# Patient Record
Sex: MALE | Race: WHITE | NOT HISPANIC OR LATINO | Employment: OTHER | URBAN - METROPOLITAN AREA
[De-identification: names, ages, dates, MRNs, and addresses within clinical notes are randomized per-mention and may not be internally consistent; named-entity substitution may affect disease eponyms.]

---

## 2019-11-15 ENCOUNTER — HOSPITAL ENCOUNTER (EMERGENCY)
Facility: HOSPITAL | Age: 59
Discharge: HOME | End: 2019-11-16
Attending: EMERGENCY MEDICINE
Payer: COMMERCIAL

## 2019-11-15 DIAGNOSIS — R06.02 SOB (SHORTNESS OF BREATH): ICD-10-CM

## 2019-11-15 DIAGNOSIS — R10.9 FLANK PAIN: Primary | ICD-10-CM

## 2019-11-15 DIAGNOSIS — N20.0 KIDNEY STONE: ICD-10-CM

## 2019-11-15 PROCEDURE — 93005 ELECTROCARDIOGRAM TRACING: CPT

## 2019-11-15 PROCEDURE — 99285 EMERGENCY DEPT VISIT HI MDM: CPT | Mod: 25

## 2019-11-16 VITALS
TEMPERATURE: 97 F | HEART RATE: 88 BPM | WEIGHT: 225 LBS | RESPIRATION RATE: 18 BRPM | BODY MASS INDEX: 33.33 KG/M2 | DIASTOLIC BLOOD PRESSURE: 90 MMHG | OXYGEN SATURATION: 99 % | SYSTOLIC BLOOD PRESSURE: 166 MMHG | HEIGHT: 69 IN

## 2019-11-16 LAB
ALBUMIN SERPL BCP-MCNC: 4.2 G/DL (ref 3.5–5.2)
ALP SERPL-CCNC: 58 U/L (ref 55–135)
ALT SERPL W/O P-5'-P-CCNC: 28 U/L (ref 10–44)
ANION GAP SERPL CALC-SCNC: 11 MMOL/L (ref 8–16)
AST SERPL-CCNC: 22 U/L (ref 10–40)
BACTERIA #/AREA URNS HPF: NEGATIVE /HPF
BASOPHILS # BLD AUTO: 0.08 K/UL (ref 0–0.2)
BASOPHILS NFR BLD: 0.5 % (ref 0–1.9)
BILIRUB SERPL-MCNC: 1 MG/DL (ref 0.1–1)
BILIRUB UR QL STRIP: NEGATIVE
BUN SERPL-MCNC: 16 MG/DL (ref 6–20)
CALCIUM SERPL-MCNC: 8.8 MG/DL (ref 8.7–10.5)
CHLORIDE SERPL-SCNC: 103 MMOL/L (ref 95–110)
CLARITY UR: CLEAR
CO2 SERPL-SCNC: 23 MMOL/L (ref 23–29)
COLOR UR: YELLOW
CREAT SERPL-MCNC: 1.3 MG/DL (ref 0.5–1.4)
DIFFERENTIAL METHOD: ABNORMAL
EOSINOPHIL # BLD AUTO: 0.4 K/UL (ref 0–0.5)
EOSINOPHIL NFR BLD: 2.2 % (ref 0–8)
ERYTHROCYTE [DISTWIDTH] IN BLOOD BY AUTOMATED COUNT: 13.2 % (ref 11.5–14.5)
EST. GFR  (AFRICAN AMERICAN): >60 ML/MIN/1.73 M^2
EST. GFR  (NON AFRICAN AMERICAN): 59.7 ML/MIN/1.73 M^2
GLUCOSE SERPL-MCNC: 254 MG/DL (ref 70–110)
GLUCOSE UR QL STRIP: ABNORMAL
HCT VFR BLD AUTO: 46.6 % (ref 40–54)
HGB BLD-MCNC: 15.2 G/DL (ref 14–18)
HGB UR QL STRIP: ABNORMAL
HYALINE CASTS #/AREA URNS LPF: 1 /LPF
IMM GRANULOCYTES # BLD AUTO: 0.06 K/UL (ref 0–0.04)
IMM GRANULOCYTES NFR BLD AUTO: 0.4 % (ref 0–0.5)
KETONES UR QL STRIP: ABNORMAL
LEUKOCYTE ESTERASE UR QL STRIP: NEGATIVE
LYMPHOCYTES # BLD AUTO: 0.9 K/UL (ref 1–4.8)
LYMPHOCYTES NFR BLD: 5.8 % (ref 18–48)
MCH RBC QN AUTO: 29.1 PG (ref 27–31)
MCHC RBC AUTO-ENTMCNC: 32.6 G/DL (ref 32–36)
MCV RBC AUTO: 89 FL (ref 82–98)
MICROSCOPIC COMMENT: ABNORMAL
MONOCYTES # BLD AUTO: 0.9 K/UL (ref 0.3–1)
MONOCYTES NFR BLD: 5.3 % (ref 4–15)
NEUTROPHILS # BLD AUTO: 13.9 K/UL (ref 1.8–7.7)
NEUTROPHILS NFR BLD: 85.8 % (ref 38–73)
NITRITE UR QL STRIP: NEGATIVE
NRBC BLD-RTO: 0 /100 WBC
PH UR STRIP: 7 [PH] (ref 5–8)
PLATELET # BLD AUTO: 231 K/UL (ref 150–350)
PMV BLD AUTO: 11 FL (ref 9.2–12.9)
POTASSIUM SERPL-SCNC: 3.9 MMOL/L (ref 3.5–5.1)
PROT SERPL-MCNC: 6.5 G/DL (ref 6–8.4)
PROT UR QL STRIP: NEGATIVE
RBC # BLD AUTO: 5.22 M/UL (ref 4.6–6.2)
RBC #/AREA URNS HPF: 6 /HPF (ref 0–4)
SODIUM SERPL-SCNC: 137 MMOL/L (ref 136–145)
SP GR UR STRIP: 1 (ref 1–1.03)
SQUAMOUS #/AREA URNS HPF: 1 /HPF
URN SPEC COLLECT METH UR: ABNORMAL
UROBILINOGEN UR STRIP-ACNC: NEGATIVE EU/DL
WBC # BLD AUTO: 16.2 K/UL (ref 3.9–12.7)
WBC #/AREA URNS HPF: 1 /HPF (ref 0–5)
YEAST URNS QL MICRO: ABNORMAL

## 2019-11-16 PROCEDURE — 80053 COMPREHEN METABOLIC PANEL: CPT

## 2019-11-16 PROCEDURE — 81001 URINALYSIS AUTO W/SCOPE: CPT

## 2019-11-16 PROCEDURE — 85025 COMPLETE CBC W/AUTO DIFF WBC: CPT

## 2019-11-16 RX ORDER — TAMSULOSIN HYDROCHLORIDE 0.4 MG/1
0.4 CAPSULE ORAL DAILY
Qty: 5 CAPSULE | Refills: 0 | Status: SHIPPED | OUTPATIENT
Start: 2019-11-16 | End: 2022-07-19

## 2019-11-16 RX ORDER — IBUPROFEN 600 MG/1
600 TABLET ORAL EVERY 6 HOURS PRN
Qty: 20 TABLET | Refills: 0 | Status: ON HOLD | OUTPATIENT
Start: 2019-11-16 | End: 2022-07-19 | Stop reason: HOSPADM

## 2019-11-16 RX ORDER — ONDANSETRON 4 MG/1
4 TABLET, FILM COATED ORAL EVERY 6 HOURS
Qty: 12 TABLET | Refills: 0 | Status: ON HOLD | OUTPATIENT
Start: 2019-11-16 | End: 2022-07-19 | Stop reason: HOSPADM

## 2019-11-16 NOTE — ED NOTES
Patient identifiers for Matteo Fraire checked and correct.  LOC:  Matteo Fraire is awake, alert, and aware of environment with an appropriate affect. He is oriented x 3 and speaking appropriately.  APPEARANCE:  He is restless and in no acute distress. He is clean and well groomed, patient's clothing is properly fastened.  SKIN:  The skin is warm and dry. He has normal skin turgor and moist mucus membranes. Skin is intact; no bruising.   MUSCULOSKELETAL:  He is moving all extremities well, no obvious deformities noted. Pulses intact.   RESPIRATORY:  Airway is open and patent. Respirations are spontaneous and non-labored with normal effort and rate.  CARDIAC:  He has a normal rate and rhythm. No peripheral edema noted. Capillary refill < 3 seconds.  ABDOMEN:  No distention noted.  Soft and non-tender upon palpation. Pt reports abdominal pain to the lower left quadrant.   NEUROLOGICAL:  PERRL. Facial expression is symmetrical. Hand grasps are equal bilaterally. Normal sensation in all extremities when touched with finger.  Allergies reported:  Review of patient's allergies indicates:  No Known Allergies  OTHER NOTES:  Pt stated pain is a 7/10

## 2019-11-19 ENCOUNTER — HOSPITAL ENCOUNTER (OUTPATIENT)
Dept: RADIOLOGY | Facility: HOSPITAL | Age: 59
Discharge: HOME | End: 2019-11-19
Attending: SPECIALIST
Payer: COMMERCIAL

## 2019-11-19 DIAGNOSIS — N20.1 CALCULUS OF URETER: ICD-10-CM

## 2019-11-19 DIAGNOSIS — N20.1 CALCULUS OF URETER: Primary | ICD-10-CM

## 2019-11-19 PROCEDURE — 74018 RADEX ABDOMEN 1 VIEW: CPT | Mod: TC,PO

## 2019-11-20 ENCOUNTER — LAB VISIT (OUTPATIENT)
Dept: LAB | Facility: HOSPITAL | Age: 59
End: 2019-11-20
Attending: SPECIALIST
Payer: COMMERCIAL

## 2019-11-20 ENCOUNTER — HOSPITAL ENCOUNTER (OUTPATIENT)
Dept: RADIOLOGY | Facility: HOSPITAL | Age: 59
Discharge: HOME | End: 2019-11-20
Attending: SPECIALIST
Payer: COMMERCIAL

## 2019-11-20 ENCOUNTER — HOSPITAL ENCOUNTER (OUTPATIENT)
Dept: PREADMISSION TESTING | Facility: HOSPITAL | Age: 59
Discharge: HOME | End: 2019-11-20
Attending: SPECIALIST
Payer: COMMERCIAL

## 2019-11-20 VITALS
SYSTOLIC BLOOD PRESSURE: 159 MMHG | RESPIRATION RATE: 20 BRPM | DIASTOLIC BLOOD PRESSURE: 92 MMHG | WEIGHT: 231.5 LBS | BODY MASS INDEX: 34.29 KG/M2 | TEMPERATURE: 97 F | OXYGEN SATURATION: 97 % | HEART RATE: 94 BPM | HEIGHT: 69 IN

## 2019-11-20 DIAGNOSIS — Z41.9 SURGERY, ELECTIVE: Primary | ICD-10-CM

## 2019-11-20 DIAGNOSIS — N20.1 LEFT URETERAL STONE: ICD-10-CM

## 2019-11-20 DIAGNOSIS — N20.1 LEFT URETERAL STONE: Primary | ICD-10-CM

## 2019-11-20 LAB
ALBUMIN SERPL BCP-MCNC: 4.3 G/DL (ref 3.5–5.2)
ALP SERPL-CCNC: 57 U/L (ref 55–135)
ALT SERPL W/O P-5'-P-CCNC: 22 U/L (ref 10–44)
ANION GAP SERPL CALC-SCNC: 10 MMOL/L (ref 8–16)
APTT PPP: 29.2 SEC (ref 23.6–33.3)
AST SERPL-CCNC: 18 U/L (ref 10–40)
BACTERIA #/AREA URNS HPF: NEGATIVE /HPF
BASOPHILS # BLD AUTO: 0.07 K/UL (ref 0–0.2)
BASOPHILS NFR BLD: 0.8 % (ref 0–1.9)
BILIRUB SERPL-MCNC: 1 MG/DL (ref 0.1–1)
BILIRUB UR QL STRIP: NEGATIVE
BUN SERPL-MCNC: 16 MG/DL (ref 6–20)
CALCIUM SERPL-MCNC: 9.4 MG/DL (ref 8.7–10.5)
CHLORIDE SERPL-SCNC: 101 MMOL/L (ref 95–110)
CLARITY UR: CLEAR
CO2 SERPL-SCNC: 30 MMOL/L (ref 23–29)
COLOR UR: YELLOW
COMPLEXED PSA SERPL-MCNC: 0.44 NG/ML (ref 0–4)
CREAT SERPL-MCNC: 1.2 MG/DL (ref 0.5–1.4)
DIFFERENTIAL METHOD: ABNORMAL
EOSINOPHIL # BLD AUTO: 0.9 K/UL (ref 0–0.5)
EOSINOPHIL NFR BLD: 11.3 % (ref 0–8)
ERYTHROCYTE [DISTWIDTH] IN BLOOD BY AUTOMATED COUNT: 13 % (ref 11.5–14.5)
EST. GFR  (AFRICAN AMERICAN): >60 ML/MIN/1.73 M^2
EST. GFR  (NON AFRICAN AMERICAN): >60 ML/MIN/1.73 M^2
GLUCOSE SERPL-MCNC: 133 MG/DL (ref 70–110)
GLUCOSE UR QL STRIP: NEGATIVE
HCT VFR BLD AUTO: 45.9 % (ref 40–54)
HGB BLD-MCNC: 15 G/DL (ref 14–18)
HGB UR QL STRIP: ABNORMAL
HYALINE CASTS #/AREA URNS LPF: 2 /LPF
IMM GRANULOCYTES # BLD AUTO: 0.03 K/UL (ref 0–0.04)
IMM GRANULOCYTES NFR BLD AUTO: 0.4 % (ref 0–0.5)
INR PPP: 1.1
KETONES UR QL STRIP: NEGATIVE
LEUKOCYTE ESTERASE UR QL STRIP: NEGATIVE
LYMPHOCYTES # BLD AUTO: 1.6 K/UL (ref 1–4.8)
LYMPHOCYTES NFR BLD: 19.1 % (ref 18–48)
MCH RBC QN AUTO: 29.4 PG (ref 27–31)
MCHC RBC AUTO-ENTMCNC: 32.7 G/DL (ref 32–36)
MCV RBC AUTO: 90 FL (ref 82–98)
MICROSCOPIC COMMENT: ABNORMAL
MONOCYTES # BLD AUTO: 0.5 K/UL (ref 0.3–1)
MONOCYTES NFR BLD: 6.2 % (ref 4–15)
NEUTROPHILS # BLD AUTO: 5.2 K/UL (ref 1.8–7.7)
NEUTROPHILS NFR BLD: 62.2 % (ref 38–73)
NITRITE UR QL STRIP: NEGATIVE
NRBC BLD-RTO: 0 /100 WBC
PH UR STRIP: 6 [PH] (ref 5–8)
PLATELET # BLD AUTO: 258 K/UL (ref 150–350)
PMV BLD AUTO: 11.3 FL (ref 9.2–12.9)
POTASSIUM SERPL-SCNC: 4 MMOL/L (ref 3.5–5.1)
PROT SERPL-MCNC: 6.7 G/DL (ref 6–8.4)
PROT UR QL STRIP: ABNORMAL
PROTHROMBIN TIME: 13.4 SEC (ref 10.6–14.8)
RBC # BLD AUTO: 5.1 M/UL (ref 4.6–6.2)
RBC #/AREA URNS HPF: 6 /HPF (ref 0–4)
SODIUM SERPL-SCNC: 141 MMOL/L (ref 136–145)
SP GR UR STRIP: 1.01 (ref 1–1.03)
SQUAMOUS #/AREA URNS HPF: 0 /HPF
URN SPEC COLLECT METH UR: ABNORMAL
UROBILINOGEN UR STRIP-ACNC: ABNORMAL EU/DL
WBC # BLD AUTO: 8.33 K/UL (ref 3.9–12.7)
WBC #/AREA URNS HPF: 3 /HPF (ref 0–5)

## 2019-11-20 PROCEDURE — 81001 URINALYSIS AUTO W/SCOPE: CPT

## 2019-11-20 PROCEDURE — 36415 COLL VENOUS BLD VENIPUNCTURE: CPT

## 2019-11-20 PROCEDURE — 85025 COMPLETE CBC W/AUTO DIFF WBC: CPT

## 2019-11-20 PROCEDURE — 84153 ASSAY OF PSA TOTAL: CPT

## 2019-11-20 PROCEDURE — 80053 COMPREHEN METABOLIC PANEL: CPT

## 2019-11-20 PROCEDURE — 85610 PROTHROMBIN TIME: CPT

## 2019-11-20 PROCEDURE — 85730 THROMBOPLASTIN TIME PARTIAL: CPT

## 2019-11-20 PROCEDURE — 71046 X-RAY EXAM CHEST 2 VIEWS: CPT | Mod: TC

## 2019-11-20 RX ORDER — CEFAZOLIN SODIUM 1 G/3ML
2 INJECTION, POWDER, FOR SOLUTION INTRAMUSCULAR; INTRAVENOUS ONCE
Status: CANCELLED | OUTPATIENT
Start: 2019-11-21

## 2019-11-20 NOTE — DISCHARGE INSTRUCTIONS
To confirm, Your doctor has instructed you that surgery is scheduled for: November 21, 2019    Please report to [facility] on [address] the morning of surgery.     Pre-Op will call the afternoon prior to surgery between 4:00 and 6:00 PM with the final arrival time.      PLEASE NOTE:  The surgery schedule has many variables which may affect the time of your surgery case.  Family members should be available if your surgery time changes.  Plan to be here the day of your procedure between 4-6 hours.    MEDICATIONS:  TAKE ONLY THESE MEDICATIONS WITH A SMALL SIP OF WATER THE MORNING OF YOUR PROCEDURE: None      DO NOT TAKE THESE MEDICATIONS 5-7 DAYS PRIOR to your procedure or per your surgeon's request: ASPIRIN, ALEVE, ADVIL, IBUPROFEN, FISH OIL VITAMIN E, HERBALS  (May take Tylenol)    ONLY if you are prescribed any types of blood thinners such as:  Aspirin, Coumadin, Plavix, Pradaxa, Xarelto, Aggrenox, Effient, Eliquis, Savasya, Brilinta, or any other, ask your surgeon whether you should stop taking them and how long before surgery you should stop.  You may also need to verify with the prescribing physician if it is ok to stop your medication.      INSTRUCTIONS IMPORTANT!!  · Do not eat or drink anything between midnight and the time of your procedure- this includes gum, mints, and candy.  · Shower the night before AND the morning of your procedure with a Chlorhexidine wash such as Hibiclens or Dial antibacterial soap from the neck down.  Do not get it on your face or in your eyes.  You may use your own shampoo and face wash. This helps your skin to be as bacteria free as possible.    · If you wear contact lenses, dentures, hearing aids or glasses, bring a container to put them in during surgery and give to a family member for safe keeping.  Please leave all jewelry, piercing's and valuables at home.   · DO NOT remove hair from the surgery site.  Do not shave the incision site unless you are given specific  instructions to do so.    · Make arrangements in advance for transportation home by a responsible adult.  · You must make arrangements for transportation, TAXI'S, UBER'S OR LYFTS ARE NOT ALLOWED.      If you have any questions about these instructions, call Pre-Op Admit  Nursing at [phone] or the Pre-Op Day Surgery Unit at [phone].

## 2019-11-21 ENCOUNTER — ANESTHESIA EVENT (OUTPATIENT)
Dept: SURGERY | Facility: HOSPITAL | Age: 59
End: 2019-11-21
Payer: COMMERCIAL

## 2019-11-21 ENCOUNTER — HOSPITAL ENCOUNTER (OUTPATIENT)
Facility: HOSPITAL | Age: 59
Discharge: HOME | End: 2019-11-21
Attending: SPECIALIST | Admitting: SPECIALIST
Payer: COMMERCIAL

## 2019-11-21 ENCOUNTER — ANESTHESIA (OUTPATIENT)
Dept: SURGERY | Facility: HOSPITAL | Age: 59
End: 2019-11-21
Payer: COMMERCIAL

## 2019-11-21 VITALS
SYSTOLIC BLOOD PRESSURE: 172 MMHG | WEIGHT: 231.5 LBS | HEIGHT: 69 IN | HEART RATE: 76 BPM | DIASTOLIC BLOOD PRESSURE: 98 MMHG | RESPIRATION RATE: 16 BRPM | OXYGEN SATURATION: 98 % | BODY MASS INDEX: 34.29 KG/M2 | TEMPERATURE: 98 F

## 2019-11-21 DIAGNOSIS — N20.0 RENAL STONE: Primary | ICD-10-CM

## 2019-11-21 DIAGNOSIS — Z41.9 SURGERY, ELECTIVE: ICD-10-CM

## 2019-11-21 PROCEDURE — 71000039 HC RECOVERY, EACH ADD'L HOUR: Performed by: SPECIALIST

## 2019-11-21 PROCEDURE — 27000671 HC TUBING MICROBORE EXT: Performed by: STUDENT IN AN ORGANIZED HEALTH CARE EDUCATION/TRAINING PROGRAM

## 2019-11-21 PROCEDURE — 71000015 HC POSTOP RECOV 1ST HR: Performed by: SPECIALIST

## 2019-11-21 PROCEDURE — 71000016 HC POSTOP RECOV ADDL HR: Performed by: SPECIALIST

## 2019-11-21 PROCEDURE — 27000653 HC CATH, IV CATHLN: Performed by: STUDENT IN AN ORGANIZED HEALTH CARE EDUCATION/TRAINING PROGRAM

## 2019-11-21 PROCEDURE — 36000706: Performed by: SPECIALIST

## 2019-11-21 PROCEDURE — 63600175 PHARM REV CODE 636 W HCPCS: Performed by: NURSE ANESTHETIST, CERTIFIED REGISTERED

## 2019-11-21 PROCEDURE — 27202105 HC BIS BILATERAL SENSOR: Performed by: STUDENT IN AN ORGANIZED HEALTH CARE EDUCATION/TRAINING PROGRAM

## 2019-11-21 PROCEDURE — 63600175 PHARM REV CODE 636 W HCPCS: Performed by: ANESTHESIOLOGY

## 2019-11-21 PROCEDURE — 36000707: Performed by: SPECIALIST

## 2019-11-21 PROCEDURE — 27202103: Performed by: STUDENT IN AN ORGANIZED HEALTH CARE EDUCATION/TRAINING PROGRAM

## 2019-11-21 PROCEDURE — 37000009 HC ANESTHESIA EA ADD 15 MINS: Performed by: SPECIALIST

## 2019-11-21 PROCEDURE — 25000003 PHARM REV CODE 250: Performed by: ANESTHESIOLOGY

## 2019-11-21 PROCEDURE — 71000033 HC RECOVERY, INTIAL HOUR: Performed by: SPECIALIST

## 2019-11-21 PROCEDURE — 37000008 HC ANESTHESIA 1ST 15 MINUTES: Performed by: SPECIALIST

## 2019-11-21 PROCEDURE — 27000651 HC AIRWAY WILLIAMS: Performed by: STUDENT IN AN ORGANIZED HEALTH CARE EDUCATION/TRAINING PROGRAM

## 2019-11-21 PROCEDURE — 27200651 HC AIRWAY, LMA: Performed by: STUDENT IN AN ORGANIZED HEALTH CARE EDUCATION/TRAINING PROGRAM

## 2019-11-21 PROCEDURE — 27000673 HC TUBING BLOOD Y: Performed by: STUDENT IN AN ORGANIZED HEALTH CARE EDUCATION/TRAINING PROGRAM

## 2019-11-21 RX ORDER — SODIUM CHLORIDE 0.9 % (FLUSH) 0.9 %
10 SYRINGE (ML) INJECTION
Status: DISCONTINUED | OUTPATIENT
Start: 2019-11-21 | End: 2019-11-21 | Stop reason: HOSPADM

## 2019-11-21 RX ORDER — ACETAMINOPHEN 500 MG
1000 TABLET ORAL ONCE
Status: COMPLETED | OUTPATIENT
Start: 2019-11-21 | End: 2019-11-21

## 2019-11-21 RX ORDER — LIDOCAINE HCL/PF 100 MG/5ML
SYRINGE (ML) INTRAVENOUS
Status: DISCONTINUED | OUTPATIENT
Start: 2019-11-21 | End: 2019-11-21

## 2019-11-21 RX ORDER — DIPHENHYDRAMINE HYDROCHLORIDE 50 MG/ML
12.5 INJECTION INTRAMUSCULAR; INTRAVENOUS
Status: DISCONTINUED | OUTPATIENT
Start: 2019-11-21 | End: 2019-11-21 | Stop reason: HOSPADM

## 2019-11-21 RX ORDER — ONDANSETRON 2 MG/ML
4 INJECTION INTRAMUSCULAR; INTRAVENOUS DAILY PRN
Status: DISCONTINUED | OUTPATIENT
Start: 2019-11-21 | End: 2019-11-21 | Stop reason: HOSPADM

## 2019-11-21 RX ORDER — PROPOFOL 10 MG/ML
INJECTION, EMULSION INTRAVENOUS
Status: DISCONTINUED | OUTPATIENT
Start: 2019-11-21 | End: 2019-11-21

## 2019-11-21 RX ORDER — OXYCODONE HYDROCHLORIDE 5 MG/1
5 TABLET ORAL
Status: DISCONTINUED | OUTPATIENT
Start: 2019-11-21 | End: 2019-11-21 | Stop reason: HOSPADM

## 2019-11-21 RX ORDER — CEFAZOLIN SODIUM 1 G/3ML
2 INJECTION, POWDER, FOR SOLUTION INTRAMUSCULAR; INTRAVENOUS ONCE
Status: DISCONTINUED | OUTPATIENT
Start: 2019-11-21 | End: 2019-11-21 | Stop reason: HOSPADM

## 2019-11-21 RX ORDER — HYDROCODONE BITARTRATE AND ACETAMINOPHEN 5; 325 MG/1; MG/1
1 TABLET ORAL EVERY 4 HOURS PRN
Status: DISCONTINUED | OUTPATIENT
Start: 2019-11-21 | End: 2019-11-21 | Stop reason: HOSPADM

## 2019-11-21 RX ORDER — NAPROXEN SODIUM 220 MG/1
81 TABLET, FILM COATED ORAL DAILY
COMMUNITY

## 2019-11-21 RX ORDER — HYDROCODONE BITARTRATE AND ACETAMINOPHEN 5; 325 MG/1; MG/1
1 TABLET ORAL EVERY 4 HOURS PRN
Qty: 12 TABLET | Refills: 0 | Status: ON HOLD
Start: 2019-11-21 | End: 2022-07-19 | Stop reason: HOSPADM

## 2019-11-21 RX ORDER — MEPERIDINE HYDROCHLORIDE 50 MG/ML
12.5 INJECTION INTRAMUSCULAR; INTRAVENOUS; SUBCUTANEOUS EVERY 10 MIN PRN
Status: DISCONTINUED | OUTPATIENT
Start: 2019-11-21 | End: 2019-11-21 | Stop reason: HOSPADM

## 2019-11-21 RX ORDER — FENTANYL CITRATE 50 UG/ML
INJECTION, SOLUTION INTRAMUSCULAR; INTRAVENOUS
Status: DISCONTINUED | OUTPATIENT
Start: 2019-11-21 | End: 2019-11-21

## 2019-11-21 RX ORDER — HYDRALAZINE HYDROCHLORIDE 20 MG/ML
10 INJECTION INTRAMUSCULAR; INTRAVENOUS ONCE
Status: COMPLETED | OUTPATIENT
Start: 2019-11-21 | End: 2019-11-21

## 2019-11-21 RX ORDER — FENTANYL CITRATE 50 UG/ML
25 INJECTION, SOLUTION INTRAMUSCULAR; INTRAVENOUS
Status: DISCONTINUED | OUTPATIENT
Start: 2019-11-21 | End: 2019-11-21 | Stop reason: HOSPADM

## 2019-11-21 RX ORDER — SODIUM CHLORIDE, SODIUM LACTATE, POTASSIUM CHLORIDE, CALCIUM CHLORIDE 600; 310; 30; 20 MG/100ML; MG/100ML; MG/100ML; MG/100ML
INJECTION, SOLUTION INTRAVENOUS CONTINUOUS PRN
Status: DISCONTINUED | OUTPATIENT
Start: 2019-11-21 | End: 2019-11-21

## 2019-11-21 RX ORDER — MIDAZOLAM HYDROCHLORIDE 1 MG/ML
INJECTION, SOLUTION INTRAMUSCULAR; INTRAVENOUS
Status: DISCONTINUED | OUTPATIENT
Start: 2019-11-21 | End: 2019-11-21

## 2019-11-21 RX ORDER — ONDANSETRON 2 MG/ML
INJECTION INTRAMUSCULAR; INTRAVENOUS
Status: DISCONTINUED | OUTPATIENT
Start: 2019-11-21 | End: 2019-11-21

## 2019-11-21 RX ORDER — GABAPENTIN 100 MG/1
100 CAPSULE ORAL ONCE
Status: COMPLETED | OUTPATIENT
Start: 2019-11-21 | End: 2019-11-21

## 2019-11-21 RX ADMIN — PROPOFOL 160 MG: 10 INJECTION, EMULSION INTRAVENOUS at 11:11

## 2019-11-21 RX ADMIN — HYDRALAZINE HYDROCHLORIDE 10 MG: 20 INJECTION INTRAMUSCULAR; INTRAVENOUS at 12:11

## 2019-11-21 RX ADMIN — FENTANYL CITRATE 100 MCG: 50 INJECTION INTRAMUSCULAR; INTRAVENOUS at 11:11

## 2019-11-21 RX ADMIN — GABAPENTIN 100 MG: 100 CAPSULE ORAL at 11:11

## 2019-11-21 RX ADMIN — SODIUM CHLORIDE, SODIUM LACTATE, POTASSIUM CHLORIDE, AND CALCIUM CHLORIDE: .6; .31; .03; .02 INJECTION, SOLUTION INTRAVENOUS at 11:11

## 2019-11-21 RX ADMIN — MIDAZOLAM 1 MG: 1 INJECTION INTRAMUSCULAR; INTRAVENOUS at 11:11

## 2019-11-21 RX ADMIN — ACETAMINOPHEN 1000 MG: 500 TABLET, FILM COATED ORAL at 11:11

## 2019-11-21 RX ADMIN — ONDANSETRON 4 MG: 2 INJECTION INTRAMUSCULAR; INTRAVENOUS at 11:11

## 2019-11-21 RX ADMIN — LIDOCAINE HYDROCHLORIDE 100 MG: 20 INJECTION, SOLUTION INTRAVENOUS at 11:11

## 2019-11-21 NOTE — TRANSFER OF CARE
"Anesthesia Transfer of Care Note    Patient: Matteo Fraire    Procedure(s) Performed: Procedure(s) (LRB):  LITHOTRIPSY, ESWL ( (Left)  CYSTOSCOPY, WITH URETERAL STENT INSERTION (Left)    Patient location: OPS    Anesthesia Type: general    Transport from OR: Transported from OR on room air with adequate spontaneous ventilation    Post pain: adequate analgesia    Post assessment: no apparent anesthetic complications    Post vital signs: stable    Level of consciousness: awake and alert    Nausea/Vomiting: no nausea/vomiting    Complications: none    Transfer of care protocol was followed      Last vitals:   Visit Vitals  BP (!) 183/108   Pulse 77   Temp 36.6 °C (97.8 °F) (Oral)   Resp 17   Ht 5' 9" (1.753 m)   Wt 105 kg (231 lb 7.7 oz)   SpO2 96%   BMI 34.18 kg/m²     "

## 2019-11-21 NOTE — ANESTHESIA PREPROCEDURE EVALUATION
11/21/2019  Matteo Fraire is a 59 y.o., male.    Anesthesia Evaluation    I have reviewed the Patient Summary Reports.    I have reviewed the Nursing Notes.   I have reviewed the Medications.     Review of Systems  Anesthesia Hx:  Denies Family Hx of Anesthesia complications.   Denies Personal Hx of Anesthesia complications.   Social:  Non-Smoker    Cardiovascular:   Exercise tolerance: good Hypertension Past MI (2005 and 2011) CAD asymptomatic CABG/stent     Pulmonary:   Three Sleep studies inconclusive   Renal/:   renal calculi    Hepatic/GI:   GERD (occ)        Physical Exam  General:  Well nourished, Obesity    Airway/Jaw/Neck:  Airway Findings: Mouth Opening: Normal Tongue: Normal  Mallampati: II  Improves to I with phonation.  TM Distance: Normal, at least 6 cm  Jaw/Neck Findings:  Neck ROM: Normal ROM      Dental:  Dental Findings: In tact   Chest/Lungs:  Chest/Lungs Findings: Clear to auscultation, Normal Respiratory Rate     Heart/Vascular:  Heart Findings: Rate: Normal  Rhythm: Regular Rhythm  Sounds: Normal  Heart murmur: negative       Mental Status:  Mental Status Findings:  Cooperative, Alert and Oriented         Anesthesia Plan  Type of Anesthesia, risks & benefits discussed:  Anesthesia Type:  general  Patient's Preference:   Intra-op Monitoring Plan: standard ASA monitors  Intra-op Monitoring Plan Comments:   Post Op Pain Control Plan: multimodal analgesia  Post Op Pain Control Plan Comments:   Induction:   IV  Beta Blocker:         Informed Consent: Patient understands risks and agrees with Anesthesia plan.  Questions answered. Anesthesia consent signed with patient.  ASA Score: 3     Day of Surgery Review of History & Physical:        Anesthesia Plan Notes: LMA  High risk for sleep apnea... No Versed, minimize fentanyl  Tylenol and gabapentin given in holding  Consider toradol in  PACU        Ready For Surgery From Anesthesia Perspective.

## 2019-11-21 NOTE — PLAN OF CARE
Spoke to Dr. Mederos regarding patients blood pressure, first reading 203/118, waited 20 minutes second reading 183/108.  Surgery called for patient, Dr. Mederos gave no new orders and said elevated blood pressure would be addressing in surgery holding.

## 2019-11-21 NOTE — ANESTHESIA POSTPROCEDURE EVALUATION
Anesthesia Post Evaluation    Patient: Matteo Fraire    Procedure(s) Performed: Procedure(s) (LRB):  LITHOTRIPSY, ESWL (Left)  CYSTOSCOPY, WITH URETERAL STENT INSERTION (Left)    Final Anesthesia Type: general    Patient location during evaluation: PACU  Patient participation: Yes- Able to Participate  Level of consciousness: awake and alert and oriented  Post-procedure vital signs: reviewed and stable  Pain management: adequate  Airway patency: patent    PONV status at discharge: No PONV  Anesthetic complications: no      Cardiovascular status: blood pressure returned to baseline and hemodynamically stable  Respiratory status: unassisted, spontaneous ventilation and room air  Hydration status: euvolemic  Follow-up not needed.          Vitals Value Taken Time   /83 11/21/2019  1:15 PM   Temp 36.6 °C (97.8 °F) 11/21/2019  9:51 AM   Pulse 70 11/21/2019  1:23 PM   Resp 12 11/21/2019  1:23 PM   SpO2 100 % 11/21/2019  1:23 PM   Vitals shown include unvalidated device data.      No case tracking events are documented in the log.      Pain/Lissy Score: Pain Rating Prior to Med Admin: 0 (11/21/2019 11:28 AM)

## 2019-11-21 NOTE — DISCHARGE INSTRUCTIONS
NO DRIVING, DRINKING ALCOHOL AND NO SIGNING LEGAL DOCUMENTS FOR 24 HOURS OR WHILE TAKING PAIN MEDICATIONS.  DO NOT SHOWER FOR 24 HOURS.  NOTIFY YOUR DOCTOR FOR UNCONTROLLED PAIN, TEMPERATURE GREATER THAN 100.5 AND/OR UNCONTROLLED NAUSEA/VOMITING

## 2019-11-21 NOTE — DISCHARGE SUMMARY
Patient comes in for outpatient lithotripsy    Procedure is done w no complication    After a brief stay in recovery, patient is discharged in good condition    Meds given:  Lortab    F/u office:  2 weeks with shaun MCCLAIN

## 2019-11-21 NOTE — OP NOTE
Operative Note         SUMMARY     Surgery Date:  11/21/2019     Assistant:     Indications:  This is a 59-year-old male with a left ureteral stone  Here for outpatient lithotripsy      Pre-op Diagnosis:   Left ureteral stone    Post-op Diagnosis:  Same    Procedure:  Left ESWL  Anesthesia: General    Description of Procedure:   After consents were obtained the patient was taken to the operating room  Placed in a supine position  Anesthesia is given  Dornier lithotripter is brought into the room  Patient is properly positioned on the lithotripsy probe  The stone is 4     mm  We began at a KV of 16 and gradually increased to 25      3000     Shocks were delivered      The procedure was done without any complication  After the procedure the patient is brought to the recovery room in satisfactory condition      Findings/Key Components:          Estimated Blood Loss:

## 2019-11-28 NOTE — ED PROVIDER NOTES
"Encounter Date: 11/15/2019     Chart completed on date signed following the encounter from chart review.       History     Chief Complaint   Patient presents with    Flank Pain     left flank pain started after eating dinner tonight, then became nauseated and sob, vomit x 4,      HPI     Presented with flank pain. Started tonight.  Pain moderate to severe.  Pain is left-sided.  No alleviating or exacerbating factors.  Began spontaneously.    Review of patient's allergies indicates:  No Known Allergies  Past Medical History:   Diagnosis Date    Heart attack 2005;2011    Hypertension     Psoriasis      Past Surgical History:   Procedure Laterality Date    CORONARY ARTERY BYPASS GRAFT      CYSTOSCOPY W/ URETERAL STENT PLACEMENT Left 11/21/2019    Procedure: CYSTOSCOPY, WITH URETERAL STENT INSERTION;  Surgeon: Mickey Escudero MD;  Location: [facility] OR;  Service: Urology;  Laterality: Left;    EXTRACORPOREAL SHOCK WAVE LITHOTRIPSY Left 11/21/2019    Procedure: LITHOTRIPSY, ESWL;  Surgeon: Mickey Escudero MD;  [facility];  Service: Urology;  Laterality: Left;    HERNIA REPAIR      Inguinal just after birth     No family history on file.  Social History     Tobacco Use    Smoking status: Never Smoker    Smokeless tobacco: Never Used   Substance Use Topics    Alcohol use: Never     Frequency: Never     Comment: "Quit in 87"    Drug use: Never     Review of Systems   Constitutional: Negative for fever.   HENT: Negative for sore throat.    Respiratory: Negative for shortness of breath.    Cardiovascular: Negative for chest pain.   Gastrointestinal: Positive for vomiting. Negative for nausea.   Genitourinary: Positive for flank pain. Negative for dysuria.   Musculoskeletal: Negative for back pain.   Skin: Negative for rash.   Neurological: Negative for weakness.   Hematological: Does not bruise/bleed easily.       Physical Exam     Initial Vitals [11/15/19 2234]   BP Pulse Resp Temp SpO2   (!) 191/115 " 95 (!) 22 97.6 °F (36.4 °C) 98 %      MAP       --         Physical Exam    Constitutional: Vital signs are normal. He appears well-developed and well-nourished.   HENT:   Head: Normocephalic and atraumatic.   Eyes: Conjunctivae are normal.   Neck: Neck supple.   Cardiovascular: Normal rate and regular rhythm.   Pulmonary/Chest:   Effort normal   Abdominal: Soft. Normal appearance. There is tenderness.   Musculoskeletal: Normal range of motion.   Neurological: He is alert and oriented to person, place, and time.   Skin: Skin is warm and dry.   Psychiatric: He has a normal mood and affect.         ED Course   Procedures  Labs Reviewed   URINALYSIS, REFLEX TO URINE CULTURE - Abnormal; Notable for the following components:       Result Value    Glucose, UA 3+ (*)     Ketones, UA 1+ (*)     Occult Blood UA 2+ (*)     All other components within normal limits    Narrative:     Preferred Collection Type->Urine, Clean Catch  Specimen Source->Urine   CBC W/ AUTO DIFFERENTIAL - Abnormal; Notable for the following components:    WBC 16.20 (*)     Gran # (ANC) 13.9 (*)     Immature Grans (Abs) 0.06 (*)     Lymph # 0.9 (*)     Gran% 85.8 (*)     Lymph% 5.8 (*)     All other components within normal limits   COMPREHENSIVE METABOLIC PANEL - Abnormal; Notable for the following components:    Glucose 254 (*)     eGFR if non  59.7 (*)     All other components within normal limits   URINALYSIS MICROSCOPIC - Abnormal; Notable for the following components:    RBC, UA 6 (*)     All other components within normal limits    Narrative:     Preferred Collection Type->Urine, Clean Catch  Specimen Source->Urine        ECG Results          EKG 12-lead (Final result)  Result time 11/18/19 13:43:12    Final result by Interface, Lab In [facility] (11/18/19 13:43:12)                 Narrative:    Test Reason : R06.02,    Vent. Rate : 091 BPM     Atrial Rate : 091 BPM     P-R Int : 204 ms          QRS Dur : 102 ms      QT Int : 366 ms        P-R-T Axes : 045 070 075 degrees     QTc Int : 450 ms    Normal sinus rhythm  Normal ECG  No previous ECGs available  Confirmed by Carlos Souza MD ([id number]) on 11/18/2019 1:43:04 PM    Referred By: ADEBAYO   SELF           Confirmed By:Carlos Souza MD                            Imaging Results          CT Renal Stone Study ABD Pelvis WO (Final result)  Result time 11/16/19 00:47:49    Final result by Jeff Galindo MD (11/16/19 00:47:49)                 Narrative:        Exam: CT OF THE ABDOMEN/PELVIS WITHOUT CONTRAST    Clinical data: Left lower quadrant pain with nausea/vomiting and shortness of  breath, onset tonight.    Technique: Direct contiguous axial CT images were acquired through the abdomen  and pelvis without contrast using soft tissue and bone algorithms.  Reformatted/MPR images were performed. Radiation dose: CTDIvol = 19.50 mGy, DLP  = 1015.50 mGy x cm.    Limitations: Lack of intravenous contrast limits evaluation of solid viscera.  Lack of oral contrast limits evaluation of the bowel loops.    Prior Studies: No prior studies submitted.    Findings: Lung bases:  Grossly clear.    Liver:   Unremarkable size, contour, and density.  Focal fatty infiltration of  right lobe of liver.  No evidence of mass. No evidence of dilated ducts.    Gallbladder:  Unremarkable    Spleen:  Grossly unremarkable.    Pancreas/adrenal glands:   Grossly unremarkable size, contour and density.    Kidneys:   In anatomic position. Grossly unremarkable renal size, contour and  density.    A 3.5 mm calculus in the left mid ureter at the level of the inferior endplate  of the L4 vertebra with mild left-sided hydronephrosis and hydroureter and  subtle perinephric fat stranding.    A 6 mm nonobstructive calculus in the right kidney interpolar region.      Retroperitoneum: No retroperitoneal lymphadenopathy. Unremarkable abdominal  aorta.  The IVC is grossly unremarkable.    Peritoneal cavity:  No evidence of  free air or ascites.    Gastrointestinal tract: Nondistended small bowel and colon.  No evidence of  obstruction.    Appendix:  Unremarkable.    Pelvis:  Solid and hollow viscera grossly unremarkable.  Bladder is moderately  distended.    Osseous structures: Bilateral pars defect at L5-S1 level.  Mild bilateral facet  arthropathy at L4-L5 and L5-S1 level.    No acute or destructive bony process identified.    IMPRESSION:    1.  A 3.5 mm calculus in the left mid ureter at the level of the inferior  endplate of the L4 vertebra with mild left-sided hydronephrosis and hydroureter  and subtle perinephric fat stranding.  A 6 mm nonobstructive calculus is also  visualized in the right kidney interpolar region.    2.  Mild, diffuse fatty infiltration of the liver.  No suspicious focal lesion.    Recommendation:    Follow up as clinically indicated.    All CT scans at this facility utilize dose modulation, iterative reconstruction,  and/or weight based dosing when appropriate to reduce radiation dose to as low  as reasonably achievable.          Electronically Signed by JEFF MICHAEL MD at 11/16/2019 2:07:25 AM                               Medical Decision Making:   Initial Assessment:   Flank pain.  Likely renal stone in origin.  See results of CT imaging below.  Offered pain control with narcotic based pain medication, but patient elected on Motrin.  Will discharge with Flomax, Motrin, and follow-up.    Imaging Results          CT Renal Stone Study ABD Pelvis WO (Final result)  Result time 11/16/19   00:47:49    Final result by Jeff Michael MD (11/16/19 00:47:49)                 Narrative:        Exam: CT OF THE ABDOMEN/PELVIS WITHOUT CONTRAST    Clinical data: Left lower quadrant pain with nausea/vomiting and shortness   of  breath, onset tonight.    Technique: Direct contiguous axial CT images were acquired through the   abdomen  and pelvis without contrast using soft tissue and bone  algorithms.  Reformatted/MPR images were performed. Radiation dose: CTDIvol = 19.50   mGy, DLP  = 1015.50 mGy x cm.    Limitations: Lack of intravenous contrast limits evaluation of solid   viscera.  Lack of oral contrast limits evaluation of the bowel loops.    Prior Studies: No prior studies submitted.    Findings: Lung bases:  Grossly clear.    Liver:   Unremarkable size, contour, and density.  Focal fatty   infiltration of  right lobe of liver.  No evidence of mass. No evidence of dilated ducts.    Gallbladder:  Unremarkable    Spleen:  Grossly unremarkable.    Pancreas/adrenal glands:   Grossly unremarkable size, contour and density.    Kidneys:   In anatomic position. Grossly unremarkable renal size, contour   and  density.    A 3.5 mm calculus in the left mid ureter at the level of the inferior   endplate  of the L4 vertebra with mild left-sided hydronephrosis and hydroureter and  subtle perinephric fat stranding.    A 6 mm nonobstructive calculus in the right kidney interpolar region.      Retroperitoneum: No retroperitoneal lymphadenopathy. Unremarkable   abdominal  aorta.  The IVC is grossly unremarkable.    Peritoneal cavity:  No evidence of free air or ascites.    Gastrointestinal tract: Nondistended small bowel and colon.  No evidence   of  obstruction.    Appendix:  Unremarkable.    Pelvis:  Solid and hollow viscera grossly unremarkable.  Bladder is   moderately  distended.    Osseous structures: Bilateral pars defect at L5-S1 level.  Mild bilateral   facet  arthropathy at L4-L5 and L5-S1 level.    No acute or destructive bony process identified.    IMPRESSION:    1.  A 3.5 mm calculus in the left mid ureter at the level of the inferior  endplate of the L4 vertebra with mild left-sided hydronephrosis and   hydroureter  and subtle perinephric fat stranding.  A 6 mm nonobstructive calculus is   also  visualized in the right kidney interpolar region.    2.  Mild, diffuse fatty infiltration of the liver.   No suspicious focal   lesion.    Recommendation:    Follow up as clinically indicated.    All CT scans at this facility utilize dose modulation, iterative   reconstruction,  and/or weight based dosing when appropriate to reduce radiation dose to as   low  as reasonably achievable.          Electronically Signed by CARLI MICHAEL MD at 11/16/2019 2:07:25 AM                                                         Clinical Impression:       ICD-10-CM ICD-9-CM   1. Flank pain R10.9 789.09   2. SOB (shortness of breath) R06.02 786.05   3. Kidney stone N20.0 592.0                             Jerad Pugh Jr., MD  11/27/19 1068

## 2022-06-27 ENCOUNTER — HOSPITAL ENCOUNTER (INPATIENT)
Facility: HOSPITAL | Age: 62
LOS: 22 days | Discharge: NURSING, REHAB OR LONG-TERM CARE | DRG: 231 | End: 2022-07-19
Attending: EMERGENCY MEDICINE | Admitting: GENERAL PRACTICE
Payer: COMMERCIAL

## 2022-06-27 DIAGNOSIS — R00.9 ABNORMAL HEART RATE: ICD-10-CM

## 2022-06-27 DIAGNOSIS — R07.9 CHEST PAIN: ICD-10-CM

## 2022-06-27 DIAGNOSIS — I21.3 ST ELEVATION MYOCARDIAL INFARCTION (STEMI), UNSPECIFIED ARTERY: ICD-10-CM

## 2022-06-27 DIAGNOSIS — I63.9 CVA (CEREBRAL VASCULAR ACCIDENT): ICD-10-CM

## 2022-06-27 DIAGNOSIS — I21.3 STEMI (ST ELEVATION MYOCARDIAL INFARCTION): Primary | ICD-10-CM

## 2022-06-27 DIAGNOSIS — Z95.1 S/P CABG (CORONARY ARTERY BYPASS GRAFT): ICD-10-CM

## 2022-06-27 DIAGNOSIS — I47.29 NSVT (NONSUSTAINED VENTRICULAR TACHYCARDIA): ICD-10-CM

## 2022-06-27 DIAGNOSIS — R63.39 FEEDING PROBLEM: ICD-10-CM

## 2022-06-27 DIAGNOSIS — I21.09 ST ELEVATION MYOCARDIAL INFARCTION (STEMI) INVOLVING OTHER CORONARY ARTERY OF ANTERIOR WALL: ICD-10-CM

## 2022-06-27 DIAGNOSIS — R79.89 ELEVATED TROPONIN: ICD-10-CM

## 2022-06-27 DIAGNOSIS — I63.9 EMBOLIC STROKE: ICD-10-CM

## 2022-06-27 DIAGNOSIS — I25.709 CORONARY ARTERY DISEASE INVOLVING CORONARY BYPASS GRAFT OF NATIVE HEART WITH ANGINA PECTORIS: ICD-10-CM

## 2022-06-27 LAB
ALBUMIN SERPL BCP-MCNC: 4.6 G/DL (ref 3.5–5.2)
ALP SERPL-CCNC: 65 U/L (ref 55–135)
ALT SERPL W/O P-5'-P-CCNC: 43 U/L (ref 10–44)
ANION GAP SERPL CALC-SCNC: 11 MMOL/L (ref 8–16)
APTT PPP: 28.1 SEC (ref 23.3–35.1)
AST SERPL-CCNC: 33 U/L (ref 10–40)
BASOPHILS # BLD AUTO: 0.06 K/UL (ref 0–0.2)
BASOPHILS # BLD AUTO: 0.08 K/UL (ref 0–0.2)
BASOPHILS NFR BLD: 0.7 % (ref 0–1.9)
BASOPHILS NFR BLD: 0.9 % (ref 0–1.9)
BILIRUB SERPL-MCNC: 0.8 MG/DL (ref 0.1–1)
BNP SERPL-MCNC: 202 PG/ML (ref 0–99)
BUN SERPL-MCNC: 18 MG/DL (ref 8–23)
CALCIUM SERPL-MCNC: 9 MG/DL (ref 8.7–10.5)
CHLORIDE SERPL-SCNC: 100 MMOL/L (ref 95–110)
CK MB SERPL-MCNC: 2 NG/ML (ref 0.1–6.5)
CK SERPL-CCNC: 95 U/L (ref 20–200)
CO2 SERPL-SCNC: 26 MMOL/L (ref 23–29)
CREAT SERPL-MCNC: 1.4 MG/DL (ref 0.5–1.4)
DIFFERENTIAL METHOD: NORMAL
DIFFERENTIAL METHOD: NORMAL
EOSINOPHIL # BLD AUTO: 0.3 K/UL (ref 0–0.5)
EOSINOPHIL # BLD AUTO: 0.4 K/UL (ref 0–0.5)
EOSINOPHIL NFR BLD: 4.1 % (ref 0–8)
EOSINOPHIL NFR BLD: 5.2 % (ref 0–8)
ERYTHROCYTE [DISTWIDTH] IN BLOOD BY AUTOMATED COUNT: 13 % (ref 11.5–14.5)
ERYTHROCYTE [DISTWIDTH] IN BLOOD BY AUTOMATED COUNT: 13.1 % (ref 11.5–14.5)
EST. GFR  (AFRICAN AMERICAN): >60 ML/MIN/1.73 M^2
EST. GFR  (NON AFRICAN AMERICAN): 53.8 ML/MIN/1.73 M^2
GLUCOSE SERPL-MCNC: 169 MG/DL (ref 70–110)
HCT VFR BLD AUTO: 44.6 % (ref 40–54)
HCT VFR BLD AUTO: 49.2 % (ref 40–54)
HGB BLD-MCNC: 14.6 G/DL (ref 14–18)
HGB BLD-MCNC: 16 G/DL (ref 14–18)
IMM GRANULOCYTES # BLD AUTO: 0.02 K/UL (ref 0–0.04)
IMM GRANULOCYTES # BLD AUTO: 0.03 K/UL (ref 0–0.04)
IMM GRANULOCYTES NFR BLD AUTO: 0.2 % (ref 0–0.5)
IMM GRANULOCYTES NFR BLD AUTO: 0.4 % (ref 0–0.5)
INR PPP: 1.1
LYMPHOCYTES # BLD AUTO: 2.1 K/UL (ref 1–4.8)
LYMPHOCYTES # BLD AUTO: 2.7 K/UL (ref 1–4.8)
LYMPHOCYTES NFR BLD: 25.1 % (ref 18–48)
LYMPHOCYTES NFR BLD: 31.7 % (ref 18–48)
MAGNESIUM SERPL-MCNC: 2.2 MG/DL (ref 1.6–2.6)
MCH RBC QN AUTO: 28.4 PG (ref 27–31)
MCH RBC QN AUTO: 28.7 PG (ref 27–31)
MCHC RBC AUTO-ENTMCNC: 32.5 G/DL (ref 32–36)
MCHC RBC AUTO-ENTMCNC: 32.7 G/DL (ref 32–36)
MCV RBC AUTO: 87 FL (ref 82–98)
MCV RBC AUTO: 88 FL (ref 82–98)
MONOCYTES # BLD AUTO: 0.5 K/UL (ref 0.3–1)
MONOCYTES # BLD AUTO: 0.6 K/UL (ref 0.3–1)
MONOCYTES NFR BLD: 5.7 % (ref 4–15)
MONOCYTES NFR BLD: 7.2 % (ref 4–15)
NEUTROPHILS # BLD AUTO: 4.6 K/UL (ref 1.8–7.7)
NEUTROPHILS # BLD AUTO: 5.3 K/UL (ref 1.8–7.7)
NEUTROPHILS NFR BLD: 54.6 % (ref 38–73)
NEUTROPHILS NFR BLD: 64.2 % (ref 38–73)
NRBC BLD-RTO: 0 /100 WBC
NRBC BLD-RTO: 0 /100 WBC
PLATELET # BLD AUTO: 232 K/UL (ref 150–450)
PLATELET # BLD AUTO: 289 K/UL (ref 150–450)
PMV BLD AUTO: 11 FL (ref 9.2–12.9)
PMV BLD AUTO: 11 FL (ref 9.2–12.9)
POC ACTIVATED CLOTTING TIME K: 237 SEC (ref 74–137)
POTASSIUM SERPL-SCNC: 4 MMOL/L (ref 3.5–5.1)
PROT SERPL-MCNC: 7.1 G/DL (ref 6–8.4)
PROTHROMBIN TIME: 13.1 SEC (ref 11.4–13.7)
RBC # BLD AUTO: 5.09 M/UL (ref 4.6–6.2)
RBC # BLD AUTO: 5.63 M/UL (ref 4.6–6.2)
SAMPLE: ABNORMAL
SARS-COV-2 RDRP RESP QL NAA+PROBE: NEGATIVE
SODIUM SERPL-SCNC: 137 MMOL/L (ref 136–145)
TROPONIN I SERPL DL<=0.01 NG/ML-MCNC: 0.06 NG/ML
WBC # BLD AUTO: 8.21 K/UL (ref 3.9–12.7)
WBC # BLD AUTO: 8.46 K/UL (ref 3.9–12.7)

## 2022-06-27 PROCEDURE — 25000003 PHARM REV CODE 250: Performed by: EMERGENCY MEDICINE

## 2022-06-27 PROCEDURE — 99291 CRITICAL CARE FIRST HOUR: CPT

## 2022-06-27 PROCEDURE — 82553 CREATINE MB FRACTION: CPT | Performed by: EMERGENCY MEDICINE

## 2022-06-27 PROCEDURE — C1769 GUIDE WIRE: HCPCS | Performed by: GENERAL PRACTICE

## 2022-06-27 PROCEDURE — 96365 THER/PROPH/DIAG IV INF INIT: CPT

## 2022-06-27 PROCEDURE — 36415 COLL VENOUS BLD VENIPUNCTURE: CPT | Performed by: GENERAL PRACTICE

## 2022-06-27 PROCEDURE — 25000003 PHARM REV CODE 250: Performed by: GENERAL PRACTICE

## 2022-06-27 PROCEDURE — U0002 COVID-19 LAB TEST NON-CDC: HCPCS | Performed by: EMERGENCY MEDICINE

## 2022-06-27 PROCEDURE — 84484 ASSAY OF TROPONIN QUANT: CPT | Mod: 91 | Performed by: GENERAL PRACTICE

## 2022-06-27 PROCEDURE — 85610 PROTHROMBIN TIME: CPT | Performed by: STUDENT IN AN ORGANIZED HEALTH CARE EDUCATION/TRAINING PROGRAM

## 2022-06-27 PROCEDURE — 83880 ASSAY OF NATRIURETIC PEPTIDE: CPT | Performed by: STUDENT IN AN ORGANIZED HEALTH CARE EDUCATION/TRAINING PROGRAM

## 2022-06-27 PROCEDURE — 96366 THER/PROPH/DIAG IV INF ADDON: CPT

## 2022-06-27 PROCEDURE — 85025 COMPLETE CBC W/AUTO DIFF WBC: CPT | Performed by: STUDENT IN AN ORGANIZED HEALTH CARE EDUCATION/TRAINING PROGRAM

## 2022-06-27 PROCEDURE — 93455 CORONARY ART/GRFT ANGIO S&I: CPT | Mod: 26,59,51, | Performed by: GENERAL PRACTICE

## 2022-06-27 PROCEDURE — 99152 PR MOD CONSCIOUS SEDATION, SAME PHYS, 5+ YRS, FIRST 15 MIN: ICD-10-PCS | Mod: ,,, | Performed by: GENERAL PRACTICE

## 2022-06-27 PROCEDURE — 85025 COMPLETE CBC W/AUTO DIFF WBC: CPT | Mod: 91 | Performed by: GENERAL PRACTICE

## 2022-06-27 PROCEDURE — 96375 TX/PRO/DX INJ NEW DRUG ADDON: CPT

## 2022-06-27 PROCEDURE — 83880 ASSAY OF NATRIURETIC PEPTIDE: CPT | Mod: 91 | Performed by: GENERAL PRACTICE

## 2022-06-27 PROCEDURE — 93459 L HRT ART/GRFT ANGIO: CPT | Performed by: GENERAL PRACTICE

## 2022-06-27 PROCEDURE — 85730 THROMBOPLASTIN TIME PARTIAL: CPT | Performed by: EMERGENCY MEDICINE

## 2022-06-27 PROCEDURE — 63600175 PHARM REV CODE 636 W HCPCS: Performed by: GENERAL PRACTICE

## 2022-06-27 PROCEDURE — C1887 CATHETER, GUIDING: HCPCS | Performed by: GENERAL PRACTICE

## 2022-06-27 PROCEDURE — 84484 ASSAY OF TROPONIN QUANT: CPT | Performed by: STUDENT IN AN ORGANIZED HEALTH CARE EDUCATION/TRAINING PROGRAM

## 2022-06-27 PROCEDURE — 20000000 HC ICU ROOM

## 2022-06-27 PROCEDURE — 92941 PRQ TRLML REVSC TOT OCCL AMI: CPT | Mod: LD | Performed by: GENERAL PRACTICE

## 2022-06-27 PROCEDURE — 99223 PR INITIAL HOSPITAL CARE,LEVL III: ICD-10-PCS | Mod: 25,,, | Performed by: GENERAL PRACTICE

## 2022-06-27 PROCEDURE — 99223 1ST HOSP IP/OBS HIGH 75: CPT | Mod: 25,,, | Performed by: GENERAL PRACTICE

## 2022-06-27 PROCEDURE — 99153 MOD SED SAME PHYS/QHP EA: CPT | Performed by: GENERAL PRACTICE

## 2022-06-27 PROCEDURE — C1760 CLOSURE DEV, VASC: HCPCS | Performed by: GENERAL PRACTICE

## 2022-06-27 PROCEDURE — 93455 PR CATH PLACE/CORONARY ANGIO, IMG SUPER/INTERP, BYPASS ANGIO: ICD-10-PCS | Mod: 26,59,51, | Performed by: GENERAL PRACTICE

## 2022-06-27 PROCEDURE — 25500020 PHARM REV CODE 255: Performed by: GENERAL PRACTICE

## 2022-06-27 PROCEDURE — 93005 ELECTROCARDIOGRAM TRACING: CPT | Performed by: SPECIALIST

## 2022-06-27 PROCEDURE — 93455 CORONARY ART/GRFT ANGIO S&I: CPT | Mod: XU | Performed by: GENERAL PRACTICE

## 2022-06-27 PROCEDURE — 82550 ASSAY OF CK (CPK): CPT | Performed by: EMERGENCY MEDICINE

## 2022-06-27 PROCEDURE — 93010 ELECTROCARDIOGRAM REPORT: CPT | Mod: ,,, | Performed by: SPECIALIST

## 2022-06-27 PROCEDURE — 99152 MOD SED SAME PHYS/QHP 5/>YRS: CPT | Performed by: GENERAL PRACTICE

## 2022-06-27 PROCEDURE — 92920 PRQ TRLUML C ANGIOP 1ART&/BR: CPT | Mod: LD,,, | Performed by: GENERAL PRACTICE

## 2022-06-27 PROCEDURE — 93010 EKG 12-LEAD: ICD-10-PCS | Mod: ,,, | Performed by: SPECIALIST

## 2022-06-27 PROCEDURE — 63600175 PHARM REV CODE 636 W HCPCS: Performed by: EMERGENCY MEDICINE

## 2022-06-27 PROCEDURE — 99152 MOD SED SAME PHYS/QHP 5/>YRS: CPT | Mod: ,,, | Performed by: GENERAL PRACTICE

## 2022-06-27 PROCEDURE — 83735 ASSAY OF MAGNESIUM: CPT | Performed by: EMERGENCY MEDICINE

## 2022-06-27 PROCEDURE — 86901 BLOOD TYPING SEROLOGIC RH(D): CPT | Performed by: GENERAL PRACTICE

## 2022-06-27 PROCEDURE — C1725 CATH, TRANSLUMIN NON-LASER: HCPCS | Performed by: GENERAL PRACTICE

## 2022-06-27 PROCEDURE — C1894 INTRO/SHEATH, NON-LASER: HCPCS | Performed by: GENERAL PRACTICE

## 2022-06-27 PROCEDURE — 80061 LIPID PANEL: CPT | Performed by: GENERAL PRACTICE

## 2022-06-27 PROCEDURE — 80053 COMPREHEN METABOLIC PANEL: CPT | Performed by: STUDENT IN AN ORGANIZED HEALTH CARE EDUCATION/TRAINING PROGRAM

## 2022-06-27 PROCEDURE — 92920 PR PTCA: ICD-10-PCS | Mod: LD,,, | Performed by: GENERAL PRACTICE

## 2022-06-27 PROCEDURE — 80048 BASIC METABOLIC PNL TOTAL CA: CPT | Performed by: GENERAL PRACTICE

## 2022-06-27 DEVICE — IMPLANTABLE DEVICE: Type: IMPLANTABLE DEVICE | Site: GROIN | Status: FUNCTIONAL

## 2022-06-27 RX ORDER — FENTANYL CITRATE 50 UG/ML
INJECTION, SOLUTION INTRAMUSCULAR; INTRAVENOUS
Status: DISCONTINUED | OUTPATIENT
Start: 2022-06-27 | End: 2022-06-27 | Stop reason: HOSPADM

## 2022-06-27 RX ORDER — LIDOCAINE HYDROCHLORIDE 20 MG/ML
INJECTION, SOLUTION EPIDURAL; INFILTRATION; INTRACAUDAL; PERINEURAL
Status: DISCONTINUED | OUTPATIENT
Start: 2022-06-27 | End: 2022-06-27 | Stop reason: HOSPADM

## 2022-06-27 RX ORDER — NITROGLYCERIN 20 MG/100ML
0-400 INJECTION INTRAVENOUS CONTINUOUS
Status: DISCONTINUED | OUTPATIENT
Start: 2022-06-27 | End: 2022-06-29

## 2022-06-27 RX ORDER — LABETALOL HYDROCHLORIDE 5 MG/ML
10 INJECTION, SOLUTION INTRAVENOUS
Status: COMPLETED | OUTPATIENT
Start: 2022-06-27 | End: 2022-06-27

## 2022-06-27 RX ORDER — MORPHINE SULFATE 2 MG/ML
2 INJECTION, SOLUTION INTRAMUSCULAR; INTRAVENOUS
Status: DISCONTINUED | OUTPATIENT
Start: 2022-06-27 | End: 2022-06-30

## 2022-06-27 RX ORDER — NITROGLYCERIN 5 MG/ML
INJECTION, SOLUTION INTRAVENOUS
Status: DISCONTINUED | OUTPATIENT
Start: 2022-06-27 | End: 2022-06-27 | Stop reason: HOSPADM

## 2022-06-27 RX ORDER — ASPIRIN 81 MG/1
81 TABLET ORAL DAILY
Status: DISCONTINUED | OUTPATIENT
Start: 2022-06-28 | End: 2022-07-09

## 2022-06-27 RX ORDER — MIDAZOLAM HYDROCHLORIDE 1 MG/ML
INJECTION INTRAMUSCULAR; INTRAVENOUS
Status: DISCONTINUED | OUTPATIENT
Start: 2022-06-27 | End: 2022-06-27 | Stop reason: HOSPADM

## 2022-06-27 RX ORDER — ONDANSETRON 2 MG/ML
4 INJECTION INTRAMUSCULAR; INTRAVENOUS
Status: COMPLETED | OUTPATIENT
Start: 2022-06-27 | End: 2022-06-27

## 2022-06-27 RX ORDER — NAPROXEN SODIUM 220 MG/1
324 TABLET, FILM COATED ORAL
Status: ACTIVE | OUTPATIENT
Start: 2022-06-27 | End: 2022-06-28

## 2022-06-27 RX ORDER — ASPIRIN 325 MG
650 TABLET ORAL ONCE
Status: ON HOLD | COMMUNITY
End: 2022-07-19 | Stop reason: HOSPADM

## 2022-06-27 RX ORDER — LIDOCAINE HYDROCHLORIDE 10 MG/ML
INJECTION, SOLUTION EPIDURAL; INFILTRATION; INTRACAUDAL; PERINEURAL
Status: DISCONTINUED | OUTPATIENT
Start: 2022-06-27 | End: 2022-06-27 | Stop reason: HOSPADM

## 2022-06-27 RX ORDER — ATORVASTATIN CALCIUM 40 MG/1
80 TABLET, FILM COATED ORAL DAILY
Status: DISCONTINUED | OUTPATIENT
Start: 2022-06-28 | End: 2022-07-18

## 2022-06-27 RX ORDER — SODIUM CHLORIDE 0.9 % (FLUSH) 0.9 %
10 SYRINGE (ML) INJECTION
Status: DISCONTINUED | OUTPATIENT
Start: 2022-06-27 | End: 2022-07-08

## 2022-06-27 RX ORDER — HEPARIN SODIUM 10000 [USP'U]/ML
INJECTION, SOLUTION INTRAVENOUS; SUBCUTANEOUS
Status: DISCONTINUED | OUTPATIENT
Start: 2022-06-27 | End: 2022-06-27 | Stop reason: HOSPADM

## 2022-06-27 RX ORDER — TALC
6 POWDER (GRAM) TOPICAL NIGHTLY PRN
Status: DISCONTINUED | OUTPATIENT
Start: 2022-06-27 | End: 2022-07-19 | Stop reason: HOSPADM

## 2022-06-27 RX ORDER — METOPROLOL TARTRATE 25 MG/1
25 TABLET, FILM COATED ORAL 2 TIMES DAILY
Status: DISCONTINUED | OUTPATIENT
Start: 2022-06-27 | End: 2022-07-15

## 2022-06-27 RX ORDER — LOSARTAN POTASSIUM 50 MG/1
50 TABLET ORAL DAILY
Status: DISCONTINUED | OUTPATIENT
Start: 2022-06-28 | End: 2022-07-15

## 2022-06-27 RX ORDER — NITROGLYCERIN 20 MG/100ML
INJECTION INTRAVENOUS
Status: DISCONTINUED | OUTPATIENT
Start: 2022-06-27 | End: 2022-07-08

## 2022-06-27 RX ORDER — MORPHINE SULFATE 4 MG/ML
4 INJECTION, SOLUTION INTRAMUSCULAR; INTRAVENOUS
Status: COMPLETED | OUTPATIENT
Start: 2022-06-27 | End: 2022-06-27

## 2022-06-27 RX ADMIN — ONDANSETRON 4 MG: 2 INJECTION INTRAMUSCULAR; INTRAVENOUS at 08:06

## 2022-06-27 RX ADMIN — MORPHINE SULFATE 4 MG: 4 INJECTION, SOLUTION INTRAMUSCULAR; INTRAVENOUS at 08:06

## 2022-06-27 RX ADMIN — LABETALOL HYDROCHLORIDE 10 MG: 5 INJECTION, SOLUTION INTRAVENOUS at 08:06

## 2022-06-27 RX ADMIN — NITROGLYCERIN 10 MCG/MIN: 20 INJECTION INTRAVENOUS at 08:06

## 2022-06-27 RX ADMIN — METOPROLOL TARTRATE 25 MG: 25 TABLET, FILM COATED ORAL at 11:06

## 2022-06-27 NOTE — Clinical Note
The catheter was repositioned into the ostial SVG graft. An angiography was performed of the svg to om. The angiography was performed via power injection.

## 2022-06-27 NOTE — Clinical Note
The catheter was repositioned into the ostial SVG graft. An angiography was performed of the graft. Multiple views were taken. The angiography was performed via power injection. The injected amount was 6 mL contrast at 3 mL/s. The PSI from the power injection was 300. SVG Graft to OM

## 2022-06-27 NOTE — Clinical Note
The catheter was inserted into the ostium   left main. An angiography was performed of the left coronary arteries. Multiple views were taken. The angiography was performed via power injection. The injected amount was 8 mL contrast at 4 mL/s.

## 2022-06-27 NOTE — Clinical Note
The catheter was repositioned into the ostial LIMA graft. An angiography was performed of the lima to lad. The angiography was performed via power injection.

## 2022-06-28 ENCOUNTER — CLINICAL SUPPORT (OUTPATIENT)
Dept: CARDIOLOGY | Facility: HOSPITAL | Age: 62
DRG: 231 | End: 2022-06-28
Attending: GENERAL PRACTICE
Payer: COMMERCIAL

## 2022-06-28 VITALS — HEIGHT: 69 IN | BODY MASS INDEX: 32.88 KG/M2 | WEIGHT: 222 LBS

## 2022-06-28 PROBLEM — I21.3 STEMI (ST ELEVATION MYOCARDIAL INFARCTION): Status: ACTIVE | Noted: 2022-06-28

## 2022-06-28 LAB
ABO + RH BLD: NORMAL
ANION GAP SERPL CALC-SCNC: 6 MMOL/L (ref 8–16)
ANION GAP SERPL CALC-SCNC: 8 MMOL/L (ref 8–16)
AV INDEX (PROSTH): 0.93
AV MEAN GRADIENT: 3 MMHG
AV VALVE AREA: 3.43 CM2
BASOPHILS # BLD AUTO: 0.06 K/UL (ref 0–0.2)
BASOPHILS NFR BLD: 0.7 % (ref 0–1.9)
BLD GP AB SCN CELLS X3 SERPL QL: NORMAL
BNP SERPL-MCNC: 183 PG/ML (ref 0–99)
BSA FOR ECHO PROCEDURE: 2.21 M2
BUN SERPL-MCNC: 15 MG/DL (ref 8–23)
BUN SERPL-MCNC: 16 MG/DL (ref 8–23)
CALCIUM SERPL-MCNC: 8.4 MG/DL (ref 8.7–10.5)
CALCIUM SERPL-MCNC: 8.6 MG/DL (ref 8.7–10.5)
CHLORIDE SERPL-SCNC: 104 MMOL/L (ref 95–110)
CHLORIDE SERPL-SCNC: 105 MMOL/L (ref 95–110)
CHOLEST SERPL-MCNC: 186 MG/DL (ref 120–199)
CHOLEST/HDLC SERPL: 6.2 {RATIO} (ref 2–5)
CO2 SERPL-SCNC: 26 MMOL/L (ref 23–29)
CO2 SERPL-SCNC: 26 MMOL/L (ref 23–29)
CREAT SERPL-MCNC: 1.1 MG/DL (ref 0.5–1.4)
CREAT SERPL-MCNC: 1.2 MG/DL (ref 0.5–1.4)
DIFFERENTIAL METHOD: NORMAL
DOP CALC AO VTI: 18.61 CM
DOP CALC LVOT AREA: 3.7 CM2
DOP CALC LVOT DIAMETER: 2.17 CM
DOP CALC LVOT PEAK VEL: 85.07 M/S
DOP CALC LVOT STROKE VOLUME: 63.88 CM3
DOP CALCLVOT PEAK VEL VTI: 17.28 CM
E WAVE DECELERATION TIME: 186.47 MSEC
E/A RATIO: 1.23
E/E' RATIO: 14.73 M/S
EJECTION FRACTION: 60 %
EOSINOPHIL # BLD AUTO: 0.4 K/UL (ref 0–0.5)
EOSINOPHIL NFR BLD: 4.7 % (ref 0–8)
ERYTHROCYTE [DISTWIDTH] IN BLOOD BY AUTOMATED COUNT: 13.2 % (ref 11.5–14.5)
EST. GFR  (AFRICAN AMERICAN): >60 ML/MIN/1.73 M^2
EST. GFR  (AFRICAN AMERICAN): >60 ML/MIN/1.73 M^2
EST. GFR  (NON AFRICAN AMERICAN): >60 ML/MIN/1.73 M^2
EST. GFR  (NON AFRICAN AMERICAN): >60 ML/MIN/1.73 M^2
ESTIMATED AVG GLUCOSE: 128 MG/DL (ref 68–131)
FRACTIONAL SHORTENING: 26 % (ref 28–44)
GLUCOSE SERPL-MCNC: 145 MG/DL (ref 70–110)
GLUCOSE SERPL-MCNC: 153 MG/DL (ref 70–110)
HBA1C MFR BLD: 6.1 % (ref 4.5–6.2)
HCT VFR BLD AUTO: 43.9 % (ref 40–54)
HDLC SERPL-MCNC: 30 MG/DL (ref 40–75)
HDLC SERPL: 16.1 % (ref 20–50)
HGB BLD-MCNC: 14.2 G/DL (ref 14–18)
IMM GRANULOCYTES # BLD AUTO: 0.03 K/UL (ref 0–0.04)
IMM GRANULOCYTES NFR BLD AUTO: 0.3 % (ref 0–0.5)
LDLC SERPL CALC-MCNC: 134.6 MG/DL (ref 63–159)
LEFT ATRIUM SIZE: 4.72 CM
LEFT INTERNAL DIMENSION IN SYSTOLE: 3.78 CM (ref 2.1–4)
LEFT VENTRICLE DIASTOLIC VOLUME INDEX: 61.86 ML/M2
LEFT VENTRICLE DIASTOLIC VOLUME: 133.61 ML
LEFT VENTRICLE SYSTOLIC VOLUME INDEX: 25.1 ML/M2
LEFT VENTRICLE SYSTOLIC VOLUME: 54.13 ML
LEFT VENTRICULAR INTERNAL DIMENSION IN DIASTOLE: 5.11 CM (ref 3.5–6)
LV LATERAL E/E' RATIO: 16.2 M/S
LV SEPTAL E/E' RATIO: 13.5 M/S
LYMPHOCYTES # BLD AUTO: 1.7 K/UL (ref 1–4.8)
LYMPHOCYTES NFR BLD: 19.4 % (ref 18–48)
MCH RBC QN AUTO: 28.9 PG (ref 27–31)
MCHC RBC AUTO-ENTMCNC: 32.3 G/DL (ref 32–36)
MCV RBC AUTO: 89 FL (ref 82–98)
MONOCYTES # BLD AUTO: 0.7 K/UL (ref 0.3–1)
MONOCYTES NFR BLD: 7.7 % (ref 4–15)
MV PEAK A VEL: 0.66 M/S
MV PEAK E VEL: 0.81 M/S
NEUTROPHILS # BLD AUTO: 6 K/UL (ref 1.8–7.7)
NEUTROPHILS NFR BLD: 67.2 % (ref 38–73)
NONHDLC SERPL-MCNC: 156 MG/DL
NRBC BLD-RTO: 0 /100 WBC
PISA TR MAX VEL: 1.97 M/S
PLATELET # BLD AUTO: 222 K/UL (ref 150–450)
PMV BLD AUTO: 11.3 FL (ref 9.2–12.9)
POTASSIUM SERPL-SCNC: 3.7 MMOL/L (ref 3.5–5.1)
POTASSIUM SERPL-SCNC: 3.7 MMOL/L (ref 3.5–5.1)
RA PRESSURE: 3 MMHG
RBC # BLD AUTO: 4.92 M/UL (ref 4.6–6.2)
RIGHT VENTRICULAR END-DIASTOLIC DIMENSION: 265 CM
SODIUM SERPL-SCNC: 136 MMOL/L (ref 136–145)
SODIUM SERPL-SCNC: 139 MMOL/L (ref 136–145)
TDI LATERAL: 0.05 M/S
TDI SEPTAL: 0.06 M/S
TDI: 0.06 M/S
TR MAX PG: 16 MMHG
TRICUSPID ANNULAR PLANE SYSTOLIC EXCURSION: 213 CM
TRIGL SERPL-MCNC: 107 MG/DL (ref 30–150)
TROPONIN I SERPL DL<=0.01 NG/ML-MCNC: 14.81 NG/ML
TROPONIN I SERPL DL<=0.01 NG/ML-MCNC: 14.81 NG/ML
TV REST PULMONARY ARTERY PRESSURE: 19 MMHG
WBC # BLD AUTO: 8.86 K/UL (ref 3.9–12.7)

## 2022-06-28 PROCEDURE — 93306 TTE W/DOPPLER COMPLETE: CPT | Mod: 26,,, | Performed by: INTERNAL MEDICINE

## 2022-06-28 PROCEDURE — 25000003 PHARM REV CODE 250

## 2022-06-28 PROCEDURE — 25000003 PHARM REV CODE 250: Performed by: GENERAL PRACTICE

## 2022-06-28 PROCEDURE — 83036 HEMOGLOBIN GLYCOSYLATED A1C: CPT | Performed by: GENERAL PRACTICE

## 2022-06-28 PROCEDURE — 63600175 PHARM REV CODE 636 W HCPCS: Performed by: THORACIC SURGERY (CARDIOTHORACIC VASCULAR SURGERY)

## 2022-06-28 PROCEDURE — 20000000 HC ICU ROOM

## 2022-06-28 PROCEDURE — 36415 COLL VENOUS BLD VENIPUNCTURE: CPT | Performed by: GENERAL PRACTICE

## 2022-06-28 PROCEDURE — 25000003 PHARM REV CODE 250: Performed by: INTERNAL MEDICINE

## 2022-06-28 PROCEDURE — 93306 ECHO (CUPID ONLY): ICD-10-PCS | Mod: 26,,, | Performed by: INTERNAL MEDICINE

## 2022-06-28 PROCEDURE — 93306 TTE W/DOPPLER COMPLETE: CPT

## 2022-06-28 PROCEDURE — 36415 COLL VENOUS BLD VENIPUNCTURE: CPT | Performed by: INTERNAL MEDICINE

## 2022-06-28 PROCEDURE — 80048 BASIC METABOLIC PNL TOTAL CA: CPT | Performed by: INTERNAL MEDICINE

## 2022-06-28 PROCEDURE — 94761 N-INVAS EAR/PLS OXIMETRY MLT: CPT

## 2022-06-28 PROCEDURE — 85025 COMPLETE CBC W/AUTO DIFF WBC: CPT | Performed by: INTERNAL MEDICINE

## 2022-06-28 RX ORDER — ENOXAPARIN SODIUM 100 MG/ML
100 INJECTION SUBCUTANEOUS
Status: DISCONTINUED | OUTPATIENT
Start: 2022-06-28 | End: 2022-06-28

## 2022-06-28 RX ORDER — ISOSORBIDE MONONITRATE 30 MG/1
30 TABLET, EXTENDED RELEASE ORAL DAILY
Status: DISCONTINUED | OUTPATIENT
Start: 2022-06-28 | End: 2022-06-29

## 2022-06-28 RX ORDER — CHLORHEXIDINE GLUCONATE ORAL RINSE 1.2 MG/ML
15 SOLUTION DENTAL 2 TIMES DAILY
Status: DISPENSED | OUTPATIENT
Start: 2022-06-28 | End: 2022-07-03

## 2022-06-28 RX ORDER — ENOXAPARIN SODIUM 100 MG/ML
100 INJECTION SUBCUTANEOUS
Status: DISCONTINUED | OUTPATIENT
Start: 2022-06-28 | End: 2022-07-08

## 2022-06-28 RX ORDER — MUPIROCIN 20 MG/G
OINTMENT TOPICAL 2 TIMES DAILY
Status: DISPENSED | OUTPATIENT
Start: 2022-06-28 | End: 2022-07-03

## 2022-06-28 RX ADMIN — ISOSORBIDE MONONITRATE 30 MG: 30 TABLET, EXTENDED RELEASE ORAL at 12:06

## 2022-06-28 RX ADMIN — ATORVASTATIN CALCIUM 80 MG: 40 TABLET, FILM COATED ORAL at 10:06

## 2022-06-28 RX ADMIN — METOPROLOL TARTRATE 25 MG: 25 TABLET, FILM COATED ORAL at 10:06

## 2022-06-28 RX ADMIN — ENOXAPARIN SODIUM 100 MG: 100 INJECTION SUBCUTANEOUS at 10:06

## 2022-06-28 RX ADMIN — LOSARTAN POTASSIUM 50 MG: 50 TABLET, FILM COATED ORAL at 10:06

## 2022-06-28 RX ADMIN — ENOXAPARIN SODIUM 100 MG: 100 INJECTION SUBCUTANEOUS at 09:06

## 2022-06-28 RX ADMIN — ASPIRIN 81 MG: 81 TABLET, COATED ORAL at 10:06

## 2022-06-28 RX ADMIN — MUPIROCIN: 20 OINTMENT TOPICAL at 10:06

## 2022-06-28 RX ADMIN — METOPROLOL TARTRATE 25 MG: 25 TABLET, FILM COATED ORAL at 09:06

## 2022-06-28 RX ADMIN — CHLORHEXIDINE GLUCONATE 15 ML: 1.2 RINSE ORAL at 10:06

## 2022-06-28 NOTE — HPI
61-year-old male past medical history of coronary artery disease status post CABG 2011 and stent 2005 both occasions they were related with MIs course in the ER today because he started having chest pain about 1 hour prior to arrival it was a pressure-like pain associated with sweating and dry heaving is with radiation to both arms sensation was similar to his MI 2005 he got 2 aspirins and appears to present 1 hour later when he got to the EKG showed STEMI so he was taken to cath lab where he had angioplasty of LIMA to LAD by Dr. Powell he is currently on nitroglycerin drip due to high blood pressure he also received morphine IV 4 mg Zofran IV 4 and labetalol 10 mg IV admitted to ICU.

## 2022-06-28 NOTE — H&P
[facility]  History & Physical    Patient Name: Matteo Fraire  MRN: [id number]  Patient Class: IP- Inpatient  Admission Date: 6/27/2022  Attending Physician: Stef Moreno MD   Primary Care Provider: Primary Doctor No         Patient information was obtained from patient, past medical records and ER records.     Subjective:     Principal Problem:STEMI (ST elevation myocardial infarction)     Chief Complaint:   Chief Complaint   Patient presents with    Chest Pain     Started about 7 PM.  Pt has cardiac procedures in 2005 and 2011.  Pt took two 325 Aspirin at home prior to arrival to ED        HPI:   61-year-old male past medical history of coronary artery disease status post CABG 2011 and stent 2005 both occasions they were related with MIs course in the ER today because he started having chest pain about 1 hour prior to arrival it was a pressure-like pain associated with sweating and dry heaving is with radiation to both arms sensation was similar to his MI 2005 he got 2 aspirins and appears to present 1 hour later when he got to the EKG showed STEMI so he was taken to cath lab where he had angioplasty of LIMA to LAD by Dr. Powell he is currently on nitroglycerin drip due to high blood pressure he also received morphine IV 4 mg Zofran IV 4 and labetalol 10 mg IV admitted to ICU.      Past Medical History:   Diagnosis Date    Heart attack 2005;2011    Hypertension     Psoriasis        Past Surgical History:   Procedure Laterality Date    CORONARY ARTERY BYPASS GRAFT      CYSTOSCOPY W/ URETERAL STENT PLACEMENT Left 11/21/2019    Procedure: CYSTOSCOPY, WITH URETERAL STENT INSERTION;  Surgeon: Mickey Escudero MD;  Location: [facility] OR;  Service: Urology;  Laterality: Left;    EXTRACORPOREAL SHOCK WAVE LITHOTRIPSY Left 11/21/2019    Procedure: LITHOTRIPSY, ESWL;  Surgeon: Mickey Escudero MD;  Location: [facility] OR;  Service: Urology;  Laterality: Left;    HERNIA REPAIR       "Inguinal just after birth       Review of patient's allergies indicates:  No Known Allergies    No current facility-administered medications on file prior to encounter.     Current Outpatient Medications on File Prior to Encounter   Medication Sig    aspirin 325 MG tablet Take 650 mg by mouth once.    aspirin 81 MG Chew Take 81 mg by mouth once daily.    HYDROcodone-acetaminophen (NORCO) 5-325 mg per tablet Take 1 tablet by mouth every 4 (four) hours as needed for Pain.    ibuprofen (ADVIL,MOTRIN) 600 MG tablet Take 1 tablet (600 mg total) by mouth every 6 (six) hours as needed for Pain.    ondansetron (ZOFRAN) 4 MG tablet Take 1 tablet (4 mg total) by mouth every 6 (six) hours.    tamsulosin (FLOMAX) 0.4 mg Cap Take 1 capsule (0.4 mg total) by mouth once daily. for 5 doses     Family History    None       Tobacco Use    Smoking status: Never Smoker    Smokeless tobacco: Never Used   Substance and Sexual Activity    Alcohol use: Never     Comment: "Quit in 87"    Drug use: Never    Sexual activity: Not on file     Review of Systems   Reason unable to perform ROS: Ten point system review pertinent positives and negatives are present on HPI.   Objective:     Vital Signs (Most Recent):  Temp: 98.3 °F (36.8 °C) (06/27/22 2255)  Pulse: 72 (06/28/22 0015)  Resp: 20 (06/28/22 0015)  BP: (!) 148/80 (06/28/22 0015)  SpO2: 98 % (06/28/22 0015)   Vital Signs (24h Range):  Temp:  [98.3 °F (36.8 °C)-98.4 °F (36.9 °C)] 98.3 °F (36.8 °C)  Pulse:  [72-85] 72  Resp:  [10-26] 20  SpO2:  [96 %-100 %] 98 %  BP: (132-229)/() 148/80     Weight: 101.1 kg (222 lb 14.2 oz)  Body mass index is 32.91 kg/m².    Physical Exam  Constitutional:       Appearance: He is obese.   HENT:      Head: Normocephalic and atraumatic.      Nose: Nose normal.   Cardiovascular:      Rate and Rhythm: Normal rate.      Heart sounds: Normal heart sounds. No murmur heard.    No friction rub. No gallop.   Pulmonary:      Effort: Pulmonary effort is " normal. No respiratory distress.      Breath sounds: Normal breath sounds. No stridor. No wheezing or rhonchi.   Abdominal:      General: Abdomen is flat. Bowel sounds are normal. There is no distension.      Palpations: Abdomen is soft.      Tenderness: There is no abdominal tenderness. There is no guarding or rebound.   Neurological:      General: No focal deficit present.      Mental Status: He is alert.           Significant Labs: All pertinent labs within the past 24 hours have been reviewed.  A1C: No results for input(s): HGBA1C in the last 4320 hours.  CBC:   Recent Labs   Lab 06/27/22 1951 06/27/22 2305   WBC 8.46 8.21   HGB 16.0 14.6   HCT 49.2 44.6    232     CMP:   Recent Labs   Lab 06/27/22 1951 06/27/22 2305    136   K 4.0 3.7    104   CO2 26 26   * 153*   BUN 18 16   CREATININE 1.4 1.2   CALCIUM 9.0 8.4*   PROT 7.1  --    ALBUMIN 4.6  --    BILITOT 0.8  --    ALKPHOS 65  --    AST 33  --    ALT 43  --    ANIONGAP 11 6*   EGFRNONAA 53.8* >60.0     Cardiac Markers:   Recent Labs   Lab 06/27/22 2305   *     Lipid Panel:   Recent Labs   Lab 06/27/22 2305   CHOL 186   HDL 30*   LDLCALC 134.6   TRIG 107   CHOLHDL 16.1*     Magnesium:   Recent Labs   Lab 06/27/22 1951   MG 2.2     Troponin:   Recent Labs   Lab 06/27/22 1951 06/27/22 2305   TROPONINI 0.056* 14.806*  14.806*     TSH: No results for input(s): TSH in the last 4320 hours.  Urine Culture: No results for input(s): LABURIN in the last 48 hours.  Urine Studies: No results for input(s): COLORU, APPEARANCEUA, PHUR, SPECGRAV, PROTEINUA, GLUCUA, KETONESU, BILIRUBINUA, OCCULTUA, NITRITE, UROBILINOGEN, LEUKOCYTESUR, RBCUA, WBCUA, BACTERIA, SQUAMEPITHEL, HYALINECASTS in the last 48 hours.    Invalid input(s): WRIGHTSUR    Significant Imaging: I have reviewed all pertinent imaging results/findings within the past 24 hours.    Assessment/Plan:     * STEMI (ST elevation myocardial infarction)    Status post angioplasty  of GODOY to LAD by Dr. Powell  Continue with nitroglycerin drip metoprolol p.o. 25 mg b.i.d. aspirin 81 once a day Brilinta atorvastatin  A1c  Cardiology recommends cardiothoracic surgery evaluation input appreciated  Follow-up echo        VTE Risk Mitigation (From admission, onward)    None             Stef Moreno MD  Department of Hospital Medicine   [facility]

## 2022-06-28 NOTE — ASSESSMENT & PLAN NOTE
Status post angioplasty of GODOY to LAD by Dr. Powell  Continue with nitroglycerin drip metoprolol p.o. 25 mg b.i.d. aspirin 81 once a day Brilinta atorvastatin  A1c  Cardiology recommends cardiothoracic surgery evaluation input appreciated  Follow-up echo

## 2022-06-28 NOTE — CONSULTS
"CVT SURGERY CONSULT NOTE    Patient Info:   Matteo Fraire                                                        61 y.o.                          1960    Date of Consult: 6/27/2022    Reason for Consult: Multivessel CAD    Referring Physician: Dr. Stevan Powell MD    Subjective:      HPI:  61-year-old white male with extensive past cardiac history including hypertension and multiple myocardial infarctions status post PCI previous CABG in 2011. He presented to [facility] yesterday evening after doing some yard work resulting in chest pain associated with nausea and vomiting.  Repeat EKG showed ST-elevation myocardial infarction anterior leads .  He was brought for emergent left heart catheterization worse LAD was ballooned. Cardiothoracic surgery was consulted for evaluation of redo CABG versus staged PCI.    Past Medical History:   Diagnosis Date    Heart attack 2005;2011    Hypertension     Psoriasis        Past Surgical History:   Procedure Laterality Date    CORONARY ARTERY BYPASS GRAFT      CYSTOSCOPY W/ URETERAL STENT PLACEMENT Left 11/21/2019    Procedure: CYSTOSCOPY, WITH URETERAL STENT INSERTION;  Surgeon: Mickey Escudero MD;  Location: [facility] OR;  Service: Urology;  Laterality: Left;    EXTRACORPOREAL SHOCK WAVE LITHOTRIPSY Left 11/21/2019    Procedure: LITHOTRIPSY, ESWL;  Surgeon: Mickey Escudero MD;  Location: [facility];  Service: Urology;  Laterality: Left;    HERNIA REPAIR      Inguinal just after birth       Social History     Tobacco Use    Smoking status: Never Smoker    Smokeless tobacco: Never Used   Substance Use Topics    Alcohol use: Never     Comment: "Quit in 87"       Social History     Substance and Sexual Activity   Drug Use Never       No family history on file.    Review of patient's allergies indicates:  No Known Allergies    Prior to Admission medications    Medication Sig Start Date End Date Taking? Authorizing Provider   aspirin 325 MG tablet Take 650 " "mg by mouth once.   Yes Historical Provider   aspirin 81 MG Chew Take 81 mg by mouth once daily.    Historical Provider   HYDROcodone-acetaminophen (NORCO) 5-325 mg per tablet Take 1 tablet by mouth every 4 (four) hours as needed for Pain. 11/21/19   Mickey Escudero MD   ibuprofen (ADVIL,MOTRIN) 600 MG tablet Take 1 tablet (600 mg total) by mouth every 6 (six) hours as needed for Pain. 11/16/19   Jerad Pugh Jr., MD   ondansetron (ZOFRAN) 4 MG tablet Take 1 tablet (4 mg total) by mouth every 6 (six) hours. 11/16/19   Jerad Pugh Jr., MD   tamsulosin (FLOMAX) 0.4 mg Cap Take 1 capsule (0.4 mg total) by mouth once daily. for 5 doses 11/16/19 11/21/19  Jerad Pugh Jr., MD       Review of Systems   Constitutional: Positive for malaise/fatigue. Negative for chills and fever.   HENT: Negative for congestion, sinus pain and sore throat.    Respiratory: Positive for cough and shortness of breath. Negative for hemoptysis.    Cardiovascular: Positive for chest pain, palpitations and orthopnea. Negative for claudication.   Gastrointestinal: Positive for nausea and vomiting. Negative for abdominal pain, constipation and diarrhea.   Genitourinary: Negative for dysuria and flank pain.   Musculoskeletal: Negative for falls and myalgias.   Neurological: Negative for dizziness, tingling, tremors and headaches. Seizures:  nasal cannula.   Psychiatric/Behavioral: Negative for depression.       Objective:    Physical Exam  /82   Pulse 68   Temp 98.2 °F (36.8 °C) (Oral)   Resp 15   Ht 5' 9" (1.753 m)   Wt 101.1 kg (222 lb 14.2 oz)   SpO2 96%   BMI 32.91 kg/m²   INPATIENT MEDS:    nitroGLYCERIN 10 mcg/min (06/28/22 0200)    nitroGLYCERIN 30 mcg/min (06/27/22 2116)      aspirin  81 mg Oral Daily    atorvastatin  80 mg Oral Daily    enoxparin  100 mg Subcutaneous Q24H    losartan  50 mg Oral Daily    metoprolol tartrate  25 mg Oral BID    ticagrelor  90 mg Oral BID     melatonin, morphine, nitroGLYCERIN, " "sodium chloride 0.9%     RECENT O2 THERAPY  Nasal cannula  I/O last 24 hours  Intake/Output - Last 3 Shifts       06/26 0700 06/27 0659 06/27 0700 06/28 0659 06/28 0700 06/29 0659    I.V. (mL/kg)  69.6 (0.7)     Total Intake(mL/kg)  69.6 (0.7)     Urine (mL/kg/hr)  900     Total Output  900     Net  -830.4                Recent cardiac rhythm:   Recent Pain Assessment:  Normal sinus rhythm denies any ACS symptoms  CBC:  Recent Labs   Lab 06/27/22 1951 06/27/22 2305 06/28/22  0426   WBC 8.46 8.21 8.86   RBC 5.63 5.09 4.92   HGB 16.0 14.6 14.2    232 222   MCV 87 88 89   MCH 28.4 28.7 28.9   MCHC 32.5 32.7 32.3     BMP:  Recent Labs   Lab 06/27/22 1951 06/27/22 2305 06/28/22  0426   CO2 26 26 26   BUN 18 16 15   CREATININE 1.4 1.2 1.1   CALCIUM 9.0 8.4* 8.6*     CARDIAC ENZYMES:  Recent Labs   Lab 06/27/22 1951 06/27/22  2305   TROPONINI 0.056* 14.806*  14.806*   CPK 95  --      BNP:  Recent Labs   Lab 06/27/22 1951 06/27/22  2305   * 183*     PT/INR:  INR   Date Value Ref Range Status   06/27/2022 1.1  Final     Comment:     Coumadin Therapy:  INR: 2.0-3.0 conventional anticoagulation    INR: 2.5-3.5 intensive anticoagulation     11/20/2019 1.1  Final     Comment:     Coumadin Therapy:  INR: 2.0-3.0 conventional anticoagulation  INR: 2.5-3.5 intensive anticoagulation     01/28/2011 1.0 0.8 - 1.2 Final     Comment:     ACCP Guideline for Coumadin usage recommends a "target range" of  2.0 - 3.0 for INR for all indicators except mechanical heart valves  and antiphospholipid syndromes which should use 2.5 - 3.5.  .   01/27/2011 1.0 0.8 - 1.2 Final     Comment:     ACCP Guideline for Coumadin usage recommends a "target range" of  2.0 - 3.0 for INR for all indicators except mechanical heart valves  and antiphospholipid syndromes which should use 2.5 - 3.5.  .   01/26/2011 1.0 0.8 - 1.2 Final     Comment:     ACCP Guideline for Coumadin usage recommends a "target range" of  2.0 - 3.0 for INR for all " indicators except mechanical heart valves  and antiphospholipid syndromes which should use 2.5 - 3.5.  .       DIAGNOSTIC RESULTS  Left heart catheterization 06/27/2022:       The Insertion lesion before Mid LAD was 100% stenosed with 30% stenosis post-intervention.  The RPDA lesion was 90% stenosed.  The RPAV lesion was 50% stenosed.  The Mid RCA-2 lesion was 90% stenosed.  The Mid RCA-1 lesion was 60% stenosed.  The 1st Mrg-2 lesion was 75% stenosed.  The 1st Mrg-3 lesion was 60% stenosed.  The 1st Mrg-4 lesion was 70% stenosed.  The 1st Mrg-1 lesion was 95% stenosed.  The Prox LAD lesion was 60% stenosed.  The Ost LAD to Prox LAD lesion was 90% stenosed.  The Mid LM to Ost LAD lesion was 80% stenosed.  The Ost Cx to Prox Cx lesion was 99% stenosed.  The Mid LAD-1 lesion was 99% stenosed.  The Mid LAD-2 lesion was 100% stenosed with 20% stenosis post-intervention.  The Mid LAD-3 lesion was 80% stenosed with 30% stenosis post-intervention.  The estimated blood loss was none.  This was a successful PCI for acute myocardial infarction.        ECG Results          EKG 12-lead (In process)  Result time 06/28/22 05:06:32    In process by Interface, Lab In [facility] (06/28/22 05:06:32)                 Narrative:    Test Reason : R07.9,    Vent. Rate : 076 BPM     Atrial Rate : 076 BPM     P-R Int : 216 ms          QRS Dur : 104 ms      QT Int : 388 ms       P-R-T Axes : 040 087 039 degrees     QTc Int : 436 ms    Sinus rhythm with 1st degree A-V block  Incomplete right bundle branch block  Anteroseptal infarct (cited on or before 27-JUN-2022)  Lateral injury pattern    ACUTE MI / STEMI    Abnormal ECG  When compared with ECG of 27-JUN-2022 20:35,  Premature ventricular complexes are no longer Present  Serial changes of Anteroseptal infarct Present    Referred By: [email or web address]   SELF           Confirmed By:                               CURRENT CONSULTS  Consults (From admission, onward)        Status Ordering Provider      Inpatient consult to Cardiology  Once        Provider:  Stevan Powell MD    Acknowledged MADDIE CARLOS     Inpatient consult to Cardiothoracic Surgery  Once        Provider:  Eyal Stone MD    Acknowledged STEVAN POWELL            Assessment/Plan:    [unfilled]  History PCI 2005  History CABG x2 2011   Chest pain  STEMI  Multivessel CAD  -hemodynamically stable  -s/p balloon angioplasty to LAD  -telemetry reviewed:  Normal sinus rhythm with mild ST elevation  -DC oral anti-platelet agents and placed on Lovenox  -I have discussed potential redo CABG versus staged PCI with patient and he would prefer to go the surgical route.  I will discuss with Dr. Stone his surgical candidacy along with potential time.  Regardless patient would need to be off of oral anti-platelet agents to decrease the chances of b, current chart reviewed.  -obtain carotid ultrasound in venous leg mapping in the meantime    Have personally all pertinent laboratory data, diagnostic studies, past medical history                Case and plan of care discussed with MD.  GI Prophylaxis:  PPI:proton pump inhibitor per orders  DVT Prophylaxis:  Anticoagulants   Medication Route Frequency    enoxaparin injection 100 mg Subcutaneous Q24H       Signed:  NICK CravenCNP-BC  Cardiovascular/Thoracic Surgery  6/28/2022  8:46 AM

## 2022-06-28 NOTE — SUBJECTIVE & OBJECTIVE
"Past Medical History:   Diagnosis Date    Heart attack 2005;2011    Hypertension     Psoriasis        Past Surgical History:   Procedure Laterality Date    CORONARY ARTERY BYPASS GRAFT      CYSTOSCOPY W/ URETERAL STENT PLACEMENT Left 11/21/2019    Procedure: CYSTOSCOPY, WITH URETERAL STENT INSERTION;  Surgeon: Mickey Escudero MD;  Location: [facility];  Service: Urology;  Laterality: Left;    EXTRACORPOREAL SHOCK WAVE LITHOTRIPSY Left 11/21/2019    Procedure: LITHOTRIPSY, ESWL;  Surgeon: Mickey Escudero MD;  Location: [facility];  Service: Urology;  Laterality: Left;    HERNIA REPAIR      Inguinal just after birth       Review of patient's allergies indicates:  No Known Allergies    No current facility-administered medications on file prior to encounter.     Current Outpatient Medications on File Prior to Encounter   Medication Sig    aspirin 325 MG tablet Take 650 mg by mouth once.    aspirin 81 MG Chew Take 81 mg by mouth once daily.    HYDROcodone-acetaminophen (NORCO) 5-325 mg per tablet Take 1 tablet by mouth every 4 (four) hours as needed for Pain.    ibuprofen (ADVIL,MOTRIN) 600 MG tablet Take 1 tablet (600 mg total) by mouth every 6 (six) hours as needed for Pain.    ondansetron (ZOFRAN) 4 MG tablet Take 1 tablet (4 mg total) by mouth every 6 (six) hours.    tamsulosin (FLOMAX) 0.4 mg Cap Take 1 capsule (0.4 mg total) by mouth once daily. for 5 doses     Family History    None       Tobacco Use    Smoking status: Never Smoker    Smokeless tobacco: Never Used   Substance and Sexual Activity    Alcohol use: Never     Comment: "Quit in 87"    Drug use: Never    Sexual activity: Not on file     Review of Systems   Reason unable to perform ROS: Ten point system review pertinent positives and negatives are present on HPI.   Objective:     Vital Signs (Most Recent):  Temp: 98.3 °F (36.8 °C) (06/27/22 2255)  Pulse: 72 (06/28/22 0015)  Resp: 20 (06/28/22 0015)  BP: (!) 148/80 (06/28/22 0015)  SpO2: 98 % (06/28/22 " 0015)   Vital Signs (24h Range):  Temp:  [98.3 °F (36.8 °C)-98.4 °F (36.9 °C)] 98.3 °F (36.8 °C)  Pulse:  [72-85] 72  Resp:  [10-26] 20  SpO2:  [96 %-100 %] 98 %  BP: (132-229)/() 148/80     Weight: 101.1 kg (222 lb 14.2 oz)  Body mass index is 32.91 kg/m².    Physical Exam  Constitutional:       Appearance: He is obese.   HENT:      Head: Normocephalic and atraumatic.      Nose: Nose normal.   Cardiovascular:      Rate and Rhythm: Normal rate.      Heart sounds: Normal heart sounds. No murmur heard.    No friction rub. No gallop.   Pulmonary:      Effort: Pulmonary effort is normal. No respiratory distress.      Breath sounds: Normal breath sounds. No stridor. No wheezing or rhonchi.   Abdominal:      General: Abdomen is flat. Bowel sounds are normal. There is no distension.      Palpations: Abdomen is soft.      Tenderness: There is no abdominal tenderness. There is no guarding or rebound.   Neurological:      General: No focal deficit present.      Mental Status: He is alert.           Significant Labs: All pertinent labs within the past 24 hours have been reviewed.  A1C: No results for input(s): HGBA1C in the last 4320 hours.  CBC:   Recent Labs   Lab 06/27/22 1951 06/27/22 2305   WBC 8.46 8.21   HGB 16.0 14.6   HCT 49.2 44.6    232     CMP:   Recent Labs   Lab 06/27/22 1951 06/27/22 2305    136   K 4.0 3.7    104   CO2 26 26   * 153*   BUN 18 16   CREATININE 1.4 1.2   CALCIUM 9.0 8.4*   PROT 7.1  --    ALBUMIN 4.6  --    BILITOT 0.8  --    ALKPHOS 65  --    AST 33  --    ALT 43  --    ANIONGAP 11 6*   EGFRNONAA 53.8* >60.0     Cardiac Markers:   Recent Labs   Lab 06/27/22  2305   *     Lipid Panel:   Recent Labs   Lab 06/27/22 2305   CHOL 186   HDL 30*   LDLCALC 134.6   TRIG 107   CHOLHDL 16.1*     Magnesium:   Recent Labs   Lab 06/27/22 1951   MG 2.2     Troponin:   Recent Labs   Lab 06/27/22 1951 06/27/22  2305   TROPONINI 0.056* 14.806*  14.806*     TSH: No  results for input(s): TSH in the last 4320 hours.  Urine Culture: No results for input(s): LABURIN in the last 48 hours.  Urine Studies: No results for input(s): COLORU, APPEARANCEUA, PHUR, SPECGRAV, PROTEINUA, GLUCUA, KETONESU, BILIRUBINUA, OCCULTUA, NITRITE, UROBILINOGEN, LEUKOCYTESUR, RBCUA, WBCUA, BACTERIA, SQUAMEPITHEL, HYALINECASTS in the last 48 hours.    Invalid input(s): MICHAEL    Significant Imaging: I have reviewed all pertinent imaging results/findings within the past 24 hours.

## 2022-06-28 NOTE — CONSULTS
[facility]  Department of Cardiology  Consult Note      PATIENT NAME: Matteo Fraire    MRN: [id number]  TODAY'S DATE: 06/27/2022  ADMIT DATE: 6/27/2022                          CONSULT REQUESTED BY: Jaylon Garcia MD    SUBJECTIVE     PRINCIPAL PROBLEM: <principal problem not specified>      REASON FOR CONSULT:  ST-elevation MI      HPI:  Patient is a 61-year-old male has a history of myocardial infarction the past with stents placed in 2005. Two thousand eleven he had coronary bypass x2.  Bypass was done at the [facility] .  He has recently moved to [place].  He was outside doing [employer] work today without any significant problem.  He came in to sit down to rest had onset of chest pain about 1 hour before admission to the hospital.  Initial EKG was not diagnostic but repeat EKG showed ST-elevation MI in the anterior lateral leads.  He was brought to the cardiac catheterization laboratory where he was found to have occluded LAD at the insertion of the LIMA graft to the LAD with no collaterals.  Simple angioplasty was performed which revealed a small vessel which was 2 is small to insert a stent.  He is currently pain-free and stable on nitro drip and beta-blocker.  The groin was closed with Vascade and he is being transferred to the ICU.    Patient also has unprotected right coronary artery with a subtotal lesion in the distal and the ostial PDA branch.  The insertion of the circumflex graft to the obtuse marginal has significant disease.  There is multiple lesions in proximal LAD.  The LAD after the surgeon the LIMA has mild diffuse disease.  Cardiovascular surgery will be consulted for consideration repeat bypass versus multivessel angioplasty at a later date.        Review of patient's allergies indicates:  No Known Allergies    Past Medical History:   Diagnosis Date    Heart attack 2005;2011    Hypertension     Psoriasis      Past Surgical History:   Procedure Laterality Date    CORONARY ARTERY  "BYPASS GRAFT      CYSTOSCOPY W/ URETERAL STENT PLACEMENT Left 11/21/2019    Procedure: CYSTOSCOPY, WITH URETERAL STENT INSERTION;  Surgeon: Mickey Escudero MD;  Location: [facility] OR;  Service: Urology;  Laterality: Left;    EXTRACORPOREAL SHOCK WAVE LITHOTRIPSY Left 11/21/2019    Procedure: LITHOTRIPSY, ESWL;  Surgeon: Mickey Escudero MD;  Location: [facility] OR;  Service: Urology;  Laterality: Left;    HERNIA REPAIR      Inguinal just after birth     Social History     Tobacco Use    Smoking status: Never Smoker    Smokeless tobacco: Never Used   Substance Use Topics    Alcohol use: Never     Comment: "Quit in 87"    Drug use: Never        REVIEW OF SYSTEMS  CONSTITUTIONAL: Negative for chills, fatigue and fever.   EYES: No double vision, No blurred vision  NEURO: No headaches, No dizziness  RESPIRATORY: Negative for cough, shortness of breath and wheezing.    CARDIOVASCULAR: POS for chest pain.  FOR 1 HOUR Negative for palpitations and leg swelling.   GI: Negative for abdominal pain, No melena, diarrhea, nausea and vomiting.   : Negative for dysuria and frequency, Negative for hematuria  SKIN: Negative for bruising, Negative for edema or discoloration noted.   ENDOCRINE: Negative for polyphagia, Negative for heat intolerance, Negative for cold intolerance  PSYCHIATRIC: Negative for depression, Negative for anxiety, Negative for memory loss  MUSCULOSKELETAL: Negative for neck pain, Negative for muscle weakness, Negative for back pain     OBJECTIVE     VITAL SIGNS (Most Recent)  Temp: 98.4 °F (36.9 °C) (06/27/22 1954)  Pulse: 74 (06/27/22 2054)  Resp: 19 (06/27/22 2054)  BP: (!) 169/96 (06/27/22 2054)  SpO2: 100 % (06/27/22 2054)    VENTILATION STATUS  Resp: 19 (06/27/22 2054)  SpO2: 100 % (06/27/22 2054)       I & O (Last 24H):No intake or output data in the 24 hours ending 06/27/22 2232    WEIGHTS  Wt Readings from Last 1 Encounters:   06/27/22 1954 99.8 kg (220 lb)       PHYSICAL EXAM  GENERAL: well " built, well nourished, well-developed in no apparent distress alert and oriented.   HEENT: Normocephalic. Pupils normal and conjunctivae normal.  Mucous membranes normal, no cyanosis or icterus, trachea central,no pallor or icterus is noted..   NECK: No JVD. No bruit..   THYROID: Thyroid not enlarged. No nodules present..   CARDIAC: Regular rate and rhythm. S1 is normal.S2 is normal.No gallops, clicks or murmurs noted at this time.  CHEST ANATOMY: normal.   LUNGS: Clear to auscultation. No wheezing or rhonchi..   ABDOMEN: Soft no masses or organomegaly.  No abdomen pulsations or bruits.  Normal bowel sounds. No pulsations and no masses felt, No guarding or rebound.   URINARY: No lee catheter   EXTREMITIES: No cyanosis, clubbing or edema noted at this time., no calf tenderness bilaterally.   PERIPHERAL VASCULAR SYSTEM: Good palpable distal pulses.   CENTRAL NERVOUS SYSTEM: No focal motor or sensory deficits noted.   SKIN: Skin without lesions, moist, well perfused.   MUSCLE STRENGTH & TONE: No noteable weakness, atrophy or abnormal movement.     HOME MEDICATIONS:  No current facility-administered medications on file prior to encounter.     Current Outpatient Medications on File Prior to Encounter   Medication Sig Dispense Refill    aspirin 325 MG tablet Take 650 mg by mouth once.      aspirin 81 MG Chew Take 81 mg by mouth once daily.      HYDROcodone-acetaminophen (NORCO) 5-325 mg per tablet Take 1 tablet by mouth every 4 (four) hours as needed for Pain. 12 tablet 0    ibuprofen (ADVIL,MOTRIN) 600 MG tablet Take 1 tablet (600 mg total) by mouth every 6 (six) hours as needed for Pain. 20 tablet 0    ondansetron (ZOFRAN) 4 MG tablet Take 1 tablet (4 mg total) by mouth every 6 (six) hours. 12 tablet 0    tamsulosin (FLOMAX) 0.4 mg Cap Take 1 capsule (0.4 mg total) by mouth once daily. for 5 doses 5 capsule 0       SCHEDULED MEDS:   aspirin  324 mg Oral ED 1 Time    [START ON 6/28/2022] aspirin  81 mg Oral  Daily    [START ON 6/28/2022] atorvastatin  80 mg Oral Daily    [START ON 6/28/2022] losartan  50 mg Oral Daily    metoprolol tartrate  25 mg Oral BID    [START ON 6/28/2022] ticagrelor  90 mg Oral BID       CONTINUOUS INFUSIONS:   nitroGLYCERIN 30 mcg/min (06/27/22 2030)    nitroGLYCERIN 30 mcg/min (06/27/22 2116)       PRN MEDS:fentaNYL, heparin (porcine), iohexoL, LIDOcaine (PF) 10 mg/ml (1%), LIDOcaine (PF) 20 mg/mL (2%), melatonin, midazolam, morphine, nitroGLYCERIN, nitroGLYCERIN, sodium chloride 0.9%, ticagrelor    LABS AND DIAGNOSTICS     CBC LAST 3 DAYS  Recent Labs   Lab 06/27/22 1951   WBC 8.46   RBC 5.63   HGB 16.0   HCT 49.2   MCV 87   MCH 28.4   MCHC 32.5   RDW 13.1      MPV 11.0   GRAN 54.6  4.6   LYMPH 31.7  2.7   MONO 7.2  0.6   BASO 0.08   NRBC 0       COAGULATION LAST 3 DAYS  Recent Labs   Lab 06/27/22 1951   LABPT 13.1   INR 1.1   APTT 28.1       CHEMISTRY LAST 3 DAYS  Recent Labs   Lab 06/27/22 1951      K 4.0      CO2 26   ANIONGAP 11   BUN 18   CREATININE 1.4   *   CALCIUM 9.0   MG 2.2   ALBUMIN 4.6   PROT 7.1   ALKPHOS 65   ALT 43   AST 33   BILITOT 0.8       CARDIAC PROFILE LAST 3 DAYS  Recent Labs   Lab 06/27/22 1951   *   CPK 95   CPKMB 2.0   TROPONINI 0.056*       ENDOCRINE LAST 3 DAYS  No results for input(s): TSH, PROCAL in the last 168 hours.    LAST ARTERIAL BLOOD GAS  ABG  No results for input(s): PH, PO2, PCO2, HCO3, BE in the last 168 hours.    LAST 7 DAYS MICROBIOLOGY   Microbiology Results (last 7 days)     ** No results found for the last 168 hours. **          MOST RECENT IMAGING  X-Ray KUB  Narrative: CLINICAL HISTORY:  ([id number] y/o  (1960) M    Pre op; Encounter for procedure for purposes other than remedying health state, unspecified    TECHNIQUE:  (A#[id number], exam time 11/21/2019 9:27)    XR KUB [id number]    COMPARISON:  Most recent radiograph from 11/19/2019    FINDINGS:  2-3 mm calcification projecting to the soft  tissues between the 3rd and 4th transverse process most consistent with the patient's known left-sided ureter stone.    There are no abnormally dilated gas filled loops of large or small bowel to suggest bowel obstruction. There is no pneumatosis or gross pneumoperitoneum. Osseous structures show no acute abnormality.  Impression: 1.  Unchanged calcification projecting along the course of the left ureter.    2.  Nonobstructive bowel gas pattern.    Electronically signed by: Alton Mosquera MD  Date:    11/21/2019  Time:    09:30      ECHOCARDIOGRAM RESULTS (last 5)  No results found for this or any previous visit.      CURRENT/PREVIOUS VISIT EKG  Results for orders placed or performed during the hospital encounter of 11/15/19   EKG 12-lead    Collection Time: 11/15/19 10:35 PM    Narrative    Test Reason : R06.02,    Vent. Rate : 091 BPM     Atrial Rate : 091 BPM     P-R Int : 204 ms          QRS Dur : 102 ms      QT Int : 366 ms       P-R-T Axes : 045 070 075 degrees     QTc Int : 450 ms    Normal sinus rhythm  Normal ECG  No previous ECGs available  Confirmed by Carlos Souza MD ([id number]) on 11/18/2019 1:43:04 PM    Referred By: [email or web address]   SELF           Confirmed By:Carlos Souza MD           ASSESSMENT/PLAN:     There are no active hospital problems to display for this patient.      ASSESSMENT & PLAN:   ST-ELEVATION MI  HYPERTENSIVE URGENCY  STATUS POST CORONARY BYPASS GRAFT OCCLUDED LAD AT THE INSERTION OF THE LIMA  BOLTON MULTIVESSEL CORONARY DISEASE  OBESITY  ELEVATED GLUCOSE RULE OUT DIABETES    RECOMMENDATIONS:  THE GODOY TO LAD INSERT IN THE MIDPORTION THE LAD WHICH WAS TOTALLY OCCLUDED WITH IT WAS OPENED WITH SIMPLE ANGIOPLASTY AT 2 0 BALLOON WAS USED THERE WERE 2 LESIONS 100% AND 90% IN THE MID PORTION WHICH WERE OPENED UP.  PATIENT IS TRANSFERRED ICU CONSULT CARDIOVASCULAR SURGERY FOR CONSIDERATION REPEAT 3 VESSEL BYPASS VERSUS MULTIVESSEL STENTING    ECHO FOR LV FUNCTION  Stevan Powell,  MD  [facility]  Department of Cardiology  Date of Service: 06/27/2022  10:32 PM

## 2022-06-28 NOTE — ED PROVIDER NOTES
"Encounter Date: 6/27/2022       History     Chief Complaint   Patient presents with    Chest Pain     Started about 7 PM.  Pt has cardiac procedures in 2005 and 2011.  Pt took two 325 Aspirin at home prior to arrival to ED     Patient presents with less than 1 hour of sternal chest pain associated shortness of breath.  Patient unsure if this is like his previous myocardial infarction.  Patient with coronary stent in 2005 and coronary bypass grafting in 2011 both associated myocardial infarction.  Pain is located to the mid upper sternal area with no radiation.  Positive shortness of breath and diaphoresis.  Positive nausea with no vomiting.        Review of patient's allergies indicates:  No Known Allergies  Past Medical History:   Diagnosis Date    Heart attack 2005;2011    Hypertension     Psoriasis      Past Surgical History:   Procedure Laterality Date    CORONARY ARTERY BYPASS GRAFT      CYSTOSCOPY W/ URETERAL STENT PLACEMENT Left 11/21/2019    Procedure: CYSTOSCOPY, WITH URETERAL STENT INSERTION;  Surgeon: Mickey Escudero MD;  Location: [facility];  Service: Urology;  Laterality: Left;    EXTRACORPOREAL SHOCK WAVE LITHOTRIPSY Left 11/21/2019    Procedure: LITHOTRIPSY, ESWL;  Surgeon: Mickey Escudero MD;  Location: [facility];  Service: Urology;  Laterality: Left;    HERNIA REPAIR      Inguinal just after birth     No family history on file.  Social History     Tobacco Use    Smoking status: Never Smoker    Smokeless tobacco: Never Used   Substance Use Topics    Alcohol use: Never     Comment: "Quit in 87"    Drug use: Never     Review of Systems   Constitutional: Negative for chills and fever.   HENT: Negative for sore throat.    Eyes: Negative for photophobia and visual disturbance.   Respiratory: Positive for shortness of breath.    Cardiovascular: Positive for chest pain.   Gastrointestinal: Positive for nausea. Negative for abdominal pain and vomiting.   Genitourinary: Negative for dysuria. "   Musculoskeletal: Negative for joint swelling.   Skin: Negative for rash.   Neurological: Negative for weakness and headaches.   Psychiatric/Behavioral: Negative for confusion.       Physical Exam     Initial Vitals [06/27/22 1954]   BP Pulse Resp Temp SpO2   (!) 229/125 85 20 98.4 °F (36.9 °C) 98 %      MAP       --         Physical Exam    Nursing note and vitals reviewed.  Constitutional:   Mildly diaphoretic   HENT:   Head: Normocephalic and atraumatic.   Eyes: Conjunctivae and EOM are normal.   Neck:   Normal range of motion.  Cardiovascular: Regular rhythm.   Pulmonary/Chest: Breath sounds normal.   No significant reproducible chest pain   Abdominal: Abdomen is soft. There is no abdominal tenderness.   Musculoskeletal:         General: Normal range of motion.      Cervical back: Normal range of motion.     Skin: No rash noted.   Psychiatric:   Alert, anxious         ED Course   Critical Care    Date/Time: 6/27/2022 9:00 PM  Performed by: Jaylon Garcia MD  Authorized by: Jaylon Garcia MD   Direct patient critical care time: 15 minutes  Additional history critical care time: 5 minutes  Ordering / reviewing critical care time: 5 minutes  Documentation critical care time: 5 minutes  Consulting other physicians critical care time: 5 minutes  Total critical care time (exclusive of procedural time) : 35 minutes        Labs Reviewed   COMPREHENSIVE METABOLIC PANEL - Abnormal; Notable for the following components:       Result Value    Glucose 169 (*)     eGFR if non  53.8 (*)     All other components within normal limits   B-TYPE NATRIURETIC PEPTIDE - Abnormal; Notable for the following components:     (*)     All other components within normal limits   TROPONIN I - Abnormal; Notable for the following components:    Troponin I 0.056 (*)     All other components within normal limits   CBC W/ AUTO DIFFERENTIAL   PROTIME-INR   APTT   CK-MB   CK   MAGNESIUM   SARS-COV-2 RNA AMPLIFICATION,  QUAL   URINALYSIS, REFLEX TO URINE CULTURE          Imaging Results          X-Ray Chest AP Portable (In process)                  Medications   aspirin chewable tablet 324 mg ( Oral Canceled Entry 6/27/22 2000)   nitroGLYCERIN 50 mg in dextrose 5 % 250 mL infusion (TITRATING) (30 mcg/min Intravenous Rate/Dose Change 6/27/22 2030)   morphine injection 4 mg (4 mg Intravenous Given 6/27/22 2005)   ondansetron injection 4 mg (4 mg Intravenous Given 6/27/22 2005)   labetaloL injection 10 mg (10 mg Intravenous Given 6/27/22 2005)     Medical Decision Making:   History:   Old Medical Records: I decided to obtain old medical records.  Clinical Tests:   Lab Tests: Reviewed  Radiological Study: Reviewed  Medical Tests: Reviewed  ED Management:  Patient presents with history and physical exam consistent with acute coronary syndrome.  Original EKG with some peaked T-waves in precordial lead and less than 1 mm ST-elevation.  This is somewhat changed from previous EKG from 2019.  Given extremely elevated blood pressure low and EKG, patient treated with morphine, Zofran, labetalol and nitroglycerin infusion.  Blood pressure improving.  Given high index of suspicion of evolving cardiac event, repeat EKG obtained that now shows evidence of acute ST elevation myocardial infarction.  Dr. Powell with Interventional Cardiology immediately consulted and will take to cardiac catheterization lab.  Patient took aspirin 650 mg prior to arrival.  Blood pressure is improving.                      Clinical Impression:   Final diagnoses:  [R07.9] Chest pain  [I21.3] ST elevation myocardial infarction (STEMI), unspecified artery          ED Disposition Condition    Admit               Jaylon Garcia MD  06/27/22 7631

## 2022-06-28 NOTE — PLAN OF CARE
[facility]  Initial Discharge Assessment       Primary Care Provider: Primary Doctor No    Admission Diagnosis: Chest pain [R07.9]    Admission Date: 2022  Expected Discharge Date:     Discharge Barriers Identified: None    Payor: BLUE CROSS BLUE SHIELD / Plan: [facility] FEDERAL BASIC / Product Type: PPO /     Extended Emergency Contact Information  Primary Emergency Contact: janessa gruber  Address: [address]  Home Phone: [phone]  Mobile Phone: [phone]  Relation: Significant other  Preferred language: English   needed? No    Discharge Plan A: Home  Discharge Plan B: Home      [id number] [address]  [address] [facility]  [address] [facility] [address]  Phone: [phone] Fax: [phone]    : Met with patient at bedside. Verified name and . Introduced self and role of CM in care of patient.   Lives with: s/o Janessa [phone]  Decision maker: self  MPOA: no  DME: denies  Provider services: denies  PCP: does not have one. Uses Urgent care  Transportation home Janessa       Initial Assessment (most recent)     Adult Discharge Assessment - 22 1504        Discharge Assessment    Assessment Type Discharge Planning Assessment     Confirmed/corrected address, phone number and insurance Yes     Confirmed Demographics Correct on Facesheet     Source of Information patient;health record     Communicated MICHAEL with patient/caregiver Date not available/Unable to determine     Reason For Admission chest pain STEMI     Lives With significant other     Do you expect to return to your current living situation? Yes     Do you have help at home or someone to help you manage your care at home? Yes     Who are your caregiver(s) and their phone number(s)? Janessa [phone]     Prior to hospitilization cognitive status: Alert/Oriented     Current cognitive status:  Alert/Oriented     Walking or Climbing Stairs Difficulty none     Dressing/Bathing Difficulty none     Do you have any problems with: --   denies    Equipment Currently Used at Home none     Readmission within 30 days? No     Do you currently have service(s) that help you manage your care at home? No     Do you take prescription medications? Yes     Do you have any problems affording any of your prescribed medications? --   BCBS    Is the patient taking medications as prescribed? yes     Who is going to help you get home at discharge? s/o Janessa     How do you get to doctors appointments? car, drives self;family or friend will provide     Are you on dialysis? No     Do you take coumadin? No     Discharge Plan A Home     Discharge Plan B Home     DME Needed Upon Discharge  none     Discharge Plan discussed with: Patient     Discharge Barriers Identified None        Relationship/Environment    Name(s) of Who Lives With Patient Janessa

## 2022-06-28 NOTE — PROGRESS NOTES
[facility]  Department of Cardiology  Consult Note      PATIENT NAME: Matteo Fraire    MRN: [id number]  TODAY'S DATE: 06/28/2022  ADMIT DATE: 6/27/2022                          CONSULT REQUESTED BY: Stef Moreno MD    SUBJECTIVE     PRINCIPAL PROBLEM: STEMI (ST elevation myocardial infarction)      REASON FOR CONSULT:  ST-elevation MI          6/28/2022  Interval History  Appears comfortable, no chest pain on IV tridil. And no dyspnea.  Patient in ICU. Denies CP. Right groin soft. Vital signs stable.    HPI:  Patient is a 61-year-old male has a history of myocardial infarction the past with stents placed in 2005. Two thousand eleven he had coronary bypass x2.  Bypass was done at the [facility] .  He has recently moved to [place].  He was outside doing "[employer]d work today without any significant problem.  He came in to sit down to rest had onset of chest pain about 1 hour before admission to the hospital.  Initial EKG was not diagnostic but repeat EKG showed ST-elevation MI in the anterior lateral leads.  He was brought to the cardiac catheterization laboratory where he was found to have occluded LAD at the insertion of the LIMA graft to the LAD with no collaterals.  Simple angioplasty was performed which revealed a small vessel which was 2 is small to insert a stent.  He is currently pain-free and stable on nitro drip and beta-blocker.  The groin was closed with V[place] and he is being transferred to the ICU.    Patient also has unprotected right coronary artery with a subtotal lesion in the distal and the ostial PDA branch.  The insertion of the circumflex graft to the obtuse marginal has significant disease.  There is multiple lesions in proximal LAD.  The LAD after the surgeon the LIMA has mild diffuse disease.  Cardiovascular surgery will be consulted for consideration repeat bypass versus multivessel angioplasty at a later date.        Review of patient's allergies indicates:  No Known  "Allergies    Past Medical History:   Diagnosis Date    Heart attack 2005;2011    Hypertension     Psoriasis      Past Surgical History:   Procedure Laterality Date    CORONARY ARTERY BYPASS GRAFT      CYSTOSCOPY W/ URETERAL STENT PLACEMENT Left 11/21/2019    Procedure: CYSTOSCOPY, WITH URETERAL STENT INSERTION;  Surgeon: Mickey Escudero MD;  Location: [facility];  Service: Urology;  Laterality: Left;    EXTRACORPOREAL SHOCK WAVE LITHOTRIPSY Left 11/21/2019    Procedure: LITHOTRIPSY, ESWL;  Surgeon: Mickey Escudero MD;  Location: [facility];  Service: Urology;  Laterality: Left;    HERNIA REPAIR      Inguinal just after birth     Social History     Tobacco Use    Smoking status: Never Smoker    Smokeless tobacco: Never Used   Substance Use Topics    Alcohol use: Never     Comment: "Quit in 87"    Drug use: Never        REVIEW OF SYSTEMS  CONSTITUTIONAL: Negative for chills, fatigue and fever.   EYES: No double vision, No blurred vision  NEURO: No headaches, No dizziness  RESPIRATORY: Negative for cough, shortness of breath and wheezing.    CARDIOVASCULAR: no cp  GI: Negative for abdominal pain, No melena, diarrhea, nausea and vomiting.   : Negative for dysuria and frequency, Negative for hematuria  SKIN: Negative for bruising, Negative for edema or discoloration noted.   ENDOCRINE: Negative for polyphagia, Negative for heat intolerance, Negative for cold intolerance  PSYCHIATRIC: Negative for depression, Negative for anxiety, Negative for memory loss  MUSCULOSKELETAL: Negative for neck pain, Negative for muscle weakness, Negative for back pain     OBJECTIVE     VITAL SIGNS (Most Recent)  Temp: 98 °F (36.7 °C) (06/28/22 1145)  Pulse: 74 (06/28/22 1230)  Resp: (!) 23 (06/28/22 1230)  BP: (!) 149/88 (06/28/22 1230)  SpO2: 96 % (06/28/22 1230)    VENTILATION STATUS  Resp: (!) 23 (06/28/22 1230)  SpO2: 96 % (06/28/22 1230)       I & O (Last 24H):    Intake/Output Summary (Last 24 hours) at 6/28/2022 7606  Last data " filed at 6/28/2022 0501  Gross per 24 hour   Intake 69.57 ml   Output 900 ml   Net -830.43 ml       WEIGHTS  Wt Readings from Last 1 Encounters:   06/27/22 2254 101.1 kg (222 lb 14.2 oz)   06/27/22 1954 99.8 kg (220 lb)     Physical examination:      Constitutional:  Well-built well-nourished in no apparent distress, alert and oriented    Neck: no carotid bruit, no JVD, no masses    Lungs: diminished breath sounds bibasilar, rhonchi bilaterally  Chest Wall: no tenderness  CARDIAC:  regular rate and rhythm, S1, S2 normal, no murmur, click, rub or gallop  Abdomen: soft, non-tender; bowel sounds normal; no masses,  no organomegaly, no guarding or rebound noted  Extremities: Extremities normal, atraumatic, no cyanosis, clubbing, or edema  Skin: Skin color, texture, turgor normal. No rashes or lesions  Neuro: Alert and responsive.  Moving all extremities      HOME MEDICATIONS:  No current facility-administered medications on file prior to encounter.     Current Outpatient Medications on File Prior to Encounter   Medication Sig Dispense Refill    aspirin 325 MG tablet Take 650 mg by mouth once.      aspirin 81 MG Chew Take 81 mg by mouth once daily.      HYDROcodone-acetaminophen (NORCO) 5-325 mg per tablet Take 1 tablet by mouth every 4 (four) hours as needed for Pain. 12 tablet 0    ibuprofen (ADVIL,MOTRIN) 600 MG tablet Take 1 tablet (600 mg total) by mouth every 6 (six) hours as needed for Pain. 20 tablet 0    ondansetron (ZOFRAN) 4 MG tablet Take 1 tablet (4 mg total) by mouth every 6 (six) hours. 12 tablet 0    tamsulosin (FLOMAX) 0.4 mg Cap Take 1 capsule (0.4 mg total) by mouth once daily. for 5 doses 5 capsule 0       SCHEDULED MEDS:   aspirin  81 mg Oral Daily    atorvastatin  80 mg Oral Daily    chlorhexidine  15 mL Mouth/Throat BID    enoxparin  100 mg Subcutaneous Q12H    isosorbide mononitrate  30 mg Oral Daily    losartan  50 mg Oral Daily    metoprolol tartrate  25 mg Oral BID    mupirocin   Nasal BID        CONTINUOUS INFUSIONS:   nitroGLYCERIN 10 mcg/min (06/28/22 0200)    nitroGLYCERIN 30 mcg/min (06/27/22 2116)       PRN MEDS:melatonin, morphine, nitroGLYCERIN, sodium chloride 0.9%    LABS AND DIAGNOSTICS     CBC LAST 3 DAYS  Recent Labs   Lab 06/27/22 1951 06/27/22 2305 06/28/22  0426   WBC 8.46 8.21 8.86   RBC 5.63 5.09 4.92   HGB 16.0 14.6 14.2   HCT 49.2 44.6 43.9   MCV 87 88 89   MCH 28.4 28.7 28.9   MCHC 32.5 32.7 32.3   RDW 13.1 13.0 13.2    232 222   MPV 11.0 11.0 11.3   GRAN 54.6  4.6 64.2  5.3 67.2  6.0   LYMPH 31.7  2.7 25.1  2.1 19.4  1.7   MONO 7.2  0.6 5.7  0.5 7.7  0.7   BASO 0.08 0.06 0.06   NRBC 0 0 0       COAGULATION LAST 3 DAYS  Recent Labs   Lab 06/27/22 1951   LABPT 13.1   INR 1.1   APTT 28.1       CHEMISTRY LAST 3 DAYS  Recent Labs   Lab 06/27/22 1951 06/27/22 2305 06/28/22 0426    136 139   K 4.0 3.7 3.7    104 105   CO2 26 26 26   ANIONGAP 11 6* 8   BUN 18 16 15   CREATININE 1.4 1.2 1.1   * 153* 145*   CALCIUM 9.0 8.4* 8.6*   MG 2.2  --   --    ALBUMIN 4.6  --   --    PROT 7.1  --   --    ALKPHOS 65  --   --    ALT 43  --   --    AST 33  --   --    BILITOT 0.8  --   --        CARDIAC PROFILE LAST 3 DAYS  Recent Labs   Lab 06/27/22 1951 06/27/22 2305   * 183*   CPK 95  --    CPKMB 2.0  --    TROPONINI 0.056* 14.806*  14.806*       ENDOCRINE LAST 3 DAYS  No results for input(s): TSH, PROCAL in the last 168 hours.    LAST ARTERIAL BLOOD GAS  ABG  No results for input(s): PH, PO2, PCO2, HCO3, BE in the last 168 hours.    LAST 7 DAYS MICROBIOLOGY   Microbiology Results (last 7 days)       ** No results found for the last 168 hours. **            MOST RECENT IMAGING  US Vein mapping, Other, Bilateral  HISTORY: vein mapping for pre-op CABG    FINDINGS: The right greater saphenous vein in the upper thigh measures 3.2 mm in diameter, in the right mid thigh 2.8 mm in diameter, in the distal right thigh 2.7 mm in diameter, at the level of the  knee 2.5 mm in diameter, in the upper calf 2.2 mm in diameter, in the mid calf 0.5 mm in diameter, and in the distal calf 2.9 mm in diameter.    The left greater saphenous vein in the upper thigh measures 3.2 mm in diameter, in the mid thigh 3.7 mm in diameter, in the distal thigh 3.2 mm in diameter, at the level of the knee 2.9 mm in diameter, in the left upper calf 2.8 mm in diameter, in the left mid calf 3 mm in diameter, and in the distal calf 3.5 mm in diameter.    IMPRESSION: Bilateral greater saphenous vein mapping as described.    Electronically signed by:  Perico Mullen MD  6/28/2022 2:11 PM CDT Workstation: [id number]  US Carotid Bilateral  CMS MANDATED QUALITY DATA - CAROTID - 195    All measurements and percent stenosis described below were determined using NASCET criteria or criteria similar to NASCET, as defined by the Society of Radiologists in Ultrasound Consensus Conference, Radiology, 2003    Reason: Preop exam.    COMPARISON: None    FINDINGS:  Color Doppler, spectral Doppler, and grayscale analysis was performed.    Grayscale images show mild bilateral intimal thickening of the CCA.    Estimated peak systolic velocity in right internal carotid artery is 52.3 cm/s proximally, 41.3 cm/s at the mid artery, and 43.5 cm/s distally. Antegrade flow is present in the right vertebral artery.    Estimated peak systolic velocity in left internal carotid artery is 64.2 cm/s proximally, 56.4 cm/s at the mid artery, and 57.7 cm/s distally. Antegrade flow is present in the left vertebral artery.    IMPRESSION:    Mild intimal thickening of the bilateral CCA. No hemodynamic significant stenosis.    Electronically signed by:  Waldemar De La Cruz DO  6/28/2022 12:38 PM CDT Workstation: [id number]  Echo Saline Bubble? No  · The left ventricle is normal in size with mild predominantly mid septal   mild asymmetric hypertrophy eccentric hypertrophy and normal systolic   function.  · The estimated ejection fraction is  60%.  · Indeterminate left ventricular diastolic function.  · Normal right ventricular size with normal right ventricular systolic   function.  · Mild left atrial enlargement.  · Normal central venous pressure (3 mmHg).  · The estimated PA systolic pressure is 19 mmHg.  · Mild mitral regurgitation.     X-Ray Chest AP Portable  Reason: chest pain    FINDINGS:    PA and lateral chest with comparison chest x-ray November 20, 2019 show normal cardiomediastinal silhouette.  Mild bilateral perihilar hazy ill-defined opacities are noted. Pulmonary vasculature is normal. No acute osseous abnormality.    IMPRESSION:    Mild bilateral perihilar hazy ill-defined opacities could reflect mild pulmonary edema or infection in the proper clinical settings.    Electronically signed by:  Waldemar De La Cruz DO  6/28/2022 6:13 AM CDT Workstation: [id number]      ECHOCARDIOGRAM RESULTS (last 5)  No results found for this or any previous visit.      CURRENT/PREVIOUS VISIT EKG  Results for orders placed or performed during the hospital encounter of 06/27/22   EKG 12-lead    Collection Time: 06/27/22  8:36 PM    Narrative    Test Reason : R07.9,    Vent. Rate : 076 BPM     Atrial Rate : 076 BPM     P-R Int : 216 ms          QRS Dur : 104 ms      QT Int : 388 ms       P-R-T Axes : 040 087 039 degrees     QTc Int : 436 ms    Sinus rhythm with 1st degree A-V block  Incomplete right bundle branch block  Anteroseptal infarct (cited on or before 27-JUN-2022)  Lateral injury pattern    ACUTE MI / STEMI    Abnormal ECG  When compared with ECG of 27-JUN-2022 20:35,  Premature ventricular complexes are no longer Present  Serial changes of Anteroseptal infarct Present    Referred By: [email or web address]   SELF           Confirmed By:            ASSESSMENT/PLAN:     Active Hospital Problems    Diagnosis    *STEMI (ST elevation myocardial infarction)       ASSESSMENT & PLAN:   ST-ELEVATION MI s/p balloon angioplasty to LAD  HYPERTENSIVE URGENCY  STATUS POST  CORONARY BYPASS GRAFT OCCLUDED LAD AT THE INSERTION OF THE LIMA  BOLTON MULTIVESSEL CORONARY DISEASE  OBESITY  ELEVATED GLUCOSE RULE OUT DIABETES    RECOMMENDATIONS:    Change Nitro gtt to Imdur 30 mg daily  Continue ASA 81 mg daily and Lovenox  Continue Metoprolol 25 mg PO BID  Continue Losartan 50 mg daily  Continue Lipitor 80 mg daily  Plan for surgery with Dr. Jones  Obtain Carotid US          Siobhan Tran NP  [facility]  Department of Cardiology  Date of Service: 06/28/2022  10:32 PM    I have personally interviewed and examined the patient, I have reviewed the Nurse Practitioner's history and physical, assessment, and plan. I have personally evaluated the patient at bedside and agree with the findings and made appropriate changes as necessary in recommendations.    Jackson Mann MD.    Department of Cardiology  [facility]  Dfate of service : 06/28/22

## 2022-06-28 NOTE — PROGRESS NOTES
"Pharmacist Renal Dose Adjustment Note    Matteo Fraire is a 61 y.o. male being treated with the medication Enoxaparin    Patient Data:    Vital Signs (Most Recent):  Temp: 98.2 °F (36.8 °C) (06/28/22 0716)  Pulse: 68 (06/28/22 0730)  Resp: 15 (06/28/22 0730)  BP: 136/82 (06/28/22 0730)  SpO2: 96 % (06/28/22 0730) Vital Signs (72h Range):  Temp:  [98.2 °F (36.8 °C)-98.4 °F (36.9 °C)]   Pulse:  [65-85]   Resp:  [10-26]   BP: (109-229)/()   SpO2:  [95 %-100 %]      Recent Labs   Lab 06/27/22 1951 06/27/22  2305 06/28/22  0426   CREATININE 1.4 1.2 1.1     Ht: 5' 9" (1.753 m)  Wt: 101.1 kg (222 lb 14.2 oz)  Estimated Creatinine Clearance: 82.7 mL/min (based on SCr of 1.1 mg/dL).    Enoxaparin 1 mg/kg SQ q 24 hrs will be changed to Enoxaparin 1 mg/kg SQ q 12 hrs due to CrCl > 30 ml/min    Pharmacist's Name: Ramón Hernandez  Pharmacist's Extension: [phone]    "

## 2022-06-28 NOTE — NURSING
"          Dx: STEMI (ST elevation myocardial infarction)    Shift Events: No acute events     Goals of Care: No chest Pain     Neuro: AAO x4, Follows Commands and Moves All Extremities    Vital Signs: /74   Pulse 69   Temp 98.1 °F (36.7 °C) (Oral)   Resp 15   Ht 5' 9" (1.753 m)   Wt 101.1 kg (222 lb 14.2 oz)   SpO2 97%   BMI 32.91 kg/m²     Respiratory: Room Air    Diet: Cardiac Diet    Gtts: MIVF, Nitro      Urine Output: Voids Spontaneously 500 cc/shift             Labs/Accuchecks: Troponin 14.08.    Skin: Right groin Sheath wnl       "

## 2022-06-29 PROBLEM — I25.709 CORONARY ARTERY DISEASE INVOLVING CORONARY BYPASS GRAFT OF NATIVE HEART WITH ANGINA PECTORIS: Status: ACTIVE | Noted: 2022-06-29

## 2022-06-29 LAB
ALBUMIN SERPL BCP-MCNC: 3.9 G/DL (ref 3.5–5.2)
ALP SERPL-CCNC: 56 U/L (ref 55–135)
ALT SERPL W/O P-5'-P-CCNC: 35 U/L (ref 10–44)
ANION GAP SERPL CALC-SCNC: 9 MMOL/L (ref 8–16)
AST SERPL-CCNC: 41 U/L (ref 10–40)
BILIRUB SERPL-MCNC: 1.7 MG/DL (ref 0.1–1)
BUN SERPL-MCNC: 20 MG/DL (ref 8–23)
CALCIUM SERPL-MCNC: 8.7 MG/DL (ref 8.7–10.5)
CHLORIDE SERPL-SCNC: 102 MMOL/L (ref 95–110)
CO2 SERPL-SCNC: 24 MMOL/L (ref 23–29)
CREAT SERPL-MCNC: 1.2 MG/DL (ref 0.5–1.4)
EST. GFR  (AFRICAN AMERICAN): >60 ML/MIN/1.73 M^2
EST. GFR  (NON AFRICAN AMERICAN): >60 ML/MIN/1.73 M^2
GLUCOSE SERPL-MCNC: 98 MG/DL (ref 70–110)
MAGNESIUM SERPL-MCNC: 2.2 MG/DL (ref 1.6–2.6)
POTASSIUM SERPL-SCNC: 4.2 MMOL/L (ref 3.5–5.1)
PROT SERPL-MCNC: 6.3 G/DL (ref 6–8.4)
SODIUM SERPL-SCNC: 135 MMOL/L (ref 136–145)

## 2022-06-29 PROCEDURE — 25000003 PHARM REV CODE 250

## 2022-06-29 PROCEDURE — 99233 SBSQ HOSP IP/OBS HIGH 50: CPT | Mod: ,,, | Performed by: INTERNAL MEDICINE

## 2022-06-29 PROCEDURE — 99900035 HC TECH TIME PER 15 MIN (STAT)

## 2022-06-29 PROCEDURE — 25000003 PHARM REV CODE 250: Performed by: GENERAL PRACTICE

## 2022-06-29 PROCEDURE — 63600175 PHARM REV CODE 636 W HCPCS: Performed by: THORACIC SURGERY (CARDIOTHORACIC VASCULAR SURGERY)

## 2022-06-29 PROCEDURE — 21000000 HC CCU ICU ROOM CHARGE

## 2022-06-29 PROCEDURE — 25000003 PHARM REV CODE 250: Performed by: HOSPITALIST

## 2022-06-29 PROCEDURE — 94761 N-INVAS EAR/PLS OXIMETRY MLT: CPT

## 2022-06-29 PROCEDURE — 99900031 HC PATIENT EDUCATION (STAT)

## 2022-06-29 PROCEDURE — 80053 COMPREHEN METABOLIC PANEL: CPT | Performed by: HOSPITALIST

## 2022-06-29 PROCEDURE — 99233 PR SUBSEQUENT HOSPITAL CARE,LEVL III: ICD-10-PCS | Mod: ,,, | Performed by: INTERNAL MEDICINE

## 2022-06-29 PROCEDURE — 83735 ASSAY OF MAGNESIUM: CPT | Performed by: HOSPITALIST

## 2022-06-29 PROCEDURE — 94799 UNLISTED PULMONARY SVC/PX: CPT

## 2022-06-29 PROCEDURE — 36415 COLL VENOUS BLD VENIPUNCTURE: CPT | Performed by: HOSPITALIST

## 2022-06-29 PROCEDURE — 25000003 PHARM REV CODE 250: Performed by: INTERNAL MEDICINE

## 2022-06-29 RX ORDER — ISOSORBIDE MONONITRATE 60 MG/1
60 TABLET, EXTENDED RELEASE ORAL DAILY
Status: DISCONTINUED | OUTPATIENT
Start: 2022-06-30 | End: 2022-07-01

## 2022-06-29 RX ORDER — POTASSIUM CHLORIDE 20 MEQ/1
20 TABLET, EXTENDED RELEASE ORAL 2 TIMES DAILY
Status: COMPLETED | OUTPATIENT
Start: 2022-06-29 | End: 2022-06-30

## 2022-06-29 RX ORDER — LANOLIN ALCOHOL/MO/W.PET/CERES
800 CREAM (GRAM) TOPICAL 2 TIMES DAILY
Status: DISCONTINUED | OUTPATIENT
Start: 2022-06-29 | End: 2022-07-08

## 2022-06-29 RX ADMIN — ASPIRIN 81 MG: 81 TABLET, COATED ORAL at 08:06

## 2022-06-29 RX ADMIN — MUPIROCIN: 20 OINTMENT TOPICAL at 08:06

## 2022-06-29 RX ADMIN — CHLORHEXIDINE GLUCONATE 15 ML: 1.2 RINSE ORAL at 08:06

## 2022-06-29 RX ADMIN — ENOXAPARIN SODIUM 100 MG: 100 INJECTION SUBCUTANEOUS at 11:06

## 2022-06-29 RX ADMIN — METOPROLOL TARTRATE 25 MG: 25 TABLET, FILM COATED ORAL at 08:06

## 2022-06-29 RX ADMIN — LOSARTAN POTASSIUM 50 MG: 50 TABLET, FILM COATED ORAL at 08:06

## 2022-06-29 RX ADMIN — POTASSIUM CHLORIDE 20 MEQ: 1500 TABLET, EXTENDED RELEASE ORAL at 08:06

## 2022-06-29 RX ADMIN — ENOXAPARIN SODIUM 100 MG: 100 INJECTION SUBCUTANEOUS at 10:06

## 2022-06-29 RX ADMIN — Medication 800 MG: at 08:06

## 2022-06-29 RX ADMIN — ISOSORBIDE MONONITRATE 30 MG: 30 TABLET, EXTENDED RELEASE ORAL at 08:06

## 2022-06-29 RX ADMIN — ATORVASTATIN CALCIUM 80 MG: 40 TABLET, FILM COATED ORAL at 08:06

## 2022-06-29 NOTE — PROGRESS NOTES
Critical access hospital Medicine  Progress Note    Patient name: Matteo Fraire  MRN: [id number]  Admit Date: 6/27/2022   LOS: 2 days     SUBJECTIVE:     Principal problem: STEMI (ST elevation myocardial infarction)    Interval History:  She was seen and examined bedside, chest pain-free, off Tridil drip since morning, right groin has no hematoma or erythema.  Hemodynamically stable.  No labs today.  Waiting for CABG    Scheduled Meds:   aspirin  81 mg Oral Daily    atorvastatin  80 mg Oral Daily    chlorhexidine  15 mL Mouth/Throat BID    enoxparin  100 mg Subcutaneous Q12H    isosorbide mononitrate  30 mg Oral Daily    losartan  50 mg Oral Daily    metoprolol tartrate  25 mg Oral BID    mupirocin   Nasal BID     Continuous Infusions:   nitroGLYCERIN 30 mcg/min (06/27/22 2116)     PRN Meds:melatonin, morphine, nitroGLYCERIN, sodium chloride 0.9%    Review of patient's allergies indicates:  No Known Allergies    Review of Systems: As per interval history    OBJECTIVE:     Vital Signs (Most Recent)  Temp: 98.9 °F (37.2 °C) (06/29/22 1115)  Pulse: 72 (06/29/22 1530)  Resp: 12 (06/29/22 1115)  BP: 138/76 (06/29/22 1530)  SpO2: 95 % (06/29/22 1530)    Vital Signs Range (Last 24H):  Temp:  [98.2 °F (36.8 °C)-98.9 °F (37.2 °C)]   Pulse:  [67-78]   Resp:  [5-26]   BP: ()/(64-82)   SpO2:  [92 %-98 %]     I & O (Last 24H):    Intake/Output Summary (Last 24 hours) at 6/29/2022 1547  Last data filed at 6/28/2022 1844  Gross per 24 hour   Intake 135.6 ml   Output --   Net 135.6 ml       Physical Exam:  General: Patient resting comfortably in no acute distress. Appears as stated age. Calm  Eyes: No conjunctival injection. No scleral icterus.  ENT: Hearing grossly intact. No discharge from ears. No nasal discharge.   Neck: Supple, trachea midline. No JVD  CVS: RRR. No LE edema BL  Lungs:  No tachypnea or accessory muscle use.  Clear to auscultation bilaterally  Abdomen:  Soft, nontender and nondistended.   No organomegaly  Neuro: AOx3. Moves all extremities. Follows commands. Responds appropriately   Skin:  No rash or erythema noted.  Right groin has no erythema or hematoma    Laboratory:  I have reviewed all pertinent lab results within the past 24 hours.    Diagnostic Results:  Reviewed all imaging    ASSESSMENT/PLAN:     61-year-old with prior history of CAD status post PCI and CABG came with chest pain found to have STEMI status post balloon angioplasty to LAD now waiting for CABG    Active Hospital Problems    Diagnosis  POA    *STEMI (ST elevation myocardial infarction) [I21.3]  Yes    Coronary artery disease involving coronary bypass graft of native heart with angina pectoris [I25.709]  Yes      Resolved Hospital Problems   No resolved problems to display.         Plan:   STEMI status post balloon angioplasty to LAD now waiting for CABG.   Unfortunately patient got Brilinta 180 mg on 06/27  Currently on weight based Lovenox  Patient was offered staged PCI and CABG redo and he opted for CABG redo  Continue Imdur, beta-blocker, losartan  Increase activity  Incentive spirometer  Transfer to cardiology floor  P.r.n. labs      VTE Risk Mitigation (From admission, onward)         Ordered     enoxaparin injection 100 mg  Every 12 hours (non-standard times)         06/28/22 0914                    Department Hospital Medicine  [facility]  Estela Madrid MD  Date of service: 06/29/2022

## 2022-06-29 NOTE — CONSULTS
"[facility]  Adult Nutrition   Consult Note (Nutrition Education)     SUMMARY     Recommendations  1) Educated patient on HTN, CHF and HH and gave written information along with my contact information if needed.   2) Continue current cardiac diet as tolerated and encourage intake.    Goals:   1) Patient to meet 75 to 100% of his estimated energy and protein needs.  2) Patient to understand the relationship between diet and HTN, CHF and a healthy heart and be able to lower his HTN, BNP and his weight by 10%.    Nutrition Goal Status: goal not met yet    Communication of RD Recs: other (comment) (patient)    Dietitian Rounds Brief  Consult per Diet Education:  Educated patient on HTN, CHF and a healthy heart and gave written information along with my contact information if needed. He verbalized understanding and intent to comply. Will follow-up after his CABG.      Diet order:   Current Diet Order: Cardiac      Evaluation of Received Nutrient/Fluid Intake  Energy Calories Required: meeting needs  Protein Required: meeting needs  Fluid Required: meeting needs  Tolerance: tolerating     % Intake of Estimated Energy Needs: 75 - 100 %  % Meal Intake: 75 - 100 %      Intake/Output Summary (Last 24 hours) at 6/29/2022 1629  Last data filed at 6/28/2022 1844  Gross per 24 hour   Intake 135.6 ml   Output --   Net 135.6 ml        Anthropometrics  Temp: 98.9 °F (37.2 °C)  Height Method: Stated  Height: 5' 9" (175.3 cm)  Height (inches): 69 in  Weight Method: Bed Scale  Weight: 101.1 kg (222 lb 14.2 oz)  Weight (lb): 222.89 lb  Ideal Body Weight (IBW), Male: 160 lb  % Ideal Body Weight, Male (lb): 139.31 %  BMI (Calculated): 32.9  BMI Grade: 30 - 34.9- obesity - grade I       Estimated/Assessed Needs  Weight Used For Calorie Calculations: 100.7 kg (222 lb 0.1 oz)  Energy Calorie Requirements (kcal): 1760-7828 kcals/day (20-25 kcals/kg)  Energy Need Method: Kcal/kg  Protein Requirements: 110-146 g/day (1.5-2 g/kg " IBW)  Weight Used For Protein Calculations: 73 kg (160 lb 15 oz)     Estimated Fluid Requirement Method: RDA Method  RDA Method (mL): 2014       Reason for Assessment  Reason For Assessment: consult  Diagnosis: other (see comments) (STEMI (ST elevation myocardial infarction))  Relevant Medical History: Hypertension, Heart attack, Psoriasis  Interdisciplinary Rounds: attended    Nutrition/Diet History  Spiritual, Cultural Beliefs, Taoism Practices, Values that Affect Care: no  Food Allergies: NKFA  Factors Affecting Nutritional Intake: None identified at this time    Nutrition Risk Screen  Nutrition Risk Screen: no indicators present             Weight History:  Wt Readings from Last 5 Encounters:   06/27/22 101.1 kg (222 lb 14.2 oz)   06/28/22 100.7 kg (222 lb)   11/21/19 105 kg (231 lb 7.7 oz)   11/20/19 105 kg (231 lb 7.7 oz)   11/15/19 102.1 kg (225 lb)        Lab/Procedures/Meds: Pertinent Labs/Meds Reviewed    Medications:Pertinent Medications Reviewed  Scheduled Meds:   aspirin  81 mg Oral Daily    atorvastatin  80 mg Oral Daily    chlorhexidine  15 mL Mouth/Throat BID    enoxparin  100 mg Subcutaneous Q12H    isosorbide mononitrate  30 mg Oral Daily    losartan  50 mg Oral Daily    metoprolol tartrate  25 mg Oral BID    mupirocin   Nasal BID     Continuous Infusions:   nitroGLYCERIN 30 mcg/min (06/27/22 2116)     PRN Meds:.melatonin, morphine, nitroGLYCERIN, sodium chloride 0.9%    Labs: Pertinent Labs Reviewed  Clinical Chemistry:  Recent Labs   Lab 06/27/22  1951 06/27/22  2305 06/28/22  0426      < > 139   K 4.0   < > 3.7      < > 105   CO2 26   < > 26   *   < > 145*   BUN 18   < > 15   CREATININE 1.4   < > 1.1   CALCIUM 9.0   < > 8.6*   PROT 7.1  --   --    ALBUMIN 4.6  --   --    BILITOT 0.8  --   --    ALKPHOS 65  --   --    AST 33  --   --    ALT 43  --   --    ANIONGAP 11   < > 8   ESTGFRAFRICA >60.0   < > >60.0   EGFRNONAA 53.8*   < > >60.0   MG 2.2  --   --     < > =  values in this interval not displayed.     CBC:   Recent Labs   Lab 06/28/22  0426   WBC 8.86   RBC 4.92   HGB 14.2   HCT 43.9      MCV 89   MCH 28.9   MCHC 32.3     Lipid Panel:  Recent Labs   Lab 06/27/22  2305   CHOL 186   HDL 30*   LDLCALC 134.6   TRIG 107   CHOLHDL 16.1*     Cardiac Profile:  Recent Labs   Lab 06/27/22 1951 06/27/22  2305   * 183*   CPK 95  --    CPKMB 2.0  --    TROPONINI 0.056* 14.806*  14.806*     Diabetes:  Recent Labs   Lab 06/28/22  0426   HGBA1C 6.1     Monitor and Evaluation  Food and Nutrient Intake: energy intake, food and beverage intake  Food and Nutrient Adminstration: diet order  Knowledge/Beliefs/Attitudes: food and nutrition knowledge/skill, beliefs and attitudes  Physical Activity and Function: nutrition-related ADLs and IADLs, factors affecting access to physical activity  Anthropometric Measurements: weight, weight change, body mass index  Biochemical Data, Medical Tests and Procedures: lipid profile, inflammatory profile, glucose/endocrine profile, gastrointestinal profile, electrolyte and renal panel  Nutrition-Focused Physical Findings: overall appearance     Nutrition Risk  Level of Risk/Frequency of Follow-up: low     Nutrition Follow-Up  RD Follow-up?: Yes      Eloisa Burnham RD 06/29/2022 4:29 PM

## 2022-06-29 NOTE — NURSING
1700- PT transported to 2535 from icu at this time. No acute distress noted. Bed alarm in use. Pt verb. Understanding l

## 2022-06-29 NOTE — CARE UPDATE
06/29/22 0727   Patient Assessment/Suction   Level of Consciousness (AVPU) alert   Respiratory Effort Normal;Unlabored   PRE-TX-O2   SpO2 96 %   Pulse Oximetry Type Continuous   $ Pulse Oximetry - Multiple Charge Pulse Oximetry - Multiple   Pulse 74   Resp 17   Education   $ Education 15 min  (SATS)   Respiratory Evaluation   $ Care Plan Tech Time 15 min

## 2022-06-29 NOTE — NURSING TRANSFER
Nursing Transfer Note      6/29/2022     Report called to Daniela     Reason patient is being transferred: Step-Down    Transfer To: 2532 Cardio A    Transfer via wheelchair    Transfer with cardiac monitoring    Transported by RADHA Motley RN    Medicines sent: Yes    Any special needs or follow-up needed: N/A    Chart send with patient: Yes    Notified: spouse    Patient reassessed at: 1720 06/29/2022      Upon arrival to floor: cardiac monitor applied, patient oriented to room, call bell in reach and bed in lowest position

## 2022-06-29 NOTE — PLAN OF CARE
"          Dx: STEMI (ST elevation myocardial infarction)    Shift Events: No acute events    Goals of Care: No chest pain     Neuro: AAO x4    Vital Signs: BP (!) 145/86   Pulse 76   Temp 99 °F (37.2 °C) (Oral)   Resp (!) 29   Ht 5' 9" (1.753 m)   Wt 101.1 kg (222 lb 14.2 oz)   SpO2 95%   BMI 32.91 kg/m²     Respiratory: Room Air    Diet: Cardiac Diet    Gtts: Nitroglycerin D/C    Urine Output: Voids Spontaneously x2 shift         Labs/Accuchecks: Mg and CMP pending .    Skin: Psoriasis        "

## 2022-06-29 NOTE — PROGRESS NOTES
[facility]  Department of Cardiology  Consult Note      PATIENT NAME: Matteo Fraire    MRN: [id number]  TODAY'S DATE: 06/29/2022  ADMIT DATE: 6/27/2022                          CONSULT REQUESTED BY: Stef Moreno MD    SUBJECTIVE     PRINCIPAL PROBLEM: STEMI (ST elevation myocardial infarction)      REASON FOR CONSULT:  ST-elevation MI    6/29/2022    Patient denies CP. Did well overnight. Right groin soft. VSS.     6/28/2022  Interval History  Appears comfortable, no chest pain on IV tridil. And no dyspnea.  Patient in ICU. Denies CP. Right groin soft. Vital signs stable.    HPI:  Patient is a 61-year-old male has a history of myocardial infarction the past with stents placed in 2005. Two thousand eleven he had coronary bypass x2.  Bypass was done at the [facility] .  He has recently moved to [place].  He was outside doing [employer] work today without any significant problem.  He came in to sit down to rest had onset of chest pain about 1 hour before admission to the hospital.  Initial EKG was not diagnostic but repeat EKG showed ST-elevation MI in the anterior lateral leads.  He was brought to the cardiac catheterization laboratory where he was found to have occluded LAD at the insertion of the LIMA graft to the LAD with no collaterals.  Simple angioplasty was performed which revealed a small vessel which was 2 is small to insert a stent.  He is currently pain-free and stable on nitro drip and beta-blocker.  The groin was closed with Vascade and he is being transferred to the ICU.    Patient also has unprotected right coronary artery with a subtotal lesion in the distal and the ostial PDA branch.  The insertion of the circumflex graft to the obtuse marginal has significant disease.  There is multiple lesions in proximal LAD.  The LAD after the surgeon the LIMA has mild diffuse disease.  Cardiovascular surgery will be consulted for consideration repeat bypass versus multivessel angioplasty at a later  "date.        Review of patient's allergies indicates:  No Known Allergies    Past Medical History:   Diagnosis Date    Heart attack 2005;2011    Hypertension     Psoriasis      Past Surgical History:   Procedure Laterality Date    CORONARY ANGIOGRAPHY INCLUDING BYPASS GRAFTS WITH CATHETERIZATION OF LEFT HEART Left 6/27/2022    Procedure: Left heart cath;  Surgeon: Stevan Powell MD;  Location: [facility] CATH/EP LAB;  Service: Cardiology;  Laterality: Left;    CORONARY ARTERY BYPASS GRAFT      CYSTOSCOPY W/ URETERAL STENT PLACEMENT Left 11/21/2019    Procedure: CYSTOSCOPY, WITH URETERAL STENT INSERTION;  Surgeon: Mickey Escudero MD;  Location: [facility] OR;  Service: Urology;  Laterality: Left;    EXTRACORPOREAL SHOCK WAVE LITHOTRIPSY Left 11/21/2019    Procedure: LITHOTRIPSY, ESWL;  Surgeon: Mickey Escudero MD;  Location: [facility] OR;  Service: Urology;  Laterality: Left;    HERNIA REPAIR      Inguinal just after birth     Social History     Tobacco Use    Smoking status: Never Smoker    Smokeless tobacco: Never Used   Substance Use Topics    Alcohol use: Never     Comment: "Quit in 87"    Drug use: Never        REVIEW OF SYSTEMS  CONSTITUTIONAL: Negative for chills, fatigue and fever.   EYES: No double vision, No blurred vision  NEURO: No headaches, No dizziness  RESPIRATORY: Negative for cough, shortness of breath and wheezing.    CARDIOVASCULAR: no cp  GI: Negative for abdominal pain, No melena, diarrhea, nausea and vomiting.   : Negative for dysuria and frequency, Negative for hematuria  SKIN: Negative for bruising, Negative for edema or discoloration noted.   ENDOCRINE: Negative for polyphagia, Negative for heat intolerance, Negative for cold intolerance  PSYCHIATRIC: Negative for depression, Negative for anxiety, Negative for memory loss  MUSCULOSKELETAL: Negative for neck pain, Negative for muscle weakness, Negative for back pain     OBJECTIVE     VITAL SIGNS (Most Recent)  Temp: 98.9 °F (37.2 °C) " (06/29/22 1115)  Pulse: 71 (06/29/22 1230)  Resp: 12 (06/29/22 1115)  BP: 125/82 (06/29/22 1230)  SpO2: 95 % (06/29/22 1230)    VENTILATION STATUS  Resp: 12 (06/29/22 1115)  SpO2: 95 % (06/29/22 1230)       I & O (Last 24H):    Intake/Output Summary (Last 24 hours) at 6/29/2022 1337  Last data filed at 6/28/2022 1844  Gross per 24 hour   Intake 135.6 ml   Output --   Net 135.6 ml       WEIGHTS  Wt Readings from Last 1 Encounters:   06/27/22 2254 101.1 kg (222 lb 14.2 oz)   06/27/22 1954 99.8 kg (220 lb)     Physical examination:      Constitutional:  Well-built well-nourished in no apparent distress, alert and oriented    Neck: no carotid bruit, no JVD, no masses    Lungs: diminished breath sounds bibasilar, rhonchi bilaterally  Chest Wall: no tenderness  CARDIAC:  regular rate and rhythm, S1, S2 normal, no murmur, click, rub or gallop  Abdomen: soft, non-tender; bowel sounds normal; no masses,  no organomegaly, no guarding or rebound noted  Extremities: Extremities normal, atraumatic, no cyanosis, clubbing, or edema  Skin: Skin color, texture, turgor normal. No rashes or lesions  Neuro: Alert and responsive.  Moving all extremities      HOME MEDICATIONS:  No current facility-administered medications on file prior to encounter.     Current Outpatient Medications on File Prior to Encounter   Medication Sig Dispense Refill    aspirin 325 MG tablet Take 650 mg by mouth once.      aspirin 81 MG Chew Take 81 mg by mouth once daily.      HYDROcodone-acetaminophen (NORCO) 5-325 mg per tablet Take 1 tablet by mouth every 4 (four) hours as needed for Pain. 12 tablet 0    ibuprofen (ADVIL,MOTRIN) 600 MG tablet Take 1 tablet (600 mg total) by mouth every 6 (six) hours as needed for Pain. 20 tablet 0    ondansetron (ZOFRAN) 4 MG tablet Take 1 tablet (4 mg total) by mouth every 6 (six) hours. 12 tablet 0    tamsulosin (FLOMAX) 0.4 mg Cap Take 1 capsule (0.4 mg total) by mouth once daily. for 5 doses 5 capsule 0        SCHEDULED MEDS:   aspirin  81 mg Oral Daily    atorvastatin  80 mg Oral Daily    chlorhexidine  15 mL Mouth/Throat BID    enoxparin  100 mg Subcutaneous Q12H    isosorbide mononitrate  30 mg Oral Daily    losartan  50 mg Oral Daily    metoprolol tartrate  25 mg Oral BID    mupirocin   Nasal BID       CONTINUOUS INFUSIONS:   nitroGLYCERIN Stopped (06/29/22 1115)    nitroGLYCERIN 30 mcg/min (06/27/22 2116)       PRN MEDS:melatonin, morphine, nitroGLYCERIN, sodium chloride 0.9%    LABS AND DIAGNOSTICS     CBC LAST 3 DAYS  Recent Labs   Lab 06/27/22 1951 06/27/22 2305 06/28/22 0426   WBC 8.46 8.21 8.86   RBC 5.63 5.09 4.92   HGB 16.0 14.6 14.2   HCT 49.2 44.6 43.9   MCV 87 88 89   MCH 28.4 28.7 28.9   MCHC 32.5 32.7 32.3   RDW 13.1 13.0 13.2    232 222   MPV 11.0 11.0 11.3   GRAN 54.6  4.6 64.2  5.3 67.2  6.0   LYMPH 31.7  2.7 25.1  2.1 19.4  1.7   MONO 7.2  0.6 5.7  0.5 7.7  0.7   BASO 0.08 0.06 0.06   NRBC 0 0 0       COAGULATION LAST 3 DAYS  Recent Labs   Lab 06/27/22 1951   LABPT 13.1   INR 1.1   APTT 28.1       CHEMISTRY LAST 3 DAYS  Recent Labs   Lab 06/27/22 1951 06/27/22 2305 06/28/22 0426    136 139   K 4.0 3.7 3.7    104 105   CO2 26 26 26   ANIONGAP 11 6* 8   BUN 18 16 15   CREATININE 1.4 1.2 1.1   * 153* 145*   CALCIUM 9.0 8.4* 8.6*   MG 2.2  --   --    ALBUMIN 4.6  --   --    PROT 7.1  --   --    ALKPHOS 65  --   --    ALT 43  --   --    AST 33  --   --    BILITOT 0.8  --   --        CARDIAC PROFILE LAST 3 DAYS  Recent Labs   Lab 06/27/22  1951 06/27/22  2305   * 183*   CPK 95  --    CPKMB 2.0  --    TROPONINI 0.056* 14.806*  14.806*       ENDOCRINE LAST 3 DAYS  No results for input(s): TSH, PROCAL in the last 168 hours.    LAST ARTERIAL BLOOD GAS  ABG  No results for input(s): PH, PO2, PCO2, HCO3, BE in the last 168 hours.    LAST 7 DAYS MICROBIOLOGY   Microbiology Results (last 7 days)     ** No results found for the last 168 hours. **           MOST RECENT IMAGING  Cardiac catheterization  · The Insertion lesion before Mid LAD was 100% stenosed with 30% stenosis   post-intervention.  · The RPDA lesion was 90% stenosed.  · The RPAV lesion was 50% stenosed.  · The Mid RCA-2 lesion was 90% stenosed.  · The Mid RCA-1 lesion was 60% stenosed.  · The 1st Mrg-2 lesion was 75% stenosed.  · The 1st Mrg-3 lesion was 60% stenosed.  · The 1st Mrg-4 lesion was 70% stenosed.  · The 1st Mrg-1 lesion was 95% stenosed.  · The Prox LAD lesion was 60% stenosed.  · The Ost LAD to Prox LAD lesion was 90% stenosed.  · The Mid LM to Ost LAD lesion was 80% stenosed.  · The Ost Cx to Prox Cx lesion was 99% stenosed.  · The Mid LAD-1 lesion was 99% stenosed.  · The Mid LAD-2 lesion was 100% stenosed with 20% stenosis   post-intervention.  · The Mid LAD-3 lesion was 80% stenosed with 30% stenosis   post-intervention.  · The estimated blood loss was none.  · This was a successful PCI for acute myocardial infarction.     The procedure log was documented by No documenter listed and verified by   Stevan Powell MD.    Date: 6/27/2022  Time: 11:06 PM      ECHOCARDIOGRAM RESULTS (last 5)  No results found for this or any previous visit.      CURRENT/PREVIOUS VISIT EKG  Results for orders placed or performed during the hospital encounter of 06/27/22   EKG 12-lead    Collection Time: 06/27/22  8:36 PM    Narrative    Test Reason : R07.9,    Vent. Rate : 076 BPM     Atrial Rate : 076 BPM     P-R Int : 216 ms          QRS Dur : 104 ms      QT Int : 388 ms       P-R-T Axes : 040 087 039 degrees     QTc Int : 436 ms    Sinus rhythm with 1st degree A-V block  Incomplete right bundle branch block  Anteroseptal infarct (cited on or before 27-JUN-2022)  Lateral injury pattern    ACUTE MI / STEMI    Abnormal ECG  When compared with ECG of 27-JUN-2022 20:35,  Premature ventricular complexes are no longer Present  Serial changes of Anteroseptal infarct Present    Referred By: [email or web address]   SELF            Confirmed By:            ASSESSMENT/PLAN:     Active Hospital Problems    Diagnosis    *STEMI (ST elevation myocardial infarction)       ASSESSMENT & PLAN:   ST-ELEVATION MI s/p balloon angioplasty to LAD  HYPERTENSIVE URGENCY  STATUS POST CORONARY BYPASS GRAFT OCCLUDED LAD AT THE INSERTION OF THE LIMA  BOLTON MULTIVESSEL CORONARY DISEASE  OBESITY  ELEVATED GLUCOSE RULE OUT DIABETES    RECOMMENDATIONS:    CABG is planned early next week.   Patient got Brilinta on admit.  He is now on lovenox  He is chest pain free  Patient will can be moved to Cardiology  Continue Imdur, increase dose to 60 mg daily  Continue Metoprolol 25 mg PO BID  Will follow you  Increase Activity  Continue magnesium , may reduce the dose to 400 mg bid x 3 days then q day  IS          Siobhan Tran NP  [facility]  Department of Cardiology  Date of Service: 06/29/2022  10:32 PM    I have personally interviewed and examined the patient, I have reviewed the Nurse Practitioner's history and physical, assessment, and plan. I have personally evaluated the patient at bedside and agree with the findings and made appropriate changes as necessary in recommendations.    Jackson Mann MD.  Department of Cardiology  [facility]  Dfate of service : 06/29/22

## 2022-06-30 PROBLEM — I47.29 NSVT (NONSUSTAINED VENTRICULAR TACHYCARDIA): Status: ACTIVE | Noted: 2022-06-30

## 2022-06-30 LAB
ALBUMIN SERPL BCP-MCNC: 3.9 G/DL (ref 3.5–5.2)
ANION GAP SERPL CALC-SCNC: 6 MMOL/L (ref 8–16)
ANION GAP SERPL CALC-SCNC: 6 MMOL/L (ref 8–16)
BASOPHILS # BLD AUTO: 0.06 K/UL (ref 0–0.2)
BASOPHILS NFR BLD: 0.7 % (ref 0–1.9)
BNP SERPL-MCNC: 165 PG/ML (ref 0–99)
BUN SERPL-MCNC: 19 MG/DL (ref 8–23)
BUN SERPL-MCNC: 19 MG/DL (ref 8–23)
CALCIUM SERPL-MCNC: 8.5 MG/DL (ref 8.7–10.5)
CALCIUM SERPL-MCNC: 8.5 MG/DL (ref 8.7–10.5)
CHLORIDE SERPL-SCNC: 102 MMOL/L (ref 95–110)
CHLORIDE SERPL-SCNC: 102 MMOL/L (ref 95–110)
CO2 SERPL-SCNC: 26 MMOL/L (ref 23–29)
CO2 SERPL-SCNC: 26 MMOL/L (ref 23–29)
CREAT SERPL-MCNC: 1.2 MG/DL (ref 0.5–1.4)
CREAT SERPL-MCNC: 1.2 MG/DL (ref 0.5–1.4)
DIFFERENTIAL METHOD: ABNORMAL
EOSINOPHIL # BLD AUTO: 0.3 K/UL (ref 0–0.5)
EOSINOPHIL NFR BLD: 3.8 % (ref 0–8)
ERYTHROCYTE [DISTWIDTH] IN BLOOD BY AUTOMATED COUNT: 13.2 % (ref 11.5–14.5)
EST. GFR  (AFRICAN AMERICAN): >60 ML/MIN/1.73 M^2
EST. GFR  (AFRICAN AMERICAN): >60 ML/MIN/1.73 M^2
EST. GFR  (NON AFRICAN AMERICAN): >60 ML/MIN/1.73 M^2
EST. GFR  (NON AFRICAN AMERICAN): >60 ML/MIN/1.73 M^2
GLUCOSE SERPL-MCNC: 115 MG/DL (ref 70–110)
GLUCOSE SERPL-MCNC: 115 MG/DL (ref 70–110)
GLUCOSE SERPL-MCNC: 194 MG/DL (ref 70–110)
HCT VFR BLD AUTO: 47.2 % (ref 40–54)
HGB BLD-MCNC: 15 G/DL (ref 14–18)
IMM GRANULOCYTES # BLD AUTO: 0.06 K/UL (ref 0–0.04)
IMM GRANULOCYTES NFR BLD AUTO: 0.7 % (ref 0–0.5)
LYMPHOCYTES # BLD AUTO: 1.9 K/UL (ref 1–4.8)
LYMPHOCYTES NFR BLD: 21.3 % (ref 18–48)
MCH RBC QN AUTO: 28.6 PG (ref 27–31)
MCHC RBC AUTO-ENTMCNC: 31.8 G/DL (ref 32–36)
MCV RBC AUTO: 90 FL (ref 82–98)
MONOCYTES # BLD AUTO: 0.8 K/UL (ref 0.3–1)
MONOCYTES NFR BLD: 9.1 % (ref 4–15)
NEUTROPHILS # BLD AUTO: 5.6 K/UL (ref 1.8–7.7)
NEUTROPHILS NFR BLD: 64.4 % (ref 38–73)
NRBC BLD-RTO: 0 /100 WBC
PHOSPHATE SERPL-MCNC: 2.2 MG/DL (ref 2.7–4.5)
PLATELET # BLD AUTO: 215 K/UL (ref 150–450)
PMV BLD AUTO: 11.3 FL (ref 9.2–12.9)
POTASSIUM SERPL-SCNC: 4.3 MMOL/L (ref 3.5–5.1)
POTASSIUM SERPL-SCNC: 4.3 MMOL/L (ref 3.5–5.1)
RBC # BLD AUTO: 5.24 M/UL (ref 4.6–6.2)
SODIUM SERPL-SCNC: 134 MMOL/L (ref 136–145)
SODIUM SERPL-SCNC: 134 MMOL/L (ref 136–145)
TROPONIN I SERPL DL<=0.01 NG/ML-MCNC: 9.88 NG/ML
WBC # BLD AUTO: 8.75 K/UL (ref 3.9–12.7)

## 2022-06-30 PROCEDURE — 25000003 PHARM REV CODE 250: Performed by: INTERNAL MEDICINE

## 2022-06-30 PROCEDURE — 93010 EKG 12-LEAD: ICD-10-PCS | Mod: ,,, | Performed by: GENERAL PRACTICE

## 2022-06-30 PROCEDURE — 93005 ELECTROCARDIOGRAM TRACING: CPT | Performed by: INTERNAL MEDICINE

## 2022-06-30 PROCEDURE — 63600175 PHARM REV CODE 636 W HCPCS: Performed by: THORACIC SURGERY (CARDIOTHORACIC VASCULAR SURGERY)

## 2022-06-30 PROCEDURE — 25000003 PHARM REV CODE 250: Performed by: GENERAL PRACTICE

## 2022-06-30 PROCEDURE — 25000003 PHARM REV CODE 250

## 2022-06-30 PROCEDURE — 27000221 HC OXYGEN, UP TO 24 HOURS

## 2022-06-30 PROCEDURE — 21000000 HC CCU ICU ROOM CHARGE

## 2022-06-30 PROCEDURE — 80069 RENAL FUNCTION PANEL: CPT | Performed by: HOSPITALIST

## 2022-06-30 PROCEDURE — 36415 COLL VENOUS BLD VENIPUNCTURE: CPT | Performed by: HOSPITALIST

## 2022-06-30 PROCEDURE — 93010 ELECTROCARDIOGRAM REPORT: CPT | Mod: ,,, | Performed by: INTERNAL MEDICINE

## 2022-06-30 PROCEDURE — 93010 EKG 12-LEAD: ICD-10-PCS | Mod: ,,, | Performed by: INTERNAL MEDICINE

## 2022-06-30 PROCEDURE — 99900035 HC TECH TIME PER 15 MIN (STAT)

## 2022-06-30 PROCEDURE — 93005 ELECTROCARDIOGRAM TRACING: CPT | Performed by: GENERAL PRACTICE

## 2022-06-30 PROCEDURE — 99233 PR SUBSEQUENT HOSPITAL CARE,LEVL III: ICD-10-PCS | Mod: ,,, | Performed by: INTERNAL MEDICINE

## 2022-06-30 PROCEDURE — 83880 ASSAY OF NATRIURETIC PEPTIDE: CPT | Performed by: HOSPITALIST

## 2022-06-30 PROCEDURE — 84484 ASSAY OF TROPONIN QUANT: CPT | Performed by: HOSPITALIST

## 2022-06-30 PROCEDURE — 25000003 PHARM REV CODE 250: Performed by: HOSPITALIST

## 2022-06-30 PROCEDURE — 93010 ELECTROCARDIOGRAM REPORT: CPT | Mod: ,,, | Performed by: GENERAL PRACTICE

## 2022-06-30 PROCEDURE — 25000242 PHARM REV CODE 250 ALT 637 W/ HCPCS: Performed by: INTERNAL MEDICINE

## 2022-06-30 PROCEDURE — 94761 N-INVAS EAR/PLS OXIMETRY MLT: CPT

## 2022-06-30 PROCEDURE — 99233 SBSQ HOSP IP/OBS HIGH 50: CPT | Mod: ,,, | Performed by: INTERNAL MEDICINE

## 2022-06-30 PROCEDURE — 99900031 HC PATIENT EDUCATION (STAT)

## 2022-06-30 PROCEDURE — 85025 COMPLETE CBC W/AUTO DIFF WBC: CPT | Performed by: HOSPITALIST

## 2022-06-30 RX ORDER — POLYETHYLENE GLYCOL 3350 17 G/17G
17 POWDER, FOR SOLUTION ORAL 2 TIMES DAILY
Status: DISCONTINUED | OUTPATIENT
Start: 2022-06-30 | End: 2022-07-08

## 2022-06-30 RX ORDER — ALPRAZOLAM 0.25 MG/1
0.25 TABLET ORAL 3 TIMES DAILY PRN
Status: DISCONTINUED | OUTPATIENT
Start: 2022-06-30 | End: 2022-07-19 | Stop reason: HOSPADM

## 2022-06-30 RX ORDER — ALPRAZOLAM 0.25 MG/1
0.25 TABLET ORAL ONCE AS NEEDED
Status: COMPLETED | OUTPATIENT
Start: 2022-06-30 | End: 2022-06-30

## 2022-06-30 RX ORDER — NITROGLYCERIN 0.4 MG/1
0.4 TABLET SUBLINGUAL EVERY 5 MIN PRN
Status: DISCONTINUED | OUTPATIENT
Start: 2022-06-30 | End: 2022-07-08

## 2022-06-30 RX ORDER — NITROGLYCERIN 0.4 MG/1
TABLET SUBLINGUAL
Status: DISPENSED
Start: 2022-06-30 | End: 2022-06-30

## 2022-06-30 RX ORDER — HYDROCODONE BITARTRATE AND ACETAMINOPHEN 5; 325 MG/1; MG/1
1 TABLET ORAL EVERY 6 HOURS PRN
Status: DISCONTINUED | OUTPATIENT
Start: 2022-06-30 | End: 2022-07-08

## 2022-06-30 RX ORDER — MORPHINE SULFATE 2 MG/ML
2 INJECTION, SOLUTION INTRAMUSCULAR; INTRAVENOUS
Status: DISCONTINUED | OUTPATIENT
Start: 2022-06-30 | End: 2022-07-08

## 2022-06-30 RX ORDER — ACETAMINOPHEN 325 MG/1
650 TABLET ORAL EVERY 6 HOURS PRN
Status: DISCONTINUED | OUTPATIENT
Start: 2022-06-30 | End: 2022-06-30

## 2022-06-30 RX ORDER — NITROGLYCERIN 20 MG/100ML
20 INJECTION INTRAVENOUS CONTINUOUS
Status: DISCONTINUED | OUTPATIENT
Start: 2022-06-30 | End: 2022-07-01

## 2022-06-30 RX ORDER — ACETAMINOPHEN 325 MG/1
650 TABLET ORAL EVERY 6 HOURS PRN
Status: DISCONTINUED | OUTPATIENT
Start: 2022-06-30 | End: 2022-07-18

## 2022-06-30 RX ADMIN — MUPIROCIN: 20 OINTMENT TOPICAL at 09:06

## 2022-06-30 RX ADMIN — METOPROLOL TARTRATE 25 MG: 25 TABLET, FILM COATED ORAL at 09:06

## 2022-06-30 RX ADMIN — NITROGLYCERIN 0.4 MG: 0.4 TABLET SUBLINGUAL at 09:06

## 2022-06-30 RX ADMIN — ASPIRIN 81 MG: 81 TABLET, COATED ORAL at 09:06

## 2022-06-30 RX ADMIN — POTASSIUM CHLORIDE 20 MEQ: 1500 TABLET, EXTENDED RELEASE ORAL at 09:06

## 2022-06-30 RX ADMIN — Medication 800 MG: at 09:06

## 2022-06-30 RX ADMIN — POLYETHYLENE GLYCOL 3350 17 G: 17 POWDER, FOR SOLUTION ORAL at 10:06

## 2022-06-30 RX ADMIN — CHLORHEXIDINE GLUCONATE 15 ML: 1.2 RINSE ORAL at 09:06

## 2022-06-30 RX ADMIN — ENOXAPARIN SODIUM 100 MG: 100 INJECTION SUBCUTANEOUS at 10:06

## 2022-06-30 RX ADMIN — ISOSORBIDE MONONITRATE 60 MG: 60 TABLET, EXTENDED RELEASE ORAL at 09:06

## 2022-06-30 RX ADMIN — ATORVASTATIN CALCIUM 80 MG: 40 TABLET, FILM COATED ORAL at 09:06

## 2022-06-30 RX ADMIN — NITROGLYCERIN 10 MCG/MIN: 20 INJECTION INTRAVENOUS at 09:06

## 2022-06-30 RX ADMIN — POLYETHYLENE GLYCOL 3350 17 G: 17 POWDER, FOR SOLUTION ORAL at 09:06

## 2022-06-30 RX ADMIN — ALPRAZOLAM 0.25 MG: 0.25 TABLET ORAL at 09:06

## 2022-06-30 RX ADMIN — LOSARTAN POTASSIUM 50 MG: 50 TABLET, FILM COATED ORAL at 09:06

## 2022-06-30 NOTE — CARE UPDATE
06/29/22 2126   Patient Assessment/Suction   Respiratory Effort Unlabored   Rhythm/Pattern, Respiratory pattern regular   PRE-TX-O2   O2 Device (Oxygen Therapy) room air   SpO2 95 %   Pulse Oximetry Type Continuous   Pulse 75   Resp 20   Respiratory Evaluation   $ Care Plan Tech Time 15 min   $ Eval/Re-eval Charges Re-evaluation   Evaluation For   (CARE PLAN)

## 2022-06-30 NOTE — PROGRESS NOTES
[facility] Medicine  Progress Note    Patient name: Matteo Fraire  MRN: [id number]  Admit Date: 6/27/2022   LOS: 3 days     SUBJECTIVE:     Principal problem: STEMI (ST elevation myocardial infarction)    Interval History:  Pt was seen and examined bedside, today morning he had an episode of diaphoresis.  As per nursing staff patient appears very anxious and stressed out at times.  He was given nitroglycerin and Tridil drip was started.  No new changes in EKG, troponin is already downtrending.  Yesterday had short run of V-tach.  Waiting for CABG    Scheduled Meds:   aspirin  81 mg Oral Daily    atorvastatin  80 mg Oral Daily    chlorhexidine  15 mL Mouth/Throat BID    enoxparin  100 mg Subcutaneous Q12H    isosorbide mononitrate  60 mg Oral Daily    losartan  50 mg Oral Daily    magnesium oxide  800 mg Oral BID    metoprolol tartrate  25 mg Oral BID    mupirocin   Nasal BID    nitroGLYCERIN        polyethylene glycol  17 g Oral BID     Continuous Infusions:   nitroGLYCERIN 20 mcg/min (06/30/22 1028)    nitroGLYCERIN 30 mcg/min (06/27/22 2116)     PRN Meds:acetaminophen, ALPRAZolam, HYDROcodone-acetaminophen, melatonin, morphine, nitroGLYCERIN, nitroglycerin, sodium chloride 0.9%    Review of patient's allergies indicates:  No Known Allergies    Review of Systems: As per interval history    OBJECTIVE:     Vital Signs (Most Recent)  Temp: 98.1 °F (36.7 °C) (06/30/22 0848)  Pulse: 71 (06/30/22 1400)  Resp: 18 (06/30/22 0935)  BP: (!) 128/59 (06/30/22 1400)  SpO2: (!) 94 % (06/30/22 1400)    Vital Signs Range (Last 24H):  Temp:  [98.1 °F (36.7 °C)-99 °F (37.2 °C)]   Pulse:  [69-81]   Resp:  [18-30]   BP: (101-155)/(59-91)   SpO2:  [93 %-98 %]     I & O (Last 24H):    Intake/Output Summary (Last 24 hours) at 6/30/2022 1443  Last data filed at 6/30/2022 0432  Gross per 24 hour   Intake 360 ml   Output --   Net 360 ml       Physical Exam:  General: Patient resting comfortably in no  acute distress. Appears as stated age. Calm  Eyes: No conjunctival injection. No scleral icterus.  ENT: Hearing grossly intact. No discharge from ears. No nasal discharge.   Neck: Supple, trachea midline. No JVD  CVS: RRR. No LE edema BL  Lungs:  No tachypnea or accessory muscle use.  Clear to auscultation bilaterally  Abdomen:  Soft, nontender and nondistended.  No organomegaly  Neuro: AOx3. Moves all extremities. Follows commands. Responds appropriately   Skin:  No rash or erythema noted.  Right groin has no erythema or hematoma    Laboratory:  I have reviewed all pertinent lab results within the past 24 hours.    Diagnostic Results:  Reviewed all imaging    ASSESSMENT/PLAN:     61-year-old with prior history of CAD status post PCI and CABG came with chest pain found to have STEMI status post balloon angioplasty to LAD now waiting for CABG    Active Hospital Problems    Diagnosis  POA    *STEMI (ST elevation myocardial infarction) [I21.3]  Yes    NSVT (nonsustained ventricular tachycardia) [I47.2]  No    Coronary artery disease involving coronary bypass graft of native heart with angina pectoris [I25.709]  Yes      Resolved Hospital Problems   No resolved problems to display.         Plan:   STEMI status post balloon angioplasty to LAD now waiting for CABG.   Unfortunately patient got Brilinta 180 mg on 06/27  Currently on weight based Lovenox  Patient was offered staged PCI and CABG redo and he opted for CABG redo  Continue Imdur, beta-blocker, losartan  Noted cardiology started Tridil drip again due to an episode of diaphoresis today morning  Increase activity  Incentive spirometer  P.r.n. labs  Aggressive electrolyte replacement considering short run of V-tach yesterday      VTE Risk Mitigation (From admission, onward)         Ordered     enoxaparin injection 100 mg  Every 12 hours (non-standard times)         06/28/22 0942                    Department Hospital Medicine  [facility]  [facility]  Estela Madrid MD  Date of service: 06/30/2022

## 2022-06-30 NOTE — PROGRESS NOTES
[facility]  Department of Cardiology  Consult Note      PATIENT NAME: Matteo Fraire    MRN: [id number]  TODAY'S DATE: 06/30/2022  ADMIT DATE: 6/27/2022                          CONSULT REQUESTED BY: Estela Madrid MD    SUBJECTIVE     PRINCIPAL PROBLEM: STEMI (ST elevation myocardial infarction)      REASON FOR CONSULT:  ST-elevation MI      630/2022    Patient had problems with SOB, sweating and chest pressure this am. Tridil started and is doing better with resolution of his symptoms.. Troponin 9, down from 14.8    6/29/2022    Patient denies CP. Did well overnight. Right groin soft. VSS.     6/28/2022  Interval History  Appears comfortable, no chest pain on IV tridil. And no dyspnea.  Patient in ICU. Denies CP. Right groin soft. Vital signs stable.    HPI:  Patient is a 61-year-old male has a history of myocardial infarction the past with stents placed in 2005. Two thousand eleven he had coronary bypass x2.  Bypass was done at the [facility] .  He has recently moved to [place].  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  He came in to sit down to rest had onset of chest pain about 1 hour before admission to the hospital.  Initial EKG was not diagnostic but repeat EKG showed ST-elevation MI in the anterior lateral leads.  He was brought to the cardiac catheterization laboratory where he was found to have occluded LAD at the insertion of the LIMA graft to the LAD with no collaterals.  Simple angioplasty was performed which revealed a small vessel which was 2 is small to insert a stent.  He is currently pain-free and stable on nitro drip and beta-blocker.  The groin was closed with Vascade and he is being transferred to the ICU.    Patient also has unprotected right coronary artery with a subtotal lesion in the distal and the ostial PDA branch.  The insertion of the circumflex graft to the obtuse marginal has significant disease.  There is multiple lesions in proximal LAD.   "The LAD after the surgeon the LIMA has mild diffuse disease.  Cardiovascular surgery will be consulted for consideration repeat bypass versus multivessel angioplasty at a later date.        Review of patient's allergies indicates:  No Known Allergies    Past Medical History:   Diagnosis Date    Heart attack 2005;2011    Hypertension     Psoriasis      Past Surgical History:   Procedure Laterality Date    CORONARY ANGIOGRAPHY INCLUDING BYPASS GRAFTS WITH CATHETERIZATION OF LEFT HEART Left 6/27/2022    Procedure: Left heart cath;  Surgeon: Stevan Powell MD;  Location: [facility] CATH/EP LAB;  Service: Cardiology;  Laterality: Left;    CORONARY ARTERY BYPASS GRAFT      CYSTOSCOPY W/ URETERAL STENT PLACEMENT Left 11/21/2019    Procedure: CYSTOSCOPY, WITH URETERAL STENT INSERTION;  Surgeon: Mickey Escudero MD;  Location: [facility] OR;  Service: Urology;  Laterality: Left;    EXTRACORPOREAL SHOCK WAVE LITHOTRIPSY Left 11/21/2019    Procedure: LITHOTRIPSY, ESWL;  Surgeon: Mickey Escudero MD;  Location: [facility] OR;  Service: Urology;  Laterality: Left;    HERNIA REPAIR      Inguinal just after birth     Social History     Tobacco Use    Smoking status: Never Smoker    Smokeless tobacco: Never Used   Substance Use Topics    Alcohol use: Never     Comment: "Quit in 87"    Drug use: Never        REVIEW OF SYSTEMS  CONSTITUTIONAL: Negative for chills, fatigue and fever.   EYES: No double vision, No blurred vision  NEURO: No headaches, No dizziness  RESPIRATORY: +sob  CARDIOVASCULAR: + cp  GI: Negative for abdominal pain, No melena, diarrhea, nausea and vomiting.   : Negative for dysuria and frequency, Negative for hematuria  SKIN: Negative for bruising, Negative for edema or discoloration noted.   ENDOCRINE: Negative for polyphagia, Negative for heat intolerance, Negative for cold intolerance  PSYCHIATRIC: Negative for depression, Negative for anxiety, Negative for memory loss  MUSCULOSKELETAL: Negative for neck " pain, Negative for muscle weakness, Negative for back pain     OBJECTIVE     VITAL SIGNS (Most Recent)  Temp: 98.1 °F (36.7 °C) (06/30/22 0848)  Pulse: 71 (06/30/22 1035)  Resp: 18 (06/30/22 0713)  BP: 128/74 (increase to 20mcg/min) (06/30/22 1035)  SpO2: 97 % (06/30/22 1035)    VENTILATION STATUS  Resp: 18 (06/30/22 0713)  SpO2: 97 % (06/30/22 1035)       I & O (Last 24H):    Intake/Output Summary (Last 24 hours) at 6/30/2022 1052  Last data filed at 6/30/2022 0432  Gross per 24 hour   Intake 360 ml   Output --   Net 360 ml       WEIGHTS  Wt Readings from Last 1 Encounters:   06/30/22 0300 100.5 kg (221 lb 9.6 oz)   06/27/22 2254 101.1 kg (222 lb 14.2 oz)   06/27/22 1954 99.8 kg (220 lb)     Physical examination:      Constitutional:  Well-built well-nourished in no apparent distress, alert and oriented    Neck: no carotid bruit, no JVD, no masses    Lungs: diminished breath sounds   Chest Wall: no tenderness  CARDIAC:  regular rate and rhythm, S1, S2 normal, no murmur, click, rub or gallop  Abdomen: soft, non-tender; bowel sounds normal; no masses,  no organomegaly, no guarding or rebound noted  Extremities: Extremities normal, atraumatic, no cyanosis, clubbing, or edema  Skin: Skin color, texture, turgor normal. No rashes or lesions  Neuro: Alert and responsive.  Moving all extremities      HOME MEDICATIONS:  No current facility-administered medications on file prior to encounter.     Current Outpatient Medications on File Prior to Encounter   Medication Sig Dispense Refill    aspirin 325 MG tablet Take 650 mg by mouth once.      aspirin 81 MG Chew Take 81 mg by mouth once daily.      HYDROcodone-acetaminophen (NORCO) 5-325 mg per tablet Take 1 tablet by mouth every 4 (four) hours as needed for Pain. 12 tablet 0    ibuprofen (ADVIL,MOTRIN) 600 MG tablet Take 1 tablet (600 mg total) by mouth every 6 (six) hours as needed for Pain. 20 tablet 0    ondansetron (ZOFRAN) 4 MG tablet Take 1 tablet (4 mg total) by  mouth every 6 (six) hours. 12 tablet 0    tamsulosin (FLOMAX) 0.4 mg Cap Take 1 capsule (0.4 mg total) by mouth once daily. for 5 doses 5 capsule 0       SCHEDULED MEDS:   aspirin  81 mg Oral Daily    atorvastatin  80 mg Oral Daily    chlorhexidine  15 mL Mouth/Throat BID    enoxparin  100 mg Subcutaneous Q12H    isosorbide mononitrate  60 mg Oral Daily    losartan  50 mg Oral Daily    magnesium oxide  800 mg Oral BID    metoprolol tartrate  25 mg Oral BID    mupirocin   Nasal BID    nitroGLYCERIN        polyethylene glycol  17 g Oral BID       CONTINUOUS INFUSIONS:   nitroGLYCERIN 20 mcg/min (06/30/22 1028)    nitroGLYCERIN 30 mcg/min (06/27/22 2116)       PRN MEDS:acetaminophen, ALPRAZolam, HYDROcodone-acetaminophen, melatonin, morphine, nitroGLYCERIN, nitroglycerin, sodium chloride 0.9%    LABS AND DIAGNOSTICS     CBC LAST 3 DAYS  Recent Labs   Lab 06/27/22 2305 06/28/22  0426 06/30/22  0629   WBC 8.21 8.86 8.75   RBC 5.09 4.92 5.24   HGB 14.6 14.2 15.0   HCT 44.6 43.9 47.2   MCV 88 89 90   MCH 28.7 28.9 28.6   MCHC 32.7 32.3 31.8*   RDW 13.0 13.2 13.2    222 215   MPV 11.0 11.3 11.3   GRAN 64.2  5.3 67.2  6.0 64.4  5.6   LYMPH 25.1  2.1 19.4  1.7 21.3  1.9   MONO 5.7  0.5 7.7  0.7 9.1  0.8   BASO 0.06 0.06 0.06   NRBC 0 0 0       COAGULATION LAST 3 DAYS  Recent Labs   Lab 06/27/22 1951   LABPT 13.1   INR 1.1   APTT 28.1       CHEMISTRY LAST 3 DAYS  Recent Labs   Lab 06/27/22 1951 06/27/22 2305 06/28/22  0426 06/29/22  1657 06/30/22  0630      < > 139 135* 134*  134*   K 4.0   < > 3.7 4.2 4.3  4.3      < > 105 102 102  102   CO2 26   < > 26 24 26  26   ANIONGAP 11   < > 8 9 6*  6*   BUN 18   < > 15 20 19  19   CREATININE 1.4   < > 1.1 1.2 1.2  1.2   *   < > 145* 98 115*  115*   CALCIUM 9.0   < > 8.6* 8.7 8.5*  8.5*   MG 2.2  --   --  2.2  --    ALBUMIN 4.6  --   --  3.9 3.9   PROT 7.1  --   --  6.3  --    ALKPHOS 65  --   --  56  --    ALT 43  --   --   35  --    AST 33  --   --  41*  --    BILITOT 0.8  --   --  1.7*  --     < > = values in this interval not displayed.       CARDIAC PROFILE LAST 3 DAYS  Recent Labs   Lab 06/27/22 1951 06/27/22 2305 06/30/22  0630   * 183* 165*   CPK 95  --   --    CPKMB 2.0  --   --    TROPONINI 0.056* 14.806*  14.806*  --        ENDOCRINE LAST 3 DAYS  No results for input(s): TSH, PROCAL in the last 168 hours.    LAST ARTERIAL BLOOD GAS  ABG  No results for input(s): PH, PO2, PCO2, HCO3, BE in the last 168 hours.    LAST 7 DAYS MICROBIOLOGY   Microbiology Results (last 7 days)     ** No results found for the last 168 hours. **          MOST RECENT IMAGING  Cardiac catheterization  · The Insertion lesion before Mid LAD was 100% stenosed with 30% stenosis   post-intervention.  · The RPDA lesion was 90% stenosed.  · The RPAV lesion was 50% stenosed.  · The Mid RCA-2 lesion was 90% stenosed.  · The Mid RCA-1 lesion was 60% stenosed.  · The 1st Mrg-2 lesion was 75% stenosed.  · The 1st Mrg-3 lesion was 60% stenosed.  · The 1st Mrg-4 lesion was 70% stenosed.  · The 1st Mrg-1 lesion was 95% stenosed.  · The Prox LAD lesion was 60% stenosed.  · The Ost LAD to Prox LAD lesion was 90% stenosed.  · The Mid LM to Ost LAD lesion was 80% stenosed.  · The Ost Cx to Prox Cx lesion was 99% stenosed.  · The Mid LAD-1 lesion was 99% stenosed.  · The Mid LAD-2 lesion was 100% stenosed with 20% stenosis   post-intervention.  · The Mid LAD-3 lesion was 80% stenosed with 30% stenosis   post-intervention.  · The estimated blood loss was none.  · This was a successful PCI for acute myocardial infarction.     The procedure log was documented by No documenter listed and verified by   Stevan Powell MD.    Date: 6/27/2022  Time: 11:06 PM      ECHOCARDIOGRAM RESULTS (last 5)  No results found for this or any previous visit.      CURRENT/PREVIOUS VISIT EKG  Results for orders placed or performed during the hospital encounter of 06/27/22   EKG  12-LEAD    Collection Time: 06/30/22  9:15 AM    Narrative    Test Reason : I21.3,    Vent. Rate : 074 BPM     Atrial Rate : 074 BPM     P-R Int : 218 ms          QRS Dur : 104 ms      QT Int : 388 ms       P-R-T Axes : 039 089 160 degrees     QTc Int : 430 ms    Sinus rhythm with 1st degree A-V block  Incomplete right bundle branch block  Possible Inferior infarct ,age undetermined  Anteroseptal infarct (cited on or before 27-JUN-2022)  T wave abnormality, consider lateral ischemia  Abnormal ECG  When compared with ECG of 27-JUN-2022 20:36,  Borderline criteria for Inferior infarct are now Present  Serial changes of evolving Anteroseptal infarct Present    Referred By: [email or web address]   SELF           Confirmed By:            ASSESSMENT/PLAN:     Active Hospital Problems    Diagnosis    *STEMI (ST elevation myocardial infarction)    Coronary artery disease involving coronary bypass graft of native heart with angina pectoris       ASSESSMENT & PLAN:   ST-ELEVATION MI s/p balloon angioplasty to LAD  HYPERTENSIVE URGENCY  STATUS POST CORONARY BYPASS GRAFT OCCLUDED LAD AT THE INSERTION OF THE LIMA  BOLTON MULTIVESSEL CORONARY DISEASE  OBESITY  ELEVATED GLUCOSE RULE OUT DIABETES    RECOMMENDATIONS:    CABG is planned early next week.   Patient got Brilinta on admit.  He is now on lovenox  Chest Pain free on Tridil continue the same.   Clinically more are stable at this time.  Discussed with the nursing staff regarding further plans and to caution as should his symptoms change       Siobhan Tran NP  [facility]  Department of Cardiology  Date of Service: 06/30/2022  10:32 PM    I have personally interviewed and examined the patient, I have reviewed the Nurse Practitioner's history and physical, assessment, and plan. I have personally evaluated the patient at bedside and agree with the findings and made appropriate changes as necessary in recommendations.    Jackson Mann MD.  Department of  Cardiology  [facility]  Dfate of service : 06/30/22

## 2022-06-30 NOTE — PLAN OF CARE
As per ERINN valiente documentation,  CABG is planned early next week.       06/30/22 1513   Post-Acute Status   Discharge Delays None known at this time   Discharge Plan   Discharge Plan A Home with family   Discharge Plan B Home with family

## 2022-06-30 NOTE — TREATMENT PLAN
Patient seen resting in bad with mild shortness of breath. He remains on therapeutic dose Lovenox and Nitroglycerine gtt. Will Discuss surgical timing with Dr. Stone and plan accordingly.     [facility]  CVTS

## 2022-06-30 NOTE — NURSING
"0910;Diaphoretic, apprehensive and feeling "fuzzy" Cbg 194, afebrile, bp 130's systolic hr 72. Ekg obtained,  aware, troponin, repeat ekg and xanax ordered.   0945- Dr.V Mann at bedside, nitro SL given per order and Tridil gtt initiated at 10mcg/min.      1002- pt endorses improvement of symptoms.   1028- Tridil at 20mcg/min  2:29 PM- Symptoms resolved. Bp's holding well    -  "

## 2022-07-01 LAB
ALBUMIN SERPL BCP-MCNC: 3.9 G/DL (ref 3.5–5.2)
ANION GAP SERPL CALC-SCNC: 6 MMOL/L (ref 8–16)
BASOPHILS # BLD AUTO: 0.05 K/UL (ref 0–0.2)
BASOPHILS NFR BLD: 0.6 % (ref 0–1.9)
BUN SERPL-MCNC: 22 MG/DL (ref 8–23)
CALCIUM SERPL-MCNC: 8.6 MG/DL (ref 8.7–10.5)
CHLORIDE SERPL-SCNC: 103 MMOL/L (ref 95–110)
CO2 SERPL-SCNC: 26 MMOL/L (ref 23–29)
CREAT SERPL-MCNC: 1.2 MG/DL (ref 0.5–1.4)
DIFFERENTIAL METHOD: NORMAL
EOSINOPHIL # BLD AUTO: 0.4 K/UL (ref 0–0.5)
EOSINOPHIL NFR BLD: 5 % (ref 0–8)
ERYTHROCYTE [DISTWIDTH] IN BLOOD BY AUTOMATED COUNT: 13.2 % (ref 11.5–14.5)
EST. GFR  (AFRICAN AMERICAN): >60 ML/MIN/1.73 M^2
EST. GFR  (NON AFRICAN AMERICAN): >60 ML/MIN/1.73 M^2
GLUCOSE SERPL-MCNC: 131 MG/DL (ref 70–110)
HCT VFR BLD AUTO: 46.7 % (ref 40–54)
HGB BLD-MCNC: 15.1 G/DL (ref 14–18)
IMM GRANULOCYTES # BLD AUTO: 0.04 K/UL (ref 0–0.04)
IMM GRANULOCYTES NFR BLD AUTO: 0.5 % (ref 0–0.5)
LYMPHOCYTES # BLD AUTO: 2 K/UL (ref 1–4.8)
LYMPHOCYTES NFR BLD: 23.2 % (ref 18–48)
MCH RBC QN AUTO: 28.8 PG (ref 27–31)
MCHC RBC AUTO-ENTMCNC: 32.3 G/DL (ref 32–36)
MCV RBC AUTO: 89 FL (ref 82–98)
MONOCYTES # BLD AUTO: 0.8 K/UL (ref 0.3–1)
MONOCYTES NFR BLD: 9 % (ref 4–15)
NEUTROPHILS # BLD AUTO: 5.3 K/UL (ref 1.8–7.7)
NEUTROPHILS NFR BLD: 61.7 % (ref 38–73)
NRBC BLD-RTO: 0 /100 WBC
PHOSPHATE SERPL-MCNC: 2.8 MG/DL (ref 2.7–4.5)
PLATELET # BLD AUTO: 219 K/UL (ref 150–450)
PMV BLD AUTO: 11.5 FL (ref 9.2–12.9)
POTASSIUM SERPL-SCNC: 4.3 MMOL/L (ref 3.5–5.1)
RBC # BLD AUTO: 5.24 M/UL (ref 4.6–6.2)
SODIUM SERPL-SCNC: 135 MMOL/L (ref 136–145)
WBC # BLD AUTO: 8.59 K/UL (ref 3.9–12.7)

## 2022-07-01 PROCEDURE — 99232 PR SUBSEQUENT HOSPITAL CARE,LEVL II: ICD-10-PCS | Mod: ,,, | Performed by: INTERNAL MEDICINE

## 2022-07-01 PROCEDURE — 85025 COMPLETE CBC W/AUTO DIFF WBC: CPT | Performed by: HOSPITALIST

## 2022-07-01 PROCEDURE — 25000003 PHARM REV CODE 250: Performed by: GENERAL PRACTICE

## 2022-07-01 PROCEDURE — 21000000 HC CCU ICU ROOM CHARGE

## 2022-07-01 PROCEDURE — 25000003 PHARM REV CODE 250: Performed by: INTERNAL MEDICINE

## 2022-07-01 PROCEDURE — 25000003 PHARM REV CODE 250

## 2022-07-01 PROCEDURE — 99232 SBSQ HOSP IP/OBS MODERATE 35: CPT | Mod: ,,, | Performed by: INTERNAL MEDICINE

## 2022-07-01 PROCEDURE — 25000003 PHARM REV CODE 250: Performed by: HOSPITALIST

## 2022-07-01 PROCEDURE — 80069 RENAL FUNCTION PANEL: CPT | Performed by: HOSPITALIST

## 2022-07-01 PROCEDURE — 36415 COLL VENOUS BLD VENIPUNCTURE: CPT | Performed by: HOSPITALIST

## 2022-07-01 PROCEDURE — 63600175 PHARM REV CODE 636 W HCPCS: Performed by: THORACIC SURGERY (CARDIOTHORACIC VASCULAR SURGERY)

## 2022-07-01 RX ORDER — ISOSORBIDE MONONITRATE 60 MG/1
120 TABLET, EXTENDED RELEASE ORAL DAILY
Status: DISCONTINUED | OUTPATIENT
Start: 2022-07-02 | End: 2022-07-08

## 2022-07-01 RX ADMIN — ENOXAPARIN SODIUM 100 MG: 100 INJECTION SUBCUTANEOUS at 09:07

## 2022-07-01 RX ADMIN — LOSARTAN POTASSIUM 50 MG: 50 TABLET, FILM COATED ORAL at 09:07

## 2022-07-01 RX ADMIN — Medication 800 MG: at 09:07

## 2022-07-01 RX ADMIN — POLYETHYLENE GLYCOL 3350 17 G: 17 POWDER, FOR SOLUTION ORAL at 09:07

## 2022-07-01 RX ADMIN — ENOXAPARIN SODIUM 100 MG: 100 INJECTION SUBCUTANEOUS at 10:07

## 2022-07-01 RX ADMIN — ISOSORBIDE MONONITRATE 60 MG: 60 TABLET, EXTENDED RELEASE ORAL at 09:07

## 2022-07-01 RX ADMIN — ASPIRIN 81 MG: 81 TABLET, COATED ORAL at 09:07

## 2022-07-01 RX ADMIN — MUPIROCIN: 20 OINTMENT TOPICAL at 09:07

## 2022-07-01 RX ADMIN — METOPROLOL TARTRATE 25 MG: 25 TABLET, FILM COATED ORAL at 09:07

## 2022-07-01 RX ADMIN — CHLORHEXIDINE GLUCONATE 15 ML: 1.2 RINSE ORAL at 09:07

## 2022-07-01 RX ADMIN — ATORVASTATIN CALCIUM 80 MG: 40 TABLET, FILM COATED ORAL at 09:07

## 2022-07-01 NOTE — PROGRESS NOTES
[facility] Medicine  Progress Note    Patient name: Matteo Fraire  MRN: [id number]  Admit Date: 6/27/2022   LOS: 4 days     SUBJECTIVE:     Principal problem: STEMI (ST elevation myocardial infarction)    Interval History:  Pt was seen and examined bedside, remains on nitroglycerin drip, denies any active chest pain, hemodynamically stable, labs are stable.  Waiting for CABG    Scheduled Meds:   aspirin  81 mg Oral Daily    atorvastatin  80 mg Oral Daily    chlorhexidine  15 mL Mouth/Throat BID    enoxparin  100 mg Subcutaneous Q12H    isosorbide mononitrate  60 mg Oral Daily    losartan  50 mg Oral Daily    magnesium oxide  800 mg Oral BID    metoprolol tartrate  25 mg Oral BID    mupirocin   Nasal BID    polyethylene glycol  17 g Oral BID     Continuous Infusions:   nitroGLYCERIN 20 mcg/min (06/30/22 1028)    nitroGLYCERIN 30 mcg/min (06/27/22 2116)     PRN Meds:acetaminophen, ALPRAZolam, HYDROcodone-acetaminophen, melatonin, morphine, nitroGLYCERIN, nitroglycerin, sodium chloride 0.9%    Review of patient's allergies indicates:  No Known Allergies    Review of Systems: As per interval history    OBJECTIVE:     Vital Signs (Most Recent)  Temp: 98.3 °F (36.8 °C) (07/01/22 0731)  Pulse: 68 (07/01/22 0731)  Resp: 18 (07/01/22 0731)  BP: 134/75 (07/01/22 0731)  SpO2: 96 % (07/01/22 0731)    Vital Signs Range (Last 24H):  Temp:  [97.9 °F (36.6 °C)-99.4 °F (37.4 °C)]   Pulse:  [68-80]   Resp:  [18-20]   BP: ()/(58-80)   SpO2:  [93 %-98 %]     I & O (Last 24H):    Intake/Output Summary (Last 24 hours) at 7/1/2022 0910  Last data filed at 7/1/2022 0700  Gross per 24 hour   Intake 1095.85 ml   Output 650 ml   Net 445.85 ml       Physical Exam:  General: Patient resting comfortably in no acute distress. Appears as stated age. Calm  Eyes: No conjunctival injection. No scleral icterus.  ENT: Hearing grossly intact. No discharge from ears. No nasal discharge.   Neck: Supple, trachea  midline. No JVD  CVS: RRR. No LE edema BL  Lungs:  No tachypnea or accessory muscle use.  Clear to auscultation bilaterally  Abdomen:  Soft, nontender and nondistended.  No organomegaly  Neuro: AOx3. Moves all extremities. Follows commands. Responds appropriately   Skin:  No rash or erythema noted.  Right groin has no erythema or hematoma    Laboratory:  I have reviewed all pertinent lab results within the past 24 hours.    Diagnostic Results:  Reviewed all imaging    ASSESSMENT/PLAN:     61-year-old with prior history of CAD status post PCI and CABG came with chest pain found to have STEMI status post balloon angioplasty to LAD now waiting for CABG    Active Hospital Problems    Diagnosis  POA    *STEMI (ST elevation myocardial infarction) [I21.3]  Yes    NSVT (nonsustained ventricular tachycardia) [I47.2]  No    Coronary artery disease involving coronary bypass graft of native heart with angina pectoris [I25.709]  Yes      Resolved Hospital Problems   No resolved problems to display.         Plan:   STEMI status post balloon angioplasty to LAD now waiting for CABG.   Unfortunately patient got Brilinta 180 mg on 06/27  Currently on weight based Lovenox, ASA  Patient was offered staged PCI and CABG redo and he opted for CABG redo  Continue Imdur, beta-blocker, losartan  Noted cardiology started Tridil drip again on 06/30 due to an episode of diaphoresis today morning  Increase activity  Incentive spirometer  P.r.n. labs  Aggressive electrolyte replacement considering short run of V-tach on 06/29      VTE Risk Mitigation (From admission, onward)         Ordered     enoxaparin injection 100 mg  Every 12 hours (non-standard times)         06/28/22 0914                    Department Hospital Medicine  [facility]  Estela Madrid MD  Date of service: 07/01/2022

## 2022-07-01 NOTE — PLAN OF CARE
Problem: Adult Inpatient Plan of Care  Goal: Absence of Hospital-Acquired Illness or Injury  Outcome: Ongoing, Progressing  Goal: Optimal Comfort and Wellbeing  Outcome: Ongoing, Progressing  Goal: Readiness for Transition of Care  Outcome: Ongoing, Progressing     Problem: Arrhythmia/Dysrhythmia (Cardiac Catheterization)  Goal: Stable Heart Rate and Rhythm  Outcome: Ongoing, Progressing     Problem: Bleeding (Cardiac Catheterization)  Goal: Absence of Bleeding  Outcome: Ongoing, Progressing     Problem: Pain (Cardiac Catheterization)  Goal: Acceptable Pain Control  Outcome: Ongoing, Progressing     Problem: Fall Injury Risk  Goal: Absence of Fall and Fall-Related Injury  Outcome: Ongoing, Progressing

## 2022-07-01 NOTE — PLAN OF CARE
06/30/22 2130   Patient Assessment/Suction   Level of Consciousness (AVPU) alert   Respiratory Effort Unlabored   Expansion/Accessory Muscles/Retractions expansion symmetric   All Lung Fields Breath Sounds coarse   Rhythm/Pattern, Respiratory depth regular;pattern regular   Cough Frequency infrequent   Cough Type good;nonproductive   PRE-TX-O2   O2 Device (Oxygen Therapy) room air   $ Is the patient on Low Flow Oxygen? Yes   SpO2 95 %   Pulse Oximetry Type Continuous   $ Pulse Oximetry - Multiple Charge Pulse Oximetry - Multiple   Pulse 80

## 2022-07-01 NOTE — PROGRESS NOTES
[facility]  Department of Cardiology  Consult Note      PATIENT NAME: Matteo Fraire    MRN: [id number]  TODAY'S DATE: 07/01/2022  ADMIT DATE: 6/27/2022                          CONSULT REQUESTED BY: Estela Madrid MD    SUBJECTIVE     PRINCIPAL PROBLEM: STEMI (ST elevation myocardial infarction)      REASON FOR CONSULT:  ST-elevation MI    7/1/2022    Patient denies symptoms currently lying in bed.  Appears comfortable no sweating or angina symptoms noted today    630/2022    Patient had problems with SOB, sweating and chest pressure this am. Tridil started and is doing better with resolution of his symptoms.. Troponin 9, down from 14.8    6/29/2022    Patient denies CP. Did well overnight. Right groin soft. VSS.     6/28/2022  Interval History  Appears comfortable, no chest pain on IV tridil. And no dyspnea.  Patient in ICU. Denies CP. Right groin soft. Vital signs stable.    HPI:  Patient is a 61-year-old male has a history of myocardial infarction the past with stents placed in 2005. Two thousand eleven he had coronary bypass x2.  Bypass was done at the [facility] .  He has recently moved to [place].  He was outside doing [email or web address] work today without any significant problem.  He came in to sit down to rest had onset of chest pain about 1 hour before admission to the hospital.  Initial EKG was not diagnostic but repeat EKG showed ST-elevation MI in the anterior lateral leads.  He was brought to the cardiac catheterization laboratory where he was found to have occluded LAD at the insertion of the LIMA graft to the LAD with no collaterals.  Simple angioplasty was performed which revealed a small vessel which was 2 is small to insert a stent.  He is currently pain-free and stable on nitro drip and beta-blocker.  The groin was closed with [place] and he is being transferred to the ICU.    Patient also has unprotected right coronary artery with a subtotal lesion in the distal and the ostial PDA branch.  The  "insertion of the circumflex graft to the obtuse marginal has significant disease.  There is multiple lesions in proximal LAD.  The LAD after the surgeon the LIMA has mild diffuse disease.  Cardiovascular surgery will be consulted for consideration repeat bypass versus multivessel angioplasty at a later date.        Review of patient's allergies indicates:  No Known Allergies    Past Medical History:   Diagnosis Date    Heart attack 2005;2011    Hypertension     Psoriasis      Past Surgical History:   Procedure Laterality Date    CORONARY ANGIOGRAPHY INCLUDING BYPASS GRAFTS WITH CATHETERIZATION OF LEFT HEART Left 6/27/2022    Procedure: Left heart cath;  Surgeon: Stevan Powell MD;  Location: [facility] CATH/EP LAB;  Service: Cardiology;  Laterality: Left;    CORONARY ARTERY BYPASS GRAFT      CYSTOSCOPY W/ URETERAL STENT PLACEMENT Left 11/21/2019    Procedure: CYSTOSCOPY, WITH URETERAL STENT INSERTION;  Surgeon: Mickey Escudero MD;  Location: [facility] OR;  Service: Urology;  Laterality: Left;    EXTRACORPOREAL SHOCK WAVE LITHOTRIPSY Left 11/21/2019    Procedure: LITHOTRIPSY, ESWL;  Surgeon: Mickey Escudero MD;  Location: [facility] OR;  Service: Urology;  Laterality: Left;    HERNIA REPAIR      Inguinal just after birth     Social History     Tobacco Use    Smoking status: Never Smoker    Smokeless tobacco: Never Used   Substance Use Topics    Alcohol use: Never     Comment: "Quit in 87"    Drug use: Never        REVIEW OF SYSTEMS  CONSTITUTIONAL: Negative for chills, fatigue and fever.   EYES: No double vision, No blurred vision  NEURO: No headaches, No dizziness  RESPIRATORY: +sob  CARDIOVASCULAR: + cp  GI: Negative for abdominal pain, No melena, diarrhea, nausea and vomiting.   : Negative for dysuria and frequency, Negative for hematuria  SKIN: Negative for bruising, Negative for edema or discoloration noted.   ENDOCRINE: Negative for polyphagia, Negative for heat intolerance, Negative for cold " intolerance  PSYCHIATRIC: Negative for depression, Negative for anxiety, Negative for memory loss  MUSCULOSKELETAL: Negative for neck pain, Negative for muscle weakness, Negative for back pain     OBJECTIVE     VITAL SIGNS (Most Recent)  Temp: 98 °F (36.7 °C) (07/01/22 1210)  Pulse: 67 (07/01/22 1210)  Resp: 18 (07/01/22 1210)  BP: 127/65 (07/01/22 1210)  SpO2: (!) 93 % (07/01/22 1210)    VENTILATION STATUS  Resp: 18 (07/01/22 1210)  SpO2: (!) 93 % (07/01/22 1210)       I & O (Last 24H):    Intake/Output Summary (Last 24 hours) at 7/1/2022 1602  Last data filed at 7/1/2022 1300  Gross per 24 hour   Intake 1343.2 ml   Output 1350 ml   Net -6.8 ml       WEIGHTS  Wt Readings from Last 1 Encounters:   07/01/22 0300 97.3 kg (214 lb 8.1 oz)   06/30/22 0300 100.5 kg (221 lb 9.6 oz)   06/27/22 2254 101.1 kg (222 lb 14.2 oz)   06/27/22 1954 99.8 kg (220 lb)     Physical examination:      Constitutional:  Well-built well-nourished in no apparent distress, alert and oriented    Neck: no carotid bruit, no JVD, no masses    Lungs: diminished breath sounds   Chest Wall: no tenderness  CARDIAC:  regular rate and rhythm, S1, S2 normal, no murmur, click, rub or gallop  Abdomen: soft, non-tender; bowel sounds normal; no masses,  no organomegaly, no guarding or rebound noted  Extremities: Extremities normal, atraumatic, no cyanosis, clubbing, or edema  Skin: Skin color, texture, turgor normal. No rashes or lesions  Neuro: Alert and responsive.  Moving all extremities      HOME MEDICATIONS:  No current facility-administered medications on file prior to encounter.     Current Outpatient Medications on File Prior to Encounter   Medication Sig Dispense Refill    aspirin 325 MG tablet Take 650 mg by mouth once.      aspirin 81 MG Chew Take 81 mg by mouth once daily.      HYDROcodone-acetaminophen (NORCO) 5-325 mg per tablet Take 1 tablet by mouth every 4 (four) hours as needed for Pain. 12 tablet 0    ibuprofen (ADVIL,MOTRIN) 600 MG  tablet Take 1 tablet (600 mg total) by mouth every 6 (six) hours as needed for Pain. 20 tablet 0    ondansetron (ZOFRAN) 4 MG tablet Take 1 tablet (4 mg total) by mouth every 6 (six) hours. 12 tablet 0    tamsulosin (FLOMAX) 0.4 mg Cap Take 1 capsule (0.4 mg total) by mouth once daily. for 5 doses 5 capsule 0       SCHEDULED MEDS:   aspirin  81 mg Oral Daily    atorvastatin  80 mg Oral Daily    chlorhexidine  15 mL Mouth/Throat BID    enoxparin  100 mg Subcutaneous Q12H    isosorbide mononitrate  60 mg Oral Daily    losartan  50 mg Oral Daily    magnesium oxide  800 mg Oral BID    metoprolol tartrate  25 mg Oral BID    mupirocin   Nasal BID    polyethylene glycol  17 g Oral BID       CONTINUOUS INFUSIONS:   nitroGLYCERIN 30 mcg/min (06/27/22 2116)       PRN MEDS:acetaminophen, ALPRAZolam, HYDROcodone-acetaminophen, melatonin, morphine, nitroGLYCERIN, nitroglycerin, sodium chloride 0.9%    LABS AND DIAGNOSTICS     CBC LAST 3 DAYS  Recent Labs   Lab 06/28/22  0426 06/30/22  0629 07/01/22  0406   WBC 8.86 8.75 8.59   RBC 4.92 5.24 5.24   HGB 14.2 15.0 15.1   HCT 43.9 47.2 46.7   MCV 89 90 89   MCH 28.9 28.6 28.8   MCHC 32.3 31.8* 32.3   RDW 13.2 13.2 13.2    215 219   MPV 11.3 11.3 11.5   GRAN 67.2  6.0 64.4  5.6 61.7  5.3   LYMPH 19.4  1.7 21.3  1.9 23.2  2.0   MONO 7.7  0.7 9.1  0.8 9.0  0.8   BASO 0.06 0.06 0.05   NRBC 0 0 0       COAGULATION LAST 3 DAYS  Recent Labs   Lab 06/27/22 1951   LABPT 13.1   INR 1.1   APTT 28.1       CHEMISTRY LAST 3 DAYS  Recent Labs   Lab 06/27/22 1951 06/27/22  2305 06/29/22  1657 06/30/22  0630 07/01/22  0406      < > 135* 134*  134* 135*   K 4.0   < > 4.2 4.3  4.3 4.3      < > 102 102  102 103   CO2 26   < > 24 26  26 26   ANIONGAP 11   < > 9 6*  6* 6*   BUN 18   < > 20 19  19 22   CREATININE 1.4   < > 1.2 1.2  1.2 1.2   *   < > 98 115*  115* 131*   CALCIUM 9.0   < > 8.7 8.5*  8.5* 8.6*   MG 2.2  --  2.2  --   --    ALBUMIN  4.6  --  3.9 3.9 3.9   PROT 7.1  --  6.3  --   --    ALKPHOS 65  --  56  --   --    ALT 43  --  35  --   --    AST 33  --  41*  --   --    BILITOT 0.8  --  1.7*  --   --     < > = values in this interval not displayed.       CARDIAC PROFILE LAST 3 DAYS  Recent Labs   Lab 06/27/22  1951 06/27/22  2305 06/30/22  0630 06/30/22  0952   * 183* 165*  --    CPK 95  --   --   --    CPKMB 2.0  --   --   --    TROPONINI 0.056* 14.806*  14.806*  --  9.876*       ENDOCRINE LAST 3 DAYS  No results for input(s): TSH, PROCAL in the last 168 hours.    LAST ARTERIAL BLOOD GAS  ABG  No results for input(s): PH, PO2, PCO2, HCO3, BE in the last 168 hours.    LAST 7 DAYS MICROBIOLOGY   Microbiology Results (last 7 days)     ** No results found for the last 168 hours. **          MOST RECENT IMAGING  Cardiac catheterization  · The Insertion lesion before Mid LAD was 100% stenosed with 30% stenosis   post-intervention.  · The RPDA lesion was 90% stenosed.  · The RPAV lesion was 50% stenosed.  · The Mid RCA-2 lesion was 90% stenosed.  · The Mid RCA-1 lesion was 60% stenosed.  · The 1st Mrg-2 lesion was 75% stenosed.  · The 1st Mrg-3 lesion was 60% stenosed.  · The 1st Mrg-4 lesion was 70% stenosed.  · The 1st Mrg-1 lesion was 95% stenosed.  · The Prox LAD lesion was 60% stenosed.  · The Ost LAD to Prox LAD lesion was 90% stenosed.  · The Mid LM to Ost LAD lesion was 80% stenosed.  · The Ost Cx to Prox Cx lesion was 99% stenosed.  · The Mid LAD-1 lesion was 99% stenosed.  · The Mid LAD-2 lesion was 100% stenosed with 20% stenosis   post-intervention.  · The Mid LAD-3 lesion was 80% stenosed with 30% stenosis   post-intervention.  · The estimated blood loss was none.  · This was a successful PCI for acute myocardial infarction.     The procedure log was documented by No documenter listed and verified by   Stevan Powell MD.    Date: 6/27/2022  Time: 11:06 PM      ECHOCARDIOGRAM RESULTS (last 5)  No results found for this or any  previous visit.      CURRENT/PREVIOUS VISIT EKG  Results for orders placed or performed during the hospital encounter of 06/27/22   EKG 12-lead    Collection Time: 06/30/22 11:36 AM    Narrative    Test Reason : R07.9,    Vent. Rate : 073 BPM     Atrial Rate : 073 BPM     P-R Int : 212 ms          QRS Dur : 102 ms      QT Int : 404 ms       P-R-T Axes : 029 088 148 degrees     QTc Int : 445 ms    Sinus rhythm with 1st degree A-V block  Incomplete right bundle branch block  Possible Inferior infarct (cited on or before 30-JUN-2022)  Anteroseptal infarct (cited on or before 27-JUN-2022)  T wave abnormality, consider lateral ischemia  Abnormal ECG  When compared with ECG of 30-JUN-2022 09:15,  No significant change was found    Referred By: [email or web address]   SELF           Confirmed By:            ASSESSMENT/PLAN:     Active Hospital Problems    Diagnosis    *STEMI (ST elevation myocardial infarction)    NSVT (nonsustained ventricular tachycardia)    Coronary artery disease involving coronary bypass graft of native heart with angina pectoris       ASSESSMENT & PLAN:   ST-ELEVATION MI s/p balloon angioplasty to LAD  HYPERTENSIVE URGENCY  STATUS POST CORONARY BYPASS GRAFT OCCLUDED LAD AT THE INSERTION OF THE LIMA  BOLTON MULTIVESSEL CORONARY DISEASE  OBESITY  ELEVATED GLUCOSE RULE OUT DIABETES    RECOMMENDATIONS:    DC Tridil   Increase Imdur to 120 mg daily  Continue Lovenox 100 q 12  Continue Metoprolol and losartan  Increase Activity  IS  At bedside  CABG early next week        Siobhan Tran NP  Counts include 234 beds at the [facility]  Department of Cardiology  Date of Service: 07/01/2022  10:32 PM    I have personally interviewed and examined the patient, I have reviewed the Nurse Practitioner's history and physical, assessment, and plan. I have personally evaluated the patient at bedside and agree with the findings and made appropriate changes as necessary in recommendations.    Jackson Mann MD.  Department of  Cardiology  [facility]  Dfate of service :  07/01/2022

## 2022-07-01 NOTE — PLAN OF CARE
Pt remains inpt and remain on Nitro gtt and is scheduled for CABG.       07/01/22 1531   Discharge Reassessment   Assessment Type Discharge Planning Reassessment   Did the patient's condition or plan change since previous assessment? No   Discharge Plan discussed with: Patient   Discharge Plan A Home   Discharge Plan B Home   DME Needed Upon Discharge  none   Discharge Barriers Identified None   Why the patient remains in the hospital Requires continued medical care   Post-Acute Status   Discharge Delays None known at this time

## 2022-07-02 PROCEDURE — 25000003 PHARM REV CODE 250: Performed by: HOSPITALIST

## 2022-07-02 PROCEDURE — 25000003 PHARM REV CODE 250: Performed by: GENERAL PRACTICE

## 2022-07-02 PROCEDURE — 63600175 PHARM REV CODE 636 W HCPCS: Performed by: THORACIC SURGERY (CARDIOTHORACIC VASCULAR SURGERY)

## 2022-07-02 PROCEDURE — 99233 PR SUBSEQUENT HOSPITAL CARE,LEVL III: ICD-10-PCS | Mod: ,,, | Performed by: INTERNAL MEDICINE

## 2022-07-02 PROCEDURE — 21000000 HC CCU ICU ROOM CHARGE

## 2022-07-02 PROCEDURE — 99900035 HC TECH TIME PER 15 MIN (STAT)

## 2022-07-02 PROCEDURE — 94761 N-INVAS EAR/PLS OXIMETRY MLT: CPT

## 2022-07-02 PROCEDURE — 99233 SBSQ HOSP IP/OBS HIGH 50: CPT | Mod: ,,, | Performed by: INTERNAL MEDICINE

## 2022-07-02 PROCEDURE — 25000003 PHARM REV CODE 250

## 2022-07-02 PROCEDURE — 25000003 PHARM REV CODE 250: Performed by: NURSE PRACTITIONER

## 2022-07-02 RX ADMIN — ENOXAPARIN SODIUM 100 MG: 100 INJECTION SUBCUTANEOUS at 10:07

## 2022-07-02 RX ADMIN — CHLORHEXIDINE GLUCONATE 15 ML: 1.2 RINSE ORAL at 08:07

## 2022-07-02 RX ADMIN — ATORVASTATIN CALCIUM 80 MG: 40 TABLET, FILM COATED ORAL at 08:07

## 2022-07-02 RX ADMIN — Medication 800 MG: at 09:07

## 2022-07-02 RX ADMIN — METOPROLOL TARTRATE 25 MG: 25 TABLET, FILM COATED ORAL at 08:07

## 2022-07-02 RX ADMIN — POLYETHYLENE GLYCOL 3350 17 G: 17 POWDER, FOR SOLUTION ORAL at 08:07

## 2022-07-02 RX ADMIN — MUPIROCIN: 20 OINTMENT TOPICAL at 08:07

## 2022-07-02 RX ADMIN — LOSARTAN POTASSIUM 50 MG: 50 TABLET, FILM COATED ORAL at 08:07

## 2022-07-02 RX ADMIN — ASPIRIN 81 MG: 81 TABLET, COATED ORAL at 08:07

## 2022-07-02 RX ADMIN — ISOSORBIDE MONONITRATE 120 MG: 60 TABLET, EXTENDED RELEASE ORAL at 08:07

## 2022-07-02 NOTE — PROGRESS NOTES
Critical access hospital Medicine  Progress Note    Patient name: Matteo Fraire  MRN: [id number]  Admit Date: 6/27/2022   LOS: 5 days     SUBJECTIVE:     Principal problem: STEMI (ST elevation myocardial infarction)    Interval History:  Pt was seen and examined bedside, remains off nitroglycerin drip, denies any active chest pain, hemodynamically stable, labs are stable.  Waiting for CABG    Scheduled Meds:   aspirin  81 mg Oral Daily    atorvastatin  80 mg Oral Daily    chlorhexidine  15 mL Mouth/Throat BID    enoxparin  100 mg Subcutaneous Q12H    isosorbide mononitrate  120 mg Oral Daily    losartan  50 mg Oral Daily    magnesium oxide  800 mg Oral BID    metoprolol tartrate  25 mg Oral BID    mupirocin   Nasal BID    polyethylene glycol  17 g Oral BID     Continuous Infusions:   nitroGLYCERIN 30 mcg/min (06/27/22 2116)     PRN Meds:acetaminophen, ALPRAZolam, HYDROcodone-acetaminophen, melatonin, morphine, nitroGLYCERIN, nitroglycerin, sodium chloride 0.9%    Review of patient's allergies indicates:  No Known Allergies    Review of Systems: As per interval history    OBJECTIVE:     Vital Signs (Most Recent)  Temp: 97.7 °F (36.5 °C) (07/02/22 1216)  Pulse: 65 (07/02/22 1216)  Resp: 19 (07/02/22 1216)  BP: 113/66 (07/02/22 1216)  SpO2: 95 % (07/02/22 1216)    Vital Signs Range (Last 24H):  Temp:  [97.5 °F (36.4 °C)-98.5 °F (36.9 °C)]   Pulse:  [64-72]   Resp:  [16-19]   BP: (113-143)/(64-86)   SpO2:  [95 %-98 %]     I & O (Last 24H):    Intake/Output Summary (Last 24 hours) at 7/2/2022 1315  Last data filed at 7/2/2022 0914  Gross per 24 hour   Intake 240 ml   Output --   Net 240 ml       Physical Exam:  General: Patient resting comfortably in no acute distress. Appears as stated age. Calm  Eyes: No conjunctival injection. No scleral icterus.  ENT: Hearing grossly intact. No discharge from ears. No nasal discharge.   Neck: Supple, trachea midline. No JVD  CVS: RRR. No LE edema BL  Lungs:  No  tachypnea or accessory muscle use.  Clear to auscultation bilaterally  Abdomen:  Soft, nontender and nondistended.  No organomegaly  Neuro: AOx3. Moves all extremities. Follows commands. Responds appropriately   Skin:  No rash or erythema noted.  Right groin has no erythema or hematoma    Laboratory:  I have reviewed all pertinent lab results within the past 24 hours.    Diagnostic Results:  Reviewed all imaging    ASSESSMENT/PLAN:     61-year-old with prior history of CAD status post PCI and CABG came with chest pain found to have STEMI status post balloon angioplasty to LAD now waiting for CABG    Active Hospital Problems    Diagnosis  POA    *STEMI (ST elevation myocardial infarction) [I21.3]  Yes    NSVT (nonsustained ventricular tachycardia) [I47.2]  No    Coronary artery disease involving coronary bypass graft of native heart with angina pectoris [I25.709]  Yes      Resolved Hospital Problems   No resolved problems to display.         Plan:   STEMI status post balloon angioplasty to LAD now waiting for CABG.   Unfortunately patient got Brilinta 180 mg on 06/27  Currently on weight based Lovenox, ASA  Patient was offered staged PCI and CABG redo and he opted for CABG redo  Continue Imdur-uptitrated, beta-blocker, losartan  Increase activity  Incentive spirometer  P.r.n. labs  Aggressive electrolyte replacement considering short run of V-tach on 06/29      VTE Risk Mitigation (From admission, onward)         Ordered     enoxaparin injection 100 mg  Every 12 hours (non-standard times)         06/28/22 0914                    Department Hospital Medicine  [facility]  Estela Madrid MD  Date of service: 07/02/2022

## 2022-07-02 NOTE — PLAN OF CARE
07/02/22 0801   Patient Assessment/Suction   Level of Consciousness (AVPU) alert   Respiratory Effort Unlabored   PRE-TX-O2   O2 Device (Oxygen Therapy) room air   SpO2 97 %   Pulse Oximetry Type Continuous   $ Pulse Oximetry - Multiple Charge Pulse Oximetry - Multiple   Pulse 69   Resp 18   Respiratory Evaluation   $ Care Plan Tech Time 15 min

## 2022-07-02 NOTE — PLAN OF CARE
Problem: Adult Inpatient Plan of Care  Goal: Plan of Care Review  Outcome: Ongoing, Progressing  Goal: Patient-Specific Goal (Individualized)  Outcome: Ongoing, Progressing  Goal: Absence of Hospital-Acquired Illness or Injury  Outcome: Ongoing, Progressing  Goal: Optimal Comfort and Wellbeing  Outcome: Ongoing, Progressing  Goal: Readiness for Transition of Care  Outcome: Ongoing, Progressing     Problem: Arrhythmia/Dysrhythmia (Cardiac Catheterization)  Goal: Stable Heart Rate and Rhythm  Outcome: Ongoing, Progressing     Problem: Bleeding (Cardiac Catheterization)  Goal: Absence of Bleeding  Outcome: Ongoing, Progressing     Problem: Contrast-Induced Injury Risk (Cardiac Catheterization)  Goal: Absence of Contrast-Induced Injury  Outcome: Ongoing, Progressing     Problem: Embolism (Cardiac Catheterization)  Goal: Absence of Embolism Signs and Symptoms  Outcome: Ongoing, Progressing     Problem: Ongoing Anesthesia/Sedation Effects (Cardiac Catheterization)  Goal: Anesthesia/Sedation Recovery  Outcome: Ongoing, Progressing     Problem: Pain (Cardiac Catheterization)  Goal: Acceptable Pain Control  Outcome: Ongoing, Progressing     Problem: Vascular Access Protection (Cardiac Catheterization)  Goal: Absence of Vascular Access Complication  Outcome: Ongoing, Progressing     Problem: Fall Injury Risk  Goal: Absence of Fall and Fall-Related Injury  Outcome: Ongoing, Progressing

## 2022-07-02 NOTE — PROGRESS NOTES
[facility]  Department of Cardiology  Progress Note      PATIENT NAME: Matteo Fraire    MRN: [id number]  TODAY'S DATE: 07/02/2022  ADMIT DATE: 6/27/2022                          CONSULT REQUESTED BY: Estela Madrid MD    SUBJECTIVE     PRINCIPAL PROBLEM: STEMI (ST elevation myocardial infarction)     7/2/22:  Patient seen resting in bed. States he has been walking around the unit. No distress noted.       REASON FOR CONSULT:  ST-elevation MI    7/1/2022    Patient denies symptoms currently lying in bed.  Appears comfortable no sweating or angina symptoms noted today    630/2022    Patient had problems with SOB, sweating and chest pressure this am. Tridil started and is doing better with resolution of his symptoms.. Troponin 9, down from 14.8    6/29/2022    Patient denies CP. Did well overnight. Right groin soft. VSS.     6/28/2022  Interval History  Appears comfortable, no chest pain on IV tridil. And no dyspnea.  Patient in ICU. Denies CP. Right groin soft. Vital signs stable.    HPI:  Patient is a 61-year-old male has a history of myocardial infarction the past with stents placed in 2005. Two thousand eleven he had coronary bypass x2.  Bypass was done at the [facility] .  He has recently moved to [place].  He was outside doing [email or web address] work today without any significant problem.  He came in to sit down to rest had onset of chest pain about 1 hour before admission to the hospital.  Initial EKG was not diagnostic but repeat EKG showed ST-elevation MI in the anterior lateral leads.  He was brought to the cardiac catheterization laboratory where he was found to have occluded LAD at the insertion of the LIMA graft to the LAD with no collaterals.  Simple angioplasty was performed which revealed a small vessel which was 2 is small to insert a stent.  He is currently pain-free and stable on nitro drip and beta-blocker.  The groin was closed with Vascade and he is being transferred to the ICU.    Patient also  "has unprotected right coronary artery with a subtotal lesion in the distal and the ostial PDA branch.  The insertion of the circumflex graft to the obtuse marginal has significant disease.  There is multiple lesions in proximal LAD.  The LAD after the surgeon the LIMA has mild diffuse disease.  Cardiovascular surgery will be consulted for consideration repeat bypass versus multivessel angioplasty at a later date.        Review of patient's allergies indicates:  No Known Allergies    Past Medical History:   Diagnosis Date    Heart attack 2005;2011    Hypertension     Psoriasis      Past Surgical History:   Procedure Laterality Date    CORONARY ANGIOGRAPHY INCLUDING BYPASS GRAFTS WITH CATHETERIZATION OF LEFT HEART Left 6/27/2022    Procedure: Left heart cath;  Surgeon: Stevan Powell MD;  Location: [facility] CATH/EP LAB;  Service: Cardiology;  Laterality: Left;    CORONARY ARTERY BYPASS GRAFT      CYSTOSCOPY W/ URETERAL STENT PLACEMENT Left 11/21/2019    Procedure: CYSTOSCOPY, WITH URETERAL STENT INSERTION;  Surgeon: Mickey Escudero MD;  Location: [facility] OR;  Service: Urology;  Laterality: Left;    EXTRACORPOREAL SHOCK WAVE LITHOTRIPSY Left 11/21/2019    Procedure: LITHOTRIPSY, ESWL;  Surgeon: Mickey Escudero MD;  Location: [facility] OR;  Service: Urology;  Laterality: Left;    HERNIA REPAIR      Inguinal just after birth     Social History     Tobacco Use    Smoking status: Never Smoker    Smokeless tobacco: Never Used   Substance Use Topics    Alcohol use: Never     Comment: "Quit in 87"    Drug use: Never        REVIEW OF SYSTEMS  CONSTITUTIONAL: Negative for chills, fatigue and fever.   EYES: No double vision, No blurred vision  NEURO: No headaches, No dizziness  RESPIRATORY: +sob  CARDIOVASCULAR: + cp  GI: Negative for abdominal pain, No melena, diarrhea, nausea and vomiting.   : Negative for dysuria and frequency, Negative for hematuria  SKIN: Negative for bruising, Negative for edema or " discoloration noted.   ENDOCRINE: Negative for polyphagia, Negative for heat intolerance, Negative for cold intolerance  PSYCHIATRIC: Negative for depression, Negative for anxiety, Negative for memory loss  MUSCULOSKELETAL: Negative for neck pain, Negative for muscle weakness, Negative for back pain     OBJECTIVE     VITAL SIGNS (Most Recent)  Temp: 97.7 °F (36.5 °C) (07/02/22 1216)  Pulse: 65 (07/02/22 1216)  Resp: 19 (07/02/22 1216)  BP: 113/66 (07/02/22 1216)  SpO2: 95 % (07/02/22 1216)    VENTILATION STATUS  Resp: 19 (07/02/22 1216)  SpO2: 95 % (07/02/22 1216)       I & O (Last 24H):    Intake/Output Summary (Last 24 hours) at 7/2/2022 1429  Last data filed at 7/2/2022 0914  Gross per 24 hour   Intake 240 ml   Output --   Net 240 ml       WEIGHTS  Wt Readings from Last 1 Encounters:   07/01/22 0300 97.3 kg (214 lb 8.1 oz)   06/30/22 0300 100.5 kg (221 lb 9.6 oz)   06/27/22 2254 101.1 kg (222 lb 14.2 oz)   06/27/22 1954 99.8 kg (220 lb)     Physical examination:      Constitutional:  Well-built well-nourished in no apparent distress, alert and oriented    Neck: no carotid bruit, no JVD, no masses    Lungs: diminished breath sounds   Chest Wall: no tenderness  CARDIAC:  regular rate and rhythm, S1, S2 normal, no murmur, click, rub or gallop  Abdomen: soft, non-tender; bowel sounds normal; no masses,  no organomegaly, no guarding or rebound noted  Extremities: Extremities normal, atraumatic, no cyanosis, clubbing, or edema  Skin: Skin color, texture, turgor normal. No rashes or lesions  Neuro: Alert and responsive.  Moving all extremities      HOME MEDICATIONS:  No current facility-administered medications on file prior to encounter.     Current Outpatient Medications on File Prior to Encounter   Medication Sig Dispense Refill    aspirin 325 MG tablet Take 650 mg by mouth once.      aspirin 81 MG Chew Take 81 mg by mouth once daily.      HYDROcodone-acetaminophen (NORCO) 5-325 mg per tablet Take 1 tablet by  mouth every 4 (four) hours as needed for Pain. 12 tablet 0    ibuprofen (ADVIL,MOTRIN) 600 MG tablet Take 1 tablet (600 mg total) by mouth every 6 (six) hours as needed for Pain. 20 tablet 0    ondansetron (ZOFRAN) 4 MG tablet Take 1 tablet (4 mg total) by mouth every 6 (six) hours. 12 tablet 0    tamsulosin (FLOMAX) 0.4 mg Cap Take 1 capsule (0.4 mg total) by mouth once daily. for 5 doses 5 capsule 0       SCHEDULED MEDS:   aspirin  81 mg Oral Daily    atorvastatin  80 mg Oral Daily    chlorhexidine  15 mL Mouth/Throat BID    enoxparin  100 mg Subcutaneous Q12H    isosorbide mononitrate  120 mg Oral Daily    losartan  50 mg Oral Daily    magnesium oxide  800 mg Oral BID    metoprolol tartrate  25 mg Oral BID    mupirocin   Nasal BID    polyethylene glycol  17 g Oral BID       CONTINUOUS INFUSIONS:   nitroGLYCERIN 30 mcg/min (06/27/22 2116)       PRN MEDS:acetaminophen, ALPRAZolam, HYDROcodone-acetaminophen, melatonin, morphine, nitroGLYCERIN, nitroglycerin, sodium chloride 0.9%    LABS AND DIAGNOSTICS     CBC LAST 3 DAYS  Recent Labs   Lab 06/28/22  0426 06/30/22  0629 07/01/22  0406   WBC 8.86 8.75 8.59   RBC 4.92 5.24 5.24   HGB 14.2 15.0 15.1   HCT 43.9 47.2 46.7   MCV 89 90 89   MCH 28.9 28.6 28.8   MCHC 32.3 31.8* 32.3   RDW 13.2 13.2 13.2    215 219   MPV 11.3 11.3 11.5   GRAN 67.2  6.0 64.4  5.6 61.7  5.3   LYMPH 19.4  1.7 21.3  1.9 23.2  2.0   MONO 7.7  0.7 9.1  0.8 9.0  0.8   BASO 0.06 0.06 0.05   NRBC 0 0 0       COAGULATION LAST 3 DAYS  Recent Labs   Lab 06/27/22 1951   LABPT 13.1   INR 1.1   APTT 28.1       CHEMISTRY LAST 3 DAYS  Recent Labs   Lab 06/27/22 1951 06/27/22  2305 06/29/22  1657 06/30/22  0630 07/01/22  0406      < > 135* 134*  134* 135*   K 4.0   < > 4.2 4.3  4.3 4.3      < > 102 102  102 103   CO2 26   < > 24 26  26 26   ANIONGAP 11   < > 9 6*  6* 6*   BUN 18   < > 20 19  19 22   CREATININE 1.4   < > 1.2 1.2  1.2 1.2   *   < > 98  115*  115* 131*   CALCIUM 9.0   < > 8.7 8.5*  8.5* 8.6*   MG 2.2  --  2.2  --   --    ALBUMIN 4.6  --  3.9 3.9 3.9   PROT 7.1  --  6.3  --   --    ALKPHOS 65  --  56  --   --    ALT 43  --  35  --   --    AST 33  --  41*  --   --    BILITOT 0.8  --  1.7*  --   --     < > = values in this interval not displayed.       CARDIAC PROFILE LAST 3 DAYS  Recent Labs   Lab 06/27/22  1951 06/27/22  2305 06/30/22  0630 06/30/22  0952   * 183* 165*  --    CPK 95  --   --   --    CPKMB 2.0  --   --   --    TROPONINI 0.056* 14.806*  14.806*  --  9.876*       ENDOCRINE LAST 3 DAYS  No results for input(s): TSH, PROCAL in the last 168 hours.    LAST ARTERIAL BLOOD GAS  ABG  No results for input(s): PH, PO2, PCO2, HCO3, BE in the last 168 hours.    LAST 7 DAYS MICROBIOLOGY   Microbiology Results (last 7 days)     ** No results found for the last 168 hours. **          MOST RECENT IMAGING  Cardiac catheterization  · The Insertion lesion before Mid LAD was 100% stenosed with 30% stenosis   post-intervention.  · The RPDA lesion was 90% stenosed.  · The RPAV lesion was 50% stenosed.  · The Mid RCA-2 lesion was 90% stenosed.  · The Mid RCA-1 lesion was 60% stenosed.  · The 1st Mrg-2 lesion was 75% stenosed.  · The 1st Mrg-3 lesion was 60% stenosed.  · The 1st Mrg-4 lesion was 70% stenosed.  · The 1st Mrg-1 lesion was 95% stenosed.  · The Prox LAD lesion was 60% stenosed.  · The Ost LAD to Prox LAD lesion was 90% stenosed.  · The Mid LM to Ost LAD lesion was 80% stenosed.  · The Ost Cx to Prox Cx lesion was 99% stenosed.  · The Mid LAD-1 lesion was 99% stenosed.  · The Mid LAD-2 lesion was 100% stenosed with 20% stenosis   post-intervention.  · The Mid LAD-3 lesion was 80% stenosed with 30% stenosis   post-intervention.  · The estimated blood loss was none.  · This was a successful PCI for acute myocardial infarction.     The procedure log was documented by No documenter listed and verified by   Stevan Powell MD.    Date:  6/27/2022  Time: 11:06 PM      ECHOCARDIOGRAM RESULTS (last 5)  No results found for this or any previous visit.      CURRENT/PREVIOUS VISIT EKG  Results for orders placed or performed during the hospital encounter of 06/27/22   EKG 12-lead    Collection Time: 06/30/22 11:36 AM    Narrative    Test Reason : R07.9,    Vent. Rate : 073 BPM     Atrial Rate : 073 BPM     P-R Int : 212 ms          QRS Dur : 102 ms      QT Int : 404 ms       P-R-T Axes : 029 088 148 degrees     QTc Int : 445 ms    Sinus rhythm with 1st degree A-V block  Incomplete right bundle branch block  Possible Inferior infarct (cited on or before 30-JUN-2022)  Anteroseptal infarct (cited on or before 27-JUN-2022)  T wave abnormality, consider lateral ischemia  Abnormal ECG  When compared with ECG of 30-JUN-2022 09:15,  No significant change was found    Referred By: [email or web address]   SELF           Confirmed By:            ASSESSMENT/PLAN:     Active Hospital Problems    Diagnosis    *STEMI (ST elevation myocardial infarction)    NSVT (nonsustained ventricular tachycardia)    Coronary artery disease involving coronary bypass graft of native heart with angina pectoris       ASSESSMENT & PLAN:   ST-ELEVATION MI s/p balloon angioplasty to LAD  HYPERTENSIVE URGENCY  STATUS POST CORONARY BYPASS GRAFT OCCLUDED LAD AT THE INSERTION OF THE LIMA  BOLTON MULTIVESSEL CORONARY DISEASE  OBESITY  ELEVATED GLUCOSE RULE OUT DIABETES    RECOMMENDATIONS:    1. Continue current medical management. Awaiting CABG early next week.   2. Continue lovenox for now.   Hold dose before surgery.   3. Will follow.       Shawanda Mcneal NP  [facility]  Department of Cardiology  Date of Service: 07/02/2022  10:32 PM    I have personally interviewed and examined the patient, I have reviewed the Nurse Practitioner's history and physical, assessment, and plan. I have personally evaluated the patient at bedside and agree with the findings and made appropriate changes as  necessary in recommendations.    Vincent Mann MD.  Department of Cardiology  [facility]  Dfate of service :  07/01/2022

## 2022-07-03 PROCEDURE — 25000003 PHARM REV CODE 250: Performed by: HOSPITALIST

## 2022-07-03 PROCEDURE — 21000000 HC CCU ICU ROOM CHARGE

## 2022-07-03 PROCEDURE — 63600175 PHARM REV CODE 636 W HCPCS: Performed by: THORACIC SURGERY (CARDIOTHORACIC VASCULAR SURGERY)

## 2022-07-03 PROCEDURE — 99232 PR SUBSEQUENT HOSPITAL CARE,LEVL II: ICD-10-PCS | Mod: ,,, | Performed by: INTERNAL MEDICINE

## 2022-07-03 PROCEDURE — 99900035 HC TECH TIME PER 15 MIN (STAT)

## 2022-07-03 PROCEDURE — 94761 N-INVAS EAR/PLS OXIMETRY MLT: CPT

## 2022-07-03 PROCEDURE — 25000003 PHARM REV CODE 250: Performed by: GENERAL PRACTICE

## 2022-07-03 PROCEDURE — 99232 SBSQ HOSP IP/OBS MODERATE 35: CPT | Mod: ,,, | Performed by: INTERNAL MEDICINE

## 2022-07-03 PROCEDURE — 25000003 PHARM REV CODE 250: Performed by: NURSE PRACTITIONER

## 2022-07-03 RX ADMIN — ATORVASTATIN CALCIUM 80 MG: 40 TABLET, FILM COATED ORAL at 08:07

## 2022-07-03 RX ADMIN — LOSARTAN POTASSIUM 50 MG: 50 TABLET, FILM COATED ORAL at 08:07

## 2022-07-03 RX ADMIN — Medication 800 MG: at 08:07

## 2022-07-03 RX ADMIN — ISOSORBIDE MONONITRATE 120 MG: 60 TABLET, EXTENDED RELEASE ORAL at 08:07

## 2022-07-03 RX ADMIN — ASPIRIN 81 MG: 81 TABLET, COATED ORAL at 08:07

## 2022-07-03 RX ADMIN — METOPROLOL TARTRATE 25 MG: 25 TABLET, FILM COATED ORAL at 08:07

## 2022-07-03 RX ADMIN — ENOXAPARIN SODIUM 100 MG: 100 INJECTION SUBCUTANEOUS at 08:07

## 2022-07-03 RX ADMIN — Medication 800 MG: at 09:07

## 2022-07-03 RX ADMIN — ENOXAPARIN SODIUM 100 MG: 100 INJECTION SUBCUTANEOUS at 10:07

## 2022-07-03 NOTE — PROGRESS NOTES
[facility]  Department of Cardiology  Progress Note      PATIENT NAME: Matteo Fraire    MRN: [id number]  TODAY'S DATE: 07/03/2022  ADMIT DATE: 6/27/2022                          CONSULT REQUESTED BY: Felton Valentin MD    SUBJECTIVE     PRINCIPAL PROBLEM: STEMI (ST elevation myocardial infarction)     7/3/22:  Patient seen resting in bed. He has been up walking around without any issues.     7/2/22:  Patient seen resting in bed. States he has been walking around the unit. No distress noted.       REASON FOR CONSULT:  ST-elevation MI    7/1/2022    Patient denies symptoms currently lying in bed.  Appears comfortable no sweating or angina symptoms noted today    630/2022    Patient had problems with SOB, sweating and chest pressure this am. Tridil started and is doing better with resolution of his symptoms.. Troponin 9, down from 14.8    6/29/2022    Patient denies CP. Did well overnight. Right groin soft. VSS.     6/28/2022  Interval History  Appears comfortable, no chest pain on IV tridil. And no dyspnea.  Patient in ICU. Denies CP. Right groin soft. Vital signs stable.    HPI:  Patient is a 61-year-old male has a history of myocardial infarction the past with stents placed in 2005. Two thousand eleven he had coronary bypass x2.  Bypass was done at the [facility] .  He has recently moved to [place].  He was outside doing [employer] work today without any significant problem.  He came in to sit down to rest had onset of chest pain about 1 hour before admission to the hospital.  Initial EKG was not diagnostic but repeat EKG showed ST-elevation MI in the anterior lateral leads.  He was brought to the cardiac catheterization laboratory where he was found to have occluded LAD at the insertion of the LIMA graft to the LAD with no collaterals.  Simple angioplasty was performed which revealed a small vessel which was 2 is small to insert a stent.  He is currently pain-free and stable on nitro drip and beta-blocker.   "The groin was closed with Vascade and he is being transferred to the ICU.    Patient also has unprotected right coronary artery with a subtotal lesion in the distal and the ostial PDA branch.  The insertion of the circumflex graft to the obtuse marginal has significant disease.  There is multiple lesions in proximal LAD.  The LAD after the surgeon the LIMA has mild diffuse disease.  Cardiovascular surgery will be consulted for consideration repeat bypass versus multivessel angioplasty at a later date.        Review of patient's allergies indicates:  No Known Allergies    Past Medical History:   Diagnosis Date    Heart attack 2005;2011    Hypertension     Psoriasis      Past Surgical History:   Procedure Laterality Date    CORONARY ANGIOGRAPHY INCLUDING BYPASS GRAFTS WITH CATHETERIZATION OF LEFT HEART Left 6/27/2022    Procedure: Left heart cath;  Surgeon: Stevan Powell MD;  Location: [facility] CATH/EP LAB;  Service: Cardiology;  Laterality: Left;    CORONARY ARTERY BYPASS GRAFT      CYSTOSCOPY W/ URETERAL STENT PLACEMENT Left 11/21/2019    Procedure: CYSTOSCOPY, WITH URETERAL STENT INSERTION;  Surgeon: Mickey Escudero MD;  Location: [facility] OR;  Service: Urology;  Laterality: Left;    EXTRACORPOREAL SHOCK WAVE LITHOTRIPSY Left 11/21/2019    Procedure: LITHOTRIPSY, ESWL;  Surgeon: Mickey Escudero MD;  Location: [facility] OR;  Service: Urology;  Laterality: Left;    HERNIA REPAIR      Inguinal just after birth     Social History     Tobacco Use    Smoking status: Never Smoker    Smokeless tobacco: Never Used   Substance Use Topics    Alcohol use: Never     Comment: "Quit in 87"    Drug use: Never        REVIEW OF SYSTEMS  CONSTITUTIONAL: Negative for chills, fatigue and fever.   EYES: No double vision, No blurred vision  NEURO: No headaches, No dizziness  RESPIRATORY: +sob: resolved  CARDIOVASCULAR: + cp: resolved   GI: Negative for abdominal pain, No melena, diarrhea, nausea and vomiting.   : Negative " for dysuria and frequency, Negative for hematuria  SKIN: Negative for bruising, Negative for edema or discoloration noted.   ENDOCRINE: Negative for polyphagia, Negative for heat intolerance, Negative for cold intolerance  PSYCHIATRIC: Negative for depression, Negative for anxiety, Negative for memory loss  MUSCULOSKELETAL: Negative for neck pain, Negative for muscle weakness, Negative for back pain     OBJECTIVE     VITAL SIGNS (Most Recent)  Temp: 97.8 °F (36.6 °C) (07/03/22 1130)  Pulse: 78 (07/03/22 1130)  Resp: 20 (07/03/22 1130)  BP: 116/82 (07/03/22 1130)  SpO2: 96 % (07/03/22 1130)    VENTILATION STATUS  Resp: 20 (07/03/22 1130)  SpO2: 96 % (07/03/22 1130)       I & O (Last 24H):    Intake/Output Summary (Last 24 hours) at 7/3/2022 1323  Last data filed at 7/3/2022 0900  Gross per 24 hour   Intake 720 ml   Output 600 ml   Net 120 ml       WEIGHTS  Wt Readings from Last 1 Encounters:   07/01/22 0300 97.3 kg (214 lb 8.1 oz)   06/30/22 0300 100.5 kg (221 lb 9.6 oz)   06/27/22 2254 101.1 kg (222 lb 14.2 oz)   06/27/22 1954 99.8 kg (220 lb)     Physical examination:      Constitutional:  Well-built well-nourished in no apparent distress, alert and oriented    Neck: no carotid bruit, no JVD, no masses    Lungs: diminished breath sounds   Chest Wall: no tenderness  CARDIAC:  regular rate and rhythm, S1, S2 normal, no murmur, click, rub or gallop  Abdomen: soft, non-tender; bowel sounds normal; no masses,  no organomegaly, no guarding or rebound noted  Extremities: Extremities normal, atraumatic, no cyanosis, clubbing, or edema  Skin: Skin color, texture, turgor normal. No rashes or lesions  Neuro: Alert and responsive.  Moving all extremities      HOME MEDICATIONS:  No current facility-administered medications on file prior to encounter.     Current Outpatient Medications on File Prior to Encounter   Medication Sig Dispense Refill    aspirin 325 MG tablet Take 650 mg by mouth once.      aspirin 81 MG Chew Take 81  mg by mouth once daily.      HYDROcodone-acetaminophen (NORCO) 5-325 mg per tablet Take 1 tablet by mouth every 4 (four) hours as needed for Pain. 12 tablet 0    ibuprofen (ADVIL,MOTRIN) 600 MG tablet Take 1 tablet (600 mg total) by mouth every 6 (six) hours as needed for Pain. 20 tablet 0    ondansetron (ZOFRAN) 4 MG tablet Take 1 tablet (4 mg total) by mouth every 6 (six) hours. 12 tablet 0    tamsulosin (FLOMAX) 0.4 mg Cap Take 1 capsule (0.4 mg total) by mouth once daily. for 5 doses 5 capsule 0       SCHEDULED MEDS:   aspirin  81 mg Oral Daily    atorvastatin  80 mg Oral Daily    enoxparin  100 mg Subcutaneous Q12H    isosorbide mononitrate  120 mg Oral Daily    losartan  50 mg Oral Daily    magnesium oxide  800 mg Oral BID    metoprolol tartrate  25 mg Oral BID    polyethylene glycol  17 g Oral BID       CONTINUOUS INFUSIONS:   nitroGLYCERIN 30 mcg/min (06/27/22 2116)       PRN MEDS:acetaminophen, ALPRAZolam, HYDROcodone-acetaminophen, melatonin, morphine, nitroGLYCERIN, nitroglycerin, sodium chloride 0.9%    LABS AND DIAGNOSTICS     CBC LAST 3 DAYS  Recent Labs   Lab 06/28/22  0426 06/30/22  0629 07/01/22  0406   WBC 8.86 8.75 8.59   RBC 4.92 5.24 5.24   HGB 14.2 15.0 15.1   HCT 43.9 47.2 46.7   MCV 89 90 89   MCH 28.9 28.6 28.8   MCHC 32.3 31.8* 32.3   RDW 13.2 13.2 13.2    215 219   MPV 11.3 11.3 11.5   GRAN 67.2  6.0 64.4  5.6 61.7  5.3   LYMPH 19.4  1.7 21.3  1.9 23.2  2.0   MONO 7.7  0.7 9.1  0.8 9.0  0.8   BASO 0.06 0.06 0.05   NRBC 0 0 0       COAGULATION LAST 3 DAYS  Recent Labs   Lab 06/27/22 1951   LABPT 13.1   INR 1.1   APTT 28.1       CHEMISTRY LAST 3 DAYS  Recent Labs   Lab 06/27/22 1951 06/27/22  2305 06/29/22  1657 06/30/22  0630 07/01/22  0406      < > 135* 134*  134* 135*   K 4.0   < > 4.2 4.3  4.3 4.3      < > 102 102  102 103   CO2 26   < > 24 26  26 26   ANIONGAP 11   < > 9 6*  6* 6*   BUN 18   < > 20 19  19 22   CREATININE 1.4   < > 1.2 1.2   1.2 1.2   *   < > 98 115*  115* 131*   CALCIUM 9.0   < > 8.7 8.5*  8.5* 8.6*   MG 2.2  --  2.2  --   --    ALBUMIN 4.6  --  3.9 3.9 3.9   PROT 7.1  --  6.3  --   --    ALKPHOS 65  --  56  --   --    ALT 43  --  35  --   --    AST 33  --  41*  --   --    BILITOT 0.8  --  1.7*  --   --     < > = values in this interval not displayed.       CARDIAC PROFILE LAST 3 DAYS  Recent Labs   Lab 06/27/22  1951 06/27/22  2305 06/30/22  0630 06/30/22  0952   * 183* 165*  --    CPK 95  --   --   --    CPKMB 2.0  --   --   --    TROPONINI 0.056* 14.806*  14.806*  --  9.876*       ENDOCRINE LAST 3 DAYS  No results for input(s): TSH, PROCAL in the last 168 hours.    LAST ARTERIAL BLOOD GAS  ABG  No results for input(s): PH, PO2, PCO2, HCO3, BE in the last 168 hours.    LAST 7 DAYS MICROBIOLOGY   Microbiology Results (last 7 days)     ** No results found for the last 168 hours. **          MOST RECENT IMAGING  Cardiac catheterization  · The Insertion lesion before Mid LAD was 100% stenosed with 30% stenosis   post-intervention.  · The RPDA lesion was 90% stenosed.  · The RPAV lesion was 50% stenosed.  · The Mid RCA-2 lesion was 90% stenosed.  · The Mid RCA-1 lesion was 60% stenosed.  · The 1st Mrg-2 lesion was 75% stenosed.  · The 1st Mrg-3 lesion was 60% stenosed.  · The 1st Mrg-4 lesion was 70% stenosed.  · The 1st Mrg-1 lesion was 95% stenosed.  · The Prox LAD lesion was 60% stenosed.  · The Ost LAD to Prox LAD lesion was 90% stenosed.  · The Mid LM to Ost LAD lesion was 80% stenosed.  · The Ost Cx to Prox Cx lesion was 99% stenosed.  · The Mid LAD-1 lesion was 99% stenosed.  · The Mid LAD-2 lesion was 100% stenosed with 20% stenosis   post-intervention.  · The Mid LAD-3 lesion was 80% stenosed with 30% stenosis   post-intervention.  · The estimated blood loss was none.  · This was a successful PCI for acute myocardial infarction.     The procedure log was documented by No documenter listed and verified by    Stevan Powell MD.    Date: 6/27/2022  Time: 11:06 PM      ECHOCARDIOGRAM RESULTS (last 5)  No results found for this or any previous visit.      CURRENT/PREVIOUS VISIT EKG  Results for orders placed or performed during the hospital encounter of 06/27/22   EKG 12-lead    Collection Time: 06/30/22 11:36 AM    Narrative    Test Reason : R07.9,    Vent. Rate : 073 BPM     Atrial Rate : 073 BPM     P-R Int : 212 ms          QRS Dur : 102 ms      QT Int : 404 ms       P-R-T Axes : 029 088 148 degrees     QTc Int : 445 ms    Sinus rhythm with 1st degree A-V block  Incomplete right bundle branch block  Possible Inferior infarct (cited on or before 30-JUN-2022)  Anteroseptal infarct (cited on or before 27-JUN-2022)  T wave abnormality, consider lateral ischemia  Abnormal ECG  When compared with ECG of 30-JUN-2022 09:15,  No significant change was found    Referred By: [email or web address]   SELF           Confirmed By:            ASSESSMENT/PLAN:     Active Hospital Problems    Diagnosis    *STEMI (ST elevation myocardial infarction)    NSVT (nonsustained ventricular tachycardia)    Coronary artery disease involving coronary bypass graft of native heart with angina pectoris       ASSESSMENT & PLAN:   ST-ELEVATION MI s/p balloon angioplasty to LAD  HYPERTENSIVE URGENCY  STATUS POST CORONARY BYPASS GRAFT OCCLUDED LAD AT THE INSERTION OF THE LIMA  BOLTON MULTIVESSEL CORONARY DISEASE  OBESITY  ELEVATED GLUCOSE RULE OUT DIABETES    RECOMMENDATIONS:    1. Hopeful for CABG on Tuesday or Wednesday of this week.   2. Will see PRN for now and follow post op.       Shawanda Mcneal NP  [facility]  Department of Cardiology  Date of Service: 07/03/2022  10:32 PM    I have personally interviewed and examined the patient, I have reviewed the Nurse Practitioner's history and physical, assessment, and plan. I have personally evaluated the patient at bedside and agree with the findings and made appropriate changes as necessary in  recommendations.    Vincent Mann MD.  Department of Cardiology  [facility]  Dfate of service :  07/01/2022

## 2022-07-03 NOTE — PROGRESS NOTES
[facility] Medicine  Progress Note    Patient name: Matteo Fraire  MRN: [id number]  Admit Date: 6/27/2022   LOS: 6 days     SUBJECTIVE:     Principal problem: STEMI (ST elevation myocardial infarction)    Interval History:  No acute overnight events reported.  Patient denies chest pain, shortness of breath or lower extremity edema.    Scheduled Meds:   aspirin  81 mg Oral Daily    atorvastatin  80 mg Oral Daily    enoxparin  100 mg Subcutaneous Q12H    isosorbide mononitrate  120 mg Oral Daily    losartan  50 mg Oral Daily    magnesium oxide  800 mg Oral BID    metoprolol tartrate  25 mg Oral BID    polyethylene glycol  17 g Oral BID     Continuous Infusions:   nitroGLYCERIN 30 mcg/min (06/27/22 2116)     PRN Meds:acetaminophen, ALPRAZolam, HYDROcodone-acetaminophen, melatonin, morphine, nitroGLYCERIN, nitroglycerin, sodium chloride 0.9%    Review of patient's allergies indicates:  No Known Allergies    Review of Systems: As per interval history    OBJECTIVE:     Vital Signs (Most Recent)  Temp: 97.8 °F (36.6 °C) (07/03/22 1500)  Pulse: 69 (07/03/22 1500)  Resp: 20 (07/03/22 1500)  BP: 108/60 (07/03/22 1500)  SpO2: (!) 94 % (07/03/22 1500)    Vital Signs Range (Last 24H):  Temp:  [97.7 °F (36.5 °C)-98.2 °F (36.8 °C)]   Pulse:  [61-78]   Resp:  [16-20]   BP: (108-158)/(60-89)   SpO2:  [94 %-98 %]     I & O (Last 24H):    Intake/Output Summary (Last 24 hours) at 7/3/2022 1727  Last data filed at 7/3/2022 0900  Gross per 24 hour   Intake 480 ml   Output 600 ml   Net -120 ml       Physical Exam:  General: Patient resting comfortably in no acute distress. Appears as stated age. Calm  Eyes: No conjunctival injection. No scleral icterus.  ENT: Hearing grossly intact. No discharge from ears. No nasal discharge.   CVS: RRR. No LE edema BL  Lungs:  No tachypnea or accessory muscle use.  Clear to auscultation   Abdomen:  Soft, nontender and nondistended.  No organomegaly  Neuro: AOx3. Moves all  extremities. Follows commands. Responds appropriately   Skin:  No rash or erythema noted.  Right groin has no erythema or hematoma    ASSESSMENT/PLAN:     61-year-old with prior history of CAD status post PCI and CABG came with chest pain found to have STEMI status post balloon angioplasty to LAD now waiting for CABG    Active Hospital Problems    Diagnosis  POA    *STEMI (ST elevation myocardial infarction) [I21.3]  Yes    NSVT (nonsustained ventricular tachycardia) [I47.2]  No    Coronary artery disease involving coronary bypass graft of native heart with angina pectoris [I25.709]  Yes      Resolved Hospital Problems   No resolved problems to display.         Plan:   STEMI status post balloon angioplasty to LAD now waiting for CABG.   Currently on weight based enoxaparin and ASA  Patient was offered staged PCI and CABG redo and he opted for CABG redo  Continue Imdur-uptitrated, beta-blocker, losartan  Increase activity  Incentive spirometer  P.r.n. labs  Electrolyte replacement protocol    VTE Risk Mitigation (From admission, onward)         Ordered     enoxaparin injection 100 mg  Every 12 hours (non-standard times)         06/28/22 0914                    Department Hospital Medicine  [facility]  Felton Valentin MD  Date of service: 07/03/2022

## 2022-07-03 NOTE — CARE UPDATE
07/02/22 8222   Patient Assessment/Suction   Level of Consciousness (AVPU) alert   Respiratory Effort Unlabored   Expansion/Accessory Muscles/Retractions no use of accessory muscles   All Lung Fields Breath Sounds diminished;clear   Rhythm/Pattern, Respiratory no shortness of breath reported   Cough Frequency no cough   PRE-TX-O2   O2 Device (Oxygen Therapy) room air   SpO2 (!) 94 %   Pulse Oximetry Type Continuous   $ Pulse Oximetry - Multiple Charge Pulse Oximetry - Multiple   Pulse 65   Positioning   Head of Bed (HOB) Positioning HOB elevated;HOB at 30 degrees   Respiratory Evaluation   $ Care Plan Tech Time 15 min

## 2022-07-03 NOTE — PLAN OF CARE
07/03/22 0800   Patient Assessment/Suction   Level of Consciousness (AVPU) alert   Respiratory Effort Unlabored   PRE-TX-O2   O2 Device (Oxygen Therapy) room air   SpO2 96 %   Pulse Oximetry Type Continuous   $ Pulse Oximetry - Multiple Charge Pulse Oximetry - Multiple   Pulse 71   Resp 18   Respiratory Evaluation   $ Care Plan Tech Time 15 min

## 2022-07-04 PROCEDURE — 63600175 PHARM REV CODE 636 W HCPCS: Performed by: THORACIC SURGERY (CARDIOTHORACIC VASCULAR SURGERY)

## 2022-07-04 PROCEDURE — 99900031 HC PATIENT EDUCATION (STAT)

## 2022-07-04 PROCEDURE — 25000003 PHARM REV CODE 250: Performed by: GENERAL PRACTICE

## 2022-07-04 PROCEDURE — 94761 N-INVAS EAR/PLS OXIMETRY MLT: CPT

## 2022-07-04 PROCEDURE — 25000003 PHARM REV CODE 250: Performed by: HOSPITALIST

## 2022-07-04 PROCEDURE — 99232 PR SUBSEQUENT HOSPITAL CARE,LEVL II: ICD-10-PCS | Mod: ,,, | Performed by: INTERNAL MEDICINE

## 2022-07-04 PROCEDURE — 21000000 HC CCU ICU ROOM CHARGE

## 2022-07-04 PROCEDURE — 25000003 PHARM REV CODE 250: Performed by: NURSE PRACTITIONER

## 2022-07-04 PROCEDURE — 99900035 HC TECH TIME PER 15 MIN (STAT)

## 2022-07-04 PROCEDURE — 99232 SBSQ HOSP IP/OBS MODERATE 35: CPT | Mod: ,,, | Performed by: INTERNAL MEDICINE

## 2022-07-04 RX ADMIN — ENOXAPARIN SODIUM 100 MG: 100 INJECTION SUBCUTANEOUS at 08:07

## 2022-07-04 RX ADMIN — METOPROLOL TARTRATE 25 MG: 25 TABLET, FILM COATED ORAL at 08:07

## 2022-07-04 RX ADMIN — ASPIRIN 81 MG: 81 TABLET, COATED ORAL at 08:07

## 2022-07-04 RX ADMIN — Medication 800 MG: at 08:07

## 2022-07-04 RX ADMIN — ATORVASTATIN CALCIUM 80 MG: 40 TABLET, FILM COATED ORAL at 08:07

## 2022-07-04 RX ADMIN — ENOXAPARIN SODIUM 100 MG: 100 INJECTION SUBCUTANEOUS at 10:07

## 2022-07-04 RX ADMIN — ISOSORBIDE MONONITRATE 120 MG: 60 TABLET, EXTENDED RELEASE ORAL at 08:07

## 2022-07-04 RX ADMIN — LOSARTAN POTASSIUM 50 MG: 50 TABLET, FILM COATED ORAL at 08:07

## 2022-07-04 NOTE — PLAN OF CARE
07/04/22 0911   Patient Assessment/Suction   Level of Consciousness (AVPU) alert   Respiratory Effort Unlabored   Rhythm/Pattern, Respiratory no shortness of breath reported   PRE-TX-O2   O2 Device (Oxygen Therapy) room air   SpO2 97 %   Pulse Oximetry Type Continuous   $ Pulse Oximetry - Multiple Charge Pulse Oximetry - Multiple   Pulse 68   Resp 18   Education   $ Education 15 min   Respiratory Evaluation   $ Care Plan Tech Time 15 min

## 2022-07-04 NOTE — CARE UPDATE
07/03/22 5810   Patient Assessment/Suction   Level of Consciousness (AVPU) alert   Respiratory Effort Unlabored   Expansion/Accessory Muscles/Retractions no use of accessory muscles   All Lung Fields Breath Sounds clear;diminished   Rhythm/Pattern, Respiratory no shortness of breath reported   Cough Frequency no cough   PRE-TX-O2   O2 Device (Oxygen Therapy) room air   SpO2 (!) 89 %   Pulse Oximetry Type Intermittent   $ Pulse Oximetry - Multiple Charge Pulse Oximetry - Multiple   Pulse 70   Positioning   Head of Bed (HOB) Positioning HOB elevated;HOB at 20-30 degrees   Respiratory Evaluation   $ Care Plan Tech Time 15 min

## 2022-07-04 NOTE — PROGRESS NOTES
[facility] Medicine  Progress Note    Patient name: Matteo Fraire  MRN: [id number]  Admit Date: 6/27/2022   LOS: 7 days     SUBJECTIVE:     Principal problem: STEMI (ST elevation myocardial infarction)    Interval History:  No acute overnight events reported.  Patient denies chest pain, shortness of breath or lower extremity edema.    Scheduled Meds:   aspirin  81 mg Oral Daily    atorvastatin  80 mg Oral Daily    enoxparin  100 mg Subcutaneous Q12H    isosorbide mononitrate  120 mg Oral Daily    losartan  50 mg Oral Daily    magnesium oxide  800 mg Oral BID    metoprolol tartrate  25 mg Oral BID    polyethylene glycol  17 g Oral BID     Continuous Infusions:   nitroGLYCERIN 30 mcg/min (06/27/22 2116)     PRN Meds:acetaminophen, ALPRAZolam, HYDROcodone-acetaminophen, melatonin, morphine, nitroGLYCERIN, nitroglycerin, sodium chloride 0.9%    Review of patient's allergies indicates:  No Known Allergies    Review of Systems: As per interval history    OBJECTIVE:     Vital Signs (Most Recent)  Temp: 97.7 °F (36.5 °C) (07/04/22 1158)  Pulse: 68 (07/04/22 0911)  Resp: 20 (07/04/22 1158)  BP: 106/62 (07/04/22 1158)  SpO2: 95 % (07/04/22 1158)    Vital Signs Range (Last 24H):  Temp:  [97.4 °F (36.3 °C)-97.8 °F (36.6 °C)]   Pulse:  [64-72]   Resp:  [16-20]   BP: (106-159)/(59-89)   SpO2:  [89 %-98 %]     I & O (Last 24H):    Intake/Output Summary (Last 24 hours) at 7/4/2022 1358  Last data filed at 7/4/2022 0911  Gross per 24 hour   Intake 600 ml   Output --   Net 600 ml       Physical Exam:  General: Patient resting comfortably in no acute distress. Appears as stated age. Calm  Eyes: No conjunctival injection. No scleral icterus.  ENT: Hearing grossly intact. No discharge from ears. No nasal discharge.   CVS: RRR. No LE edema BL  Lungs:  No tachypnea or accessory muscle use  Neuro: AOx3. Moves all extremities. Follows commands. Responds appropriately   Skin:  No rash or erythema  noted    ASSESSMENT/PLAN:     61-year-old with prior history of CAD status post PCI and CABG came with chest pain found to have STEMI status post balloon angioplasty to LAD now waiting for CABG    Active Hospital Problems    Diagnosis  POA    *STEMI (ST elevation myocardial infarction) [I21.3]  Yes    NSVT (nonsustained ventricular tachycardia) [I47.2]  No    Coronary artery disease involving coronary bypass graft of native heart with angina pectoris [I25.709]  Yes      Resolved Hospital Problems   No resolved problems to display.         Plan:   STEMI status post balloon angioplasty to LAD now waiting for CABG.   Currently on weight based enoxaparin and ASA  Patient was offered staged PCI and CABG redo and he opted for CABG redo  Continue Imdur-uptitrated, beta-blocker, losartan  Incentive spirometry  P.r.n. labs  Electrolyte replacement protocol    VTE Risk Mitigation (From admission, onward)         Ordered     enoxaparin injection 100 mg  Every 12 hours (non-standard times)         06/28/22 0914                    Department Hospital Medicine  [facility]  Felton Valentin MD  Date of service: 07/04/2022

## 2022-07-04 NOTE — PROGRESS NOTES
[facility]  Department of Cardiology  Progress Note    PATIENT NAME: Matteo Fraire  MRN: [id number]  TODAY'S DATE: 07/04/2022  ADMIT DATE: 6/27/2022    SUBJECTIVE     PRINCIPLE PROBLEM: STEMI (ST elevation myocardial infarction)    INTERVAL HISTORY:    7/4/2022  Patient is awake alert lying comfortably in bed not in any acute distress.  Patient had walked in the hallway 4 times already.  Denies any chest pain or tightness or heaviness his breathing is good denies any shortness of breath.  Awaiting for CABG.    Review of patient's allergies indicates:  No Known Allergies    REVIEW OF SYSTEMS  CARDIOVASCULAR: No recent chest pain, palpitations, arm, neck, or jaw pain  RESPIRATORY: No recent fever, cough chills, SOB or congestion  : No blood in the urine  GI: No Nausea, vomiting, constipation, diarrhea, blood, or reflux.  MUSCULOSKELETAL: No myalgias  NEURO: No lightheadedness or dizziness  EYES: No Double vision, blurry, vision or headache     OBJECTIVE     VITAL SIGNS (Most Recent)  Temp: 97.4 °F (36.3 °C) (07/04/22 0732)  Pulse: 70 (07/04/22 0827)  Resp: 20 (07/04/22 0732)  BP: (!) 159/89 (07/04/22 0827)  SpO2: 98 % (07/04/22 0522)    VENTILATION STATUS  Resp: 20 (07/04/22 0732)  SpO2: 98 % (07/04/22 0522)       I & O (Last 24H):    Intake/Output Summary (Last 24 hours) at 7/4/2022 0953  Last data filed at 7/4/2022 0911  Gross per 24 hour   Intake 600 ml   Output --   Net 600 ml       WEIGHTS  Wt Readings from Last 1 Encounters:   07/01/22 0300 97.3 kg (214 lb 8.1 oz)   06/30/22 0300 100.5 kg (221 lb 9.6 oz)   06/27/22 2254 101.1 kg (222 lb 14.2 oz)   06/27/22 1954 99.8 kg (220 lb)       PHYSICAL EXAM  CONSTITUTIONAL: Well built, well nourished in no apparent distress  NECK: no carotid bruit, no JVD  LUNGS: CTA  CHEST WALL: no tenderness  HEART: regular rate and rhythm, S1, S2 normal, no murmur, click, rub or gallop   ABDOMEN: soft, non-tender; bowel sounds normal; no masses,  no  organomegaly  EXTREMITIES: Extremities normal, no edema  NEURO: AAO X 3    SCHEDULED MEDS:   aspirin  81 mg Oral Daily    atorvastatin  80 mg Oral Daily    enoxparin  100 mg Subcutaneous Q12H    isosorbide mononitrate  120 mg Oral Daily    losartan  50 mg Oral Daily    magnesium oxide  800 mg Oral BID    metoprolol tartrate  25 mg Oral BID    polyethylene glycol  17 g Oral BID       CONTINUOUS INFUSIONS:   nitroGLYCERIN 30 mcg/min (06/27/22 2116)       PRN MEDS:acetaminophen, ALPRAZolam, HYDROcodone-acetaminophen, melatonin, morphine, nitroGLYCERIN, nitroglycerin, sodium chloride 0.9%    LABS AND DIAGNOSTICS     CBC LAST 3 DAYS  Recent Labs   Lab 06/28/22  0426 06/30/22  0629 07/01/22  0406   WBC 8.86 8.75 8.59   RBC 4.92 5.24 5.24   HGB 14.2 15.0 15.1   HCT 43.9 47.2 46.7   MCV 89 90 89   MCH 28.9 28.6 28.8   MCHC 32.3 31.8* 32.3   RDW 13.2 13.2 13.2    215 219   MPV 11.3 11.3 11.5   GRAN 67.2  6.0 64.4  5.6 61.7  5.3   LYMPH 19.4  1.7 21.3  1.9 23.2  2.0   MONO 7.7  0.7 9.1  0.8 9.0  0.8   BASO 0.06 0.06 0.05   NRBC 0 0 0       COAGULATION LAST 3 DAYS  Recent Labs   Lab 06/27/22 1951   LABPT 13.1   INR 1.1   APTT 28.1       CHEMISTRY LAST 3 DAYS  Recent Labs   Lab 06/27/22 1951 06/27/22  2305 06/29/22  1657 06/30/22  0630 07/01/22  0406      < > 135* 134*  134* 135*   K 4.0   < > 4.2 4.3  4.3 4.3      < > 102 102  102 103   CO2 26   < > 24 26  26 26   ANIONGAP 11   < > 9 6*  6* 6*   BUN 18   < > 20 19  19 22   CREATININE 1.4   < > 1.2 1.2  1.2 1.2   *   < > 98 115*  115* 131*   CALCIUM 9.0   < > 8.7 8.5*  8.5* 8.6*   MG 2.2  --  2.2  --   --    ALBUMIN 4.6  --  3.9 3.9 3.9   PROT 7.1  --  6.3  --   --    ALKPHOS 65  --  56  --   --    ALT 43  --  35  --   --    AST 33  --  41*  --   --    BILITOT 0.8  --  1.7*  --   --     < > = values in this interval not displayed.       CARDIAC PROFILE LAST 3 DAYS  Recent Labs   Lab 06/27/22 1951 06/27/22  4331  06/30/22  0630 06/30/22  0952   * 183* 165*  --    CPK 95  --   --   --    CPKMB 2.0  --   --   --    TROPONINI 0.056* 14.806*  14.806*  --  9.876*       ENDOCRINE LAST 3 DAYS  No results for input(s): TSH, PROCAL in the last 168 hours.    LAST ARTERIAL BLOOD GAS  ABG  No results for input(s): PH, PO2, PCO2, HCO3, BE in the last 168 hours.    LAST 7 DAYS MICROBIOLOGY   Microbiology Results (last 7 days)     ** No results found for the last 168 hours. **          MOST RECENT IMAGING  Cardiac catheterization  · The Insertion lesion before Mid LAD was 100% stenosed with 30% stenosis   post-intervention.  · The RPDA lesion was 90% stenosed.  · The RPAV lesion was 50% stenosed.  · The Mid RCA-2 lesion was 90% stenosed.  · The Mid RCA-1 lesion was 60% stenosed.  · The 1st Mrg-2 lesion was 75% stenosed.  · The 1st Mrg-3 lesion was 60% stenosed.  · The 1st Mrg-4 lesion was 70% stenosed.  · The 1st Mrg-1 lesion was 95% stenosed.  · The Prox LAD lesion was 60% stenosed.  · The Ost LAD to Prox LAD lesion was 90% stenosed.  · The Mid LM to Ost LAD lesion was 80% stenosed.  · The Ost Cx to Prox Cx lesion was 99% stenosed.  · The Mid LAD-1 lesion was 99% stenosed.  · The Mid LAD-2 lesion was 100% stenosed with 20% stenosis   post-intervention.  · The Mid LAD-3 lesion was 80% stenosed with 30% stenosis   post-intervention.  · The estimated blood loss was none.  · This was a successful PCI for acute myocardial infarction.     The procedure log was documented by No documenter listed and verified by   Stevan Powell MD.    Date: 6/27/2022  Time: 11:06 PM      LASTECHO  Results for orders placed during the hospital encounter of 06/27/22    Echo Saline Bubble? No    Interpretation Summary  · The left ventricle is normal in size with mild predominantly mid septal mild asymmetric hypertrophy eccentric hypertrophy and normal systolic function.  · The estimated ejection fraction is 60%.  · Indeterminate left ventricular diastolic  function.  · Normal right ventricular size with normal right ventricular systolic function.  · Mild left atrial enlargement.  · Normal central venous pressure (3 mmHg).  · The estimated PA systolic pressure is 19 mmHg.  · Mild mitral regurgitation.      CURRENT/PREVIOUS VISIT EKG  Results for orders placed or performed during the hospital encounter of 06/27/22   EKG 12-lead    Collection Time: 06/30/22 11:36 AM    Narrative    Test Reason : R07.9,    Vent. Rate : 073 BPM     Atrial Rate : 073 BPM     P-R Int : 212 ms          QRS Dur : 102 ms      QT Int : 404 ms       P-R-T Axes : 029 088 148 degrees     QTc Int : 445 ms    Sinus rhythm with 1st degree A-V block  Incomplete right bundle branch block  Possible Inferior infarct (cited on or before 30-JUN-2022)  Anteroseptal infarct (cited on or before 27-JUN-2022)  T wave abnormality, consider lateral ischemia  Abnormal ECG  When compared with ECG of 30-JUN-2022 09:15,  No significant change was found    Referred By: [email or web address]   SELF           Confirmed By:        ASSESSMENT/PLAN:     Active Hospital Problems    Diagnosis    *STEMI (ST elevation myocardial infarction)    NSVT (nonsustained ventricular tachycardia)    Coronary artery disease involving coronary bypass graft of native heart with angina pectoris       ASSESSMENT & PLAN:   1. Multivessel coronary artery disease  2. ST-elevation myocardial infarction status post PTCA  3. Nonsustained ventricular tachycardia  4. Mixed hyperlipidemia      RECOMMENDATIONS:  1. Patient is doing well post myocardial infarction status post PTCA.  Patient is on aspirin and Lovenox 100 mg subQ b.i.d. continue the same.  2. His blood pressure is stable he is currently on losartan 50 mg p.o. q.day and metoprolol tartrate 25 mg p.o. b.i.d. continue the same.  He is also on isosorbide mononitrate 120 mg p.o. q.day.  3. Patient is anxious about his coronary bypass grafting.  Patiently awaiting for the same.          Vincent BATISTA  MD Johnathan  [facility]  Department of Cardiology  Date of Service: 07/04/2022  9:53 AM

## 2022-07-05 DIAGNOSIS — I25.709 CORONARY ARTERY DISEASE INVOLVING CORONARY BYPASS GRAFT OF NATIVE HEART WITH ANGINA PECTORIS: Primary | ICD-10-CM

## 2022-07-05 LAB
ANION GAP SERPL CALC-SCNC: 4 MMOL/L (ref 8–16)
BUN SERPL-MCNC: 23 MG/DL (ref 8–23)
CALCIUM SERPL-MCNC: 8.4 MG/DL (ref 8.7–10.5)
CHLORIDE SERPL-SCNC: 105 MMOL/L (ref 95–110)
CO2 SERPL-SCNC: 25 MMOL/L (ref 23–29)
CREAT SERPL-MCNC: 1.1 MG/DL (ref 0.5–1.4)
ERYTHROCYTE [DISTWIDTH] IN BLOOD BY AUTOMATED COUNT: 13 % (ref 11.5–14.5)
EST. GFR  (AFRICAN AMERICAN): >60 ML/MIN/1.73 M^2
EST. GFR  (NON AFRICAN AMERICAN): >60 ML/MIN/1.73 M^2
GLUCOSE SERPL-MCNC: 105 MG/DL (ref 70–110)
HCT VFR BLD AUTO: 45 % (ref 40–54)
HGB BLD-MCNC: 14.8 G/DL (ref 14–18)
MAGNESIUM SERPL-MCNC: 2.3 MG/DL (ref 1.6–2.6)
MCH RBC QN AUTO: 28.6 PG (ref 27–31)
MCHC RBC AUTO-ENTMCNC: 32.9 G/DL (ref 32–36)
MCV RBC AUTO: 87 FL (ref 82–98)
PLATELET # BLD AUTO: 221 K/UL (ref 150–450)
PMV BLD AUTO: 11.1 FL (ref 9.2–12.9)
POTASSIUM SERPL-SCNC: 4.1 MMOL/L (ref 3.5–5.1)
RBC # BLD AUTO: 5.17 M/UL (ref 4.6–6.2)
SODIUM SERPL-SCNC: 134 MMOL/L (ref 136–145)
WBC # BLD AUTO: 6 K/UL (ref 3.9–12.7)

## 2022-07-05 PROCEDURE — 94761 N-INVAS EAR/PLS OXIMETRY MLT: CPT

## 2022-07-05 PROCEDURE — 25000003 PHARM REV CODE 250: Performed by: NURSE PRACTITIONER

## 2022-07-05 PROCEDURE — 83735 ASSAY OF MAGNESIUM: CPT | Performed by: INTERNAL MEDICINE

## 2022-07-05 PROCEDURE — 99900035 HC TECH TIME PER 15 MIN (STAT)

## 2022-07-05 PROCEDURE — 36415 COLL VENOUS BLD VENIPUNCTURE: CPT | Performed by: INTERNAL MEDICINE

## 2022-07-05 PROCEDURE — 85027 COMPLETE CBC AUTOMATED: CPT | Performed by: INTERNAL MEDICINE

## 2022-07-05 PROCEDURE — 25000003 PHARM REV CODE 250: Performed by: GENERAL PRACTICE

## 2022-07-05 PROCEDURE — 99900031 HC PATIENT EDUCATION (STAT)

## 2022-07-05 PROCEDURE — 25000003 PHARM REV CODE 250: Performed by: HOSPITALIST

## 2022-07-05 PROCEDURE — 80048 BASIC METABOLIC PNL TOTAL CA: CPT | Performed by: INTERNAL MEDICINE

## 2022-07-05 PROCEDURE — 99233 SBSQ HOSP IP/OBS HIGH 50: CPT | Mod: ,,, | Performed by: GENERAL PRACTICE

## 2022-07-05 PROCEDURE — 99233 PR SUBSEQUENT HOSPITAL CARE,LEVL III: ICD-10-PCS | Mod: ,,, | Performed by: GENERAL PRACTICE

## 2022-07-05 PROCEDURE — 63600175 PHARM REV CODE 636 W HCPCS: Performed by: THORACIC SURGERY (CARDIOTHORACIC VASCULAR SURGERY)

## 2022-07-05 PROCEDURE — 21000000 HC CCU ICU ROOM CHARGE

## 2022-07-05 RX ADMIN — METOPROLOL TARTRATE 25 MG: 25 TABLET, FILM COATED ORAL at 08:07

## 2022-07-05 RX ADMIN — METOPROLOL TARTRATE 25 MG: 25 TABLET, FILM COATED ORAL at 09:07

## 2022-07-05 RX ADMIN — ENOXAPARIN SODIUM 100 MG: 100 INJECTION SUBCUTANEOUS at 09:07

## 2022-07-05 RX ADMIN — ASPIRIN 81 MG: 81 TABLET, COATED ORAL at 08:07

## 2022-07-05 RX ADMIN — ATORVASTATIN CALCIUM 80 MG: 40 TABLET, FILM COATED ORAL at 08:07

## 2022-07-05 RX ADMIN — Medication 800 MG: at 09:07

## 2022-07-05 RX ADMIN — Medication 800 MG: at 08:07

## 2022-07-05 RX ADMIN — ISOSORBIDE MONONITRATE 120 MG: 60 TABLET, EXTENDED RELEASE ORAL at 08:07

## 2022-07-05 RX ADMIN — LOSARTAN POTASSIUM 50 MG: 50 TABLET, FILM COATED ORAL at 08:07

## 2022-07-05 RX ADMIN — ENOXAPARIN SODIUM 100 MG: 100 INJECTION SUBCUTANEOUS at 10:07

## 2022-07-05 NOTE — PROGRESS NOTES
[facility]  Department of Cardiology  Progress Note    PATIENT NAME: Matteo Fraire  MRN: [id number]  TODAY'S DATE: 07/05/2022  ADMIT DATE: 6/27/2022    SUBJECTIVE     PRINCIPLE PROBLEM: STEMI (ST elevation myocardial infarction)    INTERVAL HISTORY:    7/5/2022   Patient still waiting for CABG, ate breakfast this AM. No chest pain events. VSS.     7/4/2022  Patient is awake alert lying comfortably in bed not in any acute distress.  Patient had walked in the hallway 4 times already.  Denies any chest pain or tightness or heaviness his breathing is good denies any shortness of breath.  Awaiting for CABG.    Review of patient's allergies indicates:  No Known Allergies    REVIEW OF SYSTEMS  CARDIOVASCULAR: No recent chest pain, palpitations, arm, neck, or jaw pain  RESPIRATORY: No recent fever, cough chills, SOB or congestion  : No blood in the urine  GI: No Nausea, vomiting, constipation, diarrhea, blood, or reflux.  MUSCULOSKELETAL: No myalgias  NEURO: No lightheadedness or dizziness  EYES: No Double vision, blurry, vision or headache     OBJECTIVE     VITAL SIGNS (Most Recent)  Temp: 97.8 °F (36.6 °C) (07/05/22 0728)  Pulse: 69 (07/05/22 0845)  Resp: 20 (07/05/22 0728)  BP: (!) 151/83 (07/05/22 0845)  SpO2: 99 % (07/05/22 0728)    VENTILATION STATUS  Resp: 20 (07/05/22 0728)  SpO2: 99 % (07/05/22 0728)       I & O (Last 24H):    Intake/Output Summary (Last 24 hours) at 7/5/2022 1106  Last data filed at 7/5/2022 0831  Gross per 24 hour   Intake 480 ml   Output 1750 ml   Net -1270 ml       WEIGHTS  Wt Readings from Last 1 Encounters:   07/05/22 0301 96.9 kg (213 lb 10 oz)   07/01/22 0300 97.3 kg (214 lb 8.1 oz)   06/30/22 0300 100.5 kg (221 lb 9.6 oz)   06/27/22 2254 101.1 kg (222 lb 14.2 oz)   06/27/22 1954 99.8 kg (220 lb)       PHYSICAL EXAM  CONSTITUTIONAL: Well built, well nourished in no apparent distress  NECK: no carotid bruit, no JVD  LUNGS: CTA  CHEST WALL: no tenderness  HEART: regular rate  and rhythm, S1, S2 normal, no murmur, click, rub or gallop   ABDOMEN: soft, non-tender; bowel sounds normal; no masses,  no organomegaly  EXTREMITIES: Extremities normal, no edema  NEURO: AAO X 3    SCHEDULED MEDS:   aspirin  81 mg Oral Daily    atorvastatin  80 mg Oral Daily    enoxparin  100 mg Subcutaneous Q12H    isosorbide mononitrate  120 mg Oral Daily    losartan  50 mg Oral Daily    magnesium oxide  800 mg Oral BID    metoprolol tartrate  25 mg Oral BID    polyethylene glycol  17 g Oral BID       CONTINUOUS INFUSIONS:   nitroGLYCERIN 30 mcg/min (06/27/22 2116)       PRN MEDS:acetaminophen, ALPRAZolam, HYDROcodone-acetaminophen, melatonin, morphine, nitroGLYCERIN, nitroglycerin, sodium chloride 0.9%    LABS AND DIAGNOSTICS     CBC LAST 3 DAYS  Recent Labs   Lab 06/30/22  0629 07/01/22  0406 07/05/22  0447   WBC 8.75 8.59 6.00   RBC 5.24 5.24 5.17   HGB 15.0 15.1 14.8   HCT 47.2 46.7 45.0   MCV 90 89 87   MCH 28.6 28.8 28.6   MCHC 31.8* 32.3 32.9   RDW 13.2 13.2 13.0    219 221   MPV 11.3 11.5 11.1   GRAN 64.4  5.6 61.7  5.3  --    LYMPH 21.3  1.9 23.2  2.0  --    MONO 9.1  0.8 9.0  0.8  --    BASO 0.06 0.05  --    NRBC 0 0  --        COAGULATION LAST 3 DAYS  No results for input(s): LABPT, INR, APTT in the last 168 hours.    CHEMISTRY LAST 3 DAYS  Recent Labs   Lab 06/29/22  1657 06/30/22  0630 07/01/22  0406 07/05/22  0446   * 134*  134* 135* 134*   K 4.2 4.3  4.3 4.3 4.1    102  102 103 105   CO2 24 26  26 26 25   ANIONGAP 9 6*  6* 6* 4*   BUN 20 19  19 22 23   CREATININE 1.2 1.2  1.2 1.2 1.1   GLU 98 115*  115* 131* 105   CALCIUM 8.7 8.5*  8.5* 8.6* 8.4*   MG 2.2  --   --  2.3   ALBUMIN 3.9 3.9 3.9  --    PROT 6.3  --   --   --    ALKPHOS 56  --   --   --    ALT 35  --   --   --    AST 41*  --   --   --    BILITOT 1.7*  --   --   --        CARDIAC PROFILE LAST 3 DAYS  Recent Labs   Lab 06/30/22  0630 06/30/22  0952   *  --    TROPONINI  --  9.876*       ENDOCRINE  LAST 3 DAYS  No results for input(s): TSH, PROCAL in the last 168 hours.    LAST ARTERIAL BLOOD GAS  ABG  No results for input(s): PH, PO2, PCO2, HCO3, BE in the last 168 hours.    LAST 7 DAYS MICROBIOLOGY   Microbiology Results (last 7 days)       ** No results found for the last 168 hours. **            MOST RECENT IMAGING  Cardiac catheterization  · The Insertion lesion before Mid LAD was 100% stenosed with 30% stenosis   post-intervention.  · The RPDA lesion was 90% stenosed.  · The RPAV lesion was 50% stenosed.  · The Mid RCA-2 lesion was 90% stenosed.  · The Mid RCA-1 lesion was 60% stenosed.  · The 1st Mrg-2 lesion was 75% stenosed.  · The 1st Mrg-3 lesion was 60% stenosed.  · The 1st Mrg-4 lesion was 70% stenosed.  · The 1st Mrg-1 lesion was 95% stenosed.  · The Prox LAD lesion was 60% stenosed.  · The Ost LAD to Prox LAD lesion was 90% stenosed.  · The Mid LM to Ost LAD lesion was 80% stenosed.  · The Ost Cx to Prox Cx lesion was 99% stenosed.  · The Mid LAD-1 lesion was 99% stenosed.  · The Mid LAD-2 lesion was 100% stenosed with 20% stenosis   post-intervention.  · The Mid LAD-3 lesion was 80% stenosed with 30% stenosis   post-intervention.  · The estimated blood loss was none.  · This was a successful PCI for acute myocardial infarction.     The procedure log was documented by No documenter listed and verified by   Stevan Powell MD.    Date: 6/27/2022  Time: 11:06 PM      [facility]  Results for orders placed during the hospital encounter of 06/27/22    Echo Saline Bubble? No    Interpretation Summary  · The left ventricle is normal in size with mild predominantly mid septal mild asymmetric hypertrophy eccentric hypertrophy and normal systolic function.  · The estimated ejection fraction is 60%.  · Indeterminate left ventricular diastolic function.  · Normal right ventricular size with normal right ventricular systolic function.  · Mild left atrial enlargement.  · Normal central venous pressure (3  mmHg).  · The estimated PA systolic pressure is 19 mmHg.  · Mild mitral regurgitation.      CURRENT/PREVIOUS VISIT EKG  Results for orders placed or performed during the hospital encounter of 06/27/22   EKG 12-lead    Collection Time: 06/30/22 11:36 AM    Narrative    Test Reason : R07.9,    Vent. Rate : 073 BPM     Atrial Rate : 073 BPM     P-R Int : 212 ms          QRS Dur : 102 ms      QT Int : 404 ms       P-R-T Axes : 029 088 148 degrees     QTc Int : 445 ms    Sinus rhythm with 1st degree A-V block  Incomplete right bundle branch block  Possible Inferior infarct (cited on or before 30-JUN-2022)  Anteroseptal infarct (cited on or before 27-JUN-2022)  T wave abnormality, consider lateral ischemia  Abnormal ECG  When compared with ECG of 30-JUN-2022 09:15,  No significant change was found    Referred By: [email or web address]   SELF           Confirmed By:        ASSESSMENT/PLAN:     Active Hospital Problems    Diagnosis    *STEMI (ST elevation myocardial infarction)    NSVT (nonsustained ventricular tachycardia)    Coronary artery disease involving coronary bypass graft of native heart with angina pectoris       ASSESSMENT & PLAN:   1. Multivessel coronary artery disease  2. ST-elevation myocardial infarction status post PTCA  3. Nonsustained ventricular tachycardia  4. Mixed hyperlipidemia      RECOMMENDATIONS:  Hopefully he will go for bypass tomorrow, we will get in touch with CTS today to confirm   Continue current medications as is   Rest per primary   Will follow him through surgery         Fanny Amaya PA-C  [facility]  Department of Cardiology  Date of Service: 07/05/2022  9:53 AM      Patient has no more chest pain cardiac problems since admission with STEMI.  Echo shows preserved ejection fraction.  Await repeat coronary bypass

## 2022-07-05 NOTE — PLAN OF CARE
Met with pt to review discharge plan. Pt stated no discharge needs at this time. Pt waiting for CABG and no scheduled time as yet. CM will continue to monitor for discharge needs

## 2022-07-05 NOTE — PLAN OF CARE
07/05/22 0818   Patient Assessment/Suction   Level of Consciousness (AVPU) alert   Respiratory Effort Normal;Unlabored   Rhythm/Pattern, Respiratory no shortness of breath reported   PRE-TX-O2   O2 Device (Oxygen Therapy) room air   SpO2 97 %   Pulse Oximetry Type Continuous   $ Pulse Oximetry - Multiple Charge Pulse Oximetry - Multiple   Pulse 63   Resp 18   Education   $ Education 15 min   Respiratory Evaluation   $ Care Plan Tech Time 15 min

## 2022-07-05 NOTE — PROGRESS NOTES
[facility] Medicine  Progress Note    Patient name: Matteo Fraire  MRN: [id number]  Admit Date: 6/27/2022   LOS: 8 days     SUBJECTIVE:     Principal problem: STEMI (ST elevation myocardial infarction)    Interval History:  No acute overnight events reported.  Patient denies chest pain, shortness of breath or lower extremity edema. Walking in the hallways without issues    Scheduled Meds:   aspirin  81 mg Oral Daily    atorvastatin  80 mg Oral Daily    enoxparin  100 mg Subcutaneous Q12H    isosorbide mononitrate  120 mg Oral Daily    losartan  50 mg Oral Daily    magnesium oxide  800 mg Oral BID    metoprolol tartrate  25 mg Oral BID    polyethylene glycol  17 g Oral BID     Continuous Infusions:   nitroGLYCERIN 30 mcg/min (06/27/22 2116)     PRN Meds:acetaminophen, ALPRAZolam, HYDROcodone-acetaminophen, melatonin, morphine, nitroGLYCERIN, nitroglycerin, sodium chloride 0.9%    Review of patient's allergies indicates:  No Known Allergies    Review of Systems: As per interval history    OBJECTIVE:     Vital Signs (Most Recent)  Temp: 98 °F (36.7 °C) (07/05/22 1100)  Pulse: 67 (07/05/22 1100)  Resp: 18 (07/05/22 1100)  BP: (!) 116/59 (07/05/22 1100)  SpO2: (!) 65 % (07/05/22 1100)    Vital Signs Range (Last 24H):  Temp:  [97.4 °F (36.3 °C)-98 °F (36.7 °C)]   Pulse:  [63-75]   Resp:  [18-21]   BP: (107-151)/(59-85)   SpO2:  [65 %-99 %]     I & O (Last 24H):    Intake/Output Summary (Last 24 hours) at 7/5/2022 1447  Last data filed at 7/5/2022 0831  Gross per 24 hour   Intake 480 ml   Output 1750 ml   Net -1270 ml       Physical Exam:  General: Patient resting comfortably in no acute distress. Appears as stated age. Calm  Eyes: No conjunctival injection. No scleral icterus.  ENT: Hearing grossly intact. No discharge from ears. No nasal discharge.   CVS: RRR. No LE edema BL  Lungs:  No tachypnea or accessory muscle use  Neuro: AOx3. Moves all extremities. Follows commands. Responds  appropriately   Skin:  No rash or erythema noted    ASSESSMENT/PLAN:     61-year-old with prior history of CAD status post PCI and CABG came with chest pain found to have STEMI status post balloon angioplasty to LAD now waiting for CABG    Active Hospital Problems    Diagnosis  POA    *STEMI (ST elevation myocardial infarction) [I21.3]  Yes    NSVT (nonsustained ventricular tachycardia) [I47.2]  No    Coronary artery disease involving coronary bypass graft of native heart with angina pectoris [I25.709]  Yes      Resolved Hospital Problems   No resolved problems to display.         Plan:   STEMI status post balloon angioplasty to LAD now waiting for CABG.   Currently on weight based enoxaparin and ASA  Patient was offered staged PCI and CABG redo and he opted for CABG redo  Continue Imdur-uptitrated, beta-blocker, losartan  Incentive spirometry  P.r.n. labs  Electrolyte replacement protocol  Discussed with cardiology and CT surgery     VTE Risk Mitigation (From admission, onward)         Ordered     enoxaparin injection 100 mg  Every 12 hours (non-standard times)         06/28/22 0914                    Department Hospital Medicine  [facility]  Felton Valentin MD  Date of service: 07/05/2022

## 2022-07-05 NOTE — PLAN OF CARE
Met with pt to review discharge plan. Pt stated no discharge needs at this time. Pt waiting for CABG. CM will  continue to monitor for discharge needs.        07/05/22 1527   Discharge Reassessment   Assessment Type Discharge Planning Reassessment   Did the patient's condition or plan change since previous assessment? No   Discharge Plan discussed with: Patient   Discharge Plan A Home   Discharge Plan B Home   DME Needed Upon Discharge  none   Discharge Barriers Identified None   Why the patient remains in the hospital Requires continued medical care   Post-Acute Status   Discharge Delays None known at this time

## 2022-07-06 LAB
ANION GAP SERPL CALC-SCNC: 6 MMOL/L (ref 8–16)
BUN SERPL-MCNC: 24 MG/DL (ref 8–23)
CALCIUM SERPL-MCNC: 8.9 MG/DL (ref 8.7–10.5)
CHLORIDE SERPL-SCNC: 102 MMOL/L (ref 95–110)
CO2 SERPL-SCNC: 28 MMOL/L (ref 23–29)
CREAT SERPL-MCNC: 1.3 MG/DL (ref 0.5–1.4)
ERYTHROCYTE [DISTWIDTH] IN BLOOD BY AUTOMATED COUNT: 13 % (ref 11.5–14.5)
EST. GFR  (AFRICAN AMERICAN): >60 ML/MIN/1.73 M^2
EST. GFR  (NON AFRICAN AMERICAN): 58.9 ML/MIN/1.73 M^2
GLUCOSE SERPL-MCNC: 107 MG/DL (ref 70–110)
HCT VFR BLD AUTO: 45.3 % (ref 40–54)
HGB BLD-MCNC: 14.8 G/DL (ref 14–18)
MAGNESIUM SERPL-MCNC: 2.3 MG/DL (ref 1.6–2.6)
MCH RBC QN AUTO: 29 PG (ref 27–31)
MCHC RBC AUTO-ENTMCNC: 32.7 G/DL (ref 32–36)
MCV RBC AUTO: 89 FL (ref 82–98)
PLATELET # BLD AUTO: 225 K/UL (ref 150–450)
PMV BLD AUTO: 11.1 FL (ref 9.2–12.9)
POTASSIUM SERPL-SCNC: 4.5 MMOL/L (ref 3.5–5.1)
RBC # BLD AUTO: 5.1 M/UL (ref 4.6–6.2)
SODIUM SERPL-SCNC: 136 MMOL/L (ref 136–145)
WBC # BLD AUTO: 6.08 K/UL (ref 3.9–12.7)

## 2022-07-06 PROCEDURE — 25000003 PHARM REV CODE 250: Performed by: GENERAL PRACTICE

## 2022-07-06 PROCEDURE — 36415 COLL VENOUS BLD VENIPUNCTURE: CPT | Performed by: INTERNAL MEDICINE

## 2022-07-06 PROCEDURE — 83735 ASSAY OF MAGNESIUM: CPT | Performed by: INTERNAL MEDICINE

## 2022-07-06 PROCEDURE — 25000003 PHARM REV CODE 250: Performed by: HOSPITALIST

## 2022-07-06 PROCEDURE — 63600175 PHARM REV CODE 636 W HCPCS: Performed by: THORACIC SURGERY (CARDIOTHORACIC VASCULAR SURGERY)

## 2022-07-06 PROCEDURE — 99900035 HC TECH TIME PER 15 MIN (STAT)

## 2022-07-06 PROCEDURE — 99233 PR SUBSEQUENT HOSPITAL CARE,LEVL III: ICD-10-PCS | Mod: ,,, | Performed by: GENERAL PRACTICE

## 2022-07-06 PROCEDURE — 21000000 HC CCU ICU ROOM CHARGE

## 2022-07-06 PROCEDURE — 99233 SBSQ HOSP IP/OBS HIGH 50: CPT | Mod: ,,, | Performed by: GENERAL PRACTICE

## 2022-07-06 PROCEDURE — 94761 N-INVAS EAR/PLS OXIMETRY MLT: CPT

## 2022-07-06 PROCEDURE — 25000003 PHARM REV CODE 250: Performed by: NURSE PRACTITIONER

## 2022-07-06 PROCEDURE — 80048 BASIC METABOLIC PNL TOTAL CA: CPT | Performed by: INTERNAL MEDICINE

## 2022-07-06 PROCEDURE — 85027 COMPLETE CBC AUTOMATED: CPT | Performed by: INTERNAL MEDICINE

## 2022-07-06 RX ADMIN — Medication 800 MG: at 10:07

## 2022-07-06 RX ADMIN — ASPIRIN 81 MG: 81 TABLET, COATED ORAL at 09:07

## 2022-07-06 RX ADMIN — METOPROLOL TARTRATE 25 MG: 25 TABLET, FILM COATED ORAL at 09:07

## 2022-07-06 RX ADMIN — ENOXAPARIN SODIUM 100 MG: 100 INJECTION SUBCUTANEOUS at 09:07

## 2022-07-06 RX ADMIN — Medication 800 MG: at 09:07

## 2022-07-06 RX ADMIN — ENOXAPARIN SODIUM 100 MG: 100 INJECTION SUBCUTANEOUS at 10:07

## 2022-07-06 RX ADMIN — ATORVASTATIN CALCIUM 80 MG: 40 TABLET, FILM COATED ORAL at 09:07

## 2022-07-06 RX ADMIN — LOSARTAN POTASSIUM 50 MG: 50 TABLET, FILM COATED ORAL at 09:07

## 2022-07-06 RX ADMIN — METOPROLOL TARTRATE 25 MG: 25 TABLET, FILM COATED ORAL at 10:07

## 2022-07-06 RX ADMIN — ISOSORBIDE MONONITRATE 120 MG: 60 TABLET, EXTENDED RELEASE ORAL at 09:07

## 2022-07-06 NOTE — CARE UPDATE
07/06/22 0934   PRE-TX-O2   O2 Device (Oxygen Therapy) room air   Pulse Oximetry Type Continuous   $ Pulse Oximetry - Multiple Charge Pulse Oximetry - Multiple   Resp 15   Respiratory Evaluation   $ Care Plan Tech Time 15 min

## 2022-07-06 NOTE — CARE UPDATE
07/05/22 2342   Patient Assessment/Suction   Level of Consciousness (AVPU) alert   PRE-TX-O2   O2 Device (Oxygen Therapy) room air   SpO2 95 %   Pulse Oximetry Type Continuous   $ Pulse Oximetry - Multiple Charge Pulse Oximetry - Multiple   Pulse 65   Education   $ Education 15 min   Respiratory Evaluation   $ Care Plan Tech Time 15 min

## 2022-07-06 NOTE — PLAN OF CARE
Pt remains inpt pending CABG. As per MD, possible CABG on Friday 7/8. CM will continue to monitor  for CM needs.        07/06/22 8045   Post-Acute Status   Discharge Delays None known at this time   Discharge Plan   Discharge Plan A Home   Discharge Plan B Home

## 2022-07-06 NOTE — PROGRESS NOTES
[facility]  Department of Cardiology  Progress Note    PATIENT NAME: Matteo Fraire  MRN: [id number]  TODAY'S DATE: 07/06/2022  ADMIT DATE: 6/27/2022    SUBJECTIVE     PRINCIPLE PROBLEM: STEMI (ST elevation myocardial infarction)    INTERVAL HISTORY:    7/6/2022   PATIENT FOR CABG THIS Friday. Had some mild chest pains after walking for 40 minutes this morning. Has not required nitro yet. VSS.     7/5/2022  Patient still waiting for CABG, ate breakfast this AM. No chest pain events. VSS.     7/4/2022  Patient is awake alert lying comfortably in bed not in any acute distress.  Patient had walked in the hallway 4 times already.  Denies any chest pain or tightness or heaviness his breathing is good denies any shortness of breath.  Awaiting for CABG.    Review of patient's allergies indicates:  No Known Allergies    REVIEW OF SYSTEMS  CARDIOVASCULAR: No recent chest pain, palpitations, arm, neck, or jaw pain  RESPIRATORY: No recent fever, cough chills, SOB or congestion  : No blood in the urine  GI: No Nausea, vomiting, constipation, diarrhea, blood, or reflux.  MUSCULOSKELETAL: No myalgias  NEURO: No lightheadedness or dizziness  EYES: No Double vision, blurry, vision or headache     OBJECTIVE     VITAL SIGNS (Most Recent)  Temp: 98.1 °F (36.7 °C) (07/06/22 0729)  Pulse: (!) 59 (07/06/22 0729)  Resp: 20 (07/06/22 0729)  BP: (!) 146/84 (07/06/22 0729)  SpO2: 97 % (07/06/22 0500)    VENTILATION STATUS  Resp: 20 (07/06/22 0729)  SpO2: 97 % (07/06/22 0500)       I & O (Last 24H):    Intake/Output Summary (Last 24 hours) at 7/6/2022 0931  Last data filed at 7/6/2022 0600  Gross per 24 hour   Intake 490 ml   Output 1825 ml   Net -1335 ml       WEIGHTS  Wt Readings from Last 1 Encounters:   07/05/22 0301 96.9 kg (213 lb 10 oz)   07/01/22 0300 97.3 kg (214 lb 8.1 oz)   06/30/22 0300 100.5 kg (221 lb 9.6 oz)   06/27/22 2254 101.1 kg (222 lb 14.2 oz)   06/27/22 1954 99.8 kg (220 lb)       PHYSICAL  EXAM  CONSTITUTIONAL: Well built, well nourished in no apparent distress  NECK: no carotid bruit, no JVD  LUNGS: CTA  CHEST WALL: no tenderness  HEART: regular rate and rhythm, S1, S2 normal, no murmur, click, rub or gallop   ABDOMEN: soft, non-tender; bowel sounds normal; no masses,  no organomegaly  EXTREMITIES: Extremities normal, no edema  NEURO: AAO X 3    SCHEDULED MEDS:   aspirin  81 mg Oral Daily    atorvastatin  80 mg Oral Daily    enoxparin  100 mg Subcutaneous Q12H    isosorbide mononitrate  120 mg Oral Daily    losartan  50 mg Oral Daily    magnesium oxide  800 mg Oral BID    metoprolol tartrate  25 mg Oral BID    polyethylene glycol  17 g Oral BID       CONTINUOUS INFUSIONS:   nitroGLYCERIN 30 mcg/min (06/27/22 2116)       PRN MEDS:acetaminophen, ALPRAZolam, HYDROcodone-acetaminophen, melatonin, morphine, nitroGLYCERIN, nitroglycerin, sodium chloride 0.9%    LABS AND DIAGNOSTICS     CBC LAST 3 DAYS  Recent Labs   Lab 06/30/22  0629 07/01/22  0406 07/05/22 0447 07/06/22  0355   WBC 8.75 8.59 6.00 6.08   RBC 5.24 5.24 5.17 5.10   HGB 15.0 15.1 14.8 14.8   HCT 47.2 46.7 45.0 45.3   MCV 90 89 87 89   MCH 28.6 28.8 28.6 29.0   MCHC 31.8* 32.3 32.9 32.7   RDW 13.2 13.2 13.0 13.0    219 221 225   MPV 11.3 11.5 11.1 11.1   GRAN 64.4  5.6 61.7  5.3  --   --    LYMPH 21.3  1.9 23.2  2.0  --   --    MONO 9.1  0.8 9.0  0.8  --   --    BASO 0.06 0.05  --   --    NRBC 0 0  --   --        COAGULATION LAST 3 DAYS  No results for input(s): LABPT, INR, APTT in the last 168 hours.    CHEMISTRY LAST 3 DAYS  Recent Labs   Lab 06/29/22  1657 06/30/22  0630 07/01/22  0406 07/05/22  0446 07/06/22  0355   * 134*  134* 135* 134* 136   K 4.2 4.3  4.3 4.3 4.1 4.5    102  102 103 105 102   CO2 24 26  26 26 25 28   ANIONGAP 9 6*  6* 6* 4* 6*   BUN 20 19  19 22 23 24*   CREATININE 1.2 1.2  1.2 1.2 1.1 1.3   GLU 98 115*  115* 131* 105 107   CALCIUM 8.7 8.5*  8.5* 8.6* 8.4* 8.9   MG 2.2  --    --  2.3 2.3   ALBUMIN 3.9 3.9 3.9  --   --    PROT 6.3  --   --   --   --    ALKPHOS 56  --   --   --   --    ALT 35  --   --   --   --    AST 41*  --   --   --   --    BILITOT 1.7*  --   --   --   --        CARDIAC PROFILE LAST 3 DAYS  Recent Labs   Lab 06/30/22  0630 06/30/22  0952   *  --    TROPONINI  --  9.876*       ENDOCRINE LAST 3 DAYS  No results for input(s): TSH, PROCAL in the last 168 hours.    LAST ARTERIAL BLOOD GAS  ABG  No results for input(s): PH, PO2, PCO2, HCO3, BE in the last 168 hours.    LAST 7 DAYS MICROBIOLOGY   Microbiology Results (last 7 days)     ** No results found for the last 168 hours. **          MOST RECENT IMAGING  Cardiac catheterization  · The Insertion lesion before Mid LAD was 100% stenosed with 30% stenosis   post-intervention.  · The RPDA lesion was 90% stenosed.  · The RPAV lesion was 50% stenosed.  · The Mid RCA-2 lesion was 90% stenosed.  · The Mid RCA-1 lesion was 60% stenosed.  · The 1st Mrg-2 lesion was 75% stenosed.  · The 1st Mrg-3 lesion was 60% stenosed.  · The 1st Mrg-4 lesion was 70% stenosed.  · The 1st Mrg-1 lesion was 95% stenosed.  · The Prox LAD lesion was 60% stenosed.  · The Ost LAD to Prox LAD lesion was 90% stenosed.  · The Mid LM to Ost LAD lesion was 80% stenosed.  · The Ost Cx to Prox Cx lesion was 99% stenosed.  · The Mid LAD-1 lesion was 99% stenosed.  · The Mid LAD-2 lesion was 100% stenosed with 20% stenosis   post-intervention.  · The Mid LAD-3 lesion was 80% stenosed with 30% stenosis   post-intervention.  · The estimated blood loss was none.  · This was a successful PCI for acute myocardial infarction.     The procedure log was documented by No documenter listed and verified by   Stevan Powell MD.    Date: 6/27/2022  Time: 11:06 PM      [facility]  Results for orders placed during the hospital encounter of 06/27/22    Echo Saline Bubble? No    Interpretation Summary  · The left ventricle is normal in size with mild predominantly mid  septal mild asymmetric hypertrophy eccentric hypertrophy and normal systolic function.  · The estimated ejection fraction is 60%.  · Indeterminate left ventricular diastolic function.  · Normal right ventricular size with normal right ventricular systolic function.  · Mild left atrial enlargement.  · Normal central venous pressure (3 mmHg).  · The estimated PA systolic pressure is 19 mmHg.  · Mild mitral regurgitation.      CURRENT/PREVIOUS VISIT EKG  Results for orders placed or performed during the hospital encounter of 06/27/22   EKG 12-lead    Collection Time: 06/30/22 11:36 AM    Narrative    Test Reason : R07.9,    Vent. Rate : 073 BPM     Atrial Rate : 073 BPM     P-R Int : 212 ms          QRS Dur : 102 ms      QT Int : 404 ms       P-R-T Axes : 029 088 148 degrees     QTc Int : 445 ms    Sinus rhythm with 1st degree A-V block  Incomplete right bundle branch block  Possible Inferior infarct (cited on or before 30-JUN-2022)  Anteroseptal infarct (cited on or before 27-JUN-2022)  T wave abnormality, consider lateral ischemia  Abnormal ECG  When compared with ECG of 30-JUN-2022 09:15,  No significant change was found    Referred By: [email or web address]   SELF           Confirmed By:        ASSESSMENT/PLAN:     Active Hospital Problems    Diagnosis    *STEMI (ST elevation myocardial infarction)    NSVT (nonsustained ventricular tachycardia)    Coronary artery disease involving coronary bypass graft of native heart with angina pectoris       ASSESSMENT & PLAN:   1. Multivessel coronary artery disease  2. ST-elevation myocardial infarction status post PTCA  3. Nonsustained ventricular tachycardia  4. Mixed hyperlipidemia      RECOMMENDATIONS:  He will go for repeat bypass on Friday with Dr. Stone   Continue current medications as is, PRN nitro for any further chest pains   Rest per primary   Will follow him through surgery         Fanny Amaya PA-C  [facility]  Department of Cardiology  Date of  Service: 07/06/2022  9:53 AM      Patient has no more chest pain cardiac problems since admission with STEMI.  Echo shows preserved ejection fraction.  Await repeat coronary bypass

## 2022-07-07 ENCOUNTER — ANESTHESIA EVENT (OUTPATIENT)
Dept: SURGERY | Facility: HOSPITAL | Age: 62
DRG: 231 | End: 2022-07-07
Payer: COMMERCIAL

## 2022-07-07 LAB
ANION GAP SERPL CALC-SCNC: 6 MMOL/L (ref 8–16)
BUN SERPL-MCNC: 21 MG/DL (ref 8–23)
CALCIUM SERPL-MCNC: 9.1 MG/DL (ref 8.7–10.5)
CHLORIDE SERPL-SCNC: 104 MMOL/L (ref 95–110)
CO2 SERPL-SCNC: 28 MMOL/L (ref 23–29)
CREAT SERPL-MCNC: 1.2 MG/DL (ref 0.5–1.4)
EST. GFR  (AFRICAN AMERICAN): >60 ML/MIN/1.73 M^2
EST. GFR  (NON AFRICAN AMERICAN): >60 ML/MIN/1.73 M^2
GLUCOSE SERPL-MCNC: 108 MG/DL (ref 70–110)
MAGNESIUM SERPL-MCNC: 2.3 MG/DL (ref 1.6–2.6)
POTASSIUM SERPL-SCNC: 4.3 MMOL/L (ref 3.5–5.1)
SODIUM SERPL-SCNC: 138 MMOL/L (ref 136–145)

## 2022-07-07 PROCEDURE — 80048 BASIC METABOLIC PNL TOTAL CA: CPT | Performed by: INTERNAL MEDICINE

## 2022-07-07 PROCEDURE — 25000003 PHARM REV CODE 250: Performed by: HOSPITALIST

## 2022-07-07 PROCEDURE — 83735 ASSAY OF MAGNESIUM: CPT | Performed by: INTERNAL MEDICINE

## 2022-07-07 PROCEDURE — 99232 PR SUBSEQUENT HOSPITAL CARE,LEVL II: ICD-10-PCS | Mod: 57,,, | Performed by: PHYSICIAN ASSISTANT

## 2022-07-07 PROCEDURE — 36415 COLL VENOUS BLD VENIPUNCTURE: CPT | Performed by: INTERNAL MEDICINE

## 2022-07-07 PROCEDURE — 25000003 PHARM REV CODE 250: Performed by: NURSE PRACTITIONER

## 2022-07-07 PROCEDURE — 99233 PR SUBSEQUENT HOSPITAL CARE,LEVL III: ICD-10-PCS | Mod: ,,, | Performed by: GENERAL PRACTICE

## 2022-07-07 PROCEDURE — 94761 N-INVAS EAR/PLS OXIMETRY MLT: CPT

## 2022-07-07 PROCEDURE — 21000000 HC CCU ICU ROOM CHARGE

## 2022-07-07 PROCEDURE — 99900035 HC TECH TIME PER 15 MIN (STAT)

## 2022-07-07 PROCEDURE — 25000003 PHARM REV CODE 250: Performed by: GENERAL PRACTICE

## 2022-07-07 PROCEDURE — 63600175 PHARM REV CODE 636 W HCPCS: Performed by: THORACIC SURGERY (CARDIOTHORACIC VASCULAR SURGERY)

## 2022-07-07 PROCEDURE — 99232 SBSQ HOSP IP/OBS MODERATE 35: CPT | Mod: 57,,, | Performed by: PHYSICIAN ASSISTANT

## 2022-07-07 PROCEDURE — 99233 SBSQ HOSP IP/OBS HIGH 50: CPT | Mod: ,,, | Performed by: GENERAL PRACTICE

## 2022-07-07 RX ORDER — HYDROCODONE BITARTRATE AND ACETAMINOPHEN 500; 5 MG/1; MG/1
TABLET ORAL
Status: DISCONTINUED | OUTPATIENT
Start: 2022-07-07 | End: 2022-07-08

## 2022-07-07 RX ORDER — ONDANSETRON 2 MG/ML
4 INJECTION INTRAMUSCULAR; INTRAVENOUS EVERY 6 HOURS PRN
Status: DISCONTINUED | OUTPATIENT
Start: 2022-07-07 | End: 2022-07-08

## 2022-07-07 RX ORDER — LIDOCAINE HYDROCHLORIDE 20 MG/ML
100 INJECTION INTRAVENOUS ONCE
Status: DISCONTINUED | OUTPATIENT
Start: 2022-07-07 | End: 2022-07-08

## 2022-07-07 RX ORDER — LIDOCAINE HYDROCHLORIDE ANHYDROUS AND DEXTROSE MONOHYDRATE .4; 5 G/100ML; G/100ML
1 INJECTION, SOLUTION INTRAVENOUS CONTINUOUS
Status: DISPENSED | OUTPATIENT
Start: 2022-07-07 | End: 2022-07-08

## 2022-07-07 RX ADMIN — ASPIRIN 81 MG: 81 TABLET, COATED ORAL at 09:07

## 2022-07-07 RX ADMIN — METOPROLOL TARTRATE 25 MG: 25 TABLET, FILM COATED ORAL at 08:07

## 2022-07-07 RX ADMIN — ATORVASTATIN CALCIUM 80 MG: 40 TABLET, FILM COATED ORAL at 09:07

## 2022-07-07 RX ADMIN — ENOXAPARIN SODIUM 100 MG: 100 INJECTION SUBCUTANEOUS at 09:07

## 2022-07-07 RX ADMIN — METOPROLOL TARTRATE 25 MG: 25 TABLET, FILM COATED ORAL at 09:07

## 2022-07-07 RX ADMIN — Medication 800 MG: at 09:07

## 2022-07-07 RX ADMIN — ISOSORBIDE MONONITRATE 120 MG: 60 TABLET, EXTENDED RELEASE ORAL at 09:07

## 2022-07-07 RX ADMIN — ALPRAZOLAM 0.25 MG: 0.25 TABLET ORAL at 08:07

## 2022-07-07 RX ADMIN — LOSARTAN POTASSIUM 50 MG: 50 TABLET, FILM COATED ORAL at 09:07

## 2022-07-07 RX ADMIN — Medication 800 MG: at 08:07

## 2022-07-07 NOTE — PLAN OF CARE
As per dr Valentin during rounds, pt scheduled for CAB on 7/8. CM will continue to monitor for discharge needs.

## 2022-07-07 NOTE — PLAN OF CARE
Problem: Adult Inpatient Plan of Care  Goal: Absence of Hospital-Acquired Illness or Injury  Outcome: Ongoing, Progressing  Goal: Optimal Comfort and Wellbeing  Outcome: Ongoing, Progressing  Goal: Readiness for Transition of Care  Outcome: Ongoing, Progressing     Problem: Bleeding (Cardiac Catheterization)  Goal: Absence of Bleeding  Outcome: Ongoing, Progressing     Problem: Pain (Cardiac Catheterization)  Goal: Acceptable Pain Control  Outcome: Ongoing, Progressing     Problem: Fall Injury Risk  Goal: Absence of Fall and Fall-Related Injury  Outcome: Ongoing, Progressing     Problem: Ongoing Anesthesia/Sedation Effects (Cardiac Catheterization)  Goal: Anesthesia/Sedation Recovery  Outcome: Met

## 2022-07-07 NOTE — PLAN OF CARE
Problem: Adult Inpatient Plan of Care  Goal: Plan of Care Review  Outcome: Ongoing, Progressing  Goal: Patient-Specific Goal (Individualized)  Outcome: Ongoing, Progressing  Goal: Absence of Hospital-Acquired Illness or Injury  Outcome: Ongoing, Progressing  Goal: Optimal Comfort and Wellbeing  Outcome: Ongoing, Progressing  Goal: Readiness for Transition of Care  Outcome: Ongoing, Progressing     Problem: Arrhythmia/Dysrhythmia (Cardiac Catheterization)  Goal: Stable Heart Rate and Rhythm  Outcome: Ongoing, Progressing     Problem: Bleeding (Cardiac Catheterization)  Goal: Absence of Bleeding  Outcome: Ongoing, Progressing     Problem: Contrast-Induced Injury Risk (Cardiac Catheterization)  Goal: Absence of Contrast-Induced Injury  Outcome: Ongoing, Progressing     Problem: Embolism (Cardiac Catheterization)  Goal: Absence of Embolism Signs and Symptoms  Outcome: Ongoing, Progressing     Problem: Pain (Cardiac Catheterization)  Goal: Acceptable Pain Control  Outcome: Ongoing, Progressing     Problem: Vascular Access Protection (Cardiac Catheterization)  Goal: Absence of Vascular Access Complication  Outcome: Ongoing, Progressing     Problem: Fall Injury Risk  Goal: Absence of Fall and Fall-Related Injury  Outcome: Ongoing, Progressing

## 2022-07-07 NOTE — CARE UPDATE
07/07/22 0122   PRE-TX-O2   O2 Device (Oxygen Therapy) room air   SpO2 95 %   Pulse Oximetry Type Continuous   $ Pulse Oximetry - Multiple Charge Pulse Oximetry - Multiple   Pulse 64   Respiratory Evaluation   $ Care Plan Tech Time 15 min

## 2022-07-07 NOTE — PROGRESS NOTES
"CVT SURGERY PROGRESS NOTE  Matteo Fraire  61 y.o.  1960    Patient's Chief Complaint:  STEMI (ST elevation myocardial infarction)    HPI:  61 year old male with hx of HTN, previous CABG 2011 who presented to ED with complaints of chest pain after performing yard work and found to have STEMI. Emergent LHC performed with findings of recurrent multivessel CAD, PTCA of LAD was performed. CVTS consulted for CABG eval.    Last 24 hour interval:  -Pt seen this am and reports no chest pain. He did have asymptomatic arrhythmia last night.     Subjective:  Symptoms:  Stable.  No chest pain or weakness.    Diet:  No nausea or vomiting.    Activity level: Normal.    Pain:  He reports no pain.                                                                   Objective:  General Appearance:  In no acute distress and comfortable.    Vital signs: (most recent): Blood pressure (!) 144/76, pulse 63, temperature 97.4 °F (36.3 °C), temperature source Oral, resp. rate 15, height 5' 9" (1.753 m), weight 96.4 kg (212 lb 8.4 oz), SpO2 99 %.    Output: Producing urine.    Lungs:  Normal effort and normal respiratory rate.  Breath sounds clear to auscultation.  He is not in respiratory distress.    Heart: Normal rate.  Regular rhythm.  No murmur.   Chest: Symmetric chest wall expansion.   Abdomen: Abdomen is soft and non-distended.  Bowel sounds are normal.   There is no abdominal tenderness.     Neurological: Patient is alert and oriented to person, place and time.    Skin:  Warm and dry.      Recent Vitals:  Vitals:    07/07/22 0305 07/07/22 0520 07/07/22 0703 07/07/22 0841   BP: 112/64  (!) 144/76    BP Location: Right arm  Right arm    Patient Position: Lying  Lying    Pulse: 62  63    Resp:    15   Temp: 97.9 °F (36.6 °C)  97.4 °F (36.3 °C)    TempSrc: Oral  Oral    SpO2: (!) 94%  99%    Weight:  96.4 kg (212 lb 8.4 oz)     Height:           INPATIENT MEDS   nitroGLYCERIN 30 mcg/min (06/27/22 2116)      aspirin  81 mg Oral " "Daily    atorvastatin  80 mg Oral Daily    enoxparin  100 mg Subcutaneous Q12H    isosorbide mononitrate  120 mg Oral Daily    losartan  50 mg Oral Daily    magnesium oxide  800 mg Oral BID    metoprolol tartrate  25 mg Oral BID    polyethylene glycol  17 g Oral BID     acetaminophen, ALPRAZolam, HYDROcodone-acetaminophen, melatonin, morphine, nitroGLYCERIN, nitroglycerin, sodium chloride 0.9%  HEMODYNAMICS      Recent O2 Therapy/Vent Settings: RA    I/O last 24 hrs:  Intake/Output - Last 3 Shifts       07/05 0700 07/06 0659 07/06 0700 07/07 0659 07/07 0700 07/08 0659    P.O. 720 1200 480    I.V. (mL/kg) 10 (0.1) 10 (0.1)     Total Intake(mL/kg) 730 (7.5) 1210 (12.6) 480 (5)    Urine (mL/kg/hr) 2825 (1.2) 750 (0.3)     Stool 0      Total Output 2825 750     Net -2095 +460 +480           Urine Occurrence 0 x 2 x     Stool Occurrence 1 x 1 x         Recent Cardiac Rhythm: SR    Recent Pain Assessment: 0    CBC  Recent Labs   Lab 07/01/22  0406 07/05/22  0447 07/06/22  0355   WBC 8.59 6.00 6.08   RBC 5.24 5.17 5.10   HGB 15.1 14.8 14.8    221 225   MCV 89 87 89   MCH 28.8 28.6 29.0   MCHC 32.3 32.9 32.7     BMP  Recent Labs   Lab 07/05/22  0446 07/06/22  0355 07/07/22  0322   CO2 25 28 28   BUN 23 24* 21   CREATININE 1.1 1.3 1.2   CALCIUM 8.4* 8.9 9.1     CARDIAC ENZYMES  Recent Labs   Lab 06/30/22  0952   TROPONINI 9.876*     PT/INR  INR   Date Value Ref Range Status   06/27/2022 1.1  Final     Comment:     Coumadin Therapy:  INR: 2.0-3.0 conventional anticoagulation    INR: 2.5-3.5 intensive anticoagulation     11/20/2019 1.1  Final     Comment:     Coumadin Therapy:  INR: 2.0-3.0 conventional anticoagulation  INR: 2.5-3.5 intensive anticoagulation     01/28/2011 1.0 0.8 - 1.2 Final     Comment:     ACCP Guideline for Coumadin usage recommends a "target range" of  2.0 - 3.0 for INR for all indicators except mechanical heart valves  and antiphospholipid syndromes which should use 2.5 - 3.5.  . " "  01/27/2011 1.0 0.8 - 1.2 Final     Comment:     ACCP Guideline for Coumadin usage recommends a "target range" of  2.0 - 3.0 for INR for all indicators except mechanical heart valves  and antiphospholipid syndromes which should use 2.5 - 3.5.  .   01/26/2011 1.0 0.8 - 1.2 Final     Comment:     ACCP Guideline for Coumadin usage recommends a "target range" of  2.0 - 3.0 for INR for all indicators except mechanical heart valves  and antiphospholipid syndromes which should use 2.5 - 3.5.  .     DIAGNOSTIC RESULTS:    CURRENT CONSULTS:  IP CONSULT TO CARDIOTHORACIC SURGERY  IP CONSULT TO CARDIOLOGY  IP CONSULT TO REGISTERED DIETITIAN/NUTRITIONIST    ASSESSMENT/PLAN:  STEMI  Multivessel CAD  Previous CABG 2011  S/P PTCA of LAD  -Hemodynamically stable without symptoms  -Plan for redo CABG in am  -Pre op orders placed    Case and plan of care discussed with MD Roxann Dotson PA-C  Cardiovascular Thoracic Surgery  7/7/2022  9:25 AM        "

## 2022-07-07 NOTE — PROGRESS NOTES
[facility]  Adult Nutrition   Progress Note (Follow-Up)    SUMMARY      Recommendations:   1. Continue Cardiac diet as tolerated.  2. Offer wound healing supplementation post-CABG.     Goals:   Goals: 1) Patient to meet 75 to 100% of his estimated energy and protein needs. 2) Patient to understand the relationship between diet and HTN, CHF and a healthy heart and be able to lower his HTN, BNP and his weight by 10%.    Dietitian Rounds Brief  Plan is for redo CABG in am per sx. Follow for supplementation and nutrition support as needed.    Diet order: Cardiac    % Intake of Estimated Energy Needs: 75 - 100 %  % Meal Intake: 75 - 100 %    Estimated/Assessed Needs  Weight Used For Calorie Calculations: 100.7 kg (222 lb 0.1 oz)  Energy Calorie Requirements (kcal): 5545-7043 kcals/day (20-25 kcals/kg)  Energy Need Method: Kcal/kg  Protein Requirements: 110-146 g/day (1.5-2 g/kg IBW)  Weight Used For Protein Calculations: 73 kg (160 lb 15 oz)     Estimated Fluid Requirement Method: RDA Method  RDA Method (mL): 2014       Weight History:  Wt Readings from Last 5 Encounters:   07/07/22 96.4 kg (212 lb 8.4 oz)   06/28/22 100.7 kg (222 lb)   11/21/19 105 kg (231 lb 7.7 oz)   11/20/19 105 kg (231 lb 7.7 oz)   11/15/19 102.1 kg (225 lb)        Reason for Assessment  Reason For Assessment: consult  Diagnosis: other (see comments) (STEMI (ST elevation myocardial infarction))  Relevant Medical History: Hypertension, Heart attack, Psoriasis  Interdisciplinary Rounds: attended    Medications:Pertinent Medications Reviewed  Scheduled Meds:   aspirin  81 mg Oral Daily    atorvastatin  80 mg Oral Daily    enoxparin  100 mg Subcutaneous Q12H    isosorbide mononitrate  120 mg Oral Daily    LIDOcaine (cardiac)  100 mg Intravenous Once    losartan  50 mg Oral Daily    magnesium oxide  800 mg Oral BID    metoprolol tartrate  25 mg Oral BID    polyethylene glycol  17 g Oral BID     Continuous Infusions:   LIDOcaine       nitroGLYCERIN 30 mcg/min (06/27/22 2116)     PRN Meds:.sodium chloride, acetaminophen, ALPRAZolam, HYDROcodone-acetaminophen, melatonin, morphine, nitroGLYCERIN, nitroglycerin, sodium chloride 0.9%    Labs: Pertinent Labs Reviewed  Clinical Chemistry:  Recent Labs   Lab 07/01/22  0406 07/05/22  0446 07/07/22  0322   *   < > 138   K 4.3   < > 4.3      < > 104   CO2 26   < > 28   *   < > 108   BUN 22   < > 21   CREATININE 1.2   < > 1.2   CALCIUM 8.6*   < > 9.1   ALBUMIN 3.9  --   --    ANIONGAP 6*   < > 6*   ESTGFRAFRICA >60.0   < > >60.0   EGFRNONAA >60.0   < > >60.0   MG  --    < > 2.3   PHOS 2.8  --   --     < > = values in this interval not displayed.     CBC:   Recent Labs   Lab 07/06/22  0355   WBC 6.08   RBC 5.10   HGB 14.8   HCT 45.3      MCV 89   MCH 29.0   MCHC 32.7       Monitor and Evaluation  Food and Nutrient Intake: energy intake, food and beverage intake  Food and Nutrient Adminstration: diet order  Knowledge/Beliefs/Attitudes: food and nutrition knowledge/skill, beliefs and attitudes  Physical Activity and Function: nutrition-related ADLs and IADLs, factors affecting access to physical activity  Anthropometric Measurements: weight, weight change, body mass index  Biochemical Data, Medical Tests and Procedures: lipid profile, inflammatory profile, glucose/endocrine profile, gastrointestinal profile, electrolyte and renal panel  Nutrition-Focused Physical Findings: overall appearance     Nutrition Risk  Level of Risk/Frequency of Follow-up: low     Nutrition Follow-Up  RD Follow-up?: Yes    Roxann Nj, PAULINO 07/07/2022 3:41 PM

## 2022-07-07 NOTE — PROGRESS NOTES
[facility]  Department of Cardiology  Progress Note    PATIENT NAME: Matteo Fraire  MRN: [id number]  TODAY'S DATE: 07/07/2022  ADMIT DATE: 6/27/2022    SUBJECTIVE     PRINCIPLE PROBLEM: STEMI (ST elevation myocardial infarction)    INTERVAL HISTORY:    7/7/2022   Patient had 42 beat run of Vtach this morning, asymp[tomatic. No further chest pain. VSS.     7/6/2022  PATIENT FOR CABG THIS Friday. Had some mild chest pains after walking for 40 minutes this morning. Has not required nitro yet. VSS.     7/5/2022  Patient still waiting for CABG, ate breakfast this AM. No chest pain events. VSS.     7/4/2022  Patient is awake alert lying comfortably in bed not in any acute distress.  Patient had walked in the hallway 4 times already.  Denies any chest pain or tightness or heaviness his breathing is good denies any shortness of breath.  Awaiting for CABG.    Review of patient's allergies indicates:  No Known Allergies    REVIEW OF SYSTEMS  CARDIOVASCULAR: No recent chest pain, palpitations, arm, neck, or jaw pain  RESPIRATORY: No recent fever, cough chills, SOB or congestion  : No blood in the urine  GI: No Nausea, vomiting, constipation, diarrhea, blood, or reflux.  MUSCULOSKELETAL: No myalgias  NEURO: No lightheadedness or dizziness  EYES: No Double vision, blurry, vision or headache     OBJECTIVE     VITAL SIGNS (Most Recent)  Temp: 98.4 °F (36.9 °C) (07/07/22 1115)  Pulse: 74 (07/07/22 1115)  Resp: 15 (07/07/22 0841)  BP: 126/75 (07/07/22 1115)  SpO2: 97 % (07/07/22 1115)    VENTILATION STATUS  Resp: 15 (07/07/22 0841)  SpO2: 97 % (07/07/22 1115)       I & O (Last 24H):    Intake/Output Summary (Last 24 hours) at 7/7/2022 1429  Last data filed at 7/7/2022 0830  Gross per 24 hour   Intake 1090 ml   Output 750 ml   Net 340 ml       WEIGHTS  Wt Readings from Last 1 Encounters:   07/07/22 0520 96.4 kg (212 lb 8.4 oz)   07/05/22 0301 96.9 kg (213 lb 10 oz)   07/01/22 0300 97.3 kg (214 lb 8.1 oz)   06/30/22  0300 100.5 kg (221 lb 9.6 oz)   06/27/22 2254 101.1 kg (222 lb 14.2 oz)   06/27/22 1954 99.8 kg (220 lb)       PHYSICAL EXAM  CONSTITUTIONAL: Well built, well nourished in no apparent distress  NECK: no carotid bruit, no JVD  LUNGS: CTA  CHEST WALL: no tenderness  HEART: regular rate and rhythm, S1, S2 normal, no murmur, click, rub or gallop   ABDOMEN: soft, non-tender; bowel sounds normal; no masses,  no organomegaly  EXTREMITIES: Extremities normal, no edema  NEURO: AAO X 3    SCHEDULED MEDS:   aspirin  81 mg Oral Daily    atorvastatin  80 mg Oral Daily    enoxparin  100 mg Subcutaneous Q12H    isosorbide mononitrate  120 mg Oral Daily    LIDOcaine (cardiac)  100 mg Intravenous Once    losartan  50 mg Oral Daily    magnesium oxide  800 mg Oral BID    metoprolol tartrate  25 mg Oral BID    polyethylene glycol  17 g Oral BID       CONTINUOUS INFUSIONS:   LIDOcaine      nitroGLYCERIN 30 mcg/min (06/27/22 2116)       PRN MEDS:sodium chloride, acetaminophen, ALPRAZolam, HYDROcodone-acetaminophen, melatonin, morphine, nitroGLYCERIN, nitroglycerin, sodium chloride 0.9%    LABS AND DIAGNOSTICS     CBC LAST 3 DAYS  Recent Labs   Lab 07/01/22  0406 07/05/22 0447 07/06/22  0355   WBC 8.59 6.00 6.08   RBC 5.24 5.17 5.10   HGB 15.1 14.8 14.8   HCT 46.7 45.0 45.3   MCV 89 87 89   MCH 28.8 28.6 29.0   MCHC 32.3 32.9 32.7   RDW 13.2 13.0 13.0    221 225   MPV 11.5 11.1 11.1   GRAN 61.7  5.3  --   --    LYMPH 23.2  2.0  --   --    MONO 9.0  0.8  --   --    BASO 0.05  --   --    NRBC 0  --   --        COAGULATION LAST 3 DAYS  No results for input(s): LABPT, INR, APTT in the last 168 hours.    CHEMISTRY LAST 3 DAYS  Recent Labs   Lab 07/01/22  0406 07/05/22  0446 07/06/22  0355 07/07/22  0322   * 134* 136 138   K 4.3 4.1 4.5 4.3    105 102 104   CO2 26 25 28 28   ANIONGAP 6* 4* 6* 6*   BUN 22 23 24* 21   CREATININE 1.2 1.1 1.3 1.2   * 105 107 108   CALCIUM 8.6* 8.4* 8.9 9.1   MG  --  2.3 2.3 2.3    ALBUMIN 3.9  --   --   --        CARDIAC PROFILE LAST 3 DAYS  No results for input(s): BNP, CPK, CPKMB, LDH, TROPONINI in the last 168 hours.    ENDOCRINE LAST 3 DAYS  No results for input(s): TSH, PROCAL in the last 168 hours.    LAST ARTERIAL BLOOD GAS  ABG  No results for input(s): PH, PO2, PCO2, HCO3, BE in the last 168 hours.    LAST 7 DAYS MICROBIOLOGY   Microbiology Results (last 7 days)       ** No results found for the last 168 hours. **            MOST RECENT IMAGING  Cardiac catheterization  · The Insertion lesion before Mid LAD was 100% stenosed with 30% stenosis   post-intervention.  · The RPDA lesion was 90% stenosed.  · The RPAV lesion was 50% stenosed.  · The Mid RCA-2 lesion was 90% stenosed.  · The Mid RCA-1 lesion was 60% stenosed.  · The 1st Mrg-2 lesion was 75% stenosed.  · The 1st Mrg-3 lesion was 60% stenosed.  · The 1st Mrg-4 lesion was 70% stenosed.  · The 1st Mrg-1 lesion was 95% stenosed.  · The Prox LAD lesion was 60% stenosed.  · The Ost LAD to Prox LAD lesion was 90% stenosed.  · The Mid LM to Ost LAD lesion was 80% stenosed.  · The Ost Cx to Prox Cx lesion was 99% stenosed.  · The Mid LAD-1 lesion was 99% stenosed.  · The Mid LAD-2 lesion was 100% stenosed with 20% stenosis   post-intervention.  · The Mid LAD-3 lesion was 80% stenosed with 30% stenosis   post-intervention.  · The estimated blood loss was none.  · This was a successful PCI for acute myocardial infarction.     The procedure log was documented by No documenter listed and verified by   Stevan Powell MD.    Date: 6/27/2022  Time: 11:06 PM      LASTECHO  Results for orders placed during the hospital encounter of 06/27/22    Echo Saline Bubble? No    Interpretation Summary  · The left ventricle is normal in size with mild predominantly mid septal mild asymmetric hypertrophy eccentric hypertrophy and normal systolic function.  · The estimated ejection fraction is 60%.  · Indeterminate left ventricular diastolic  function.  · Normal right ventricular size with normal right ventricular systolic function.  · Mild left atrial enlargement.  · Normal central venous pressure (3 mmHg).  · The estimated PA systolic pressure is 19 mmHg.  · Mild mitral regurgitation.      CURRENT/PREVIOUS VISIT EKG  Results for orders placed or performed during the hospital encounter of 06/27/22   EKG 12-lead    Collection Time: 06/30/22 11:36 AM    Narrative    Test Reason : R07.9,    Vent. Rate : 073 BPM     Atrial Rate : 073 BPM     P-R Int : 212 ms          QRS Dur : 102 ms      QT Int : 404 ms       P-R-T Axes : 029 088 148 degrees     QTc Int : 445 ms    Sinus rhythm with 1st degree A-V block  Incomplete right bundle branch block  Possible Inferior infarct (cited on or before 30-JUN-2022)  Anteroseptal infarct (cited on or before 27-JUN-2022)  T wave abnormality, consider lateral ischemia  Abnormal ECG  When compared with ECG of 30-JUN-2022 09:15,  No significant change was found    Referred By: [email or web address]   SELF           Confirmed By:        ASSESSMENT/PLAN:     Active Hospital Problems    Diagnosis    *STEMI (ST elevation myocardial infarction)    NSVT (nonsustained ventricular tachycardia)    Coronary artery disease involving coronary bypass graft of native heart with angina pectoris       ASSESSMENT & PLAN:   1. Multivessel coronary artery disease  2. ST-elevation myocardial infarction status post PTCA  3. Nonsustained ventricular tachycardia  4. Mixed hyperlipidemia      RECOMMENDATIONS:  He will go for repeat bypass on Friday with Dr. Stone   Continue current medications as is, PRN nitro for any further chest pains   Give 100 mg bolus of lidocaine followed by 1 mg/min infusion given long run of NSVT early this morning         Fanny Amaya PA-C  [facility]  Department of Cardiology  Date of Service: 07/07/2022  9:53 AM      Patient has no more chest pain cardiac problems since admission with STEMI.  Echo shows preserved  ejection fraction.  Await repeat coronary bypass  Patient had a long run of nonsustained ventricular tachycardia last night.  Would initiate a lidocaine overnight until surgery is performed

## 2022-07-07 NOTE — CARE UPDATE
07/07/22 0841   PRE-TX-O2   O2 Device (Oxygen Therapy) room air   Pulse Oximetry Type Continuous   $ Pulse Oximetry - Multiple Charge Pulse Oximetry - Multiple   Resp 15   Respiratory Evaluation   $ Care Plan Tech Time 15 min

## 2022-07-07 NOTE — PROGRESS NOTES
[facility] Medicine  Progress Note    Patient name: Matteo Fraire  MRN: [id number]  Admit Date: 6/27/2022   LOS: 9 days     SUBJECTIVE:     Principal problem: STEMI (ST elevation myocardial infarction)    Interval History:  No acute overnight events reported.  Patient denies chest pain, shortness of breath or lower extremity edema. Walking in the hallways without issues    Scheduled Meds:   aspirin  81 mg Oral Daily    atorvastatin  80 mg Oral Daily    enoxparin  100 mg Subcutaneous Q12H    isosorbide mononitrate  120 mg Oral Daily    losartan  50 mg Oral Daily    magnesium oxide  800 mg Oral BID    metoprolol tartrate  25 mg Oral BID    polyethylene glycol  17 g Oral BID     Continuous Infusions:   nitroGLYCERIN 30 mcg/min (06/27/22 2116)     PRN Meds:acetaminophen, ALPRAZolam, HYDROcodone-acetaminophen, melatonin, morphine, nitroGLYCERIN, nitroglycerin, sodium chloride 0.9%    Review of patient's allergies indicates:  No Known Allergies    Review of Systems: As per interval history    OBJECTIVE:     Vital Signs (Most Recent)  Temp: 97.9 °F (36.6 °C) (07/06/22 1954)  Pulse: 89 (07/06/22 1954)  Resp: 18 (07/06/22 1954)  BP: 127/78 (07/06/22 1954)  SpO2: 97 % (07/06/22 1954)    Vital Signs Range (Last 24H):  Temp:  [97.9 °F (36.6 °C)-98.7 °F (37.1 °C)]   Pulse:  [59-89]   Resp:  [14-20]   BP: (112-146)/(65-84)   SpO2:  [94 %-98 %]     I & O (Last 24H):    Intake/Output Summary (Last 24 hours) at 7/6/2022 2148  Last data filed at 7/6/2022 1300  Gross per 24 hour   Intake 840 ml   Output 625 ml   Net 215 ml       Physical Exam:  General: Patient resting comfortably in no acute distress. Appears as stated age. Calm  Eyes: No conjunctival injection. No scleral icterus.  ENT: Hearing grossly intact. No discharge from ears. No nasal discharge.   CVS: RRR. No LE edema BL  Lungs:  No tachypnea or accessory muscle use  Neuro: AOx3. Moves all extremities. Follows commands. Responds  appropriately   Skin:  No rash or erythema noted    ASSESSMENT/PLAN:     61-year-old with prior history of CAD status post PCI and CABG came with chest pain found to have STEMI status post balloon angioplasty to LAD now waiting for CABG    Active Hospital Problems    Diagnosis  POA    *STEMI (ST elevation myocardial infarction) [I21.3]  Yes    NSVT (nonsustained ventricular tachycardia) [I47.2]  No    Coronary artery disease involving coronary bypass graft of native heart with angina pectoris [I25.709]  Yes      Resolved Hospital Problems   No resolved problems to display.         Plan:   STEMI status post balloon angioplasty to LAD now waiting for CABG (tentative date - 07/08)  Currently on weight based enoxaparin and ASA  Patient was offered staged PCI and CABG redo and he opted for CABG redo  Continue Imdur-uptitrated, beta-blocker, losartan  Incentive spirometry  P.r.n. labs  Electrolyte replacement protocol    VTE Risk Mitigation (From admission, onward)         Ordered     enoxaparin injection 100 mg  Every 12 hours (non-standard times)         06/28/22 0914                    Department Hospital Medicine  [facility]  Felton Valentin MD  Date of service: 07/06/2022

## 2022-07-08 ENCOUNTER — ANESTHESIA (OUTPATIENT)
Dept: SURGERY | Facility: HOSPITAL | Age: 62
DRG: 231 | End: 2022-07-08
Payer: COMMERCIAL

## 2022-07-08 LAB
ABO + RH BLD: NORMAL
ALLENS TEST: ABNORMAL
ANION GAP SERPL CALC-SCNC: 8 MMOL/L (ref 8–16)
ANION GAP SERPL CALC-SCNC: 8 MMOL/L (ref 8–16)
ANION GAP SERPL CALC-SCNC: 9 MMOL/L (ref 8–16)
APTT PPP: 40.8 SEC (ref 23.3–35.1)
BASOPHILS # BLD AUTO: 0.01 K/UL (ref 0–0.2)
BASOPHILS # BLD AUTO: 0.01 K/UL (ref 0–0.2)
BASOPHILS # BLD AUTO: 0.09 K/UL (ref 0–0.2)
BASOPHILS NFR BLD: 0.1 % (ref 0–1.9)
BASOPHILS NFR BLD: 0.1 % (ref 0–1.9)
BASOPHILS NFR BLD: 0.4 % (ref 0–1.9)
BLD GP AB SCN CELLS X3 SERPL QL: NORMAL
BUN SERPL-MCNC: 17 MG/DL (ref 8–23)
BUN SERPL-MCNC: 17 MG/DL (ref 8–23)
BUN SERPL-MCNC: 22 MG/DL (ref 8–23)
CALCIUM SERPL-MCNC: 7 MG/DL (ref 8.7–10.5)
CALCIUM SERPL-MCNC: 7 MG/DL (ref 8.7–10.5)
CALCIUM SERPL-MCNC: 8.9 MG/DL (ref 8.7–10.5)
CHLORIDE SERPL-SCNC: 104 MMOL/L (ref 95–110)
CHLORIDE SERPL-SCNC: 111 MMOL/L (ref 95–110)
CHLORIDE SERPL-SCNC: 114 MMOL/L (ref 95–110)
CO2 SERPL-SCNC: 18 MMOL/L (ref 23–29)
CO2 SERPL-SCNC: 22 MMOL/L (ref 23–29)
CO2 SERPL-SCNC: 24 MMOL/L (ref 23–29)
CREAT SERPL-MCNC: 1.1 MG/DL (ref 0.5–1.4)
DELSYS: ABNORMAL
DIFFERENTIAL METHOD: ABNORMAL
EOSINOPHIL # BLD AUTO: 0 K/UL (ref 0–0.5)
EOSINOPHIL # BLD AUTO: 0 K/UL (ref 0–0.5)
EOSINOPHIL # BLD AUTO: 0.4 K/UL (ref 0–0.5)
EOSINOPHIL NFR BLD: 0 % (ref 0–8)
EOSINOPHIL NFR BLD: 0 % (ref 0–8)
EOSINOPHIL NFR BLD: 1.9 % (ref 0–8)
ERYTHROCYTE [DISTWIDTH] IN BLOOD BY AUTOMATED COUNT: 13.2 % (ref 11.5–14.5)
ERYTHROCYTE [DISTWIDTH] IN BLOOD BY AUTOMATED COUNT: 13.2 % (ref 11.5–14.5)
ERYTHROCYTE [DISTWIDTH] IN BLOOD BY AUTOMATED COUNT: 13.4 % (ref 11.5–14.5)
ERYTHROCYTE [DISTWIDTH] IN BLOOD BY AUTOMATED COUNT: 13.5 % (ref 11.5–14.5)
ERYTHROCYTE [SEDIMENTATION RATE] IN BLOOD BY WESTERGREN METHOD: 14 MM/H
ERYTHROCYTE [SEDIMENTATION RATE] IN BLOOD BY WESTERGREN METHOD: 14 MM/H
ERYTHROCYTE [SEDIMENTATION RATE] IN BLOOD BY WESTERGREN METHOD: 20 MM/H
ERYTHROCYTE [SEDIMENTATION RATE] IN BLOOD BY WESTERGREN METHOD: 20 MM/H
EST. GFR  (AFRICAN AMERICAN): >60 ML/MIN/1.73 M^2
EST. GFR  (NON AFRICAN AMERICAN): >60 ML/MIN/1.73 M^2
FIO2: 100
FIO2: 50
FIO2: 50
FIO2: 55
FIO2: 55
FLOW: 50
GLUCOSE SERPL-MCNC: 101 MG/DL (ref 70–110)
GLUCOSE SERPL-MCNC: 102 MG/DL (ref 70–110)
GLUCOSE SERPL-MCNC: 112 MG/DL (ref 70–110)
GLUCOSE SERPL-MCNC: 115 MG/DL (ref 70–110)
GLUCOSE SERPL-MCNC: 115 MG/DL (ref 70–110)
GLUCOSE SERPL-MCNC: 136 MG/DL (ref 70–110)
GLUCOSE SERPL-MCNC: 141 MG/DL (ref 70–110)
GLUCOSE SERPL-MCNC: 144 MG/DL (ref 70–110)
GLUCOSE SERPL-MCNC: 147 MG/DL (ref 70–110)
GLUCOSE SERPL-MCNC: 149 MG/DL (ref 70–110)
GLUCOSE SERPL-MCNC: 149 MG/DL (ref 70–110)
GLUCOSE SERPL-MCNC: 150 MG/DL (ref 70–110)
GLUCOSE SERPL-MCNC: 150 MG/DL (ref 70–110)
GLUCOSE SERPL-MCNC: 151 MG/DL (ref 70–110)
GLUCOSE SERPL-MCNC: 153 MG/DL (ref 70–110)
GLUCOSE SERPL-MCNC: 165 MG/DL (ref 70–110)
GLUCOSE SERPL-MCNC: 185 MG/DL (ref 70–110)
GLUCOSE SERPL-MCNC: 192 MG/DL (ref 70–110)
GLUCOSE SERPL-MCNC: 90 MG/DL (ref 70–110)
HCO3 UR-SCNC: 17.3 MMOL/L (ref 24–28)
HCO3 UR-SCNC: 19.6 MMOL/L (ref 24–28)
HCO3 UR-SCNC: 20.4 MMOL/L (ref 24–28)
HCO3 UR-SCNC: 20.6 MMOL/L (ref 24–28)
HCO3 UR-SCNC: 21.4 MMOL/L (ref 24–28)
HCO3 UR-SCNC: 21.8 MMOL/L (ref 24–28)
HCO3 UR-SCNC: 21.9 MMOL/L (ref 24–28)
HCO3 UR-SCNC: 23.4 MMOL/L (ref 24–28)
HCO3 UR-SCNC: 24 MMOL/L (ref 24–28)
HCO3 UR-SCNC: 24.8 MMOL/L (ref 24–28)
HCO3 UR-SCNC: 26 MMOL/L (ref 24–28)
HCT VFR BLD AUTO: 32.9 % (ref 40–54)
HCT VFR BLD AUTO: 36.4 % (ref 40–54)
HCT VFR BLD AUTO: 37.3 % (ref 40–54)
HCT VFR BLD AUTO: 44.5 % (ref 40–54)
HCT VFR BLD CALC: 25 %PCV (ref 36–54)
HCT VFR BLD CALC: 26 %PCV (ref 36–54)
HCT VFR BLD CALC: 26 %PCV (ref 36–54)
HCT VFR BLD CALC: 27 %PCV (ref 36–54)
HCT VFR BLD CALC: 30 %PCV (ref 36–54)
HCT VFR BLD CALC: 32 %PCV (ref 36–54)
HCT VFR BLD CALC: 35 %PCV (ref 36–54)
HCT VFR BLD CALC: 36 %PCV (ref 36–54)
HCT VFR BLD CALC: 36 %PCV (ref 36–54)
HCT VFR BLD CALC: 40 %PCV (ref 36–54)
HCT VFR BLD CALC: 44 %PCV (ref 36–54)
HGB BLD-MCNC: 10.7 G/DL (ref 14–18)
HGB BLD-MCNC: 12.2 G/DL (ref 14–18)
HGB BLD-MCNC: 12.3 G/DL (ref 14–18)
HGB BLD-MCNC: 14.5 G/DL (ref 14–18)
IMM GRANULOCYTES # BLD AUTO: 0.05 K/UL (ref 0–0.04)
IMM GRANULOCYTES # BLD AUTO: 0.06 K/UL (ref 0–0.04)
IMM GRANULOCYTES # BLD AUTO: 0.17 K/UL (ref 0–0.04)
IMM GRANULOCYTES NFR BLD AUTO: 0.4 % (ref 0–0.5)
IMM GRANULOCYTES NFR BLD AUTO: 0.5 % (ref 0–0.5)
IMM GRANULOCYTES NFR BLD AUTO: 0.8 % (ref 0–0.5)
LYMPHOCYTES # BLD AUTO: 0.3 K/UL (ref 1–4.8)
LYMPHOCYTES # BLD AUTO: 0.5 K/UL (ref 1–4.8)
LYMPHOCYTES # BLD AUTO: 3 K/UL (ref 1–4.8)
LYMPHOCYTES NFR BLD: 14 % (ref 18–48)
LYMPHOCYTES NFR BLD: 3 % (ref 18–48)
LYMPHOCYTES NFR BLD: 4 % (ref 18–48)
MAGNESIUM SERPL-MCNC: 2 MG/DL (ref 1.6–2.6)
MAGNESIUM SERPL-MCNC: 2.2 MG/DL (ref 1.6–2.6)
MAGNESIUM SERPL-MCNC: 2.4 MG/DL (ref 1.6–2.6)
MCH RBC QN AUTO: 28.7 PG (ref 27–31)
MCH RBC QN AUTO: 28.7 PG (ref 27–31)
MCH RBC QN AUTO: 29.1 PG (ref 27–31)
MCH RBC QN AUTO: 29.2 PG (ref 27–31)
MCHC RBC AUTO-ENTMCNC: 32.5 G/DL (ref 32–36)
MCHC RBC AUTO-ENTMCNC: 32.6 G/DL (ref 32–36)
MCHC RBC AUTO-ENTMCNC: 33 G/DL (ref 32–36)
MCHC RBC AUTO-ENTMCNC: 33.5 G/DL (ref 32–36)
MCV RBC AUTO: 87 FL (ref 82–98)
MCV RBC AUTO: 87 FL (ref 82–98)
MCV RBC AUTO: 88 FL (ref 82–98)
MCV RBC AUTO: 90 FL (ref 82–98)
MODE: ABNORMAL
MONOCYTES # BLD AUTO: 0.3 K/UL (ref 0.3–1)
MONOCYTES # BLD AUTO: 0.5 K/UL (ref 0.3–1)
MONOCYTES # BLD AUTO: 1.1 K/UL (ref 0.3–1)
MONOCYTES NFR BLD: 2.3 % (ref 4–15)
MONOCYTES NFR BLD: 4.1 % (ref 4–15)
MONOCYTES NFR BLD: 5.1 % (ref 4–15)
NEUTROPHILS # BLD AUTO: 10.5 K/UL (ref 1.8–7.7)
NEUTROPHILS # BLD AUTO: 11.8 K/UL (ref 1.8–7.7)
NEUTROPHILS # BLD AUTO: 16.6 K/UL (ref 1.8–7.7)
NEUTROPHILS NFR BLD: 77.8 % (ref 38–73)
NEUTROPHILS NFR BLD: 91.3 % (ref 38–73)
NEUTROPHILS NFR BLD: 94.2 % (ref 38–73)
NRBC BLD-RTO: 0 /100 WBC
PCO2 BLDA: 29.8 MMHG (ref 35–45)
PCO2 BLDA: 32.5 MMHG (ref 35–45)
PCO2 BLDA: 34.2 MMHG (ref 35–45)
PCO2 BLDA: 34.4 MMHG (ref 35–45)
PCO2 BLDA: 35.8 MMHG (ref 35–45)
PCO2 BLDA: 38.9 MMHG (ref 35–45)
PCO2 BLDA: 40.4 MMHG (ref 35–45)
PCO2 BLDA: 43.7 MMHG (ref 35–45)
PCO2 BLDA: 47 MMHG (ref 35–45)
PCO2 BLDA: 48.8 MMHG (ref 35–45)
PCO2 BLDA: 49.1 MMHG (ref 35–45)
PEEP: 5
PH SMN: 7.17 [PH] (ref 7.35–7.45)
PH SMN: 7.21 [PH] (ref 7.35–7.45)
PH SMN: 7.23 [PH] (ref 7.35–7.45)
PH SMN: 7.31 [PH] (ref 7.35–7.45)
PH SMN: 7.4 [PH] (ref 7.35–7.45)
PH SMN: 7.41 [PH] (ref 7.35–7.45)
PH SMN: 7.42 [PH] (ref 7.35–7.45)
PH SMN: 7.42 [PH] (ref 7.35–7.45)
PH SMN: 7.43 [PH] (ref 7.35–7.45)
PH SMN: 7.45 [PH] (ref 7.35–7.45)
PH SMN: 7.46 [PH] (ref 7.35–7.45)
PHOSPHATE SERPL-MCNC: 1.7 MG/DL (ref 2.7–4.5)
PIP: 20
PIP: 20
PIP: 24
PLATELET # BLD AUTO: 158 K/UL (ref 150–450)
PLATELET # BLD AUTO: 171 K/UL (ref 150–450)
PLATELET # BLD AUTO: 235 K/UL (ref 150–450)
PLATELET # BLD AUTO: 243 K/UL (ref 150–450)
PMV BLD AUTO: 11.2 FL (ref 9.2–12.9)
PMV BLD AUTO: 11.2 FL (ref 9.2–12.9)
PMV BLD AUTO: 11.3 FL (ref 9.2–12.9)
PMV BLD AUTO: 11.6 FL (ref 9.2–12.9)
PO2 BLDA: 134 MMHG (ref 80–100)
PO2 BLDA: 226 MMHG (ref 80–100)
PO2 BLDA: 384 MMHG (ref 80–100)
PO2 BLDA: 415 MMHG (ref 80–100)
PO2 BLDA: 453 MMHG (ref 80–100)
PO2 BLDA: 526 MMHG (ref 80–100)
PO2 BLDA: 550 MMHG (ref 80–100)
PO2 BLDA: 67 MMHG (ref 80–100)
PO2 BLDA: 71 MMHG (ref 80–100)
PO2 BLDA: 80 MMHG (ref 80–100)
PO2 BLDA: 87 MMHG (ref 80–100)
POC ACTIVATED CLOTTING TIME K: 121 SEC (ref 74–137)
POC ACTIVATED CLOTTING TIME K: 138 SEC (ref 74–137)
POC ACTIVATED CLOTTING TIME K: 491 SEC (ref 74–137)
POC ACTIVATED CLOTTING TIME K: 491 SEC (ref 74–137)
POC ACTIVATED CLOTTING TIME K: 595 SEC (ref 74–137)
POC ACTIVATED CLOTTING TIME K: 654 SEC (ref 74–137)
POC BE: -1 MMOL/L
POC BE: -1 MMOL/L
POC BE: -11 MMOL/L
POC BE: -3 MMOL/L
POC BE: -4 MMOL/L
POC BE: -7 MMOL/L
POC BE: -8 MMOL/L
POC BE: 1 MMOL/L
POC BE: 1 MMOL/L
POC IONIZED CALCIUM: 0.92 MMOL/L (ref 1.06–1.42)
POC IONIZED CALCIUM: 0.97 MMOL/L (ref 1.06–1.42)
POC IONIZED CALCIUM: 0.98 MMOL/L (ref 1.06–1.42)
POC IONIZED CALCIUM: 1 MMOL/L (ref 1.06–1.42)
POC IONIZED CALCIUM: 1.02 MMOL/L (ref 1.06–1.42)
POC IONIZED CALCIUM: 1.06 MMOL/L (ref 1.06–1.42)
POC IONIZED CALCIUM: 1.07 MMOL/L (ref 1.06–1.42)
POC IONIZED CALCIUM: 1.11 MMOL/L (ref 1.06–1.42)
POC IONIZED CALCIUM: 1.12 MMOL/L (ref 1.06–1.42)
POC IONIZED CALCIUM: 1.15 MMOL/L (ref 1.06–1.42)
POC IONIZED CALCIUM: 1.15 MMOL/L (ref 1.06–1.42)
POC PCO2 TEMP: 33.6 MMHG
POC PCO2 TEMP: 34.8 MMHG
POC PCO2 TEMP: 44.9 MMHG
POC PCO2 TEMP: 46.4 MMHG
POC PCO2 TEMP: 47.3 MMHG
POC PH TEMP: 7.19
POC PH TEMP: 7.23
POC PH TEMP: 7.24
POC PH TEMP: 7.41
POC PH TEMP: 7.42
POC PO2 TEMP: 128 MMHG
POC PO2 TEMP: 64 MMHG
POC PO2 TEMP: 73 MMHG
POC PO2 TEMP: 80 MMHG
POC PO2 TEMP: 84 MMHG
POC SATURATED O2: 100 % (ref 95–100)
POC SATURATED O2: 89 % (ref 95–100)
POC SATURATED O2: 94 % (ref 95–100)
POC SATURATED O2: 94 % (ref 95–100)
POC SATURATED O2: 96 % (ref 95–100)
POC SATURATED O2: 98 % (ref 95–100)
POC TCO2: 19 MMOL/L (ref 23–27)
POC TCO2: 21 MMOL/L (ref 23–27)
POC TCO2: 21 MMOL/L (ref 23–27)
POC TCO2: 22 MMOL/L (ref 23–27)
POC TCO2: 22 MMOL/L (ref 23–27)
POC TCO2: 23 MMOL/L (ref 23–27)
POC TCO2: 23 MMOL/L (ref 23–27)
POC TCO2: 24 MMOL/L (ref 23–27)
POC TCO2: 25 MMOL/L (ref 23–27)
POC TCO2: 26 MMOL/L (ref 23–27)
POC TCO2: 27 MMOL/L (ref 23–27)
POC TEMPERATURE: ABNORMAL
POTASSIUM BLD-SCNC: 3.3 MMOL/L (ref 3.5–5.1)
POTASSIUM BLD-SCNC: 3.6 MMOL/L (ref 3.5–5.1)
POTASSIUM BLD-SCNC: 3.6 MMOL/L (ref 3.5–5.1)
POTASSIUM BLD-SCNC: 3.7 MMOL/L (ref 3.5–5.1)
POTASSIUM BLD-SCNC: 3.9 MMOL/L (ref 3.5–5.1)
POTASSIUM BLD-SCNC: 4 MMOL/L (ref 3.5–5.1)
POTASSIUM BLD-SCNC: 4.1 MMOL/L (ref 3.5–5.1)
POTASSIUM BLD-SCNC: 4.1 MMOL/L (ref 3.5–5.1)
POTASSIUM BLD-SCNC: 4.5 MMOL/L (ref 3.5–5.1)
POTASSIUM BLD-SCNC: 4.8 MMOL/L (ref 3.5–5.1)
POTASSIUM BLD-SCNC: 5.6 MMOL/L (ref 3.5–5.1)
POTASSIUM SERPL-SCNC: 3.3 MMOL/L (ref 3.5–5.1)
POTASSIUM SERPL-SCNC: 3.3 MMOL/L (ref 3.5–5.1)
POTASSIUM SERPL-SCNC: 4 MMOL/L (ref 3.5–5.1)
POTASSIUM SERPL-SCNC: 4.1 MMOL/L (ref 3.5–5.1)
PS: 10
RBC # BLD AUTO: 3.66 M/UL (ref 4.6–6.2)
RBC # BLD AUTO: 4.19 M/UL (ref 4.6–6.2)
RBC # BLD AUTO: 4.29 M/UL (ref 4.6–6.2)
RBC # BLD AUTO: 5.06 M/UL (ref 4.6–6.2)
SAMPLE: ABNORMAL
SAMPLE: NORMAL
SITE: ABNORMAL
SODIUM BLD-SCNC: 140 MMOL/L (ref 136–145)
SODIUM BLD-SCNC: 142 MMOL/L (ref 136–145)
SODIUM BLD-SCNC: 144 MMOL/L (ref 136–145)
SODIUM BLD-SCNC: 145 MMOL/L (ref 136–145)
SODIUM BLD-SCNC: 146 MMOL/L (ref 136–145)
SODIUM BLD-SCNC: 147 MMOL/L (ref 136–145)
SODIUM SERPL-SCNC: 137 MMOL/L (ref 136–145)
SODIUM SERPL-SCNC: 140 MMOL/L (ref 136–145)
SODIUM SERPL-SCNC: 141 MMOL/L (ref 136–145)
SP02: 100
SP02: 96
SP02: 99
TROPONIN I SERPL DL<=0.01 NG/ML-MCNC: 0.85 NG/ML
TROPONIN I SERPL DL<=0.01 NG/ML-MCNC: 2.53 NG/ML
VT: 500
VT: 600
VT: 600
WBC # BLD AUTO: 11.15 K/UL (ref 3.9–12.7)
WBC # BLD AUTO: 12.94 K/UL (ref 3.9–12.7)
WBC # BLD AUTO: 21.37 K/UL (ref 3.9–12.7)
WBC # BLD AUTO: 7.82 K/UL (ref 3.9–12.7)

## 2022-07-08 PROCEDURE — 27000656 HC EYE GOGGLES: Performed by: ANESTHESIOLOGY

## 2022-07-08 PROCEDURE — 33518 CABG ARTERY-VEIN TWO: CPT | Mod: ,,, | Performed by: THORACIC SURGERY (CARDIOTHORACIC VASCULAR SURGERY)

## 2022-07-08 PROCEDURE — 80048 BASIC METABOLIC PNL TOTAL CA: CPT | Mod: 91 | Performed by: THORACIC SURGERY (CARDIOTHORACIC VASCULAR SURGERY)

## 2022-07-08 PROCEDURE — 33533 PR CABG, ARTERIAL, SINGLE: ICD-10-PCS | Mod: 51,,, | Performed by: THORACIC SURGERY (CARDIOTHORACIC VASCULAR SURGERY)

## 2022-07-08 PROCEDURE — 36000713 HC OR TIME LEV V EA ADD 15 MIN: Performed by: THORACIC SURGERY (CARDIOTHORACIC VASCULAR SURGERY)

## 2022-07-08 PROCEDURE — 37799 UNLISTED PX VASCULAR SURGERY: CPT

## 2022-07-08 PROCEDURE — 63600175 PHARM REV CODE 636 W HCPCS: Performed by: NURSE ANESTHETIST, CERTIFIED REGISTERED

## 2022-07-08 PROCEDURE — 27202276 HC INTRODUCER, PERC 8 FR: Performed by: ANESTHESIOLOGY

## 2022-07-08 PROCEDURE — 94002 VENT MGMT INPAT INIT DAY: CPT

## 2022-07-08 PROCEDURE — 27000676 HC TUBING PRIMARY PLUMSET: Performed by: ANESTHESIOLOGY

## 2022-07-08 PROCEDURE — S0017 INJECTION, AMINOCAPROIC ACID: HCPCS | Performed by: NURSE ANESTHETIST, CERTIFIED REGISTERED

## 2022-07-08 PROCEDURE — 35600 PR HARVEST, UPPER EXTREMITY ARTERY, 1 SEGM FOR CABG, OPEN: ICD-10-PCS | Mod: ,,, | Performed by: THORACIC SURGERY (CARDIOTHORACIC VASCULAR SURGERY)

## 2022-07-08 PROCEDURE — 27000284 HC CANNULA NASAL: Performed by: ANESTHESIOLOGY

## 2022-07-08 PROCEDURE — 99900026 HC AIRWAY MAINTENANCE (STAT)

## 2022-07-08 PROCEDURE — 27201423 OPTIME MED/SURG SUP & DEVICES STERILE SUPPLY: Performed by: THORACIC SURGERY (CARDIOTHORACIC VASCULAR SURGERY)

## 2022-07-08 PROCEDURE — 85025 COMPLETE CBC W/AUTO DIFF WBC: CPT | Mod: 91 | Performed by: THORACIC SURGERY (CARDIOTHORACIC VASCULAR SURGERY)

## 2022-07-08 PROCEDURE — P9047 ALBUMIN (HUMAN), 25%, 50ML: HCPCS | Mod: JG

## 2022-07-08 PROCEDURE — 25000003 PHARM REV CODE 250: Performed by: NURSE ANESTHETIST, CERTIFIED REGISTERED

## 2022-07-08 PROCEDURE — 37000008 HC ANESTHESIA 1ST 15 MINUTES: Performed by: THORACIC SURGERY (CARDIOTHORACIC VASCULAR SURGERY)

## 2022-07-08 PROCEDURE — 94761 N-INVAS EAR/PLS OXIMETRY MLT: CPT

## 2022-07-08 PROCEDURE — 86920 COMPATIBILITY TEST SPIN: CPT | Performed by: THORACIC SURGERY (CARDIOTHORACIC VASCULAR SURGERY)

## 2022-07-08 PROCEDURE — 84100 ASSAY OF PHOSPHORUS: CPT | Mod: 91 | Performed by: THORACIC SURGERY (CARDIOTHORACIC VASCULAR SURGERY)

## 2022-07-08 PROCEDURE — 25000003 PHARM REV CODE 250: Performed by: THORACIC SURGERY (CARDIOTHORACIC VASCULAR SURGERY)

## 2022-07-08 PROCEDURE — 93010 EKG 12-LEAD: ICD-10-PCS | Mod: 76,,, | Performed by: GENERAL PRACTICE

## 2022-07-08 PROCEDURE — 27201107 HC STYLET, STANDARD: Performed by: ANESTHESIOLOGY

## 2022-07-08 PROCEDURE — 85730 THROMBOPLASTIN TIME PARTIAL: CPT | Performed by: PHYSICIAN ASSISTANT

## 2022-07-08 PROCEDURE — 63600175 PHARM REV CODE 636 W HCPCS

## 2022-07-08 PROCEDURE — 33518 PR CABG, ARTERY-VEIN, TWO: ICD-10-PCS | Mod: ,,, | Performed by: THORACIC SURGERY (CARDIOTHORACIC VASCULAR SURGERY)

## 2022-07-08 PROCEDURE — 84484 ASSAY OF TROPONIN QUANT: CPT | Performed by: INTERNAL MEDICINE

## 2022-07-08 PROCEDURE — 33031 PR REMOV HEART SAC W CP BYPASS: ICD-10-PCS | Mod: ,,, | Performed by: THORACIC SURGERY (CARDIOTHORACIC VASCULAR SURGERY)

## 2022-07-08 PROCEDURE — 25000003 PHARM REV CODE 250: Performed by: INTERNAL MEDICINE

## 2022-07-08 PROCEDURE — 33530 PR CABG, REOPERATE >1 MON ORIG: ICD-10-PCS | Mod: ,,, | Performed by: THORACIC SURGERY (CARDIOTHORACIC VASCULAR SURGERY)

## 2022-07-08 PROCEDURE — 83735 ASSAY OF MAGNESIUM: CPT | Performed by: INTERNAL MEDICINE

## 2022-07-08 PROCEDURE — 82330 ASSAY OF CALCIUM: CPT

## 2022-07-08 PROCEDURE — 27000658 HC ARTERIAL LINE ALL: Performed by: ANESTHESIOLOGY

## 2022-07-08 PROCEDURE — 27000671 HC TUBING MICROBORE EXT: Performed by: ANESTHESIOLOGY

## 2022-07-08 PROCEDURE — 33031 PARTIAL REMOVAL OF HEART SAC: CPT | Mod: ,,, | Performed by: THORACIC SURGERY (CARDIOTHORACIC VASCULAR SURGERY)

## 2022-07-08 PROCEDURE — 27000675 HC TUBING MICRODRIP: Performed by: ANESTHESIOLOGY

## 2022-07-08 PROCEDURE — 27000666 HC INFUSOR PRESSURE BAG: Performed by: ANESTHESIOLOGY

## 2022-07-08 PROCEDURE — 80048 BASIC METABOLIC PNL TOTAL CA: CPT | Performed by: INTERNAL MEDICINE

## 2022-07-08 PROCEDURE — C1729 CATH, DRAINAGE: HCPCS | Performed by: THORACIC SURGERY (CARDIOTHORACIC VASCULAR SURGERY)

## 2022-07-08 PROCEDURE — 27000221 HC OXYGEN, UP TO 24 HOURS

## 2022-07-08 PROCEDURE — 93005 ELECTROCARDIOGRAM TRACING: CPT | Performed by: GENERAL PRACTICE

## 2022-07-08 PROCEDURE — 27202163 HC CATH MULTI LUMEN: Performed by: ANESTHESIOLOGY

## 2022-07-08 PROCEDURE — 33530 CORONARY ARTERY BYPASS/REOP: CPT | Mod: ,,, | Performed by: THORACIC SURGERY (CARDIOTHORACIC VASCULAR SURGERY)

## 2022-07-08 PROCEDURE — 93010 ELECTROCARDIOGRAM REPORT: CPT | Mod: ,,, | Performed by: GENERAL PRACTICE

## 2022-07-08 PROCEDURE — 86901 BLOOD TYPING SEROLOGIC RH(D): CPT | Performed by: PHYSICIAN ASSISTANT

## 2022-07-08 PROCEDURE — 27202107 HC XP QUATRO SENSOR: Performed by: ANESTHESIOLOGY

## 2022-07-08 PROCEDURE — S0017 INJECTION, AMINOCAPROIC ACID: HCPCS

## 2022-07-08 PROCEDURE — 84132 ASSAY OF SERUM POTASSIUM: CPT

## 2022-07-08 PROCEDURE — 37000009 HC ANESTHESIA EA ADD 15 MINS: Performed by: THORACIC SURGERY (CARDIOTHORACIC VASCULAR SURGERY)

## 2022-07-08 PROCEDURE — 27200678 HC TRANSDUCER MONITOR KIT TRIPLE: Performed by: ANESTHESIOLOGY

## 2022-07-08 PROCEDURE — 63600175 PHARM REV CODE 636 W HCPCS: Performed by: PHYSICIAN ASSISTANT

## 2022-07-08 PROCEDURE — 63600175 PHARM REV CODE 636 W HCPCS: Performed by: THORACIC SURGERY (CARDIOTHORACIC VASCULAR SURGERY)

## 2022-07-08 PROCEDURE — 35600 OPEN HRV UXTR ART 1 SGM CAB: CPT | Mod: ,,, | Performed by: THORACIC SURGERY (CARDIOTHORACIC VASCULAR SURGERY)

## 2022-07-08 PROCEDURE — 63600175 PHARM REV CODE 636 W HCPCS: Performed by: INTERNAL MEDICINE

## 2022-07-08 PROCEDURE — 85014 HEMATOCRIT: CPT

## 2022-07-08 PROCEDURE — P9045 ALBUMIN (HUMAN), 5%, 250 ML: HCPCS | Mod: JG | Performed by: NURSE ANESTHETIST, CERTIFIED REGISTERED

## 2022-07-08 PROCEDURE — 27000673 HC TUBING BLOOD Y: Performed by: ANESTHESIOLOGY

## 2022-07-08 PROCEDURE — 25000003 PHARM REV CODE 250

## 2022-07-08 PROCEDURE — 27100025 HC TUBING, SET FLUID WARMER: Performed by: ANESTHESIOLOGY

## 2022-07-08 PROCEDURE — 33508 ENDOSCOPIC VEIN HARVEST: CPT | Mod: 59,,, | Performed by: THORACIC SURGERY (CARDIOTHORACIC VASCULAR SURGERY)

## 2022-07-08 PROCEDURE — 27200066: Performed by: ANESTHESIOLOGY

## 2022-07-08 PROCEDURE — 63600175 PHARM REV CODE 636 W HCPCS: Mod: JG | Performed by: THORACIC SURGERY (CARDIOTHORACIC VASCULAR SURGERY)

## 2022-07-08 PROCEDURE — 84484 ASSAY OF TROPONIN QUANT: CPT | Mod: 91 | Performed by: THORACIC SURGERY (CARDIOTHORACIC VASCULAR SURGERY)

## 2022-07-08 PROCEDURE — 20000000 HC ICU ROOM

## 2022-07-08 PROCEDURE — 36415 COLL VENOUS BLD VENIPUNCTURE: CPT | Performed by: PHYSICIAN ASSISTANT

## 2022-07-08 PROCEDURE — 83735 ASSAY OF MAGNESIUM: CPT | Mod: 91 | Performed by: THORACIC SURGERY (CARDIOTHORACIC VASCULAR SURGERY)

## 2022-07-08 PROCEDURE — 33533 CABG ARTERIAL SINGLE: CPT | Mod: 51,,, | Performed by: THORACIC SURGERY (CARDIOTHORACIC VASCULAR SURGERY)

## 2022-07-08 PROCEDURE — C9248 INJ, CLEVIDIPINE BUTYRATE: HCPCS | Mod: JG | Performed by: THORACIC SURGERY (CARDIOTHORACIC VASCULAR SURGERY)

## 2022-07-08 PROCEDURE — 93010 ELECTROCARDIOGRAM REPORT: CPT | Mod: 76,,, | Performed by: GENERAL PRACTICE

## 2022-07-08 PROCEDURE — 84295 ASSAY OF SERUM SODIUM: CPT

## 2022-07-08 PROCEDURE — 27000080 OPTIME MED/SURG SUP & DEVICES GENERAL CLASSIFICATION: Performed by: THORACIC SURGERY (CARDIOTHORACIC VASCULAR SURGERY)

## 2022-07-08 PROCEDURE — 99900035 HC TECH TIME PER 15 MIN (STAT)

## 2022-07-08 PROCEDURE — 33508 PR ENDOSCOPY W/VIDEO-ASST VEIN HARVEST,CABG: ICD-10-PCS | Mod: 59,,, | Performed by: THORACIC SURGERY (CARDIOTHORACIC VASCULAR SURGERY)

## 2022-07-08 PROCEDURE — 82803 BLOOD GASES ANY COMBINATION: CPT

## 2022-07-08 PROCEDURE — 36000712 HC OR TIME LEV V 1ST 15 MIN: Performed by: THORACIC SURGERY (CARDIOTHORACIC VASCULAR SURGERY)

## 2022-07-08 PROCEDURE — 36415 COLL VENOUS BLD VENIPUNCTURE: CPT | Performed by: THORACIC SURGERY (CARDIOTHORACIC VASCULAR SURGERY)

## 2022-07-08 PROCEDURE — 85027 COMPLETE CBC AUTOMATED: CPT | Performed by: INTERNAL MEDICINE

## 2022-07-08 PROCEDURE — 86920 COMPATIBILITY TEST SPIN: CPT | Performed by: PHYSICIAN ASSISTANT

## 2022-07-08 RX ORDER — ALBUMIN HUMAN 50 G/1000ML
25 SOLUTION INTRAVENOUS
Status: DISCONTINUED | OUTPATIENT
Start: 2022-07-08 | End: 2022-07-19 | Stop reason: HOSPADM

## 2022-07-08 RX ORDER — SODIUM CHLORIDE 9 MG/ML
INJECTION, SOLUTION INTRAVENOUS CONTINUOUS PRN
Status: DISCONTINUED | OUTPATIENT
Start: 2022-07-08 | End: 2022-07-08

## 2022-07-08 RX ORDER — ONDANSETRON 2 MG/ML
4 INJECTION INTRAMUSCULAR; INTRAVENOUS EVERY 6 HOURS PRN
Status: DISCONTINUED | OUTPATIENT
Start: 2022-07-08 | End: 2022-07-19 | Stop reason: HOSPADM

## 2022-07-08 RX ORDER — LIDOCAINE HYDROCHLORIDE 20 MG/ML
JELLY TOPICAL
Status: DISCONTINUED | OUTPATIENT
Start: 2022-07-08 | End: 2022-07-08

## 2022-07-08 RX ORDER — SODIUM CHLORIDE 450 MG/100ML
INJECTION, SOLUTION INTRAVENOUS CONTINUOUS
Status: DISCONTINUED | OUTPATIENT
Start: 2022-07-08 | End: 2022-07-11

## 2022-07-08 RX ORDER — ASPIRIN 325 MG
650 TABLET ORAL ONCE
Status: COMPLETED | OUTPATIENT
Start: 2022-07-08 | End: 2022-07-08

## 2022-07-08 RX ORDER — CALCIUM GLUCONATE 20 MG/ML
1 INJECTION, SOLUTION INTRAVENOUS
Status: DISCONTINUED | OUTPATIENT
Start: 2022-07-08 | End: 2022-07-19 | Stop reason: HOSPADM

## 2022-07-08 RX ORDER — DOCUSATE SODIUM 100 MG/1
100 CAPSULE, LIQUID FILLED ORAL 2 TIMES DAILY
Status: DISCONTINUED | OUTPATIENT
Start: 2022-07-09 | End: 2022-07-17

## 2022-07-08 RX ORDER — MIDAZOLAM HYDROCHLORIDE 1 MG/ML
1 INJECTION INTRAMUSCULAR; INTRAVENOUS
Status: ACTIVE | OUTPATIENT
Start: 2022-07-08 | End: 2022-07-09

## 2022-07-08 RX ORDER — SODIUM BICARBONATE 1 MEQ/ML
SYRINGE (ML) INTRAVENOUS
Status: COMPLETED
Start: 2022-07-08 | End: 2022-07-08

## 2022-07-08 RX ORDER — MIDAZOLAM HYDROCHLORIDE 1 MG/ML
2 INJECTION INTRAMUSCULAR; INTRAVENOUS
Status: ACTIVE | OUTPATIENT
Start: 2022-07-08 | End: 2022-07-09

## 2022-07-08 RX ORDER — TAMSULOSIN HYDROCHLORIDE 0.4 MG/1
0.4 CAPSULE ORAL DAILY
Status: DISCONTINUED | OUTPATIENT
Start: 2022-07-08 | End: 2022-07-19 | Stop reason: HOSPADM

## 2022-07-08 RX ORDER — POTASSIUM CHLORIDE 29.8 MG/ML
40 INJECTION INTRAVENOUS
Status: DISCONTINUED | OUTPATIENT
Start: 2022-07-08 | End: 2022-07-19 | Stop reason: HOSPADM

## 2022-07-08 RX ORDER — ONDANSETRON 2 MG/ML
INJECTION INTRAMUSCULAR; INTRAVENOUS
Status: DISCONTINUED | OUTPATIENT
Start: 2022-07-08 | End: 2022-07-08

## 2022-07-08 RX ORDER — MIDAZOLAM HYDROCHLORIDE 1 MG/ML
INJECTION INTRAMUSCULAR; INTRAVENOUS
Status: DISCONTINUED | OUTPATIENT
Start: 2022-07-08 | End: 2022-07-08

## 2022-07-08 RX ORDER — MORPHINE SULFATE 2 MG/ML
2 INJECTION, SOLUTION INTRAMUSCULAR; INTRAVENOUS
Status: DISCONTINUED | OUTPATIENT
Start: 2022-07-08 | End: 2022-07-12

## 2022-07-08 RX ORDER — MUPIROCIN 20 MG/G
OINTMENT TOPICAL
Status: DISCONTINUED | OUTPATIENT
Start: 2022-07-08 | End: 2022-07-19 | Stop reason: HOSPADM

## 2022-07-08 RX ORDER — CEFAZOLIN SODIUM 2 G/50ML
2 SOLUTION INTRAVENOUS
Status: COMPLETED | OUTPATIENT
Start: 2022-07-08 | End: 2022-07-09

## 2022-07-08 RX ORDER — SODIUM CHLORIDE, SODIUM LACTATE, POTASSIUM CHLORIDE, CALCIUM CHLORIDE 600; 310; 30; 20 MG/100ML; MG/100ML; MG/100ML; MG/100ML
INJECTION, SOLUTION INTRAVENOUS CONTINUOUS PRN
Status: DISCONTINUED | OUTPATIENT
Start: 2022-07-08 | End: 2022-07-08

## 2022-07-08 RX ORDER — AMINOCAPROIC ACID 250 MG/ML
INJECTION, SOLUTION INTRAVENOUS
Status: DISCONTINUED | OUTPATIENT
Start: 2022-07-08 | End: 2022-07-08

## 2022-07-08 RX ORDER — ALBUMIN HUMAN 50 G/1000ML
SOLUTION INTRAVENOUS CONTINUOUS PRN
Status: DISCONTINUED | OUTPATIENT
Start: 2022-07-08 | End: 2022-07-08

## 2022-07-08 RX ORDER — LIDOCAINE HYDROCHLORIDE 10 MG/ML
INJECTION, SOLUTION EPIDURAL; INFILTRATION; INTRACAUDAL; PERINEURAL
Status: COMPLETED
Start: 2022-07-08 | End: 2022-07-08

## 2022-07-08 RX ORDER — SODIUM BICARBONATE 1 MEQ/ML
100 SYRINGE (ML) INTRAVENOUS ONCE
Status: COMPLETED | OUTPATIENT
Start: 2022-07-08 | End: 2022-07-08

## 2022-07-08 RX ORDER — FENTANYL CITRATE 50 UG/ML
INJECTION, SOLUTION INTRAMUSCULAR; INTRAVENOUS
Status: COMPLETED
Start: 2022-07-08 | End: 2022-07-08

## 2022-07-08 RX ORDER — CALCIUM GLUCONATE 20 MG/ML
3 INJECTION, SOLUTION INTRAVENOUS
Status: DISCONTINUED | OUTPATIENT
Start: 2022-07-08 | End: 2022-07-19 | Stop reason: HOSPADM

## 2022-07-08 RX ORDER — CALCIUM GLUCONATE 20 MG/ML
2 INJECTION, SOLUTION INTRAVENOUS
Status: DISCONTINUED | OUTPATIENT
Start: 2022-07-08 | End: 2022-07-19 | Stop reason: HOSPADM

## 2022-07-08 RX ORDER — VECURONIUM BROMIDE FOR INJECTION 1 MG/ML
INJECTION, POWDER, LYOPHILIZED, FOR SOLUTION INTRAVENOUS
Status: DISCONTINUED | OUTPATIENT
Start: 2022-07-08 | End: 2022-07-08

## 2022-07-08 RX ORDER — CEFAZOLIN SODIUM 2 G/50ML
2 SOLUTION INTRAVENOUS
Status: COMPLETED | OUTPATIENT
Start: 2022-07-08 | End: 2022-07-08

## 2022-07-08 RX ORDER — LIDOCAINE HYDROCHLORIDE 20 MG/ML
INJECTION, SOLUTION EPIDURAL; INFILTRATION; INTRACAUDAL; PERINEURAL
Status: DISCONTINUED | OUTPATIENT
Start: 2022-07-08 | End: 2022-07-08

## 2022-07-08 RX ORDER — POTASSIUM CHLORIDE 14.9 MG/ML
INJECTION INTRAVENOUS CONTINUOUS PRN
Status: DISCONTINUED | OUTPATIENT
Start: 2022-07-08 | End: 2022-07-08

## 2022-07-08 RX ORDER — HEPARIN SODIUM 10000 [USP'U]/ML
INJECTION, SOLUTION INTRAVENOUS; SUBCUTANEOUS
Status: DISCONTINUED | OUTPATIENT
Start: 2022-07-08 | End: 2022-07-08

## 2022-07-08 RX ORDER — MAGNESIUM SULFATE HEPTAHYDRATE 40 MG/ML
2 INJECTION, SOLUTION INTRAVENOUS
Status: DISCONTINUED | OUTPATIENT
Start: 2022-07-08 | End: 2022-07-19 | Stop reason: HOSPADM

## 2022-07-08 RX ORDER — POTASSIUM CHLORIDE 29.8 MG/ML
80 INJECTION INTRAVENOUS
Status: DISCONTINUED | OUTPATIENT
Start: 2022-07-08 | End: 2022-07-19 | Stop reason: HOSPADM

## 2022-07-08 RX ORDER — ROCURONIUM BROMIDE 10 MG/ML
INJECTION, SOLUTION INTRAVENOUS
Status: DISCONTINUED | OUTPATIENT
Start: 2022-07-08 | End: 2022-07-08

## 2022-07-08 RX ORDER — POTASSIUM CHLORIDE 14.9 MG/ML
60 INJECTION INTRAVENOUS
Status: DISCONTINUED | OUTPATIENT
Start: 2022-07-08 | End: 2022-07-19 | Stop reason: HOSPADM

## 2022-07-08 RX ORDER — ETOMIDATE 2 MG/ML
INJECTION INTRAVENOUS
Status: DISCONTINUED | OUTPATIENT
Start: 2022-07-08 | End: 2022-07-08

## 2022-07-08 RX ORDER — MORPHINE SULFATE 4 MG/ML
4 INJECTION, SOLUTION INTRAMUSCULAR; INTRAVENOUS
Status: DISCONTINUED | OUTPATIENT
Start: 2022-07-08 | End: 2022-07-12

## 2022-07-08 RX ORDER — SUCCINYLCHOLINE CHLORIDE 20 MG/ML
INJECTION INTRAMUSCULAR; INTRAVENOUS
Status: DISCONTINUED | OUTPATIENT
Start: 2022-07-08 | End: 2022-07-08

## 2022-07-08 RX ORDER — MIDAZOLAM HYDROCHLORIDE 1 MG/ML
INJECTION INTRAMUSCULAR; INTRAVENOUS
Status: COMPLETED
Start: 2022-07-08 | End: 2022-07-08

## 2022-07-08 RX ORDER — PROTAMINE SULFATE 10 MG/ML
INJECTION, SOLUTION INTRAVENOUS
Status: DISCONTINUED | OUTPATIENT
Start: 2022-07-08 | End: 2022-07-08

## 2022-07-08 RX ORDER — FENTANYL CITRATE 50 UG/ML
INJECTION, SOLUTION INTRAMUSCULAR; INTRAVENOUS
Status: DISCONTINUED | OUTPATIENT
Start: 2022-07-08 | End: 2022-07-08

## 2022-07-08 RX ORDER — FAMOTIDINE 10 MG/ML
INJECTION INTRAVENOUS
Status: DISCONTINUED | OUTPATIENT
Start: 2022-07-08 | End: 2022-07-08

## 2022-07-08 RX ORDER — HYDROCODONE BITARTRATE AND ACETAMINOPHEN 5; 325 MG/1; MG/1
1 TABLET ORAL EVERY 4 HOURS PRN
Status: DISCONTINUED | OUTPATIENT
Start: 2022-07-08 | End: 2022-07-19 | Stop reason: HOSPADM

## 2022-07-08 RX ORDER — PHENYLEPHRINE HYDROCHLORIDE 10 MG/ML
INJECTION INTRAVENOUS
Status: DISCONTINUED | OUTPATIENT
Start: 2022-07-08 | End: 2022-07-08

## 2022-07-08 RX ORDER — ALBUMIN HUMAN 250 G/1000ML
SOLUTION INTRAVENOUS CONTINUOUS PRN
Status: DISCONTINUED | OUTPATIENT
Start: 2022-07-08 | End: 2022-07-08

## 2022-07-08 RX ORDER — MAGNESIUM SULFATE HEPTAHYDRATE 40 MG/ML
4 INJECTION, SOLUTION INTRAVENOUS
Status: DISCONTINUED | OUTPATIENT
Start: 2022-07-08 | End: 2022-07-19 | Stop reason: HOSPADM

## 2022-07-08 RX ADMIN — PHENYLEPHRINE HYDROCHLORIDE 100 MCG: 10 INJECTION INTRAVENOUS at 09:07

## 2022-07-08 RX ADMIN — ETOMIDATE 10 MG: 2 INJECTION, SOLUTION INTRAVENOUS at 09:07

## 2022-07-08 RX ADMIN — ONDANSETRON 4 MG: 2 INJECTION INTRAMUSCULAR; INTRAVENOUS at 12:07

## 2022-07-08 RX ADMIN — PHENYLEPHRINE HYDROCHLORIDE 100 MCG: 10 INJECTION INTRAVENOUS at 11:07

## 2022-07-08 RX ADMIN — INSULIN HUMAN 3.3 UNITS/HR: 1 INJECTION, SOLUTION INTRAVENOUS at 07:07

## 2022-07-08 RX ADMIN — LIDOCAINE HYDROCHLORIDE 80 MG: 20 INJECTION, SOLUTION EPIDURAL; INFILTRATION; INTRACAUDAL; PERINEURAL at 09:07

## 2022-07-08 RX ADMIN — PHENYLEPHRINE HYDROCHLORIDE 50 MCG: 10 INJECTION INTRAVENOUS at 10:07

## 2022-07-08 RX ADMIN — CLEVIPIDINE 2 MG/HR: 0.5 EMULSION INTRAVENOUS at 09:07

## 2022-07-08 RX ADMIN — ONDANSETRON 4 MG: 2 INJECTION INTRAMUSCULAR; INTRAVENOUS at 08:07

## 2022-07-08 RX ADMIN — VECURONIUM BROMIDE 4 MG: 1 INJECTION, POWDER, LYOPHILIZED, FOR SOLUTION INTRAVENOUS at 10:07

## 2022-07-08 RX ADMIN — AMINOCAPROIC ACID 5 G: 250 INJECTION, SOLUTION INTRAVENOUS at 02:07

## 2022-07-08 RX ADMIN — MIDAZOLAM HYDROCHLORIDE 2 MG: 1 INJECTION, SOLUTION INTRAMUSCULAR; INTRAVENOUS at 11:07

## 2022-07-08 RX ADMIN — SODIUM CHLORIDE: 0.45 INJECTION, SOLUTION INTRAVENOUS at 04:07

## 2022-07-08 RX ADMIN — MIDAZOLAM HYDROCHLORIDE 1 MG: 1 INJECTION, SOLUTION INTRAMUSCULAR; INTRAVENOUS at 10:07

## 2022-07-08 RX ADMIN — HEPARIN SODIUM 35000 UNITS: 10000 INJECTION, SOLUTION INTRAVENOUS; SUBCUTANEOUS at 11:07

## 2022-07-08 RX ADMIN — SODIUM CHLORIDE: 0.9 INJECTION, SOLUTION INTRAVENOUS at 09:07

## 2022-07-08 RX ADMIN — MIDAZOLAM HYDROCHLORIDE 3 MG: 1 INJECTION, SOLUTION INTRAMUSCULAR; INTRAVENOUS at 12:07

## 2022-07-08 RX ADMIN — FAMOTIDINE 20 MG: 10 INJECTION, SOLUTION INTRAVENOUS at 08:07

## 2022-07-08 RX ADMIN — FENTANYL CITRATE 250 MCG: 50 INJECTION INTRAMUSCULAR; INTRAVENOUS at 11:07

## 2022-07-08 RX ADMIN — CALCIUM GLUCONATE: 98 INJECTION, SOLUTION INTRAVENOUS at 07:07

## 2022-07-08 RX ADMIN — SUCCINYLCHOLINE CHLORIDE 140 MG: 20 INJECTION, SOLUTION INTRAMUSCULAR; INTRAVENOUS at 09:07

## 2022-07-08 RX ADMIN — FENTANYL CITRATE 250 MCG: 50 INJECTION INTRAMUSCULAR; INTRAVENOUS at 02:07

## 2022-07-08 RX ADMIN — MIDAZOLAM HYDROCHLORIDE 1 MG: 1 INJECTION, SOLUTION INTRAMUSCULAR; INTRAVENOUS at 07:07

## 2022-07-08 RX ADMIN — VECURONIUM BROMIDE 6 MG: 1 INJECTION, POWDER, LYOPHILIZED, FOR SOLUTION INTRAVENOUS at 10:07

## 2022-07-08 RX ADMIN — FENTANYL CITRATE 250 MCG: 50 INJECTION INTRAMUSCULAR; INTRAVENOUS at 10:07

## 2022-07-08 RX ADMIN — LIDOCAINE HYDROCHLORIDE 3 ML: 20 JELLY TOPICAL at 09:07

## 2022-07-08 RX ADMIN — SODIUM BICARBONATE 100 MEQ: 84 INJECTION, SOLUTION INTRAVENOUS at 06:07

## 2022-07-08 RX ADMIN — MIDAZOLAM HYDROCHLORIDE 1 MG: 1 INJECTION, SOLUTION INTRAMUSCULAR; INTRAVENOUS at 11:07

## 2022-07-08 RX ADMIN — ETOMIDATE 8 MG: 2 INJECTION, SOLUTION INTRAVENOUS at 09:07

## 2022-07-08 RX ADMIN — FENTANYL CITRATE 250 MCG: 50 INJECTION INTRAMUSCULAR; INTRAVENOUS at 12:07

## 2022-07-08 RX ADMIN — SODIUM CHLORIDE 0.04 MCG/KG/MIN: 9 INJECTION, SOLUTION INTRAVENOUS at 01:07

## 2022-07-08 RX ADMIN — ROCURONIUM BROMIDE 45 MG: 10 INJECTION, SOLUTION INTRAVENOUS at 09:07

## 2022-07-08 RX ADMIN — DEXTROSE 2 G: 50 INJECTION, SOLUTION INTRAVENOUS at 05:07

## 2022-07-08 RX ADMIN — PHENYLEPHRINE HYDROCHLORIDE 100 MCG: 10 INJECTION INTRAVENOUS at 02:07

## 2022-07-08 RX ADMIN — CEFAZOLIN SODIUM 2 G: 2 SOLUTION INTRAVENOUS at 09:07

## 2022-07-08 RX ADMIN — FENTANYL CITRATE 150 MCG: 50 INJECTION INTRAMUSCULAR; INTRAVENOUS at 09:07

## 2022-07-08 RX ADMIN — FENTANYL CITRATE 50 MCG/ML: 50 INJECTION, SOLUTION INTRAMUSCULAR; INTRAVENOUS at 07:07

## 2022-07-08 RX ADMIN — METOPROLOL TARTRATE 25 MG: 25 TABLET, FILM COATED ORAL at 10:07

## 2022-07-08 RX ADMIN — POTASSIUM CHLORIDE: 14.9 INJECTION, SOLUTION INTRAVENOUS at 02:07

## 2022-07-08 RX ADMIN — AMINOCAPROIC ACID 5 G: 250 INJECTION, SOLUTION INTRAVENOUS at 09:07

## 2022-07-08 RX ADMIN — ASPIRIN 325 MG ORAL TABLET 650 MG: 325 PILL ORAL at 05:07

## 2022-07-08 RX ADMIN — FENTANYL CITRATE 50 MCG: 50 INJECTION INTRAMUSCULAR; INTRAVENOUS at 09:07

## 2022-07-08 RX ADMIN — CEFAZOLIN SODIUM 2 G: 2 SOLUTION INTRAVENOUS at 01:07

## 2022-07-08 RX ADMIN — ALBUMIN (HUMAN): 12.5 INJECTION, SOLUTION INTRAVENOUS at 02:07

## 2022-07-08 RX ADMIN — LIDOCAINE HYDROCHLORIDE: 10 INJECTION, SOLUTION EPIDURAL; INFILTRATION; INTRACAUDAL; PERINEURAL at 07:07

## 2022-07-08 RX ADMIN — INSULIN HUMAN 1.2 UNITS/HR: 1 INJECTION, SOLUTION INTRAVENOUS at 09:07

## 2022-07-08 RX ADMIN — GENTAMICIN SULFATE 80 MG: 40 INJECTION, SOLUTION INTRAMUSCULAR; INTRAVENOUS at 09:07

## 2022-07-08 RX ADMIN — VECURONIUM BROMIDE 4 MG: 1 INJECTION, POWDER, LYOPHILIZED, FOR SOLUTION INTRAVENOUS at 11:07

## 2022-07-08 RX ADMIN — LIDOCAINE HYDROCHLORIDE ANHYDROUS AND DEXTROSE MONOHYDRATE 1 MG/MIN: .4; 5 INJECTION, SOLUTION INTRAVENOUS at 02:07

## 2022-07-08 RX ADMIN — MIDAZOLAM HYDROCHLORIDE 2 MG: 1 INJECTION, SOLUTION INTRAMUSCULAR; INTRAVENOUS at 08:07

## 2022-07-08 RX ADMIN — VECURONIUM BROMIDE 6 MG: 1 INJECTION, POWDER, LYOPHILIZED, FOR SOLUTION INTRAVENOUS at 12:07

## 2022-07-08 RX ADMIN — SODIUM BICARBONATE 100 MEQ: 84 INJECTION, SOLUTION INTRAVENOUS at 03:07

## 2022-07-08 RX ADMIN — SODIUM CHLORIDE: 0.9 INJECTION, SOLUTION INTRAVENOUS at 11:07

## 2022-07-08 RX ADMIN — ROCURONIUM BROMIDE 5 MG: 10 INJECTION, SOLUTION INTRAVENOUS at 09:07

## 2022-07-08 RX ADMIN — ACETAMINOPHEN 650 MG: 325 TABLET ORAL at 10:07

## 2022-07-08 RX ADMIN — SODIUM CHLORIDE, SODIUM LACTATE, POTASSIUM CHLORIDE, AND CALCIUM CHLORIDE: .6; .31; .03; .02 INJECTION, SOLUTION INTRAVENOUS at 08:07

## 2022-07-08 RX ADMIN — DEXTROSE 2 G: 50 INJECTION, SOLUTION INTRAVENOUS at 09:07

## 2022-07-08 RX ADMIN — PROTAMINE SULFATE 300 MG: 10 INJECTION, SOLUTION INTRAVENOUS at 02:07

## 2022-07-08 NOTE — PROGRESS NOTES
[facility] Medicine  Progress Note    Patient name: Matteo Fraire  MRN: [id number]  Admit Date: 6/27/2022   LOS: 11 days     SUBJECTIVE:     Principal problem: STEMI (ST elevation myocardial infarction)    Interval History:  Seen after returning from OR after undergoing re-do three-vessel CABG.  Sedated.  He has been weaned off pressor support.      Scheduled Meds:   aspirin  81 mg Oral Daily    atorvastatin  80 mg Oral Daily    ceFAZolin (ANCEF) IVPB  2 g Intravenous Q6H    [START ON 7/9/2022] docusate sodium  100 mg Oral BID    losartan  50 mg Oral Daily    metoprolol tartrate  25 mg Oral BID    tamsulosin  0.4 mg Oral Daily     Continuous Infusions:   sodium chloride 0.45% 50 mL/hr at 07/08/22 1618    insulin regular 1 units/mL infusion orderable (CTS POST-OP)       PRN Meds:acetaminophen, albumin human 5%, ALPRAZolam, calcium gluconate IVPB, calcium gluconate IVPB, calcium gluconate IVPB, dextrose 10%, dextrose 10%, HYDROcodone-acetaminophen, magnesium sulfate IVPB, magnesium sulfate IVPB, melatonin, midazolam, midazolam, morphine, morphine, mupirocin, ondansetron, potassium chloride in water **AND** potassium chloride in water **AND** potassium chloride in water, sodium phosphate IVPB, sodium phosphate IVPB, sodium phosphate IVPB    Review of patient's allergies indicates:  No Known Allergies    Review of Systems:  Unable to obtain as patient is sedated    OBJECTIVE:     Vital Signs (Most Recent)  Temp: 96.8 °F (36 °C) (07/08/22 1512)  Pulse: 87 (07/08/22 1600)  Resp: (!) 0 (07/08/22 1600)  BP: 116/68 (07/08/22 1600)  SpO2: 96 % (07/08/22 1600)    Vital Signs Range (Last 24H):  Temp:  [96.8 °F (36 °C)-98.2 °F (36.8 °C)]   Pulse:  [60-87]   Resp:  [0-24]   BP: (115-138)/(54-89)   SpO2:  [95 %-100 %]     I & O (Last 24H):    Intake/Output Summary (Last 24 hours) at 7/8/2022 1249  Last data filed at 7/8/2022 1532  Gross per 24 hour   Intake 5430 ml   Output 955 ml   Net 4475 ml        Physical Exam:  General: Patient resting comfortably in no acute distress. Appears as stated age.  Sedated and intubated  Eyes: No conjunctival injection. No scleral icterus.  ENT:  ET and OG tubes noted. No discharge from ears. No nasal discharge.   CVS: RRR. No LE edema BL  Lungs:  Intubated.  Mechanical breath sounds  Abd: Soft. Non tender   Neuro:  Sedated  Skin:  No rash or erythema noted  :  Chamorro catheter noted.    ASSESSMENT/PLAN:     61-year-old with prior history of CAD status post PCI and CABG came with chest pain found to have STEMI status post balloon angioplasty to LAD - now status post redo 3 vessel CABG    Active Hospital Problems    Diagnosis  POA    *STEMI (ST elevation myocardial infarction) [I21.3]  Yes    NSVT (nonsustained ventricular tachycardia) [I47.2]  No    Coronary artery disease involving coronary bypass graft of native heart with angina pectoris [I25.709]  Yes      Resolved Hospital Problems   No resolved problems to display.         Plan:   STEMI status post balloon angioplasty to LAD   Status post redo 3 vessel CABG for recurrent severe atherosclerotic CAD  Routine postoperative care in the ICU  Monitor fluid status, hemodynamics and respiratory status  Antihypertensives as needed  Electrolyte replacement per protocol    Discussed plan with spouse at bedside     VTE Risk Mitigation (From admission, onward)    None              Department Hospital Medicine  [facility]  Felton Valentin MD  Date of service: 07/08/2022

## 2022-07-08 NOTE — ANESTHESIA PROCEDURE NOTES
Central Line    Diagnosis: CAD  Patient location during procedure: ICU  Timeout: 7/8/2022 7:35 AM  Procedure end time: 7/8/2022 8:00 AM    Staffing  Authorizing Provider: Kike Mercado MD  Performing Provider: Kike Mercado MD    Staffing  Performed: anesthesiologist   Anesthesiologist: Kike Mercado MD  Anesthesiologist was present at the time of the procedure.  Preanesthetic Checklist  Completed: patient identified, IV checked, site marked, risks and benefits discussed, surgical consent, monitors and equipment checked, pre-op evaluation, timeout performed and anesthesia consent given  Indication   Indication: vascular access     Anesthesia   local infiltration    Central Line   Skin Prep: skin prepped with ChloraPrep, skin prep agent completely dried prior to procedure  Sterile Barriers Followed: Yes    All five maximal barriers used- gloves, gown, cap, mask, and large sterile sheet    hand hygiene performed prior to central venous catheter insertion  Location: right internal jugular.   Catheter type: triple lumen  Catheter Size: 7 Fr  Inserted Catheter Length: 16 cm  Ultrasound: vascular probe with ultrasound   Vessel Caliber: medium, patent, compressibility normal  Needle advanced into vessel with real time Ultrasound guidance.  Guidewire confirmed in vessel.  sterile gel and probe cover used in ultrasound-guided central venous catheter insertion  Manometry: none  Insertion Attempts: 1   Securement:line sutured, chlorhexidine patch, sterile dressing applied and blood return through all ports    Post-Procedure   X-Ray: no pneumothorax on x-ray, placement verified by x-ray, tip termination and successful placement  Tip termination comments: SVC   Adverse Events:none      Guidewire Guidewire removed intact.   Additional Notes  Central line and percutaneous introducer/[place]Fadumo catheter placed concurrently.   No indication of arterial puncture at any time.  All ports aspirated and flushed easily.  Patient  tolerated procedure well with Versed 1 mg IV and fentanyl 50 mcg IV.  Vital signs stable throughout procedure.

## 2022-07-08 NOTE — TRANSFER OF CARE
"Anesthesia Transfer of Care Note    Patient: Matteo Fraire    Procedure(s) Performed: Procedure(s) (LRB):  CABG, REPEAT (N/A)  HARVEST-VEIN-ENDOVASCULAR (Right)  SURGICAL PROCUREMENT,ARTERY,RADIAL,FOR CABG (Left)    Patient location: ICU    Anesthesia Type: general    Transport from OR: Upon arrival to PACU/ICU, patient attached to ventilator and auscultated to confirm bilateral breath sounds and adequate TV. Continuous ECG monitoring in transport. Continuous SpO2 monitoring in transport. Continuos invasive BP monitoring in transport. Transported from OR intubated on 100% O2 by AMBU with adequate controlled ventilation    Post pain: adequate analgesia    Post assessment: no apparent anesthetic complications and tolerated procedure well    Post vital signs: stable    Level of consciousness: sedated    Nausea/Vomiting: no nausea/vomiting    Complications: none    Transfer of care protocol was followed      Last vitals:   Visit Vitals  BP (!) 115/54 (BP Location: Right arm, Patient Position: Lying)   Pulse 87   Temp 36 °C (96.8 °F) (Core ([place]-Fadumo))   Resp 14   Ht 5' 9" (1.753 m)   Wt 96.4 kg (212 lb 8.4 oz)   SpO2 100%   BMI 31.38 kg/m²     "

## 2022-07-08 NOTE — OP NOTE
This is Dr. Stone dictating with date of service being 8th July 2022.  Preoperative diagnosis:  Severe recurrent atherosclerotic coronary artery disease status post remote coronary artery bypass grafting.  Postoperative diagnosis:  Same.  Operation:  Redo coronary artery bypass grafting x3 using left radial artery to the left anterior descending coronary artery and separate segments of saphenous vein from the aorta to the posterior descending coronary artery and from the aorta to the obtuse marginal coronary artery using a combination of antegrade and retrograde cardioplegia, mild systemic hypothermia, endoscopic vein harvest, left radial artery harvest, and complete pericardiectomy due to severe adhesions from previous surgery as well as insertion of right femoral arterial line with percutaneous Seldinger technique.  Operation in detail:  The patient was prepped and draped in usual sterile fashion.  The right femoral artery was punctured with an introducer needle and a J-wire advanced.  A dilator and sheath were placed over the wire into the right femoral artery.  The dilator and wire were removed leaving the sheath which was secured to skin with interrupted silk suture.  The right saphenous vein was harvested with the [employer] endoscopic harvest system.  The side branches were initially controlled with electrocautery.  The vein was excised and retrogradely flushed and found to be satisfactory.  The vein branches were reinforced with hemoclips and silk ties.  The leg was closed in a single layer of the subcutaneous tissue with running 2 0 Monocryl stitch in the skin was closed with running 3 0 subcuticular Monocryl stitch.  The left radial artery was harvested through a longitudinal incision.  It side branches were controlled with hemoclips and low power electrocautery.  The radial artery was divided distally and flushed proximally with a nitroglycerin and Cardene solution.  Soft bulldog clamp placed in the distal  aspect of the pedicle.  The artery at the wrist was oversewn with 5 0 Prolene suture.  The arm was partially closed from distal to proximal with running 2 0 Monocryl stitch.  The pedicle was then clamped at the antecubital fossa and excised.  It was flushed with a nitroglycerin and Cardene solution.  The artery at the antecubital fossa was oversewn with 5 0 Prolene suture.  The arm was closed with a running 2 0 Monocryl stitch in the subcutaneous tissue and the skin was closed with running 3 0 subcuticular Monocryl stitch.  The chest was opened through redo sternotomy.  The previously placed wires were excised.  The sternum was incised longitudinally with the oscillating saw to carefully enter the mediastinum.  The periosteal edges were cauterized.  Careful sharp and cautery dissection was performed behind the sternum to release adhesions from the epicardium to the posterior aspect of the chest wall.  In this way a dissection was then performed on either side to allow placement of a self-retaining retractor to complete and continued the dissection.  The right atrium and ascending thoracic aorta were carefully dissected so that cannulation could be performed.  As many as the adhesions as possible were incised to free up as much as the heart before heparinization and cannulation.  The ascending thoracic aorta was cannulated as well as the upper aspect of the right atrium below the superior vena cava with a two-stage venous cannula.  Antegrade and retrograde cardioplegia lines were placed in the aorta and right atrial free wall and into the coronary sinus respectively.  The patient was placed on cardiopulmonary bypass and cooled to systemic moderate hypothermia.  The rest of the adhesions and the complete pericardiectomy was performed with sharp and cautery dissection.  The cross-clamp was applied and the heart arrested initially with antegrade cardioplegia and then intermittently with retrograde cardioplegia.  Once  the heart was satisfactorily arrested the bypasses were done 1st with saphenous vein sewn in end-to-side fashion to the posterior descending coronary artery with running 7 0 Prolene suture.  The graft was measured to length and cut and sewn proximally to the aorta in end-to-side fashion with running 6 0 Prolene suture.  The vein was then next sewn in end-to-side fashion to the obtuse marginal coronary artery with running 7 0 Prolene suture.  The graft was measured to length and cut and sewn proximally to the aorta in end-to-side fashion with running 6 0 Prolene suture.  The left radial was sewn in an end-to-side fashion to the left anterior descending coronary artery with running 7 0 Prolene suture.  The graft was measured to length and cut and sewn proximally to the ascencio of the vein graft that proceeded to circumflex system.  This was done in end-to-side fashion with running 7 0 Prolene suture.  The patient was rewarming during this time and the cross-clamp released.  The patient was weaned from cardiopulmonary bypass once satisfactory hemodynamics were maintained and rewarming complete.  Chest tubes were inserted into the left hemithorax and 2 into the anterior mediastinum through separate stab wounds and secured to skin with 0 silk suture.  Two pacing wires were placed on the right ventricle and brought out through separate stab wounds and secured to skin with 2-0 silk suture.  Once the patient was doing well decannulation was performed and the sites secured with their respective pursestrings and oversewn with Prolene suture.  Closure of the sternum was done with interrupted stainless steel wire.  The presternal fascia and subcutaneous tissues were closed with running 0 and 2-0 Monocryl stitch respectively.  The skin was closed with running 3 0 subcuticular Monocryl stitch.  Overall patient tolerated the procedure well and there appeared to be no major obvious intraoperative complications.  It was difficult and  long-lasting due to cardiomegaly and the fairly significant adhesions encountered but the patient was brought to the ICU in satisfactory condition with an estimated blood loss of 500 cc.

## 2022-07-08 NOTE — PROGRESS NOTES
Critical access hospital Medicine  Progress Note    Patient name: Matteo Fraire  MRN: [id number]  Admit Date: 6/27/2022   LOS: 10 days     SUBJECTIVE:     Principal problem: STEMI (ST elevation myocardial infarction)    Interval History:  42 beat run of NSVT in early morning hours. He was reportedly asymptomatic.  Patient denies chest pain, shortness of breath or lower extremity edema.     Scheduled Meds:   aspirin  81 mg Oral Daily    atorvastatin  80 mg Oral Daily    enoxparin  100 mg Subcutaneous Q12H    isosorbide mononitrate  120 mg Oral Daily    LIDOcaine (cardiac)  100 mg Intravenous Once    losartan  50 mg Oral Daily    magnesium oxide  800 mg Oral BID    metoprolol tartrate  25 mg Oral BID    polyethylene glycol  17 g Oral BID     Continuous Infusions:   LIDOcaine      nitroGLYCERIN 30 mcg/min (06/27/22 2116)     PRN Meds:sodium chloride, acetaminophen, ALPRAZolam, HYDROcodone-acetaminophen, melatonin, morphine, nitroGLYCERIN, nitroglycerin, ondansetron, sodium chloride 0.9%    Review of patient's allergies indicates:  No Known Allergies    Review of Systems: As per interval history    OBJECTIVE:     Vital Signs (Most Recent)  Temp: 98 °F (36.7 °C) (07/07/22 1907)  Pulse: 70 (07/07/22 1907)  Resp: 16 (07/07/22 1907)  BP: 119/66 (07/07/22 1907)  SpO2: 97 % (07/07/22 1907)    Vital Signs Range (Last 24H):  Temp:  [97.4 °F (36.3 °C)-98.4 °F (36.9 °C)]   Pulse:  [62-74]   Resp:  [15-20]   BP: (112-144)/(64-76)   SpO2:  [94 %-99 %]     I & O (Last 24H):    Intake/Output Summary (Last 24 hours) at 7/7/2022 2016  Last data filed at 7/7/2022 0830  Gross per 24 hour   Intake 1090 ml   Output 750 ml   Net 340 ml       Physical Exam:  General: Patient resting comfortably in no acute distress. Appears as stated age. Calm  Eyes: No conjunctival injection. No scleral icterus.  ENT: Hearing grossly intact. No discharge from ears. No nasal discharge.   CVS: RRR. No LE edema BL  Lungs:  No tachypnea or  accessory muscle use. CTA b/l  Abd: Soft. Non tender   Neuro: AOx3. Moves all extremities. Follows commands. Responds appropriately   Skin:  No rash or erythema noted    ASSESSMENT/PLAN:     61-year-old with prior history of CAD status post PCI and CABG came with chest pain found to have STEMI status post balloon angioplasty to LAD now waiting for CABG. On lidocaine infusion for NSVT     Active Hospital Problems    Diagnosis  POA    *STEMI (ST elevation myocardial infarction) [I21.3]  Yes    NSVT (nonsustained ventricular tachycardia) [I47.2]  No    Coronary artery disease involving coronary bypass graft of native heart with angina pectoris [I25.709]  Yes      Resolved Hospital Problems   No resolved problems to display.         Plan:   STEMI status post balloon angioplasty to LAD now waiting for CABG (tentative date - 07/08)  NPO from midnight   Currently on weight based enoxaparin and ASA  Patient was offered staged PCI and CABG redo and he opted for CABG redo  Started on lidocaine infusion by cardiology given NSVT   Continue Imdur-uptitrated, beta-blocker, losartan  Incentive spirometry  P.r.n. labs  Electrolyte replacement protocol    VTE Risk Mitigation (From admission, onward)         Ordered     enoxaparin injection 100 mg  Every 12 hours (non-standard times)         06/28/22 0914                    Department Hospital Medicine  [facility]  Felton Valentin MD  Date of service: 07/07/2022

## 2022-07-08 NOTE — ANESTHESIA PROCEDURE NOTES
Arterial line    Diagnosis: CAD    Patient location during procedure: ICU  Procedure start time: 7/8/2022 7:15 AM  Timeout: 7/8/2022 7:15 AM  Procedure end time: 7/8/2022 7:25 AM    Staffing  Authorizing Provider: Kike Mercado MD  Performing Provider: Kike Mercado MD    Anesthesiologist was present at the time of the procedure.    Preanesthetic Checklist  Completed: patient identified, risks and benefits discussed, timeout performed and anesthesia consent givenArterial line  Skin Prep: chlorhexidine gluconate  Local Infiltration: lidocaine  Orientation: right  Location: radial    Catheter Size: 20 G  Catheter placement by Ultrasound guidance. Heme positive aspiration all ports.   Vessel Caliber: medium, patent, compressibility normal  Vascular Doppler:  not done  Needle advanced into vessel with real time Ultrasound guidance.  Sterile sheath used.Insertion Attempts: 1  Assessment  Dressing: sutured in place and taped and tegaderm  Patient: Tolerated well

## 2022-07-08 NOTE — ANESTHESIA PROCEDURE NOTES
Intubation    Date/Time: 7/8/2022 9:12 AM  Performed by: Erica Cotton CRNA  Authorized by: Jason Thorpe MD     Intubation:     Induction:  Intravenous    Intubated:  Postinduction    Mask Ventilation:  Moderately difficult with oral airway (large, thick beard made it difficult to get a good seal)    Attempts:  1    Attempted By:  CRNA    Method of Intubation:  Video laryngoscopy    Blade:  Amador 3    Laryngeal View Grade: Grade IIA - cords partially seen      Difficult Airway Encountered?: No      Complications:  None    Airway Device:  Oral endotracheal tube    Airway Device Size:  8.5    Style/Cuff Inflation:  Cuffed    Tube secured:  23    Secured at:  The lips    Placement Verified By:  Capnometry    Complicating Factors:  Large/floppy epiglottis    Findings Post-Intubation:  BS equal bilateral and atraumatic/condition of teeth unchanged

## 2022-07-08 NOTE — ANESTHESIA PREPROCEDURE EVALUATION
07/08/2022  Matteo Fraire is a 61 y.o., male.      Pre-op Assessment    I have reviewed the Patient Summary Reports.     I have reviewed the Nursing Notes. I have reviewed the NPO Status.   I have reviewed the Medications.     Review of Systems  Anesthesia Hx:  Denies Family Hx of Anesthesia complications.   Denies Personal Hx of Anesthesia complications.   Social:  Non-Smoker, No Alcohol Use    Hematology/Oncology:  Hematology Normal   Oncology Normal     EENT/Dental:   Right upper permanent bridge. Left upper bicuspid and and 1st premolar broken.   Cardiovascular:   Hypertension Past MI CAD  CABG/stent Dysrhythmias (lidocaine infusion for recent non-sustained VT)  Angina 60% EF, mild MR   Pulmonary:  Pulmonary Normal    Renal/:  Renal/ Normal     Hepatic/GI:   PUD, (1980s) GERD ( yesterday, resolved with medication)    Musculoskeletal:  Musculoskeletal Normal    Neurological:  Neurology Normal    Endocrine:  Endocrine Normal    Dermatological:   psoriasis   Psych:  Psychiatric Normal           Physical Exam  General: Well nourished, Cooperative, Alert and Oriented    Airway:  Mallampati: II / I  Mouth Opening: Normal  TM Distance: > 6 cm  Tongue: Normal  Neck ROM: Normal ROM    Dental:  Intact    Chest/Lungs:  Clear to auscultation, Normal Respiratory Rate    Heart:  Rate: Normal  Rhythm: Regular Rhythm  Sounds: Normal        Anesthesia Plan  Type of Anesthesia, risks & benefits discussed:    Anesthesia Type: Gen ETT  Intra-op Monitoring Plan: Standard ASA Monitors, Art Line, Central Line, PA and CO  Post Op Pain Control Plan: multimodal analgesia  Induction:  IV  Airway Plan: , Post-Induction  Informed Consent: Informed consent signed with the Patient and all parties understand the risks and agree with anesthesia plan.  All questions answered. Patient consented to blood products? Yes  ASA Score:  4    Ready For Surgery From Anesthesia Perspective.     .

## 2022-07-08 NOTE — ANESTHESIA PROCEDURE NOTES
[place] Fadumo Line    Diagnosis: CAD  Patient location during procedure: ICU  Procedure start time: 7/8/2022 7:35 AM  Timeout: 7/8/2022 7:35 AM  Procedure end time: 7/8/2022 8:00 AM    Staffing  Authorizing Provider: Kike Mercado MD  Performing Provider: Kike Mercado MD    Anesthesiologist was present at the time of the procedure.  Preanesthetic Checklist  Completed: patient identified, IV checked, site marked, risks and benefits discussed, surgical consent, monitors and equipment checked, pre-op evaluation, timeout performed and anesthesia consent given  [place] Fadumo Line  Skin Prep: chlorhexidine gluconate  Local Infiltration: lidocaine  Location: right,  internal jugular vein  Vessel Caliber: medium, patent, compressibility normal  Introducer: 9 Fr single lumen,   Device: standard thermodilution catheter   Catheter Size: 7 Fr  Catheter placement by yes. Heme positive aspiration all ports. PAC floated with balloon up not wedgedSterile sheath used  Locked at: 40 cm.Insertion Attempts: 2  Indication: hemodynamic monitoring  Ultrasound Guidance  Needle advanced into vessel with real time Ultrasound guidance.  Guidewire confirmed in vessel.  Sterile sheath used.  Assessment  Central Line Bundle Protocol followed. Hand hygiene before procedure, surgical cap worn, surgical mask worn, sterile surgical gloves worn, large sterile drape used.  Verification: chest x-ray and blood return  Dressing: sutured in place and taped and tegaderm  Patient: Tolerated Well  Additional Notes  No indication of arterial puncture at any time while placing introducer.  At end of procedure, balloon deflated and good PA waveform.

## 2022-07-09 ENCOUNTER — CLINICAL SUPPORT (OUTPATIENT)
Dept: CARDIOLOGY | Facility: HOSPITAL | Age: 62
DRG: 231 | End: 2022-07-09
Attending: INTERNAL MEDICINE
Payer: COMMERCIAL

## 2022-07-09 VITALS — HEIGHT: 69 IN | WEIGHT: 212 LBS | BODY MASS INDEX: 31.4 KG/M2

## 2022-07-09 LAB
ALLENS TEST: ABNORMAL
ANION GAP SERPL CALC-SCNC: 5 MMOL/L (ref 8–16)
ANION GAP SERPL CALC-SCNC: 7 MMOL/L (ref 8–16)
BASOPHILS # BLD AUTO: 0 K/UL (ref 0–0.2)
BASOPHILS NFR BLD: 0 % (ref 0–1.9)
BUN SERPL-MCNC: 16 MG/DL (ref 8–23)
BUN SERPL-MCNC: 16 MG/DL (ref 8–23)
CALCIUM SERPL-MCNC: 7.6 MG/DL (ref 8.7–10.5)
CALCIUM SERPL-MCNC: 7.7 MG/DL (ref 8.7–10.5)
CHLORIDE SERPL-SCNC: 112 MMOL/L (ref 95–110)
CHLORIDE SERPL-SCNC: 115 MMOL/L (ref 95–110)
CO2 SERPL-SCNC: 22 MMOL/L (ref 23–29)
CO2 SERPL-SCNC: 22 MMOL/L (ref 23–29)
CREAT SERPL-MCNC: 1 MG/DL (ref 0.5–1.4)
CREAT SERPL-MCNC: 1.2 MG/DL (ref 0.5–1.4)
DELSYS: ABNORMAL
DIFFERENTIAL METHOD: ABNORMAL
EOSINOPHIL # BLD AUTO: 0 K/UL (ref 0–0.5)
EOSINOPHIL NFR BLD: 0 % (ref 0–8)
ERYTHROCYTE [DISTWIDTH] IN BLOOD BY AUTOMATED COUNT: 13.5 % (ref 11.5–14.5)
ERYTHROCYTE [SEDIMENTATION RATE] IN BLOOD BY WESTERGREN METHOD: 14 MM/H
ERYTHROCYTE [SEDIMENTATION RATE] IN BLOOD BY WESTERGREN METHOD: 20 MM/H
EST. GFR  (AFRICAN AMERICAN): >60 ML/MIN/1.73 M^2
EST. GFR  (AFRICAN AMERICAN): >60 ML/MIN/1.73 M^2
EST. GFR  (NON AFRICAN AMERICAN): >60 ML/MIN/1.73 M^2
EST. GFR  (NON AFRICAN AMERICAN): >60 ML/MIN/1.73 M^2
FIO2: 40
FIO2: 55
FIO2: 55
FLOW: 50
GLUCOSE SERPL-MCNC: 104 MG/DL (ref 70–110)
GLUCOSE SERPL-MCNC: 127 MG/DL (ref 70–110)
GLUCOSE SERPL-MCNC: 129 MG/DL (ref 70–110)
GLUCOSE SERPL-MCNC: 134 MG/DL (ref 70–110)
GLUCOSE SERPL-MCNC: 139 MG/DL (ref 70–110)
GLUCOSE SERPL-MCNC: 145 MG/DL (ref 70–110)
GLUCOSE SERPL-MCNC: 189 MG/DL (ref 70–110)
GLUCOSE SERPL-MCNC: 204 MG/DL (ref 70–110)
GLUCOSE SERPL-MCNC: 220 MG/DL (ref 70–110)
GLUCOSE SERPL-MCNC: 222 MG/DL (ref 70–110)
GLUCOSE SERPL-MCNC: 99 MG/DL (ref 70–110)
HCO3 UR-SCNC: 21 MMOL/L (ref 24–28)
HCO3 UR-SCNC: 24.1 MMOL/L (ref 24–28)
HCO3 UR-SCNC: 24.9 MMOL/L (ref 24–28)
HCO3 UR-SCNC: 25.1 MMOL/L (ref 24–28)
HCT VFR BLD AUTO: 36.2 % (ref 40–54)
HCT VFR BLD CALC: 33 %PCV (ref 36–54)
HCT VFR BLD CALC: 33 %PCV (ref 36–54)
HCT VFR BLD CALC: 35 %PCV (ref 36–54)
HCT VFR BLD CALC: 36 %PCV (ref 36–54)
HGB BLD-MCNC: 12.2 G/DL (ref 14–18)
IMM GRANULOCYTES # BLD AUTO: 0.04 K/UL (ref 0–0.04)
IMM GRANULOCYTES NFR BLD AUTO: 0.3 % (ref 0–0.5)
LYMPHOCYTES # BLD AUTO: 0.7 K/UL (ref 1–4.8)
LYMPHOCYTES NFR BLD: 5.1 % (ref 18–48)
MAGNESIUM SERPL-MCNC: 1.9 MG/DL (ref 1.6–2.6)
MAGNESIUM SERPL-MCNC: 1.9 MG/DL (ref 1.6–2.6)
MCH RBC QN AUTO: 29.1 PG (ref 27–31)
MCHC RBC AUTO-ENTMCNC: 33.7 G/DL (ref 32–36)
MCV RBC AUTO: 86 FL (ref 82–98)
MODE: ABNORMAL
MONOCYTES # BLD AUTO: 0.6 K/UL (ref 0.3–1)
MONOCYTES NFR BLD: 4.1 % (ref 4–15)
NEUTROPHILS # BLD AUTO: 12.4 K/UL (ref 1.8–7.7)
NEUTROPHILS NFR BLD: 90.5 % (ref 38–73)
NRBC BLD-RTO: 0 /100 WBC
PCO2 BLDA: 27.5 MMHG (ref 35–45)
PCO2 BLDA: 32.7 MMHG (ref 35–45)
PCO2 BLDA: 35.8 MMHG (ref 35–45)
PCO2 BLDA: 38.2 MMHG (ref 35–45)
PEEP: 5
PH SMN: 7.41 [PH] (ref 7.35–7.45)
PH SMN: 7.45 [PH] (ref 7.35–7.45)
PH SMN: 7.49 [PH] (ref 7.35–7.45)
PH SMN: 7.49 [PH] (ref 7.35–7.45)
PHOSPHATE SERPL-MCNC: 2 MG/DL (ref 2.7–4.5)
PHOSPHATE SERPL-MCNC: 2 MG/DL (ref 2.7–4.5)
PIP: 23
PLATELET # BLD AUTO: 162 K/UL (ref 150–450)
PMV BLD AUTO: 11.7 FL (ref 9.2–12.9)
PO2 BLDA: 60 MMHG (ref 80–100)
PO2 BLDA: 69 MMHG (ref 80–100)
PO2 BLDA: 69 MMHG (ref 80–100)
PO2 BLDA: 73 MMHG (ref 80–100)
POC BE: -1 MMOL/L
POC BE: -2 MMOL/L
POC BE: 1 MMOL/L
POC BE: 2 MMOL/L
POC IONIZED CALCIUM: 1.12 MMOL/L (ref 1.06–1.42)
POC IONIZED CALCIUM: 1.13 MMOL/L (ref 1.06–1.42)
POC IONIZED CALCIUM: 1.13 MMOL/L (ref 1.06–1.42)
POC IONIZED CALCIUM: 1.16 MMOL/L (ref 1.06–1.42)
POC PCO2 TEMP: 28.4 MMHG
POC PCO2 TEMP: 33 MMHG
POC PCO2 TEMP: 36.3 MMHG
POC PCO2 TEMP: 38.7 MMHG
POC PH TEMP: 7.4
POC PH TEMP: 7.45
POC PH TEMP: 7.48
POC PH TEMP: 7.49
POC PO2 TEMP: 61 MMHG
POC PO2 TEMP: 71 MMHG
POC PO2 TEMP: 73 MMHG
POC PO2 TEMP: 74 MMHG
POC SATURATED O2: 93 % (ref 95–100)
POC SATURATED O2: 95 % (ref 95–100)
POC TCO2: 22 MMOL/L (ref 23–27)
POC TCO2: 25 MMOL/L (ref 23–27)
POC TCO2: 26 MMOL/L (ref 23–27)
POC TCO2: 26 MMOL/L (ref 23–27)
POC TEMPERATURE: ABNORMAL
POTASSIUM BLD-SCNC: 3.9 MMOL/L (ref 3.5–5.1)
POTASSIUM BLD-SCNC: 3.9 MMOL/L (ref 3.5–5.1)
POTASSIUM BLD-SCNC: 4 MMOL/L (ref 3.5–5.1)
POTASSIUM BLD-SCNC: 4.1 MMOL/L (ref 3.5–5.1)
POTASSIUM SERPL-SCNC: 3.9 MMOL/L (ref 3.5–5.1)
POTASSIUM SERPL-SCNC: 3.9 MMOL/L (ref 3.5–5.1)
PS: 10
RBC # BLD AUTO: 4.19 M/UL (ref 4.6–6.2)
SAMPLE: ABNORMAL
SITE: ABNORMAL
SODIUM BLD-SCNC: 142 MMOL/L (ref 136–145)
SODIUM BLD-SCNC: 143 MMOL/L (ref 136–145)
SODIUM SERPL-SCNC: 139 MMOL/L (ref 136–145)
SODIUM SERPL-SCNC: 144 MMOL/L (ref 136–145)
SP02: 92
SP02: 96
SPONT RATE: 14
TROPONIN I SERPL DL<=0.01 NG/ML-MCNC: 20.88 NG/ML
TROPONIN I SERPL DL<=0.01 NG/ML-MCNC: 62.26 NG/ML
TROPONIN I SERPL DL<=0.01 NG/ML-MCNC: 62.27 NG/ML
VT: 500
VT: 600
WBC # BLD AUTO: 13.72 K/UL (ref 3.9–12.7)

## 2022-07-09 PROCEDURE — 99900035 HC TECH TIME PER 15 MIN (STAT)

## 2022-07-09 PROCEDURE — 99024 POSTOP FOLLOW-UP VISIT: CPT | Mod: ,,, | Performed by: THORACIC SURGERY (CARDIOTHORACIC VASCULAR SURGERY)

## 2022-07-09 PROCEDURE — 63600175 PHARM REV CODE 636 W HCPCS: Performed by: INTERNAL MEDICINE

## 2022-07-09 PROCEDURE — 84295 ASSAY OF SERUM SODIUM: CPT

## 2022-07-09 PROCEDURE — 94799 UNLISTED PULMONARY SVC/PX: CPT

## 2022-07-09 PROCEDURE — 25000003 PHARM REV CODE 250: Performed by: INTERNAL MEDICINE

## 2022-07-09 PROCEDURE — 93010 ELECTROCARDIOGRAM REPORT: CPT | Mod: ,,, | Performed by: INTERNAL MEDICINE

## 2022-07-09 PROCEDURE — 63600175 PHARM REV CODE 636 W HCPCS: Performed by: THORACIC SURGERY (CARDIOTHORACIC VASCULAR SURGERY)

## 2022-07-09 PROCEDURE — 83735 ASSAY OF MAGNESIUM: CPT | Performed by: INTERNAL MEDICINE

## 2022-07-09 PROCEDURE — 99233 PR SUBSEQUENT HOSPITAL CARE,LEVL III: ICD-10-PCS | Mod: ,,, | Performed by: INTERNAL MEDICINE

## 2022-07-09 PROCEDURE — 93308 ECHO (CUPID ONLY): ICD-10-PCS | Mod: 26,,, | Performed by: INTERNAL MEDICINE

## 2022-07-09 PROCEDURE — 93308 TTE F-UP OR LMTD: CPT | Mod: 26,,, | Performed by: INTERNAL MEDICINE

## 2022-07-09 PROCEDURE — 82330 ASSAY OF CALCIUM: CPT

## 2022-07-09 PROCEDURE — 25000003 PHARM REV CODE 250: Performed by: THORACIC SURGERY (CARDIOTHORACIC VASCULAR SURGERY)

## 2022-07-09 PROCEDURE — 37799 UNLISTED PX VASCULAR SURGERY: CPT

## 2022-07-09 PROCEDURE — 94761 N-INVAS EAR/PLS OXIMETRY MLT: CPT

## 2022-07-09 PROCEDURE — 82803 BLOOD GASES ANY COMBINATION: CPT

## 2022-07-09 PROCEDURE — 84132 ASSAY OF SERUM POTASSIUM: CPT

## 2022-07-09 PROCEDURE — 85025 COMPLETE CBC W/AUTO DIFF WBC: CPT | Performed by: INTERNAL MEDICINE

## 2022-07-09 PROCEDURE — 94003 VENT MGMT INPAT SUBQ DAY: CPT

## 2022-07-09 PROCEDURE — 20000000 HC ICU ROOM

## 2022-07-09 PROCEDURE — 84100 ASSAY OF PHOSPHORUS: CPT | Performed by: INTERNAL MEDICINE

## 2022-07-09 PROCEDURE — 99900026 HC AIRWAY MAINTENANCE (STAT)

## 2022-07-09 PROCEDURE — C9248 INJ, CLEVIDIPINE BUTYRATE: HCPCS | Mod: JG | Performed by: THORACIC SURGERY (CARDIOTHORACIC VASCULAR SURGERY)

## 2022-07-09 PROCEDURE — 99233 SBSQ HOSP IP/OBS HIGH 50: CPT | Mod: ,,, | Performed by: INTERNAL MEDICINE

## 2022-07-09 PROCEDURE — 93005 ELECTROCARDIOGRAM TRACING: CPT | Performed by: INTERNAL MEDICINE

## 2022-07-09 PROCEDURE — 36415 COLL VENOUS BLD VENIPUNCTURE: CPT | Performed by: INTERNAL MEDICINE

## 2022-07-09 PROCEDURE — 27000221 HC OXYGEN, UP TO 24 HOURS

## 2022-07-09 PROCEDURE — 93010 EKG 12-LEAD: ICD-10-PCS | Mod: ,,, | Performed by: INTERNAL MEDICINE

## 2022-07-09 PROCEDURE — 80048 BASIC METABOLIC PNL TOTAL CA: CPT | Performed by: INTERNAL MEDICINE

## 2022-07-09 PROCEDURE — 85014 HEMATOCRIT: CPT

## 2022-07-09 PROCEDURE — 63600175 PHARM REV CODE 636 W HCPCS: Mod: JG | Performed by: THORACIC SURGERY (CARDIOTHORACIC VASCULAR SURGERY)

## 2022-07-09 PROCEDURE — 93308 TTE F-UP OR LMTD: CPT

## 2022-07-09 PROCEDURE — 84484 ASSAY OF TROPONIN QUANT: CPT | Performed by: INTERNAL MEDICINE

## 2022-07-09 PROCEDURE — 99024 PR POST-OP FOLLOW-UP VISIT: ICD-10-PCS | Mod: ,,, | Performed by: THORACIC SURGERY (CARDIOTHORACIC VASCULAR SURGERY)

## 2022-07-09 RX ORDER — PROPOFOL 10 MG/ML
0-50 INJECTION, EMULSION INTRAVENOUS CONTINUOUS
Status: DISCONTINUED | OUTPATIENT
Start: 2022-07-09 | End: 2022-07-12

## 2022-07-09 RX ORDER — METOPROLOL TARTRATE 1 MG/ML
5 INJECTION, SOLUTION INTRAVENOUS ONCE
Status: COMPLETED | OUTPATIENT
Start: 2022-07-09 | End: 2022-07-09

## 2022-07-09 RX ORDER — SODIUM CHLORIDE 9 MG/ML
INJECTION, SOLUTION INTRAVENOUS ONCE
Status: CANCELLED | OUTPATIENT
Start: 2022-07-09 | End: 2022-07-09

## 2022-07-09 RX ORDER — ATROPINE SULFATE 0.1 MG/ML
INJECTION INTRAVENOUS
Status: DISCONTINUED
Start: 2022-07-09 | End: 2022-07-09 | Stop reason: WASHOUT

## 2022-07-09 RX ORDER — EPINEPHRINE 0.1 MG/ML
INJECTION INTRAVENOUS
Status: DISCONTINUED
Start: 2022-07-09 | End: 2022-07-09 | Stop reason: WASHOUT

## 2022-07-09 RX ORDER — NAPROXEN SODIUM 220 MG/1
81 TABLET, FILM COATED ORAL DAILY
Status: DISCONTINUED | OUTPATIENT
Start: 2022-07-09 | End: 2022-07-18

## 2022-07-09 RX ORDER — SODIUM CHLORIDE 0.9 % (FLUSH) 0.9 %
10 SYRINGE (ML) INJECTION
Status: CANCELLED | OUTPATIENT
Start: 2022-07-09

## 2022-07-09 RX ORDER — DIPHENHYDRAMINE HCL 25 MG
50 CAPSULE ORAL
Status: CANCELLED | OUTPATIENT
Start: 2022-07-09

## 2022-07-09 RX ORDER — NITROGLYCERIN 20 MG/100ML
0-400 INJECTION INTRAVENOUS CONTINUOUS
Status: DISCONTINUED | OUTPATIENT
Start: 2022-07-09 | End: 2022-07-12

## 2022-07-09 RX ORDER — INSULIN ASPART 100 [IU]/ML
1-12 INJECTION, SOLUTION INTRAVENOUS; SUBCUTANEOUS
Status: DISCONTINUED | OUTPATIENT
Start: 2022-07-09 | End: 2022-07-19 | Stop reason: HOSPADM

## 2022-07-09 RX ADMIN — AMIODARONE HYDROCHLORIDE 1 MG/MIN: 1.8 INJECTION, SOLUTION INTRAVENOUS at 05:07

## 2022-07-09 RX ADMIN — INSULIN ASPART 4 UNITS: 100 INJECTION, SOLUTION INTRAVENOUS; SUBCUTANEOUS at 12:07

## 2022-07-09 RX ADMIN — SODIUM PHOSPHATE, MONOBASIC, MONOHYDRATE 20.01 MMOL: 276; 142 INJECTION, SOLUTION INTRAVENOUS at 05:07

## 2022-07-09 RX ADMIN — INSULIN ASPART 4 UNITS: 100 INJECTION, SOLUTION INTRAVENOUS; SUBCUTANEOUS at 05:07

## 2022-07-09 RX ADMIN — NITROGLYCERIN 40 MCG/MIN: 20 INJECTION INTRAVENOUS at 11:07

## 2022-07-09 RX ADMIN — DEXTROSE 2 G: 50 INJECTION, SOLUTION INTRAVENOUS at 03:07

## 2022-07-09 RX ADMIN — LOSARTAN POTASSIUM 50 MG: 50 TABLET, FILM COATED ORAL at 09:07

## 2022-07-09 RX ADMIN — CLEVIPIDINE 3 MG/HR: 0.5 EMULSION INTRAVENOUS at 09:07

## 2022-07-09 RX ADMIN — ASPIRIN 81 MG CHEWABLE TABLET 81 MG: 81 TABLET CHEWABLE at 09:07

## 2022-07-09 RX ADMIN — PROPOFOL 10 MCG/KG/MIN: 10 INJECTION, EMULSION INTRAVENOUS at 01:07

## 2022-07-09 RX ADMIN — ATORVASTATIN CALCIUM 80 MG: 40 TABLET, FILM COATED ORAL at 09:07

## 2022-07-09 RX ADMIN — PROPOFOL 25 MCG/KG/MIN: 10 INJECTION, EMULSION INTRAVENOUS at 08:07

## 2022-07-09 RX ADMIN — DEXTROSE 2 G: 50 INJECTION, SOLUTION INTRAVENOUS at 09:07

## 2022-07-09 RX ADMIN — METOPROLOL TARTRATE 5 MG: 5 INJECTION, SOLUTION INTRAVENOUS at 10:07

## 2022-07-09 RX ADMIN — METOPROLOL TARTRATE 25 MG: 25 TABLET, FILM COATED ORAL at 09:07

## 2022-07-09 RX ADMIN — AMIODARONE HYDROCHLORIDE 1 MG/MIN: 1.8 INJECTION, SOLUTION INTRAVENOUS at 11:07

## 2022-07-09 NOTE — PROGRESS NOTES
"Matteo Fraire is a 61 y.o. male patient.    Chief Complaint   Patient presents with    Chest Pain     Started about 7 PM.  Pt has cardiac procedures in 2005 and 2011.  Pt took two 325 Aspirin at home prior to arrival to ED       Active Hospital Problems    Diagnosis  POA    *STEMI (ST elevation myocardial infarction) [I21.3]  Yes    NSVT (nonsustained ventricular tachycardia) [I47.2]  No    Coronary artery disease involving coronary bypass graft of native heart with angina pectoris [I25.709]  Yes      Resolved Hospital Problems   No resolved problems to display.       Temp: 99.5 °F (37.5 °C) (07/09/22 0601)  Pulse: 91 (07/09/22 0920)  Resp: (!) 0 (07/09/22 0800)  BP: 131/71 (07/09/22 0920)  SpO2: (!) 94 % (07/09/22 0800)  Weight: 96.4 kg (212 lb 8.4 oz) (07/07/22 0520)  Height: 5' 9" (175.3 cm) (06/27/22 2254)    CBC:  Recent Labs   Lab 07/08/22  1520 07/08/22  1606 07/08/22  1937 07/08/22 2222 07/08/22 2348 07/09/22  0400 07/09/22  0502 07/09/22  1047   WBC 21.37*  --  12.94*  --  11.15 13.72*  --   --    HGB 10.7*  --  12.3*  --  12.2* 12.2*  --   --    HCT 32.9*   < > 37.3*   < > 36.4* 36.2* 36 35*     --  171  --  158 162  --   --    MCV 90  --  87  --  87 86  --   --     < > = values in this interval not displayed.     BMP:  Recent Labs   Lab 07/08/22  1520 07/08/22  1937 07/08/22 2348 07/09/22  0400   * 150* 139* 134*    141 144 139   K 3.3*  3.3* 4.1 3.9 3.9   * 111* 115* 112*   CO2 18* 22* 22* 22*   BUN 17 17 16 16   CREATININE 1.1 1.1 1.2 1.0   CALCIUM 7.0* 7.0* 7.6* 7.7*   MG 2.4 2.0 1.9 1.9   PHOS 1.7*  --  2.0* 2.0*     LFTs:  No results for input(s): ALT, AST, ALKPHOS, BILITOT, PROT, ALBUMIN in the last 72 hours.  COAGS:  Recent Labs   Lab 07/08/22  0327   APTT 40.8*     CARDIAC MARKERS:  Recent Labs   Lab 07/08/22  1937 07/08/22  2348 07/09/22  0400 07/09/22  0810   TROPONINI 2.533* 20.881* 62.273* 62.262*       Output (mL)  Urine: 475 mL    Imaging Results          " "X-Ray Chest AP Portable (Final result)  Result time 06/28/22 06:13:22    Final result by Waldemar De La Cruz MD (06/28/22 06:13:22)                 Narrative:    Reason: chest pain    FINDINGS:    PA and lateral chest with comparison chest x-ray November 20, 2019 show normal cardiomediastinal silhouette.  Mild bilateral perihilar hazy ill-defined opacities are noted. Pulmonary vasculature is normal. No acute osseous abnormality.    IMPRESSION:    Mild bilateral perihilar hazy ill-defined opacities could reflect mild pulmonary edema or infection in the proper clinical settings.    Electronically signed by:  Waldemar De La Cruz DO  6/28/2022 6:13 AM CDT Workstation: [id number]                              Subjective:  Symptoms:  (Intubated and sedated    Patient developed ST elevations postoperatively which improved after calcium channel blocker infusions.  Troponins elevated as well over serial evaluations.  He has however remained stable.).    Diet:  NPO.    Activity level: Activity impairment: Bedrest.        Objective:  General Appearance:  In no acute distress (Intubated and sedated).    Vital signs: (most recent): Blood pressure 131/71, pulse 91, temperature 99.5 °F (37.5 °C), temperature source Core ([place]-Fadumo), resp. rate (!) 0, height 5' 9" (1.753 m), weight 96.4 kg (212 lb 8.4 oz), SpO2 (!) 94 %.  Vital signs are normal.    Lungs:  Breath sounds clear to auscultation.    Heart: Normal rate.  Regular rhythm.    Chest: (Expected output from mediastinal and pleural drains.  Chest x-ray with good lung expansion and normal cardiac silhouette)      Assessment:  (Post redo CABG.  ST elevations on electrocardiogram which have improved with calcium channel blocker infusion.  Elevated troponins related to this.).     Plan:   Consults: cardiology.  Plan from Cardiology at this time is to initiate nitroglycerin infusion along with a calcium channel blocker infusion.  Continued care otherwise.  No contraindication from surgical " standpoint for reduction of sedation and extubation from mechanical ventilation.       Omar Ayon MD  7/9/2022

## 2022-07-09 NOTE — PROGRESS NOTES
Postop day 1. Patient status post redo coronary artery bypass surgery.  I was informed that he had ST changes and elevations in troponins.  Patient remains intubated intentionally since he may need to go to the cath lab for further interventions.  We will follow patient again tomorrow.

## 2022-07-09 NOTE — PROGRESS NOTES
[facility]  Department of Cardiology  Progress Note    PATIENT NAME: Matteo Fraire  MRN: [id number]  TODAY'S DATE: 07/09/2022  ADMIT DATE: 6/27/2022    SUBJECTIVE     PRINCIPLE PROBLEM: STEMI (ST elevation myocardial infarction)    INTERVAL HISTORY:    7/9/2022   Patient developed ST elevations overnight post CABG. Troponins were checked and are still rising at 62. He remains intubated and sedated. BP stable on cleviprex gtt. ST elevations have improved.     7/7/2022  Patient had 42 beat run of Vtach this morning, asymp[tomatic. No further chest pain. VSS.     7/6/2022  PATIENT FOR CABG THIS Friday. Had some mild chest pains after walking for 40 minutes this morning. Has not required nitro yet. VSS.     7/5/2022  Patient still waiting for CABG, ate breakfast this AM. No chest pain events. VSS.     7/4/2022  Patient is awake alert lying comfortably in bed not in any acute distress.  Patient had walked in the hallway 4 times already.  Denies any chest pain or tightness or heaviness his breathing is good denies any shortness of breath.  Awaiting for CABG.    Review of patient's allergies indicates:  No Known Allergies    REVIEW OF SYSTEMS  Unable to obtain     OBJECTIVE     VITAL SIGNS (Most Recent)  Temp: 99.5 °F (37.5 °C) (07/09/22 0601)  Pulse: 80 (07/09/22 1045)  Resp: (!) 0 (07/09/22 1045)  BP: 131/71 (07/09/22 0920)  SpO2: 96 % (07/09/22 1045)    VENTILATION STATUS  Resp: (!) 0 (07/09/22 1045)  SpO2: 96 % (07/09/22 1045)  Vent Mode: SIMV  Oxygen Concentration (%):  [49-55] 55  Resp Rate Total:  [13 br/min-30 br/min] 14 br/min  Vt Set:  [500 mL-600 mL] 600 mL  PEEP/CPAP:  [5 cmH20] 5 cmH20  Pressure Support:  [10 cmH20] 10 cmH20  Mean Airway Pressure:  [9.6 gzX43-93 cmH20] 11 cmH20    I & O (Last 24H):    Intake/Output Summary (Last 24 hours) at 7/9/2022 1111  Last data filed at 7/9/2022 0601  Gross per 24 hour   Intake 5259.27 ml   Output 2581 ml   Net 2678.27 ml       WEIGHTS  Wt Readings from Last  1 Encounters:   07/07/22 0520 96.4 kg (212 lb 8.4 oz)   07/05/22 0301 96.9 kg (213 lb 10 oz)   07/01/22 0300 97.3 kg (214 lb 8.1 oz)   06/30/22 0300 100.5 kg (221 lb 9.6 oz)   06/27/22 2254 101.1 kg (222 lb 14.2 oz)   06/27/22 1954 99.8 kg (220 lb)       PHYSICAL EXAM  CONSTITUTIONAL: intubated and sedated   NECK: no carotid bruit, no JVD  LUNGS: BL mechanical breath sounds   CHEST WALL: tubes and wires in place   HEART: regular rate and rhythm, S1, S2 normal, no murmur, click, rub or gallop   ABDOMEN: soft, non-tender; bowel sounds normal; no masses,  no organomegaly  EXTREMITIES: Extremities normal, no edema    SCHEDULED MEDS:   aspirin  81 mg Oral Daily    atorvastatin  80 mg Oral Daily    docusate sodium  100 mg Oral BID    losartan  50 mg Oral Daily    metoprolol tartrate  25 mg Oral BID    tamsulosin  0.4 mg Oral Daily       CONTINUOUS INFUSIONS:   sodium chloride 0.45% 50 mL/hr at 07/08/22 1618    clevidipine 3 mg/hr (07/09/22 0916)    insulin regular 1 units/mL infusion orderable (CTS POST-OP) Stopped (07/09/22 0310)    propofoL 25 mcg/kg/min (07/09/22 0800)       PRN MEDS:acetaminophen, albumin human 5%, ALPRAZolam, calcium gluconate IVPB, calcium gluconate IVPB, calcium gluconate IVPB, dextrose 10%, dextrose 10%, HYDROcodone-acetaminophen, insulin aspart U-100, magnesium sulfate IVPB, magnesium sulfate IVPB, melatonin, midazolam, midazolam, morphine, morphine, mupirocin, ondansetron, potassium chloride in water **AND** potassium chloride in water **AND** potassium chloride in water, sodium phosphate IVPB, sodium phosphate IVPB, sodium phosphate IVPB    LABS AND DIAGNOSTICS     CBC LAST 3 DAYS  Recent Labs   Lab 07/08/22  1937 07/08/22  2222 07/08/22  2348 07/09/22  0400 07/09/22  0502 07/09/22  1047   WBC 12.94*  --  11.15 13.72*  --   --    RBC 4.29*  --  4.19* 4.19*  --   --    HGB 12.3*  --  12.2* 12.2*  --   --    HCT 37.3*   < > 36.4* 36.2* 36 35*   MCV 87  --  87 86  --   --    MCH 28.7  --   29.1 29.1  --   --    MCHC 33.0  --  33.5 33.7  --   --    RDW 13.4  --  13.5 13.5  --   --      --  158 162  --   --    MPV 11.3  --  11.6 11.7  --   --    GRAN 91.3*  11.8*  --  94.2*  10.5* 90.5*  12.4*  --   --    LYMPH 4.0*  0.5*  --  3.0*  0.3* 5.1*  0.7*  --   --    MONO 4.1  0.5  --  2.3*  0.3 4.1  0.6  --   --    BASO 0.01  --  0.01 0.00  --   --    NRBC 0  --  0 0  --   --     < > = values in this interval not displayed.       COAGULATION LAST 3 DAYS  Recent Labs   Lab 07/08/22  0327   APTT 40.8*       CHEMISTRY LAST 3 DAYS  Recent Labs   Lab 07/08/22 1937 07/08/22 2222 07/08/22 2348 07/09/22  0400 07/09/22  0502 07/09/22  1047     --  144 139  --   --    K 4.1  --  3.9 3.9  --   --    *  --  115* 112*  --   --    CO2 22*  --  22* 22*  --   --    ANIONGAP 8  --  7* 5*  --   --    BUN 17  --  16 16  --   --    CREATININE 1.1  --  1.2 1.0  --   --    *  --  139* 134*  --   --    CALCIUM 7.0*  --  7.6* 7.7*  --   --    PH  --  7.426  --   --  7.492* 7.408   MG 2.0  --  1.9 1.9  --   --        CARDIAC PROFILE LAST 3 DAYS  Recent Labs   Lab 07/08/22 2348 07/09/22  0400 07/09/22  0810   TROPONINI 20.881* 62.273* 62.262*       ENDOCRINE LAST 3 DAYS  No results for input(s): TSH, PROCAL in the last 168 hours.    LAST ARTERIAL BLOOD GAS  ABG  Recent Labs   Lab 07/09/22  1047   PH 7.408   PO2 73*   PCO2 38.2   HCO3 24.1   BE -1       LAST 7 DAYS MICROBIOLOGY   Microbiology Results (last 7 days)     ** No results found for the last 168 hours. **          MOST RECENT IMAGING  X-Ray Chest AP Portable  Examination: 1V chest    CLINICAL HISTORY: Postop    COMPARISON: 7/8/2022    TECHNIQUE: One view chest.    FINDINGS: Cardiac silhouette remains prominent in size similar in capacity as compared to previous. [place] catheter tip projecting just prior to the right pulmonary outflow tract and the mediastinal drains remain stable in position. Endotracheal tube remains in optimal  position at the medial ends of clavicles and the NG tube passes in the stomach. Left-sided thoracostomy tube remains in stable position. No significant pleural effusion. No evidence of mass lesion. Homogeneous airlessness in left lung base is noted. Regional skeleton appears normal.    IMPRESSION:  1. Appropriate position of all cerebral nodules.  2. Stable chest findings.    Electronically signed by:  Alton Alvarez MD  7/9/2022 7:38 AM CDT Workstation: [id number]      [facility] Corporation[facility]Handle  Results for orders placed during the hospital encounter of 06/27/22    Echo Saline Bubble? No    Interpretation Summary  · The left ventricle is normal in size with mild predominantly mid septal mild asymmetric hypertrophy eccentric hypertrophy and normal systolic function.  · The estimated ejection fraction is 60%.  · Indeterminate left ventricular diastolic function.  · Normal right ventricular size with normal right ventricular systolic function.  · Mild left atrial enlargement.  · Normal central venous pressure (3 mmHg).  · The estimated PA systolic pressure is 19 mmHg.  · Mild mitral regurgitation.      CURRENT/PREVIOUS VISIT EKG  Results for orders placed or performed during the hospital encounter of 06/27/22   EKG 12-lead    Collection Time: 07/09/22 12:41 AM    Narrative    Test Reason : I21.3,    Vent. Rate : 088 BPM     Atrial Rate : 088 BPM     P-R Int : 232 ms          QRS Dur : 094 ms      QT Int : 384 ms       P-R-T Axes : 043 101 132 degrees     QTc Int : 464 ms    Sinus rhythm with 1st degree A-V block with occasional Premature  ventricular complexes  Possible Inferior infarct ,age undetermined  Anterolateral infarct (cited on or before 27-JUN-2022)  Abnormal ECG  When compared with ECG of 08-JUL-2022 16:38,  Borderline criteria for Inferior infarct are now Present  Serial changes of Anterior infarct Present    Referred By: [email or web address]   SELF           Confirmed By:        ASSESSMENT/PLAN:     Active Hospital Problems     Diagnosis    *STEMI (ST elevation myocardial infarction)    NSVT (nonsustained ventricular tachycardia)    Coronary artery disease involving coronary bypass graft of native heart with angina pectoris       ASSESSMENT & PLAN:   1. Multivessel coronary artery disease  2. ST-elevation myocardial infarction status post PTCA  3. Nonsustained ventricular tachycardia  4. Mixed hyperlipidemia      RECOMMENDATIONS:  Transition cleviprex gtt to Tridil infusion   Trend trop until down trending   Do not see need for any acute intervention today as anterior apical wall appeared hypokinetic on echo films before bypass   Recommend extubation today if ok with CTS         Fanny Amaya PA-C  [facility]  Department of Cardiology  Date of Service: 07/09/2022  9:53 AM

## 2022-07-09 NOTE — NURSING
7/8/22 20:46 - Dr. Stone notified about elevated troponin level. No new orders given at this time. Vital signs stable.    7/9/22 00:57 - Notified Dr. De Santiago (on call physician) of elevated troponin level. He instructed to call cardiologist on call and inform them. Orders to keep patient intubated and sedated. Patient vitals stable.     7/9/22 01:52 - Spoke with Dr. Munguia about troponin level. Orders to trend troponins and notify him if patient becomes hemodynamically unstable.    7/9/22 05:08 - Attempted to reach Dr. Munguia about increase in troponin level. Left message with answering service. Will try to call again.

## 2022-07-09 NOTE — PLAN OF CARE
07/08/22 2100   Patient Assessment/Suction   Level of Consciousness (AVPU) responds to voice   Respiratory Effort Unlabored   Expansion/Accessory Muscles/Retractions expansion symmetric   All Lung Fields Breath Sounds clear   Rhythm/Pattern, Respiratory depth regular;pattern regular   Cough Frequency infrequent   Cough Type good;nonproductive   PRE-TX-O2   O2 Device (Oxygen Therapy) ventilator   $ Is the patient on Low Flow Oxygen? Yes   Oxygen Concentration (%) 55   SpO2 97 %   Pulse Oximetry Type Intermittent   $ Pulse Oximetry - Multiple Charge Pulse Oximetry - Multiple   Pulse 84   Resp 20   Vent Select   Charged w/in last 24h YES   Preset Conventional Ventilator Settings   Vent Type    Ventilation Type VC   Vent Mode SIMV   Set Rate 20 BPM   Vt Set 600 mL   PEEP/CPAP 5 cmH20   Pressure Support 10 cmH20   Waveform RAMP   Peak Flow 50 L/min   Plateau Set/Insp. Hold (sec) 0   Insp Rise Time  50 %   Trigger Sensitivity Flow/I-Trigger 2.7 L/min   Patient Ventilator Parameters   Resp Rate Total 20 br/min   Peak Airway Pressure 23 cmH2O   Mean Airway Pressure 13 cmH20   Plateau Pressure 0 cmH20   Exhaled Vt 616 mL   Total Ve 12.3 mL   Spont Ve 0 L   I:E Ratio Measured 1:1.30   Conventional Ventilator Alarms   Resp Rate High Alarm 45 br/min   Press High Alarm 50 cmH2O   Apnea Rate 10   Apnea Volume (mL) 1 mL   Apnea Oxygen Concentration  100   Apnea Flow Rate (L/min) 74   T Apnea 20 sec(s)   Ready to Wean/Extubation Screen   FIO2<=50 (chart decimal) (!) 0.55   MV<16L (chart vol.) 12.3   PEEP <=8 (chart #) 5   Ready to Wean Parameters   F/VT Ratio<105 (RSBI) (!) 32.47

## 2022-07-09 NOTE — PROGRESS NOTES
[facility] Medicine  Progress Note    Patient name: Matteo Fraire  MRN: [id number]  Admit Date: 6/27/2022   LOS: 12 days     SUBJECTIVE:     Principal problem: STEMI (ST elevation myocardial infarction)    Interval History:  Status post three-vessel redo CABG yesterday.  Overnight patient was found to have ST elevations on monitor/EKGs after which serial troponins were monitored.  Elevated to greater than 60 ng/mL.  Cardiothoracic surgery and Cardiology were consulted overnight and patient underwent echocardiogram which revealed EF of around 40%.  Currently on nitroglycerin infusion.  Patient remains intubated and sedated.    Hospital course:  61-year-old with prior history of CAD status post PCI and CABG came with chest pain found to have STEMI status post balloon angioplasty to LAD - now status post redo 3 vessel CABG.  Hospital stay notable for episodes of NSVT after which he was initiated on lidocaine infusion.    Scheduled Meds:   aspirin  81 mg Oral Daily    atorvastatin  80 mg Oral Daily    atropine        docusate sodium  100 mg Oral BID    EPINEPHrine        losartan  50 mg Oral Daily    metoprolol tartrate  25 mg Oral BID    tamsulosin  0.4 mg Oral Daily     Continuous Infusions:   sodium chloride 0.45% 50 mL/hr at 07/08/22 1618    amiodarone in dextrose 5% 1 mg/min (07/09/22 1136)    amiodarone in dextrose 5%      insulin regular 1 units/mL infusion orderable (CTS POST-OP) Stopped (07/09/22 0310)    nitroGLYCERIN      propofoL 25 mcg/kg/min (07/09/22 0800)     PRN Meds:acetaminophen, albumin human 5%, ALPRAZolam, calcium gluconate IVPB, calcium gluconate IVPB, calcium gluconate IVPB, dextrose 50%, dextrose 50%, HYDROcodone-acetaminophen, insulin aspart U-100, magnesium sulfate IVPB, magnesium sulfate IVPB, melatonin, midazolam, midazolam, morphine, morphine, mupirocin, ondansetron, potassium chloride in water **AND** potassium chloride in water **AND** potassium  chloride in water, sodium phosphate IVPB, sodium phosphate IVPB, sodium phosphate IVPB    Review of patient's allergies indicates:  No Known Allergies    Review of Systems:  Unable to obtain as patient is sedated    OBJECTIVE:     Vital Signs (Most Recent)  Temp: 99.5 °F (37.5 °C) (07/09/22 0601)  Pulse: 82 (07/09/22 1100)  Resp: (!) 0 (07/09/22 1100)  BP: 131/71 (07/09/22 0920)  SpO2: 96 % (07/09/22 1100)    Vital Signs Range (Last 24H):  Temp:  [96.7 °F (35.9 °C)-100.3 °F (37.9 °C)]   Pulse:  [80-97]   Resp:  [0-20]   BP: (105-147)/(54-87)   SpO2:  [93 %-100 %]   Arterial Line BP: ()/(47-65)     I & O (Last 24H):    Intake/Output Summary (Last 24 hours) at 7/9/2022 1206  Last data filed at 7/9/2022 0601  Gross per 24 hour   Intake 4259.27 ml   Output 2496 ml   Net 1763.27 ml       Physical Exam:  General: Patient resting comfortably in no acute distress. Appears as stated age.  Sedated and intubated  Eyes: No conjunctival injection. No scleral icterus.  ENT:  ET and OG tubes noted. No discharge from ears. No nasal discharge.   CVS: RRR. No LE edema BL.  Mediastinal chest tubes noted  Lungs:  Intubated.  Mechanical breath sounds  Abd: Soft. Non tender   Neuro:  Sedated  Skin:  No rash or erythema noted  :  Chamorro catheter noted.    ASSESSMENT/PLAN:       Active Hospital Problems    Diagnosis  POA    *STEMI (ST elevation myocardial infarction) [I21.3]  Yes    NSVT (nonsustained ventricular tachycardia) [I47.2]  No    Coronary artery disease involving coronary bypass graft of native heart with angina pectoris [I25.709]  Yes      Resolved Hospital Problems   No resolved problems to display.         Plan:   STEMI status post balloon angioplasty to LAD   Status post redo 3 vessel CABG for recurrent severe atherosclerotic CAD  Routine postoperative care in the ICU  Follow repeat echocardiogram; reported EF of around 40%   Cardiology and cardiothoracic surgery following  Continue nitroglycerin infusion  Monitor  fluid status, hemodynamics and respiratory status  Antihypertensives as needed  Electrolyte replacement per protocol    Discussed plan with spouse at bedside     VTE Risk Mitigation (From admission, onward)    None              Department Hospital Medicine  [facility]  Felton Valentin MD  Date of service: 07/09/2022

## 2022-07-10 LAB
ANION GAP SERPL CALC-SCNC: 7 MMOL/L (ref 8–16)
BASOPHILS # BLD AUTO: 0.01 K/UL (ref 0–0.2)
BASOPHILS NFR BLD: 0.1 % (ref 0–1.9)
BLD PROD TYP BPU: NORMAL
BLOOD UNIT EXPIRATION DATE: NORMAL
BLOOD UNIT TYPE CODE: 600
BLOOD UNIT TYPE: NORMAL
BSA FOR ECHO PROCEDURE: 2.16 M2
BUN SERPL-MCNC: 24 MG/DL (ref 8–23)
CALCIUM SERPL-MCNC: 7.8 MG/DL (ref 8.7–10.5)
CHLORIDE SERPL-SCNC: 104 MMOL/L (ref 95–110)
CO2 SERPL-SCNC: 27 MMOL/L (ref 23–29)
CODING SYSTEM: NORMAL
CREAT SERPL-MCNC: 1.1 MG/DL (ref 0.5–1.4)
DIFFERENTIAL METHOD: ABNORMAL
DISPENSE STATUS: NORMAL
EJECTION FRACTION: 43 %
EOSINOPHIL # BLD AUTO: 0 K/UL (ref 0–0.5)
EOSINOPHIL NFR BLD: 0 % (ref 0–8)
ERYTHROCYTE [DISTWIDTH] IN BLOOD BY AUTOMATED COUNT: 14 % (ref 11.5–14.5)
EST. GFR  (AFRICAN AMERICAN): >60 ML/MIN/1.73 M^2
EST. GFR  (NON AFRICAN AMERICAN): >60 ML/MIN/1.73 M^2
FRACTIONAL SHORTENING: 16 % (ref 28–44)
GLUCOSE SERPL-MCNC: 164 MG/DL (ref 70–110)
GLUCOSE SERPL-MCNC: 165 MG/DL (ref 70–110)
GLUCOSE SERPL-MCNC: 176 MG/DL (ref 70–110)
GLUCOSE SERPL-MCNC: 188 MG/DL (ref 70–110)
HCT VFR BLD AUTO: 33 % (ref 40–54)
HGB BLD-MCNC: 10.6 G/DL (ref 14–18)
IMM GRANULOCYTES # BLD AUTO: 0.1 K/UL (ref 0–0.04)
IMM GRANULOCYTES NFR BLD AUTO: 0.6 % (ref 0–0.5)
LEFT INTERNAL DIMENSION IN SYSTOLE: 5.08 CM (ref 2.1–4)
LEFT VENTRICLE DIASTOLIC VOLUME INDEX: 103.24 ML/M2
LEFT VENTRICLE DIASTOLIC VOLUME: 218.87 ML
LEFT VENTRICLE SYSTOLIC VOLUME INDEX: 62 ML/M2
LEFT VENTRICLE SYSTOLIC VOLUME: 131.48 ML
LEFT VENTRICULAR INTERNAL DIMENSION IN DIASTOLE: 6.03 CM (ref 3.5–6)
LYMPHOCYTES # BLD AUTO: 1.1 K/UL (ref 1–4.8)
LYMPHOCYTES NFR BLD: 6.8 % (ref 18–48)
MAGNESIUM SERPL-MCNC: 2 MG/DL (ref 1.6–2.6)
MCH RBC QN AUTO: 28.5 PG (ref 27–31)
MCHC RBC AUTO-ENTMCNC: 32.1 G/DL (ref 32–36)
MCV RBC AUTO: 89 FL (ref 82–98)
MONOCYTES # BLD AUTO: 1.4 K/UL (ref 0.3–1)
MONOCYTES NFR BLD: 8.3 % (ref 4–15)
NEUTROPHILS # BLD AUTO: 14.1 K/UL (ref 1.8–7.7)
NEUTROPHILS NFR BLD: 84.2 % (ref 38–73)
NRBC BLD-RTO: 0 /100 WBC
NUM UNITS TRANS PACKED RBC: NORMAL
PLATELET # BLD AUTO: 160 K/UL (ref 150–450)
PMV BLD AUTO: 12 FL (ref 9.2–12.9)
POTASSIUM SERPL-SCNC: 4.1 MMOL/L (ref 3.5–5.1)
RBC # BLD AUTO: 3.72 M/UL (ref 4.6–6.2)
SODIUM SERPL-SCNC: 138 MMOL/L (ref 136–145)
TROPONIN I SERPL DL<=0.01 NG/ML-MCNC: 63.5 NG/ML
WBC # BLD AUTO: 16.69 K/UL (ref 3.9–12.7)

## 2022-07-10 PROCEDURE — 63600175 PHARM REV CODE 636 W HCPCS: Performed by: INTERNAL MEDICINE

## 2022-07-10 PROCEDURE — 84484 ASSAY OF TROPONIN QUANT: CPT | Performed by: INTERNAL MEDICINE

## 2022-07-10 PROCEDURE — 51798 US URINE CAPACITY MEASURE: CPT

## 2022-07-10 PROCEDURE — 99233 SBSQ HOSP IP/OBS HIGH 50: CPT | Mod: ,,, | Performed by: INTERNAL MEDICINE

## 2022-07-10 PROCEDURE — 99024 POSTOP FOLLOW-UP VISIT: CPT | Mod: ,,, | Performed by: THORACIC SURGERY (CARDIOTHORACIC VASCULAR SURGERY)

## 2022-07-10 PROCEDURE — 83735 ASSAY OF MAGNESIUM: CPT | Performed by: THORACIC SURGERY (CARDIOTHORACIC VASCULAR SURGERY)

## 2022-07-10 PROCEDURE — 82962 GLUCOSE BLOOD TEST: CPT

## 2022-07-10 PROCEDURE — 94799 UNLISTED PULMONARY SVC/PX: CPT

## 2022-07-10 PROCEDURE — 85025 COMPLETE CBC W/AUTO DIFF WBC: CPT | Performed by: THORACIC SURGERY (CARDIOTHORACIC VASCULAR SURGERY)

## 2022-07-10 PROCEDURE — 25000003 PHARM REV CODE 250: Performed by: INTERNAL MEDICINE

## 2022-07-10 PROCEDURE — 63600175 PHARM REV CODE 636 W HCPCS: Performed by: THORACIC SURGERY (CARDIOTHORACIC VASCULAR SURGERY)

## 2022-07-10 PROCEDURE — 94761 N-INVAS EAR/PLS OXIMETRY MLT: CPT

## 2022-07-10 PROCEDURE — 99233 PR SUBSEQUENT HOSPITAL CARE,LEVL III: ICD-10-PCS | Mod: ,,, | Performed by: INTERNAL MEDICINE

## 2022-07-10 PROCEDURE — 80048 BASIC METABOLIC PNL TOTAL CA: CPT | Performed by: THORACIC SURGERY (CARDIOTHORACIC VASCULAR SURGERY)

## 2022-07-10 PROCEDURE — 99024 PR POST-OP FOLLOW-UP VISIT: ICD-10-PCS | Mod: ,,, | Performed by: THORACIC SURGERY (CARDIOTHORACIC VASCULAR SURGERY)

## 2022-07-10 PROCEDURE — 20000000 HC ICU ROOM

## 2022-07-10 PROCEDURE — 25000003 PHARM REV CODE 250: Performed by: THORACIC SURGERY (CARDIOTHORACIC VASCULAR SURGERY)

## 2022-07-10 PROCEDURE — 27000221 HC OXYGEN, UP TO 24 HOURS

## 2022-07-10 RX ORDER — CARBOXYMETHYLCELLULOSE SODIUM 5 MG/ML
2 SOLUTION/ DROPS OPHTHALMIC CONTINUOUS PRN
Status: DISCONTINUED | OUTPATIENT
Start: 2022-07-10 | End: 2022-07-19 | Stop reason: HOSPADM

## 2022-07-10 RX ORDER — FUROSEMIDE 10 MG/ML
40 INJECTION INTRAMUSCULAR; INTRAVENOUS ONCE
Status: COMPLETED | OUTPATIENT
Start: 2022-07-10 | End: 2022-07-10

## 2022-07-10 RX ADMIN — MORPHINE SULFATE 2 MG: 2 INJECTION, SOLUTION INTRAMUSCULAR; INTRAVENOUS at 09:07

## 2022-07-10 RX ADMIN — MORPHINE SULFATE 2 MG: 2 INJECTION, SOLUTION INTRAMUSCULAR; INTRAVENOUS at 05:07

## 2022-07-10 RX ADMIN — FUROSEMIDE 40 MG: 10 INJECTION, SOLUTION INTRAMUSCULAR; INTRAVENOUS at 09:07

## 2022-07-10 RX ADMIN — AMIODARONE HYDROCHLORIDE 0.5 MG/MIN: 1.8 INJECTION, SOLUTION INTRAVENOUS at 04:07

## 2022-07-10 RX ADMIN — NITROGLYCERIN 50 MCG/MIN: 20 INJECTION INTRAVENOUS at 07:07

## 2022-07-10 RX ADMIN — MORPHINE SULFATE 2 MG: 2 INJECTION, SOLUTION INTRAMUSCULAR; INTRAVENOUS at 10:07

## 2022-07-10 RX ADMIN — SODIUM CHLORIDE: 0.45 INJECTION, SOLUTION INTRAVENOUS at 10:07

## 2022-07-10 NOTE — PLAN OF CARE
07/10/22 1417   Discharge Reassessment   Assessment Type Discharge Planning Reassessment   Did the patient's condition or plan change since previous assessment? No   Discharge Plan discussed with: Patient   Communicated MICHAEL with patient/caregiver Date not available/Unable to determine   Discharge Plan A Home   Discharge Plan B Home   DME Needed Upon Discharge  none   Discharge Barriers Identified None   Why the patient remains in the hospital Requires continued medical care   Post-Acute Status   Discharge Delays None known at this time     Pt still requires continued medical care. DC plan remains for Pt to return home when medically clear. CM will continue to follow.

## 2022-07-10 NOTE — PLAN OF CARE
07/09/22 2001   Patient Assessment/Suction   Level of Consciousness (AVPU) alert   Respiratory Effort Unlabored   Expansion/Accessory Muscles/Retractions expansion symmetric   All Lung Fields Breath Sounds clear   Rhythm/Pattern, Respiratory depth regular;pattern regular   Cough Frequency infrequent   Cough Type good   Suction Method oral   PRE-TX-O2   O2 Device (Oxygen Therapy) Oxymask   $ Is the patient on Low Flow Oxygen? Yes   Flow (L/min) 6   SpO2 95 %   Pulse Oximetry Type Continuous   $ Pulse Oximetry - Multiple Charge Pulse Oximetry - Multiple   Pulse 98   Resp 15   /61   Incentive Spirometer   $ Incentive Spirometer Charges postop instruction   Incentive Spirometer Predicted Level (mL) 1500   Administration (IS) instruction provided, initial   Number of Repetitions (IS) 10   Level Incentive Spirometer (mL) 750   Patient Tolerance (IS) good   Respiratory Evaluation   $ Care Plan Tech Time 15 min   $ Eval/Re-eval Charges Evaluation   Evaluation For New Orders   Admitting Diagnosis CAD

## 2022-07-10 NOTE — NURSING
Patient HR slowly increasing, currently 108. Dr. Moreno notified and ordered IV metoprolol x1 since patient cannot take his oral medication.

## 2022-07-10 NOTE — ANESTHESIA POSTPROCEDURE EVALUATION
Anesthesia Post Evaluation    Patient: Matteo Fraire    Procedure(s) Performed: Procedure(s) (LRB):  CABG, REPEAT (N/A)  HARVEST-VEIN-ENDOVASCULAR (Right)  SURGICAL PROCUREMENT,ARTERY,RADIAL,FOR CABG (Left)    Final Anesthesia Type: general      Patient location during evaluation: ICU  Patient participation: Yes- Able to Participate  Level of consciousness: awake and alert, oriented and awake  Post-procedure vital signs: reviewed and stable  Pain management: adequate  Airway patency: patent  FAISAL mitigation strategies: Multimodal analgesia, Extubation while patient is awake, Verification of full reversal of neuromuscular block and Extubation and recovery carried out in lateral, semiupright, or other nonsupine position  PONV status at discharge: No PONV  Anesthetic complications: no      Cardiovascular status: blood pressure returned to baseline, hemodynamically stable and stable  Respiratory status: unassisted, spontaneous ventilation and face mask  Hydration status: euvolemic  Follow-up not needed.          Vitals Value Taken Time   /76 07/10/22 1000   Temp 36.6 °C (97.9 °F) 07/10/22 0728   Pulse 105 07/10/22 1000   Resp 21 07/10/22 1000   SpO2 94 % 07/10/22 1000   Vitals shown include unvalidated device data.      No case tracking events are documented in the log.      Pain/Lissy Score: Pain Rating Prior to Med Admin: 6 (7/10/2022  9:01 AM)  Pain Rating Post Med Admin: 0 (7/10/2022  9:30 AM)

## 2022-07-10 NOTE — PROGRESS NOTES
[facility] hospital Medicine  Progress Note    Patient name: Matteo Fraire  MRN: [id number]  Admit Date: 6/27/2022   LOS: 13 days     SUBJECTIVE:     Principal problem: STEMI (ST elevation myocardial infarction)    Interval History:  No acute overnight events reported.  Had difficulty swallowing last night.  He endorses sore throat.  Also admits to incisional chest pain worse with deep breathing and better with rest.      Hospital course:  61-year-old with prior history of CAD status post PCI and CABG came with chest pain found to have STEMI status post balloon angioplasty to LAD - now status post redo 3 vessel CABG.  Hospital stay notable for episodes of NSVT after which he was initiated on lidocaine infusion.  Postoperative stay notable for persistent ST elevation which improved with nitroglycerin infusion.     Scheduled Meds:   aspirin  81 mg Oral Daily    atorvastatin  80 mg Oral Daily    docusate sodium  100 mg Oral BID    losartan  50 mg Oral Daily    metoprolol tartrate  25 mg Oral BID    tamsulosin  0.4 mg Oral Daily     Continuous Infusions:   sodium chloride 0.45% 50 mL/hr at 07/08/22 1618    amiodarone in dextrose 5% 0.5 mg/min (07/10/22 0440)    insulin regular 1 units/mL infusion orderable (CTS POST-OP) Stopped (07/09/22 0310)    nitroGLYCERIN 50 mcg/min (07/10/22 0733)    propofoL Stopped (07/09/22 1400)     PRN Meds:acetaminophen, albumin human 5%, ALPRAZolam, calcium gluconate IVPB, calcium gluconate IVPB, calcium gluconate IVPB, dextrose 50%, dextrose 50%, HYDROcodone-acetaminophen, insulin aspart U-100, magnesium sulfate IVPB, magnesium sulfate IVPB, melatonin, morphine, morphine, mupirocin, ondansetron, potassium chloride in water **AND** potassium chloride in water **AND** potassium chloride in water, sodium phosphate IVPB, sodium phosphate IVPB, sodium phosphate IVPB    Review of patient's allergies indicates:  No Known Allergies    Review of Systems:  Unable to obtain  as patient is sedated    OBJECTIVE:     Vital Signs (Most Recent)  Temp: 97.9 °F (36.6 °C) (07/10/22 0728)  Pulse: 98 (07/10/22 0945)  Resp: 17 (07/10/22 0945)  BP: 107/67 (07/10/22 0930)  SpO2: (!) 94 % (07/10/22 0945)    Vital Signs Range (Last 24H):  Temp:  [97.9 °F (36.6 °C)-99.2 °F (37.3 °C)]   Pulse:  []   Resp:  [10-27]   BP: (106-154)/(57-94)   SpO2:  [92 %-100 %]   Arterial Line BP: (116-144)/(55-77)     I & O (Last 24H):    Intake/Output Summary (Last 24 hours) at 7/10/2022 1015  Last data filed at 7/10/2022 0900  Gross per 24 hour   Intake 2526.22 ml   Output 1738 ml   Net 788.22 ml       Physical Exam:  General: Patient resting comfortably in no acute distress. Appears as stated age.  Calm  Eyes: No conjunctival injection. No scleral icterus.  ENT:  Hearing intact.  No discharge from ears. No nasal discharge.   CVS: RRR. No LE edema BL.  Mediastinal chest tubes noted  Lungs:  No tachypnea or accessory muscle use.  Diminished breath sounds at the bases  Abd: Soft. Non tender .  Nondistended  Neuro:  Alert.  Follows commands.  Responds appropriately  Skin:  No rash or erythema noted  :  Chamorro catheter noted.    ASSESSMENT/PLAN:       Active Hospital Problems    Diagnosis  POA    *STEMI (ST elevation myocardial infarction) [I21.3]  Yes    NSVT (nonsustained ventricular tachycardia) [I47.2]  No    Coronary artery disease involving coronary bypass graft of native heart with angina pectoris [I25.709]  Yes      Resolved Hospital Problems   No resolved problems to display.         Plan:   STEMI status post balloon angioplasty to LAD   Status post redo 3 vessel CABG for recurrent severe atherosclerotic CAD - 07/08   Routine postoperative care in the ICU  Follow repeat echocardiogram; reported EF of around 40%   Cardiology and cardiothoracic surgery following  Continue nitroglycerin infusion  Monitor fluid status, hemodynamics and respiratory status  Antihypertensives as needed  Electrolyte replacement  per protocol      VTE risk high. SCDs for now. Will start pharmacologic anticoagulation once chest tubes are out      Department Hospital Medicine  [facility]  Felotn Valentin MD  Date of service: 07/10/2022

## 2022-07-10 NOTE — PT/OT/SLP PROGRESS
Physical Therapy      Patient Name:  Matteo Fraire   MRN:  [id number]    Patient not seen today secondary to Other (Comment) (RN hold). Will follow-up 7/11/22.

## 2022-07-10 NOTE — NURSING
Pt. Unable to void while attempting to use the urinal. Bladder scan volume=524ml. Discussed with night time nurse, GEORGE Schroeder.

## 2022-07-10 NOTE — PROGRESS NOTES
[facility]  Department of Cardiology  Progress Note    PATIENT NAME: Matteo Fraire  MRN: [id number]  TODAY'S DATE: 07/10/2022  ADMIT DATE: 6/27/2022    SUBJECTIVE     PRINCIPLE PROBLEM: STEMI (ST elevation myocardial infarction)    INTERVAL HISTORY:    7/10/2022   Patient continues to asymptomatic NSVT. On oxymask, having a lot of congestion. Seems out of it still despite morphine being given 3 hours ago. BP stable on nitro gtt.     7/9/2022  Patient developed ST elevations overnight post CABG. Troponins were checked and are still rising at 62. He remains intubated and sedated. BP stable on cleviprex gtt. ST elevations have improved.     7/7/2022  Patient had 42 beat run of Vtach this morning, asymp[tomatic. No further chest pain. VSS.     7/6/2022  PATIENT FOR CABG THIS Friday. Had some mild chest pains after walking for 40 minutes this morning. Has not required nitro yet. VSS.     7/5/2022  Patient still waiting for CABG, ate breakfast this AM. No chest pain events. VSS.     7/4/2022  Patient is awake alert lying comfortably in bed not in any acute distress.  Patient had walked in the hallway 4 times already.  Denies any chest pain or tightness or heaviness his breathing is good denies any shortness of breath.  Awaiting for CABG.    Review of patient's allergies indicates:  No Known Allergies    REVIEW OF SYSTEMS  Unable to obtain     OBJECTIVE     VITAL SIGNS (Most Recent)  Temp: 98.1 °F (36.7 °C) (07/10/22 1130)  Pulse: 107 (07/10/22 1315)  Resp: 12 (07/10/22 1315)  BP: 108/63 (07/10/22 1300)  SpO2: (!) 94 % (07/10/22 1315)    VENTILATION STATUS  Resp: 12 (07/10/22 1315)  SpO2: (!) 94 % (07/10/22 1315)  Vent Mode: Spont  Oxygen Concentration (%):  [40] 40  Resp Rate Total:  [15 br/min] 15 br/min  Vt Set:  [600 mL] 600 mL  PEEP/CPAP:  [5 cmH20] 5 cmH20  Pressure Support:  [10 cmH20] 10 cmH20  Mean Airway Pressure:  [8.6 cmH20-9.7 cmH20] 9.7 cmH20    I & O (Last 24H):    Intake/Output Summary (Last  24 hours) at 7/10/2022 1319  Last data filed at 7/10/2022 1000  Gross per 24 hour   Intake 2526.22 ml   Output 2088 ml   Net 438.22 ml       WEIGHTS  Wt Readings from Last 1 Encounters:   07/07/22 0520 96.4 kg (212 lb 8.4 oz)   07/05/22 0301 96.9 kg (213 lb 10 oz)   07/01/22 0300 97.3 kg (214 lb 8.1 oz)   06/30/22 0300 100.5 kg (221 lb 9.6 oz)   06/27/22 2254 101.1 kg (222 lb 14.2 oz)   06/27/22 1954 99.8 kg (220 lb)       PHYSICAL EXAM  CONSTITUTIONAL: on oxymask, seems confused and slow   NECK: no carotid bruit, no JVD  LUNGS: coarse   CHEST WALL: tubes and wires in place   HEART: regular rate and rhythm, S1, S2 normal, no murmur, click, rub or gallop   ABDOMEN: soft, non-tender; bowel sounds normal; no masses,  no organomegaly  EXTREMITIES: Edematous UE   NEURO: symmetric grimmace, soft  UE BL, wiggles both all toes BL     SCHEDULED MEDS:   aspirin  81 mg Oral Daily    atorvastatin  80 mg Oral Daily    docusate sodium  100 mg Oral BID    losartan  50 mg Oral Daily    metoprolol tartrate  25 mg Oral BID    tamsulosin  0.4 mg Oral Daily       CONTINUOUS INFUSIONS:   sodium chloride 0.45% 50 mL/hr at 07/08/22 1618    amiodarone in dextrose 5% 0.5 mg/min (07/10/22 0440)    insulin regular 1 units/mL infusion orderable (CTS POST-OP) Stopped (07/09/22 0310)    nitroGLYCERIN 50 mcg/min (07/10/22 0733)    propofoL Stopped (07/09/22 1400)       PRN MEDS:acetaminophen, albumin human 5%, ALPRAZolam, calcium gluconate IVPB, calcium gluconate IVPB, calcium gluconate IVPB, dextrose 50%, dextrose 50%, HYDROcodone-acetaminophen, insulin aspart U-100, magnesium sulfate IVPB, magnesium sulfate IVPB, melatonin, morphine, morphine, mupirocin, ondansetron, potassium chloride in water **AND** potassium chloride in water **AND** potassium chloride in water, sodium phosphate IVPB, sodium phosphate IVPB, sodium phosphate IVPB    LABS AND DIAGNOSTICS     CBC LAST 3 DAYS  Recent Labs   Lab 07/08/22  2348 07/09/22  9750  07/09/22  0502 07/09/22  1609 07/09/22  1751 07/10/22  0323   WBC 11.15 13.72*  --   --   --  16.69*   RBC 4.19* 4.19*  --   --   --  3.72*   HGB 12.2* 12.2*  --   --   --  10.6*   HCT 36.4* 36.2*   < > 33* 33* 33.0*   MCV 87 86  --   --   --  89   MCH 29.1 29.1  --   --   --  28.5   MCHC 33.5 33.7  --   --   --  32.1   RDW 13.5 13.5  --   --   --  14.0    162  --   --   --  160   MPV 11.6 11.7  --   --   --  12.0   GRAN 94.2*  10.5* 90.5*  12.4*  --   --   --  84.2*  14.1*   LYMPH 3.0*  0.3* 5.1*  0.7*  --   --   --  6.8*  1.1   MONO 2.3*  0.3 4.1  0.6  --   --   --  8.3  1.4*   BASO 0.01 0.00  --   --   --  0.01   NRBC 0 0  --   --   --  0    < > = values in this interval not displayed.       COAGULATION LAST 3 DAYS  Recent Labs   Lab 07/08/22 0327   APTT 40.8*       CHEMISTRY LAST 3 DAYS  Recent Labs   Lab 07/08/22  2348 07/09/22  0400 07/09/22  0502 07/09/22  1047 07/09/22  1609 07/09/22  1751 07/10/22  0323    139  --   --   --   --  138   K 3.9 3.9  --   --   --   --  4.1   * 112*  --   --   --   --  104   CO2 22* 22*  --   --   --   --  27   ANIONGAP 7* 5*  --   --   --   --  7*   BUN 16 16  --   --   --   --  24*   CREATININE 1.2 1.0  --   --   --   --  1.1   * 134*  --   --   --   --  188*   CALCIUM 7.6* 7.7*  --   --   --   --  7.8*   PH  --   --    < > 7.408 7.492* 7.451*  --    MG 1.9 1.9  --   --   --   --  2.0    < > = values in this interval not displayed.       CARDIAC PROFILE LAST 3 DAYS  Recent Labs   Lab 07/09/22  0400 07/09/22  0810 07/10/22  0323   TROPONINI 62.273* 62.262* 63.503*       ENDOCRINE LAST 3 DAYS  No results for input(s): TSH, PROCAL in the last 168 hours.    LAST ARTERIAL BLOOD GAS  ABG  Recent Labs   Lab 07/09/22  1751   PH 7.451*   PO2 69*   PCO2 35.8   HCO3 24.9   BE 1       LAST 7 DAYS MICROBIOLOGY   Microbiology Results (last 7 days)     ** No results found for the last 168 hours. **          MOST RECENT IMAGING  Echo  · The left ventricle  is normal in size with concentric remodeling and   mildly decreased systolic function.  · The estimated ejection fraction is 43%.  · Left ventricular diastolic dysfunction.  · There are segmental left ventricular wall motion abnormalities.  · Anterior apical akinesis  · Normal right ventricular size with normal right ventricular systolic   function.     X-Ray Chest AP Portable  Chest single view    CLINICAL DATA: Postop, bypass    FINDINGS: AP view is compared to July 9. Borderline cardiomegaly is stable. Post sternotomy changes are noted. Endotracheal tube, Vienna-Fadumo catheter, and nasogastric tube have been removed. Right IJ central catheter remains in place with tip at superior vena cava. Left-sided chest tube and mediastinal drains also remain in place.    The lungs are clear. There is no evidence of pneumothorax.    IMPRESSION:  1. Post sternotomy changes with interval removal of support devices as above. No acute radiographic abnormalities.    Electronically signed by:  Sebastian Andino MD  7/10/2022 6:20 AM CDT Workstation: [id number]      [facility]  Results for orders placed during the hospital encounter of 06/27/22    Echo Saline Bubble? No    Interpretation Summary  · The left ventricle is normal in size with mild predominantly mid septal mild asymmetric hypertrophy eccentric hypertrophy and normal systolic function.  · The estimated ejection fraction is 60%.  · Indeterminate left ventricular diastolic function.  · Normal right ventricular size with normal right ventricular systolic function.  · Mild left atrial enlargement.  · Normal central venous pressure (3 mmHg).  · The estimated PA systolic pressure is 19 mmHg.  · Mild mitral regurgitation.      CURRENT/PREVIOUS VISIT EKG  Results for orders placed or performed during the hospital encounter of 06/27/22   EKG 12-lead    Collection Time: 07/09/22 12:41 AM    Narrative    Test Reason : I21.3,    Vent. Rate : 088 BPM     Atrial Rate : 088 BPM     P-R Int  : 232 ms          QRS Dur : 094 ms      QT Int : 384 ms       P-R-T Axes : 043 101 132 degrees     QTc Int : 464 ms    Sinus rhythm with 1st degree A-V block with occasional Premature  ventricular complexes  Possible Inferior infarct ,age undetermined  Anterolateral infarct (cited on or before 27-JUN-2022)  Abnormal ECG  When compared with ECG of 08-JUL-2022 16:38,  Borderline criteria for Inferior infarct are now Present  Serial changes of Anterior infarct Present    Referred By: [email or web address]   SELF           Confirmed By:        ASSESSMENT/PLAN:     Active Hospital Problems    Diagnosis    *STEMI (ST elevation myocardial infarction)    NSVT (nonsustained ventricular tachycardia)    Coronary artery disease involving coronary bypass graft of native heart with angina pectoris       ASSESSMENT & PLAN:   1. Multivessel coronary artery disease  2. ST-elevation myocardial infarction status post PTCA  3. Nonsustained ventricular tachycardia  4. Mixed hyperlipidemia      RECOMMENDATIONS:  Continue nitro gtt  CT head to rule out CVA given mentation   Continue amio gtt - patient continues to have runs or NSVT asymptomatic   Start entresto when he passes swallow study         Fanny Amaya PA-C  [facility]  Department of Cardiology  Date of Service: 07/10/2022  9:53 AM

## 2022-07-10 NOTE — PROGRESS NOTES
"Matteo Fraire is a 61 y.o. male patient.    Chief Complaint   Patient presents with    Chest Pain     Started about 7 PM.  Pt has cardiac procedures in 2005 and 2011.  Pt took two 325 Aspirin at home prior to arrival to ED       Active Hospital Problems    Diagnosis  POA    *STEMI (ST elevation myocardial infarction) [I21.3]  Yes    NSVT (nonsustained ventricular tachycardia) [I47.2]  No    Coronary artery disease involving coronary bypass graft of native heart with angina pectoris [I25.709]  Yes      Resolved Hospital Problems   No resolved problems to display.       Temp: 97.9 °F (36.6 °C) (07/10/22 0728)  Pulse: 94 (07/10/22 0531)  Resp: 20 (07/10/22 0542)  BP: 134/77 (07/10/22 0531)  SpO2: 99 % (07/10/22 0531)  Weight: 96.4 kg (212 lb 8.4 oz) (07/07/22 0520)  Height: 5' 9" (175.3 cm) (06/27/22 2254)    CBC:  Recent Labs   Lab 07/08/22  1937 07/08/22  2222 07/08/22  2348 07/09/22  0400 07/09/22  0502 07/09/22  1047 07/09/22  1609 07/09/22  1751 07/10/22  0323   WBC 12.94*  --  11.15 13.72*  --   --   --   --  16.69*   HGB 12.3*  --  12.2* 12.2*  --   --   --   --  10.6*   HCT 37.3*   < > 36.4* 36.2*   < > 35* 33* 33* 33.0*     --  158 162  --   --   --   --  160   MCV 87  --  87 86  --   --   --   --  89    < > = values in this interval not displayed.     BMP:  Recent Labs   Lab 07/08/22  1520 07/08/22 1937 07/08/22  2348 07/09/22  0400 07/10/22  0323   * 150* 139* 134* 188*    141 144 139 138   K 3.3*  3.3* 4.1 3.9 3.9 4.1   * 111* 115* 112* 104   CO2 18* 22* 22* 22* 27   BUN 17 17 16 16 24*   CREATININE 1.1 1.1 1.2 1.0 1.1   CALCIUM 7.0* 7.0* 7.6* 7.7* 7.8*   MG 2.4 2.0 1.9 1.9 2.0   PHOS 1.7*  --  2.0* 2.0*  --      LFTs:  No results for input(s): ALT, AST, ALKPHOS, BILITOT, PROT, ALBUMIN in the last 72 hours.  COAGS:  Recent Labs   Lab 07/08/22  0327   APTT 40.8*     CARDIAC MARKERS:  Recent Labs   Lab 07/08/22  2348 07/09/22  0400 07/09/22  0810 07/10/22  0323   TROPONINI " "20.881* 62.273* 62.262* 63.503*       Output (mL)  Urine: 475 mL    Imaging Results          X-Ray Chest AP Portable (Final result)  Result time 06/28/22 06:13:22    Final result by Waldemar De La Cruz MD (06/28/22 06:13:22)                 Narrative:    Reason: chest pain    FINDINGS:    PA and lateral chest with comparison chest x-ray November 20, 2019 show normal cardiomediastinal silhouette.  Mild bilateral perihilar hazy ill-defined opacities are noted. Pulmonary vasculature is normal. No acute osseous abnormality.    IMPRESSION:    Mild bilateral perihilar hazy ill-defined opacities could reflect mild pulmonary edema or infection in the proper clinical settings.    Electronically signed by:  Waldemar De La Cruz DO  6/28/2022 6:13 AM CDT Workstation: [id number]                              Subjective:  Symptoms:  (Extubated yesterday.  Had some difficulty swallowing liquids and medications.).    Pain:  He complains of pain that is mild.        Objective:  General Appearance:  In no acute distress.    Vital signs: (most recent): Blood pressure 134/77, pulse 94, temperature 97.9 °F (36.6 °C), temperature source Oral, resp. rate 20, height 5' 9" (1.753 m), weight 96.4 kg (212 lb 8.4 oz), SpO2 99 %.  Vital signs are normal.  (Epinephrine  Amiodarone).    Lungs:  There are rales.    Heart: Tachycardia.  Regular rhythm.  (QRS complex narrow)  Chest: (No air leak)      Assessment:    Condition: In serious condition.   (Post redo CABG.  Cardiac rhythm improved today.  Troponin 63).     Plan:   Start/continue incentive spirometry.  Continue chest tubes.  Speech therapy to help with swallowing.  Further medical management per cardiology..       Omar Ayon MD  7/10/2022    "

## 2022-07-10 NOTE — NURSING
Attempted to give patient his nighttime oral meds. Gave patient a sip of water first and he began to cough excessively and had trouble catching his breath. All po meds were held and Dr. Moreno was notified. Bedside swallow documented and Speech therapy consulted.

## 2022-07-11 LAB
ALLENS TEST: ABNORMAL
ANION GAP SERPL CALC-SCNC: 7 MMOL/L (ref 8–16)
BASOPHILS # BLD AUTO: 0.01 K/UL (ref 0–0.2)
BASOPHILS NFR BLD: 0.1 % (ref 0–1.9)
BLD PROD TYP BPU: NORMAL
BLD PROD TYP BPU: NORMAL
BLOOD UNIT EXPIRATION DATE: NORMAL
BLOOD UNIT EXPIRATION DATE: NORMAL
BLOOD UNIT TYPE CODE: 600
BLOOD UNIT TYPE CODE: 600
BLOOD UNIT TYPE: NORMAL
BLOOD UNIT TYPE: NORMAL
BUN SERPL-MCNC: 25 MG/DL (ref 8–23)
CALCIUM SERPL-MCNC: 7.7 MG/DL (ref 8.7–10.5)
CHLORIDE SERPL-SCNC: 102 MMOL/L (ref 95–110)
CO2 SERPL-SCNC: 28 MMOL/L (ref 23–29)
CODING SYSTEM: NORMAL
CODING SYSTEM: NORMAL
CREAT SERPL-MCNC: 1 MG/DL (ref 0.5–1.4)
DELSYS: ABNORMAL
DIFFERENTIAL METHOD: ABNORMAL
DISPENSE STATUS: NORMAL
DISPENSE STATUS: NORMAL
EOSINOPHIL # BLD AUTO: 0 K/UL (ref 0–0.5)
EOSINOPHIL NFR BLD: 0 % (ref 0–8)
ERYTHROCYTE [DISTWIDTH] IN BLOOD BY AUTOMATED COUNT: 13.8 % (ref 11.5–14.5)
EST. GFR  (AFRICAN AMERICAN): >60 ML/MIN/1.73 M^2
EST. GFR  (NON AFRICAN AMERICAN): >60 ML/MIN/1.73 M^2
FIO2: 40
FLOW: 5
GLUCOSE SERPL-MCNC: 123 MG/DL (ref 70–110)
GLUCOSE SERPL-MCNC: 148 MG/DL (ref 70–110)
GLUCOSE SERPL-MCNC: 148 MG/DL (ref 70–110)
GLUCOSE SERPL-MCNC: 149 MG/DL (ref 70–110)
GLUCOSE SERPL-MCNC: 152 MG/DL (ref 70–110)
HCO3 UR-SCNC: 26.1 MMOL/L (ref 24–28)
HCT VFR BLD AUTO: 30.7 % (ref 40–54)
HCT VFR BLD CALC: 32 %PCV (ref 36–54)
HGB BLD-MCNC: 10.1 G/DL (ref 14–18)
IMM GRANULOCYTES # BLD AUTO: 0.04 K/UL (ref 0–0.04)
IMM GRANULOCYTES NFR BLD AUTO: 0.4 % (ref 0–0.5)
LYMPHOCYTES # BLD AUTO: 1.3 K/UL (ref 1–4.8)
LYMPHOCYTES NFR BLD: 12 % (ref 18–48)
MAGNESIUM SERPL-MCNC: 2.1 MG/DL (ref 1.6–2.6)
MCH RBC QN AUTO: 29.4 PG (ref 27–31)
MCHC RBC AUTO-ENTMCNC: 32.9 G/DL (ref 32–36)
MCV RBC AUTO: 89 FL (ref 82–98)
MODE: ABNORMAL
MONOCYTES # BLD AUTO: 0.8 K/UL (ref 0.3–1)
MONOCYTES NFR BLD: 7.4 % (ref 4–15)
NEUTROPHILS # BLD AUTO: 8.7 K/UL (ref 1.8–7.7)
NEUTROPHILS NFR BLD: 80.1 % (ref 38–73)
NRBC BLD-RTO: 0 /100 WBC
NUM UNITS TRANS PACKED RBC: NORMAL
NUM UNITS TRANS PACKED RBC: NORMAL
PCO2 BLDA: 36.8 MMHG (ref 35–45)
PH SMN: 7.46 [PH] (ref 7.35–7.45)
PLATELET # BLD AUTO: 141 K/UL (ref 150–450)
PMV BLD AUTO: 11.4 FL (ref 9.2–12.9)
PO2 BLDA: 57 MMHG (ref 80–100)
POC BE: 2 MMOL/L
POC IONIZED CALCIUM: 1.16 MMOL/L (ref 1.06–1.42)
POC SATURATED O2: 91 % (ref 95–100)
POC TCO2: 27 MMOL/L (ref 23–27)
POTASSIUM BLD-SCNC: 3.9 MMOL/L (ref 3.5–5.1)
POTASSIUM SERPL-SCNC: 4 MMOL/L (ref 3.5–5.1)
RBC # BLD AUTO: 3.44 M/UL (ref 4.6–6.2)
SAMPLE: ABNORMAL
SITE: ABNORMAL
SODIUM BLD-SCNC: 140 MMOL/L (ref 136–145)
SODIUM SERPL-SCNC: 137 MMOL/L (ref 136–145)
SP02: 90
WBC # BLD AUTO: 10.88 K/UL (ref 3.9–12.7)

## 2022-07-11 PROCEDURE — 99900035 HC TECH TIME PER 15 MIN (STAT)

## 2022-07-11 PROCEDURE — 37799 UNLISTED PX VASCULAR SURGERY: CPT

## 2022-07-11 PROCEDURE — 85025 COMPLETE CBC W/AUTO DIFF WBC: CPT | Performed by: THORACIC SURGERY (CARDIOTHORACIC VASCULAR SURGERY)

## 2022-07-11 PROCEDURE — 82962 GLUCOSE BLOOD TEST: CPT

## 2022-07-11 PROCEDURE — 94760 N-INVAS EAR/PLS OXIMETRY 1: CPT

## 2022-07-11 PROCEDURE — 99233 PR SUBSEQUENT HOSPITAL CARE,LEVL III: ICD-10-PCS | Mod: ,,, | Performed by: INTERNAL MEDICINE

## 2022-07-11 PROCEDURE — 63600175 PHARM REV CODE 636 W HCPCS: Performed by: THORACIC SURGERY (CARDIOTHORACIC VASCULAR SURGERY)

## 2022-07-11 PROCEDURE — 27000221 HC OXYGEN, UP TO 24 HOURS

## 2022-07-11 PROCEDURE — 92610 EVALUATE SWALLOWING FUNCTION: CPT

## 2022-07-11 PROCEDURE — 63600175 PHARM REV CODE 636 W HCPCS: Performed by: INTERNAL MEDICINE

## 2022-07-11 PROCEDURE — 99024 POSTOP FOLLOW-UP VISIT: CPT | Mod: ,,, | Performed by: NURSE PRACTITIONER

## 2022-07-11 PROCEDURE — 97530 THERAPEUTIC ACTIVITIES: CPT

## 2022-07-11 PROCEDURE — 25000003 PHARM REV CODE 250: Performed by: THORACIC SURGERY (CARDIOTHORACIC VASCULAR SURGERY)

## 2022-07-11 PROCEDURE — 94799 UNLISTED PULMONARY SVC/PX: CPT

## 2022-07-11 PROCEDURE — 94761 N-INVAS EAR/PLS OXIMETRY MLT: CPT

## 2022-07-11 PROCEDURE — 20000000 HC ICU ROOM

## 2022-07-11 PROCEDURE — 97165 OT EVAL LOW COMPLEX 30 MIN: CPT

## 2022-07-11 PROCEDURE — 97161 PT EVAL LOW COMPLEX 20 MIN: CPT

## 2022-07-11 PROCEDURE — 25000003 PHARM REV CODE 250

## 2022-07-11 PROCEDURE — 99024 PR POST-OP FOLLOW-UP VISIT: ICD-10-PCS | Mod: ,,, | Performed by: NURSE PRACTITIONER

## 2022-07-11 PROCEDURE — 80048 BASIC METABOLIC PNL TOTAL CA: CPT | Performed by: THORACIC SURGERY (CARDIOTHORACIC VASCULAR SURGERY)

## 2022-07-11 PROCEDURE — 25000003 PHARM REV CODE 250: Performed by: INTERNAL MEDICINE

## 2022-07-11 PROCEDURE — 83735 ASSAY OF MAGNESIUM: CPT | Performed by: THORACIC SURGERY (CARDIOTHORACIC VASCULAR SURGERY)

## 2022-07-11 PROCEDURE — 99233 SBSQ HOSP IP/OBS HIGH 50: CPT | Mod: ,,, | Performed by: INTERNAL MEDICINE

## 2022-07-11 PROCEDURE — 99900031 HC PATIENT EDUCATION (STAT)

## 2022-07-11 RX ORDER — FUROSEMIDE 10 MG/ML
40 INJECTION INTRAMUSCULAR; INTRAVENOUS ONCE
Status: COMPLETED | OUTPATIENT
Start: 2022-07-11 | End: 2022-07-11

## 2022-07-11 RX ORDER — METOPROLOL TARTRATE 1 MG/ML
2.5 INJECTION, SOLUTION INTRAVENOUS EVERY 6 HOURS
Status: DISCONTINUED | OUTPATIENT
Start: 2022-07-11 | End: 2022-07-12

## 2022-07-11 RX ADMIN — AMIODARONE HYDROCHLORIDE 0.5 MG/MIN: 1.8 INJECTION, SOLUTION INTRAVENOUS at 03:07

## 2022-07-11 RX ADMIN — MORPHINE SULFATE 2 MG: 2 INJECTION, SOLUTION INTRAMUSCULAR; INTRAVENOUS at 12:07

## 2022-07-11 RX ADMIN — NITROGLYCERIN 15 MCG/MIN: 20 INJECTION INTRAVENOUS at 03:07

## 2022-07-11 RX ADMIN — FUROSEMIDE 40 MG: 10 INJECTION, SOLUTION INTRAMUSCULAR; INTRAVENOUS at 10:07

## 2022-07-11 RX ADMIN — METOPROLOL TARTRATE 2.5 MG: 5 INJECTION, SOLUTION INTRAVENOUS at 11:07

## 2022-07-11 RX ADMIN — MORPHINE SULFATE 2 MG: 2 INJECTION, SOLUTION INTRAMUSCULAR; INTRAVENOUS at 08:07

## 2022-07-11 RX ADMIN — METOPROLOL TARTRATE 2.5 MG: 5 INJECTION, SOLUTION INTRAVENOUS at 05:07

## 2022-07-11 NOTE — NURSING
Notified Dr. De Santiago of pt not being able to void after lee removed. Updated him that pt has been NPO so is unable to take flomax past few days and has a bladder scan value of 500cc. New orders to place lee.

## 2022-07-11 NOTE — NURSING
Provided follow up education on Incentive Spirometry use. Pt pulled a fulled reps failry with IS reaching 500cc. Educated pt on the importance of using IS. Will continue to monitor.

## 2022-07-11 NOTE — PLAN OF CARE
07/10/22 2114   Patient Assessment/Suction   Level of Consciousness (AVPU) alert   Respiratory Effort Unlabored   Expansion/Accessory Muscles/Retractions expansion symmetric   All Lung Fields Breath Sounds clear   Rhythm/Pattern, Respiratory depth regular;pattern regular   Cough Frequency infrequent   Cough Type good;nonproductive   PRE-TX-O2   O2 Device (Oxygen Therapy) Oxymask   $ Is the patient on Low Flow Oxygen? Yes   Flow (L/min) 4   SpO2 (!) 94 %   Pulse Oximetry Type Continuous   $ Pulse Oximetry - Multiple Charge Pulse Oximetry - Multiple   Pulse 100   Resp (!) 22   Incentive Spirometer   $ Incentive Spirometer Charges done with encouragement   Incentive Spirometer Predicted Level (mL) 1500   Administration (IS) instruction provided, follow-up   Number of Repetitions (IS) 8   Level Incentive Spirometer (mL) 450   Patient Tolerance (IS) fair

## 2022-07-11 NOTE — PT/OT/SLP EVAL
Physical Therapy Evaluation    Patient Name:  Matteo Fraire   MRN:  [id number]    Recommendations:     Discharge Recommendations:  rehabilitation facility   Discharge Equipment Recommendations: other (see comments) (TBD)   Barriers to discharge: increase assist with mobility    Assessment:     Matteo Fraire is a 61 y.o. male admitted with a medical diagnosis of STEMI (ST elevation myocardial infarction).  He presents with the following impairments/functional limitations:  weakness, impaired endurance, impaired self care skills, impaired functional mobilty, gait instability, impaired balance, decreased lower extremity function, decreased safety awareness, impaired cardiopulmonary response to activity, pain.    Pt found up in chair on 4L SAO2 WFL. Sit to stand with mod A x 2 to RW to maintain sternal precautions. Static standing at RW x 5 mins initiated standing marches with decrease foot clearance and resulted in report of mild dizziness and became diaphoretic. Returned to seated and nurse notified.    Rehab Prognosis: Fair; patient would benefit from acute skilled PT services to address these deficits and reach maximum level of function.    Recent Surgery: Procedure(s) (LRB):  CABG, REPEAT (N/A)  HARVEST-VEIN-ENDOVASCULAR (Right)  SURGICAL PROCUREMENT,ARTERY,RADIAL,FOR CABG (Left) 3 Days Post-Op    Plan:     During this hospitalization, patient to be seen daily to address the identified rehab impairments via gait training, therapeutic activities, therapeutic exercises, neuromuscular re-education and progress toward the following goals:    · Plan of Care Expires:  08/11/22    Subjective     Chief Complaint: weak  Patient/Family Comments/goals: get better  Pain/Comfort:  · Pain Rating 1: 0/10    Patients cultural, spiritual, Rastafari conflicts given the current situation: no    Living Environment:  Pt lives in a raised two story with his wife with 10 JOEL with BHR. (+)  no O2 at home  Prior to admission,  patients level of function was Independent no AD.  Equipment used at home: shower chair.  DME owned (not currently used): none.  Upon discharge, patient will have assistance from wife.    Objective:     Communicated with RN prior to session.  Patient found up in chair with peripheral IV, telemetry, pulse ox (continuous), oxygen, blood pressure cuff, chest tube, lee catheter  upon PT entry to room.    General Precautions: Standard, fall, NPO   Orthopedic Precautions:N/A   Braces: N/A  Respiratory Status: Nasal cannula, flow 4 L/min    Exams:  · RLE ROM: WFL  · RLE Strength: 4+/5 throughout  · LLE ROM: WFL  · LLE Strength: 4+/5 throughout    Functional Mobility:  · Transfers:     · Sit to Stand:  moderate assistance and of 2 persons with rolling walker    Therapeutic Activities and Exercises:   Pt educated on POC, discharge recommendation, sternal precautions, importance of time OOB, pursed lip breathing, proper form with transfers, need for assist with mobility, use of call bell to seek assistance as needed and fall prevention      AM-PAC 6 CLICK MOBILITY  Total Score:10     Patient left up in chair with all lines intact, call button in reach and respiratory and RN present.    GOALS:   Multidisciplinary Problems     Physical Therapy Goals        Problem: Physical Therapy    Goal Priority Disciplines Outcome Goal Variances Interventions   Physical Therapy Goal     PT, PT/OT Ongoing, Progressing     Description: Goals to be met by: 22     Patient will increase functional independence with mobility by performin. Supine to sit with Supervision  2. Sit to stand transfer with Supervision  3. Bed to chair transfer with Supervision using Rolling Walker  4. Gait  x 150 feet with Supervision using Rolling Walker.                          History:     Past Medical History:   Diagnosis Date    Heart attack ;    Hypertension     Psoriasis        Past Surgical History:   Procedure Laterality Date     CORONARY ANGIOGRAPHY INCLUDING BYPASS GRAFTS WITH CATHETERIZATION OF LEFT HEART Left 6/27/2022    Procedure: Left heart cath;  Surgeon: Stevan Powell MD;  Location: [facility] CATH/EP LAB;  Service: Cardiology;  Laterality: Left;    CORONARY ARTERY BYPASS GRAFT      CYSTOSCOPY W/ URETERAL STENT PLACEMENT Left 11/21/2019    Procedure: CYSTOSCOPY, WITH URETERAL STENT INSERTION;  Surgeon: Mickey Escudero MD;  Location: [facility] OR;  Service: Urology;  Laterality: Left;    ENDOSCOPIC HARVEST OF VEIN Right 7/8/2022    Procedure: HARVEST-VEIN-ENDOVASCULAR;  Surgeon: Eyal Stone MD;  Location: [facility];  Service: Cardiothoracic;  Laterality: Right;    EXTRACORPOREAL SHOCK WAVE LITHOTRIPSY Left 11/21/2019    Procedure: LITHOTRIPSY, ESWL;  Surgeon: Mickey Escudero MD;  Location: [facility] OR;  Service: Urology;  Laterality: Left;    HERNIA REPAIR      Inguinal just after birth    REPEAT CORONARY ARTERY BYPASS GRAFTING N/A 7/8/2022    Procedure: CABG, REPEAT;  Surgeon: Eyal Stone MD;  Location: [facility];  Service: Cardiothoracic;  Laterality: N/A;  drugs ordered 7-7  @1322      SURGICAL PROCUREMENT, ARTERY, RADIAL, FOR CABG Left 7/8/2022    Procedure: SURGICAL PROCUREMENT,ARTERY,RADIAL,FOR CABG;  Surgeon: Eyal Stone MD;  Location: [facility];  Service: Cardiothoracic;  Laterality: Left;       Time Tracking:     PT Received On: 07/11/22  PT Start Time: 1016     PT Stop Time: 1030  PT Total Time (min): 14 min     Billable Minutes: Evaluation 6 and Therapeutic Activity 8      07/11/2022

## 2022-07-11 NOTE — PT/OT/SLP EVAL
Speech Language Pathology Evaluation  Bedside Swallow    Patient Name:  Matteo Fraire   MRN:  [id number]  Admitting Diagnosis: STEMI (ST elevation myocardial infarction)    Recommendations:                 General Recommendations:    · Strict NPO with non oral medications: MBS once Pt is managing secretions and demonstrating readiness stable respiratory rate and oxygen saturations  · Consider neurology: infarct noted on CT involving right basal ganglia     Diet recommendations:  · Strict NPO     Aspiration Precautions:   · Frequent oral hygiene  · Frequent suctioning      General Precautions: Standard, aspiration, NPO    History:       Per EMR:   [facility] 6/28:   61-year-old male past medical history of coronary artery disease status post CABG 2011 and stent 2005 both occasions they were related with MIs course in the ER today because he started having chest pain about 1 hour prior to arrival it was a pressure-like pain associated with sweating and dry heaving is with radiation to both arms sensation was similar to his MI 2005 he got 2 aspirins and appears to present 1 hour later when he got to the EKG showed STEMI so he was taken to cath lab where he had angioplasty of LIMA to LAD by Dr. Powell he is currently on nitroglycerin drip due to high blood pressure he also received morphine IV 4 mg Zofran IV 4 and labetalol 10 mg IV admitted to ICU.     Hospital course:  61-year-old with prior history of CAD status post PCI and CABG came with chest pain found to have STEMI status post balloon angioplasty to LAD - now status post redo 3 vessel CABG.  Hospital stay notable for episodes of NSVT after which he was initiated on lidocaine infusion.  Postoperative stay notable for persistent ST elevation which improved with nitroglycerin infusion.       CXR     IMPRESSION:  1. Post sternotomy changes with interval removal of support devices as above. No acute radiographic abnormalities.    CT HEAD  IMPRESSION: No acute intracranial  process     Area of encephalomalacia in the anterior right temporal lobe compatible with prior infarct     Small lacunar infarct in the right basal ganglia with mild periventricular small vessel disease  Past Medical History:   Diagnosis Date    Heart attack 2005;2011    Hypertension     Psoriasis        Past Surgical History:   Procedure Laterality Date    CORONARY ANGIOGRAPHY INCLUDING BYPASS GRAFTS WITH CATHETERIZATION OF LEFT HEART Left 6/27/2022    Procedure: Left heart cath;  Surgeon: Stevan Powell MD;  Location: [facility] [place] CATH/EP LAB;  Service: Cardiology;  Laterality: Left;    CORONARY ARTERY BYPASS GRAFT      CYSTOSCOPY W/ URETERAL STENT PLACEMENT Left 11/21/2019    Procedure: CYSTOSCOPY, WITH URETERAL STENT INSERTION;  Surgeon: Mickey Escudero MD;  Location: [facility] [place] OR;  Service: Urology;  Laterality: Left;    ENDOSCOPIC HARVEST OF VEIN Right 7/8/2022    Procedure: HARVEST-VEIN-ENDOVASCULAR;  Surgeon: Eyal Stone MD;  Location: [facility];  Service: Cardiothoracic;  Laterality: Right;    EXTRACORPOREAL SHOCK WAVE LITHOTRIPSY Left 11/21/2019    Procedure: LITHOTRIPSY, ESWL;  Surgeon: Mickey Escudero MD;  Location: [facility] [place] OR;  Service: Urology;  Laterality: Left;    HERNIA REPAIR      Inguinal just after birth    REPEAT CORONARY ARTERY BYPASS GRAFTING N/A 7/8/2022    Procedure: CABG, REPEAT;  Surgeon: Eyal Stone MD;  Location: [facility];  Service: Cardiothoracic;  Laterality: N/A;  drugs ordered 7-7  @1322      SURGICAL PROCUREMENT, ARTERY, RADIAL, FOR CABG Left 7/8/2022    Procedure: SURGICAL PROCUREMENT,ARTERY,RADIAL,FOR CABG;  Surgeon: Eyal Stone MD;  Location: [facility];  Service: Cardiothoracic;  Laterality: Left;         Subjective     Pt alert, cooperative   RN present at bedside   Patient goals: none specified     Pain/Comfort:  · Pain Rating 1: 0/10None promoted     Respiratory Status: Nasal cannula, flow 4 L/min    Objective:     Oral Musculature  Evaluation  Oral Musculature: facial asymmetry present, left weakness  Dentition: present and adequate  Secretion Management: problems swallowing secretions, coughing on secretions (drooling)  Oral Labial Strength and Mobility: impaired retraction, impaired pursing (secondaryt to left sided facial paresis)  Lingual Strength and Mobility: WNL (protrudes midline able to lateralize right and left)  Volitional Cough: Present however, inconsistently effective to bring up secreretions for suction  Volitional Swallow: Hyolaryngeal lift present  Voice Prior to PO Intake: Clear in nature, reduced intensity  Oral Musculature Comments: Left sided facial droop with minimal retraction on left, inability to raise left eyebrow. Speech is intelligible however with slightly diminished precision and rate.  Bilateral and equal sensation to forehead, cheek and jaw     Bedside Swallow Eval:   · Wet coughing administration of PO meds (crushed in pudding) upon approach  · Pt suctioning brown tinged material with cough effort   · Continuous wet coughing on secretions; cough effort improved with cardiac pillow  · Able to suction copious amounts from oropharynx   · PO presentations not appropriate     Cognitive communication status:   · Alert, oriented  · Follows simple commands  · Verbal expression fluent, appropriate   · Accurately recalls recent medical events and procedures   · Promotes recent double vision (no signs of left visual field cut or neglect on screening tasks)     Assessment:     Matteo Fraire is a 61 y.o. male with an SLP diagnosis of dysphagia, acute in nature s/p CABG (7/8/2022) with infarct involving the right basal ganglia. At this time, Pt is unable to swallow secretions and requiring continuous suctioning. Recommend proceeding with MBS once Pt is managing secretions and demonstrating readiness stable respiratory rate and oxygen saturations.    Goals:   Multidisciplinary Problems     SLP Goals        Problem: SLP     Goal Priority Disciplines Outcome   SLP Goal     SLP Ongoing, Progressing   Description: 1. Pt will tolerate least restrictive PO diet without acute dysphagia pulmonary complication.   2. PENDING: Pt will participate in VFSS to define swallow physiology; treatment goal to follow                      Plan:     · Patient to be seen:  5 x/week   · Plan of Care expires:     · Plan of Care reviewed with:  patient (RN, MD)   · SLP Follow-Up:  Yes       Barriers to Discharge:  Level of Skilled Assistance Needed . and Safety Awareness .    Time Tracking:     SLP Treatment Date:   07/11/22  Speech Start Time:  0945  Speech Stop Time:  1000     Speech Total Time (min):  15 min    Billable Minutes: Eval Swallow and Oral Function 15    07/11/2022

## 2022-07-11 NOTE — NURSING
Called to the pt's room while ST was evaluating pt. Left facial droop noted while pt smiles. NIHSS of 2. Spoke with Dr. Valentin about the above. Okay to get a gas and watch pt for now. No other orders at this time.

## 2022-07-11 NOTE — PT/OT/SLP EVAL
Occupational Therapy   Evaluation    Name: Matteo Fraire  MRN: [id number]  Admitting Diagnosis:  STEMI (ST elevation myocardial infarction)  Recent Surgery: Procedure(s) (LRB):  CABG, REPEAT (N/A)  HARVEST-VEIN-ENDOVASCULAR (Right)  SURGICAL PROCUREMENT,ARTERY,RADIAL,FOR CABG (Left) 3 Days Post-Op    Recommendations:     Discharge Recommendations: rehabilitation facility  Discharge Equipment Recommendations:   (TBD)  Barriers to discharge:  None    Assessment:     Matteo Fraire is a 61 y.o. male with a medical diagnosis of STEMI (ST elevation myocardial infarction).  He presents with general weakness s/p CABG. Performance deficits affecting function: weakness, impaired endurance, impaired self care skills, impaired functional mobilty, gait instability, impaired balance, impaired cardiopulmonary response to activity.      Rehab Prognosis: Good; patient would benefit from acute skilled OT services to address these deficits and reach maximum level of function.       Plan:     Patient to be seen 6 x/week to address the above listed problems via self-care/home management, therapeutic activities, therapeutic exercises  · Plan of Care Expires: 08/11/22  · Plan of Care Reviewed with: patient, spouse    Subjective     Chief Complaint: general weakness  Patient/Family Comments/goals: improved functional mobility and ADL independence.    Occupational Profile:  Living Environment: lives with spouse in an elevated home with 5 steps and bilateral rails to enter.  Previous level of function: independent with ADLs, IADLs and driving.  Roles and Routines: primary homemaker  Equipment Used at Home:  shower chair  Assistance upon Discharge: spouse    Pain/Comfort:  · Pain Rating 1: 2/10  · Location 1:  (Chest tube incision site)  · Pain Rating Post-Intervention 1: 2/10    Patients cultural, spiritual, Latter-day conflicts given the current situation: no    Objective:     Communicated with: nurse prior to session.  Patient found up in  chair with telemetry, peripheral IV, pulse ox (continuous), oxygen, central line, chest tube, lee catheter upon OT entry to room.    General Precautions: Standard, fall, sternal   Orthopedic Precautions:N/A   Braces: N/A  Respiratory Status: Nasal cannula, flow 4 L/min    Occupational Performance:    Functional Mobility/Transfers:  · Patient completed Sit <> Stand Transfer with moderate assistance  with  hand-held assist     Activities of Daily Living:  · Grooming: moderate assistance to doff socks sitting in chair    Cognitive/Visual Perceptual:  Cognitive/Psychosocial Skills:     -       Oriented to: Person, Place and Situation   -       Follows Commands/attention:Follows one-step commands  -       Communication: slurred speech  -       Memory: No Deficits noted  -       Safety awareness/insight to disability: impaired   -       Mood/Affect/Coping skills/emotional control: Cooperative, Flat affect and Pleasant  Visual/Perceptual:      -Intact Acuity    Physical Exam:  Balance:    -       Sitting: Contact Guard, Standing: Minimal Assist  Upper Extremity Range of Motion:     -       Right Upper Extremity: WFL  -       Left Upper Extremity: WFL  Upper Extremity Strength:    -       Right Upper Extremity: 3+/5  -       Left Upper Extremity: 3+/5   Strength:    -       Right Upper Extremity: fair  -       Left Upper Extremity: fair  Fine Motor Coordination:    -       Intact    AMPAC 6 Click ADL:  AMPAC Total Score: 15    Treatment & Education:  Patient educated on the purpose of Occupational Therapy and the importance of getting OOB.  Education:    Patient left up in chair with all lines intact and call button in reach    GOALS:   Multidisciplinary Problems     Occupational Therapy Goals        Problem: Occupational Therapy    Goal Priority Disciplines Outcome Interventions   Occupational Therapy Goal     OT, PT/OT     Description: Goals to be met by: 8/11/2022     Patient will increase functional independence  with ADLs by performing:    UE Dressing with Supervision.  LE Dressing with Supervision.  Grooming while standing at sink with Supervision.  Toileting from toilet with Supervision for hygiene and clothing management.   Toilet transfer to toilet with Supervision.  Perform 30 minutes sitting/standing ADL activity while maintaining sternal precautions with no verbal cues.                     History:     Past Medical History:   Diagnosis Date    Heart attack 2005;2011    Hypertension     Psoriasis        Past Surgical History:   Procedure Laterality Date    CORONARY ANGIOGRAPHY INCLUDING BYPASS GRAFTS WITH CATHETERIZATION OF LEFT HEART Left 6/27/2022    Procedure: Left heart cath;  Surgeon: Stevan Powell MD;  Location: [facility] CATH/EP LAB;  Service: Cardiology;  Laterality: Left;    CORONARY ARTERY BYPASS GRAFT      CYSTOSCOPY W/ URETERAL STENT PLACEMENT Left 11/21/2019    Procedure: CYSTOSCOPY, WITH URETERAL STENT INSERTION;  Surgeon: Mickey Escudero MD;  Location: [facility] OR;  Service: Urology;  Laterality: Left;    ENDOSCOPIC HARVEST OF VEIN Right 7/8/2022    Procedure: HARVEST-VEIN-ENDOVASCULAR;  Surgeon: Eyal Stone MD;  Location: [facility];  Service: Cardiothoracic;  Laterality: Right;    EXTRACORPOREAL SHOCK WAVE LITHOTRIPSY Left 11/21/2019    Procedure: LITHOTRIPSY, ESWL;  Surgeon: Mickey Escudero MD;  Location: [facility] OR;  Service: Urology;  Laterality: Left;    HERNIA REPAIR      Inguinal just after birth    REPEAT CORONARY ARTERY BYPASS GRAFTING N/A 7/8/2022    Procedure: CABG, REPEAT;  Surgeon: Eyal Stone MD;  Location: [facility];  Service: Cardiothoracic;  Laterality: N/A;  drugs ordered 7-7  @1322      SURGICAL PROCUREMENT, ARTERY, RADIAL, FOR CABG Left 7/8/2022    Procedure: SURGICAL PROCUREMENT,ARTERY,RADIAL,FOR CABG;  Surgeon: Eyal Stone MD;  Location: [facility];  Service: Cardiothoracic;  Laterality: Left;       Time Tracking:     OT Date of Treatment:  07/11/22  OT Start Time: 1106  OT Stop Time: 1129  OT Total Time (min): 23 min    Billable Minutes:Evaluation 8  Therapeutic Activity 15    7/11/2022

## 2022-07-11 NOTE — PROGRESS NOTES
CVT SURGERY PROGRESS NOTE  Matteo Fraire  61 y.o.  1960    Patient's Chief Complaint:  STEMI (ST elevation myocardial infarction)    HPI: 61-year-old white male with extensive past cardiac history including hypertension and multiple myocardial infarctions status post PCI previous CABG in 2011. He presented to [facility] yesterday evening after doing some yard work resulting in chest pain associated with nausea and vomiting.  Repeat EKG showed ST-elevation myocardial infarction anterior leads .  He was brought for emergent left heart catheterization worse LAD was ballooned. Cardiothoracic surgery was consulted for evaluation of redo CABG versus staged PCI.    Hospital Course: Redo coronary artery bypass grafting x3 using left radial artery to the left anterior descending coronary artery and separate segments of saphenous vein from the aorta to the posterior descending coronary artery and from the aorta to the obtuse marginal coronary artery using a combination of antegrade and retrograde cardioplegia, mild systemic hypothermia, endoscopic vein harvest, left radial artery harvest, and complete pericardiectomy due to severe adhesions from previous surgery as well as insertion of right femoral arterial line with percutaneous Seldinger technique by Dr. Stone 7/8/22.     Last 24 hour Interval:  -Small lacunar infarct in the right basal ganglia   -Hemodynamics stable  -H&H stable  -Patient has not ambulated since surgery.   -Pleural bulb with 50 ml. To bulb suction.   -Mediastinal chest tubes with 46 ml. No air leak to suction.     Subjective:  Symptoms:  Stable.    Diet:  Adequate intake.    Activity level: Impaired due to weakness.    Pain:  He complains of pain that is mild.  Pain is well controlled.                                                                 Objective:  General Appearance:  Well-appearing.    Vital signs: (most recent): Blood pressure 115/68, pulse 107, temperature 99.2 °F  "(37.3 °C), temperature source Axillary, resp. rate (!) 26, height 5' 9" (1.753 m), weight 96.4 kg (212 lb 8.4 oz), SpO2 (!) 89 %.  Vital signs are normal.    Output: Producing urine.    Lungs:  Normal effort and normal respiratory rate.    Heart: Normal rate.  Regular rhythm.    Abdomen: Abdomen is soft.    Neurological: Patient is alert and oriented to person, place and time.    Skin:  Warm and dry.  (Surgical dressings CDI)    Recent Vitals:  Vitals:    07/11/22 0900 07/11/22 0915 07/11/22 0930 07/11/22 0945   BP:   115/68    BP Location:       Patient Position:       Pulse: 99 97 98 107   Resp: (!) 29 (!) 24 (!) 30 (!) 26   Temp:       TempSrc:       SpO2: (!) 89% (!) 90% (!) 93% (!) 89%   Weight:       Height:           INPATIENT MEDS   sodium chloride 0.45% 50 mL/hr at 07/10/22 2244    amiodarone in dextrose 5% 0.5 mg/min (07/11/22 0312)    carboxymethylcellulose      insulin regular 1 units/mL infusion orderable (CTS POST-OP) Stopped (07/09/22 0310)    nitroGLYCERIN 15 mcg/min (07/11/22 0309)    propofoL Stopped (07/09/22 1400)      aspirin  81 mg Oral Daily    atorvastatin  80 mg Oral Daily    docusate sodium  100 mg Oral BID    furosemide (LASIX) injection  40 mg Intravenous Once    losartan  50 mg Oral Daily    metoprolol tartrate  25 mg Oral BID    tamsulosin  0.4 mg Oral Daily     acetaminophen, albumin human 5%, ALPRAZolam, calcium gluconate IVPB, calcium gluconate IVPB, calcium gluconate IVPB, carboxymethylcellulose, dextrose 50%, dextrose 50%, HYDROcodone-acetaminophen, insulin aspart U-100, magnesium sulfate IVPB, magnesium sulfate IVPB, melatonin, morphine, morphine, mupirocin, ondansetron, potassium chloride in water **AND** potassium chloride in water **AND** potassium chloride in water, sodium phosphate IVPB, sodium phosphate IVPB, sodium phosphate IVPB  HEMODYNAMICS      Recent O2 Therapy/Vent Settings:  RA  I/O last 24 hrs:  Intake/Output - Last 3 Shifts       07/09 0700  07/10 0659 " "07/10 0700 07/11 0659 07/11 0700 07/12 0659    P.O.  0     I.V. (mL/kg) 1949.9 (20.3) 1859.5 (19.3)     Blood       IV Piggyback 332.6      Total Intake(mL/kg) 2282.5 (23.7) 1859.5 (19.3)     Urine (mL/kg/hr) 1090 (0.5) 3065 (1.3)     Drains 140 51     Chest Tube 43 46     Total Output 1273 3162     Net +1009.5 -1302.5                Recent Cardiac Rhythm: NSR    Recent Pain Assessment: [place]  CBC  Recent Labs   Lab 07/09/22  0400 07/10/22  0323 07/11/22  0337   WBC 13.72* 16.69* 10.88   RBC 4.19* 3.72* 3.44*   HGB 12.2* 10.6* 10.1*    160 141*   MCV 86 89 89   MCH 29.1 28.5 29.4   MCHC 33.7 32.1 32.9     BMP  Recent Labs   Lab 07/09/22  0400 07/10/22  0323 07/11/22  0337   CO2 22* 27 28   BUN 16 24* 25*   CREATININE 1.0 1.1 1.0   CALCIUM 7.7* 7.8* 7.7*     CARDIAC ENZYMES  Recent Labs   Lab 07/09/22  0400 07/09/22  0810 07/10/22  0323   TROPONINI 62.273* 62.262* 63.503*     BNP  No results for input(s): BNP in the last 168 hours.  PT/INR  INR   Date Value Ref Range Status   06/27/2022 1.1  Final     Comment:     Coumadin Therapy:  INR: 2.0-3.0 conventional anticoagulation    INR: 2.5-3.5 intensive anticoagulation     11/20/2019 1.1  Final     Comment:     Coumadin Therapy:  INR: 2.0-3.0 conventional anticoagulation  INR: 2.5-3.5 intensive anticoagulation     01/28/2011 1.0 0.8 - 1.2 Final     Comment:     ACCP Guideline for Coumadin usage recommends a "target range" of  2.0 - 3.0 for INR for all indicators except mechanical heart valves  and antiphospholipid syndromes which should use 2.5 - 3.5.  .   01/27/2011 1.0 0.8 - 1.2 Final     Comment:     ACCP Guideline for Coumadin usage recommends a "target range" of  2.0 - 3.0 for INR for all indicators except mechanical heart valves  and antiphospholipid syndromes which should use 2.5 - 3.5.  .   01/26/2011 1.0 0.8 - 1.2 Final     Comment:     ACCP Guideline for Coumadin usage recommends a "target range" of  2.0 - 3.0 for INR for all indicators except " mechanical heart valves  and antiphospholipid syndromes which should use 2.5 - 3.5.  .         DIAGNOSTIC RESULTS:    ECG Results          EKG 12-lead (In process)  Result time 07/09/22 06:06:25    In process by Interface, Lab In [facility] (07/09/22 06:06:25)                 Narrative:    Test Reason : R07.9,    Vent. Rate : 091 BPM     Atrial Rate : 091 BPM     P-R Int : 254 ms          QRS Dur : 086 ms      QT Int : 354 ms       P-R-T Axes : 072 090 057 degrees     QTc Int : 435 ms    Sinus rhythm with 1st degree A-V block with occasional Premature  ventricular complexes  Rightward axis  Anterior infarct , possibly acute  Lateral injury pattern    ACUTE MI / STEMI    Abnormal ECG  No previous ECGs available    Referred By: ADEBAYO   SELF           Confirmed By:                              EKG 12-lead (Final result)  Result time 07/03/22 13:49:52    Final result by Interface, Lab In [facility] (07/03/22 13:49:52)                 Narrative:    Test Reason : R07.9,    Vent. Rate : 076 BPM     Atrial Rate : 076 BPM     P-R Int : 216 ms          QRS Dur : 104 ms      QT Int : 388 ms       P-R-T Axes : 040 087 039 degrees     QTc Int : 436 ms    Sinus rhythm with 1st degree A-V block  Incomplete right bundle branch block  Anteroseptal infarct (cited on or before 27-JUN-2022)  Lateral injury pattern    ACUTE MI / STEMI    Abnormal ECG  When compared with ECG of 27-JUN-2022 20:35,  Premature ventricular complexes are no longer Present  Serial changes of Anteroseptal infarct Present  Confirmed by Perfecto VIRGEN, Schuyler FINNEY ([id number]) on 7/3/2022 1:49:41 PM    Referred By: ADEBAYO   SELF           Confirmed By:Schuyler Grove MD                                CURRENT CONSULTS:  IP CONSULT TO CARDIOTHORACIC SURGERY  IP CONSULT TO CARDIOLOGY  IP CONSULT TO REGISTERED DIETITIAN/NUTRITIONIST  IP CONSULT TO CARDIAC REHAB    ASSESSMENT/PLAN:    History PCI 2005  History CABG x2 2011  Chest pain  STEMI  Multivessel CAD  S/p REDO CABG x 3 and  complete pericardectomy by Dr. Stone 7/8/22  -POD # 3  -Hemodynamically stable  -Tele reviewed - NSR  -H&H stable  -Leukocytosis resolved  -Chest tubes and pacer wires to remain  -Surgical Dressings CDI.   -OOB TID with meals. Ambulate if tolerated.  -Continue/Encourage IS at least Q 2 hours  -Cardiac Rehab consulted  -Continue ASA, BB, statin  -Replace electrolytes accordingly  -Lasix x 1  -OOB to chair. Only in kary to sleep. Will pull chest tubes once patient ambulates;.     CVA  -Per CT: Small lacunar infarct in the right basal ganglia with mild periventricular small vessel disease  -Recommend neuro consult    Acute blood loss anemia, post operative  -Expected post-op.  -H&H: 10/30  -Continue to monitor     Thrombocytopenia, post-operative  -Expected  -Plt ct: 141,000  -Continue to monitor    GI Prophylaxis: proton pump inhibitor per orders  DVT Prophylaxis:   Anticoagulants   Medication Route Frequency           Skyler Duke AGACNP-BC  7/11/2022  10:00 AM

## 2022-07-11 NOTE — CARE UPDATE
07/11/22 1036   Patient Assessment/Suction   Level of Consciousness (AVPU) alert   Respiratory Effort Normal;Mild   Expansion/Accessory Muscles/Retractions no retractions   All Lung Fields Breath Sounds clear   Rhythm/Pattern, Respiratory pattern regular   Cough Frequency infrequent   PRE-TX-O2   O2 Device (Oxygen Therapy) High Flow nasal Cannula   $ Is the patient on Low Flow Oxygen? Yes   Flow (L/min) 7   SpO2 (!) 92 %   Pulse Oximetry Type Continuous   $ Pulse Oximetry - Single Charge Pulse Oximetry - Single   Education   $ Education 15 min   Labs   $ Was an ABG obtained? A Line   $ Labs Tech Time 15 min   Provider Notification   Reason for Communication Evaluate   Provider Name Berenice Trent RN   Provider Role Nurse   Method of Communication Face to face   Response At bedside   Notification Time 1036   Respiratory Evaluation   $ Care Plan Tech Time 15 min   $ Eval/Re-eval Charges Evaluation   Evaluation For New Orders  (care plan)

## 2022-07-11 NOTE — PROGRESS NOTES
[facility]  Department of Cardiology  Progress Note    PATIENT NAME: Matteo Fraire  MRN: [id number]  TODAY'S DATE: 07/11/2022  ADMIT DATE: 6/27/2022    SUBJECTIVE     PRINCIPLE PROBLEM: STEMI (ST elevation myocardial infarction)    INTERVAL HISTORY:    7/11/2022   No further runs of NSVT. Continues to have atypical mentation, now with facial droop and complaint of trouble seeing and focusing in left eye. CT head was negative for any acute ischemia. Only complaint from patient is the vision. He is tachycardic today. Failed swallow study again.     7/10/2022  Patient continues to asymptomatic NSVT. On oxymask, having a lot of congestion. Seems out of it still despite morphine being given 3 hours ago. BP stable on nitro gtt.     7/9/2022  Patient developed ST elevations overnight post CABG. Troponins were checked and are still rising at 62. He remains intubated and sedated. BP stable on cleviprex gtt. ST elevations have improved.     7/7/2022  Patient had 42 beat run of Vtach this morning, asymp[tomatic. No further chest pain. VSS.     7/6/2022  PATIENT FOR CABG THIS Friday. Had some mild chest pains after walking for 40 minutes this morning. Has not required nitro yet. VSS.     7/5/2022  Patient still waiting for CABG, ate breakfast this AM. No chest pain events. VSS.     7/4/2022  Patient is awake alert lying comfortably in bed not in any acute distress.  Patient had walked in the hallway 4 times already.  Denies any chest pain or tightness or heaviness his breathing is good denies any shortness of breath.  Awaiting for CABG.    Review of patient's allergies indicates:  No Known Allergies    REVIEW OF SYSTEMS  Unable to obtain     OBJECTIVE     VITAL SIGNS (Most Recent)  Temp: 99.2 °F (37.3 °C) (07/11/22 0730)  Pulse: 104 (07/11/22 1045)  Resp: (!) 23 (07/11/22 1045)  BP: 122/70 (07/11/22 1030)  SpO2: (!) 94 % (07/11/22 1045)    VENTILATION STATUS  Resp: (!) 23 (07/11/22 1045)  SpO2: (!) 94 % (07/11/22  1045)       I & O (Last 24H):    Intake/Output Summary (Last 24 hours) at 7/11/2022 1113  Last data filed at 7/11/2022 0525  Gross per 24 hour   Intake 1615.76 ml   Output 1947 ml   Net -331.24 ml       WEIGHTS  Wt Readings from Last 1 Encounters:   07/07/22 0520 96.4 kg (212 lb 8.4 oz)   07/05/22 0301 96.9 kg (213 lb 10 oz)   07/01/22 0300 97.3 kg (214 lb 8.1 oz)   06/30/22 0300 100.5 kg (221 lb 9.6 oz)   06/27/22 2254 101.1 kg (222 lb 14.2 oz)   06/27/22 1954 99.8 kg (220 lb)       PHYSICAL EXAM  CONSTITUTIONAL: on oxymask, seems confused and slow   NECK: no carotid bruit, no JVD  LUNGS: coarse   CHEST WALL: tubes and wires in place   HEART: regular rate and rhythm, S1, S2 normal, no murmur, click, rub or gallop   ABDOMEN: soft, non-tender; bowel sounds normal; no masses,  no organomegaly  EXTREMITIES: Edematous UE   NEURO: symmetric grimmace, soft  UE BL, wiggles both all toes BL     SCHEDULED MEDS:   aspirin  81 mg Oral Daily    atorvastatin  80 mg Oral Daily    docusate sodium  100 mg Oral BID    losartan  50 mg Oral Daily    metoprolol tartrate  25 mg Oral BID    tamsulosin  0.4 mg Oral Daily       CONTINUOUS INFUSIONS:   amiodarone in dextrose 5% 0.5 mg/min (07/11/22 0312)    carboxymethylcellulose      nitroGLYCERIN 10 mcg/min (07/11/22 1025)    propofoL Stopped (07/09/22 1400)       PRN MEDS:acetaminophen, albumin human 5%, ALPRAZolam, calcium gluconate IVPB, calcium gluconate IVPB, calcium gluconate IVPB, carboxymethylcellulose, dextrose 50%, dextrose 50%, HYDROcodone-acetaminophen, insulin aspart U-100, magnesium sulfate IVPB, magnesium sulfate IVPB, melatonin, morphine, morphine, mupirocin, ondansetron, potassium chloride in water **AND** potassium chloride in water **AND** potassium chloride in water, sodium phosphate IVPB, sodium phosphate IVPB, sodium phosphate IVPB    LABS AND DIAGNOSTICS     CBC LAST 3 DAYS  Recent Labs   Lab 07/09/22  0400 07/09/22  0502 07/10/22  0323 07/11/22  0337  07/11/22  1036   WBC 13.72*  --  16.69* 10.88  --    RBC 4.19*  --  3.72* 3.44*  --    HGB 12.2*  --  10.6* 10.1*  --    HCT 36.2*   < > 33.0* 30.7* 32*   MCV 86  --  89 89  --    MCH 29.1  --  28.5 29.4  --    MCHC 33.7  --  32.1 32.9  --    RDW 13.5  --  14.0 13.8  --      --  160 141*  --    MPV 11.7  --  12.0 11.4  --    GRAN 90.5*  12.4*  --  84.2*  14.1* 80.1*  8.7*  --    LYMPH 5.1*  0.7*  --  6.8*  1.1 12.0*  1.3  --    MONO 4.1  0.6  --  8.3  1.4* 7.4  0.8  --    BASO 0.00  --  0.01 0.01  --    NRBC 0  --  0 0  --     < > = values in this interval not displayed.       COAGULATION LAST 3 DAYS  Recent Labs   Lab 07/08/22  0327   APTT 40.8*       CHEMISTRY LAST 3 DAYS  Recent Labs   Lab 07/09/22  0400 07/09/22  0502 07/09/22  1609 07/09/22  1751 07/10/22  0323 07/11/22  0337 07/11/22  1036     --   --   --  138 137  --    K 3.9  --   --   --  4.1 4.0  --    *  --   --   --  104 102  --    CO2 22*  --   --   --  27 28  --    ANIONGAP 5*  --   --   --  7* 7*  --    BUN 16  --   --   --  24* 25*  --    CREATININE 1.0  --   --   --  1.1 1.0  --    *  --   --   --  188* 149*  --    CALCIUM 7.7*  --   --   --  7.8* 7.7*  --    PH  --    < > 7.492* 7.451*  --   --  7.459*   MG 1.9  --   --   --  2.0 2.1  --     < > = values in this interval not displayed.       CARDIAC PROFILE LAST 3 DAYS  Recent Labs   Lab 07/09/22  0400 07/09/22  0810 07/10/22  0323   TROPONINI 62.273* 62.262* 63.503*       ENDOCRINE LAST 3 DAYS  No results for input(s): TSH, PROCAL in the last 168 hours.    LAST ARTERIAL BLOOD GAS  ABG  Recent Labs   Lab 07/11/22  1036   PH 7.459*   PO2 57*   PCO2 36.8   HCO3 26.1   BE 2       LAST 7 DAYS MICROBIOLOGY   Microbiology Results (last 7 days)     ** No results found for the last 168 hours. **          MOST RECENT IMAGING  CT HEAD FOR STROKE  CMS MANDATED QUALITY DATA - CT RADIATION  436    All CT scans at this facility utilize dose modulation, iterative  reconstruction, and/or weight based dosing when appropriate to reduce radiation dose to as low as reasonably achievable.  CT head without contrast    Clinical data: Altered mental state, coronary artery disease status post redo CABG    FINDINGS: Noninfusion images were obtained from the skull base to the vertex. The ventricles and sulci are normal in size and configuration for age.    There is an area of encephalomalacia in the anterior right temporal lobe compatible with prior infarct. There is small lacunar infarct in the right basal ganglia. There is mild periventricular low attenuation compatible with cyst is chronic small vessel disease.    There is no hemorrhage, mass or midline shift. There are no extra-axial fluid collections. Cerebellum and brainstem are normal.    There are air-fluid levels within the sphenoid sinuses with mucosal thickening in the ethmoid air cells. The calvarium is intact.    IMPRESSION: No acute intracranial process    Area of encephalomalacia in the anterior right temporal lobe compatible with prior infarct    Small lacunar infarct in the right basal ganglia with mild periventricular small vessel disease    These findings were called to Berenice the patient's nurse at 2:25 PM    Electronically signed by:  Isis Blair MD  7/10/2022 2:26 PM CDT Workstation: [id number]  Echo  · The left ventricle is normal in size with concentric remodeling and   mildly decreased systolic function.  · The estimated ejection fraction is 43%.  · Left ventricular diastolic dysfunction.  · There are segmental left ventricular wall motion abnormalities.  · Anterior apical akinesis  · Normal right ventricular size with normal right ventricular systolic   function.     X-Ray Chest AP Portable  Chest single view    CLINICAL DATA: Postop, bypass    FINDINGS: AP view is compared to July 9. Borderline cardiomegaly is stable. Post sternotomy changes are noted. Endotracheal tube, [place] catheter, and  nasogastric tube have been removed. Right IJ central catheter remains in place with tip at superior vena cava. Left-sided chest tube and mediastinal drains also remain in place.    The lungs are clear. There is no evidence of pneumothorax.    IMPRESSION:  1. Post sternotomy changes with interval removal of support devices as above. No acute radiographic abnormalities.    Electronically signed by:  Sebastian Andino MD  7/10/2022 6:20 AM CDT Workstation: [id number]      [facility]  Results for orders placed during the hospital encounter of 06/27/22    Echo Saline Bubble? No    Interpretation Summary  · The left ventricle is normal in size with mild predominantly mid septal mild asymmetric hypertrophy eccentric hypertrophy and normal systolic function.  · The estimated ejection fraction is 60%.  · Indeterminate left ventricular diastolic function.  · Normal right ventricular size with normal right ventricular systolic function.  · Mild left atrial enlargement.  · Normal central venous pressure (3 mmHg).  · The estimated PA systolic pressure is 19 mmHg.  · Mild mitral regurgitation.      CURRENT/PREVIOUS VISIT EKG  Results for orders placed or performed during the hospital encounter of 06/27/22   EKG 12-lead    Collection Time: 07/09/22 12:41 AM    Narrative    Test Reason : I21.3,    Vent. Rate : 088 BPM     Atrial Rate : 088 BPM     P-R Int : 232 ms          QRS Dur : 094 ms      QT Int : 384 ms       P-R-T Axes : 043 101 132 degrees     QTc Int : 464 ms    Sinus rhythm with 1st degree A-V block with occasional Premature  ventricular complexes  Possible Inferior infarct ,age undetermined  Anterolateral infarct (cited on or before 27-JUN-2022)  Abnormal ECG  When compared with ECG of 08-JUL-2022 16:38,  Borderline criteria for Inferior infarct are now Present  Serial changes of Anterior infarct Present    Referred By: [email or web address]   SELF           Confirmed By:        ASSESSMENT/PLAN:     Active Hospital Problems     Diagnosis    *STEMI (ST elevation myocardial infarction)    NSVT (nonsustained ventricular tachycardia)    Coronary artery disease involving coronary bypass graft of native heart with angina pectoris       ASSESSMENT & PLAN:   1. Multivessel coronary artery disease  2. ST-elevation myocardial infarction status post PTCA  3. Nonsustained ventricular tachycardia  4. Mixed hyperlipidemia      RECOMMENDATIONS:  Continue nitro gtt  CT head negative for acute CVA  Agree with neuro consult    Continue amio gtt   Give 2.5 lopressor IV q6 for heart rate control         Fanny Amaya PA-C  Counts include 234 beds at the [facility]  Department of Cardiology  Date of Service: 07/11/2022  9:53 AM

## 2022-07-11 NOTE — PLAN OF CARE
Problem: SLP  Goal: SLP Goal  Description: 1. Pt will tolerate least restrictive PO diet without acute dysphagia pulmonary complication.   2. PENDING: Pt will participate in VFSS to define swallow physiology; treatment goal to follow     Outcome: Ongoing, Progressing

## 2022-07-11 NOTE — CONSULTS
Former[facility]  Department of Neurology  Neurology Consultation Note        PATIENT NAME: Matteo Fraire  MRN: [id number]  CSN: [id number]      TODAY'S DATE: 07/11/2022  ADMIT DATE: 6/27/2022                            CONSULTING PROVIDER: Louis Jarquin MD  CONSULT REQUESTED BY: Felton Valentin MD      Reason for consult: Encephalopathy and facial droop       History obtained from chart review and the patient.    HPI per EMR: Matteo Fraire is a 61 y.o. male with a history of 61-year-old with prior history of CAD status post PCI and CABG in 2011 came with chest pain found to have STEMI status post balloon angioplasty to LAD - now status post redo 3 vessel CABG 7/8 .  Hospital stay notable for episodes of NSVT after which he was initiated on lidocaine infusion.  Postoperative stay notable for persistent ST elevation which improved with nitroglycerin infusion.     Postoperatively patient has been lethargic and slow to respond.  He has been oriented x3.  This morning left facial droop was noted for which neurology was consulted.       Neurology consult:  Patient was seen and examined by me.  He is lethargic however able to respond questions and commands.  He did not have any episodes of SVT since last night, mental status has slightly improving as per the nurse.  He is oriented x3. On exam, he has a L facial droop and left lid lag.  He does not any other focal neurological deficits including no weakness or sensory changes in extremities.  He denies any headaches, nausea, vomiting any new symptoms.    PREVIOUS MEDICAL HISTORY:  Past Medical History:   Diagnosis Date    Heart attack 2005;2011    Hypertension     Psoriasis      PREVIOUS SURGICAL HISTORY:  Past Surgical History:   Procedure Laterality Date    CORONARY ANGIOGRAPHY INCLUDING BYPASS GRAFTS WITH CATHETERIZATION OF LEFT HEART Left 6/27/2022    Procedure: Left heart cath;  Surgeon: Stevan Powell MD;  Location: [facility] CATH/EP LAB;  Service:  "Cardiology;  Laterality: Left;    CORONARY ARTERY BYPASS GRAFT      CYSTOSCOPY W/ URETERAL STENT PLACEMENT Left 11/21/2019    Procedure: CYSTOSCOPY, WITH URETERAL STENT INSERTION;  Surgeon: Mickey Escudero MD;  Location: [facility] OR;  Service: Urology;  Laterality: Left;    ENDOSCOPIC HARVEST OF VEIN Right 7/8/2022    Procedure: HARVEST-VEIN-ENDOVASCULAR;  Surgeon: Eyal Stone MD;  Location: [facility];  Service: Cardiothoracic;  Laterality: Right;    EXTRACORPOREAL SHOCK WAVE LITHOTRIPSY Left 11/21/2019    Procedure: LITHOTRIPSY, ESWL;  Surgeon: Mickey Escudero MD;  Location: [facility] OR;  Service: Urology;  Laterality: Left;    HERNIA REPAIR      Inguinal just after birth    REPEAT CORONARY ARTERY BYPASS GRAFTING N/A 7/8/2022    Procedure: CABG, REPEAT;  Surgeon: Eyal Stone MD;  Location: [facility];  Service: Cardiothoracic;  Laterality: N/A;  drugs ordered 7-7  @1322      SURGICAL PROCUREMENT, ARTERY, RADIAL, FOR CABG Left 7/8/2022    Procedure: SURGICAL PROCUREMENT,ARTERY,RADIAL,FOR CABG;  Surgeon: Eyal Stone MD;  Location: [facility];  Service: Cardiothoracic;  Laterality: Left;     FAMILY MEDICAL HISTORY:  No family history on file.  SOCIAL HISTORY:  Social History     Tobacco Use    Smoking status: Never Smoker    Smokeless tobacco: Never Used   Substance Use Topics    Alcohol use: Never     Comment: "Quit in 87"    Drug use: Never     ALLERGIES:  Review of patient's allergies indicates:  No Known Allergies  HOME MEDICATIONS:  Prior to Admission medications    Medication Sig Start Date End Date Taking? Authorizing Provider   aspirin 325 MG tablet Take 650 mg by mouth once.   Yes Historical Provider   aspirin 81 MG Chew Take 81 mg by mouth once daily.    Historical Provider   HYDROcodone-acetaminophen (NORCO) 5-325 mg per tablet Take 1 tablet by mouth every 4 (four) hours as needed for Pain. 11/21/19   Mickey Escudero MD   ibuprofen (ADVIL,MOTRIN) 600 MG tablet Take 1 " tablet (600 mg total) by mouth every 6 (six) hours as needed for Pain. 11/16/19   Jerad Pugh Jr., MD   ondansetron (ZOFRAN) 4 MG tablet Take 1 tablet (4 mg total) by mouth every 6 (six) hours. 11/16/19   Jerad Pugh Jr., MD   tamsulosin (FLOMAX) 0.4 mg Cap Take 1 capsule (0.4 mg total) by mouth once daily. for 5 doses 11/16/19 11/21/19  Jerad Pugh Jr., MD     CURRENT SCHEDULED MEDICATIONS:   aspirin  81 mg Oral Daily    atorvastatin  80 mg Oral Daily    docusate sodium  100 mg Oral BID    losartan  50 mg Oral Daily    metoprolol  2.5 mg Intravenous Q6H    metoprolol tartrate  25 mg Oral BID    tamsulosin  0.4 mg Oral Daily     CURRENT INFUSIONS:   amiodarone in dextrose 5% 0.5 mg/min (07/11/22 0312)    carboxymethylcellulose      nitroGLYCERIN 25 mcg/min (07/11/22 1452)    propofoL Stopped (07/09/22 1400)     CURRENT PRN MEDICATIONS:  acetaminophen, albumin human 5%, ALPRAZolam, calcium gluconate IVPB, calcium gluconate IVPB, calcium gluconate IVPB, carboxymethylcellulose, dextrose 50%, dextrose 50%, HYDROcodone-acetaminophen, insulin aspart U-100, magnesium sulfate IVPB, magnesium sulfate IVPB, melatonin, morphine, morphine, mupirocin, ondansetron, potassium chloride in water **AND** potassium chloride in water **AND** potassium chloride in water, sodium phosphate IVPB, sodium phosphate IVPB, sodium phosphate IVPB    REVIEW OF SYSTEMS:  Please refer to the HPI for all pertinent positive and negative findings. A comprehensive review of all other systems was negative.       PHYSICAL EXAM:  Patient Vitals for the past 24 hrs:   BP Temp Temp src Pulse Resp SpO2   07/11/22 1515 -- -- -- (!) 111 (!) 30 95 %   07/11/22 1500 110/67 -- -- (!) 111 (!) 22 (!) 94 %   07/11/22 1445 -- -- -- (!) 112 (!) 26 95 %   07/11/22 1430 -- -- -- (!) 112 (!) 26 95 %   07/11/22 1415 -- -- -- (!) 113 (!) 24 (!) 93 %   07/11/22 1400 107/63 -- -- (!) 114 (!) 29 96 %   07/11/22 1345 -- -- -- (!) 117 (!) 22 (!) 92 %    07/11/22 1330 -- -- -- (!) 117 (!) 27 (!) 93 %   07/11/22 1315 -- -- -- (!) 116 (!) 30 (!) 94 %   07/11/22 1300 112/68 -- -- (!) 117 (!) 28 (!) 92 %   07/11/22 1249 -- -- -- -- (!) 28 --   07/11/22 1245 -- -- -- (!) 121 (!) 35 (!) 90 %   07/11/22 1230 -- -- -- (!) 120 (!) 31 (!) 92 %   07/11/22 1215 -- -- -- (!) 119 (!) 36 (!) 93 %   07/11/22 1200 117/71 100.1 °F (37.8 °C) Axillary (!) 115 19 (!) 92 %   07/11/22 1145 -- -- -- (!) 117 (!) 26 (!) 92 %   07/11/22 1130 -- -- -- (!) 118 (!) 34 (!) 93 %   07/11/22 1115 -- -- -- (!) 111 (!) 27 95 %   07/11/22 1100 118/60 -- -- 108 (!) 21 (!) 93 %   07/11/22 1045 -- -- -- 104 (!) 23 (!) 94 %   07/11/22 1036 -- -- -- -- -- (!) 92 %   07/11/22 1030 122/70 -- -- 108 (!) 36 95 %   07/11/22 1015 -- -- -- 102 (!) 28 (!) 94 %   07/11/22 1000 119/65 -- -- 108 (!) 27 (!) 90 %   07/11/22 0945 -- -- -- 107 (!) 26 (!) 89 %   07/11/22 0930 115/68 -- -- 98 (!) 30 (!) 93 %   07/11/22 0915 -- -- -- 97 (!) 24 (!) 90 %   07/11/22 0900 -- -- -- 99 (!) 29 (!) 89 %   07/11/22 0845 -- -- -- 94 (!) 23 (!) 93 %   07/11/22 0830 113/62 -- -- 94 20 (!) 93 %   07/11/22 0824 -- -- -- -- (!) 23 --   07/11/22 0730 117/72 99.2 °F (37.3 °C) Axillary 96 (!) 31 (!) 92 %   07/11/22 0715 -- -- -- 93 (!) 22 95 %   07/11/22 0700 118/64 -- -- 97 (!) 22 (!) 94 %   07/11/22 0600 128/67 -- -- 97 (!) 25 (!) 92 %   07/11/22 0500 120/62 -- -- 96 (!) 21 (!) 93 %   07/11/22 0400 126/63 -- -- 96 (!) 23 (!) 92 %   07/11/22 0330 125/67 -- -- -- -- --   07/11/22 0301 125/65 98.8 °F (37.1 °C) Oral 94 20 96 %   07/11/22 0200 119/61 -- -- 94 (!) 21 95 %   07/11/22 0130 115/63 -- -- -- -- --   07/11/22 0101 118/66 99.1 °F (37.3 °C) Oral 96 20 95 %   07/11/22 0030 121/76 -- -- -- -- --   07/11/22 0000 115/67 -- -- 97 20 (!) 94 %   07/10/22 2301 108/65 98.9 °F (37.2 °C) Oral 100 19 (!) 94 %   07/10/22 2238 -- -- -- -- (!) 23 --   07/10/22 2200 112/61 -- -- 103 (!) 24 (!) 93 %   07/10/22 2130 111/67 -- -- -- -- --   07/10/22 2114  -- -- -- 100 (!) 22 (!) 94 %   07/10/22 2101 105/62 -- -- 103 (!) 23 (!) 94 %   07/10/22 2030 112/66 -- -- -- -- --   07/10/22 2000 111/63 -- -- 105 (!) 24 (!) 93 %   07/10/22 1901 109/66 99 °F (37.2 °C) Oral 102 (!) 21 95 %   07/10/22 1700 (!) 107/58 -- -- 97 (!) 31 95 %   07/10/22 1645 -- -- -- 97 (!) 27 95 %   07/10/22 1630 106/68 -- -- 98 (!) 24 95 %   07/10/22 1615 -- -- -- 92 (!) 22 96 %   07/10/22 1600 (!) 101/59 -- -- 90 (!) 22 96 %   07/10/22 1545 -- -- -- 89 (!) 25 (!) 92 %   07/10/22 1539 -- 98.4 °F (36.9 °C) Axillary -- -- --   07/10/22 1530 116/67 -- -- (!) 113 (!) 26 (!) 92 %       GENERAL APPEARANCE: Lethargic, well-developed, well-nourished male in no acute distress.  HEENT: Normocephalic and atraumatic. PERRL. Oropharynx unremarkable.  PULM: Normal respiratory effort. No accessory muscle use.  CV: RRR.  ABDOMEN: Soft, nontender.  EXTREMITIES: No obvious signs of vascular compromise. Pulses present. No cyanosis, clubbing or edema.  SKIN: Clear; no rashes, lesions or skin breaks in exposed areas.    NEURO:  MENTAL STATUS: Lethargic, oriented x3.  Able to commands appropriately..  Affect labile.    CRANIAL NERVES:  CN I: Not tested.  CN II: Fundoscopic exam deferred.  CN III, IV, VI: Pupils equal, round and reactive to light.  Extraocular movements full and intact.  CN V: Facial sensation normal.  CN VII: Facial asymmetry noted for left facial droop. Left upper face is involved as well.   CN VIII: Hearing grossly normal and equal bilaterally.  No skew deviation or pathologic nystagmus.  CN IX, X: Palate elevates symmetrically. Speech/articulation is mildly dysarthric.  CN XI: Shoulder shrug and chin rotation equal with good strength.  CN XII: Tongue protrusion midline.    MOTOR:  Bulk normal. Tone normal and symmetric throughout.  Abnormal movements absent.  Tremor: none present.  Strength 5/5 throughout.    REFLEXES:  DTRs 2+ throughout.  Plantar response downgoing bilaterally.  SENSATION: grossly  intact throughout.  COORDINATION: normal finger-to-nose.  STATION: not tested.  GAIT: not tested.      NIHSS:  1a      Level of Consciousness (alert, drowsy, etc.):   0=alert; keenly responsive  1b.     Level of Consciousness Questions (month, age): 0= able to answer both questions  1c.     Level of Consciousness Commands (open, close eyes, make fist, let go):  0=Answers both tasks correctly  2.      Best Gaze (eyes open - patient follows examiner's finger or face):      0=normal  3.      Visual (introduce visual stimulus/threat to patient's visual field quadrants):  0=No visual loss  4.      Facial Palsy (show teeth, raise eyebrows and squeeze eyes shut):        3=Complete paralysis of one or both sides (absence of facial movement in the upper and lower face)  5a.     Motor Arm - Left (elevate extremity 90 degrees and score drift/movement):       0=No drift, limb holds 90 (or 45) degrees for full 10 seconds  5b.     Motor Arm - Right (elevate extremity 90 degrees and score drift/movement):      0=No drift, limb holds 90 (or 45) degrees for full 10 seconds  6a.     Motor Leg - Left (elevate extremity 30 degrees and score drift/movement):       0=No drift, limb holds 90 (or 45) degrees for full 10 seconds  6b      Motor Leg - Right (elevate extremity 30 degrees and score drift/movement):      0=No drift, limb holds 90 (or 45) degrees for full 10 seconds  7.      Limb Ataxia (finger-nose, heel down shin):      0=Absent  8.      Sensory (pin prick to face, arm, trunk, and leg - compare side to side):        0=Normal; no sensory loss  9.      Best Language (name items, describe a picture and read sentences):      0=No aphasia, normal  10.     Dysarthria (evaluate speech clarity by patient repeating listed words): 0=Normal  11.     Extinction and Inattention (use prior testing to identify neglect or double simultaneous stimuli testing):      0=No abnormality          NIH Stroke Scale Total:         0      Labs:  Recent  Labs   Lab 07/08/22  1520 07/08/22  1937 07/08/22  2348 07/09/22  0400 07/10/22  0323 07/11/22  0337      < > 144 139 138 137   K 3.3*  3.3*   < > 3.9 3.9 4.1 4.0   *   < > 115* 112* 104 102   CO2 18*   < > 22* 22* 27 28   BUN 17   < > 16 16 24* 25*   CREATININE 1.1   < > 1.2 1.0 1.1 1.0   *   < > 139* 134* 188* 149*   CALCIUM 7.0*   < > 7.6* 7.7* 7.8* 7.7*   PHOS 1.7*  --  2.0* 2.0*  --   --    MG 2.4   < > 1.9 1.9 2.0 2.1    < > = values in this interval not displayed.     Recent Labs   Lab 07/09/22  0400 07/09/22  0502 07/10/22  0323 07/11/22  0337 07/11/22  1036   WBC 13.72*  --  16.69* 10.88  --    HGB 12.2*  --  10.6* 10.1*  --    HCT 36.2*   < > 33.0* 30.7* 32*     --  160 141*  --     < > = values in this interval not displayed.     No results for input(s): ALBUMIN, PROT, BILITOT, ALKPHOS, ALT, AST in the last 168 hours.  Lab Results   Component Value Date    INR 1.1 06/27/2022     Lab Results   Component Value Date    TRIG 107 06/27/2022    HDL 30 (L) 06/27/2022    CHOLHDL 16.1 (L) 06/27/2022     Lab Results   Component Value Date    HGBA1C 6.1 06/28/2022     No results found for: PROTEINCSF, GLUCCSF, WBCCSF    Imaging:  I have reviewed and interpreted the pertinent imaging and lab results.      CT HEAD FOR STROKE  CMS MANDATED QUALITY DATA - CT RADIATION  436    All CT scans at this facility utilize dose modulation, iterative reconstruction, and/or weight based dosing when appropriate to reduce radiation dose to as low as reasonably achievable.  CT head without contrast    Clinical data: Altered mental state, coronary artery disease status post redo CABG    FINDINGS: Noninfusion images were obtained from the skull base to the vertex. The ventricles and sulci are normal in size and configuration for age.    There is an area of encephalomalacia in the anterior right temporal lobe compatible with prior infarct. There is small lacunar infarct in the right basal ganglia. There is mild  periventricular low attenuation compatible with cyst is chronic small vessel disease.    There is no hemorrhage, mass or midline shift. There are no extra-axial fluid collections. Cerebellum and brainstem are normal.    There are air-fluid levels within the sphenoid sinuses with mucosal thickening in the ethmoid air cells. The calvarium is intact.    IMPRESSION: No acute intracranial process    Area of encephalomalacia in the anterior right temporal lobe compatible with prior infarct    Small lacunar infarct in the right basal ganglia with mild periventricular small vessel disease    These findings were called to Berenice the patient's nurse at 2:25 PM    Electronically signed by:  Isis Blair MD  7/10/2022 2:26 PM CDT Workstation: [id number]  Echo  · The left ventricle is normal in size with concentric remodeling and   mildly decreased systolic function.  · The estimated ejection fraction is 43%.  · Left ventricular diastolic dysfunction.  · There are segmental left ventricular wall motion abnormalities.  · Anterior apical akinesis  · Normal right ventricular size with normal right ventricular systolic   function.     X-Ray Chest AP Portable  Chest single view    CLINICAL DATA: Postop, bypass    FINDINGS: AP view is compared to July 9. Borderline cardiomegaly is stable. Post sternotomy changes are noted. Endotracheal tube, [place]-Fadumo catheter, and nasogastric tube have been removed. Right IJ central catheter remains in place with tip at superior vena cava. Left-sided chest tube and mediastinal drains also remain in place.    The lungs are clear. There is no evidence of pneumothorax.    IMPRESSION:  1. Post sternotomy changes with interval removal of support devices as above. No acute radiographic abnormalities.    Electronically signed by:  Sebastian Andino MD  7/10/2022 6:20 AM CDT Workstation: [id number]         ASSESSMENT & PLAN:    L facial Droop- Bell's Palsy  Post CABG    Workup  · CTH: No acute  intracranial abnormality. Prior R temporal and R BG infarcts  · MRI brain: pending       Plan  · Admitted for further stroke workup  · L facial droop involving L upper and lower part of the face and is likely ball's palsy  · Continue with Aspirin 81 mg and Lipitor 80mg for stroke prevention  · Normalize BP  · Post CABG management per primary team and Cardiology/CT sugery  · MRI brain to be done after removal of pacer wires to rule our stroke  · If MRI brain negative, will recommend Prednisone 60mg daily for 1 week for bells palsy  · PT OT  · Speech therapy  · DVT prophylaxis with chemo/SCD prophylaxis  · Discussed lifestyle modifications as prophylactic measures for stroke prevention including, adequate blood pressure management, healthy diet and regular exercise.        Thank you kindly for including us in the care of this patient. Please do not hesitate to contact us with any questions.      Critical Care:  56 minutes of critical care time has been spent evaluating with the patient. Time includes chart review not limited to diagnostic imaging, labs, and vitals, patient assessment, discussion with family and nursing, current order evaluations, and new order entries.       Louis Jarquin MD  Neurology/vascular Neurology  Date of Service: 07/11/2022  3:22 PM    --------------------------------------------------------------------------------------------------------------------------------------------------------------------------------------------------------------------------------------------------------------  Please note: This note was transcribed using voice recognition software. Because of this technology there are often uinintended grammatical, spelling, and other transcription errors. Please disregard these errors.

## 2022-07-12 PROBLEM — E63.9 INADEQUATE NUTRITION: Status: ACTIVE | Noted: 2022-07-12

## 2022-07-12 LAB
ANION GAP SERPL CALC-SCNC: 8 MMOL/L (ref 8–16)
BUN SERPL-MCNC: 28 MG/DL (ref 8–23)
CALCIUM SERPL-MCNC: 7.9 MG/DL (ref 8.7–10.5)
CHLORIDE SERPL-SCNC: 102 MMOL/L (ref 95–110)
CHOLEST SERPL-MCNC: 111 MG/DL (ref 120–199)
CHOLEST/HDLC SERPL: 3.8 {RATIO} (ref 2–5)
CO2 SERPL-SCNC: 28 MMOL/L (ref 23–29)
CREAT SERPL-MCNC: 1 MG/DL (ref 0.5–1.4)
ERYTHROCYTE [DISTWIDTH] IN BLOOD BY AUTOMATED COUNT: 13.6 % (ref 11.5–14.5)
EST. GFR  (AFRICAN AMERICAN): >60 ML/MIN/1.73 M^2
EST. GFR  (NON AFRICAN AMERICAN): >60 ML/MIN/1.73 M^2
GLUCOSE SERPL-MCNC: 121 MG/DL (ref 70–110)
GLUCOSE SERPL-MCNC: 132 MG/DL (ref 70–110)
GLUCOSE SERPL-MCNC: 149 MG/DL (ref 70–110)
HCT VFR BLD AUTO: 28.5 % (ref 40–54)
HDLC SERPL-MCNC: 29 MG/DL (ref 40–75)
HDLC SERPL: 26.1 % (ref 20–50)
HGB BLD-MCNC: 9.3 G/DL (ref 14–18)
LDLC SERPL CALC-MCNC: 59.8 MG/DL (ref 63–159)
MAGNESIUM SERPL-MCNC: 2.1 MG/DL (ref 1.6–2.6)
MCH RBC QN AUTO: 29.2 PG (ref 27–31)
MCHC RBC AUTO-ENTMCNC: 32.6 G/DL (ref 32–36)
MCV RBC AUTO: 89 FL (ref 82–98)
NONHDLC SERPL-MCNC: 82 MG/DL
PHOSPHATE SERPL-MCNC: 2.3 MG/DL (ref 2.7–4.5)
PHOSPHATE SERPL-MCNC: 2.3 MG/DL (ref 2.7–4.5)
PLATELET # BLD AUTO: 153 K/UL (ref 150–450)
PMV BLD AUTO: 11.3 FL (ref 9.2–12.9)
POTASSIUM SERPL-SCNC: 4 MMOL/L (ref 3.5–5.1)
RBC # BLD AUTO: 3.19 M/UL (ref 4.6–6.2)
SODIUM SERPL-SCNC: 138 MMOL/L (ref 136–145)
TRIGL SERPL-MCNC: 111 MG/DL (ref 30–150)
WBC # BLD AUTO: 9.42 K/UL (ref 3.9–12.7)

## 2022-07-12 PROCEDURE — 27000221 HC OXYGEN, UP TO 24 HOURS

## 2022-07-12 PROCEDURE — 63600175 PHARM REV CODE 636 W HCPCS: Performed by: INTERNAL MEDICINE

## 2022-07-12 PROCEDURE — 84100 ASSAY OF PHOSPHORUS: CPT | Mod: 91 | Performed by: INTERNAL MEDICINE

## 2022-07-12 PROCEDURE — 94761 N-INVAS EAR/PLS OXIMETRY MLT: CPT

## 2022-07-12 PROCEDURE — 84100 ASSAY OF PHOSPHORUS: CPT | Performed by: THORACIC SURGERY (CARDIOTHORACIC VASCULAR SURGERY)

## 2022-07-12 PROCEDURE — 25000003 PHARM REV CODE 250: Performed by: THORACIC SURGERY (CARDIOTHORACIC VASCULAR SURGERY)

## 2022-07-12 PROCEDURE — 85027 COMPLETE CBC AUTOMATED: CPT | Performed by: THORACIC SURGERY (CARDIOTHORACIC VASCULAR SURGERY)

## 2022-07-12 PROCEDURE — 99233 PR SUBSEQUENT HOSPITAL CARE,LEVL III: ICD-10-PCS | Mod: ,,, | Performed by: INTERNAL MEDICINE

## 2022-07-12 PROCEDURE — 25000003 PHARM REV CODE 250

## 2022-07-12 PROCEDURE — 25500020 PHARM REV CODE 255: Performed by: INTERNAL MEDICINE

## 2022-07-12 PROCEDURE — 97535 SELF CARE MNGMENT TRAINING: CPT

## 2022-07-12 PROCEDURE — 25000003 PHARM REV CODE 250: Performed by: INTERNAL MEDICINE

## 2022-07-12 PROCEDURE — 80061 LIPID PANEL: CPT | Performed by: INTERNAL MEDICINE

## 2022-07-12 PROCEDURE — 99024 PR POST-OP FOLLOW-UP VISIT: ICD-10-PCS | Mod: ,,, | Performed by: NURSE PRACTITIONER

## 2022-07-12 PROCEDURE — 99233 SBSQ HOSP IP/OBS HIGH 50: CPT | Mod: ,,, | Performed by: INTERNAL MEDICINE

## 2022-07-12 PROCEDURE — 21000000 HC CCU ICU ROOM CHARGE

## 2022-07-12 PROCEDURE — 82962 GLUCOSE BLOOD TEST: CPT

## 2022-07-12 PROCEDURE — 99900035 HC TECH TIME PER 15 MIN (STAT)

## 2022-07-12 PROCEDURE — 92526 ORAL FUNCTION THERAPY: CPT

## 2022-07-12 PROCEDURE — 80048 BASIC METABOLIC PNL TOTAL CA: CPT | Performed by: THORACIC SURGERY (CARDIOTHORACIC VASCULAR SURGERY)

## 2022-07-12 PROCEDURE — 83735 ASSAY OF MAGNESIUM: CPT | Performed by: THORACIC SURGERY (CARDIOTHORACIC VASCULAR SURGERY)

## 2022-07-12 PROCEDURE — 97116 GAIT TRAINING THERAPY: CPT

## 2022-07-12 PROCEDURE — 99024 POSTOP FOLLOW-UP VISIT: CPT | Mod: ,,, | Performed by: NURSE PRACTITIONER

## 2022-07-12 PROCEDURE — 99900031 HC PATIENT EDUCATION (STAT)

## 2022-07-12 RX ORDER — MORPHINE SULFATE 2 MG/ML
2 INJECTION, SOLUTION INTRAMUSCULAR; INTRAVENOUS EVERY 6 HOURS PRN
Status: DISCONTINUED | OUTPATIENT
Start: 2022-07-12 | End: 2022-07-19 | Stop reason: HOSPADM

## 2022-07-12 RX ORDER — AMIODARONE HYDROCHLORIDE 200 MG/1
200 TABLET ORAL 2 TIMES DAILY
Status: DISCONTINUED | OUTPATIENT
Start: 2022-07-12 | End: 2022-07-15

## 2022-07-12 RX ORDER — ENOXAPARIN SODIUM 100 MG/ML
40 INJECTION SUBCUTANEOUS DAILY
Status: DISCONTINUED | OUTPATIENT
Start: 2022-07-12 | End: 2022-07-14

## 2022-07-12 RX ADMIN — SODIUM PHOSPHATE, MONOBASIC, MONOHYDRATE AND SODIUM PHOSPHATE, DIBASIC, ANHYDROUS 15 MMOL: 276; 142 INJECTION, SOLUTION INTRAVENOUS at 09:07

## 2022-07-12 RX ADMIN — AMIODARONE HYDROCHLORIDE 200 MG: 200 TABLET ORAL at 06:07

## 2022-07-12 RX ADMIN — AMIODARONE HYDROCHLORIDE 0.5 MG/MIN: 1.8 INJECTION, SOLUTION INTRAVENOUS at 04:07

## 2022-07-12 RX ADMIN — METOPROLOL TARTRATE 25 MG: 25 TABLET, FILM COATED ORAL at 09:07

## 2022-07-12 RX ADMIN — SODIUM PHOSPHATE, MONOBASIC, MONOHYDRATE AND SODIUM PHOSPHATE, DIBASIC, ANHYDROUS 15 MMOL: 276; 142 INJECTION, SOLUTION INTRAVENOUS at 04:07

## 2022-07-12 RX ADMIN — METOPROLOL TARTRATE 2.5 MG: 5 INJECTION, SOLUTION INTRAVENOUS at 12:07

## 2022-07-12 RX ADMIN — METOPROLOL TARTRATE 2.5 MG: 5 INJECTION, SOLUTION INTRAVENOUS at 06:07

## 2022-07-12 RX ADMIN — TAMSULOSIN HYDROCHLORIDE 0.4 MG: 0.4 CAPSULE ORAL at 05:07

## 2022-07-12 RX ADMIN — ATORVASTATIN CALCIUM 80 MG: 40 TABLET, FILM COATED ORAL at 05:07

## 2022-07-12 RX ADMIN — DOCUSATE SODIUM 100 MG: 100 CAPSULE, LIQUID FILLED ORAL at 09:07

## 2022-07-12 RX ADMIN — IOHEXOL 100 ML: 350 INJECTION, SOLUTION INTRAVENOUS at 02:07

## 2022-07-12 RX ADMIN — ENOXAPARIN SODIUM 40 MG: 40 INJECTION SUBCUTANEOUS at 08:07

## 2022-07-12 NOTE — PLAN OF CARE
07/12/22 0913   Post-Acute Status   Post-Acute Authorization Placement   Post-Acute Placement Status Referrals Sent   Spoke to patient about recs for IPR, patient hopes he improves enough to go home but is willing to let Rhona work up in anticipation of need.

## 2022-07-12 NOTE — PLAN OF CARE
Problem: Occupational Therapy  Goal: Occupational Therapy Goal  Description: Goals to be met by: 8/11/2022     Patient will increase functional independence with ADLs by performing:    UE Dressing with Supervision.  LE Dressing with Supervision.  Grooming while standing at sink with Supervision.  Toileting from toilet with Supervision for hygiene and clothing management.   Toilet transfer to toilet with Supervision.  Perform 30 minutes sitting/standing ADL activity while maintaining sternal precautions with no verbal cues.    Outcome: Ongoing, Progressing

## 2022-07-12 NOTE — PROGRESS NOTES
[facility]  Department of Cardiology  Progress Note    PATIENT NAME: Matteo Fraire  MRN: [id number]  TODAY'S DATE: 07/12/2022  ADMIT DATE: 6/27/2022    SUBJECTIVE     PRINCIPLE PROBLEM: STEMI (ST elevation myocardial infarction)    INTERVAL HISTORY:    7/12/2022   No further runs of NSVT. Mentation much improved today. Patient states he feels better. NG tube will be placed so patient can recieve some nutrition. VSS.     7/11/2022  No further runs of NSVT. Continues to have atypical mentation, now with facial droop and complaint of trouble seeing and focusing in left eye. CT head was negative for any acute ischemia. Only complaint from patient is the vision. He is tachycardic today. Failed swallow study again.     7/10/2022  Patient continues to asymptomatic NSVT. On oxymask, having a lot of congestion. Seems out of it still despite morphine being given 3 hours ago. BP stable on nitro gtt.     7/9/2022  Patient developed ST elevations overnight post CABG. Troponins were checked and are still rising at 62. He remains intubated and sedated. BP stable on cleviprex gtt. ST elevations have improved.     7/7/2022  Patient had 42 beat run of Vtach this morning, asymp[tomatic. No further chest pain. VSS.     7/6/2022  PATIENT FOR CABG THIS Friday. Had some mild chest pains after walking for 40 minutes this morning. Has not required nitro yet. VSS.     7/5/2022  Patient still waiting for CABG, ate breakfast this AM. No chest pain events. VSS.     7/4/2022  Patient is awake alert lying comfortably in bed not in any acute distress.  Patient had walked in the hallway 4 times already.  Denies any chest pain or tightness or heaviness his breathing is good denies any shortness of breath.  Awaiting for CABG.    Review of patient's allergies indicates:  No Known Allergies    REVIEW OF SYSTEMS  Unable to obtain     OBJECTIVE     VITAL SIGNS (Most Recent)  Temp: 97.7 °F (36.5 °C) (07/12/22 1105)  Pulse: 79 (07/12/22  1300)  Resp: 17 (07/12/22 1300)  BP: 129/70 (07/12/22 1300)  SpO2: (!) 94 % (07/12/22 1300)    VENTILATION STATUS  Resp: 17 (07/12/22 1300)  SpO2: (!) 94 % (07/12/22 1300)       I & O (Last 24H):    Intake/Output Summary (Last 24 hours) at 7/12/2022 1350  Last data filed at 7/12/2022 0400  Gross per 24 hour   Intake 361.2 ml   Output 946 ml   Net -584.8 ml       WEIGHTS  Wt Readings from Last 1 Encounters:   07/12/22 0400 97.4 kg (214 lb 11.7 oz)   07/07/22 0520 96.4 kg (212 lb 8.4 oz)   07/05/22 0301 96.9 kg (213 lb 10 oz)   07/01/22 0300 97.3 kg (214 lb 8.1 oz)   06/30/22 0300 100.5 kg (221 lb 9.6 oz)   06/27/22 2254 101.1 kg (222 lb 14.2 oz)   06/27/22 1954 99.8 kg (220 lb)       PHYSICAL EXAM  CONSTITUTIONAL: on oxymask, seems confused and slow   NECK: no carotid bruit, no JVD  LUNGS: coarse   CHEST WALL: tubes and wires in place   HEART: regular rate and rhythm, S1, S2 normal, no murmur, click, rub or gallop   ABDOMEN: soft, non-tender; bowel sounds normal; no masses,  no organomegaly  EXTREMITIES: Edematous UE   NEURO: symmetric grimmace, soft  UE BL, wiggles both all toes BL     SCHEDULED MEDS:   aspirin  81 mg Oral Daily    atorvastatin  80 mg Oral Daily    docusate sodium  100 mg Oral BID    enoxparin  40 mg Subcutaneous Daily    losartan  50 mg Oral Daily    metoprolol  2.5 mg Intravenous Q6H    metoprolol tartrate  25 mg Oral BID    tamsulosin  0.4 mg Oral Daily       CONTINUOUS INFUSIONS:   amiodarone in dextrose 5% 0.5 mg/min (07/12/22 0414)    carboxymethylcellulose         PRN MEDS:acetaminophen, albumin human 5%, ALPRAZolam, calcium gluconate IVPB, calcium gluconate IVPB, calcium gluconate IVPB, carboxymethylcellulose, dextrose 50%, dextrose 50%, HYDROcodone-acetaminophen, insulin aspart U-100, magnesium sulfate IVPB, magnesium sulfate IVPB, melatonin, morphine, morphine, mupirocin, ondansetron, potassium chloride in water **AND** potassium chloride in water **AND** potassium chloride  in water, sodium phosphate IVPB, sodium phosphate IVPB, sodium phosphate IVPB    LABS AND DIAGNOSTICS     CBC LAST 3 DAYS  Recent Labs   Lab 07/09/22  0400 07/09/22  0502 07/10/22  0323 07/11/22  0337 07/11/22  1036 07/12/22  0308   WBC 13.72*  --  16.69* 10.88  --  9.42   RBC 4.19*  --  3.72* 3.44*  --  3.19*   HGB 12.2*  --  10.6* 10.1*  --  9.3*   HCT 36.2*   < > 33.0* 30.7* 32* 28.5*   MCV 86  --  89 89  --  89   MCH 29.1  --  28.5 29.4  --  29.2   MCHC 33.7  --  32.1 32.9  --  32.6   RDW 13.5  --  14.0 13.8  --  13.6     --  160 141*  --  153   MPV 11.7  --  12.0 11.4  --  11.3   GRAN 90.5*  12.4*  --  84.2*  14.1* 80.1*  8.7*  --   --    LYMPH 5.1*  0.7*  --  6.8*  1.1 12.0*  1.3  --   --    MONO 4.1  0.6  --  8.3  1.4* 7.4  0.8  --   --    BASO 0.00  --  0.01 0.01  --   --    NRBC 0  --  0 0  --   --     < > = values in this interval not displayed.       COAGULATION LAST 3 DAYS  Recent Labs   Lab 07/08/22 0327   APTT 40.8*       CHEMISTRY LAST 3 DAYS  Recent Labs   Lab 07/09/22  1609 07/09/22  1751 07/10/22  0323 07/11/22  0337 07/11/22  1036 07/12/22  0308   NA  --   --  138 137  --  138   K  --   --  4.1 4.0  --  4.0   CL  --   --  104 102  --  102   CO2  --   --  27 28  --  28   ANIONGAP  --   --  7* 7*  --  8   BUN  --   --  24* 25*  --  28*   CREATININE  --   --  1.1 1.0  --  1.0   GLU  --   --  188* 149*  --  149*   CALCIUM  --   --  7.8* 7.7*  --  7.9*   PH 7.492* 7.451*  --   --  7.459*  --    MG  --   --  2.0 2.1  --  2.1       CARDIAC PROFILE LAST 3 DAYS  Recent Labs   Lab 07/09/22  0400 07/09/22  0810 07/10/22  0323   TROPONINI 62.273* 62.262* 63.503*       ENDOCRINE LAST 3 DAYS  No results for input(s): TSH, PROCAL in the last 168 hours.    LAST ARTERIAL BLOOD GAS  ABG  Recent Labs   Lab 07/11/22  1036   PH 7.459*   PO2 57*   PCO2 36.8   HCO3 26.1   BE 2       LAST 7 DAYS MICROBIOLOGY   Microbiology Results (last 7 days)     ** No results found for the last 168 hours. **           MOST RECENT IMAGING  MRI Brain Without Contrast  MRI of the brain without contrast    HISTORY: Cerebrovascular accident.    Multiplanar noncontrast imaging is performed.    There is mild motion degradation.    There is restricted diffusion involving the cortex of the right temporal lobe with in the middle cranial fossa inferiorly. Additional foci of restricted diffusion are observed within the right lentiform nucleus and within the leftward aspect of the upper medulla and lower mele the findings are likely on the basis of areas of recent infarction within right MCA and posterior circulation. There are no findings of acute hemorrhage. There is local mass effect involving the right temporal lobe. There is no midline shift.    There are additional scattered foci of white matter hyperintensity within the subcortical and periventricular regions of both hemispheres, potentially a reflection of chronic microvascular ischemia but nonspecific.    The ventricular system is appropriate in size and position there are no extra-axial collections.    There appears to be an irregular flow void within the right MCA. There are otherwise appropriate flow voids in the major blood vessels.    Scattered mucosal thickening is observed within the paranasal sinuses. There is a smoothly marginated 10 mm hyperdense mass far anteriorly within the left maxillary sinus. The skull base and craniocervical junction appear unremarkable.    IMPRESSION:    Foci of restricted diffusion compatible with recent infarction involving the right temporal lobe inferiorly, right lentiform nucleus and upper medulla/lower mele to the left of midline. There is local mass effect and sulcal effacement involving the right temporal lobe. There is no midline shift or evidence of hemorrhagic transformation.    White matter changes, likely on the basis of chronic microvascular ischemia.    Irregular appearing flow void involving the right MCA which could be further  assessed with MRA or CTA.    Electronically signed by:  Julio Cesar Oviedo MD  7/12/2022 12:52 PM CDT Workstation: [id number]      LASTECHO  Results for orders placed during the hospital encounter of 06/27/22    Echo Saline Bubble? No    Interpretation Summary  · The left ventricle is normal in size with mild predominantly mid septal mild asymmetric hypertrophy eccentric hypertrophy and normal systolic function.  · The estimated ejection fraction is 60%.  · Indeterminate left ventricular diastolic function.  · Normal right ventricular size with normal right ventricular systolic function.  · Mild left atrial enlargement.  · Normal central venous pressure (3 mmHg).  · The estimated PA systolic pressure is 19 mmHg.  · Mild mitral regurgitation.      CURRENT/PREVIOUS VISIT EKG  Results for orders placed or performed during the hospital encounter of 06/27/22   EKG 12-lead    Collection Time: 07/09/22 12:41 AM    Narrative    Test Reason : I21.3,    Vent. Rate : 088 BPM     Atrial Rate : 088 BPM     P-R Int : 232 ms          QRS Dur : 094 ms      QT Int : 384 ms       P-R-T Axes : 043 101 132 degrees     QTc Int : 464 ms    Sinus rhythm with 1st degree A-V block with occasional Premature  ventricular complexes  Possible Inferior infarct ,age undetermined  Anterolateral infarct (cited on or before 27-JUN-2022)  Abnormal ECG  When compared with ECG of 08-JUL-2022 16:38,  Borderline criteria for Inferior infarct are now Present  Serial changes of Anterior infarct Present    Referred By: [email or web address]   SELF           Confirmed By:        ASSESSMENT/PLAN:     Active Hospital Problems    Diagnosis    *STEMI (ST elevation myocardial infarction)    NSVT (nonsustained ventricular tachycardia)    Coronary artery disease involving coronary bypass graft of native heart with angina pectoris       ASSESSMENT & PLAN:   1. Multivessel coronary artery disease  2. ST-elevation myocardial infarction status post PTCA  3. Nonsustained ventricular  tachycardia  4. Mixed hyperlipidemia      RECOMMENDATIONS:  Continue nitro gtt  MRI with recent infarct right temporal lobe- neurology following   Continue amio gtt   Give 2.5 lopressor IV q6 for heart rate control   Ambulate as often as tolerated   Ok to move to floor         Fanny Amaya PA-C  [facility]  Department of Cardiology  Date of Service: 07/12/2022  9:53 AM

## 2022-07-12 NOTE — PLAN OF CARE
Problem: Physical Therapy  Goal: Physical Therapy Goal  Description: Goals to be met by: 22     Patient will increase functional independence with mobility by performin. Supine to sit with Supervision  2. Sit to stand transfer with Supervision  3. Bed to chair transfer with Supervision using Rolling Walker  4. Gait  x 150 feet with Supervision using Rolling Walker.         Outcome: Ongoing, Progressing

## 2022-07-12 NOTE — PLAN OF CARE
Problem: Feeding Intolerance (Enteral Nutrition)  Goal: Feeding Tolerance  Outcome: Ongoing, Progressing  Intervention: Prevent and Manage Feeding Intolerance  Flowsheets (Taken 7/12/2022 1411)  Nutrition Support Management: tube feeding initiated

## 2022-07-12 NOTE — CONSULTS
"[facility] hospital  Adult Nutrition   Consult Note (Nutrition Support Management)    SUMMARY     Recommendations  Recommendation/Intervention:   1) NG placement + initiating tube feedings. Start Vital AF 1.2 @ 20 ml/hr advancing by 10 ml q4 hours or as tolerated to goal rate of 70 ml/hr with 30 ml FWF q4 hours. TF + FWF will provide 2016 kcal, 126 gm protein, and 1542 free H2O.  2) Hypophosphatemia (2.3)- Monitor for signs and symptoms of refeedings syndrome (Hypophosphatemia, hypomagnesemia, hypokalemia, hyperglycemia). Ordered appropriate labs.  3) Advance diet as medically able per SLP recommendations       Goals:   1) TF tolerance.   2) Adequate intake to meet estimated nutritional needs  3) Diet to advance as medically able  Nutrition Goal Status: new  Communication of PAULINO Recs: reviewed with RN    Dietitian Rounds Brief  NPO x 4 days, failed swallowing study today. SLP recommend remaining strict NPO with alternative means of nutrition. Spoke w/ pt at bedside regarding nutritional support options. Pt receptive to NG placement + initiation of TF.    Diet order:   Current Diet Order: NPO     Evaluation of Received Nutrient/Fluid Intake  Energy Calories Required: not meeting needs  Protein Required: not meeting needs  Fluid Required: not meeting needs  Tolerance: other (see comments) (NPO)     % Intake of Estimated Energy Needs: 0%  % Meal Intake: NPO      Intake/Output Summary (Last 24 hours) at 7/12/2022 1345  Last data filed at 7/12/2022 0400  Gross per 24 hour   Intake 361.2 ml   Output 946 ml   Net -584.8 ml        Anthropometrics  Temp: 97.7 °F (36.5 °C)  Height Method: Stated  Height: 5' 9" (175.3 cm)  Height (inches): 69 in  Weight Method: Bed Scale  Weight: 97.4 kg (214 lb 11.7 oz)  Weight (lb): 214.73 lb  Ideal Body Weight (IBW), Male: 160 lb  % Ideal Body Weight, Male (lb): 139.31 %  BMI (Calculated): 31.7  BMI Grade: 30 - 34.9- obesity - grade I       Estimated/Assessed Needs  Weight Used For " Calorie Calculations: 97.4 kg (214 lb 11.7 oz)  Energy Calorie Requirements (kcal): 6285-6232 (20-25 kcal/kg)  Energy Need Method: Kcal/kg  Protein Requirements: 105-140 (1.5-2 g/kg IBW)  Weight Used For Protein Calculations: 70 kg (154 lb 5.2 oz)  Fluid Requirements (mL): 9526-1186 (1 ml/kcal)  Estimated Fluid Requirement Method: other (see comments)  RDA Method (mL): 1948       Reason for Assessment  Reason For Assessment: consult  Diagnosis: other (see comments) (STEMI (ST elevation myocardial infarction))  Relevant Medical History: Hypertension, Heart attack, Psoriasis  Interdisciplinary Rounds: attended    Nutrition/Diet History  Spiritual, Cultural Beliefs, Church Practices, Values that Affect Care: no  Food Allergies: NKFA  Factors Affecting Nutritional Intake: NPO    Nutrition Risk Screen  Nutrition Risk Screen: no indicators present     MST Score: 0  Have you recently lost weight without trying?: No  Weight loss score: 0  Have you been eating poorly because of a decreased appetite?: No  Appetite score: 0       Weight History:  Wt Readings from Last 5 Encounters:   07/12/22 97.4 kg (214 lb 11.7 oz)   07/09/22 96.2 kg (212 lb)   06/28/22 100.7 kg (222 lb)   11/21/19 105 kg (231 lb 7.7 oz)   11/20/19 105 kg (231 lb 7.7 oz)        Medications:Pertinent Medications Reviewed  Scheduled Meds:   aspirin  81 mg Oral Daily    atorvastatin  80 mg Oral Daily    docusate sodium  100 mg Oral BID    enoxparin  40 mg Subcutaneous Daily    losartan  50 mg Oral Daily    metoprolol  2.5 mg Intravenous Q6H    metoprolol tartrate  25 mg Oral BID    tamsulosin  0.4 mg Oral Daily     Continuous Infusions:   amiodarone in dextrose 5% 0.5 mg/min (07/12/22 0414)    carboxymethylcellulose       PRN Meds:.acetaminophen, albumin human 5%, ALPRAZolam, calcium gluconate IVPB, calcium gluconate IVPB, calcium gluconate IVPB, carboxymethylcellulose, dextrose 50%, dextrose 50%, HYDROcodone-acetaminophen, insulin aspart U-100,  magnesium sulfate IVPB, magnesium sulfate IVPB, melatonin, morphine, morphine, mupirocin, ondansetron, potassium chloride in water **AND** potassium chloride in water **AND** potassium chloride in water, sodium phosphate IVPB, sodium phosphate IVPB, sodium phosphate IVPB    Labs: Pertinent Labs Reviewed  Clinical Chemistry:  Recent Labs   Lab 07/08/22  2348 07/09/22  0400 07/10/22  0323 07/12/22  0308    139   < > 138   K 3.9 3.9   < > 4.0   * 112*   < > 102   CO2 22* 22*   < > 28   * 134*   < > 149*   BUN 16 16   < > 28*   CREATININE 1.2 1.0   < > 1.0   CALCIUM 7.6* 7.7*   < > 7.9*   ANIONGAP 7* 5*   < > 8   ESTGFRAFRICA >60.0 >60.0   < > >60.0   EGFRNONAA >60.0 >60.0   < > >60.0   MG 1.9 1.9   < > 2.1   PHOS 2.0* 2.0*  --  2.3*    < > = values in this interval not displayed.     CBC:   Recent Labs   Lab 07/12/22  0308   WBC 9.42   RBC 3.19*   HGB 9.3*   HCT 28.5*      MCV 89   MCH 29.2   MCHC 32.6     Cardiac Profile:  Recent Labs   Lab 07/09/22  0400 07/09/22  0810 07/10/22  0323   TROPONINI 62.273* 62.262* 63.503*     Monitor and Evaluation  Food and Nutrient Intake: enteral nutrition intake  Food and Nutrient Adminstration: enteral and parenteral nutrition administration  Knowledge/Beliefs/Attitudes: food and nutrition knowledge/skill, beliefs and attitudes  Physical Activity and Function: nutrition-related ADLs and IADLs  Anthropometric Measurements: weight, weight change, body mass index  Biochemical Data, Medical Tests and Procedures: glucose/endocrine profile, gastrointestinal profile, electrolyte and renal panel  Nutrition-Focused Physical Findings: overall appearance     Nutrition Risk  Level of Risk/Frequency of Follow-up: high     Nutrition Follow-Up  RD Follow-up?: Yes      Jazzmine Mosher RD 07/12/2022 1:45 PM

## 2022-07-12 NOTE — CARE UPDATE
07/12/22 0800   Patient Assessment/Suction   Level of Consciousness (AVPU) alert   Respiratory Effort Unlabored;Normal   Expansion/Accessory Muscles/Retractions expansion symmetric   PRE-TX-O2   O2 Device (Oxygen Therapy) High Flow nasal Cannula   $ Is the patient on Low Flow Oxygen? Yes   Flow (L/min) 3   SpO2 (!) 94 %   Pulse Oximetry Type Continuous   $ Pulse Oximetry - Multiple Charge Pulse Oximetry - Multiple   Pulse 82   Resp (!) 21   /68   Education   $ Education 15 min;DME Oxygen   Respiratory Evaluation   $ Care Plan Tech Time 15 min

## 2022-07-12 NOTE — PT/OT/SLP PROGRESS
Physical Therapy Treatment    Patient Name:  Matteo Fraire   MRN:  [id number]    Recommendations:     Discharge Recommendations:  rehabilitation facility   Discharge Equipment Recommendations: other (see comments) (TBD)   Barriers to discharge: increase assist with mobility     Assessment:     Matteo Fraire is a 61 y.o. male admitted with a medical diagnosis of STEMI (ST elevation myocardial infarction).  He presents with the following impairments/functional limitations:  weakness, impaired endurance, impaired self care skills, impaired functional mobilty, gait instability, impaired balance, decreased lower extremity function, decreased safety awareness, impaired cardiopulmonary response to activity.    Pt found up in chair on 2L of oxygen. Pt SAO2 WFL at the beginning of session, RN placed pt on room air to mobilize. Sit to stand transfer min A x 2 to help follow sternal precautions. Pt ambulated 70ft x 2 min A x 2 using RW. Pt reports dizziness while ambulated but did not request to stop. Pt SAO2 98% at 70ft with standing rest break. Pt required min verbal cueing throughout ambulation to maintain proper upright posture. Pt left up in chair on room air, RN notified and present.     Rehab Prognosis: Fair; patient would benefit from acute skilled PT services to address these deficits and reach maximum level of function.    Recent Surgery: Procedure(s) (LRB):  CABG, REPEAT (N/A)  HARVEST-VEIN-ENDOVASCULAR (Right)  SURGICAL PROCUREMENT,ARTERY,RADIAL,FOR CABG (Left) 4 Days Post-Op    Plan:     During this hospitalization, patient to be seen daily to address the identified rehab impairments via gait training, therapeutic activities, therapeutic exercises, neuromuscular re-education and progress toward the following goals:    · Plan of Care Expires:  08/12/22    Subjective     Chief Complaint: fatigue at start of session   Patient/Family Comments/goals: ability to swallow to eat   Pain/Comfort:  · Pain Rating 1:  0/10      Objective:     Communicated with RN prior to session.  Patient found up in chair with peripheral IV, pulse ox (continuous), oxygen, blood pressure cuff, lee catheter, telemetry upon PT entry to room.     General Precautions: Standard, fall   Orthopedic Precautions:N/A   Braces: N/A  Respiratory Status: Nasal cannula, flow 2 L/min     Functional Mobility:  · Transfers:     · Sit to Stand:  minimum assistance and of 2 persons with rolling walker  · Gait: 70ft x 2 min A x 2 using RW      AM-PAC 6 CLICK MOBILITY          Therapeutic Activities and Exercises:   Pt educated on plan of care, importance of time out of bed, proper upright posture while ambulating, energy conservation, need for use of call bell for assistance and to prevent falls, and need for assistance with mobility.       Patient left up in chair with all lines intact, call button in reach, RN notified and RN present..    GOALS:   Multidisciplinary Problems     Physical Therapy Goals        Problem: Physical Therapy    Goal Priority Disciplines Outcome Goal Variances Interventions   Physical Therapy Goal     PT, PT/OT Ongoing, Progressing     Description: Goals to be met by: 22     Patient will increase functional independence with mobility by performin. Supine to sit with Supervision  2. Sit to stand transfer with Supervision  3. Bed to chair transfer with Supervision using Rolling Walker  4. Gait  x 150 feet with Supervision using Rolling Walker.                          Time Tracking:     PT Received On: 22  PT Start Time: 1043     PT Stop Time: 1100  PT Total Time (min): 17 min     Billable Minutes: Gait Training 17    Treatment Type: Treatment  PT/PTA: PT     PTA Visit Number: 0     2022

## 2022-07-12 NOTE — NURSING
Skyler Duke NP came to bedside to remove pacer wires and chest tubes. He removed all surgical dressings. Per his instruction, all incisions painted with betadine.

## 2022-07-12 NOTE — PT/OT/SLP PROGRESS
Speech Language Pathology Treatment    Patient Name:  Matteo Fraire   MRN:  [id number]  Admitting Diagnosis: STEMI (ST elevation myocardial infarction)    Recommendations:                General Recommendations:    · Strict NPO with non oral medications: consider MBS 7/13 if difficulty persists to assess function and determine treatment plan/exercise based therapy      Diet recommendations:  · Strict NPO      Aspiration Precautions:   · Frequent oral hygiene  · Frequent suctioning      General Precautions: Standard, aspiration, NPO    Subjective     Pt alert, pleasant   Patient goals: swallow      Pain/Comfort:  ·  0/10    Respiratory Status: High flow, flow 2 L/min, concentration .%    Objective:     Has the patient been evaluated by SLP for swallowing?   Yes  Keep patient NPO? Yes     · Chest tubes removed this AM  · MRI planned for this afternoon; prednisone to be started per neurology if unremarkable     Bedside Swallow Eval:   · Continues to require frequent suctioning  · Promotes difficulty with secretions and ice chips   · PO presentations: Ice chip x1, 1/2 teaspoon applesauce   · Oral phase: WNL  · Pharyngeal Phase:   · suspicion for absent swallow reflex as no hyolaryngeal lift was palpated across cued dry swallows or with oral presentations.   · Patient perceives lack of transit to the esophagus. Immediate cough effort across presentations followed by suction assist      Cognitive communication status:   · Alert, oriented  · Follows simple commands  · Verbal expression fluent, appropriate       Assessment:     Matteo Fraire is a 61 y.o. male with an SLP diagnosis of dysphagia, acute in nature s/p CABG (7/8/2022) with infarct involving the right basal ganglia. At this time, Pt is unable to swallow secretions and requiring continuous suctioning. Recommend proceeding with MBS 7/13 to assess function, determine nutrition needs, and initiate exercise based treatment.     Goals:   Multidisciplinary Problems      SLP Goals        Problem: SLP    Goal Priority Disciplines Outcome   SLP Goal     SLP Ongoing, Progressing   Description: 1. Pt will tolerate least restrictive PO diet without acute dysphagia pulmonary complication.   2. PENDING: Pt will participate in VFSS to define swallow physiology; treatment goal to follow                      Plan:     · Patient to be seen:  5 x/week   · Plan of Care expires:     · Plan of Care reviewed with:  patient (RN, MD)   · SLP Follow-Up:  Yes       Discharge recommendations: IPR     Time Tracking:     SLP Treatment Date:   07/12/22  Speech Start Time:  1030  Speech Stop Time:  1042     Speech Total Time (min):  12 min    Billable Minutes: Treatment Swallowing Dysfunction 12 07/12/2022

## 2022-07-12 NOTE — PROGRESS NOTES
[facility]  Department of Neurology  Progress Note  Date: 2022 9:42 AM          Patient Name: Matteo Fraire   MRN: [id number]   : 1960    AGE: 61 y.o.    LOS: 15 days Hospital Day: 16  Admit date: 2022  7:45 PM       HPI per EMR: Matteo Fraire is a 61 y.o. male with a history of 61-year-old with prior history of CAD status post PCI and CABG in  came with chest pain found to have STEMI status post balloon angioplasty to LAD - now status post redo 3 vessel CABG  .  Hospital stay notable for episodes of NSVT after which he was initiated on lidocaine infusion.  Postoperative stay notable for persistent ST elevation which improved with nitroglycerin infusion.      Postoperatively patient has been lethargic and slow to respond.  He has been oriented x3.  This morning left facial droop was noted for which neurology was consulted.         Neurology consult:  Patient was seen and examined by me.  He is lethargic however able to respond questions and commands.  He did not have any episodes of SVT since last night, mental status has slightly improving as per the nurse.  He is oriented x3. On exam, he has a L facial droop and left lid lag.  He does not any other focal neurological deficits including no weakness or sensory changes in extremities.  He denies any headaches, nausea, vomiting any new symptoms.    2022: No acute events overnight. Patient was seen and examined by me this morning. Neuro exam same as yesterday.  Continues to have left upper lower face weakness.  No other new symptoms.       Vitals:  Patient Vitals for the past 24 hrs:   BP Temp Temp src Pulse Resp SpO2 Weight   22 -- -- -- 84 14 97 % --   22 0915 -- -- -- 83 14 98 % --   22 0900 112/70 -- -- 82 (!) 23 96 % --   22 0845 -- -- -- 86 (!) 22 98 % --   2230 -- -- -- 84 16 95 % --   22 0815 -- -- -- 83 18 (!) 94 % --   22 0800 108/68 -- -- 82 (!) 21 (!) 94 % --    07/12/22 0745 -- -- -- 86 (!) 23 (!) 94 % --   07/12/22 0730 -- -- -- 82 (!) 30 97 % --   07/12/22 0715 -- 97.9 °F (36.6 °C) Oral 77 19 100 % --   07/12/22 0700 111/63 -- -- 77 18 99 % --   07/12/22 0501 109/62 -- -- 78 19 97 % --   07/12/22 0400 -- -- -- -- -- -- 97.4 kg (214 lb 11.7 oz)   07/12/22 0307 106/60 98.8 °F (37.1 °C) -- 78 18 98 % --   07/12/22 0301 106/60 -- -- 78 18 98 % --   07/12/22 0101 106/65 -- -- 82 (!) 23 99 % --   07/11/22 2336 109/63 98.8 °F (37.1 °C) -- 87 19 97 % --   07/11/22 2301 109/63 -- -- 87 19 97 % --   07/11/22 2142 -- -- -- 89 17 97 % --   07/11/22 2101 107/64 -- -- 92 (!) 26 96 % --   07/11/22 2015 114/71 -- -- 95 (!) 21 96 % --   07/11/22 1901 101/61 99 °F (37.2 °C) Axillary 96 (!) 25 (!) 94 % --   07/11/22 1830 -- -- -- 96 (!) 28 96 % --   07/11/22 1815 -- -- -- 95 (!) 24 96 % --   07/11/22 1800 98/64 -- -- 94 20 (!) 94 % --   07/11/22 1745 -- -- -- 106 (!) 26 95 % --   07/11/22 1730 -- -- -- 110 (!) 25 95 % --   07/11/22 1715 -- -- -- 108 19 96 % --   07/11/22 1700 107/64 -- -- 110 (!) 24 (!) 93 % --   07/11/22 1645 -- -- -- 109 (!) 25 (!) 94 % --   07/11/22 1630 -- -- -- (!) 112 (!) 31 (!) 94 % --   07/11/22 1615 -- -- -- (!) 113 (!) 34 (!) 92 % --   07/11/22 1600 106/62 -- -- (!) 111 (!) 36 (!) 92 % --   07/11/22 1545 -- -- -- (!) 111 (!) 29 (!) 92 % --   07/11/22 1544 -- 99.2 °F (37.3 °C) Axillary -- -- -- --   07/11/22 1530 -- -- -- (!) 112 19 (!) 94 % --   07/11/22 1515 -- -- -- (!) 111 (!) 30 95 % --   07/11/22 1500 110/67 -- -- (!) 111 (!) 22 (!) 94 % --   07/11/22 1445 -- -- -- (!) 112 (!) 26 95 % --   07/11/22 1430 -- -- -- (!) 112 (!) 26 95 % --   07/11/22 1415 -- -- -- (!) 113 (!) 24 (!) 93 % --   07/11/22 1400 107/63 -- -- (!) 114 (!) 29 96 % --   07/11/22 1345 -- -- -- (!) 117 (!) 22 (!) 92 % --   07/11/22 1330 -- -- -- (!) 117 (!) 27 (!) 93 % --   07/11/22 1315 -- -- -- (!) 116 (!) 30 (!) 94 % --   07/11/22 1300 112/68 -- -- (!) 117 (!) 28 (!) 92 % --   07/11/22  1249 -- -- -- -- (!) 28 -- --   07/11/22 1245 -- -- -- (!) 121 (!) 35 (!) 90 % --   07/11/22 1230 -- -- -- (!) 120 (!) 31 (!) 92 % --   07/11/22 1215 -- -- -- (!) 119 (!) 36 (!) 93 % --   07/11/22 1200 117/71 100.1 °F (37.8 °C) Axillary (!) 115 19 (!) 92 % --   07/11/22 1145 -- -- -- (!) 117 (!) 26 (!) 92 % --   07/11/22 1130 -- -- -- (!) 118 (!) 34 (!) 93 % --   07/11/22 1115 -- -- -- (!) 111 (!) 27 95 % --   07/11/22 1100 118/60 -- -- 108 (!) 21 (!) 93 % --   07/11/22 1045 -- -- -- 104 (!) 23 (!) 94 % --   07/11/22 1036 -- -- -- -- -- (!) 92 % --   07/11/22 1030 122/70 -- -- 108 (!) 36 95 % --   07/11/22 1015 -- -- -- 102 (!) 28 (!) 94 % --   07/11/22 1000 119/65 -- -- 108 (!) 27 (!) 90 % --   07/11/22 0945 -- -- -- 107 (!) 26 (!) 89 % --     PHYSICAL EXAM:     GENERAL APPEARANCE: Lethargic, well-developed, well-nourished male in no acute distress.  HEENT: Normocephalic and atraumatic. PERRL. Oropharynx unremarkable.  PULM: Normal respiratory effort. No accessory muscle use.  CV: RRR.  ABDOMEN: Soft, nontender.  EXTREMITIES: No obvious signs of vascular compromise. Pulses present. No cyanosis, clubbing or edema.  SKIN: Clear; no rashes, lesions or skin breaks in exposed areas.     NEURO:  MENTAL STATUS: Lethargic, oriented x3.  Able to commands appropriately..  Affect labile.     CRANIAL NERVES:  CN I: Not tested.  CN II: Fundoscopic exam deferred.  CN III, IV, VI: Pupils equal, round and reactive to light.  Extraocular movements full and intact.  CN V: Facial sensation normal.  CN VII: Facial asymmetry noted for left facial droop. Left upper face is involved as well.   CN VIII: Hearing grossly normal and equal bilaterally.  No skew deviation or pathologic nystagmus.  CN IX, X: Palate elevates symmetrically. Speech/articulation is mildly dysarthric.  CN XI: Shoulder shrug and chin rotation equal with good strength.  CN XII: Tongue protrusion midline.     MOTOR:  Bulk normal. Tone normal and symmetric  throughout.  Abnormal movements absent.  Tremor: none present.  Strength 5/5 throughout.     REFLEXES:  DTRs 2+ throughout.  Plantar response downgoing bilaterally.  SENSATION: grossly intact throughout.  COORDINATION: normal finger-to-nose.  STATION: not tested.  GAIT: not tested.          CURRENT SCHEDULED MEDICATIONS:   aspirin  81 mg Oral Daily    atorvastatin  80 mg Oral Daily    docusate sodium  100 mg Oral BID    enoxparin  40 mg Subcutaneous Daily    losartan  50 mg Oral Daily    metoprolol  2.5 mg Intravenous Q6H    metoprolol tartrate  25 mg Oral BID    tamsulosin  0.4 mg Oral Daily     CURRENT INFUSIONS:   amiodarone in dextrose 5% 0.5 mg/min (07/12/22 0414)    carboxymethylcellulose      nitroGLYCERIN Stopped (07/12/22 0839)     DATA:  Recent Labs   Lab 07/08/22  1520 07/08/22  1937 07/08/22  2348 07/09/22  0400 07/10/22  0323 07/11/22  0337 07/12/22  0308    141 144 139 138 137 138   K 3.3*  3.3* 4.1 3.9 3.9 4.1 4.0 4.0   * 111* 115* 112* 104 102 102   CO2 18* 22* 22* 22* 27 28 28   BUN 17 17 16 16 24* 25* 28*   CREATININE 1.1 1.1 1.2 1.0 1.1 1.0 1.0   * 150* 139* 134* 188* 149* 149*   CALCIUM 7.0* 7.0* 7.6* 7.7* 7.8* 7.7* 7.9*   PHOS 1.7*  --  2.0* 2.0*  --   --  2.3*   MG 2.4 2.0 1.9 1.9 2.0 2.1 2.1     Recent Labs   Lab 07/08/22  1520 07/08/22  1606 07/08/22  1937 07/08/22  2222 07/08/22  2348 07/09/22  0400 07/09/22  0502 07/09/22  1047 07/09/22  1609 07/09/22  1751 07/10/22  0323 07/11/22  0337 07/11/22  1036 07/12/22  0308   WBC 21.37*  --  12.94*  --  11.15 13.72*  --   --   --   --  16.69* 10.88  --  9.42   HGB 10.7*  --  12.3*  --  12.2* 12.2*  --   --   --   --  10.6* 10.1*  --  9.3*   HCT 32.9*   < > 37.3*   < > 36.4* 36.2*   < > 35* 33* 33* 33.0* 30.7* 32* 28.5*     --  171  --  158 162  --   --   --   --  160 141*  --  153    < > = values in this interval not displayed.     No results found for: PROTEINCSF, GLUCCSF, WBCCSF, RBCCSF  Hemoglobin A1C   Date  Value Ref Range Status   06/28/2022 6.1 4.5 - 6.2 % Final     Comment:     According to ADA guidelines, hemoglobin A1C <7.0% represents  optimal control in non-pregnant diabetic patients.  Different  metrics may apply to specific populations.   Standards of Medical Care in Diabetes - 2016.    For the purpose of screening for the presence of diabetes:  <5.7%     Consistent with the absence of diabetes  5.7-6.4%  Consistent with increasing risk for diabetes   (prediabetes)  >or=6.5%  Consistent with diabetes    Currently no consensus exists for use of hemoglobin A1C  for diagnosis of diabetes for children.     01/20/2011 5.8 4.0 - 6.2 % Final   01/14/2011 5.8 4.0 - 6.2 % Final            I have personally reviewed and interpreted the pertinent imaging and lab results.  Imaging Results          X-Ray Chest AP Portable (Final result)  Result time 06/28/22 06:13:22    Final result by Waldemar De La Cruz MD (06/28/22 06:13:22)                 Narrative:    Reason: chest pain    FINDINGS:    PA and lateral chest with comparison chest x-ray November 20, 2019 show normal cardiomediastinal silhouette.  Mild bilateral perihilar hazy ill-defined opacities are noted. Pulmonary vasculature is normal. No acute osseous abnormality.    IMPRESSION:    Mild bilateral perihilar hazy ill-defined opacities could reflect mild pulmonary edema or infection in the proper clinical settings.    Electronically signed by:  Waldemar De La Cruz DO  6/28/2022 6:13 AM CDT Workstation: [id number]                                      ASSESSMENT AND PLAN:       Acute ischemic stroke  Post CABG     Workup  · CTH: No acute intracranial abnormality. Prior R temporal and R BG infarcts  · MRI brain: Foci of restricted diffusion compatible with recent infarction involving the right temporal lobe inferiorly, right lentiform nucleus and upper medulla/lower mele to the left of midline  · CTA head and neck pending   · Hemoglobin A1c:  6.1  · Lipid panel:   Pending  · Echocardiogram:  EF 43%, normal left atrium        Plan  · Admitted for further stroke workup  · Etiology of stroke could likely be secondary cardioembolism.  · Continue with Aspirin 81 mg and Lipitor 80mg for stroke prevention.  Will hold off on dual antiplatelet therapy due to moderate stroke burden   · CT angiogram head and neck ordered and pending.  · Normalize BP  · Post CABG management per primary team and Cardiology/CT sugery  · PT OT  · Speech therapy  · DVT prophylaxis with chemo/SCD prophylaxis  · Discussed lifestyle modifications as prophylactic measures for stroke prevention including, adequate blood pressure management, healthy diet and regular exercise.           Critical Care:  44 minutes of critical care time has been spent evaluating with the patient. Time includes chart review not limited to diagnostic imaging, labs, and vitals, patient assessment, discussion with family and nursing, current order evaluations, and new order entries.     Louis Jarquin MD  Neurology/vascular Neurology  Date of Service: 07/12/2022  9:42 AM    Please note: This note was transcribed using voice recognition software. Because of this technology there are often uinintended grammatical, spelling, and other transcription errors. Please disregard these errors.

## 2022-07-12 NOTE — PT/OT/SLP PROGRESS
Occupational Therapy   Treatment    Name: Matteo Fraire  MRN: [id number]  Admitting Diagnosis:  STEMI (ST elevation myocardial infarction)  4 Days Post-Op    Recommendations:     Discharge Recommendations: rehabilitation facility  Discharge Equipment Recommendations:   (TBD)  Barriers to discharge:  None    Assessment:     Matteo Fraire is a 61 y.o. male with a medical diagnosis of STEMI (ST elevation myocardial infarction).  He presents with improving medical acuity and functional mobility. Patient participated in functional transfer, LB dressing and education on sternal precautions. Performance deficits affecting function are weakness, impaired endurance, impaired self care skills, impaired functional mobilty, gait instability, impaired balance, impaired cardiopulmonary response to activity.     Rehab Prognosis:  Good; patient would benefit from acute skilled OT services to address these deficits and reach maximum level of function.       Plan:     Patient to be seen 6 x/week to address the above listed problems via self-care/home management, therapeutic activities, therapeutic exercises  · Plan of Care Expires: 08/11/22  · Plan of Care Reviewed with: patient    Subjective     Pain/Comfort:  · Pain Rating 1: 0/10  · Pain Rating Post-Intervention 1: 0/10    Objective:     Communicated with: nurse prior to session.  Patient found up in chair with telemetry, peripheral IV, arterial line, central line, chest tube upon OT entry to room.    General Precautions: Standard, fall, sternal   Orthopedic Precautions:N/A   Braces: N/A  Respiratory Status: Nasal cannula, flow 2 L/min     Occupational Performance:     Functional Mobility/Transfers:  · Patient completed Sit <> Stand Transfer with minimum assistance  with  hand-held assist   · Functional Mobility: marched in place for 30 seconds with contact guard assistance.    Activities of Daily Living:  · Lower Body Dressing: maximal assistance to don/doff socks sitting in  chair.      [facility] 6 Click ADL: 17    Treatment & Education:  Patient educated on sternal precautions while performing ADLs and functional mobility. More alert and communicating more clearly today.    Patient left up in chair with all lines intact and call button in reachEducation:      GOALS:   Multidisciplinary Problems     Occupational Therapy Goals        Problem: Occupational Therapy    Goal Priority Disciplines Outcome Interventions   Occupational Therapy Goal     OT, PT/OT Ongoing, Progressing    Description: Goals to be met by: 8/11/2022     Patient will increase functional independence with ADLs by performing:    UE Dressing with Supervision.  LE Dressing with Supervision.  Grooming while standing at sink with Supervision.  Toileting from toilet with Supervision for hygiene and clothing management.   Toilet transfer to toilet with Supervision.  Perform 30 minutes sitting/standing ADL activity while maintaining sternal precautions with no verbal cues.                     Time Tracking:     OT Date of Treatment: 07/12/22  OT Start Time: 0924  OT Stop Time: 0941  OT Total Time (min): 17 min    Billable Minutes:Self Care/Home Management 17    OT/LEONARDO: OT          7/12/2022

## 2022-07-12 NOTE — PROGRESS NOTES
CVT SURGERY PROGRESS NOTE  Matteo Fraire  61 y.o.  1960    Patient's Chief Complaint:  STEMI (ST elevation myocardial infarction)    HPI: 61-year-old white male with extensive past cardiac history including hypertension and multiple myocardial infarctions status post PCI previous CABG in 2011. He presented to [facility] yesterday evening after doing some yard work resulting in chest pain associated with nausea and vomiting.  Repeat EKG showed ST-elevation myocardial infarction anterior leads .  He was brought for emergent left heart catheterization worse LAD was ballooned. Cardiothoracic surgery was consulted for evaluation of redo CABG versus staged PCI.    Hospital Course: Redo coronary artery bypass grafting x3 using left radial artery to the left anterior descending coronary artery and separate segments of saphenous vein from the aorta to the posterior descending coronary artery and from the aorta to the obtuse marginal coronary artery using a combination of antegrade and retrograde cardioplegia, mild systemic hypothermia, endoscopic vein harvest, left radial artery harvest, and complete pericardiectomy due to severe adhesions from previous surgery as well as insertion of right femoral arterial line with percutaneous Seldinger technique by Dr. Stone 7/8/22.     Last 24 hour Interval:  -Small lacunar infarct in the right basal ganglia   -Mentation is improving. Left facial droop noted  -Hemodynamics stable  -H&H stable  -Pleural bulb with 50 ml. To bulb suction.   -Mediastinal chest tubes with 46 ml. No air leak to suction.     Subjective:  Symptoms:  Stable.    Diet:  Adequate intake.    Activity level: Impaired due to weakness.    Pain:  He complains of pain that is mild.  Pain is well controlled.                                                                 Objective:  General Appearance:  Well-appearing.    Vital signs: (most recent): Blood pressure 111/63, pulse 77, temperature 97.9  "°F (36.6 °C), temperature source Oral, resp. rate 19, height 5' 9" (1.753 m), weight 97.4 kg (214 lb 11.7 oz), SpO2 100 %.  Vital signs are normal.    Output: Producing urine.    Lungs:  Normal effort and normal respiratory rate.    Heart: Normal rate.  Regular rhythm.    Abdomen: Abdomen is soft.    Neurological: Patient is alert and oriented to person, place and time.    Skin:  Warm and dry.  (Surgical dressings CDI)    Recent Vitals:  Vitals:    07/12/22 0400 07/12/22 0501 07/12/22 0700 07/12/22 0715   BP:  109/62 111/63    Pulse:  78 77 77   Resp:  19 18 19   Temp:    97.9 °F (36.6 °C)   TempSrc:    Oral   SpO2:  97% 99% 100%   Weight: 97.4 kg (214 lb 11.7 oz)      Height:           INPATIENT MEDS   amiodarone in dextrose 5% 0.5 mg/min (07/12/22 0414)    carboxymethylcellulose      nitroGLYCERIN 15 mcg/min (07/11/22 1628)    propofoL Stopped (07/09/22 1400)      aspirin  81 mg Oral Daily    atorvastatin  80 mg Oral Daily    docusate sodium  100 mg Oral BID    losartan  50 mg Oral Daily    metoprolol  2.5 mg Intravenous Q6H    metoprolol tartrate  25 mg Oral BID    tamsulosin  0.4 mg Oral Daily     acetaminophen, albumin human 5%, ALPRAZolam, calcium gluconate IVPB, calcium gluconate IVPB, calcium gluconate IVPB, carboxymethylcellulose, dextrose 50%, dextrose 50%, HYDROcodone-acetaminophen, insulin aspart U-100, magnesium sulfate IVPB, magnesium sulfate IVPB, melatonin, morphine, morphine, mupirocin, ondansetron, potassium chloride in water **AND** potassium chloride in water **AND** potassium chloride in water, sodium phosphate IVPB, sodium phosphate IVPB, sodium phosphate IVPB  HEMODYNAMICS      Recent O2 Therapy/Vent Settings:  RA  I/O last 24 hrs:  Intake/Output - Last 3 Shifts       07/10 0700  07/11 0659 07/11 0700 07/12 0659 07/12 0700 07/13 0659    P.O. 0 0     I.V. (mL/kg) 1859.5 (19.3) 740.2 (7.6)     IV Piggyback       Total Intake(mL/kg) 1859.5 (19.3) 740.2 (7.6)     Urine (mL/kg/hr) 3065 " "(1.3) 2140 (0.9)     Drains 51 50     Chest Tube 46 56     Total Output 3162 8396     Net -1302.5 -1505.8                Recent Cardiac Rhythm: NSR    Recent Pain Assessment: Denvera  CBC  Recent Labs   Lab 07/10/22  0323 07/11/22  0337 07/12/22  0308   WBC 16.69* 10.88 9.42   RBC 3.72* 3.44* 3.19*   HGB 10.6* 10.1* 9.3*    141* 153   MCV 89 89 89   MCH 28.5 29.4 29.2   MCHC 32.1 32.9 32.6     BMP  Recent Labs   Lab 07/10/22  0323 07/11/22  0337 07/12/22  0308   CO2 27 28 28   BUN 24* 25* 28*   CREATININE 1.1 1.0 1.0   CALCIUM 7.8* 7.7* 7.9*     CARDIAC ENZYMES  Recent Labs   Lab 07/09/22  0400 07/09/22  0810 07/10/22  0323   TROPONINI 62.273* 62.262* 63.503*     BNP  No results for input(s): BNP in the last 168 hours.  PT/INR  INR   Date Value Ref Range Status   06/27/2022 1.1  Final     Comment:     Coumadin Therapy:  INR: 2.0-3.0 conventional anticoagulation    INR: 2.5-3.5 intensive anticoagulation     11/20/2019 1.1  Final     Comment:     Coumadin Therapy:  INR: 2.0-3.0 conventional anticoagulation  INR: 2.5-3.5 intensive anticoagulation     01/28/2011 1.0 0.8 - 1.2 Final     Comment:     ACCP Guideline for Coumadin usage recommends a "target range" of  2.0 - 3.0 for INR for all indicators except mechanical heart valves  and antiphospholipid syndromes which should use 2.5 - 3.5.  .   01/27/2011 1.0 0.8 - 1.2 Final     Comment:     ACCP Guideline for Coumadin usage recommends a "target range" of  2.0 - 3.0 for INR for all indicators except mechanical heart valves  and antiphospholipid syndromes which should use 2.5 - 3.5.  .   01/26/2011 1.0 0.8 - 1.2 Final     Comment:     ACCP Guideline for Coumadin usage recommends a "target range" of  2.0 - 3.0 for INR for all indicators except mechanical heart valves  and antiphospholipid syndromes which should use 2.5 - 3.5.  .         DIAGNOSTIC RESULTS:    ECG Results          EKG 12-lead (In process)  Result time 07/09/22 06:06:25    In process by Interface, Lab " In [facility] (07/09/22 06:06:25)                 Narrative:    Test Reason : R07.9,    Vent. Rate : 091 BPM     Atrial Rate : 091 BPM     P-R Int : 254 ms          QRS Dur : 086 ms      QT Int : 354 ms       P-R-T Axes : 072 090 057 degrees     QTc Int : 435 ms    Sinus rhythm with 1st degree A-V block with occasional Premature  ventricular complexes  Rightward axis  Anterior infarct , possibly acute  Lateral injury pattern    ACUTE MI / STEMI    Abnormal ECG  No previous ECGs available    Referred By: [email or web address]   SELF           Confirmed By:                              EKG 12-lead (Final result)  Result time 07/03/22 13:49:52    Final result by Interface, Lab In [facility] (07/03/22 13:49:52)                 Narrative:    Test Reason : R07.9,    Vent. Rate : 076 BPM     Atrial Rate : 076 BPM     P-R Int : 216 ms          QRS Dur : 104 ms      QT Int : 388 ms       P-R-T Axes : 040 087 039 degrees     QTc Int : 436 ms    Sinus rhythm with 1st degree A-V block  Incomplete right bundle branch block  Anteroseptal infarct (cited on or before 27-JUN-2022)  Lateral injury pattern    ACUTE MI / STEMI    Abnormal ECG  When compared with ECG of 27-JUN-2022 20:35,  Premature ventricular complexes are no longer Present  Serial changes of Anteroseptal infarct Present  Confirmed by Perfecto VIRGEN, Schuyler FINNEY ([id number]) on 7/3/2022 1:49:41 PM    Referred By: ADEBAYO   SELF           Confirmed By:Schuyler Grove MD                                CURRENT CONSULTS:  IP CONSULT TO CARDIOTHORACIC SURGERY  IP CONSULT TO CARDIOLOGY  IP CONSULT TO REGISTERED DIETITIAN/NUTRITIONIST  IP CONSULT TO CARDIAC REHAB  IP CONSULT TO NEUROLOGY    ASSESSMENT/PLAN:    History PCI 2005  History CABG x2 2011  Chest pain  STEMI  Multivessel CAD  S/p REDO CABG x 3 and complete pericardectomy by Dr. Stone 7/8/22  -POD # 4  -Hemodynamically stable  -Tele reviewed - NSR  -H&H stable  -Leukocytosis resolved  -Chest tubes and pacer wires removed  -Surgical Dressings  CDI.   -OOB TID with meals. Ambulate if tolerated.  -Continue/Encourage IS at least Q 2 hours  -Cardiac Rehab consulted  -Continue ASA, BB, statin  -Replace electrolytes accordingly  -OOB to chair. Only in bed to sleep.   -OK to transfer from surgical standpoint    SVT  -Remains in NSR.   -Medical management per cards.       CVA  -Per CT: Small lacunar infarct in the right basal ganglia with mild periventricular small vessel disease  -Neuro following.     Acute blood loss anemia, post operative  -Expected post-op.  -H&H: stable  -Continue to monitor     Thrombocytopenia, post-operative  -Expected  -Plt ct: stable  -Continue to monitor    GI Prophylaxis: proton pump inhibitor per orders  DVT Prophylaxis:   Anticoagulants   Medication Route Frequency           Skyler Duke AGACNP-BC  7/12/2022  10:00 AM

## 2022-07-12 NOTE — CARE UPDATE
07/11/22 2142   Patient Assessment/Suction   Level of Consciousness (AVPU) alert   Respiratory Effort Unlabored   Expansion/Accessory Muscles/Retractions no use of accessory muscles   All Lung Fields Breath Sounds clear;coarse   Rhythm/Pattern, Respiratory no shortness of breath reported   Cough Frequency no cough   PRE-TX-O2   O2 Device (Oxygen Therapy) nasal cannula   $ Is the patient on Low Flow Oxygen? Yes   Flow (L/min) 2   SpO2 97 %   Pulse Oximetry Type Continuous   $ Pulse Oximetry - Multiple Charge Pulse Oximetry - Multiple   Pulse 89   Resp 17   Positioning   Head of Bed (HOB) Positioning HOB elevated;HOB at 30 degrees   Respiratory Evaluation   $ Care Plan Tech Time 15 min

## 2022-07-13 PROBLEM — I63.9 ACUTE CVA (CEREBROVASCULAR ACCIDENT): Status: ACTIVE | Noted: 2022-07-13

## 2022-07-13 LAB
ALBUMIN SERPL BCP-MCNC: 2.9 G/DL (ref 3.5–5.2)
ALP SERPL-CCNC: 47 U/L (ref 55–135)
ALT SERPL W/O P-5'-P-CCNC: 60 U/L (ref 10–44)
ANION GAP SERPL CALC-SCNC: 7 MMOL/L (ref 8–16)
AST SERPL-CCNC: 58 U/L (ref 10–40)
BILIRUB SERPL-MCNC: 1.2 MG/DL (ref 0.1–1)
BUN SERPL-MCNC: 28 MG/DL (ref 8–23)
CALCIUM SERPL-MCNC: 8.1 MG/DL (ref 8.7–10.5)
CHLORIDE SERPL-SCNC: 103 MMOL/L (ref 95–110)
CO2 SERPL-SCNC: 29 MMOL/L (ref 23–29)
CREAT SERPL-MCNC: 0.9 MG/DL (ref 0.5–1.4)
ERYTHROCYTE [DISTWIDTH] IN BLOOD BY AUTOMATED COUNT: 13.6 % (ref 11.5–14.5)
EST. GFR  (AFRICAN AMERICAN): >60 ML/MIN/1.73 M^2
EST. GFR  (NON AFRICAN AMERICAN): >60 ML/MIN/1.73 M^2
GLUCOSE SERPL-MCNC: 129 MG/DL (ref 70–110)
GLUCOSE SERPL-MCNC: 150 MG/DL (ref 70–110)
GLUCOSE SERPL-MCNC: 152 MG/DL (ref 70–110)
HCT VFR BLD AUTO: 30.5 % (ref 40–54)
HGB BLD-MCNC: 9.4 G/DL (ref 14–18)
MCH RBC QN AUTO: 27.9 PG (ref 27–31)
MCHC RBC AUTO-ENTMCNC: 30.8 G/DL (ref 32–36)
MCV RBC AUTO: 91 FL (ref 82–98)
PHOSPHATE SERPL-MCNC: 3.2 MG/DL (ref 2.7–4.5)
PLATELET # BLD AUTO: 190 K/UL (ref 150–450)
PMV BLD AUTO: 11.2 FL (ref 9.2–12.9)
POTASSIUM SERPL-SCNC: 3.7 MMOL/L (ref 3.5–5.1)
PROT SERPL-MCNC: 5.6 G/DL (ref 6–8.4)
RBC # BLD AUTO: 3.37 M/UL (ref 4.6–6.2)
SODIUM SERPL-SCNC: 139 MMOL/L (ref 136–145)
WBC # BLD AUTO: 9.67 K/UL (ref 3.9–12.7)

## 2022-07-13 PROCEDURE — 27000221 HC OXYGEN, UP TO 24 HOURS

## 2022-07-13 PROCEDURE — 99233 SBSQ HOSP IP/OBS HIGH 50: CPT | Mod: ,,, | Performed by: INTERNAL MEDICINE

## 2022-07-13 PROCEDURE — 25000003 PHARM REV CODE 250: Performed by: PHYSICIAN ASSISTANT

## 2022-07-13 PROCEDURE — 80053 COMPREHEN METABOLIC PANEL: CPT | Performed by: INTERNAL MEDICINE

## 2022-07-13 PROCEDURE — 21000000 HC CCU ICU ROOM CHARGE

## 2022-07-13 PROCEDURE — 97530 THERAPEUTIC ACTIVITIES: CPT

## 2022-07-13 PROCEDURE — 92526 ORAL FUNCTION THERAPY: CPT

## 2022-07-13 PROCEDURE — 92611 MOTION FLUOROSCOPY/SWALLOW: CPT

## 2022-07-13 PROCEDURE — 85027 COMPLETE CBC AUTOMATED: CPT | Performed by: INTERNAL MEDICINE

## 2022-07-13 PROCEDURE — 97116 GAIT TRAINING THERAPY: CPT

## 2022-07-13 PROCEDURE — 99024 POSTOP FOLLOW-UP VISIT: CPT | Mod: ,,, | Performed by: PHYSICIAN ASSISTANT

## 2022-07-13 PROCEDURE — 63600175 PHARM REV CODE 636 W HCPCS

## 2022-07-13 PROCEDURE — 36415 COLL VENOUS BLD VENIPUNCTURE: CPT | Performed by: INTERNAL MEDICINE

## 2022-07-13 PROCEDURE — 25000003 PHARM REV CODE 250: Performed by: THORACIC SURGERY (CARDIOTHORACIC VASCULAR SURGERY)

## 2022-07-13 PROCEDURE — 94799 UNLISTED PULMONARY SVC/PX: CPT

## 2022-07-13 PROCEDURE — 99233 PR SUBSEQUENT HOSPITAL CARE,LEVL III: ICD-10-PCS | Mod: ,,, | Performed by: INTERNAL MEDICINE

## 2022-07-13 PROCEDURE — 94761 N-INVAS EAR/PLS OXIMETRY MLT: CPT

## 2022-07-13 PROCEDURE — 99900035 HC TECH TIME PER 15 MIN (STAT)

## 2022-07-13 PROCEDURE — 63600175 PHARM REV CODE 636 W HCPCS: Performed by: INTERNAL MEDICINE

## 2022-07-13 PROCEDURE — 99900031 HC PATIENT EDUCATION (STAT)

## 2022-07-13 PROCEDURE — 84100 ASSAY OF PHOSPHORUS: CPT | Performed by: INTERNAL MEDICINE

## 2022-07-13 PROCEDURE — 99024 PR POST-OP FOLLOW-UP VISIT: ICD-10-PCS | Mod: ,,, | Performed by: PHYSICIAN ASSISTANT

## 2022-07-13 RX ORDER — METOPROLOL TARTRATE 1 MG/ML
2.5 INJECTION, SOLUTION INTRAVENOUS EVERY 6 HOURS PRN
Status: DISCONTINUED | OUTPATIENT
Start: 2022-07-13 | End: 2022-07-18

## 2022-07-13 RX ORDER — AMLODIPINE BESYLATE 5 MG/1
5 TABLET ORAL DAILY
Status: DISCONTINUED | OUTPATIENT
Start: 2022-07-13 | End: 2022-07-18

## 2022-07-13 RX ADMIN — METOPROLOL TARTRATE 25 MG: 25 TABLET, FILM COATED ORAL at 09:07

## 2022-07-13 RX ADMIN — AMIODARONE HYDROCHLORIDE 0.5 MG/MIN: 1.8 INJECTION, SOLUTION INTRAVENOUS at 04:07

## 2022-07-13 RX ADMIN — ENOXAPARIN SODIUM 40 MG: 40 INJECTION SUBCUTANEOUS at 11:07

## 2022-07-13 RX ADMIN — AMLODIPINE BESYLATE 5 MG: 5 TABLET ORAL at 09:07

## 2022-07-13 NOTE — PLAN OF CARE
Met with pt and his SO Janessa at bedside and reviewed recommendation for Rehab. Both agree for Rehab when pt medically cleared. Pt's preference is for [facility].   Referral submitted via [employer].  Pt continues to receive NG rohan, NPO.        07/13/22 4118   Discharge Reassessment   Assessment Type Discharge Planning Reassessment   Did the patient's condition or plan change since previous assessment? No   Discharge Plan discussed with: Spouse/sig other;Patient   Name(s) and Number(s) janessa gruber (Significant other)   [phone] (Mobile)   Discharge Plan A Rehab   Discharge Plan B Rehab   DME Needed Upon Discharge  none   Why the patient remains in the hospital Requires continued medical care   Post-Acute Status   Post-Acute Authorization Placement   Post-Acute Placement Status Referrals Sent   Discharge Delays None known at this time

## 2022-07-13 NOTE — PT/OT/SLP PROGRESS
Occupational Therapy   Treatment    Name: Matteo Fraire  MRN: [id number]  Admitting Diagnosis:  STEMI (ST elevation myocardial infarction)  5 Days Post-Op    Recommendations:     Discharge Recommendations: rehabilitation facility  Discharge Equipment Recommendations:   (TBD)  Barriers to discharge:  Decreased caregiver support    Assessment:     Matteo Fraire is a 61 y.o. male with a medical diagnosis of STEMI (ST elevation myocardial infarction).   Performance deficits affecting function are weakness, impaired endurance, impaired self care skills, impaired functional mobilty, gait instability, impaired balance, pain, impaired cardiopulmonary response to activity, orthopedic precautions.     Rehab Prognosis:  Good; patient would benefit from acute skilled OT services to address these deficits and reach maximum level of function.       Plan:     Patient to be seen 5 x/week to address the above listed problems via self-care/home management, therapeutic activities, therapeutic exercises  · Plan of Care Expires: 08/11/22  · Plan of Care Reviewed with: patient    Subjective     Pain/Comfort:  · Pain Rating 1: 0/10  · Pain Rating Post-Intervention 1: 0/10    Objective:     Communicated with: nurse prior to session.  Patient found up in chair with oxygen, telemetry, NG tube, pulse ox (continuous), peripheral IV, blood pressure cuff, lee catheter upon OT entry to room.    General Precautions: Standard, fall, sternal   Orthopedic Precautions:N/A      Occupational Performance:     Functional Mobility/Transfers:  · Patient completed Sit <> Stand Transfer with contact guard assistance  with  rolling walker   · Patient completed Toilet Transfer Stand Pivot technique with contact guard assistance with  rolling walker and bedside commode    Treatment & Education:  Reviewed sternal precautions and application to ADLs/functional mobility; pt verbalized understanding    Patient left up in chair with all lines intact and call  button in reachEducation:      GOALS:   Multidisciplinary Problems     Occupational Therapy Goals        Problem: Occupational Therapy    Goal Priority Disciplines Outcome Interventions   Occupational Therapy Goal     OT, PT/OT Ongoing, Progressing    Description: Goals to be met by: 8/11/2022     Patient will increase functional independence with ADLs by performing:    UE Dressing with Supervision.  LE Dressing with Supervision.  Grooming while standing at sink with Supervision.  Toileting from toilet with Supervision for hygiene and clothing management.   Toilet transfer to toilet with Supervision.  Perform 30 minutes sitting/standing ADL activity while maintaining sternal precautions with no verbal cues.                     Time Tracking:     OT Date of Treatment: 07/13/22  OT Start Time: 1049  OT Stop Time: 1112  OT Total Time (min): 23 min    Billable Minutes:Therapeutic Activity 23    OT/LEONARDO: OT          7/13/2022

## 2022-07-13 NOTE — NURSING
Unable to give Po meds due to strict NPO status. Notified MD and Cardiology of this. Pt does have NGT with current feeding at 30ml/hr.

## 2022-07-13 NOTE — SUBJECTIVE & OBJECTIVE
Interval History:     Review of Systems   Constitutional:  Negative for activity change and appetite change.   HENT:  Negative for congestion and dental problem.    Eyes:  Negative for discharge and itching.   Respiratory:  Negative for shortness of breath.    Cardiovascular:  Negative for chest pain.   Gastrointestinal:  Negative for abdominal distention and abdominal pain.   Endocrine: Negative for cold intolerance.   Genitourinary:  Negative for difficulty urinating and dysuria.   Musculoskeletal:  Negative for arthralgias and back pain.   Skin:  Negative for color change.   Neurological:  Negative for dizziness and facial asymmetry.   Hematological:  Negative for adenopathy.   Psychiatric/Behavioral:  Negative for agitation and behavioral problems.    Objective:     Vital Signs (Most Recent):  Temp: 99.5 °F (37.5 °C) (07/12/22 1901)  Pulse: 88 (07/12/22 1901)  Resp: (!) 21 (07/12/22 1901)  BP: (!) 141/75 (07/12/22 1901)  SpO2: 95 % (07/12/22 1901)   Vital Signs (24h Range):  Temp:  [97.7 °F (36.5 °C)-99.5 °F (37.5 °C)] 99.5 °F (37.5 °C)  Pulse:  [77-95] 88  Resp:  [6-31] 21  SpO2:  [91 %-100 %] 95 %  BP: (106-156)/(60-91) 141/75  Arterial Line BP: (102-146)/(51-71) 142/71     Weight: 97.4 kg (214 lb 11.7 oz)  Body mass index is 31.71 kg/m².    Intake/Output Summary (Last 24 hours) at 7/12/2022 1949  Last data filed at 7/12/2022 1901  Gross per 24 hour   Intake 529.79 ml   Output 1411 ml   Net -881.21 ml      Physical Exam  Vitals and nursing note reviewed.   Constitutional:       Appearance: He is well-developed.   HENT:      Head: Atraumatic.      Right Ear: External ear normal.      Left Ear: External ear normal.      Nose: Nose normal.      Mouth/Throat:      Mouth: Mucous membranes are moist.   Eyes:      General: No scleral icterus.  Cardiovascular:      Rate and Rhythm: Normal rate.   Pulmonary:      Effort: Pulmonary effort is normal.   Abdominal:      General: There is no distension.   Musculoskeletal:          General: Normal range of motion.      Cervical back: Full passive range of motion without pain and normal range of motion.   Skin:     General: Skin is warm.   Neurological:      Mental Status: He is alert and oriented to person, place, and time.   Psychiatric:         Behavior: Behavior normal.       Significant Labs: All pertinent labs within the past 24 hours have been reviewed.  CBC:   Recent Labs   Lab 07/11/22  0337 07/11/22  1036 07/12/22  0308   WBC 10.88  --  9.42   HGB 10.1*  --  9.3*   HCT 30.7* 32* 28.5*   *  --  153     CMP:   Recent Labs   Lab 07/11/22  0337 07/12/22  0308    138   K 4.0 4.0    102   CO2 28 28   * 149*   BUN 25* 28*   CREATININE 1.0 1.0   CALCIUM 7.7* 7.9*   ANIONGAP 7* 8   EGFRNONAA >60.0 >60.0       Significant Imaging: I have reviewed all pertinent imaging results/findings within the past 24 hours.

## 2022-07-13 NOTE — PROCEDURES
Modified Barium Swallow    Patient Name:  Matteo Fraire   MRN:  [id number]      Recommendations:     Recommendations:                General Recommendations:    · Prolonged nutrition support: rapid or spontaneous recovery within acute stage is not expected/anticipated  · Intensive dysphagia therapy   · Patient is at increased risk for aspiration related pulmonary complication on secretions: oral hygiene should be completed every 4 hours with toothpaste and toothbrush to decrease risk     Diet recommendations:  NPO, NPO     Aspiration Precautions: Strict aspiration precautions   · Frequent suction  · Oral hygiene every 4 hours with toothpaste and toothbrush     General Precautions: Standard, aspiration, NPO  Communication strategies:  none    Referral     Reason for Referral  Patient was referred for a Modified Barium Swallow Study to assess the efficiency of his/her swallow function, rule out aspiration and make recommendations regarding safe dietary consistencies, effective compensatory strategies, and safe eating environment.     Diagnosis: STEMI (ST elevation myocardial infarction)       History:     Past Medical History:   Diagnosis Date    Heart attack 2005;2011    Hypertension     Psoriasis        Objective:     Current Respiratory Status: 07/13/22    Alert: yes    Cooperative: yes    Follows Directions: yes    Visualization  · Patient was seen in the lateral view  · NG tube observed in place    Oral Peripheral Examination  Oral Musculature: facial asymmetry present, left weakness  Dentition: present and adequate  Secretion Management: problems swallowing secretions, coughing on secretions (drooling)  Oral Labial Strength and Mobility: impaired retraction, impaired pursing (secondaryt to left sided facial paresis)  Lingual Strength and Mobility: WNL (protrudes midline able to lateralize right and left)  Volitional Cough: Present however, inconsistently effective to bring up secreretions for  suction  Volitional Swallow: Hyolaryngeal lift present  Voice Prior to PO Intake: Clear in nature, reduced intensity  Oral Musculature Comments: Left sided facial droop with minimal retraction on left, inability to raise left eyebrow. Speech is intelligible however with slightly diminished precision and rate.    Consistencies Assessed  Thin 1/2 teaspoon x2    Oral Preparation/Oral Phase   Lip closure  0: no labial escape   1: interlabial escape; no progression to anterior lip    2: escape from interlabial space or lateral juncture; no extension beyond vermilion border   3: escape progressing to mid-chin    4: escape beyond mid-chin          Tongue control during bolus hold  0: cohesive bolus between tongue to palatal seal    1: escape to lateral buccal cavity/floor of mouth   2: posterior escape of less than half of bolus    3: posterior escape of greater than half of bolus       Bolus preparation/mastication   0: timely and efficient chewing and mashing   1: slow prolonged chewing/mashing with complete re-collection   2: disorganized chewing/mashing with solid pieces of bolus unchewed    3: minimal chewing/mashign with majority of bolus unchewed      N/A     Bolus transport/lingual motion   0: brisk tongue motion    1: delayed initiation of tongue motion    2: slowed tongue motion    3: repetitive/disorganized tongue motion    4: minimal to no tongue motion       Oral residue  0: complete oral clearance   1: trace residue lining oral structures   2: residue collection on oral structures    3: majority of bolus remaining    4: minimal to no clearance      Pharyngeal Phase    Initiation of pharyngeal swallow   0: bolus head at posterior gage of ramus    1: bolus head in valleculae    2: bolus head at posterior laryngeal surface of epiglottis    3: bolus head in pyriforms   4: No visible initiation at any location         Soft palate elevation   0: no bolus between soft palate and posterior pharyngeal wall    1:  trace column of contrast or air between soft palate and posterior pharyngeal wall    2: escape to nasopharynx   3: escape to nasal cavity    4: escape to nostril with/without emission      Noted to elevation, appearing with intact approximation during attempted swallow effort       Base of tongue retraction     0: no contrast between tongue base and posterior pharyngeal wall    1: trace column of contrast or air between tongue base and posterior pharyngeal wall    2: narrow column of contrast or air between tongue base and posterior pharyngeal wall     3: wide column of contrast or air between tongue base and posterior pharyngeal wall    4: no visible posterior motion of tongue base      Noted to retract, appearing with intact approximation during attempted swallow effort     Laryngeal elevation   0: complete superior movement of thyroid cartilage with complete approximation of arytenoids to epiglottic petiole     1: partial superior movement of thyroid cartilage/partial approximation of arytenoids to epiglottic petiole    2: minimal superior movement of thyroid cartilage with minimal approximation of arytenoids to epiglottic petiole    3: no superior movement of thyroid cartilage with complete approximation of arytenoids to epiglottic petiole       Anterior hyoid excursion   0: complete anterior movement    1: partial anterior movement    2: no anterior movement       Epiglottic movement   0: complete inversion    1: partial inversion    2: no inversion       Laryngeal vestibular closure   0: complete; no air/contrast in laryngeal vestibule    1: incomplete; narrow column of air/contrast in laryngeal vestibule    2: none; side column of air/contrast in laryngeal vestibule     Pharyngeal stripping wave   0: present; complete    1: present; diminished   2: absent       Pharyngeal contraction     * in AP view at rest and throughout maximal movement of structures   0: complete   1: incomplete    2: unilateral  bulging    3: bilateral bulging       Pharyngeal residue   0: complete pharyngeal clearance    1: trace residue within or in pharyngeal structures   2: collection of residue within or on pharyngeal structures   3: majority of contrast within or on pharyngeal structures   4: minimal to no pharyngeal clearance       Pharyngoesophageal segment opening   0: complete distention and complete duration; no obstruction of flow    1: partial distension/partial duration; partial obstruction of flow    2: minimal distension/minimal duration; marked obstruction of flow    3: no distension with total obstruction of flow         8- point Penetration-Aspiration Scale (PAS)    Thin liquid   Best: 4) Material enters the airway, contacts the vocal folds, & is ejected from the airway   Worst: 5) Material enters the airway, contacts the vocal folds, & is not ejected from the airway      Mildly-thick liquid   Best:    Worst:       Moderately-thick liquid   Best:    Worst:       Pudding   Best:    Worst:       Solid   Best:    Worst:      1) Material does not enter the airway    2) Material enters the airway, remains above the vocal folds, & is ejected from the airway    3) Material enters the airway, remains above the vocal folds, & is not ejected from the airway    4) Material enters the airway, contacts the vocal folds, & is ejected from the airway    5) Material enters the airway, contacts the vocal folds, & is not ejected from the airway    6) Material enters the airway, passes below the vocal folds, & is ejected into the larynx or out of the airway     7) Material enters the airway, passes below the vocal folds, & is not ejected from the trachea despite effort     8) Material enters the airway, passes below the vocal folds, & no effort is made to eject     Compensatory swallow strategies:   Reclined position to increase time and opportunity for completion of swallow without airway invasion: effective     Dysphagia outcomes and  severity scale (CHUY) level 1, severe dysphagia secondary to   Unable to achieve swallow   ·     Assessment:     Impressions  ·  Severe pharyngeal dysphagia with impaired swallow safety and efficiency, absent pharyngeal swallow; acute in nature and consistent with lateral medullary stroke. No transit through the upper esophogeal sphincter secondary to absent hyolaryngeal displacement despite continued effort. Patient with intact sensation, producing continued cough effort and hacking to bring up material in pharynx and laryngeal vestibule. Unable to completely clear with material remaining in pyriform after effort.     Prognosis: Fair    Barriers:  · None    Education  Results were discussed with patient. Results were discussed with Medical Team who was in agreement with plan.     Goals:   Multidisciplinary Problems     SLP Goals        Problem: SLP    Goal Priority Disciplines Outcome   SLP Goal     SLP Ongoing, Progressing   Description: 1. Pt will tolerate least restrictive PO diet without acute dysphagia pulmonary complication.   2. Pt will participate in VFSS to define swallow physiology (met)   3. Pt will complete mendelsohn maneuver with 2 second hold 5-10x across 3-5 sets to improve duration of hyolaryngeal lift and UES opening hyolaryngeal elevation, excursion, airway protection and UES opening   4. Pt will complete isometric chin tuck against resistance (CTAR) OR Shaker with 60 second hold across 3-5 sets to improve hyolaryngeal elevation, excursion, airway protection and UES opening   5. Pt will complete isokinetic chin tuck against resistance (CTAR) OR Shaker across 3-5 sets to improve hyolaryngeal elevation, excursion, airway protection and UES opening                              Plan:   · Patient to be seen:  Therapy Frequency: 6 x/week   · Plan of Care expires:     · Plan of Care reviewed with:  patient (RN, MD)        Discharge recommendations:  IPR   Time Tracking:   SLP Treatment Date:    07/13/22  Speech Start Time:  1000  Speech Stop Time:  1040     Speech Total Time (min):  40 min    07/14/2022

## 2022-07-13 NOTE — PT/OT/SLP PROGRESS
Physical Therapy Treatment    Patient Name:  Matteo Fraire   MRN:  [id number]    Recommendations:     Discharge Recommendations:  rehabilitation facility   Discharge Equipment Recommendations: other (see comments) (TBD)   Barriers to discharge: increase assist with mobility     Assessment:     Matteo Fraire is a 61 y.o. male admitted with a medical diagnosis of STEMI (ST elevation myocardial infarction).  He presents with the following impairments/functional limitations:  weakness, impaired endurance, impaired self care skills, impaired functional mobilty, gait instability, impaired balance, decreased lower extremity function, decreased safety awareness, impaired cardiopulmonary response to activity.    Pt found HOB elevated, on 2L of oxygen. Pt reports feeling better today at the beginning of the session. SAO2 WFL at the beginning of session, pt placed on room air. BM min A for supine to sit EOB. Sit to stand transfer min A. Pt was able to follow sternal precautions without verbal cueing. Pt ambulated 60ft x 2 CGA on the straight aways and min A for turning due to decrease right foot sensation and gait instability. Pt did not reports dizziness throughout ambulation and SAO2 WFL throughout. Pt placed back on 2L of oxygen at the end of session and left up in chair.     Rehab Prognosis: Fair; patient would benefit from acute skilled PT services to address these deficits and reach maximum level of function.    Recent Surgery: Procedure(s) (LRB):  CABG, REPEAT (N/A)  HARVEST-VEIN-ENDOVASCULAR (Right)  SURGICAL PROCUREMENT,ARTERY,RADIAL,FOR CABG (Left) 5 Days Post-Op    Plan:     During this hospitalization, patient to be seen daily to address the identified rehab impairments via gait training, therapeutic activities, therapeutic exercises, neuromuscular re-education and progress toward the following goals:    · Plan of Care Expires:  08/13/22    Subjective     Chief Complaint: none stated   Patient/Family  Comments/goals: none stated   Pain/Comfort:  Pain Rating 1: 0/10      Objective:     Communicated with RN prior to session.  Patient found HOB elevated with peripheral IV, pulse ox (continuous), oxygen, blood pressure cuff, lee catheter, telemetry upon PT entry to room.     General Precautions: Standard, fall   Orthopedic Precautions:N/A   Braces: N/A  Respiratory Status: Nasal cannula, flow 2 L/min     Functional Mobility:  · Bed Mobility:     · Supine to Sit: minimum assistance  · Transfers:     · Sit to Stand:  minimum assistance with no AD  · Gait: 60 ft x 2 CGA/min A for turning      AM-PAC 6 CLICK MOBILITY          Therapeutic Activities and Exercises:  Pt educated on plan of care, importance of time out of bed, pacing/ energy conservation, proper gait pattern, RW management, need for use of call bell for assistance and to prevent falls, and need for assistance with mobility      Patient left up in chair with all lines intact and call button in reach..    GOALS:   Multidisciplinary Problems     Physical Therapy Goals        Problem: Physical Therapy    Goal Priority Disciplines Outcome Goal Variances Interventions   Physical Therapy Goal     PT, PT/OT Ongoing, Progressing     Description: Goals to be met by: 22     Patient will increase functional independence with mobility by performin. Supine to sit with Supervision  2. Sit to stand transfer with Supervision  3. Bed to chair transfer with Supervision using Rolling Walker  4. Gait  x 150 feet with Supervision using Rolling Walker.                          Time Tracking:     PT Received On: 22  PT Start Time: 905     PT Stop Time: 928  PT Total Time (min): 23 min     Billable Minutes: Gait Training 23    Treatment Type: Treatment  PT/PTA: PT     PTA Visit Number: 0     2022

## 2022-07-13 NOTE — PROGRESS NOTES
Formerly [facility], [facility]  Department of Neurology  Progress Note  Date: 2022 9:42 AM          Patient Name: Matteo Fraire   MRN: [id number]   : 1960    AGE: 61 y.o.    LOS: 16 days Hospital Day: 17  Admit date: 2022  7:45 PM       HPI per EMR: Matteo Fraire is a 61 y.o. male with a history of 61-year-old with prior history of CAD status post PCI and CABG in  came with chest pain found to have STEMI status post balloon angioplasty to LAD - now status post redo 3 vessel CABG  .  Hospital stay notable for episodes of NSVT after which he was initiated on lidocaine infusion.  Postoperative stay notable for persistent ST elevation which improved with nitroglycerin infusion.      Postoperatively patient has been lethargic and slow to respond.  He has been oriented x3.  This morning left facial droop was noted for which neurology was consulted.         Neurology consult:  Patient was seen and examined by me.  He is lethargic however able to respond questions and commands.  He did not have any episodes of SVT since last night, mental status has slightly improving as per the nurse.  He is oriented x3. On exam, he has a L facial droop and left lid lag.  He does not any other focal neurological deficits including no weakness or sensory changes in extremities.  He denies any headaches, nausea, vomiting any new symptoms.    2022: No acute events overnight. Patient was seen and examined by me this morning. Neuro exam same as yesterday.  Continues to have left upper lower face weakness.  No other new symptoms.    2022:  Patient was seen examined by me this morning.  He denies any new symptoms morning.  Continues to have facial droop on the left side.  He is having difficulty swallowing for which speech is seen him and he was started on NG tube feeds.       Vitals:  Patient Vitals for the past 24 hrs:   BP Temp Temp src Pulse Resp SpO2   22 1147 134/78 97.5 °F (36.4 °C) Oral 86 19 100 %    07/13/22 0934 -- -- -- 82 16 97 %   07/13/22 0700 135/77 98.5 °F (36.9 °C) Oral 79 19 100 %   07/13/22 0307 -- 98 °F (36.7 °C) -- -- -- --   07/13/22 0200 128/67 -- -- 79 (!) 21 100 %   07/13/22 0000 125/70 -- -- 74 18 100 %   07/12/22 2300 115/65 -- -- 73 16 100 %   07/12/22 2231 -- 98.3 °F (36.8 °C) -- 74 (!) 21 100 %   07/12/22 2230 116/67 -- -- 74 18 100 %   07/12/22 2103 125/65 -- -- 90 -- --   07/12/22 1901 (!) 141/75 99.5 °F (37.5 °C) Oral 88 (!) 21 95 %   07/12/22 1845 139/77 -- -- 87 17 (!) 93 %   07/12/22 1830 136/78 -- -- 87 14 95 %   07/12/22 1815 (!) 141/78 -- -- 88 20 (!) 91 %   07/12/22 1800 (!) 144/76 -- -- 86 (!) 6 (!) 93 %   07/12/22 1745 139/75 -- -- 85 (!) 7 (!) 93 %   07/12/22 1730 (!) 144/79 -- -- 87 13 95 %   07/12/22 1715 (!) 141/74 -- -- 92 20 95 %   07/12/22 1700 (!) 140/73 -- -- 87 18 (!) 93 %   07/12/22 1645 (!) 140/72 -- -- 88 17 (!) 93 %   07/12/22 1630 (!) 143/75 -- -- 87 19 96 %   07/12/22 1615 (!) 140/73 -- -- 80 11 96 %   07/12/22 1600 (!) 144/70 -- -- 84 20 95 %   07/12/22 1545 (!) 153/76 -- -- 88 (!) 25 97 %   07/12/22 1530 (!) 156/91 98.1 °F (36.7 °C) Oral 87 (!) 31 96 %   07/12/22 1515 129/68 -- -- 85 14 95 %   07/12/22 1500 129/72 -- -- 86 (!) 23 96 %   07/12/22 1445 137/65 -- -- -- -- --   07/12/22 1430 133/74 -- -- 85 18 (!) 94 %   07/12/22 1415 137/75 -- -- 85 19 (!) 94 %   07/12/22 1400 135/76 -- -- 84 (!) 22 (!) 94 %     PHYSICAL EXAM:     GENERAL APPEARANCE: Lethargic, well-developed, well-nourished male in no acute distress.  HEENT: Normocephalic and atraumatic. PERRL. Oropharynx unremarkable.  PULM: Normal respiratory effort. No accessory muscle use.  CV: RRR.  ABDOMEN: Soft, nontender.  EXTREMITIES: No obvious signs of vascular compromise. Pulses present. No cyanosis, clubbing or edema.  SKIN: Clear; no rashes, lesions or skin breaks in exposed areas.     NEURO:  MENTAL STATUS: Lethargic, oriented x3.  Able to commands appropriately..  Affect labile.     CRANIAL  NERVES:  CN I: Not tested.  CN II: Fundoscopic exam deferred.  CN III, IV, VI: Pupils equal, round and reactive to light.  Extraocular movements full and intact.  CN V: Facial sensation normal.  CN VII: Facial asymmetry noted for left facial droop. Left upper face is involved as well.   CN VIII: Hearing grossly normal and equal bilaterally.  No skew deviation or pathologic nystagmus.  CN IX, X: Palate elevates symmetrically. Speech/articulation is mildly dysarthric.  CN XI: Shoulder shrug and chin rotation equal with good strength.  CN XII: Tongue protrusion midline.     MOTOR:  Bulk normal. Tone normal and symmetric throughout.  Abnormal movements absent.  Tremor: none present.  Strength 5/5 throughout.     REFLEXES:  DTRs 2+ throughout.  Plantar response downgoing bilaterally.  SENSATION: grossly intact throughout.  COORDINATION: normal finger-to-nose.  STATION: not tested.  GAIT: not tested.          CURRENT SCHEDULED MEDICATIONS:   amiodarone  200 mg Oral BID    aspirin  81 mg Oral Daily    atorvastatin  80 mg Oral Daily    docusate sodium  100 mg Oral BID    enoxparin  40 mg Subcutaneous Daily    losartan  50 mg Oral Daily    metoprolol tartrate  25 mg Oral BID    tamsulosin  0.4 mg Oral Daily     CURRENT INFUSIONS:   carboxymethylcellulose       DATA:  Recent Labs   Lab 07/08/22  1520 07/08/22  1937 07/08/22  2348 07/09/22  0400 07/10/22  0323 07/11/22  0337 07/12/22  0308 07/12/22  1947 07/13/22  0308    141 144 139 138 137 138  --  139   K 3.3*  3.3* 4.1 3.9 3.9 4.1 4.0 4.0  --  3.7   * 111* 115* 112* 104 102 102  --  103   CO2 18* 22* 22* 22* 27 28 28  --  29   BUN 17 17 16 16 24* 25* 28*  --  28*   CREATININE 1.1 1.1 1.2 1.0 1.1 1.0 1.0  --  0.9   * 150* 139* 134* 188* 149* 149*  --  129*   CALCIUM 7.0* 7.0* 7.6* 7.7* 7.8* 7.7* 7.9*  --  8.1*   PHOS 1.7*  --  2.0* 2.0*  --   --  2.3* 2.3* 3.2   MG 2.4 2.0 1.9 1.9 2.0 2.1 2.1  --   --    AST  --   --   --   --   --   --   --    --  58*   ALT  --   --   --   --   --   --   --   --  60*     Recent Labs   Lab 07/08/22  1937 07/08/22  2222 07/08/22  2348 07/09/22  0400 07/09/22  0502 07/09/22  1609 07/09/22  1751 07/10/22  0323 07/11/22  0337 07/11/22  1036 07/12/22  0308 07/13/22  0307   WBC 12.94*  --  11.15 13.72*  --   --   --  16.69* 10.88  --  9.42 9.67   HGB 12.3*  --  12.2* 12.2*  --   --   --  10.6* 10.1*  --  9.3* 9.4*   HCT 37.3*   < > 36.4* 36.2*   < > 33* 33* 33.0* 30.7* 32* 28.5* 30.5*     --  158 162  --   --   --  160 141*  --  153 190    < > = values in this interval not displayed.     No results found for: PROTEINCSF, GLUCCSF, WBCCSF, RBCCSF  Hemoglobin A1C   Date Value Ref Range Status   06/28/2022 6.1 4.5 - 6.2 % Final     Comment:     According to ADA guidelines, hemoglobin A1C <7.0% represents  optimal control in non-pregnant diabetic patients.  Different  metrics may apply to specific populations.   Standards of Medical Care in Diabetes - 2016.    For the purpose of screening for the presence of diabetes:  <5.7%     Consistent with the absence of diabetes  5.7-6.4%  Consistent with increasing risk for diabetes   (prediabetes)  >or=6.5%  Consistent with diabetes    Currently no consensus exists for use of hemoglobin A1C  for diagnosis of diabetes for children.     01/20/2011 5.8 4.0 - 6.2 % Final   01/14/2011 5.8 4.0 - 6.2 % Final            I have personally reviewed and interpreted the pertinent imaging and lab results.  Imaging Results          X-Ray Chest AP Portable (Final result)  Result time 06/28/22 06:13:22    Final result by Waldemar De La Cruz MD (06/28/22 06:13:22)                 Narrative:    Reason: chest pain    FINDINGS:    PA and lateral chest with comparison chest x-ray November 20, 2019 show normal cardiomediastinal silhouette.  Mild bilateral perihilar hazy ill-defined opacities are noted. Pulmonary vasculature is normal. No acute osseous abnormality.    IMPRESSION:    Mild bilateral perihilar hazy  ill-defined opacities could reflect mild pulmonary edema or infection in the proper clinical settings.    Electronically signed by:  Waldemar De La Cruz DO  6/28/2022 6:13 AM CDT Workstation: [id number]                                      ASSESSMENT AND PLAN:       Acute ischemic stroke  Post CABG     Workup  · CTH: No acute intracranial abnormality. Prior R temporal and R BG infarcts  · MRI brain: Foci of restricted diffusion compatible with recent infarction involving the right temporal lobe inferiorly, right lentiform nucleus and upper medulla/lower mele to the left of midline  · CTA head and neck :  Multifocal narrowing of intracranial vertebral arteries bilaterally with high-grade stenosis, atherosclerotic narrowing of proximal right ANTONIO.  No large vessel occlusion.  · Hemoglobin A1c:  6.1  · Lipid panel:  59  · Echocardiogram:  EF 43%, normal left atrium        Plan  · Admitted for further stroke workup  · Continue with Aspirin 81 mg and Lipitor 80mg for stroke prevention.  Will hold off on dual antiplatelet therapy due to moderate stroke burden   · ASHLEY ordered and pending   · Normalize BP  · Post CABG management per primary team and Cardiology/CT sugery  · PT OT  · Speech therapy  · DVT prophylaxis with chemo/SCD prophylaxis  · Discussed lifestyle modifications as prophylactic measures for stroke prevention including, adequate blood pressure management, healthy diet and regular exercise.             38 minutes of care time has been spent evaluating with the patient. Time includes chart review not limited to diagnostic imaging, labs, and vitals, patient assessment, discussion with family and nursing, current order evaluations, and new order entries.     Luois Jarquin MD  Neurology/vascular Neurology  Date of Service: 07/13/2022  9:42 AM    Please note: This note was transcribed using voice recognition software. Because of this technology there are often uinintended grammatical, spelling, and other  transcription errors. Please disregard these errors.

## 2022-07-13 NOTE — ASSESSMENT & PLAN NOTE
Status NPO  Pt will have MBSS tomorrow  Started on Enteral feeding today but NG tube insertion was unsuccessful

## 2022-07-13 NOTE — HOSPITAL COURSE
Patient got admitted with STEMI and had  balloon angioplasty to LAD  (06/27 )  Eventually pt underwent  redo 3 vessel CABG for recurrent severe atherosclerotic CAD (07/08 )  Unfortunately pt suffered from Acute CVA and was started on  aspirin  Pt had ASHLEY which was reported negative  He was also started on abx for acute Pneumonia  Found to have NSVT (nonsustained ventricular tachycardia) and Cardio Team started pt on Amiodarone  Pt had PEG insertion on 07/15 and TF s were initiated  Later pt was discharged to inpatient rehab

## 2022-07-13 NOTE — PLAN OF CARE
Problem: Adult Inpatient Plan of Care  Goal: Plan of Care Review  Outcome: Ongoing, Progressing  Goal: Patient-Specific Goal (Individualized)  Outcome: Ongoing, Progressing  Goal: Absence of Hospital-Acquired Illness or Injury  Outcome: Ongoing, Progressing  Goal: Optimal Comfort and Wellbeing  Outcome: Ongoing, Progressing  Goal: Readiness for Transition of Care  Outcome: Ongoing, Progressing     Problem: Arrhythmia/Dysrhythmia (Cardiac Catheterization)  Goal: Stable Heart Rate and Rhythm  Outcome: Ongoing, Progressing     Problem: Bleeding (Cardiac Catheterization)  Goal: Absence of Bleeding  Outcome: Ongoing, Progressing     Problem: Contrast-Induced Injury Risk (Cardiac Catheterization)  Goal: Absence of Contrast-Induced Injury  Outcome: Ongoing, Progressing     Problem: Pain (Cardiac Catheterization)  Goal: Acceptable Pain Control  Outcome: Ongoing, Progressing     Problem: Vascular Access Protection (Cardiac Catheterization)  Goal: Absence of Vascular Access Complication  Outcome: Ongoing, Progressing     Problem: Fall Injury Risk  Goal: Absence of Fall and Fall-Related Injury  Outcome: Ongoing, Progressing     Problem: Infection  Goal: Absence of Infection Signs and Symptoms  Outcome: Ongoing, Progressing     Problem: Skin Injury Risk Increased  Goal: Skin Health and Integrity  Outcome: Ongoing, Progressing     Problem: Feeding Intolerance (Enteral Nutrition)  Goal: Feeding Tolerance  Outcome: Ongoing, Progressing

## 2022-07-13 NOTE — ASSESSMENT & PLAN NOTE
STEMI status post balloon angioplasty to LAD  - 06/27   Status post redo 3 vessel CABG for recurrent severe atherosclerotic CAD - 07/08   Transfer out of ICU on 07/12

## 2022-07-13 NOTE — PROGRESS NOTES
Formerly [facility]  Adult Nutrition   Progress Note (Follow-Up)    SUMMARY      Recommendations:   1. Continue to increase EN to goal rate as tolerated.  2. Increase FWF to estimated needs (90ml every 4 hrs)  3. Advance diet per speech therapy.     Goals:   Goals: 1) TF tolerance. 2) Adequate intake to meet estimated nutritional needs    Dietitian Rounds Brief  Nepro infusing at 50 ml/hr. Recommend increase FWF to 90 ml every 4 hrs (total volume 1902 ml / day) to meet needs.     Diet order: NPO    TF rate/provision: Vital AF 1.2 @ 50ml / hr [id number] kcal, 90 gm protein, and 973 ml free water.      % Intake of Estimated Energy Needs: 50 - 75 % and 0%  % Meal Intake: NPO    Estimated/Assessed Needs  Weight Used For Calorie Calculations: 97.4 kg (214 lb 11.7 oz)  Energy Calorie Requirements (kcal): 6448-4238 (20-25 kcal/kg)  Energy Need Method: Kcal/kg  Protein Requirements: 105-140 (1.5-2 g/kg)  Weight Used For Protein Calculations: 70 kg (154 lb 5.2 oz)  Fluid Requirements (mL): 2886 (30 ml/kg)  Estimated Fluid Requirement Method: other (see comments)  RDA Method (mL): 1948       Weight History:  Wt Readings from Last 5 Encounters:   07/12/22 97.4 kg (214 lb 11.7 oz)   07/09/22 96.2 kg (212 lb)   06/28/22 100.7 kg (222 lb)   11/21/19 105 kg (231 lb 7.7 oz)   11/20/19 105 kg (231 lb 7.7 oz)        Reason for Assessment  Reason For Assessment: consult  Diagnosis: other (see comments) (STEMI (ST elevation myocardial infarction))  Relevant Medical History: Hypertension, Heart attack, Psoriasis  Interdisciplinary Rounds: attended    Medications:Pertinent Medications Reviewed  Scheduled Meds:   amiodarone  200 mg Oral BID    amLODIPine  5 mg Oral Daily    aspirin  81 mg Oral Daily    atorvastatin  80 mg Oral Daily    docusate sodium  100 mg Oral BID    enoxparin  40 mg Subcutaneous Daily    losartan  50 mg Oral Daily    metoprolol tartrate  25 mg Oral BID    tamsulosin  0.4 mg Oral Daily     Continuous  Infusions:   amiodarone in dextrose 5%      carboxymethylcellulose       PRN Meds:.acetaminophen, albumin human 5%, ALPRAZolam, calcium gluconate IVPB, calcium gluconate IVPB, calcium gluconate IVPB, carboxymethylcellulose, dextrose 50%, dextrose 50%, HYDROcodone-acetaminophen, insulin aspart U-100, magnesium sulfate IVPB, magnesium sulfate IVPB, melatonin, metoprolol, morphine, mupirocin, ondansetron, potassium chloride in water **AND** potassium chloride in water **AND** potassium chloride in water, sodium phosphate IVPB, sodium phosphate IVPB, sodium phosphate IVPB    Labs: Pertinent Labs Reviewed  Clinical Chemistry:  Recent Labs   Lab 07/12/22  0308 07/12/22  1947 07/13/22  0308     --  139   K 4.0  --  3.7     --  103   CO2 28  --  29   *  --  129*   BUN 28*  --  28*   CREATININE 1.0  --  0.9   CALCIUM 7.9*  --  8.1*   PROT  --   --  5.6*   ALBUMIN  --   --  2.9*   BILITOT  --   --  1.2*   ALKPHOS  --   --  47*   AST  --   --  58*   ALT  --   --  60*   ANIONGAP 8  --  7*   ESTGFRAFRICA >60.0  --  >60.0   EGFRNONAA >60.0  --  >60.0   MG 2.1  --   --    PHOS 2.3* 2.3* 3.2     CBC:   Recent Labs   Lab 07/13/22  0307   WBC 9.67   RBC 3.37*   HGB 9.4*   HCT 30.5*      MCV 91   MCH 27.9   MCHC 30.8*     Lipid Panel:  Recent Labs   Lab 07/12/22  1810   CHOL 111*   HDL 29*   LDLCALC 59.8*   TRIG 111   CHOLHDL 26.1     Cardiac Profile:  Recent Labs   Lab 07/09/22  0400 07/09/22  0810 07/10/22  0323   TROPONINI 62.273* 62.262* 63.503*     Inflammatory Labs:  No results for input(s): CRP in the last 168 hours.  Diabetes:  No results for input(s): HGBA1C, POCTGLUCOSE in the last 168 hours.  Thyroid & Parathyroid:  No results for input(s): TSH, FREET4, I8RWXIC, G6GSIVH, THYROIDAB in the last 168 hours.    Monitor and Evaluation  Food and Nutrient Intake: enteral nutrition intake  Food and Nutrient Adminstration: enteral and parenteral nutrition administration  Knowledge/Beliefs/Attitudes: food  and nutrition knowledge/skill, beliefs and attitudes  Physical Activity and Function: nutrition-related ADLs and IADLs  Anthropometric Measurements: weight, weight change, body mass index  Biochemical Data, Medical Tests and Procedures: glucose/endocrine profile, gastrointestinal profile, electrolyte and renal panel  Nutrition-Focused Physical Findings: overall appearance     Nutrition Risk  Level of Risk/Frequency of Follow-up: high     Nutrition Follow-Up  RD Follow-up?: Yes    Roxann Nj RD 07/13/2022 3:53 PM

## 2022-07-13 NOTE — PLAN OF CARE
07/13/22 0934   Patient Assessment/Suction   Level of Consciousness (AVPU) alert   Respiratory Effort Normal;Unlabored   PRE-TX-O2   O2 Device (Oxygen Therapy) nasal cannula   $ Is the patient on Low Flow Oxygen? Yes   Flow (L/min) 2   SpO2 97 %   Pulse Oximetry Type Continuous   $ Pulse Oximetry - Multiple Charge Pulse Oximetry - Multiple   Pulse 82   Resp 16   Education   $ Education 15 min   Respiratory Evaluation   $ Care Plan Tech Time 15 min

## 2022-07-13 NOTE — PROGRESS NOTES
[facility]  Department of Cardiology  Progress Note    PATIENT NAME: Matteo Fraire  MRN: [id number]  TODAY'S DATE: 07/13/2022  ADMIT DATE: 6/27/2022    SUBJECTIVE     PRINCIPLE PROBLEM: STEMI (ST elevation myocardial infarction)    INTERVAL HISTORY:    7/13/2022   Patient sitting up at bedside, receiving nutrition through NG tube. He feels hungry and mouth is dry. He could not even swallow for his barium swallow today he says. VSS.     7/12/2022  No further runs of NSVT. Mentation much improved today. Patient states he feels better. NG tube will be placed so patient can recieve some nutrition. VSS.     7/11/2022  No further runs of NSVT. Continues to have atypical mentation, now with facial droop and complaint of trouble seeing and focusing in left eye. CT head was negative for any acute ischemia. Only complaint from patient is the vision. He is tachycardic today. Failed swallow study again.     7/10/2022  Patient continues to asymptomatic NSVT. On oxymask, having a lot of congestion. Seems out of it still despite morphine being given 3 hours ago. BP stable on nitro gtt.     7/9/2022  Patient developed ST elevations overnight post CABG. Troponins were checked and are still rising at 62. He remains intubated and sedated. BP stable on cleviprex gtt. ST elevations have improved.     7/7/2022  Patient had 42 beat run of Vtach this morning, asymp[tomatic. No further chest pain. VSS.     7/6/2022  PATIENT FOR CABG THIS Friday. Had some mild chest pains after walking for 40 minutes this morning. Has not required nitro yet. VSS.     7/5/2022  Patient still waiting for CABG, ate breakfast this AM. No chest pain events. VSS.     7/4/2022  Patient is awake alert lying comfortably in bed not in any acute distress.  Patient had walked in the hallway 4 times already.  Denies any chest pain or tightness or heaviness his breathing is good denies any shortness of breath.  Awaiting for CABG.    Review of patient's  allergies indicates:  No Known Allergies    REVIEW OF SYSTEMS  Unable to obtain     OBJECTIVE     VITAL SIGNS (Most Recent)  Temp: 97.5 °F (36.4 °C) (07/13/22 1147)  Pulse: 86 (07/13/22 1147)  Resp: 19 (07/13/22 1147)  BP: 134/78 (07/13/22 1147)  SpO2: 100 % (07/13/22 1147)    VENTILATION STATUS  Resp: 19 (07/13/22 1147)  SpO2: 100 % (07/13/22 1147)       I & O (Last 24H):    Intake/Output Summary (Last 24 hours) at 7/13/2022 1433  Last data filed at 7/13/2022 0821  Gross per 24 hour   Intake 316.03 ml   Output 1450 ml   Net -1133.97 ml       WEIGHTS  Wt Readings from Last 1 Encounters:   07/12/22 0400 97.4 kg (214 lb 11.7 oz)   07/07/22 0520 96.4 kg (212 lb 8.4 oz)   07/05/22 0301 96.9 kg (213 lb 10 oz)   07/01/22 0300 97.3 kg (214 lb 8.1 oz)   06/30/22 0300 100.5 kg (221 lb 9.6 oz)   06/27/22 2254 101.1 kg (222 lb 14.2 oz)   06/27/22 1954 99.8 kg (220 lb)       PHYSICAL EXAM  CONSTITUTIONAL: on oxymask, seems confused and slow   NECK: no carotid bruit, no JVD  LUNGS: coarse   CHEST WALL: tubes and wires in place   HEART: regular rate and rhythm, S1, S2 normal, no murmur, click, rub or gallop   ABDOMEN: soft, non-tender; bowel sounds normal; no masses,  no organomegaly  EXTREMITIES: Edematous UE   NEURO: symmetric grimmace, soft  UE BL, wiggles both all toes BL     SCHEDULED MEDS:   amiodarone  200 mg Oral BID    aspirin  81 mg Oral Daily    atorvastatin  80 mg Oral Daily    docusate sodium  100 mg Oral BID    enoxparin  40 mg Subcutaneous Daily    losartan  50 mg Oral Daily    metoprolol tartrate  25 mg Oral BID    tamsulosin  0.4 mg Oral Daily       CONTINUOUS INFUSIONS:   carboxymethylcellulose         PRN MEDS:acetaminophen, albumin human 5%, ALPRAZolam, calcium gluconate IVPB, calcium gluconate IVPB, calcium gluconate IVPB, carboxymethylcellulose, dextrose 50%, dextrose 50%, HYDROcodone-acetaminophen, insulin aspart U-100, magnesium sulfate IVPB, magnesium sulfate IVPB, melatonin, morphine,  mupirocin, ondansetron, potassium chloride in water **AND** potassium chloride in water **AND** potassium chloride in water, sodium phosphate IVPB, sodium phosphate IVPB, sodium phosphate IVPB    LABS AND DIAGNOSTICS     CBC LAST 3 DAYS  Recent Labs   Lab 07/09/22  0400 07/09/22  0502 07/10/22  0323 07/11/22  0337 07/11/22  1036 07/12/22  0308 07/13/22  0307   WBC 13.72*  --  16.69* 10.88  --  9.42 9.67   RBC 4.19*  --  3.72* 3.44*  --  3.19* 3.37*   HGB 12.2*  --  10.6* 10.1*  --  9.3* 9.4*   HCT 36.2*   < > 33.0* 30.7* 32* 28.5* 30.5*   MCV 86  --  89 89  --  89 91   MCH 29.1  --  28.5 29.4  --  29.2 27.9   MCHC 33.7  --  32.1 32.9  --  32.6 30.8*   RDW 13.5  --  14.0 13.8  --  13.6 13.6     --  160 141*  --  153 190   MPV 11.7  --  12.0 11.4  --  11.3 11.2   GRAN 90.5*  12.4*  --  84.2*  14.1* 80.1*  8.7*  --   --   --    LYMPH 5.1*  0.7*  --  6.8*  1.1 12.0*  1.3  --   --   --    MONO 4.1  0.6  --  8.3  1.4* 7.4  0.8  --   --   --    BASO 0.00  --  0.01 0.01  --   --   --    NRBC 0  --  0 0  --   --   --     < > = values in this interval not displayed.       COAGULATION LAST 3 DAYS  Recent Labs   Lab 07/08/22 0327   APTT 40.8*       CHEMISTRY LAST 3 DAYS  Recent Labs   Lab 07/09/22  1609 07/09/22  1751 07/10/22  0323 07/11/22  0337 07/11/22  1036 07/12/22 0308 07/13/22  0308   NA  --   --  138 137  --  138 139   K  --   --  4.1 4.0  --  4.0 3.7   CL  --   --  104 102  --  102 103   CO2  --   --  27 28  --  28 29   ANIONGAP  --   --  7* 7*  --  8 7*   BUN  --   --  24* 25*  --  28* 28*   CREATININE  --   --  1.1 1.0  --  1.0 0.9   GLU  --   --  188* 149*  --  149* 129*   CALCIUM  --   --  7.8* 7.7*  --  7.9* 8.1*   PH 7.492* 7.451*  --   --  7.459*  --   --    MG  --   --  2.0 2.1  --  2.1  --    ALBUMIN  --   --   --   --   --   --  2.9*   PROT  --   --   --   --   --   --  5.6*   ALKPHOS  --   --   --   --   --   --  47*   ALT  --   --   --   --   --   --  60*   AST  --   --   --   --   --    --  58*   BILITOT  --   --   --   --   --   --  1.2*       CARDIAC PROFILE LAST 3 DAYS  Recent Labs   Lab 07/09/22  0400 07/09/22  0810 07/10/22  0323   TROPONINI 62.273* 62.262* 63.503*       ENDOCRINE LAST 3 DAYS  No results for input(s): TSH, PROCAL in the last 168 hours.    LAST ARTERIAL BLOOD GAS  ABG  Recent Labs   Lab 07/11/22  1036   PH 7.459*   PO2 57*   PCO2 36.8   HCO3 26.1   BE 2       LAST 7 DAYS MICROBIOLOGY   Microbiology Results (last 7 days)     ** No results found for the last 168 hours. **          MOST RECENT IMAGING  Fl Modified Barium Swallow Speech  CMS MANDATED QUALITY [id number]    HISTORY: R13.10, R63.3, dysphagia, feeding difficulties, dysphagia s/p CVA and CABG .    FINDINGS: Fluoroscopy was provided by the radiology department for evaluation of the swallowing mechanism by speech pathology. Total fluoroscopic time for the procedure was 1 minute 42 seconds, with 10 spot fluoroscopic images or series obtained.    There was no penetration or aspiration observed with any of the consistencies.    IMPRESSION: Negative for penetration or aspiration. Please see accompanying speech pathology report for further details and recommendations.    Electronically signed by:  Perico Mullen MD  7/13/2022 10:31 AM CDT Workstation: [id number]      [facility]  Results for orders placed during the hospital encounter of 06/27/22    Echo Saline Bubble? No    Interpretation Summary  · The left ventricle is normal in size with mild predominantly mid septal mild asymmetric hypertrophy eccentric hypertrophy and normal systolic function.  · The estimated ejection fraction is 60%.  · Indeterminate left ventricular diastolic function.  · Normal right ventricular size with normal right ventricular systolic function.  · Mild left atrial enlargement.  · Normal central venous pressure (3 mmHg).  · The estimated PA systolic pressure is 19 mmHg.  · Mild mitral regurgitation.      CURRENT/PREVIOUS VISIT EKG  Results  for orders placed or performed during the hospital encounter of 06/27/22   EKG 12-lead    Collection Time: 07/09/22 12:41 AM    Narrative    Test Reason : I21.3,    Vent. Rate : 088 BPM     Atrial Rate : 088 BPM     P-R Int : 232 ms          QRS Dur : 094 ms      QT Int : 384 ms       P-R-T Axes : 043 101 132 degrees     QTc Int : 464 ms    Sinus rhythm with 1st degree A-V block with occasional Premature  ventricular complexes  Possible Inferior infarct ,age undetermined  Anterolateral infarct (cited on or before 27-JUN-2022)  Abnormal ECG  When compared with ECG of 08-JUL-2022 16:38,  Borderline criteria for Inferior infarct are now Present  Serial changes of Anterior infarct Present    Referred By: [email or web address]   SELF           Confirmed By:        ASSESSMENT/PLAN:     Active Hospital Problems    Diagnosis    *STEMI (ST elevation myocardial infarction)    Inadequate nutrition    NSVT (nonsustained ventricular tachycardia)    Coronary artery disease involving coronary bypass graft of native heart with angina pectoris       ASSESSMENT & PLAN:   1. Multivessel coronary artery disease  2. ST-elevation myocardial infarction status post PTCA  3. Nonsustained ventricular tachycardia  4. Mixed hyperlipidemia      RECOMMENDATIONS:  Resume amio gtt as crushed PO amio is hard to get down NG tube  MRI with recent infarct right temporal lobe- neurology following   Give 2.5 lopressor IV q6 for heart rate control   Ambulate as often as tolerated   He is going for a ASHLEY tomorrow with Dr. Johnathan Amaya PAPHIL  [facility]  Department of Cardiology  Date of Service: 07/13/2022  9:53 AM

## 2022-07-13 NOTE — PROGRESS NOTES
[facility] Medicine  Progress Note    Patient Name: Matteo Fraire  MRN: [id number]  Patient Class: IP- Inpatient   Admission Date: 6/27/2022  Length of Stay: 15 days  Attending Physician: Mik Mckinnon MD  Primary Care Provider: Primary Doctor No        Subjective:     Principal Problem:STEMI (ST elevation myocardial infarction)        HPI:    61-year-old male past medical history of coronary artery disease status post CABG 2011 and stent 2005 both occasions they were related with MIs course in the ER today because he started having chest pain about 1 hour prior to arrival it was a pressure-like pain associated with sweating and dry heaving is with radiation to both arms sensation was similar to his MI 2005 he got 2 aspirins and appears to present 1 hour later when he got to the EKG showed STEMI so he was taken to cath lab where he had angioplasty of LIMA to LAD by Dr. Powell he is currently on nitroglycerin drip due to high blood pressure he also received morphine IV 4 mg Zofran IV 4 and labetalol 10 mg IV admitted to ICU.      Overview/Hospital Course:  07/12  Pacer wires and Chest tubes removed  Still has Chamorro insitu  Pt sitting on chair and denies any issues      Interval History:     Review of Systems   Constitutional:  Negative for activity change and appetite change.   HENT:  Negative for congestion and dental problem.    Eyes:  Negative for discharge and itching.   Respiratory:  Negative for shortness of breath.    Cardiovascular:  Negative for chest pain.   Gastrointestinal:  Negative for abdominal distention and abdominal pain.   Endocrine: Negative for cold intolerance.   Genitourinary:  Negative for difficulty urinating and dysuria.   Musculoskeletal:  Negative for arthralgias and back pain.   Skin:  Negative for color change.   Neurological:  Negative for dizziness and facial asymmetry.   Hematological:  Negative for adenopathy.   Psychiatric/Behavioral:  Negative for agitation and  behavioral problems.    Objective:     Vital Signs (Most Recent):  Temp: 99.5 °F (37.5 °C) (07/12/22 1901)  Pulse: 88 (07/12/22 1901)  Resp: (!) 21 (07/12/22 1901)  BP: (!) 141/75 (07/12/22 1901)  SpO2: 95 % (07/12/22 1901)   Vital Signs (24h Range):  Temp:  [97.7 °F (36.5 °C)-99.5 °F (37.5 °C)] 99.5 °F (37.5 °C)  Pulse:  [77-95] 88  Resp:  [6-31] 21  SpO2:  [91 %-100 %] 95 %  BP: (106-156)/(60-91) 141/75  Arterial Line BP: (102-146)/(51-71) 142/71     Weight: 97.4 kg (214 lb 11.7 oz)  Body mass index is 31.71 kg/m².    Intake/Output Summary (Last 24 hours) at 7/12/2022 1949  Last data filed at 7/12/2022 1901  Gross per 24 hour   Intake 529.79 ml   Output 1411 ml   Net -881.21 ml      Physical Exam  Vitals and nursing note reviewed.   Constitutional:       Appearance: He is well-developed.   HENT:      Head: Atraumatic.      Right Ear: External ear normal.      Left Ear: External ear normal.      Nose: Nose normal.      Mouth/Throat:      Mouth: Mucous membranes are moist.   Eyes:      General: No scleral icterus.  Cardiovascular:      Rate and Rhythm: Normal rate.   Pulmonary:      Effort: Pulmonary effort is normal.   Abdominal:      General: There is no distension.   Musculoskeletal:         General: Normal range of motion.      Cervical back: Full passive range of motion without pain and normal range of motion.   Skin:     General: Skin is warm.   Neurological:      Mental Status: He is alert and oriented to person, place, and time.   Psychiatric:         Behavior: Behavior normal.       Significant Labs: All pertinent labs within the past 24 hours have been reviewed.  CBC:   Recent Labs   Lab 07/11/22  0337 07/11/22  1036 07/12/22  0308   WBC 10.88  --  9.42   HGB 10.1*  --  9.3*   HCT 30.7* 32* 28.5*   *  --  153     CMP:   Recent Labs   Lab 07/11/22  0337 07/12/22  0308    138   K 4.0 4.0    102   CO2 28 28   * 149*   BUN 25* 28*   CREATININE 1.0 1.0   CALCIUM 7.7* 7.9*   ANIONGAP 7*  8   EGFRNONAA >60.0 >60.0       Significant Imaging: I have reviewed all pertinent imaging results/findings within the past 24 hours.      Assessment/Plan:      * STEMI (ST elevation myocardial infarction)  STEMI status post balloon angioplasty to LAD  - 06/27   Status post redo 3 vessel CABG for recurrent severe atherosclerotic CAD - 07/08   Transfer out of ICU on 07/12      Inadequate nutrition  Status NPO  Pt will have MBSS tomorrow  Started on Enteral feeding today but NG tube insertion was unsuccessful        NSVT (nonsustained ventricular tachycardia)  Stable       Coronary artery disease involving coronary bypass graft of native heart with angina pectoris  As above         VTE Risk Mitigation (From admission, onward)         Ordered     enoxaparin injection 40 mg  Daily         07/12/22 0839                Discharge Planning   MICHAEL: 7/15/2022     Code Status: Full Code   Is the patient medically ready for discharge?: No    Reason for patient still in hospital (select all that apply): Treatment  Discharge Plan A: Home   Discharge Delays: None known at this time              Mik Mckinnon MD  Department of Hospital Medicine   [facility]

## 2022-07-13 NOTE — PLAN OF CARE
MBS completed: absent pharyngeal swallow consistent with lateral medullary stroke. Rec STRICT NPO, intensive dysphagia therapy, and prolonged nutrition support (recovery in acute state not anticipated).     Problem: SLP  Goal: SLP Goal  Description: 1. Pt will tolerate least restrictive PO diet without acute dysphagia pulmonary complication.   2. Pt will participate in VFSS to define swallow physiology (met) Pt will complete isometric chin tuck against resistance (CTAR) with 60 second hold across 3-5 sets to improve 3. Pt will complete mendelsohn maneuver with 2 second hold 5-10x across 3-5 sets to improve duration of hyolaryngeal lift and UES opening hyolaryngeal elevation, excursion, airway protection and UES opening   4. Pt will complete isometric chin tuck against resistance (CTAR) OR Shaker with 60 second hold across 3-5 sets to improve hyolaryngeal elevation, excursion, airway protection and UES opening   5. Pt will complete isokinetic chin tuck against resistance (CTAR) OR Shaker across 3-5 sets to improve hyolaryngeal elevation, excursion, airway protection and UES opening             Outcome: Ongoing, Progressing

## 2022-07-13 NOTE — PROGRESS NOTES
"CVT SURGERY PROGRESS NOTE  Matteo Fraire  61 y.o.  1960    Patient's Chief Complaint:  STEMI (ST elevation myocardial infarction)    HPI:  61 year old male with hx of HTN, previous CABG 2011 who presented to ED with complaints of chest pain after performing yard work and found to have STEMI. Emergent LHC performed with findings of recurrent multivessel CAD, PTCA of LAD was performed. CVTS consulted for CABG eval    Hospital Course:  -S/P Redo CABG x 3 (Left radial artery-LAD, SVG-PDA, SVG-OM) and complete pericardiectomy by Dr Stone 7/8/2022.  Admitted to ICU post operatively with subsequent extubation, He developed swallowing issues and was found to have small lacunar infarct of right basal ganglia. He also developed facial droop. Neuro consulted and following. Chest tubes have been removed and he was transferred to tele unit     Last 24 hour interval:  -Pt seen this afternoon and reports he continues to have swallowing issues, NG tube in place, Pt reports he is to get Peg tube so the NG tube can be removed and he can go to rehab.  -He is noted to have swelling of RUE.  -He reports left buttock pain, recently returned to the bed, discussed skin assessment with nurse.     Subjective:  Symptoms:  Stable.    Diet:  Dietary issues: NGT.  No nausea or vomiting.    Activity level: Impaired due to weakness.    Pain:  He complains of pain that is mild.  He reports pain is improving.                                                                   Objective:  General Appearance:  In no acute distress and comfortable.    Vital signs: (most recent): Blood pressure 134/78, pulse 86, temperature 97.5 °F (36.4 °C), temperature source Oral, resp. rate 19, height 5' 9" (1.753 m), weight 97.4 kg (214 lb 11.7 oz), SpO2 100 %.    Output: Producing urine.    Lungs:  Normal effort and normal respiratory rate.  Breath sounds clear to auscultation.  He is not in respiratory distress.    Heart: Normal rate.  Regular rhythm.  " No murmur.   Chest: Symmetric chest wall expansion. (Sternal incision clean and dry. Well approximated. )  Abdomen: Abdomen is soft and non-distended.  Bowel sounds are normal.   There is no abdominal tenderness.     Extremities: (LUE and RLE surgical incisions are clean and dry, well approximated  RUE swelling)  Pulses: Distal pulses are intact.    Neurological: Patient is alert and oriented to person, place and time.  (L sided facial weakness).    Skin:  Warm and dry.      Recent Vitals:  Vitals:    07/13/22 0307 07/13/22 0700 07/13/22 0934 07/13/22 1147   BP:  135/77  134/78   BP Location:  Right arm  Left arm   Patient Position:  Lying  Sitting   Pulse:  79 82 86   Resp:  19 16 19   Temp: 98 °F (36.7 °C) 98.5 °F (36.9 °C)  97.5 °F (36.4 °C)   TempSrc:  Oral  Oral   SpO2:  100% 97% 100%   Weight:       Height:           INPATIENT MEDS   carboxymethylcellulose        amiodarone  200 mg Oral BID    aspirin  81 mg Oral Daily    atorvastatin  80 mg Oral Daily    docusate sodium  100 mg Oral BID    enoxparin  40 mg Subcutaneous Daily    losartan  50 mg Oral Daily    metoprolol tartrate  25 mg Oral BID    tamsulosin  0.4 mg Oral Daily     acetaminophen, albumin human 5%, ALPRAZolam, calcium gluconate IVPB, calcium gluconate IVPB, calcium gluconate IVPB, carboxymethylcellulose, dextrose 50%, dextrose 50%, HYDROcodone-acetaminophen, insulin aspart U-100, magnesium sulfate IVPB, magnesium sulfate IVPB, melatonin, morphine, mupirocin, ondansetron, potassium chloride in water **AND** potassium chloride in water **AND** potassium chloride in water, sodium phosphate IVPB, sodium phosphate IVPB, sodium phosphate IVPB  HEMODYNAMICS      Recent O2 Therapy/Vent Settings: NC    I/O last 24 hrs:  Intake/Output - Last 3 Shifts       07/11 0700 07/12 0659 07/12 0700 07/13 0659 07/13 0700 07/14 0659    P.O. 0 0 0    I.V. (mL/kg) 740.2 (7.6) 256 (2.6)     NG/GT  60     Total Intake(mL/kg) 740.2 (7.6) 316 (3.2) 0 (0)     "Urine (mL/kg/hr) 2140 (0.9) 1450 (0.6)     Drains 50      Chest Tube 56      Total Output 2246 1450     Net -1505.8 -1134 0               Recent Cardiac Rhythm: SR    Recent Pain Assessment: 4    CBC  Recent Labs   Lab 07/11/22  0337 07/12/22  0308 07/13/22  0307   WBC 10.88 9.42 9.67   RBC 3.44* 3.19* 3.37*   HGB 10.1* 9.3* 9.4*   * 153 190   MCV 89 89 91   MCH 29.4 29.2 27.9   MCHC 32.9 32.6 30.8*     BMP  Recent Labs   Lab 07/11/22  0337 07/12/22  0308 07/13/22  0308   CO2 28 28 29   BUN 25* 28* 28*   CREATININE 1.0 1.0 0.9   CALCIUM 7.7* 7.9* 8.1*     CARDIAC ENZYMES  Recent Labs   Lab 07/09/22  0400 07/09/22  0810 07/10/22  0323   TROPONINI 62.273* 62.262* 63.503*     PT/INR  INR   Date Value Ref Range Status   06/27/2022 1.1  Final     Comment:     Coumadin Therapy:  INR: 2.0-3.0 conventional anticoagulation    INR: 2.5-3.5 intensive anticoagulation     11/20/2019 1.1  Final     Comment:     Coumadin Therapy:  INR: 2.0-3.0 conventional anticoagulation  INR: 2.5-3.5 intensive anticoagulation     01/28/2011 1.0 0.8 - 1.2 Final     Comment:     ACCP Guideline for Coumadin usage recommends a "target range" of  2.0 - 3.0 for INR for all indicators except mechanical heart valves  and antiphospholipid syndromes which should use 2.5 - 3.5.  .   01/27/2011 1.0 0.8 - 1.2 Final     Comment:     ACCP Guideline for Coumadin usage recommends a "target range" of  2.0 - 3.0 for INR for all indicators except mechanical heart valves  and antiphospholipid syndromes which should use 2.5 - 3.5.  .   01/26/2011 1.0 0.8 - 1.2 Final     Comment:     ACCP Guideline for Coumadin usage recommends a "target range" of  2.0 - 3.0 for INR for all indicators except mechanical heart valves  and antiphospholipid syndromes which should use 2.5 - 3.5.  .     DIAGNOSTIC RESULTS:    CURRENT CONSULTS:  IP CONSULT TO CARDIOTHORACIC SURGERY  IP CONSULT TO CARDIOLOGY  IP CONSULT TO REGISTERED DIETITIAN/NUTRITIONIST  IP CONSULT TO CARDIAC " REHAB  IP CONSULT TO NEUROLOGY  IP CONSULT TO SOCIAL WORK/CASE MANAGEMENT  IP CONSULT TO REGISTERED DIETITIAN/NUTRITIONIST  IP CONSULT TO REGISTERED DIETITIAN/NUTRITIONIST  IP CONSULT TO GASTROENTEROLOGY    ASSESSMENT/PLAN:  STEMI  Multivessel CAD  Previous CABG 2011  S/P PTCA of LAD  S/P Redo CABG x 3 and pericardiectomy by Dr Stone 7/8/2022  -Hemodynamically stable  -Tolerating NC  -Chest tubes and pacer wires have been removed  -Surgical incisions stable  -Continue ASA, BB, statin  -Add Norvasc for prevention of vasospasm of radial artery  -Remains in SR  -No leukocytosis, afebrile  -H/H stable  -Encourage IS  -Increase activity  -Luis hose  -Stable from surgical standpoint    NSVT  -On amiodarone oral  -Cardiology following and managing    Post op CVA  -Failed swallow study, presently NPO  -NG tube in place  -Plan for Peg tube  -Neuro following    Anemia due to acute blood loss  Thrombocytopenia  -Stable    RUE swelling  -Obtain venous US    Case and plan of care discussed with MD    DVT Prophylaxis:  Anticoagulants   Medication Route Frequency    enoxaparin injection 40 mg Subcutaneous Daily       Roxann Dotson PA-C  Cardiovascular Thoracic Surgery  7/13/2022  2:27 PM

## 2022-07-14 ENCOUNTER — ANESTHESIA (OUTPATIENT)
Dept: CARDIOLOGY | Facility: HOSPITAL | Age: 62
DRG: 231 | End: 2022-07-14
Payer: COMMERCIAL

## 2022-07-14 ENCOUNTER — ANESTHESIA EVENT (OUTPATIENT)
Dept: CARDIOLOGY | Facility: HOSPITAL | Age: 62
DRG: 231 | End: 2022-07-14
Payer: COMMERCIAL

## 2022-07-14 PROBLEM — I82.890 SUPERFICIAL VEIN THROMBOSIS: Status: ACTIVE | Noted: 2022-07-14

## 2022-07-14 LAB
ALBUMIN SERPL BCP-MCNC: 3 G/DL (ref 3.5–5.2)
ALP SERPL-CCNC: 58 U/L (ref 55–135)
ALT SERPL W/O P-5'-P-CCNC: 87 U/L (ref 10–44)
ANION GAP SERPL CALC-SCNC: 8 MMOL/L (ref 8–16)
AST SERPL-CCNC: 49 U/L (ref 10–40)
BILIRUB SERPL-MCNC: 1.1 MG/DL (ref 0.1–1)
BUN SERPL-MCNC: 24 MG/DL (ref 8–23)
CALCIUM SERPL-MCNC: 8.3 MG/DL (ref 8.7–10.5)
CHLORIDE SERPL-SCNC: 103 MMOL/L (ref 95–110)
CO2 SERPL-SCNC: 28 MMOL/L (ref 23–29)
CREAT SERPL-MCNC: 0.8 MG/DL (ref 0.5–1.4)
ERYTHROCYTE [DISTWIDTH] IN BLOOD BY AUTOMATED COUNT: 13.4 % (ref 11.5–14.5)
EST. GFR  (AFRICAN AMERICAN): >60 ML/MIN/1.73 M^2
EST. GFR  (NON AFRICAN AMERICAN): >60 ML/MIN/1.73 M^2
GLUCOSE SERPL-MCNC: 124 MG/DL (ref 70–110)
GLUCOSE SERPL-MCNC: 155 MG/DL (ref 70–110)
HCT VFR BLD AUTO: 33.1 % (ref 40–54)
HGB BLD-MCNC: 10.6 G/DL (ref 14–18)
MCH RBC QN AUTO: 29 PG (ref 27–31)
MCHC RBC AUTO-ENTMCNC: 32 G/DL (ref 32–36)
MCV RBC AUTO: 91 FL (ref 82–98)
PLATELET # BLD AUTO: 240 K/UL (ref 150–450)
PMV BLD AUTO: 11.6 FL (ref 9.2–12.9)
POTASSIUM SERPL-SCNC: 3.7 MMOL/L (ref 3.5–5.1)
PROT SERPL-MCNC: 5.7 G/DL (ref 6–8.4)
RBC # BLD AUTO: 3.65 M/UL (ref 4.6–6.2)
SODIUM SERPL-SCNC: 139 MMOL/L (ref 136–145)
WBC # BLD AUTO: 12.82 K/UL (ref 3.9–12.7)

## 2022-07-14 PROCEDURE — 21000000 HC CCU ICU ROOM CHARGE

## 2022-07-14 PROCEDURE — 99900035 HC TECH TIME PER 15 MIN (STAT)

## 2022-07-14 PROCEDURE — 92526 ORAL FUNCTION THERAPY: CPT

## 2022-07-14 PROCEDURE — 25000003 PHARM REV CODE 250: Performed by: THORACIC SURGERY (CARDIOTHORACIC VASCULAR SURGERY)

## 2022-07-14 PROCEDURE — 94799 UNLISTED PULMONARY SVC/PX: CPT

## 2022-07-14 PROCEDURE — 25000003 PHARM REV CODE 250: Performed by: PHYSICIAN ASSISTANT

## 2022-07-14 PROCEDURE — 99232 PR SUBSEQUENT HOSPITAL CARE,LEVL II: ICD-10-PCS | Mod: ,,, | Performed by: INTERNAL MEDICINE

## 2022-07-14 PROCEDURE — 27000221 HC OXYGEN, UP TO 24 HOURS

## 2022-07-14 PROCEDURE — 94761 N-INVAS EAR/PLS OXIMETRY MLT: CPT

## 2022-07-14 PROCEDURE — 80053 COMPREHEN METABOLIC PANEL: CPT | Performed by: INTERNAL MEDICINE

## 2022-07-14 PROCEDURE — 63600175 PHARM REV CODE 636 W HCPCS

## 2022-07-14 PROCEDURE — 99024 PR POST-OP FOLLOW-UP VISIT: ICD-10-PCS | Mod: ,,, | Performed by: PHYSICIAN ASSISTANT

## 2022-07-14 PROCEDURE — 63600175 PHARM REV CODE 636 W HCPCS: Performed by: INTERNAL MEDICINE

## 2022-07-14 PROCEDURE — 85027 COMPLETE CBC AUTOMATED: CPT | Performed by: INTERNAL MEDICINE

## 2022-07-14 PROCEDURE — 99024 POSTOP FOLLOW-UP VISIT: CPT | Mod: ,,, | Performed by: PHYSICIAN ASSISTANT

## 2022-07-14 PROCEDURE — 99900031 HC PATIENT EDUCATION (STAT)

## 2022-07-14 PROCEDURE — 99232 SBSQ HOSP IP/OBS MODERATE 35: CPT | Mod: ,,, | Performed by: INTERNAL MEDICINE

## 2022-07-14 RX ORDER — HYDRALAZINE HYDROCHLORIDE 20 MG/ML
10 INJECTION INTRAMUSCULAR; INTRAVENOUS EVERY 4 HOURS PRN
Status: DISCONTINUED | OUTPATIENT
Start: 2022-07-14 | End: 2022-07-19 | Stop reason: HOSPADM

## 2022-07-14 RX ADMIN — HYDRALAZINE HYDROCHLORIDE 10 MG: 20 INJECTION INTRAMUSCULAR; INTRAVENOUS at 06:07

## 2022-07-14 RX ADMIN — AMIODARONE HYDROCHLORIDE 0.5 MG/MIN: 1.8 INJECTION, SOLUTION INTRAVENOUS at 10:07

## 2022-07-14 RX ADMIN — LOSARTAN POTASSIUM 50 MG: 50 TABLET, FILM COATED ORAL at 10:07

## 2022-07-14 RX ADMIN — AMIODARONE HYDROCHLORIDE 0.5 MG/MIN: 1.8 INJECTION, SOLUTION INTRAVENOUS at 04:07

## 2022-07-14 RX ADMIN — METOPROLOL TARTRATE 25 MG: 25 TABLET, FILM COATED ORAL at 08:07

## 2022-07-14 RX ADMIN — HYDRALAZINE HYDROCHLORIDE 10 MG: 20 INJECTION INTRAMUSCULAR; INTRAVENOUS at 01:07

## 2022-07-14 RX ADMIN — METOPROLOL TARTRATE 25 MG: 25 TABLET, FILM COATED ORAL at 10:07

## 2022-07-14 RX ADMIN — ENOXAPARIN SODIUM 40 MG: 40 INJECTION SUBCUTANEOUS at 10:07

## 2022-07-14 RX ADMIN — POTASSIUM CHLORIDE 40 MEQ: 29.8 INJECTION, SOLUTION INTRAVENOUS at 10:07

## 2022-07-14 RX ADMIN — AMLODIPINE BESYLATE 5 MG: 5 TABLET ORAL at 10:07

## 2022-07-14 NOTE — ANESTHESIA PREPROCEDURE EVALUATION
07/14/2022  Matteo Fraire is a 61 y.o., male.      Patient Active Problem List   Diagnosis    STEMI (ST elevation myocardial infarction)    Coronary artery disease involving coronary bypass graft of native heart with angina pectoris    NSVT (nonsustained ventricular tachycardia)    Inadequate nutrition    S/P CABG (coronary artery bypass graft)    Acute CVA (cerebrovascular accident)       Past Surgical History:   Procedure Laterality Date    CORONARY ANGIOGRAPHY INCLUDING BYPASS GRAFTS WITH CATHETERIZATION OF LEFT HEART Left 6/27/2022    Procedure: Left heart cath;  Surgeon: Stevan Powell MD;  Location: [facility] CATH/EP LAB;  Service: Cardiology;  Laterality: Left;    CORONARY ARTERY BYPASS GRAFT      CYSTOSCOPY W/ URETERAL STENT PLACEMENT Left 11/21/2019    Procedure: CYSTOSCOPY, WITH URETERAL STENT INSERTION;  Surgeon: Mickey Escudero MD;  Location: [facility] OR;  Service: Urology;  Laterality: Left;    ENDOSCOPIC HARVEST OF VEIN Right 7/8/2022    Procedure: HARVEST-VEIN-ENDOVASCULAR;  Surgeon: Eyal Stone MD;  Location: [facility];  Service: Cardiothoracic;  Laterality: Right;    EXTRACORPOREAL SHOCK WAVE LITHOTRIPSY Left 11/21/2019    Procedure: LITHOTRIPSY, ESWL;  Surgeon: Mickey Escudero MD;  Location: [facility] OR;  Service: Urology;  Laterality: Left;    HERNIA REPAIR      Inguinal just after birth    REPEAT CORONARY ARTERY BYPASS GRAFTING N/A 7/8/2022    Procedure: CABG, REPEAT;  Surgeon: Eyal Stone MD;  Location: [facility];  Service: Cardiothoracic;  Laterality: N/A;  drugs ordered 7-7  @1322      SURGICAL PROCUREMENT, ARTERY, RADIAL, FOR CABG Left 7/8/2022    Procedure: SURGICAL PROCUREMENT,ARTERY,RADIAL,FOR CABG;  Surgeon: Eyal Stone MD;  Location: [facility];  Service: Cardiothoracic;  Laterality: Left;        Tobacco Use:  The patient  reports that he has  never smoked. He has never used smokeless tobacco.     Results for orders placed or performed during the hospital encounter of 06/27/22   EKG 12-lead    Collection Time: 07/09/22 12:41 AM    Narrative    Test Reason : I21.3,    Vent. Rate : 088 BPM     Atrial Rate : 088 BPM     P-R Int : 232 ms          QRS Dur : 094 ms      QT Int : 384 ms       P-R-T Axes : 043 101 132 degrees     QTc Int : 464 ms    Sinus rhythm with 1st degree A-V block with occasional Premature  ventricular complexes  Possible Inferior infarct ,age undetermined  Anterolateral infarct (cited on or before 27-JUN-2022)  Abnormal ECG  When compared with ECG of 08-JUL-2022 16:38,  Borderline criteria for Inferior infarct are now Present  Serial changes of Anterior infarct Present    Referred By: [email or web address]   SELF           Confirmed By:         Imaging Results          X-Ray Chest AP Portable (Final result)  Result time 06/28/22 06:13:22    Final result by Waldemar De La Cruz MD (06/28/22 06:13:22)                 Narrative:    Reason: chest pain    FINDINGS:    PA and lateral chest with comparison chest x-ray November 20, 2019 show normal cardiomediastinal silhouette.  Mild bilateral perihilar hazy ill-defined opacities are noted. Pulmonary vasculature is normal. No acute osseous abnormality.    IMPRESSION:    Mild bilateral perihilar hazy ill-defined opacities could reflect mild pulmonary edema or infection in the proper clinical settings.    Electronically signed by:  Waldemar De La Cruz DO  6/28/2022 6:13 AM CDT Workstation: [id number]                               Lab Results   Component Value Date    WBC 12.82 (H) 07/14/2022    HGB 10.6 (L) 07/14/2022    HCT 33.1 (L) 07/14/2022    MCV 91 07/14/2022     07/14/2022     BMP  Lab Results   Component Value Date     07/14/2022    K 3.7 07/14/2022     07/14/2022    CO2 28 07/14/2022    BUN 24 (H) 07/14/2022    CREATININE 0.8 07/14/2022    CALCIUM 8.3 (L) 07/14/2022    ANIONGAP 8 07/14/2022      (H) 07/14/2022     (H) 07/13/2022     (H) 07/12/2022       Results for orders placed during the hospital encounter of 06/27/22    Echo    Interpretation Summary  · The left ventricle is normal in size with concentric remodeling and mildly decreased systolic function.  · The estimated ejection fraction is 43%.  · Left ventricular diastolic dysfunction.  · There are segmental left ventricular wall motion abnormalities.  · Anterior apical akinesis  · Normal right ventricular size with normal right ventricular systolic function.        Pre-op Assessment    I have reviewed the Patient Summary Reports.     I have reviewed the Nursing Notes. I have reviewed the NPO Status.   I have reviewed the Medications.     Review of Systems  Anesthesia Hx:  History of prior surgery of interest to airway management or planning: heart surgery. Denies Family Hx of Anesthesia complications.   Denies Personal Hx of Anesthesia complications.   Social:  Non-Smoker, No Alcohol Use    Hematology/Oncology:     Oncology Normal    -- Anemia:   EENT/Dental:   Right upper permanent bridge. Left upper bicuspid and and 1st premolar broken.   Cardiovascular:   Hypertension, poorly controlled Past MI CAD asymptomatic CABG/stent Dysrhythmias (lidocaine infusion for recent non-sustained VT)  Angina ECG has been reviewed. 60% EF, mild MR   Pulmonary:  Pulmonary Normal    Renal/:  Renal/ Normal     Hepatic/GI:   PUD, (1980s) GERD ( yesterday, resolved with medication), well controlled Patient with no active nausea vomiting at this time  Patient with no intestinal obstruction at this time  Patient with difficulty handling secretions using Yankauer suction  NG tube feedings held at midnight   Musculoskeletal:  Musculoskeletal Normal    Neurological:   CVA, residual symptoms Facial droop   Endocrine:  Endocrine Normal    Dermatological:   psoriasis   Psych:  Psychiatric Normal           Physical Exam  General: Well nourished,  Cooperative, Alert and Oriented    Airway:  Mallampati: II / I  Mouth Opening: Normal  TM Distance: > 6 cm  Tongue: Normal  Neck ROM: Normal ROM    Dental:  Intact    Chest/Lungs:  Clear to auscultation, Normal Respiratory Rate    Heart:  Rate: Normal  Rhythm: Regular Rhythm  Sounds: Normal        Anesthesia Plan  Type of Anesthesia, risks & benefits discussed:    Anesthesia Type: Gen ETT  Intra-op Monitoring Plan: Standard ASA Monitors and CO  Induction:  IV  Informed Consent: Informed consent signed with the Patient and all parties understand the risks and agree with anesthesia plan.  All questions answered. Patient consented to blood products? Yes  ASA Score: 3  Anesthesia Plan Notes:     MAC with Propofol  POM Mask    Ready For Surgery From Anesthesia Perspective.     .

## 2022-07-14 NOTE — PROGRESS NOTES
"[facility]  Adult Nutrition   Progress Note (Nutrition Support Management)    SUMMARY      Recommendations:   Continue current EN      Goals:   TF tolerance via PEG which should be placed today.    Dietitian Rounds Brief  F/U Nutrition Note: Patient is OOR probably having PEG placed. SLP wrote per PN's today... "Prolonged nutrition support: rapid or spontaneous recovery within acute stage is not expected/anticipated (GI consulted, anticipate PEG placement this date)   Intensive dysphagia therapy   Patient is at increased risk for aspiration related pulmonary complication on secretions: oral hygiene should be completed every 4 hours with toothpaste and toothbrush to decrease risk".  Will continue to follow prn.      Diet order: NPO    TF rate/provision: Vital AF/70     % Intake of Estimated Energy Needs: 75 - 100 %  % Meal Intake: NPO    Estimated/Assessed Needs  Weight Used For Calorie Calculations: 97.4 kg (214 lb 11.7 oz)  Energy Calorie Requirements (kcal): 7533-5596 (20-25 kcal/kg)  Energy Need Method: Kcal/kg  Protein Requirements: 105-140 (1.5-2 g/kg)  Weight Used For Protein Calculations: 70 kg (154 lb 5.2 oz)  Fluid Requirements (mL): 2886 (30 ml/kg)  Estimated Fluid Requirement Method: other (see comments)  RDA Method (mL): 1948       Weight History:  Wt Readings from Last 5 Encounters:   07/12/22 97.4 kg (214 lb 11.7 oz)   07/09/22 96.2 kg (212 lb)   06/28/22 100.7 kg (222 lb)   11/21/19 105 kg (231 lb 7.7 oz)   11/20/19 105 kg (231 lb 7.7 oz)        Reason for Assessment  Reason For Assessment: consult  Diagnosis: other (see comments) (STEMI (ST elevation myocardial infarction))  Relevant Medical History: Hypertension, Heart attack, Psoriasis  Interdisciplinary Rounds: attended    Medications:Pertinent Medications Reviewed  Scheduled Meds:   amiodarone  200 mg Oral BID    amLODIPine  5 mg Oral Daily    aspirin  81 mg Oral Daily    atorvastatin  80 mg Oral Daily    docusate sodium  100 " mg Oral BID    enoxparin  40 mg Subcutaneous Daily    losartan  50 mg Oral Daily    metoprolol tartrate  25 mg Oral BID    tamsulosin  0.4 mg Oral Daily     Continuous Infusions:   amiodarone in dextrose 5% 0.5 mg/min (07/14/22 1043)    carboxymethylcellulose       PRN Meds:.acetaminophen, albumin human 5%, ALPRAZolam, calcium gluconate IVPB, calcium gluconate IVPB, calcium gluconate IVPB, carboxymethylcellulose, dextrose 50%, dextrose 50%, hydrALAZINE, HYDROcodone-acetaminophen, insulin aspart U-100, magnesium sulfate IVPB, magnesium sulfate IVPB, melatonin, metoprolol, morphine, mupirocin, ondansetron, potassium chloride in water **AND** potassium chloride in water **AND** potassium chloride in water, sodium phosphate IVPB, sodium phosphate IVPB, sodium phosphate IVPB    Labs: Pertinent Labs Reviewed  Clinical Chemistry:  Recent Labs   Lab 07/12/22 0308 07/12/22 0308 07/12/22 1947 07/13/22 0308 07/14/22 0515     --   --  139 139   K 4.0  --   --  3.7 3.7     --   --  103 103   CO2 28  --   --  29 28   *  --   --  129* 155*   BUN 28*  --   --  28* 24*   CREATININE 1.0  --   --  0.9 0.8   CALCIUM 7.9*  --   --  8.1* 8.3*   PROT  --    < >  --  5.6* 5.7*   ALBUMIN  --    < >  --  2.9* 3.0*   BILITOT  --    < >  --  1.2* 1.1*   ALKPHOS  --    < >  --  47* 58   AST  --    < >  --  58* 49*   ALT  --    < >  --  60* 87*   ANIONGAP 8  --   --  7* 8   ESTGFRAFRICA >60.0  --   --  >60.0 >60.0   EGFRNONAA >60.0  --   --  >60.0 >60.0   MG 2.1  --   --   --   --    PHOS 2.3*  --  2.3* 3.2  --     < > = values in this interval not displayed.     CBC:   Recent Labs   Lab 07/14/22  0515   WBC 12.82*   RBC 3.65*   HGB 10.6*   HCT 33.1*      MCV 91   MCH 29.0   MCHC 32.0     Lipid Panel:  Recent Labs   Lab 07/12/22  1810   CHOL 111*   HDL 29*   LDLCALC 59.8*   TRIG 111   CHOLHDL 26.1     Cardiac Profile:  Recent Labs   Lab 07/09/22  0400 07/09/22  0810 07/10/22  0323   TROPONINI 62.273* 62.262*  63.503*     Monitor and Evaluation  Food and Nutrient Intake: enteral nutrition intake  Food and Nutrient Adminstration: enteral and parenteral nutrition administration  Knowledge/Beliefs/Attitudes: food and nutrition knowledge/skill, beliefs and attitudes  Physical Activity and Function: nutrition-related ADLs and IADLs  Anthropometric Measurements: weight, weight change, body mass index  Biochemical Data, Medical Tests and Procedures: glucose/endocrine profile, gastrointestinal profile, electrolyte and renal panel  Nutrition-Focused Physical Findings: overall appearance     Nutrition Risk  Level of Risk/Frequency of Follow-up: high     Nutrition Follow-Up  RD Follow-up?: Yes    Eloisa Burnham RD 07/14/2022 1:02 PM

## 2022-07-14 NOTE — CARE UPDATE
07/13/22 2871   Patient Assessment/Suction   Respiratory Effort Unlabored   Rhythm/Pattern, Respiratory pattern regular   PRE-TX-O2   O2 Device (Oxygen Therapy) nasal cannula   Flow (L/min) 2  (decreased to 1)   SpO2 100 %   Pulse Oximetry Type Continuous   Pulse 74   Resp 20   Respiratory Evaluation   $ Care Plan Tech Time 15 min   $ Eval/Re-eval Charges Re-evaluation   Evaluation For Re-Eval 5+ day

## 2022-07-14 NOTE — NURSING
Contacted Dr. Munguia and LARRY Amaya about ordered ASHLEY on Pt.She advised she would contact Dr. Mann. Since Pt is not on schedule at this time.    0930 hrs per Fanny Mann wants GI work up complete before ASHLEY procedure. ASHLEY was canceled for today. Dr. Castellanos was consulted for PEG placement yesterday at 1624 hrs.

## 2022-07-14 NOTE — SUBJECTIVE & OBJECTIVE
Interval History:     Review of Systems   Constitutional:  Negative for activity change and appetite change.   HENT:  Negative for congestion and dental problem.    Eyes:  Negative for discharge and itching.   Respiratory:  Negative for shortness of breath.    Cardiovascular:  Negative for chest pain.   Gastrointestinal:  Negative for abdominal distention and abdominal pain.   Endocrine: Negative for cold intolerance.   Genitourinary:  Negative for difficulty urinating and dysuria.   Musculoskeletal:  Negative for arthralgias and back pain.   Skin:  Negative for color change.   Neurological:  Negative for dizziness and facial asymmetry.   Hematological:  Negative for adenopathy.   Psychiatric/Behavioral:  Negative for agitation and behavioral problems.    Objective:     Vital Signs (Most Recent):  Temp: 98.4 °F (36.9 °C) (07/13/22 1634)  Pulse: 87 (07/13/22 1634)  Resp: 19 (07/13/22 1634)  BP: (!) 170/92 (07/13/22 1634)  SpO2: 100 % (07/13/22 1634)   Vital Signs (24h Range):  Temp:  [97.5 °F (36.4 °C)-98.5 °F (36.9 °C)] 98.4 °F (36.9 °C)  Pulse:  [73-90] 87  Resp:  [16-21] 19  SpO2:  [97 %-100 %] 100 %  BP: (115-170)/(65-92) 170/92     Weight: 97.4 kg (214 lb 11.7 oz)  Body mass index is 31.71 kg/m².    Intake/Output Summary (Last 24 hours) at 7/13/2022 2030  Last data filed at 7/13/2022 1800  Gross per 24 hour   Intake 240 ml   Output 1650 ml   Net -1410 ml      Physical Exam  Vitals and nursing note reviewed.   Constitutional:       Appearance: He is well-developed.   HENT:      Head: Atraumatic.      Right Ear: External ear normal.      Left Ear: External ear normal.      Nose: Nose normal.      Mouth/Throat:      Mouth: Mucous membranes are moist.   Eyes:      General: No scleral icterus.  Cardiovascular:      Rate and Rhythm: Normal rate.   Pulmonary:      Effort: Pulmonary effort is normal.   Musculoskeletal:         General: Normal range of motion.      Cervical back: Full passive range of motion without pain  and normal range of motion.   Skin:     General: Skin is warm.   Neurological:      Mental Status: He is alert and oriented to person, place, and time.   Psychiatric:         Behavior: Behavior normal.       Significant Labs: All pertinent labs within the past 24 hours have been reviewed.  CBC:   Recent Labs   Lab 07/12/22 0308 07/13/22 0307   WBC 9.42 9.67   HGB 9.3* 9.4*   HCT 28.5* 30.5*    190     CMP:   Recent Labs   Lab 07/12/22 0308 07/13/22 0308    139   K 4.0 3.7    103   CO2 28 29   * 129*   BUN 28* 28*   CREATININE 1.0 0.9   CALCIUM 7.9* 8.1*   PROT  --  5.6*   ALBUMIN  --  2.9*   BILITOT  --  1.2*   ALKPHOS  --  47*   AST  --  58*   ALT  --  60*   ANIONGAP 8 7*   EGFRNONAA >60.0 >60.0       Significant Imaging: I have reviewed all pertinent imaging results/findings within the past 24 hours.

## 2022-07-14 NOTE — PROGRESS NOTES
[facility]  Department of Neurology  Progress Note  Date: 2022 9:42 AM          Patient Name: Matteo Fraire   MRN: [id number]   : 1960    AGE: 61 y.o.    LOS: 17 days Hospital Day: 18  Admit date: 2022  7:45 PM       HPI per EMR: Matteo Fraire is a 61 y.o. male with a history of 61-year-old with prior history of CAD status post PCI and CABG in  came with chest pain found to have STEMI status post balloon angioplasty to LAD - now status post redo 3 vessel CABG  .  Hospital stay notable for episodes of NSVT after which he was initiated on lidocaine infusion.  Postoperative stay notable for persistent ST elevation which improved with nitroglycerin infusion.      Postoperatively patient has been lethargic and slow to respond.  He has been oriented x3.  This morning left facial droop was noted for which neurology was consulted.         Neurology consult:  Patient was seen and examined by me.  He is lethargic however able to respond questions and commands.  He did not have any episodes of SVT since last night, mental status has slightly improving as per the nurse.  He is oriented x3. On exam, he has a L facial droop and left lid lag.  He does not any other focal neurological deficits including no weakness or sensory changes in extremities.  He denies any headaches, nausea, vomiting any new symptoms.    2022: No acute events overnight. Patient was seen and examined by me this morning. Neuro exam same as yesterday.  Continues to have left upper lower face weakness.  No other new symptoms.    2022:  Patient was seen examined by me this morning.  He denies any new symptoms morning.  Continues to have facial droop on the left side.  He is having difficulty swallowing for which speech is seen him and he was started on NG tube feeds.    2022:  Patient was seen examined by me this morning.  He denies any new symptoms this morning.  He is pending a transesophageal  echocardiogram and PEG tube placement today.       Vitals:  Patient Vitals for the past 24 hrs:   BP Temp Temp src Pulse Resp SpO2   07/14/22 1312 (!) 175/81 -- -- -- -- --   07/14/22 1117 -- -- -- 71 (!) 29 98 %   07/14/22 1050 (!) 178/86 98.1 °F (36.7 °C) Axillary 77 (!) 28 99 %   07/14/22 1003 (!) 195/90 -- -- 88 -- --   07/14/22 0720 (!) 157/89 98.2 °F (36.8 °C) Axillary 88 13 98 %   07/14/22 0346 (!) 142/81 98.8 °F (37.1 °C) Oral 80 -- 96 %   07/14/22 0305 129/76 -- -- 81 -- 99 %   07/14/22 0017 125/73 98.9 °F (37.2 °C) Oral 79 -- 98 %   07/13/22 2237 -- -- -- 74 20 100 %   07/13/22 2125 (!) 166/93 -- -- 93 -- 100 %   07/13/22 1925 (!) 149/84 98.1 °F (36.7 °C) Oral 88 (!) 22 100 %   07/13/22 1634 (!) 170/92 98.4 °F (36.9 °C) Oral 87 19 100 %     PHYSICAL EXAM:     GENERAL APPEARANCE: Lethargic, well-developed, well-nourished male in no acute distress.  HEENT: Normocephalic and atraumatic. PERRL. Oropharynx unremarkable.  PULM: Normal respiratory effort. No accessory muscle use.  CV: RRR.  ABDOMEN: Soft, nontender.  EXTREMITIES: No obvious signs of vascular compromise. Pulses present. No cyanosis, clubbing or edema.  SKIN: Clear; no rashes, lesions or skin breaks in exposed areas.     NEURO:  MENTAL STATUS: Lethargic, oriented x3.  Able to commands appropriately..  Affect labile.     CRANIAL NERVES:  CN I: Not tested.  CN II: Fundoscopic exam deferred.  CN III, IV, VI: Pupils equal, round and reactive to light.  Extraocular movements full and intact.  CN V: Facial sensation normal.  CN VII: Facial asymmetry noted for left facial droop. Left upper face is involved as well.   CN VIII: Hearing grossly normal and equal bilaterally.  No skew deviation or pathologic nystagmus.  CN IX, X: Palate elevates symmetrically. Speech/articulation is mildly dysarthric.  CN XI: Shoulder shrug and chin rotation equal with good strength.  CN XII: Tongue protrusion midline.     MOTOR:  Bulk normal. Tone normal and symmetric  throughout.  Abnormal movements absent.  Tremor: none present.  Strength 5/5 throughout.     REFLEXES:  DTRs 2+ throughout.  Plantar response downgoing bilaterally.  SENSATION: grossly intact throughout.  COORDINATION: normal finger-to-nose.  STATION: not tested.  GAIT: not tested.          CURRENT SCHEDULED MEDICATIONS:   amiodarone  200 mg Oral BID    amLODIPine  5 mg Oral Daily    aspirin  81 mg Oral Daily    atorvastatin  80 mg Oral Daily    docusate sodium  100 mg Oral BID    enoxparin  40 mg Subcutaneous Daily    losartan  50 mg Oral Daily    metoprolol tartrate  25 mg Oral BID    tamsulosin  0.4 mg Oral Daily     CURRENT INFUSIONS:   amiodarone in dextrose 5% 0.5 mg/min (07/14/22 1043)    carboxymethylcellulose       DATA:  Recent Labs   Lab 07/08/22  1520 07/08/22  1937 07/08/22  2348 07/09/22  0400 07/10/22  0323 07/11/22  0337 07/12/22  0308 07/12/22  1947 07/13/22  0308 07/14/22  0515    141 144 139 138 137 138  --  139 139   K 3.3*  3.3* 4.1 3.9 3.9 4.1 4.0 4.0  --  3.7 3.7   * 111* 115* 112* 104 102 102  --  103 103   CO2 18* 22* 22* 22* 27 28 28  --  29 28   BUN 17 17 16 16 24* 25* 28*  --  28* 24*   CREATININE 1.1 1.1 1.2 1.0 1.1 1.0 1.0  --  0.9 0.8   * 150* 139* 134* 188* 149* 149*  --  129* 155*   CALCIUM 7.0* 7.0* 7.6* 7.7* 7.8* 7.7* 7.9*  --  8.1* 8.3*   PHOS 1.7*  --  2.0* 2.0*  --   --  2.3* 2.3* 3.2  --    MG 2.4 2.0 1.9 1.9 2.0 2.1 2.1  --   --   --    AST  --   --   --   --   --   --   --   --  58* 49*   ALT  --   --   --   --   --   --   --   --  60* 87*     Recent Labs   Lab 07/08/22  2348 07/09/22  0400 07/09/22  0502 07/09/22  1751 07/10/22  0323 07/11/22  0337 07/11/22  1036 07/12/22  0308 07/13/22  0307 07/14/22  0515   WBC 11.15 13.72*  --   --  16.69* 10.88  --  9.42 9.67 12.82*   HGB 12.2* 12.2*  --   --  10.6* 10.1*  --  9.3* 9.4* 10.6*   HCT 36.4* 36.2*   < > 33* 33.0* 30.7* 32* 28.5* 30.5* 33.1*    162  --   --  160 141*  --  153 190 240    <  > = values in this interval not displayed.     No results found for: PROTEINCSF, GLUCCSF, WBCCSF, RBCCSF  Hemoglobin A1C   Date Value Ref Range Status   06/28/2022 6.1 4.5 - 6.2 % Final     Comment:     According to ADA guidelines, hemoglobin A1C <7.0% represents  optimal control in non-pregnant diabetic patients.  Different  metrics may apply to specific populations.   Standards of Medical Care in Diabetes - 2016.    For the purpose of screening for the presence of diabetes:  <5.7%     Consistent with the absence of diabetes  5.7-6.4%  Consistent with increasing risk for diabetes   (prediabetes)  >or=6.5%  Consistent with diabetes    Currently no consensus exists for use of hemoglobin A1C  for diagnosis of diabetes for children.     01/20/2011 5.8 4.0 - 6.2 % Final   01/14/2011 5.8 4.0 - 6.2 % Final            I have personally reviewed and interpreted the pertinent imaging and lab results.  Imaging Results          X-Ray Chest AP Portable (Final result)  Result time 06/28/22 06:13:22    Final result by Waldemar De La Cruz MD (06/28/22 06:13:22)                 Narrative:    Reason: chest pain    FINDINGS:    PA and lateral chest with comparison chest x-ray November 20, 2019 show normal cardiomediastinal silhouette.  Mild bilateral perihilar hazy ill-defined opacities are noted. Pulmonary vasculature is normal. No acute osseous abnormality.    IMPRESSION:    Mild bilateral perihilar hazy ill-defined opacities could reflect mild pulmonary edema or infection in the proper clinical settings.    Electronically signed by:  Waldemar De La Cruz DO  6/28/2022 6:13 AM CDT Workstation: [id number]                                      ASSESSMENT AND PLAN:       Acute ischemic stroke  Post CABG  Dysphagia     Workup  · CTH: No acute intracranial abnormality. Prior R temporal and R BG infarcts  · MRI brain: Foci of restricted diffusion compatible with recent infarction involving the right temporal lobe inferiorly, right lentiform  nucleus and upper medulla/lower mele to the left of midline  · CTA head and neck :  Multifocal narrowing of intracranial vertebral arteries bilaterally with high-grade stenosis, atherosclerotic narrowing of proximal right ANTONIO.  No large vessel occlusion.  · Hemoglobin A1c:  6.1  · Lipid panel:  59  · Echocardiogram:  EF 43%, normal left atrium        Plan  · Admitted for further stroke workup  · Continue with Aspirin 81 mg and Lipitor 80mg for stroke prevention.  Will hold off on dual antiplatelet therapy due to moderate stroke burden   · ASHLEY ordered and pending   · Normalize BP  · Post CABG management per primary team and Cardiology/CT sugery  · PT OT  · Speech therapy  · PEG tube placement for dysphagia.  · DVT prophylaxis with chemo/SCD prophylaxis  · Discussed lifestyle modifications as prophylactic measures for stroke prevention including, adequate blood pressure management, healthy diet and regular exercise.             36 minutes of care time has been spent evaluating with the patient. Time includes chart review not limited to diagnostic imaging, labs, and vitals, patient assessment, discussion with family and nursing, current order evaluations, and new order entries.     Louis Jarquin MD  Neurology/vascular Neurology  Date of Service: 07/14/2022  9:42 AM    Please note: This note was transcribed using voice recognition software. Because of this technology there are often uinintended grammatical, spelling, and other transcription errors. Please disregard these errors.

## 2022-07-14 NOTE — PROGRESS NOTES
[facility]  Department of Cardiology  Progress Note    PATIENT NAME: Matteo Fraire  MRN: [id number]  TODAY'S DATE: 07/14/2022  ADMIT DATE: 6/27/2022    SUBJECTIVE     PRINCIPLE PROBLEM: STEMI (ST elevation myocardial infarction)    INTERVAL HISTORY:    7/14/2022   Patient is hypertensive today. He is having more secretions and cough. PEG tub placement pending GI eval. Rhythm stable.     7/13/2022  Patient sitting up at bedside, receiving nutrition through NG tube. He feels hungry and mouth is dry. He could not even swallow for his barium swallow today he says. VSS.     7/12/2022  No further runs of NSVT. Mentation much improved today. Patient states he feels better. NG tube will be placed so patient can recieve some nutrition. VSS.     7/11/2022  No further runs of NSVT. Continues to have atypical mentation, now with facial droop and complaint of trouble seeing and focusing in left eye. CT head was negative for any acute ischemia. Only complaint from patient is the vision. He is tachycardic today. Failed swallow study again.     7/10/2022  Patient continues to asymptomatic NSVT. On oxymask, having a lot of congestion. Seems out of it still despite morphine being given 3 hours ago. BP stable on nitro gtt.     7/9/2022  Patient developed ST elevations overnight post CABG. Troponins were checked and are still rising at 62. He remains intubated and sedated. BP stable on cleviprex gtt. ST elevations have improved.     7/7/2022  Patient had 42 beat run of Vtach this morning, asymp[tomatic. No further chest pain. VSS.     7/6/2022  PATIENT FOR CABG THIS Friday. Had some mild chest pains after walking for 40 minutes this morning. Has not required nitro yet. VSS.     7/5/2022  Patient still waiting for CABG, ate breakfast this AM. No chest pain events. VSS.     7/4/2022  Patient is awake alert lying comfortably in bed not in any acute distress.  Patient had walked in the hallway 4 times already.  Denies any  chest pain or tightness or heaviness his breathing is good denies any shortness of breath.  Awaiting for CABG.    Review of patient's allergies indicates:  No Known Allergies    REVIEW OF SYSTEMS  Unable to obtain as patient was sleeping during rounds and we did not want to disturb     OBJECTIVE     VITAL SIGNS (Most Recent)  Temp: 98.1 °F (36.7 °C) (07/14/22 1050)  Pulse: 71 (07/14/22 1117)  Resp: (!) 29 (07/14/22 1117)  BP: (!) 175/81 (07/14/22 1312)  SpO2: 98 % (07/14/22 1117)    VENTILATION STATUS  Resp: (!) 29 (07/14/22 1117)  SpO2: 98 % (07/14/22 1117)       I & O (Last 24H):    Intake/Output Summary (Last 24 hours) at 7/14/2022 1550  Last data filed at 7/14/2022 1041  Gross per 24 hour   Intake 1334.26 ml   Output 2375 ml   Net -1040.74 ml       WEIGHTS  Wt Readings from Last 1 Encounters:   07/12/22 0400 97.4 kg (214 lb 11.7 oz)   07/07/22 0520 96.4 kg (212 lb 8.4 oz)   07/05/22 0301 96.9 kg (213 lb 10 oz)   07/01/22 0300 97.3 kg (214 lb 8.1 oz)   06/30/22 0300 100.5 kg (221 lb 9.6 oz)   06/27/22 2254 101.1 kg (222 lb 14.2 oz)   06/27/22 1954 99.8 kg (220 lb)       PHYSICAL EXAM  Unable to obtain as patient was sleeping during rounds and we did not want to disturb     SCHEDULED MEDS:   amiodarone  200 mg Oral BID    amLODIPine  5 mg Oral Daily    aspirin  81 mg Oral Daily    atorvastatin  80 mg Oral Daily    docusate sodium  100 mg Oral BID    enoxparin  40 mg Subcutaneous Daily    losartan  50 mg Oral Daily    metoprolol tartrate  25 mg Oral BID    tamsulosin  0.4 mg Oral Daily       CONTINUOUS INFUSIONS:   amiodarone in dextrose 5% 0.5 mg/min (07/14/22 1043)    carboxymethylcellulose         PRN MEDS:acetaminophen, albumin human 5%, ALPRAZolam, calcium gluconate IVPB, calcium gluconate IVPB, calcium gluconate IVPB, carboxymethylcellulose, dextrose 50%, dextrose 50%, hydrALAZINE, HYDROcodone-acetaminophen, insulin aspart U-100, magnesium sulfate IVPB, magnesium sulfate IVPB, melatonin,  metoprolol, morphine, mupirocin, ondansetron, potassium chloride in water **AND** potassium chloride in water **AND** potassium chloride in water, sodium phosphate IVPB, sodium phosphate IVPB, sodium phosphate IVPB    LABS AND DIAGNOSTICS     CBC LAST 3 DAYS  Recent Labs   Lab 07/09/22  0400 07/09/22  0502 07/10/22  0323 07/11/22  0337 07/11/22  1036 07/12/22  0308 07/13/22  0307 07/14/22  0515   WBC 13.72*  --  16.69* 10.88  --  9.42 9.67 12.82*   RBC 4.19*  --  3.72* 3.44*  --  3.19* 3.37* 3.65*   HGB 12.2*  --  10.6* 10.1*  --  9.3* 9.4* 10.6*   HCT 36.2*   < > 33.0* 30.7*   < > 28.5* 30.5* 33.1*   MCV 86  --  89 89  --  89 91 91   MCH 29.1  --  28.5 29.4  --  29.2 27.9 29.0   MCHC 33.7  --  32.1 32.9  --  32.6 30.8* 32.0   RDW 13.5  --  14.0 13.8  --  13.6 13.6 13.4     --  160 141*  --  153 190 240   MPV 11.7  --  12.0 11.4  --  11.3 11.2 11.6   GRAN 90.5*  12.4*  --  84.2*  14.1* 80.1*  8.7*  --   --   --   --    LYMPH 5.1*  0.7*  --  6.8*  1.1 12.0*  1.3  --   --   --   --    MONO 4.1  0.6  --  8.3  1.4* 7.4  0.8  --   --   --   --    BASO 0.00  --  0.01 0.01  --   --   --   --    NRBC 0  --  0 0  --   --   --   --     < > = values in this interval not displayed.       COAGULATION LAST 3 DAYS  Recent Labs   Lab 07/08/22 0327   APTT 40.8*       CHEMISTRY LAST 3 DAYS  Recent Labs   Lab 07/09/22  1609 07/09/22  1751 07/10/22  0323 07/11/22  0337 07/11/22  1036 07/12/22  0308 07/13/22  0308 07/14/22  0515   NA  --   --  138 137  --  138 139 139   K  --   --  4.1 4.0  --  4.0 3.7 3.7   CL  --   --  104 102  --  102 103 103   CO2  --   --  27 28  --  28 29 28   ANIONGAP  --   --  7* 7*  --  8 7* 8   BUN  --   --  24* 25*  --  28* 28* 24*   CREATININE  --   --  1.1 1.0  --  1.0 0.9 0.8   GLU  --   --  188* 149*  --  149* 129* 155*   CALCIUM  --   --  7.8* 7.7*  --  7.9* 8.1* 8.3*   PH 7.492* 7.451*  --   --  7.459*  --   --   --    MG  --   --  2.0 2.1  --  2.1  --   --    ALBUMIN  --   --   --   --    --   --  2.9* 3.0*   PROT  --   --   --   --   --   --  5.6* 5.7*   ALKPHOS  --   --   --   --   --   --  47* 58   ALT  --   --   --   --   --   --  60* 87*   AST  --   --   --   --   --   --  58* 49*   BILITOT  --   --   --   --   --   --  1.2* 1.1*       CARDIAC PROFILE LAST 3 DAYS  Recent Labs   Lab 07/09/22  0400 07/09/22  0810 07/10/22  0323   TROPONINI 62.273* 62.262* 63.503*       ENDOCRINE LAST 3 DAYS  No results for input(s): TSH, PROCAL in the last 168 hours.    LAST ARTERIAL BLOOD GAS  ABG  Recent Labs   Lab 07/11/22  1036   PH 7.459*   PO2 57*   PCO2 36.8   HCO3 26.1   BE 2       LAST 7 DAYS MICROBIOLOGY   Microbiology Results (last 7 days)     ** No results found for the last 168 hours. **          MOST RECENT IMAGING  US Upper Extremity Veins Right  Examination: Right approximately duplex venous sonogram.    Comparisons: None.    HISTORY: Swelling.    FINDINGS:    The venous structures of the right upper extremity were evaluated from the right internal jugular vein through the right forearm.    There findings of echogenic material with with near lack of flow at the IV site within the basilic vein. There is nonocclusive thrombus extending into the more proximal portions of the basilic vein.    Remaining venous structures of the right upper extremity are unremarkable.    IMPRESSION:  1. Nearly completely occlusive thrombus within the basilic vein at the site of the IV insertion.  2. Remaining venous structures are unremarkable.    The sonographer verbally told to report to the nurse Simin as reported on the PACS system.    Electronically signed by:  Izaiah Verdin MD  7/13/2022 10:28 PM CDT Workstation: [id number]  Fl Modified Barium Swallow Speech  CMS MANDATED QUALITY [id number]    HISTORY: R13.10, R63.3, dysphagia, feeding difficulties, dysphagia s/p CVA and CABG .    FINDINGS: Fluoroscopy was provided by the radiology department for evaluation of the swallowing mechanism by speech pathology.  Total fluoroscopic time for the procedure was 1 minute 42 seconds, with 10 spot fluoroscopic images or series obtained.    There was no penetration or aspiration observed with any of the consistencies.    IMPRESSION: Negative for penetration or aspiration. Please see accompanying speech pathology report for further details and recommendations.    Electronically signed by:  Perico Mullen MD  7/13/2022 10:31 AM CDT Workstation: [id number]      [facility]  Results for orders placed during the hospital encounter of 06/27/22    Echo Saline Bubble? No    Interpretation Summary  · The left ventricle is normal in size with mild predominantly mid septal mild asymmetric hypertrophy eccentric hypertrophy and normal systolic function.  · The estimated ejection fraction is 60%.  · Indeterminate left ventricular diastolic function.  · Normal right ventricular size with normal right ventricular systolic function.  · Mild left atrial enlargement.  · Normal central venous pressure (3 mmHg).  · The estimated PA systolic pressure is 19 mmHg.  · Mild mitral regurgitation.      CURRENT/PREVIOUS VISIT EKG  Results for orders placed or performed during the hospital encounter of 06/27/22   EKG 12-lead    Collection Time: 07/09/22 12:41 AM    Narrative    Test Reason : I21.3,    Vent. Rate : 088 BPM     Atrial Rate : 088 BPM     P-R Int : 232 ms          QRS Dur : 094 ms      QT Int : 384 ms       P-R-T Axes : 043 101 132 degrees     QTc Int : 464 ms    Sinus rhythm with 1st degree A-V block with occasional Premature  ventricular complexes  Possible Inferior infarct ,age undetermined  Anterolateral infarct (cited on or before 27-JUN-2022)  Abnormal ECG  When compared with ECG of 08-JUL-2022 16:38,  Borderline criteria for Inferior infarct are now Present  Serial changes of Anterior infarct Present    Referred By: [email or web address]   SELF           Confirmed By:        ASSESSMENT/PLAN:     Active Hospital Problems    Diagnosis    *STEMI (ST elevation  myocardial infarction)    Acute CVA (cerebrovascular accident)    S/P CABG (coronary artery bypass graft)    Inadequate nutrition    NSVT (nonsustained ventricular tachycardia)    Coronary artery disease involving coronary bypass graft of native heart with angina pectoris       ASSESSMENT & PLAN:   1. Multivessel coronary artery disease  2. ST-elevation myocardial infarction status post PTCA  3. Nonsustained ventricular tachycardia  4. Mixed hyperlipidemia      RECOMMENDATIONS:  Resume amio gtt as crushed PO amio is hard to get down NG tube  MRI with recent infarct right temporal lobe- neurology following   Give 2.5 lopressor IV q6 for heart rate control   Ambulate as often as tolerated   Waiting PEG tube placement as well as to be able to swallow on his own before we proceed with ASHLEY   Give his PRN hydralizine for SBP over 140        Fanny Amaya PA-C  [facility]  Department of Cardiology  Date of Service: 07/14/2022  9:53 AM

## 2022-07-14 NOTE — NURSING
Notified Dr. Mckinnon via secure chat the results of the Ultra Sound on Right Arm (DVT) . No new orders at this time.   Dorsal Nasal Flap Text: The defect edges were debeveled with a #15 scalpel blade.  Given the location of the defect and the proximity to free margins a dorsal nasal flap was deemed most appropriate.  Using a sterile surgical marker, an appropriate dorsal nasal flap was drawn around the defect.    The area thus outlined was incised deep to adipose tissue with a #15 scalpel blade.  The skin margins were undermined to an appropriate distance in all directions utilizing iris scissors.

## 2022-07-14 NOTE — PT/OT/SLP PROGRESS
Physical Therapy      Patient Name:  Matteo Fraire   MRN:  [id number]    Patient not seen today secondary to Patient fatigue. Will follow-up next service date.

## 2022-07-14 NOTE — PLAN OF CARE
07/14/22 1117   Patient Assessment/Suction   Level of Consciousness (AVPU) alert   Respiratory Effort Normal;Unlabored   All Lung Fields Breath Sounds diminished   PRE-TX-O2   O2 Device (Oxygen Therapy) nasal cannula   $ Is the patient on Low Flow Oxygen? Yes   Flow (L/min) 2   SpO2 98 %   Pulse Oximetry Type Continuous   $ Pulse Oximetry - Multiple Charge Pulse Oximetry - Multiple   Pulse 71   Resp (!) 29   Education   $ Education 15 min   Respiratory Evaluation   $ Care Plan Tech Time 15 min

## 2022-07-14 NOTE — PT/OT/SLP PROGRESS
Speech Language Pathology Treatment    Patient Name:  Matteo Fraire   MRN:  [id number]  Admitting Diagnosis: STEMI (ST elevation myocardial infarction)    Recommendations:                 General Recommendations:    · Prolonged nutrition support: rapid or spontaneous recovery within acute stage is not expected/anticipated (GI consulted, anticipate PEG placement this date)   · Intensive dysphagia therapy   · Patient is at increased risk for aspiration related pulmonary complication on secretions: oral hygiene should be completed every 4 hours with toothpaste and toothbrush to decrease risk      Diet recommendations:  NPO, NPO      Aspiration Precautions: Strict aspiration precautions   · Frequent suction  · Oral hygiene every 4 hours with toothpaste and toothbrush      General Precautions: Standard, aspiration, NPO  Communication strategies:  none       Subjective     Alert, cooperative   Patient goals: none stated      Pain/Comfort:  ·      Respiratory Status: Nasal cannula, flow 2 L/min    Objective:     Has the patient been evaluated by SLP for swallowing?   Yes  Keep patient NPO? Yes     MBS completed 7/13: Severe pharyngeal dysphagia with impaired swallow safety and efficiency, absent pharyngeal swallow; acute in nature and consistent with lateral medullary stroke. No transit through the upper esophogeal sphincter secondary to absent hyolaryngeal displacement despite continued effort. Patient with intact sensation, producing continued cough effort and hacking to bring up material in pharynx and laryngeal vestibule. Unable to completely clear with material remaining in pyriform after effort.     Swallowing treatment:   · Continuous cough effort on secretions, requires suction   · Reviewed MBS recommendations and plan of care; reinforced importance of oral hygiene  · Oral hygiene completed; assist for setup   · Attempted mendelsohn: Pt unable to demonstrate hyolaryngeal lift across attempts  · Completed isometric  CTAR with 60 second hold x1 and 30 second hold x1. Pt requiring extended rest period due to fatigue and dyspnea. Ultimately required termination.   · RN present for encounter, aware of complaint, BP checked       Assessment:     Matteo Fraire is a 61 y.o. male with an SLP diagnosis of Dysphagia and Dysarthria.      Goals:   Multidisciplinary Problems     SLP Goals        Problem: SLP    Goal Priority Disciplines Outcome   SLP Goal     SLP Ongoing, Progressing   Description: 1. Pt will tolerate least restrictive PO diet without acute dysphagia pulmonary complication.   2. Pt will participate in VFSS to define swallow physiology (met) Pt will complete isometric chin tuck against resistance (CTAR) with 60 second hold across 3-5 sets to improve 3. Pt will complete mendelsohn maneuver with 2 second hold 5-10x across 3-5 sets to improve duration of hyolaryngeal lift and UES opening hyolaryngeal elevation, excursion, airway protection and UES opening   4. Pt will complete isometric chin tuck against resistance (CTAR) OR Shaker with 60 second hold across 3-5 sets to improve hyolaryngeal elevation, excursion, airway protection and UES opening   5. Pt will complete isokinetic chin tuck against resistance (CTAR) OR Shaker across 3-5 sets to improve hyolaryngeal elevation, excursion, airway protection and UES opening                              Plan:     · Patient to be seen:  6 x/week   · Plan of Care expires:     · Plan of Care reviewed with:  patient (RN, MD)   · SLP Follow-Up:  Yes       Discharge recommendations: IPR     Time Tracking:     SLP Treatment Date:   07/14/22  Speech Start Time:  1034  Speech Stop Time:  1052     Speech Total Time (min):  18 min    Billable Minutes: Treatment Swallowing Dysfunction 18    07/14/2022

## 2022-07-14 NOTE — PT/OT/SLP PROGRESS
Occupational Therapy      Patient Name:  Matteo Fraire   MRN:  [id number]    Patient not seen today secondary to Patient fatigue. Will follow-up 7/15..    7/14/2022

## 2022-07-14 NOTE — NURSING
Patient said that he wanted to shave his beard off. Informed that we do not have razors but that we do have surgical clippers. At the patient's request his beard was clipped using the surgical clippers. Patient voiced appreciation

## 2022-07-14 NOTE — ASSESSMENT & PLAN NOTE
STEMI status post balloon angioplasty to LAD  - 06/27   Status post redo 3 vessel CABG for recurrent severe atherosclerotic CAD - 07/08   Transferred  out of ICU on 07/12

## 2022-07-14 NOTE — PROGRESS NOTES
"CVT SURGERY PROGRESS NOTE  Matteo Fraire  61 y.o.  1960    Patient's Chief Complaint:  STEMI (ST elevation myocardial infarction)    HPI:  61 year old male with hx of HTN, previous CABG 2011 who presented to ED with complaints of chest pain after performing yard work and found to have STEMI. Emergent LHC performed with findings of recurrent multivessel CAD, PTCA of LAD was performed. CVTS consulted for CABG eval    Hospital Course:  -S/P Redo CABG x 3 (Left radial artery-LAD, SVG-PDA, SVG-OM) and complete pericardiectomy by Dr Stone 7/8/2022.  Admitted to ICU post operatively with subsequent extubation, He developed swallowing issues and was found to have small lacunar infarct of right basal ganglia. He also developed facial droop. Neuro consulted and following. Chest tubes have been removed and he was transferred to tele unit     Last 24 hour interval:  -RUE venous US with extensive DVT.  -Pt remains NPO with potential PEG tube placement today and possible ASHLEY.  -Pt reports awaiting PEG and ASHLEY  -He needs full dose Lovenox or OAC if ok with neuro and PEG and ASHLEY complete for DVT    Subjective:  Symptoms:  Stable.    Diet:  Dietary issues: NGT.  No nausea or vomiting.    Activity level: Impaired due to weakness.    Pain:  He complains of pain that is mild.  He reports pain is improving.                                                                   Objective:  General Appearance:  In no acute distress and comfortable.    Vital signs: (most recent): Blood pressure (!) 175/81, pulse 71, temperature 98.1 °F (36.7 °C), temperature source Axillary, resp. rate (!) 29, height 5' 9" (1.753 m), weight 97.4 kg (214 lb 11.7 oz), SpO2 98 %.    Output: Producing urine.    Lungs:  Normal effort and normal respiratory rate.  Breath sounds clear to auscultation.  He is not in respiratory distress.    Heart: Normal rate.  Regular rhythm.  No murmur.   Chest: Symmetric chest wall expansion. (Sternal incision clean and " dry. Well approximated. )  Abdomen: Abdomen is soft and non-distended.  Bowel sounds are normal.   There is no abdominal tenderness.     Extremities: (LUE and RLE surgical incisions are clean and dry, well approximated  RUE swelling)  Pulses: Distal pulses are intact.    Neurological: Patient is alert and oriented to person, place and time.  (L sided facial weakness).    Skin:  Warm and dry.      Recent Vitals:  Vitals:    07/14/22 1003 07/14/22 1050 07/14/22 1117 07/14/22 1312   BP: (!) 195/90 (!) 178/86  (!) 175/81   BP Location:       Patient Position:       Pulse: 88 77 71    Resp:  (!) 28 (!) 29    Temp:  98.1 °F (36.7 °C)     TempSrc:  Axillary     SpO2:  99% 98%    Weight:       Height:           INPATIENT MEDS   amiodarone in dextrose 5% 0.5 mg/min (07/14/22 1043)    carboxymethylcellulose        amiodarone  200 mg Oral BID    amLODIPine  5 mg Oral Daily    aspirin  81 mg Oral Daily    atorvastatin  80 mg Oral Daily    docusate sodium  100 mg Oral BID    enoxparin  40 mg Subcutaneous Daily    losartan  50 mg Oral Daily    metoprolol tartrate  25 mg Oral BID    tamsulosin  0.4 mg Oral Daily     acetaminophen, albumin human 5%, ALPRAZolam, calcium gluconate IVPB, calcium gluconate IVPB, calcium gluconate IVPB, carboxymethylcellulose, dextrose 50%, dextrose 50%, hydrALAZINE, HYDROcodone-acetaminophen, insulin aspart U-100, magnesium sulfate IVPB, magnesium sulfate IVPB, melatonin, metoprolol, morphine, mupirocin, ondansetron, potassium chloride in water **AND** potassium chloride in water **AND** potassium chloride in water, sodium phosphate IVPB, sodium phosphate IVPB, sodium phosphate IVPB  HEMODYNAMICS      Recent O2 Therapy/Vent Settings: NC    I/O last 24 hrs:  Intake/Output - Last 3 Shifts       07/12 0700  07/13 0659 07/13 0700  07/14 0659 07/14 0700  07/15 0659    P.O. 0 0     I.V. (mL/kg) 256 (2.6) 184.3 (1.9)     NG/GT 60 1050     Total Intake(mL/kg) 316 (3.2) 1234.3 (12.7)     Urine  "(mL/kg/hr) 1450 (0.6) 1350 (0.6) 1025 (1.7)    Drains       Chest Tube       Total Output 1450 1350 1025    Net -1134 -115.7 -1025               Recent Cardiac Rhythm: SR    Recent Pain Assessment: 4    CBC  Recent Labs   Lab 07/12/22  0308 07/13/22  0307 07/14/22  0515   WBC 9.42 9.67 12.82*   RBC 3.19* 3.37* 3.65*   HGB 9.3* 9.4* 10.6*    190 240   MCV 89 91 91   MCH 29.2 27.9 29.0   MCHC 32.6 30.8* 32.0     BMP  Recent Labs   Lab 07/12/22  0308 07/13/22  0308 07/14/22  0515   CO2 28 29 28   BUN 28* 28* 24*   CREATININE 1.0 0.9 0.8   CALCIUM 7.9* 8.1* 8.3*     CARDIAC ENZYMES  Recent Labs   Lab 07/09/22  0400 07/09/22  0810 07/10/22  0323   TROPONINI 62.273* 62.262* 63.503*     PT/INR  INR   Date Value Ref Range Status   06/27/2022 1.1  Final     Comment:     Coumadin Therapy:  INR: 2.0-3.0 conventional anticoagulation    INR: 2.5-3.5 intensive anticoagulation     11/20/2019 1.1  Final     Comment:     Coumadin Therapy:  INR: 2.0-3.0 conventional anticoagulation  INR: 2.5-3.5 intensive anticoagulation     01/28/2011 1.0 0.8 - 1.2 Final     Comment:     ACCP Guideline for Coumadin usage recommends a "target range" of  2.0 - 3.0 for INR for all indicators except mechanical heart valves  and antiphospholipid syndromes which should use 2.5 - 3.5.  .   01/27/2011 1.0 0.8 - 1.2 Final     Comment:     ACCP Guideline for Coumadin usage recommends a "target range" of  2.0 - 3.0 for INR for all indicators except mechanical heart valves  and antiphospholipid syndromes which should use 2.5 - 3.5.  .   01/26/2011 1.0 0.8 - 1.2 Final     Comment:     ACCP Guideline for Coumadin usage recommends a "target range" of  2.0 - 3.0 for INR for all indicators except mechanical heart valves  and antiphospholipid syndromes which should use 2.5 - 3.5.  .     DIAGNOSTIC RESULTS:  FINDINGS:     The venous structures of the right upper extremity were evaluated from the right internal jugular vein through the right forearm.     There " findings of echogenic material with with near lack of flow at the IV site within the basilic vein. There is nonocclusive thrombus extending into the more proximal portions of the basilic vein.     Remaining venous structures of the right upper extremity are unremarkable.     IMPRESSION:  1. Nearly completely occlusive thrombus within the basilic vein at the site of the IV insertion.  2. Remaining venous structures are unremarkable.     CURRENT CONSULTS:  IP CONSULT TO CARDIOTHORACIC SURGERY  IP CONSULT TO CARDIOLOGY  IP CONSULT TO REGISTERED DIETITIAN/NUTRITIONIST  IP CONSULT TO CARDIAC REHAB  IP CONSULT TO NEUROLOGY  IP CONSULT TO SOCIAL WORK/CASE MANAGEMENT  IP CONSULT TO REGISTERED DIETITIAN/NUTRITIONIST  IP CONSULT TO REGISTERED DIETITIAN/NUTRITIONIST  IP CONSULT TO GASTROENTEROLOGY  WOUND CARE CONSULT    ASSESSMENT/PLAN:  STEMI  Multivessel CAD  Previous CABG 2011  S/P PTCA of LAD  S/P Redo CABG x 3 and pericardiectomy by Dr Stone 7/8/2022  -Hemodynamically stable  -Tolerating NC  -Chest tubes and pacer wires have been removed  -Surgical incisions stable  -Continue ASA, BB, statin  -Continue Norvasc for prevention of vasospasm of radial artery  -Remains in SR  -Mild leukocytosis, afebrile  -H/H stable  -Encourage IS  -Increase activity  -Luis hose  -Stable from surgical standpoint  -F/U with Dr Stone 2-3 weeks    NSVT  -On amiodarone oral  -Cardiology following and managing    Post op CVA  -Failed swallow study, presently NPO  -NG tube in place  -Await Peg tube  -Neuro following    Anemia due to acute blood loss  Thrombocytopenia  -Stable    RUE DVT  -Needs full dose lovenox or OAC once ok with Neuro and PEG and ASHLEY performed, discussed with nurse    Case and plan of care discussed with MD    DVT Prophylaxis:  Anticoagulants   Medication Route Frequency    enoxaparin injection 40 mg Subcutaneous Daily       Roxann Dotson PA-C  Cardiovascular Thoracic Surgery  7/14/2022  2:27 PM

## 2022-07-14 NOTE — PLAN OF CARE
Problem: SLP  Goal: SLP Goal  Description: 1. Pt will tolerate least restrictive PO diet without acute dysphagia pulmonary complication.   2. Pt will participate in VFSS to define swallow physiology (met)   3. Pt will complete mendelsohn maneuver with 2 second hold 5-10x across 3-5 sets to improve duration of hyolaryngeal lift and UES opening, hyolaryngeal elevation, excursion, airway protection and UES opening   4. Pt will complete isometric chin tuck against resistance (CTAR) OR Shaker with 60 second hold across 3-5 sets to improve hyolaryngeal elevation, excursion, airway protection and UES opening   5. Pt will complete isokinetic chin tuck against resistance (CTAR) OR Shaker across 3-5 sets to improve hyolaryngeal elevation, excursion, airway protection and UES opening             Outcome: Ongoing, Progressing

## 2022-07-14 NOTE — ASSESSMENT & PLAN NOTE
Status NPO  Started on Enteral feeding on 07/12  Pt agreed with PEG placement and GI MD consulted

## 2022-07-14 NOTE — NURSING
Safety and comfort maintained. NPO for ASHLEY this AM. K 3.7. Patient to receive 40 meq of potassium. Medication requested from pharmacy. Afebrile and VSS. Day shift nurse notified of DVT to right arm. MD to be notified by day shift nurse. Bed alarm remains on. Needs within reach.

## 2022-07-14 NOTE — NURSING
Dr. Madrid notified of DVT to right basilic vein. No new orders obtained. Instructed to notify staff in the am.

## 2022-07-14 NOTE — NURSING
US Upper Extremity Veins Right    Status: Final result     MyChart Results Release    MyChart Status: Pending      PACS Images for ViTAL [facility] Viewer     Show images for US Upper Extremity Veins Right    US Upper Extremity Veins Right  Order: [id number]   Status: Final result       Visible to patient: No (inaccessible in Patient Portal)       Next appt: 07/15/2022 at 10:00 AM in Family Medicine (Naomi Bliss, [facility]       0 Result Notes      Details    Reading Physician Reading Date Result Priority   Izaiah Verdin MD  [phone] 7/13/2022      Narrative & Impression  Examination: Right approximately duplex venous sonogram.     Comparisons: None.     HISTORY: Swelling.     FINDINGS:     The venous structures of the right upper extremity were evaluated from the right internal jugular vein through the right forearm.     There findings of echogenic material with with near lack of flow at the IV site within the basilic vein. There is nonocclusive thrombus extending into the more proximal portions of the basilic vein.     Remaining venous structures of the right upper extremity are unremarkable.     IMPRESSION:  1. Nearly completely occlusive thrombus within the basilic vein at the site of the IV insertion.  2. Remaining venous structures are unremarkable.     The sonographer verbally told to report to the nurse Duval as reported on the PACS system.     Electronically signed by:  Izaiah Verdin MD  7/13/2022 10:28 PM CDT Workstation: [id number]               Specimen Collected: 07/13/22 18:43 Last Resulted: 07/13/22 22:28        Order Details        View Encounter        Lab and Collection Details        Routing        Result History               Result Care Coordination        Patient Communication     Released  Not seen Back to Top             US Upper Extremity Veins Right: Patient Communication     Released  Not seen     External Result Report    External Result Report     Narrative &  Impression    Examination: Right approximately duplex venous sonogram.     Comparisons: None.     HISTORY: Swelling.     FINDINGS:     The venous structures of the right upper extremity were evaluated from the right internal jugular vein through the right forearm.     There findings of echogenic material with with near lack of flow at the IV site within the basilic vein. There is nonocclusive thrombus extending into the more proximal portions of the basilic vein.     Remaining venous structures of the right upper extremity are unremarkable.     IMPRESSION:  1. Nearly completely occlusive thrombus within the basilic vein at the site of the IV insertion.  2. Remaining venous structures are unremarkable.     The sonographer verbally told to report to the nurse Simin as reported on the PACS system.     Electronically signed by:  Izaiah Verdin MD  7/13/2022 10:28 PM CDT Workstation: [id number]        Encounter    View Encounter               Signed by    Signed Time Phone Pager   Izaiah Verdin MD 7/13/2022 22:28 [phone]      Exam Details    Performed Procedure Technologist Supporting Staff Performing Physician   US Upper Extremity Veins Right Richelle Densing, RT           Appointment Date/Status Modality Department    7/13/2022     Arrived [facility] PORT US3 [facility] E9 [facility] ULTRASOUND           Begin Exam End Exam  End Exam Questionnaires   7/13/2022  6:43 PM 7/13/2022 10:13 PM  IMAGING END ALL            Reason for Exam  Priority: STAT  swelling     Order Report     Order Details      DVT to right basilic vein resulted. Dr. Madrid notified.

## 2022-07-14 NOTE — PROGRESS NOTES
[facility] Medicine  Progress Note    Patient Name: Matteo Fraire  MRN: [id number]  Patient Class: IP- Inpatient   Admission Date: 6/27/2022  Length of Stay: 16 days  Attending Physician: Mik Mckinnon MD  Primary Care Provider: Primary Doctor No        Subjective:     Principal Problem:STEMI (ST elevation myocardial infarction)        HPI:    61-year-old male past medical history of coronary artery disease status post CABG 2011 and stent 2005 both occasions they were related with MIs course in the ER today because he started having chest pain about 1 hour prior to arrival it was a pressure-like pain associated with sweating and dry heaving is with radiation to both arms sensation was similar to his MI 2005 he got 2 aspirins and appears to present 1 hour later when he got to the EKG showed STEMI so he was taken to cath lab where he had angioplasty of LIMA to LAD by Dr. Powell he is currently on nitroglycerin drip due to high blood pressure he also received morphine IV 4 mg Zofran IV 4 and labetalol 10 mg IV admitted to ICU.      Overview/Hospital Course:  07/12  Pacer wires and Chest tubes removed  Still has Chamorro insitu  Pt sitting on chair and denies any issues    07/13  Agreed with PEG placement  ASHLEY tomorrow  NG tube insitu  and started on Tfs  Chamorro still on place  Restarted on amio gtt today  Overall he says he is better      Interval History:     Review of Systems   Constitutional:  Negative for activity change and appetite change.   HENT:  Negative for congestion and dental problem.    Eyes:  Negative for discharge and itching.   Respiratory:  Negative for shortness of breath.    Cardiovascular:  Negative for chest pain.   Gastrointestinal:  Negative for abdominal distention and abdominal pain.   Endocrine: Negative for cold intolerance.   Genitourinary:  Negative for difficulty urinating and dysuria.   Musculoskeletal:  Negative for arthralgias and back pain.   Skin:  Negative for color  change.   Neurological:  Negative for dizziness and facial asymmetry.   Hematological:  Negative for adenopathy.   Psychiatric/Behavioral:  Negative for agitation and behavioral problems.    Objective:     Vital Signs (Most Recent):  Temp: 98.4 °F (36.9 °C) (07/13/22 1634)  Pulse: 87 (07/13/22 1634)  Resp: 19 (07/13/22 1634)  BP: (!) 170/92 (07/13/22 1634)  SpO2: 100 % (07/13/22 1634)   Vital Signs (24h Range):  Temp:  [97.5 °F (36.4 °C)-98.5 °F (36.9 °C)] 98.4 °F (36.9 °C)  Pulse:  [73-90] 87  Resp:  [16-21] 19  SpO2:  [97 %-100 %] 100 %  BP: (115-170)/(65-92) 170/92     Weight: 97.4 kg (214 lb 11.7 oz)  Body mass index is 31.71 kg/m².    Intake/Output Summary (Last 24 hours) at 7/13/2022 2030  Last data filed at 7/13/2022 1800  Gross per 24 hour   Intake 240 ml   Output 1650 ml   Net -1410 ml      Physical Exam  Vitals and nursing note reviewed.   Constitutional:       Appearance: He is well-developed.   HENT:      Head: Atraumatic.      Right Ear: External ear normal.      Left Ear: External ear normal.      Nose: Nose normal.      Mouth/Throat:      Mouth: Mucous membranes are moist.   Eyes:      General: No scleral icterus.  Cardiovascular:      Rate and Rhythm: Normal rate.   Pulmonary:      Effort: Pulmonary effort is normal.   Musculoskeletal:         General: Normal range of motion.      Cervical back: Full passive range of motion without pain and normal range of motion.   Skin:     General: Skin is warm.   Neurological:      Mental Status: He is alert and oriented to person, place, and time.   Psychiatric:         Behavior: Behavior normal.       Significant Labs: All pertinent labs within the past 24 hours have been reviewed.  CBC:   Recent Labs   Lab 07/12/22  0308 07/13/22  0307   WBC 9.42 9.67   HGB 9.3* 9.4*   HCT 28.5* 30.5*    190     CMP:   Recent Labs   Lab 07/12/22  0308 07/13/22  0308    139   K 4.0 3.7    103   CO2 28 29   * 129*   BUN 28* 28*   CREATININE 1.0 0.9    CALCIUM 7.9* 8.1*   PROT  --  5.6*   ALBUMIN  --  2.9*   BILITOT  --  1.2*   ALKPHOS  --  47*   AST  --  58*   ALT  --  60*   ANIONGAP 8 7*   EGFRNONAA >60.0 >60.0       Significant Imaging: I have reviewed all pertinent imaging results/findings within the past 24 hours.      Assessment/Plan:      * STEMI (ST elevation myocardial infarction)  STEMI status post balloon angioplasty to LAD  - 06/27   Status post redo 3 vessel CABG for recurrent severe atherosclerotic CAD - 07/08   Transferred  out of ICU on 07/12      Inadequate nutrition  Status NPO  Started on Enteral feeding on 07/12  Pt agreed with PEG placement and GI MD consulted         Acute CVA (cerebrovascular accident)  Maintain aspirin  Once medically cleared he need rehab placement vs HH   ASHLEY tomorrow      S/P CABG (coronary artery bypass graft)  Stable       NSVT (nonsustained ventricular tachycardia)  Stable   Now on amio gtt      Coronary artery disease involving coronary bypass graft of native heart with angina pectoris  As above         VTE Risk Mitigation (From admission, onward)         Ordered     enoxaparin injection 40 mg  Daily         07/12/22 0839                Discharge Planning   MICHAEL: 7/15/2022     Code Status: Full Code   Is the patient medically ready for discharge?: No    Reason for patient still in hospital (select all that apply): Treatment and Pending disposition  Discharge Plan A: Rehab   Discharge Delays: None known at this time              Mik Mckinnon MD  Department of Hospital Medicine   [facility]

## 2022-07-14 NOTE — NURSING
61 yr old male  Awake and alert   bilat buttock  Stage 2 pressure injury  open dry peeling     Recommendation:  bilat buttock  Clean area with chlorhexidine/ns  Pat dry  Apply venelex and cover with mepilex.

## 2022-07-14 NOTE — PROGRESS NOTES
Pt in need of peg.  Had lovenox this AM.  Held for tomorrow AM.   Plan for PEG in AM.  Resume lovenox after peg placed

## 2022-07-15 ENCOUNTER — ANESTHESIA (OUTPATIENT)
Dept: SURGERY | Facility: HOSPITAL | Age: 62
DRG: 231 | End: 2022-07-15
Payer: COMMERCIAL

## 2022-07-15 ENCOUNTER — ANESTHESIA EVENT (OUTPATIENT)
Dept: SURGERY | Facility: HOSPITAL | Age: 62
DRG: 231 | End: 2022-07-15
Payer: COMMERCIAL

## 2022-07-15 VITALS
DIASTOLIC BLOOD PRESSURE: 74 MMHG | SYSTOLIC BLOOD PRESSURE: 189 MMHG | HEART RATE: 91 BPM | RESPIRATION RATE: 20 BRPM | OXYGEN SATURATION: 100 %

## 2022-07-15 PROBLEM — Z93.1 PEG (PERCUTANEOUS ENDOSCOPIC GASTROSTOMY) STATUS: Status: ACTIVE | Noted: 2022-07-15

## 2022-07-15 LAB
ALBUMIN SERPL BCP-MCNC: 3.2 G/DL (ref 3.5–5.2)
ALP SERPL-CCNC: 57 U/L (ref 55–135)
ALT SERPL W/O P-5'-P-CCNC: 73 U/L (ref 10–44)
ANION GAP SERPL CALC-SCNC: 9 MMOL/L (ref 8–16)
AST SERPL-CCNC: 30 U/L (ref 10–40)
BILIRUB SERPL-MCNC: 1.7 MG/DL (ref 0.1–1)
BNP SERPL-MCNC: 610 PG/ML (ref 0–99)
BUN SERPL-MCNC: 20 MG/DL (ref 8–23)
CALCIUM SERPL-MCNC: 8.4 MG/DL (ref 8.7–10.5)
CHLORIDE SERPL-SCNC: 103 MMOL/L (ref 95–110)
CO2 SERPL-SCNC: 24 MMOL/L (ref 23–29)
CREAT SERPL-MCNC: 0.8 MG/DL (ref 0.5–1.4)
ERYTHROCYTE [DISTWIDTH] IN BLOOD BY AUTOMATED COUNT: 13.6 % (ref 11.5–14.5)
EST. GFR  (AFRICAN AMERICAN): >60 ML/MIN/1.73 M^2
EST. GFR  (NON AFRICAN AMERICAN): >60 ML/MIN/1.73 M^2
GLUCOSE SERPL-MCNC: 114 MG/DL (ref 70–110)
GLUCOSE SERPL-MCNC: 139 MG/DL (ref 70–110)
GLUCOSE SERPL-MCNC: 140 MG/DL (ref 70–110)
GLUCOSE SERPL-MCNC: 170 MG/DL (ref 70–110)
HCT VFR BLD AUTO: 35.7 % (ref 40–54)
HGB BLD-MCNC: 11.9 G/DL (ref 14–18)
MCH RBC QN AUTO: 29.1 PG (ref 27–31)
MCHC RBC AUTO-ENTMCNC: 33.3 G/DL (ref 32–36)
MCV RBC AUTO: 87 FL (ref 82–98)
PLATELET # BLD AUTO: 325 K/UL (ref 150–450)
PLATELET BLD QL SMEAR: NORMAL
PMV BLD AUTO: 10.7 FL (ref 9.2–12.9)
POTASSIUM SERPL-SCNC: 3.9 MMOL/L (ref 3.5–5.1)
PROT SERPL-MCNC: 5.9 G/DL (ref 6–8.4)
RBC # BLD AUTO: 4.09 M/UL (ref 4.6–6.2)
SODIUM SERPL-SCNC: 136 MMOL/L (ref 136–145)
WBC # BLD AUTO: 15.59 K/UL (ref 3.9–12.7)

## 2022-07-15 PROCEDURE — 99900035 HC TECH TIME PER 15 MIN (STAT)

## 2022-07-15 PROCEDURE — 25000003 PHARM REV CODE 250: Performed by: THORACIC SURGERY (CARDIOTHORACIC VASCULAR SURGERY)

## 2022-07-15 PROCEDURE — 25000003 PHARM REV CODE 250: Performed by: NURSE ANESTHETIST, CERTIFIED REGISTERED

## 2022-07-15 PROCEDURE — 63600175 PHARM REV CODE 636 W HCPCS: Performed by: INTERNAL MEDICINE

## 2022-07-15 PROCEDURE — 63600175 PHARM REV CODE 636 W HCPCS: Performed by: NURSE ANESTHETIST, CERTIFIED REGISTERED

## 2022-07-15 PROCEDURE — 80053 COMPREHEN METABOLIC PANEL: CPT | Performed by: INTERNAL MEDICINE

## 2022-07-15 PROCEDURE — 25000003 PHARM REV CODE 250: Performed by: INTERNAL MEDICINE

## 2022-07-15 PROCEDURE — 21000000 HC CCU ICU ROOM CHARGE

## 2022-07-15 PROCEDURE — 43246 EGD PLACE GASTROSTOMY TUBE: CPT | Performed by: INTERNAL MEDICINE

## 2022-07-15 PROCEDURE — 85027 COMPLETE CBC AUTOMATED: CPT | Performed by: INTERNAL MEDICINE

## 2022-07-15 PROCEDURE — 99233 PR SUBSEQUENT HOSPITAL CARE,LEVL III: ICD-10-PCS | Mod: ,,, | Performed by: INTERNAL MEDICINE

## 2022-07-15 PROCEDURE — 25000003 PHARM REV CODE 250: Performed by: HOSPITALIST

## 2022-07-15 PROCEDURE — 94761 N-INVAS EAR/PLS OXIMETRY MLT: CPT

## 2022-07-15 PROCEDURE — 37000008 HC ANESTHESIA 1ST 15 MINUTES: Performed by: INTERNAL MEDICINE

## 2022-07-15 PROCEDURE — 25000003 PHARM REV CODE 250

## 2022-07-15 PROCEDURE — 99233 SBSQ HOSP IP/OBS HIGH 50: CPT | Mod: ,,, | Performed by: INTERNAL MEDICINE

## 2022-07-15 PROCEDURE — 27000671 HC TUBING MICROBORE EXT: Performed by: ANESTHESIOLOGY

## 2022-07-15 PROCEDURE — 83880 ASSAY OF NATRIURETIC PEPTIDE: CPT | Performed by: INTERNAL MEDICINE

## 2022-07-15 PROCEDURE — 37000009 HC ANESTHESIA EA ADD 15 MINS: Performed by: INTERNAL MEDICINE

## 2022-07-15 PROCEDURE — 82962 GLUCOSE BLOOD TEST: CPT

## 2022-07-15 PROCEDURE — 27201018 HC KIT, PEG (ANY): Performed by: INTERNAL MEDICINE

## 2022-07-15 RX ORDER — METOPROLOL TARTRATE 25 MG/1
25 TABLET, FILM COATED ORAL 2 TIMES DAILY
Status: DISCONTINUED | OUTPATIENT
Start: 2022-07-15 | End: 2022-07-19 | Stop reason: HOSPADM

## 2022-07-15 RX ORDER — LABETALOL HYDROCHLORIDE 5 MG/ML
10 INJECTION, SOLUTION INTRAVENOUS EVERY 8 HOURS PRN
Status: DISCONTINUED | OUTPATIENT
Start: 2022-07-15 | End: 2022-07-19 | Stop reason: HOSPADM

## 2022-07-15 RX ORDER — AMIODARONE HYDROCHLORIDE 200 MG/1
200 TABLET ORAL 2 TIMES DAILY
Status: DISCONTINUED | OUTPATIENT
Start: 2022-07-15 | End: 2022-07-19 | Stop reason: HOSPADM

## 2022-07-15 RX ORDER — CEFAZOLIN SODIUM 1 G/50ML
1 SOLUTION INTRAVENOUS ONCE
Status: COMPLETED | OUTPATIENT
Start: 2022-07-15 | End: 2022-07-15

## 2022-07-15 RX ORDER — PROPOFOL 10 MG/ML
VIAL (ML) INTRAVENOUS
Status: DISCONTINUED | OUTPATIENT
Start: 2022-07-15 | End: 2022-07-15

## 2022-07-15 RX ORDER — AMIODARONE HYDROCHLORIDE 200 MG/1
200 TABLET ORAL 2 TIMES DAILY
Status: DISCONTINUED | OUTPATIENT
Start: 2022-07-15 | End: 2022-07-15

## 2022-07-15 RX ADMIN — LABETALOL HYDROCHLORIDE 10 MG: 5 INJECTION, SOLUTION INTRAVENOUS at 05:07

## 2022-07-15 RX ADMIN — SACUBITRIL AND VALSARTAN 1 TABLET: 24; 26 TABLET, FILM COATED ORAL at 12:07

## 2022-07-15 RX ADMIN — AMIODARONE HYDROCHLORIDE 200 MG: 200 TABLET ORAL at 09:07

## 2022-07-15 RX ADMIN — AMIODARONE HYDROCHLORIDE 200 MG: 200 TABLET ORAL at 12:07

## 2022-07-15 RX ADMIN — CEFAZOLIN SODIUM 1 G: 1 SOLUTION INTRAVENOUS at 10:07

## 2022-07-15 RX ADMIN — DOCUSATE SODIUM 100 MG: 100 CAPSULE, LIQUID FILLED ORAL at 12:07

## 2022-07-15 RX ADMIN — POTASSIUM CHLORIDE 40 MEQ: 29.8 INJECTION, SOLUTION INTRAVENOUS at 05:07

## 2022-07-15 RX ADMIN — HYDROCODONE BITARTRATE AND ACETAMINOPHEN 1 TABLET: 5; 325 TABLET ORAL at 12:07

## 2022-07-15 RX ADMIN — DOCUSATE SODIUM 100 MG: 100 CAPSULE, LIQUID FILLED ORAL at 09:07

## 2022-07-15 RX ADMIN — PROPOFOL 20 MG: 10 INJECTION, EMULSION INTRAVENOUS at 09:07

## 2022-07-15 RX ADMIN — METOPROLOL TARTRATE 25 MG: 25 TABLET, FILM COATED ORAL at 09:07

## 2022-07-15 RX ADMIN — SODIUM CHLORIDE: 9 INJECTION, SOLUTION INTRAVENOUS at 09:07

## 2022-07-15 RX ADMIN — ASPIRIN 81 MG CHEWABLE TABLET 81 MG: 81 TABLET CHEWABLE at 12:07

## 2022-07-15 RX ADMIN — TAMSULOSIN HYDROCHLORIDE 0.4 MG: 0.4 CAPSULE ORAL at 12:07

## 2022-07-15 RX ADMIN — PROPOFOL 40 MG: 10 INJECTION, EMULSION INTRAVENOUS at 09:07

## 2022-07-15 RX ADMIN — HYDRALAZINE HYDROCHLORIDE 10 MG: 20 INJECTION INTRAMUSCULAR; INTRAVENOUS at 03:07

## 2022-07-15 RX ADMIN — ATORVASTATIN CALCIUM 80 MG: 40 TABLET, FILM COATED ORAL at 12:07

## 2022-07-15 RX ADMIN — SACUBITRIL AND VALSARTAN 1 TABLET: 24; 26 TABLET, FILM COATED ORAL at 09:07

## 2022-07-15 NOTE — ANESTHESIA POSTPROCEDURE EVALUATION
Anesthesia Post Evaluation    Patient: Matteo Fraire    Procedure(s) Performed: Procedure(s) (LRB):  EGD (ESOPHAGOGASTRODUODENOSCOPY) (N/A)    Final Anesthesia Type: MAC      Patient location during evaluation: GI PACU  Patient participation: Yes- Able to Participate  Level of consciousness: awake and alert, oriented and awake  Post-procedure vital signs: reviewed and stable  Pain management: adequate  Airway patency: patent    PONV status at discharge: No PONV  Anesthetic complications: no      Cardiovascular status: blood pressure returned to baseline, hemodynamically stable and stable  Respiratory status: unassisted, spontaneous ventilation and room air  Hydration status: euvolemic  Follow-up not needed.          Vitals Value Taken Time   /74 07/15/22 1010   Temp 37.2 °C (98.9 °F) 07/15/22 1000   Pulse 89 07/15/22 1017   Resp 16 07/15/22 1017   SpO2 98 % 07/15/22 1017   Vitals shown include unvalidated device data.      No case tracking events are documented in the log.      Pain/Lissy Score: No data recorded

## 2022-07-15 NOTE — CONSULTS
Received CM consult. Pt referred and accepted at [facility] pending medical clearance.

## 2022-07-15 NOTE — PT/OT/SLP PROGRESS
Physical Therapy      Patient Name:  Matteo Fraire   MRN:  [id number]    Patient not seen today secondary to Other (Comment) (1st attempt off floor for procedure, 2nd attempt bed rest due to PEG placement). Will follow-up 7/16/2022.

## 2022-07-15 NOTE — PROGRESS NOTES
[facility] Medicine  Progress Note    Patient Name: Matteo Fraire  MRN: [id number]  Patient Class: IP- Inpatient   Admission Date: 6/27/2022  Length of Stay: 17 days  Attending Physician: Mik Mckinnon MD  Primary Care Provider: Primary Doctor No        Subjective:     Principal Problem:STEMI (ST elevation myocardial infarction)        HPI:    61-year-old male past medical history of coronary artery disease status post CABG 2011 and stent 2005 both occasions they were related with MIs course in the ER today because he started having chest pain about 1 hour prior to arrival it was a pressure-like pain associated with sweating and dry heaving is with radiation to both arms sensation was similar to his MI 2005 he got 2 aspirins and appears to present 1 hour later when he got to the EKG showed STEMI so he was taken to cath lab where he had angioplasty of LIMA to LAD by Dr. Powell he is currently on nitroglycerin drip due to high blood pressure he also received morphine IV 4 mg Zofran IV 4 and labetalol 10 mg IV admitted to ICU.      Overview/Hospital Course:  07/12  Pacer wires and Chest tubes removed  Still has Chamorro insitu  Pt sitting on chair and denies any issues    07/13  Agreed with PEG placement  ASHLEY tomorrow  NG tube insitu  and started on Tfs  Chamorro still on place  Restarted on amio gtt today  Overall he says he is better    07/14  ASHLEY cancelled  Will have PEG tmrw AM   BP levels on higher range      Interval History:     Review of Systems   Constitutional:  Negative for activity change and appetite change.   HENT:  Negative for congestion and dental problem.    Eyes:  Negative for discharge and itching.   Respiratory:  Negative for shortness of breath.    Cardiovascular:  Negative for chest pain.   Gastrointestinal:  Negative for abdominal distention and abdominal pain.   Endocrine: Negative for cold intolerance.   Genitourinary:  Negative for difficulty urinating and dysuria.    Musculoskeletal:  Positive for arthralgias. Negative for back pain.   Skin:  Negative for color change.   Neurological:  Negative for dizziness and facial asymmetry.   Hematological:  Negative for adenopathy.   Psychiatric/Behavioral:  Negative for agitation and behavioral problems.    Objective:     Vital Signs (Most Recent):  Temp: 98.8 °F (37.1 °C) (07/14/22 1923)  Pulse: 93 (07/14/22 1923)  Resp: 17 (07/14/22 1923)  BP: (!) 175/81 (07/14/22 1923)  SpO2: 98 % (07/14/22 1923)   Vital Signs (24h Range):  Temp:  [98.1 °F (36.7 °C)-98.9 °F (37.2 °C)] 98.8 °F (37.1 °C)  Pulse:  [71-93] 93  Resp:  [13-29] 17  SpO2:  [96 %-100 %] 98 %  BP: (125-195)/(73-93) 175/81     Weight: 97.4 kg (214 lb 11.7 oz)  Body mass index is 31.71 kg/m².    Intake/Output Summary (Last 24 hours) at 7/14/2022 1930  Last data filed at 7/14/2022 1041  Gross per 24 hour   Intake 974.26 ml   Output 1425 ml   Net -450.74 ml      Physical Exam  Vitals and nursing note reviewed.   Constitutional:       Appearance: He is well-developed.   HENT:      Head: Atraumatic.      Right Ear: External ear normal.      Left Ear: External ear normal.      Nose: Nose normal.      Mouth/Throat:      Mouth: Mucous membranes are moist.   Eyes:      General: No scleral icterus.  Cardiovascular:      Rate and Rhythm: Normal rate.   Pulmonary:      Effort: Pulmonary effort is normal.   Musculoskeletal:         General: Normal range of motion.      Cervical back: Full passive range of motion without pain and normal range of motion.   Skin:     General: Skin is warm.   Neurological:      Mental Status: He is alert and oriented to person, place, and time.   Psychiatric:         Behavior: Behavior normal.       Significant Labs: All pertinent labs within the past 24 hours have been reviewed.  CBC:   Recent Labs   Lab 07/13/22  0307 07/14/22  0515   WBC 9.67 12.82*   HGB 9.4* 10.6*   HCT 30.5* 33.1*    240     CMP:   Recent Labs   Lab 07/13/22  0308 07/14/22  0515     139   K 3.7 3.7    103   CO2 29 28   * 155*   BUN 28* 24*   CREATININE 0.9 0.8   CALCIUM 8.1* 8.3*   PROT 5.6* 5.7*   ALBUMIN 2.9* 3.0*   BILITOT 1.2* 1.1*   ALKPHOS 47* 58   AST 58* 49*   ALT 60* 87*   ANIONGAP 7* 8   EGFRNONAA >60.0 >60.0       Significant Imaging: I have reviewed all pertinent imaging results/findings within the past 24 hours.      Assessment/Plan:      * STEMI (ST elevation myocardial infarction)  STEMI status post balloon angioplasty to LAD  - 06/27   Status post redo 3 vessel CABG for recurrent severe atherosclerotic CAD - 07/08   Transferred  out of ICU on 07/12      Inadequate nutrition  Status NPO  Started on Enteral feeding on 07/12  Pt agreed with PEG placement and scheduled for PEG insertion on 07/15        Acute CVA (cerebrovascular accident)  Maintain aspirin  Once medically cleared he need rehab placement vs HH   ASHLEY down the bjorn      Superficial vein thrombosis  Nearly completely occlusive thrombus within the basilic vein R side  This is a superficial vein  No need for anticoagulation      S/P CABG (coronary artery bypass graft)  Stable       NSVT (nonsustained ventricular tachycardia)  Stable   Now on amio gtt      Coronary artery disease involving coronary bypass graft of native heart with angina pectoris  As above       VTE Risk Mitigation (From admission, onward)    None          Discharge Planning   MICHAEL:      Code Status: Full Code   Is the patient medically ready for discharge?: No    Reason for patient still in hospital (select all that apply): Treatment  Discharge Plan A: Rehab   Discharge Delays: None known at this time              Mik Mckinnon MD  Department of Hospital Medicine   [facility]

## 2022-07-15 NOTE — PLAN OF CARE
Problem: Adult Inpatient Plan of Care  Goal: Plan of Care Review  Outcome: Ongoing, Progressing  Goal: Patient-Specific Goal (Individualized)  Outcome: Ongoing, Progressing  Goal: Absence of Hospital-Acquired Illness or Injury  Outcome: Ongoing, Progressing  Goal: Optimal Comfort and Wellbeing  Outcome: Ongoing, Progressing  Goal: Readiness for Transition of Care  Outcome: Ongoing, Progressing     Problem: Arrhythmia/Dysrhythmia (Cardiac Catheterization)  Goal: Stable Heart Rate and Rhythm  Outcome: Ongoing, Progressing     Problem: Bleeding (Cardiac Catheterization)  Goal: Absence of Bleeding  Outcome: Ongoing, Progressing     Problem: Contrast-Induced Injury Risk (Cardiac Catheterization)  Goal: Absence of Contrast-Induced Injury  Outcome: Ongoing, Progressing     Problem: Embolism (Cardiac Catheterization)  Goal: Absence of Embolism Signs and Symptoms  Outcome: Ongoing, Progressing     Problem: Pain (Cardiac Catheterization)  Goal: Acceptable Pain Control  Outcome: Ongoing, Progressing     Problem: Vascular Access Protection (Cardiac Catheterization)  Goal: Absence of Vascular Access Complication  Outcome: Ongoing, Progressing     Problem: Fall Injury Risk  Goal: Absence of Fall and Fall-Related Injury  Outcome: Ongoing, Progressing     Problem: Infection  Goal: Absence of Infection Signs and Symptoms  Outcome: Ongoing, Progressing     Problem: Communication Impairment (Mechanical Ventilation, Invasive)  Goal: Effective Communication  Outcome: Ongoing, Progressing     Problem: Device-Related Complication Risk (Mechanical Ventilation, Invasive)  Goal: Optimal Device Function  Outcome: Ongoing, Progressing     Problem: Inability to Wean (Mechanical Ventilation, Invasive)  Goal: Mechanical Ventilation Liberation  Outcome: Ongoing, Progressing     Problem: Nutrition Impairment (Mechanical Ventilation, Invasive)  Goal: Optimal Nutrition Delivery  Outcome: Ongoing, Progressing     Problem: Skin and Tissue Injury  (Mechanical Ventilation, Invasive)  Goal: Absence of Device-Related Skin and Tissue Injury  Outcome: Ongoing, Progressing     Problem: Ventilator-Induced Lung Injury (Mechanical Ventilation, Invasive)  Goal: Absence of Ventilator-Induced Lung Injury  Outcome: Ongoing, Progressing     Problem: Communication Impairment (Artificial Airway)  Goal: Effective Communication  Outcome: Ongoing, Progressing     Problem: Device-Related Complication Risk (Artificial Airway)  Goal: Optimal Device Function  Outcome: Ongoing, Progressing     Problem: Skin and Tissue Injury (Artificial Airway)  Goal: Absence of Device-Related Skin or Tissue Injury  Outcome: Ongoing, Progressing     Problem: Noninvasive Ventilation Acute  Goal: Effective Unassisted Ventilation and Oxygenation  Outcome: Ongoing, Progressing     Problem: Skin Injury Risk Increased  Goal: Skin Health and Integrity  Outcome: Ongoing, Progressing     Problem: Feeding Intolerance (Enteral Nutrition)  Goal: Feeding Tolerance  Outcome: Ongoing, Progressing     Problem: Impaired Wound Healing  Goal: Optimal Wound Healing  Outcome: Ongoing, Progressing

## 2022-07-15 NOTE — PROGRESS NOTES
[facility]  Department of Neurology  Progress Note  Date: 07/15/2022 9:42 AM          Patient Name: Matteo Fraire   MRN: [id number]   : 1960    AGE: 61 y.o.    LOS: 18 days Hospital Day: 19  Admit date: 2022  7:45 PM       HPI per EMR: Matteo Fraire is a 61 y.o. male with a history of 61-year-old with prior history of CAD status post PCI and CABG in  came with chest pain found to have STEMI status post balloon angioplasty to LAD - now status post redo 3 vessel CABG  .  Hospital stay notable for episodes of NSVT after which he was initiated on lidocaine infusion.  Postoperative stay notable for persistent ST elevation which improved with nitroglycerin infusion.      Postoperatively patient has been lethargic and slow to respond.  He has been oriented x3.  This morning left facial droop was noted for which neurology was consulted.         Neurology consult:  Patient was seen and examined by me.  He is lethargic however able to respond questions and commands.  He did not have any episodes of SVT since last night, mental status has slightly improving as per the nurse.  He is oriented x3. On exam, he has a L facial droop and left lid lag.  He does not any other focal neurological deficits including no weakness or sensory changes in extremities.  He denies any headaches, nausea, vomiting any new symptoms.    2022: No acute events overnight. Patient was seen and examined by me this morning. Neuro exam same as yesterday.  Continues to have left upper lower face weakness.  No other new symptoms.    2022:  Patient was seen examined by me this morning.  He denies any new symptoms morning.  Continues to have facial droop on the left side.  He is having difficulty swallowing for which speech is seen him and he was started on NG tube feeds.    2022:  Patient was seen examined by me this morning.  He denies any new symptoms this morning.  He is pending a transesophageal  echocardiogram and PEG tube placement today.    7/15/2022:  Patient was seen and examined by me this morning.  He is status post PEG tube placement.  Pending a transesophageal echocardiogram.       Vitals:  Patient Vitals for the past 24 hrs:   BP Temp Temp src Pulse Resp SpO2   07/15/22 1450 -- 97.9 °F (36.6 °C) Oral -- -- --   07/15/22 1223 -- -- -- -- 18 --   07/15/22 1125 (!) 162/84 98 °F (36.7 °C) Oral 89 20 96 %   07/15/22 1057 -- -- -- 84 15 98 %   07/15/22 1030 (!) 158/80 -- -- 82 16 98 %   07/15/22 1020 (!) 163/82 -- -- 89 16 98 %   07/15/22 1010 (!) 160/74 -- -- 89 18 98 %   07/15/22 1000 (!) 160/74 98.9 °F (37.2 °C) -- 86 20 98 %   07/15/22 0925 (!) 155/82 98.8 °F (37.1 °C) -- 85 20 96 %   07/15/22 0740 (!) 153/86 98.8 °F (37.1 °C) Oral 86 (!) 28 97 %   07/15/22 0735 (!) 153/86 97.3 °F (36.3 °C) Axillary 86 20 98 %   07/15/22 0322 (!) 171/88 -- -- -- -- --   07/15/22 0320 (!) 171/88 98.8 °F (37.1 °C) Oral 93 19 97 %   07/14/22 2251 (!) 153/73 98.3 °F (36.8 °C) Oral 81 19 97 %   07/14/22 2035 -- -- -- 79 20 99 %   07/14/22 1923 (!) 175/81 98.8 °F (37.1 °C) Oral 93 17 98 %   07/14/22 1814 (!) 189/87 -- -- -- -- --   07/14/22 1605 (!) 174/85 98.2 °F (36.8 °C) Oral 82 18 98 %     PHYSICAL EXAM:     GENERAL APPEARANCE: Lethargic, well-developed, well-nourished male in no acute distress.  HEENT: Normocephalic and atraumatic. PERRL. Oropharynx unremarkable.  PULM: Normal respiratory effort. No accessory muscle use.  CV: RRR.  ABDOMEN: Soft, nontender.  EXTREMITIES: No obvious signs of vascular compromise. Pulses present. No cyanosis, clubbing or edema.  SKIN: Clear; no rashes, lesions or skin breaks in exposed areas.     NEURO:  MENTAL STATUS: Lethargic, oriented x3.  Able to commands appropriately..  Affect labile.     CRANIAL NERVES:  CN I: Not tested.  CN II: Fundoscopic exam deferred.  CN III, IV, VI: Pupils equal, round and reactive to light.  Extraocular movements full and intact.  CN V: Facial sensation  normal.  CN VII: Facial asymmetry noted for left facial droop. Left upper face is involved as well.   CN VIII: Hearing grossly normal and equal bilaterally.  No skew deviation or pathologic nystagmus.  CN IX, X: Palate elevates symmetrically. Speech/articulation is mildly dysarthric.  CN XI: Shoulder shrug and chin rotation equal with good strength.  CN XII: Tongue protrusion midline.     MOTOR:  Bulk normal. Tone normal and symmetric throughout.  Abnormal movements absent.  Tremor: none present.  Strength 5/5 throughout.     REFLEXES:  DTRs 2+ throughout.  Plantar response downgoing bilaterally.  SENSATION: grossly intact throughout.  COORDINATION: normal finger-to-nose.  STATION: not tested.  GAIT: not tested.          CURRENT SCHEDULED MEDICATIONS:   amiodarone  200 mg Per G Tube BID    amLODIPine  5 mg Oral Daily    aspirin  81 mg Oral Daily    atorvastatin  80 mg Oral Daily    docusate sodium  100 mg Oral BID    metoprolol tartrate  25 mg Per G Tube BID    sacubitriL-valsartan  1 tablet Per G Tube BID    tamsulosin  0.4 mg Oral Daily     CURRENT INFUSIONS:   carboxymethylcellulose       DATA:  Recent Labs   Lab 07/08/22  1520 07/08/22  1937 07/08/22  2348 07/09/22  0400 07/10/22  0323 07/11/22  0337 07/12/22  0308 07/12/22  1947 07/13/22  0308 07/14/22  0515 07/15/22  0408    141 144 139 138 137 138  --  139 139 136   K 3.3*  3.3* 4.1 3.9 3.9 4.1 4.0 4.0  --  3.7 3.7 3.9   * 111* 115* 112* 104 102 102  --  103 103 103   CO2 18* 22* 22* 22* 27 28 28  --  29 28 24   BUN 17 17 16 16 24* 25* 28*  --  28* 24* 20   CREATININE 1.1 1.1 1.2 1.0 1.1 1.0 1.0  --  0.9 0.8 0.8   * 150* 139* 134* 188* 149* 149*  --  129* 155* 140*   CALCIUM 7.0* 7.0* 7.6* 7.7* 7.8* 7.7* 7.9*  --  8.1* 8.3* 8.4*   PHOS 1.7*  --  2.0* 2.0*  --   --  2.3* 2.3* 3.2  --   --    MG 2.4 2.0 1.9 1.9 2.0 2.1 2.1  --   --   --   --    AST  --   --   --   --   --   --   --   --  58* 49* 30   ALT  --   --   --   --   --   --    --   --  60* 87* 73*     Recent Labs   Lab 07/09/22  0400 07/09/22  0502 07/10/22  0323 07/11/22  0337 07/11/22  1036 07/12/22  0308 07/13/22  0307 07/14/22  0515 07/15/22  0408   WBC 13.72*  --  16.69* 10.88  --  9.42 9.67 12.82* 15.59*   HGB 12.2*  --  10.6* 10.1*  --  9.3* 9.4* 10.6* 11.9*   HCT 36.2*   < > 33.0* 30.7* 32* 28.5* 30.5* 33.1* 35.7*     --  160 141*  --  153 190 240 325    < > = values in this interval not displayed.     No results found for: PROTEINCSF, GLUCCSF, WBCCSF, RBCCSF  Hemoglobin A1C   Date Value Ref Range Status   06/28/2022 6.1 4.5 - 6.2 % Final     Comment:     According to ADA guidelines, hemoglobin A1C <7.0% represents  optimal control in non-pregnant diabetic patients.  Different  metrics may apply to specific populations.   Standards of Medical Care in Diabetes - 2016.    For the purpose of screening for the presence of diabetes:  <5.7%     Consistent with the absence of diabetes  5.7-6.4%  Consistent with increasing risk for diabetes   (prediabetes)  >or=6.5%  Consistent with diabetes    Currently no consensus exists for use of hemoglobin A1C  for diagnosis of diabetes for children.     01/20/2011 5.8 4.0 - 6.2 % Final   01/14/2011 5.8 4.0 - 6.2 % Final            I have personally reviewed and interpreted the pertinent imaging and lab results.  Imaging Results          X-Ray Chest AP Portable (Final result)  Result time 06/28/22 06:13:22    Final result by Waldemar De La Cruz MD (06/28/22 06:13:22)                 Narrative:    Reason: chest pain    FINDINGS:    PA and lateral chest with comparison chest x-ray November 20, 2019 show normal cardiomediastinal silhouette.  Mild bilateral perihilar hazy ill-defined opacities are noted. Pulmonary vasculature is normal. No acute osseous abnormality.    IMPRESSION:    Mild bilateral perihilar hazy ill-defined opacities could reflect mild pulmonary edema or infection in the proper clinical settings.    Electronically signed by:   Waldemar De La Cruz DO  6/28/2022 6:13 AM CDT Workstation: [id number]                                      ASSESSMENT AND PLAN:       Acute ischemic stroke  Post CABG  Dysphagia     Workup  · CTH: No acute intracranial abnormality. Prior R temporal and R BG infarcts  · MRI brain: Foci of restricted diffusion compatible with recent infarction involving the right temporal lobe inferiorly, right lentiform nucleus and upper medulla/lower mele to the left of midline  · CTA head and neck :  Multifocal narrowing of intracranial vertebral arteries bilaterally with high-grade stenosis, atherosclerotic narrowing of proximal right ANTONIO.  No large vessel occlusion.  · Hemoglobin A1c:  6.1  · Lipid panel:  59  · Echocardiogram:  EF 43%, normal left atrium  · ASHLEY pending         Plan  · Admitted for further stroke workup  · Continue with Aspirin 81 mg and Lipitor 80mg for stroke prevention.  Will hold off on dual antiplatelet therapy due to moderate stroke burden   · ASHLEY ordered and pending   · Normalize BP  · Post CABG management per primary team and Cardiology/CT sugery  · PT OT  · Speech therapy  · PEG tube placement for dysphagia.  · DVT prophylaxis with chemo/SCD prophylaxis  · Discussed lifestyle modifications as prophylactic measures for stroke prevention including, adequate blood pressure management, healthy diet and regular exercise.           35 minutes of care time has been spent evaluating with the patient. Time includes chart review not limited to diagnostic imaging, labs, and vitals, patient assessment, discussion with family and nursing, current order evaluations, and new order entries.     Louis Jarquin MD  Neurology/vascular Neurology  Date of Service: 07/15/2022  9:42 AM    Please note: This note was transcribed using voice recognition software. Because of this technology there are often uinintended grammatical, spelling, and other transcription errors. Please disregard these errors.

## 2022-07-15 NOTE — SUBJECTIVE & OBJECTIVE
Interval History:     Review of Systems   Constitutional:  Positive for fatigue. Negative for activity change and appetite change.   HENT:  Negative for congestion and dental problem.    Eyes:  Negative for discharge and itching.   Respiratory:  Negative for shortness of breath.    Cardiovascular:  Negative for chest pain.   Gastrointestinal:  Negative for abdominal distention and abdominal pain.   Endocrine: Negative for cold intolerance.   Genitourinary:  Negative for difficulty urinating and dysuria.   Musculoskeletal:  Negative for arthralgias and back pain.   Skin:  Negative for color change.   Neurological:  Negative for dizziness and facial asymmetry.   Hematological:  Negative for adenopathy.   Psychiatric/Behavioral:  Negative for agitation and behavioral problems.    Objective:     Vital Signs (Most Recent):  Temp: 97.9 °F (36.6 °C) (07/15/22 1450)  Pulse: 89 (07/15/22 1451)  Resp: 16 (07/15/22 1451)  BP: (!) 156/73 (07/15/22 1451)  SpO2: (!) 92 % (07/15/22 1451)   Vital Signs (24h Range):  Temp:  [97.3 °F (36.3 °C)-98.9 °F (37.2 °C)] 97.9 °F (36.6 °C)  Pulse:  [79-93] 89  Resp:  [15-29] 16  SpO2:  [92 %-100 %] 92 %  BP: (152-189)/(73-88) 156/73     Weight: 97.4 kg (214 lb 11.7 oz)  Body mass index is 31.71 kg/m².    Intake/Output Summary (Last 24 hours) at 7/15/2022 1630  Last data filed at 7/15/2022 0955  Gross per 24 hour   Intake 489.77 ml   Output 2000 ml   Net -1510.23 ml      Physical Exam  Vitals and nursing note reviewed.   Constitutional:       Appearance: He is well-developed.   HENT:      Head: Atraumatic.      Right Ear: External ear normal.      Left Ear: External ear normal.      Nose: Nose normal.      Mouth/Throat:      Mouth: Mucous membranes are moist.   Eyes:      General: No scleral icterus.  Cardiovascular:      Rate and Rhythm: Normal rate.   Pulmonary:      Effort: Pulmonary effort is normal.      Breath sounds: Normal breath sounds.   Abdominal:      General: There is no  distension.      Comments: PEG tube    Musculoskeletal:         General: Normal range of motion.      Cervical back: Full passive range of motion without pain and normal range of motion.   Skin:     General: Skin is warm.   Neurological:      Mental Status: He is alert and oriented to person, place, and time.   Psychiatric:         Behavior: Behavior normal.       Significant Labs: All pertinent labs within the past 24 hours have been reviewed.  CBC:   Recent Labs   Lab 07/14/22  0515 07/15/22  0408   WBC 12.82* 15.59*   HGB 10.6* 11.9*   HCT 33.1* 35.7*    325     CMP:   Recent Labs   Lab 07/14/22  0515 07/15/22  0408    136   K 3.7 3.9    103   CO2 28 24   * 140*   BUN 24* 20   CREATININE 0.8 0.8   CALCIUM 8.3* 8.4*   PROT 5.7* 5.9*   ALBUMIN 3.0* 3.2*   BILITOT 1.1* 1.7*   ALKPHOS 58 57   AST 49* 30   ALT 87* 73*   ANIONGAP 8 9   EGFRNONAA >60.0 >60.0       Significant Imaging:

## 2022-07-15 NOTE — PROGRESS NOTES
[facility]  Department of Cardiology  Progress Note    PATIENT NAME: Matteo Fraire  MRN: [id number]  TODAY'S DATE: 07/15/2022  ADMIT DATE: 6/27/2022    SUBJECTIVE     PRINCIPLE PROBLEM: STEMI (ST elevation myocardial infarction)    INTERVAL HISTORY:    7/15/2022   Successful G tube placement today, can start feedings via this route later this afternoon. No complaints from patient. VSS.     7/14/2022  Patient is hypertensive today. He is having more secretions and cough. PEG tub placement pending GI eval. Rhythm stable.     7/13/2022  Patient sitting up at bedside, receiving nutrition through NG tube. He feels hungry and mouth is dry. He could not even swallow for his barium swallow today he says. VSS.     7/12/2022  No further runs of NSVT. Mentation much improved today. Patient states he feels better. NG tube will be placed so patient can recieve some nutrition. VSS.     7/11/2022  No further runs of NSVT. Continues to have atypical mentation, now with facial droop and complaint of trouble seeing and focusing in left eye. CT head was negative for any acute ischemia. Only complaint from patient is the vision. He is tachycardic today. Failed swallow study again.     7/10/2022  Patient continues to asymptomatic NSVT. On oxymask, having a lot of congestion. Seems out of it still despite morphine being given 3 hours ago. BP stable on nitro gtt.     7/9/2022  Patient developed ST elevations overnight post CABG. Troponins were checked and are still rising at 62. He remains intubated and sedated. BP stable on cleviprex gtt. ST elevations have improved.     7/7/2022  Patient had 42 beat run of Vtach this morning, asymp[tomatic. No further chest pain. VSS.     7/6/2022  PATIENT FOR CABG THIS Friday. Had some mild chest pains after walking for 40 minutes this morning. Has not required nitro yet. VSS.     7/5/2022  Patient still waiting for CABG, ate breakfast this AM. No chest pain events. VSS.      7/4/2022  Patient is awake alert lying comfortably in bed not in any acute distress.  Patient had walked in the hallway 4 times already.  Denies any chest pain or tightness or heaviness his breathing is good denies any shortness of breath.  Awaiting for CABG.    Review of patient's allergies indicates:  No Known Allergies    REVIEW OF SYSTEMS  Unable to obtain as patient was sleeping during rounds and we did not want to disturb     OBJECTIVE     VITAL SIGNS (Most Recent)  Temp: 98 °F (36.7 °C) (07/15/22 1125)  Pulse: 84 (07/15/22 1057)  Resp: 15 (07/15/22 1057)  BP: (!) 158/80 (07/15/22 1030)  SpO2: 98 % (07/15/22 1057)    VENTILATION STATUS  Resp: 15 (07/15/22 1057)  SpO2: 98 % (07/15/22 1057)       I & O (Last 24H):    Intake/Output Summary (Last 24 hours) at 7/15/2022 1139  Last data filed at 7/15/2022 0955  Gross per 24 hour   Intake 489.77 ml   Output 2000 ml   Net -1510.23 ml       WEIGHTS  Wt Readings from Last 1 Encounters:   07/12/22 0400 97.4 kg (214 lb 11.7 oz)   07/07/22 0520 96.4 kg (212 lb 8.4 oz)   07/05/22 0301 96.9 kg (213 lb 10 oz)   07/01/22 0300 97.3 kg (214 lb 8.1 oz)   06/30/22 0300 100.5 kg (221 lb 9.6 oz)   06/27/22 2254 101.1 kg (222 lb 14.2 oz)   06/27/22 1954 99.8 kg (220 lb)       PHYSICAL EXAM  Unable to obtain as patient was sleeping during rounds and we did not want to disturb     SCHEDULED MEDS:   amiodarone  200 mg Oral BID    amLODIPine  5 mg Oral Daily    aspirin  81 mg Oral Daily    atorvastatin  80 mg Oral Daily    docusate sodium  100 mg Oral BID    losartan  50 mg Oral Daily    metoprolol tartrate  25 mg Oral BID    tamsulosin  0.4 mg Oral Daily       CONTINUOUS INFUSIONS:   amiodarone in dextrose 5% 0.5 mg/min (07/14/22 2252)    carboxymethylcellulose         PRN MEDS:acetaminophen, albumin human 5%, ALPRAZolam, calcium gluconate IVPB, calcium gluconate IVPB, calcium gluconate IVPB, carboxymethylcellulose, dextrose 50%, dextrose 50%, hydrALAZINE,  HYDROcodone-acetaminophen, insulin aspart U-100, labetalol, magnesium sulfate IVPB, magnesium sulfate IVPB, melatonin, metoprolol, morphine, mupirocin, ondansetron, potassium chloride in water **AND** potassium chloride in water **AND** potassium chloride in water, sodium phosphate IVPB, sodium phosphate IVPB, sodium phosphate IVPB    LABS AND DIAGNOSTICS     CBC LAST 3 DAYS  Recent Labs   Lab 07/09/22  0400 07/09/22  0502 07/10/22  0323 07/11/22  0337 07/11/22  1036 07/13/22  0307 07/14/22  0515 07/15/22  0408   WBC 13.72*  --  16.69* 10.88   < > 9.67 12.82* 15.59*   RBC 4.19*  --  3.72* 3.44*   < > 3.37* 3.65* 4.09*   HGB 12.2*  --  10.6* 10.1*   < > 9.4* 10.6* 11.9*   HCT 36.2*   < > 33.0* 30.7*   < > 30.5* 33.1* 35.7*   MCV 86  --  89 89   < > 91 91 87   MCH 29.1  --  28.5 29.4   < > 27.9 29.0 29.1   MCHC 33.7  --  32.1 32.9   < > 30.8* 32.0 33.3   RDW 13.5  --  14.0 13.8   < > 13.6 13.4 13.6     --  160 141*   < > 190 240 325   MPV 11.7  --  12.0 11.4   < > 11.2 11.6 10.7   GRAN 90.5*  12.4*  --  84.2*  14.1* 80.1*  8.7*  --   --   --   --    LYMPH 5.1*  0.7*  --  6.8*  1.1 12.0*  1.3  --   --   --   --    MONO 4.1  0.6  --  8.3  1.4* 7.4  0.8  --   --   --   --    BASO 0.00  --  0.01 0.01  --   --   --   --    NRBC 0  --  0 0  --   --   --   --     < > = values in this interval not displayed.       COAGULATION LAST 3 DAYS  No results for input(s): LABPT, INR, APTT in the last 168 hours.    CHEMISTRY LAST 3 DAYS  Recent Labs   Lab 07/09/22  1609 07/09/22  1751 07/10/22  0323 07/11/22  0337 07/11/22  1036 07/12/22  0308 07/13/22  0308 07/14/22  0515 07/15/22  0408   NA  --   --  138 137  --  138 139 139 136   K  --   --  4.1 4.0  --  4.0 3.7 3.7 3.9   CL  --   --  104 102  --  102 103 103 103   CO2  --   --  27 28  --  28 29 28 24   ANIONGAP  --   --  7* 7*  --  8 7* 8 9   BUN  --   --  24* 25*  --  28* 28* 24* 20   CREATININE  --   --  1.1 1.0  --  1.0 0.9 0.8 0.8   GLU  --   --  188* 149*  --   149* 129* 155* 140*   CALCIUM  --   --  7.8* 7.7*  --  7.9* 8.1* 8.3* 8.4*   PH 7.492* 7.451*  --   --  7.459*  --   --   --   --    MG  --   --  2.0 2.1  --  2.1  --   --   --    ALBUMIN  --   --   --   --   --   --  2.9* 3.0* 3.2*   PROT  --   --   --   --   --   --  5.6* 5.7* 5.9*   ALKPHOS  --   --   --   --   --   --  47* 58 57   ALT  --   --   --   --   --   --  60* 87* 73*   AST  --   --   --   --   --   --  58* 49* 30   BILITOT  --   --   --   --   --   --  1.2* 1.1* 1.7*       CARDIAC PROFILE LAST 3 DAYS  Recent Labs   Lab 07/09/22  0400 07/09/22  0810 07/10/22  0323 07/15/22  0408   BNP  --   --   --  610*   TROPONINI 62.273* 62.262* 63.503*  --        ENDOCRINE LAST 3 DAYS  No results for input(s): TSH, PROCAL in the last 168 hours.    LAST ARTERIAL BLOOD GAS  ABG  Recent Labs   Lab 07/11/22  1036   PH 7.459*   PO2 57*   PCO2 36.8   HCO3 26.1   BE 2       LAST 7 DAYS MICROBIOLOGY   Microbiology Results (last 7 days)     ** No results found for the last 168 hours. **          MOST RECENT IMAGING  US Upper Extremity Veins Right  Examination: Right approximately duplex venous sonogram.    Comparisons: None.    HISTORY: Swelling.    FINDINGS:    The venous structures of the right upper extremity were evaluated from the right internal jugular vein through the right forearm.    There findings of echogenic material with with near lack of flow at the IV site within the basilic vein. There is nonocclusive thrombus extending into the more proximal portions of the basilic vein.    Remaining venous structures of the right upper extremity are unremarkable.    IMPRESSION:  1. Nearly completely occlusive thrombus within the basilic vein at the site of the IV insertion.  2. Remaining venous structures are unremarkable.    The sonographer verbally told to report to the nurse Duval as reported on the PACS system.    Electronically signed by:  Izaiah Verdin MD  7/13/2022 10:28 PM CDT Workstation: [id number]  Fl Modified  Barium Swallow Speech  CMS MANDATED QUALITY [id number]    HISTORY: R13.10, R63.3, dysphagia, feeding difficulties, dysphagia s/p CVA and CABG .    FINDINGS: Fluoroscopy was provided by the radiology department for evaluation of the swallowing mechanism by speech pathology. Total fluoroscopic time for the procedure was 1 minute 42 seconds, with 10 spot fluoroscopic images or series obtained.    There was no penetration or aspiration observed with any of the consistencies.    IMPRESSION: Negative for penetration or aspiration. Please see accompanying speech pathology report for further details and recommendations.    Electronically signed by:  Perico Mullen MD  7/13/2022 10:31 AM CDT Workstation: [id number]      [facility]  Results for orders placed during the hospital encounter of 06/27/22    Echo Saline Bubble? No    Interpretation Summary  · The left ventricle is normal in size with mild predominantly mid septal mild asymmetric hypertrophy eccentric hypertrophy and normal systolic function.  · The estimated ejection fraction is 60%.  · Indeterminate left ventricular diastolic function.  · Normal right ventricular size with normal right ventricular systolic function.  · Mild left atrial enlargement.  · Normal central venous pressure (3 mmHg).  · The estimated PA systolic pressure is 19 mmHg.  · Mild mitral regurgitation.      CURRENT/PREVIOUS VISIT EKG  Results for orders placed or performed during the hospital encounter of 06/27/22   EKG 12-lead    Collection Time: 07/09/22 12:41 AM    Narrative    Test Reason : I21.3,    Vent. Rate : 088 BPM     Atrial Rate : 088 BPM     P-R Int : 232 ms          QRS Dur : 094 ms      QT Int : 384 ms       P-R-T Axes : 043 101 132 degrees     QTc Int : 464 ms    Sinus rhythm with 1st degree A-V block with occasional Premature  ventricular complexes  Possible Inferior infarct ,age undetermined  Anterolateral infarct (cited on or before 27-JUN-2022)  Abnormal ECG  When  compared with ECG of 08-JUL-2022 16:38,  Borderline criteria for Inferior infarct are now Present  Serial changes of Anterior infarct Present    Referred By: [email or web address]   SELF           Confirmed By:        ASSESSMENT/PLAN:     Active Hospital Problems    Diagnosis    *STEMI (ST elevation myocardial infarction)    Superficial vein thrombosis    Acute CVA (cerebrovascular accident)    S/P CABG (coronary artery bypass graft)    Inadequate nutrition    NSVT (nonsustained ventricular tachycardia)    Coronary artery disease involving coronary bypass graft of native heart with angina pectoris       ASSESSMENT & PLAN:   1. Multivessel coronary artery disease  2. ST-elevation myocardial infarction status post PTCA  3. Nonsustained ventricular tachycardia  4. Mixed hyperlipidemia      RECOMMENDATIONS:  Give crushed PO amio 200 mg BID and lopresor 25 mg BID down PEG tube  Discontinue amio gtt   Continue to give PRN IV hydralizine for BP   Start entresto BID down PEG tube (received losartan 7/14)  Dr. Mann to see tomorrow, waiting for GI clearance to proceed with ASHLEY         Fanny Amaya PA-C  [facility]  Department of Cardiology  Date of Service: 07/15/2022  9:53 AM

## 2022-07-15 NOTE — PROVATION PATIENT INSTRUCTIONS
Discharge Summary/Instructions after an Endoscopic Procedure  Patient Name: Matteo Fraire  Patient MRN: [id number]  Patient YOB: 1960  Friday, July 15, 2022  Art Pino MD  RESTRICTIONS:  During your procedure today, you received medications for sedation.  These   medications may affect your judgment, balance and coordination.  Therefore,   for 24 hours, you have the following restrictions:   - DO NOT drive a car, operate machinery, make legal/financial decisions,   sign important papers or drink alcohol.    ACTIVITY:  Today: no heavy lifting, straining or running due to procedural   sedation/anesthesia.  The following day: return to full activity including work.  DIET:  Eat and drink normally unless instructed otherwise.     TREATMENT FOR COMMON SIDE EFFECTS:  - Mild abdominal pain, nausea, belching, bloating or excessive gas:  rest,   eat lightly and use a heating pad.  - Sore Throat: treat with throat lozenges and/or gargle with warm salt   water.  - Because air was used during the procedure, expelling large amounts of air   from your rectum or belching is normal.  - If a bowel prep was taken, you may not have a bowel movement for 1-3 days.    This is normal.  SYMPTOMS TO WATCH FOR AND REPORT TO YOUR PHYSICIAN:  1. Abdominal pain or bloating, other than gas cramps.  2. Chest pain.  3. Back pain.  4. Signs of infection such as: chills or fever occurring within 24 hours   after the procedure.  5. Rectal bleeding, which would show as bright red, maroon, or black stools.   (A tablespoon of blood from the rectum is not serious, especially if   hemorrhoids are present.)  6. Vomiting.  7. Weakness or dizziness.  GO DIRECTLY TO THE NEAREST EMERGENCY ROOM IF YOU HAVE ANY OF THE FOLLOWING:      Difficulty breathing              Chills and/or fever over 101 F   Persistent vomiting and/or vomiting blood   Severe abdominal pain   Severe chest pain   Black, tarry stools   Bleeding- more than one  tablespoon   Any other symptom or condition that you feel may need urgent attention  Your doctor recommends these additional instructions:  If any biopsies were taken, your doctors clinic will contact you in 1 to 2   weeks with any results.  - Return patient to [facility] for ongoing care.   - Full liquid diet.   - Continue present medications.   - OK to use PEG for meds now, feedings at 5 pm  For questions, problems or results please call your physician - Art Pino MD at Work:  [phone].  [facility], EMERGENCY ROOM PHONE NUMBER: [phone]  IF A COMPLICATION OR EMERGENCY SITUATION ARISES AND YOU ARE UNABLE TO REACH   YOUR PHYSICIAN - GO DIRECTLY TO THE EMERGENCY ROOM.  Art Pino MD  7/15/2022 10:05:33 AM  This report has been verified and signed electronically.  Dear patient,  As a result of recent federal legislation (The Federal Cures Act), you may   receive lab or pathology results from your procedure in your [facility]   account before your physician is able to contact you. Your physician or   their representative will relay the results to you with their   recommendations at their soonest availability.  Thank you,  [employer]OVATION

## 2022-07-15 NOTE — SUBJECTIVE & OBJECTIVE
Interval History:     Review of Systems   Constitutional:  Negative for activity change and appetite change.   HENT:  Negative for congestion and dental problem.    Eyes:  Negative for discharge and itching.   Respiratory:  Negative for shortness of breath.    Cardiovascular:  Negative for chest pain.   Gastrointestinal:  Negative for abdominal distention and abdominal pain.   Endocrine: Negative for cold intolerance.   Genitourinary:  Negative for difficulty urinating and dysuria.   Musculoskeletal:  Positive for arthralgias. Negative for back pain.   Skin:  Negative for color change.   Neurological:  Negative for dizziness and facial asymmetry.   Hematological:  Negative for adenopathy.   Psychiatric/Behavioral:  Negative for agitation and behavioral problems.    Objective:     Vital Signs (Most Recent):  Temp: 98.8 °F (37.1 °C) (07/14/22 1923)  Pulse: 93 (07/14/22 1923)  Resp: 17 (07/14/22 1923)  BP: (!) 175/81 (07/14/22 1923)  SpO2: 98 % (07/14/22 1923)   Vital Signs (24h Range):  Temp:  [98.1 °F (36.7 °C)-98.9 °F (37.2 °C)] 98.8 °F (37.1 °C)  Pulse:  [71-93] 93  Resp:  [13-29] 17  SpO2:  [96 %-100 %] 98 %  BP: (125-195)/(73-93) 175/81     Weight: 97.4 kg (214 lb 11.7 oz)  Body mass index is 31.71 kg/m².    Intake/Output Summary (Last 24 hours) at 7/14/2022 1930  Last data filed at 7/14/2022 1041  Gross per 24 hour   Intake 974.26 ml   Output 1425 ml   Net -450.74 ml      Physical Exam  Vitals and nursing note reviewed.   Constitutional:       Appearance: He is well-developed.   HENT:      Head: Atraumatic.      Right Ear: External ear normal.      Left Ear: External ear normal.      Nose: Nose normal.      Mouth/Throat:      Mouth: Mucous membranes are moist.   Eyes:      General: No scleral icterus.  Cardiovascular:      Rate and Rhythm: Normal rate.   Pulmonary:      Effort: Pulmonary effort is normal.   Musculoskeletal:         General: Normal range of motion.      Cervical back: Full passive range of motion  without pain and normal range of motion.   Skin:     General: Skin is warm.   Neurological:      Mental Status: He is alert and oriented to person, place, and time.   Psychiatric:         Behavior: Behavior normal.       Significant Labs: All pertinent labs within the past 24 hours have been reviewed.  CBC:   Recent Labs   Lab 07/13/22 0307 07/14/22  0515   WBC 9.67 12.82*   HGB 9.4* 10.6*   HCT 30.5* 33.1*    240     CMP:   Recent Labs   Lab 07/13/22 0308 07/14/22  0515    139   K 3.7 3.7    103   CO2 29 28   * 155*   BUN 28* 24*   CREATININE 0.9 0.8   CALCIUM 8.1* 8.3*   PROT 5.6* 5.7*   ALBUMIN 2.9* 3.0*   BILITOT 1.2* 1.1*   ALKPHOS 47* 58   AST 58* 49*   ALT 60* 87*   ANIONGAP 7* 8   EGFRNONAA >60.0 >60.0       Significant Imaging: I have reviewed all pertinent imaging results/findings within the past 24 hours.

## 2022-07-15 NOTE — PROGRESS NOTES
[facility] Medicine  Progress Note    Patient Name: Matteo Fraire  MRN: [id number]  Patient Class: IP- Inpatient   Admission Date: 6/27/2022  Length of Stay: 18 days  Attending Physician: Mik Mckinnon MD  Primary Care Provider: Primary Doctor No        Subjective:     Principal Problem:STEMI (ST elevation myocardial infarction)        HPI:    61-year-old male past medical history of coronary artery disease status post CABG 2011 and stent 2005 both occasions they were related with MIs course in the ER today because he started having chest pain about 1 hour prior to arrival it was a pressure-like pain associated with sweating and dry heaving is with radiation to both arms sensation was similar to his MI 2005 he got 2 aspirins and appears to present 1 hour later when he got to the EKG showed STEMI so he was taken to cath lab where he had angioplasty of LIMA to LAD by Dr. Powell he is currently on nitroglycerin drip due to high blood pressure he also received morphine IV 4 mg Zofran IV 4 and labetalol 10 mg IV admitted to ICU.      Overview/Hospital Course:  07/12  Pacer wires and Chest tubes removed  Still has Chamorro insitu  Pt sitting on chair and denies any issues    07/13  Agreed with PEG placement  ASHLEY tomorrow  NG tube insitu  and started on Tfs  Chamorro still on place  Restarted on amio gtt today  Overall he says he is better    07/14  ASHLEY cancelled  Will have PEG tmrw AM   BP levels on higher range    07/15  Pt had PEG placement  ASHLEY tomorrow ?  BP levels better      Interval History:     Review of Systems   Constitutional:  Positive for fatigue. Negative for activity change and appetite change.   HENT:  Negative for congestion and dental problem.    Eyes:  Negative for discharge and itching.   Respiratory:  Negative for shortness of breath.    Cardiovascular:  Negative for chest pain.   Gastrointestinal:  Negative for abdominal distention and abdominal pain.   Endocrine: Negative for cold  intolerance.   Genitourinary:  Negative for difficulty urinating and dysuria.   Musculoskeletal:  Negative for arthralgias and back pain.   Skin:  Negative for color change.   Neurological:  Negative for dizziness and facial asymmetry.   Hematological:  Negative for adenopathy.   Psychiatric/Behavioral:  Negative for agitation and behavioral problems.    Objective:     Vital Signs (Most Recent):  Temp: 97.9 °F (36.6 °C) (07/15/22 1450)  Pulse: 89 (07/15/22 1451)  Resp: 16 (07/15/22 1451)  BP: (!) 156/73 (07/15/22 1451)  SpO2: (!) 92 % (07/15/22 1451)   Vital Signs (24h Range):  Temp:  [97.3 °F (36.3 °C)-98.9 °F (37.2 °C)] 97.9 °F (36.6 °C)  Pulse:  [79-93] 89  Resp:  [15-29] 16  SpO2:  [92 %-100 %] 92 %  BP: (152-189)/(73-88) 156/73     Weight: 97.4 kg (214 lb 11.7 oz)  Body mass index is 31.71 kg/m².    Intake/Output Summary (Last 24 hours) at 7/15/2022 1630  Last data filed at 7/15/2022 0955  Gross per 24 hour   Intake 489.77 ml   Output 2000 ml   Net -1510.23 ml      Physical Exam  Vitals and nursing note reviewed.   Constitutional:       Appearance: He is well-developed.   HENT:      Head: Atraumatic.      Right Ear: External ear normal.      Left Ear: External ear normal.      Nose: Nose normal.      Mouth/Throat:      Mouth: Mucous membranes are moist.   Eyes:      General: No scleral icterus.  Cardiovascular:      Rate and Rhythm: Normal rate.   Pulmonary:      Effort: Pulmonary effort is normal.      Breath sounds: Normal breath sounds.   Abdominal:      General: There is no distension.      Comments: PEG tube    Musculoskeletal:         General: Normal range of motion.      Cervical back: Full passive range of motion without pain and normal range of motion.   Skin:     General: Skin is warm.   Neurological:      Mental Status: He is alert and oriented to person, place, and time.   Psychiatric:         Behavior: Behavior normal.       Significant Labs: All pertinent labs within the past 24 hours have been  reviewed.  CBC:   Recent Labs   Lab 07/14/22  0515 07/15/22  0408   WBC 12.82* 15.59*   HGB 10.6* 11.9*   HCT 33.1* 35.7*    325     CMP:   Recent Labs   Lab 07/14/22  0515 07/15/22  0408    136   K 3.7 3.9    103   CO2 28 24   * 140*   BUN 24* 20   CREATININE 0.8 0.8   CALCIUM 8.3* 8.4*   PROT 5.7* 5.9*   ALBUMIN 3.0* 3.2*   BILITOT 1.1* 1.7*   ALKPHOS 58 57   AST 49* 30   ALT 87* 73*   ANIONGAP 8 9   EGFRNONAA >60.0 >60.0       Significant Imaging:       Assessment/Plan:      * STEMI (ST elevation myocardial infarction)  STEMI status post balloon angioplasty to LAD  - 06/27   Status post redo 3 vessel CABG for recurrent severe atherosclerotic CAD - 07/08   Transferred  out of ICU on 07/12      PEG (percutaneous endoscopic gastrostomy) status  PEG insertion on 07/15  Started on TFs      Acute CVA (cerebrovascular accident)  Maintain aspirin  Once medically cleared he need rehab placement vs    ASHLEY tomorrow ?      Superficial vein thrombosis  Nearly completely occlusive thrombus within the basilic vein R side  This is a superficial vein  No need for anticoagulation      S/P CABG (coronary artery bypass graft)  Stable       NSVT (nonsustained ventricular tachycardia)  Stable   Continue PO amiodarone      Coronary artery disease involving coronary bypass graft of native heart with angina pectoris  As above         VTE Risk Mitigation (From admission, onward)    None          Discharge Planning   MICHAEL:      Code Status: Full Code   Is the patient medically ready for discharge?: No    Reason for patient still in hospital (select all that apply): Treatment and Pending disposition  Discharge Plan A: Rehab   Discharge Delays: None known at this time              Mik Mckinnon MD  Department of Hospital Medicine   [facility]

## 2022-07-15 NOTE — CARE UPDATE
07/15/22 1057   PRE-TX-O2   O2 Device (Oxygen Therapy) room air   SpO2 98 %   Pulse Oximetry Type Continuous   $ Pulse Oximetry - Multiple Charge Pulse Oximetry - Multiple   Pulse 84   Resp 15   Respiratory Evaluation   $ Care Plan Tech Time 15 min

## 2022-07-15 NOTE — PT/OT/SLP PROGRESS
Occupational Therapy      Patient Name:  Matteo Fraire   MRN:  [id number]    Patient not seen today secondary to Off the floor for procedure/surgery. Will follow-up next service date.    7/15/2022

## 2022-07-15 NOTE — ANESTHESIA PREPROCEDURE EVALUATION
07/15/2022  Matteo Fraire is a 61 y.o., male.      Patient Active Problem List   Diagnosis    STEMI (ST elevation myocardial infarction)    Coronary artery disease involving coronary bypass graft of native heart with angina pectoris    NSVT (nonsustained ventricular tachycardia)    Inadequate nutrition    S/P CABG (coronary artery bypass graft)    Acute CVA (cerebrovascular accident)    Superficial vein thrombosis       Past Surgical History:   Procedure Laterality Date    CORONARY ANGIOGRAPHY INCLUDING BYPASS GRAFTS WITH CATHETERIZATION OF LEFT HEART Left 6/27/2022    Procedure: Left heart cath;  Surgeon: Stevan Powell MD;  Location: [facility] CATH/EP LAB;  Service: Cardiology;  Laterality: Left;    CORONARY ARTERY BYPASS GRAFT      CYSTOSCOPY W/ URETERAL STENT PLACEMENT Left 11/21/2019    Procedure: CYSTOSCOPY, WITH URETERAL STENT INSERTION;  Surgeon: Mickey Escudero MD;  Location: [facility] OR;  Service: Urology;  Laterality: Left;    ENDOSCOPIC HARVEST OF VEIN Right 7/8/2022    Procedure: HARVEST-VEIN-ENDOVASCULAR;  Surgeon: Eyal Stone MD;  Location: [facility];  Service: Cardiothoracic;  Laterality: Right;    EXTRACORPOREAL SHOCK WAVE LITHOTRIPSY Left 11/21/2019    Procedure: LITHOTRIPSY, ESWL;  Surgeon: Mickey Escudero MD;  Location: [facility] OR;  Service: Urology;  Laterality: Left;    HERNIA REPAIR      Inguinal just after birth    REPEAT CORONARY ARTERY BYPASS GRAFTING N/A 7/8/2022    Procedure: CABG, REPEAT;  Surgeon: Eyal Stone MD;  Location: [facility];  Service: Cardiothoracic;  Laterality: N/A;  drugs ordered 7-7  @1322      SURGICAL PROCUREMENT, ARTERY, RADIAL, FOR CABG Left 7/8/2022    Procedure: SURGICAL PROCUREMENT,ARTERY,RADIAL,FOR CABG;  Surgeon: Eyal Stone MD;  Location: [facility];  Service: Cardiothoracic;  Laterality: Left;        Tobacco Use:  The  patient  reports that he has never smoked. He has never used smokeless tobacco.     Results for orders placed or performed during the hospital encounter of 06/27/22   EKG 12-lead    Collection Time: 07/09/22 12:41 AM    Narrative    Test Reason : I21.3,    Vent. Rate : 088 BPM     Atrial Rate : 088 BPM     P-R Int : 232 ms          QRS Dur : 094 ms      QT Int : 384 ms       P-R-T Axes : 043 101 132 degrees     QTc Int : 464 ms    Sinus rhythm with 1st degree A-V block with occasional Premature  ventricular complexes  Possible Inferior infarct ,age undetermined  Anterolateral infarct (cited on or before 27-JUN-2022)  Abnormal ECG  When compared with ECG of 08-JUL-2022 16:38,  Borderline criteria for Inferior infarct are now Present  Serial changes of Anterior infarct Present    Referred By: [email or web address]   SELF           Confirmed By:              Lab Results   Component Value Date    WBC 15.59 (H) 07/15/2022    HGB 11.9 (L) 07/15/2022    HCT 35.7 (L) 07/15/2022    MCV 87 07/15/2022     07/15/2022     BMP  Lab Results   Component Value Date     07/15/2022    K 3.9 07/15/2022     07/15/2022    CO2 24 07/15/2022    BUN 20 07/15/2022    CREATININE 0.8 07/15/2022    CALCIUM 8.4 (L) 07/15/2022    ANIONGAP 9 07/15/2022     (H) 07/15/2022     (H) 07/14/2022     (H) 07/13/2022       Results for orders placed during the hospital encounter of 06/27/22    Echo    Interpretation Summary  · The left ventricle is normal in size with concentric remodeling and mildly decreased systolic function.  · The estimated ejection fraction is 43%.  · Left ventricular diastolic dysfunction.  · There are segmental left ventricular wall motion abnormalities.  · Anterior apical akinesis  · Normal right ventricular size with normal right ventricular systolic function.        Pre-op Assessment    I have reviewed the Patient Summary Reports.     I have reviewed the Nursing Notes. I have reviewed the NPO Status.    I have reviewed the Medications.     Review of Systems  Anesthesia Hx:  History of prior surgery of interest to airway management or planning: heart surgery. Denies Family Hx of Anesthesia complications.   Denies Personal Hx of Anesthesia complications.   Social:  Non-Smoker, No Alcohol Use    Hematology/Oncology:     Oncology Normal    -- Anemia:   EENT/Dental:   Right upper permanent bridge. Left upper bicuspid and and 1st premolar broken.   Cardiovascular:   Hypertension, poorly controlled Past MI CAD asymptomatic CABG/stent Dysrhythmias (lidocaine infusion for recent non-sustained VT)  Angina ECG has been reviewed. 60% EF, mild MR   Pulmonary:  Pulmonary Normal    Renal/:  Renal/ Normal     Hepatic/GI:   PUD, (1980s) GERD ( yesterday, resolved with medication), well controlled Patient with no active nausea vomiting at this time  Patient with no intestinal obstruction at this time  Patient with difficulty handling secretions using Yankauer suction  NG tube feedings held at midnight   Musculoskeletal:  Musculoskeletal Normal    Neurological:   CVA, residual symptoms Facial droop   Endocrine:  Endocrine Normal    Dermatological:   psoriasis   Psych:  Psychiatric Normal           Physical Exam  General: Well nourished, Cooperative, Alert and Oriented    Airway:  Mallampati: II / I  Mouth Opening: Normal  TM Distance: > 6 cm  Tongue: Normal  Neck ROM: Normal ROM    Dental:  Intact    Chest/Lungs:  Clear to auscultation, Normal Respiratory Rate    Heart:  Rate: Normal  Rhythm: Regular Rhythm  Sounds: Normal        Anesthesia Plan  Type of Anesthesia, risks & benefits discussed:    Anesthesia Type: Gen ETT  Intra-op Monitoring Plan: Standard ASA Monitors and CO  Induction:  IV  Informed Consent: Informed consent signed with the Patient and all parties understand the risks and agree with anesthesia plan.  All questions answered. Patient consented to blood products? Yes  ASA Score: 3  Anesthesia Plan Notes:     MAC  with Propofol  POM Mask  Zofran    Ready For Surgery From Anesthesia Perspective.     .

## 2022-07-15 NOTE — TRANSFER OF CARE
"Anesthesia Transfer of Care Note    Patient: Matteo Fraire    Procedure(s) Performed: Procedure(s) (LRB):  EGD (ESOPHAGOGASTRODUODENOSCOPY) (N/A)    Patient location: GI    Anesthesia Type: MAC    Post pain: adequate analgesia    Post assessment: no apparent anesthetic complications    Post vital signs: stable    Level of consciousness: awake, alert and oriented    Nausea/Vomiting: no nausea/vomiting    Complications: none    Transfer of care protocol was followed      Last vitals:   Visit Vitals  BP (!) 155/82   Pulse 85   Temp 37.1 °C (98.8 °F)   Resp 20   Ht 5' 9" (1.753 m)   Wt 97.4 kg (214 lb 11.7 oz)   SpO2 96%   BMI 31.71 kg/m²     "

## 2022-07-15 NOTE — ASSESSMENT & PLAN NOTE
Nearly completely occlusive thrombus within the basilic vein R side  This is a superficial vein  No need for anticoagulation

## 2022-07-15 NOTE — ASSESSMENT & PLAN NOTE
Status NPO  Started on Enteral feeding on 07/12  Pt agreed with PEG placement and scheduled for PEG insertion on 07/15

## 2022-07-15 NOTE — CARE UPDATE
07/14/22 2035   Patient Assessment/Suction   Respiratory Effort Unlabored   Rhythm/Pattern, Respiratory pattern regular   PRE-TX-O2   O2 Device (Oxygen Therapy) nasal cannula   Flow (L/min) 2  (decreased to 1)   SpO2 99 %   Pulse Oximetry Type Continuous   Pulse 79   Resp 20   Respiratory Evaluation   $ Care Plan Tech Time 15 min   $ Eval/Re-eval Charges Re-evaluation   Evaluation For Re-Eval 5+ day

## 2022-07-16 LAB
ALBUMIN SERPL BCP-MCNC: 2.9 G/DL (ref 3.5–5.2)
ALP SERPL-CCNC: 53 U/L (ref 55–135)
ALT SERPL W/O P-5'-P-CCNC: 60 U/L (ref 10–44)
ANION GAP SERPL CALC-SCNC: 3 MMOL/L (ref 8–16)
AST SERPL-CCNC: 22 U/L (ref 10–40)
BILIRUB SERPL-MCNC: 1.4 MG/DL (ref 0.1–1)
BUN SERPL-MCNC: 25 MG/DL (ref 8–23)
CALCIUM SERPL-MCNC: 8 MG/DL (ref 8.7–10.5)
CHLORIDE SERPL-SCNC: 104 MMOL/L (ref 95–110)
CO2 SERPL-SCNC: 26 MMOL/L (ref 23–29)
CREAT SERPL-MCNC: 0.9 MG/DL (ref 0.5–1.4)
ERYTHROCYTE [DISTWIDTH] IN BLOOD BY AUTOMATED COUNT: 13.6 % (ref 11.5–14.5)
EST. GFR  (AFRICAN AMERICAN): >60 ML/MIN/1.73 M^2
EST. GFR  (NON AFRICAN AMERICAN): >60 ML/MIN/1.73 M^2
GLUCOSE SERPL-MCNC: 141 MG/DL (ref 70–110)
GLUCOSE SERPL-MCNC: 143 MG/DL (ref 70–110)
GLUCOSE SERPL-MCNC: 152 MG/DL (ref 70–110)
GLUCOSE SERPL-MCNC: 172 MG/DL (ref 70–110)
GLUCOSE SERPL-MCNC: 177 MG/DL (ref 70–110)
HCT VFR BLD AUTO: 36.4 % (ref 40–54)
HGB BLD-MCNC: 11.7 G/DL (ref 14–18)
MCH RBC QN AUTO: 28.8 PG (ref 27–31)
MCHC RBC AUTO-ENTMCNC: 32.1 G/DL (ref 32–36)
MCV RBC AUTO: 90 FL (ref 82–98)
PLATELET # BLD AUTO: 344 K/UL (ref 150–450)
PMV BLD AUTO: 10.9 FL (ref 9.2–12.9)
POTASSIUM SERPL-SCNC: 3.9 MMOL/L (ref 3.5–5.1)
PROT SERPL-MCNC: 5.6 G/DL (ref 6–8.4)
RBC # BLD AUTO: 4.06 M/UL (ref 4.6–6.2)
SODIUM SERPL-SCNC: 133 MMOL/L (ref 136–145)
WBC # BLD AUTO: 13.95 K/UL (ref 3.9–12.7)

## 2022-07-16 PROCEDURE — 25000003 PHARM REV CODE 250: Performed by: THORACIC SURGERY (CARDIOTHORACIC VASCULAR SURGERY)

## 2022-07-16 PROCEDURE — 21000000 HC CCU ICU ROOM CHARGE

## 2022-07-16 PROCEDURE — 94799 UNLISTED PULMONARY SVC/PX: CPT

## 2022-07-16 PROCEDURE — 25000003 PHARM REV CODE 250: Performed by: INTERNAL MEDICINE

## 2022-07-16 PROCEDURE — 97530 THERAPEUTIC ACTIVITIES: CPT

## 2022-07-16 PROCEDURE — 99232 SBSQ HOSP IP/OBS MODERATE 35: CPT | Mod: ,,, | Performed by: INTERNAL MEDICINE

## 2022-07-16 PROCEDURE — 25000003 PHARM REV CODE 250: Performed by: PHYSICIAN ASSISTANT

## 2022-07-16 PROCEDURE — 99232 PR SUBSEQUENT HOSPITAL CARE,LEVL II: ICD-10-PCS | Mod: ,,, | Performed by: INTERNAL MEDICINE

## 2022-07-16 PROCEDURE — 80053 COMPREHEN METABOLIC PANEL: CPT | Performed by: INTERNAL MEDICINE

## 2022-07-16 PROCEDURE — 99900031 HC PATIENT EDUCATION (STAT)

## 2022-07-16 PROCEDURE — 25000003 PHARM REV CODE 250

## 2022-07-16 PROCEDURE — 85027 COMPLETE CBC AUTOMATED: CPT | Performed by: INTERNAL MEDICINE

## 2022-07-16 PROCEDURE — 99900035 HC TECH TIME PER 15 MIN (STAT)

## 2022-07-16 PROCEDURE — 63600175 PHARM REV CODE 636 W HCPCS: Performed by: INTERNAL MEDICINE

## 2022-07-16 PROCEDURE — 94761 N-INVAS EAR/PLS OXIMETRY MLT: CPT

## 2022-07-16 RX ADMIN — AMIODARONE HYDROCHLORIDE 200 MG: 200 TABLET ORAL at 09:07

## 2022-07-16 RX ADMIN — METOPROLOL TARTRATE 25 MG: 25 TABLET, FILM COATED ORAL at 09:07

## 2022-07-16 RX ADMIN — SACUBITRIL AND VALSARTAN 1 TABLET: 24; 26 TABLET, FILM COATED ORAL at 09:07

## 2022-07-16 RX ADMIN — METOPROLOL TARTRATE 25 MG: 25 TABLET, FILM COATED ORAL at 08:07

## 2022-07-16 RX ADMIN — ASPIRIN 81 MG CHEWABLE TABLET 81 MG: 81 TABLET CHEWABLE at 09:07

## 2022-07-16 RX ADMIN — AMLODIPINE BESYLATE 5 MG: 5 TABLET ORAL at 09:07

## 2022-07-16 RX ADMIN — AMIODARONE HYDROCHLORIDE 200 MG: 200 TABLET ORAL at 08:07

## 2022-07-16 RX ADMIN — ATORVASTATIN CALCIUM 80 MG: 40 TABLET, FILM COATED ORAL at 09:07

## 2022-07-16 RX ADMIN — TAMSULOSIN HYDROCHLORIDE 0.4 MG: 0.4 CAPSULE ORAL at 09:07

## 2022-07-16 RX ADMIN — POTASSIUM CHLORIDE 40 MEQ: 29.8 INJECTION, SOLUTION INTRAVENOUS at 05:07

## 2022-07-16 RX ADMIN — SACUBITRIL AND VALSARTAN 1 TABLET: 24; 26 TABLET, FILM COATED ORAL at 08:07

## 2022-07-16 NOTE — PT/OT/SLP PROGRESS
Physical Therapy Treatment    Patient Name:  Matteo Fraire   MRN:  [id number]    Recommendations:     Discharge Recommendations:  rehabilitation facility   Discharge Equipment Recommendations:  (TBD)   Barriers to discharge: Decreased caregiver support    Assessment:     Matteo Fraire is a 61 y.o. male admitted with a medical diagnosis of STEMI (ST elevation myocardial infarction).  He presents with the following impairments/functional limitations:  impaired endurance, weakness, impaired self care skills, impaired functional mobility, gait instability, impaired cardiopulmonary response to activity .    Pt readily consented to PT.He t/f'ed supine tos it  with Min A  and complained of dizziness that subsided. He then t/f'ed to stand with Min A and reported dizziness again . Pt returned to supine. His BP taken in sitting was 158/103.  Rehab Prognosis: Good; patient would benefit from acute skilled PT services to address these deficits and reach maximum level of function.    Recent Surgery: Procedure(s) (LRB):  EGD (ESOPHAGOGASTRODUODENOSCOPY) (N/A) 1 Day Post-Op    Plan:     During this hospitalization, patient to be seen daily to address the identified rehab impairments via gait training, therapeutic activities, therapeutic exercises and progress toward the following goals:    · Plan of Care Expires:  08/13/22    Subjective     Chief Complaint:PEG tube placement  Patient/Family Comments/goals: PLOF  Pain/Comfort:  ·        Objective:     Communicated with nurse prior to session.  Patient found HOB elevated with bed alarm upon PT entry to room.     General Precautions: Standard, fall, sternal   Orthopedic Precautions:N/A   Braces:    Respiratory Status: Room air     Functional Mobility:  · Bed Mobility:     · Supine to Sit: minimum assistance  · Sit to Supine: minimum assistance  · Transfers:     · Sit to Stand:  minimum assistance with rolling walker      AM-PAC 6 CLICK MOBILITY          Therapeutic Activities and  Exercises:  Educated on safety, use of call button, sternal precautions. Pt session was limited due to dizziness  Patient left HOB elevated with all lines intact, call button in reach and bed alarm on..    GOALS:   Multidisciplinary Problems     Physical Therapy Goals        Problem: Physical Therapy    Goal Priority Disciplines Outcome Goal Variances Interventions   Physical Therapy Goal     PT, PT/OT Ongoing, Progressing     Description: Goals to be met by: 22     Patient will increase functional independence with mobility by performin. Supine to sit with Supervision  2. Sit to stand transfer with Supervision  3. Bed to chair transfer with Supervision using Rolling Walker  4. Gait  x 150 feet with Supervision using Rolling Walker.                          Time Tracking:     PT Received On: 22  PT Start Time: 1052     PT Stop Time: 1110  PT Total Time (min): 18 min     Billable Minutes: Therapeutic Exercise 18 minutes    Treatment Type: Treatment  PT/PTA: PT     PTA Visit Number: 0     2022

## 2022-07-16 NOTE — PROGRESS NOTES
Critical access hospital  Department of Cardiology  Progress Note    PATIENT NAME: Matteo Fraire  MRN: [id number]  TODAY'S DATE: 07/16/2022  ADMIT DATE: 6/27/2022    SUBJECTIVE     PRINCIPLE PROBLEM: STEMI (ST elevation myocardial infarction)    INTERVAL HISTORY:      7/16/2022 :  Patient is feeling a bit better content still complains of sore throat and discomfort on the left side of his neck.  A still unable to swallow any secretions  Are peg tube feeding has been initiated.  Is tolerating this well so far    10/15/2022   Successful G tube placement today, can start feedings via this route later this afternoon. No complaints from patient. VSS.     7/14/2022  Patient is hypertensive today. He is having more secretions and cough. PEG tub placement pending GI eval. Rhythm stable.     7/13/2022  Patient sitting up at bedside, receiving nutrition through NG tube. He feels hungry and mouth is dry. He could not even swallow for his barium swallow today he says. VSS.     7/12/2022  No further runs of NSVT. Mentation much improved today. Patient states he feels better. NG tube will be placed so patient can recieve some nutrition. VSS.     7/11/2022  No further runs of NSVT. Continues to have atypical mentation, now with facial droop and complaint of trouble seeing and focusing in left eye. CT head was negative for any acute ischemia. Only complaint from patient is the vision. He is tachycardic today. Failed swallow study again.     7/10/2022  Patient continues to asymptomatic NSVT. On oxymask, having a lot of congestion. Seems out of it still despite morphine being given 3 hours ago. BP stable on nitro gtt.     7/9/2022  Patient developed ST elevations overnight post CABG. Troponins were checked and are still rising at 62. He remains intubated and sedated. BP stable on cleviprex gtt. ST elevations have improved.     7/7/2022  Patient had 42 beat run of Vtach this morning, asymp[tomatic. No further chest pain. VSS.      7/6/2022  PATIENT FOR CABG THIS Friday. Had some mild chest pains after walking for 40 minutes this morning. Has not required nitro yet. VSS.     7/5/2022  Patient still waiting for CABG, ate breakfast this AM. No chest pain events. VSS.     7/4/2022  Patient is awake alert lying comfortably in bed not in any acute distress.  Patient had walked in the hallway 4 times already.  Denies any chest pain or tightness or heaviness his breathing is good denies any shortness of breath.  Awaiting for CABG.    Review of patient's allergies indicates:  No Known Allergies    REVIEW OF SYSTEMS  Unable to obtain as patient was sleeping during rounds and we did not want to disturb     OBJECTIVE     VITAL SIGNS (Most Recent)  Temp: 97.8 °F (36.6 °C) (07/16/22 0709)  Pulse: 97 (07/16/22 0921)  Resp: (!) 22 (07/16/22 0709)  BP: 133/87 (07/16/22 0709)  SpO2: 95 % (07/16/22 0709)    VENTILATION STATUS  Resp: (!) 22 (07/16/22 0709)  SpO2: 95 % (07/16/22 0709)       I & O (Last 24H):    Intake/Output Summary (Last 24 hours) at 7/16/2022 0956  Last data filed at 7/16/2022 0830  Gross per 24 hour   Intake 960 ml   Output 1100 ml   Net -140 ml       WEIGHTS  Wt Readings from Last 1 Encounters:   07/16/22 0520 98.7 kg (217 lb 9.5 oz)   07/12/22 0400 97.4 kg (214 lb 11.7 oz)   07/07/22 0520 96.4 kg (212 lb 8.4 oz)   07/05/22 0301 96.9 kg (213 lb 10 oz)   07/01/22 0300 97.3 kg (214 lb 8.1 oz)   06/30/22 0300 100.5 kg (221 lb 9.6 oz)   06/27/22 2254 101.1 kg (222 lb 14.2 oz)   06/27/22 1954 99.8 kg (220 lb)       PHYSICAL EXAM  Unable to obtain as patient was sleeping during rounds and we did not want to disturb     SCHEDULED MEDS:   amiodarone  200 mg Per G Tube BID    amLODIPine  5 mg Oral Daily    aspirin  81 mg Oral Daily    atorvastatin  80 mg Oral Daily    docusate sodium  100 mg Oral BID    metoprolol tartrate  25 mg Per G Tube BID    sacubitriL-valsartan  1 tablet Per G Tube BID    tamsulosin  0.4 mg Oral Daily       CONTINUOUS  INFUSIONS:   carboxymethylcellulose         PRN MEDS:acetaminophen, albumin human 5%, ALPRAZolam, calcium gluconate IVPB, calcium gluconate IVPB, calcium gluconate IVPB, carboxymethylcellulose, dextrose 50%, dextrose 50%, hydrALAZINE, HYDROcodone-acetaminophen, insulin aspart U-100, labetalol, magnesium sulfate IVPB, magnesium sulfate IVPB, melatonin, metoprolol, morphine, mupirocin, ondansetron, potassium chloride in water **AND** potassium chloride in water **AND** potassium chloride in water, sodium phosphate IVPB, sodium phosphate IVPB, sodium phosphate IVPB    LABS AND DIAGNOSTICS     CBC LAST 3 DAYS  Recent Labs   Lab 07/10/22  0323 07/11/22  0337 07/11/22  1036 07/14/22  0515 07/15/22  0408 07/16/22  0410   WBC 16.69* 10.88   < > 12.82* 15.59* 13.95*   RBC 3.72* 3.44*   < > 3.65* 4.09* 4.06*   HGB 10.6* 10.1*   < > 10.6* 11.9* 11.7*   HCT 33.0* 30.7*   < > 33.1* 35.7* 36.4*   MCV 89 89   < > 91 87 90   MCH 28.5 29.4   < > 29.0 29.1 28.8   MCHC 32.1 32.9   < > 32.0 33.3 32.1   RDW 14.0 13.8   < > 13.4 13.6 13.6    141*   < > 240 325 344   MPV 12.0 11.4   < > 11.6 10.7 10.9   GRAN 84.2*  14.1* 80.1*  8.7*  --   --   --   --    LYMPH 6.8*  1.1 12.0*  1.3  --   --   --   --    MONO 8.3  1.4* 7.4  0.8  --   --   --   --    BASO 0.01 0.01  --   --   --   --    NRBC 0 0  --   --   --   --     < > = values in this interval not displayed.       COAGULATION LAST 3 DAYS  No results for input(s): LABPT, INR, APTT in the last 168 hours.    CHEMISTRY LAST 3 DAYS  Recent Labs   Lab 07/09/22  1609 07/09/22  1751 07/10/22  0323 07/10/22  0323 07/11/22  0337 07/11/22  1036 07/12/22  0308 07/13/22  0308 07/14/22  0515 07/15/22  0408 07/16/22  0410   NA  --   --  138   < > 137  --  138   < > 139 136 133*   K  --   --  4.1   < > 4.0  --  4.0   < > 3.7 3.9 3.9   CL  --   --  104   < > 102  --  102   < > 103 103 104   CO2  --   --  27   < > 28  --  28   < > 28 24 26   ANIONGAP  --   --  7*   < > 7*  --  8   < > 8 9 3*    BUN  --   --  24*   < > 25*  --  28*   < > 24* 20 25*   CREATININE  --   --  1.1   < > 1.0  --  1.0   < > 0.8 0.8 0.9   GLU  --   --  188*   < > 149*  --  149*   < > 155* 140* 172*   CALCIUM  --   --  7.8*   < > 7.7*  --  7.9*   < > 8.3* 8.4* 8.0*   PH 7.492* 7.451*  --   --   --  7.459*  --   --   --   --   --    MG  --   --  2.0  --  2.1  --  2.1  --   --   --   --    ALBUMIN  --   --   --   --   --   --   --    < > 3.0* 3.2* 2.9*   PROT  --   --   --   --   --   --   --    < > 5.7* 5.9* 5.6*   ALKPHOS  --   --   --   --   --   --   --    < > 58 57 53*   ALT  --   --   --   --   --   --   --    < > 87* 73* 60*   AST  --   --   --   --   --   --   --    < > 49* 30 22   BILITOT  --   --   --   --   --   --   --    < > 1.1* 1.7* 1.4*    < > = values in this interval not displayed.       CARDIAC PROFILE LAST 3 DAYS  Recent Labs   Lab 07/10/22  0323 07/15/22  0408   BNP  --  610*   TROPONINI 63.503*  --        ENDOCRINE LAST 3 DAYS  No results for input(s): TSH, PROCAL in the last 168 hours.    LAST ARTERIAL BLOOD GAS  ABG  Recent Labs   Lab 07/11/22  1036   PH 7.459*   PO2 57*   PCO2 36.8   HCO3 26.1   BE 2       LAST 7 DAYS MICROBIOLOGY   Microbiology Results (last 7 days)       ** No results found for the last 168 hours. **            MOST RECENT IMAGING  US Upper Extremity Veins Right  Examination: Right approximately duplex venous sonogram.    Comparisons: None.    HISTORY: Swelling.    FINDINGS:    The venous structures of the right upper extremity were evaluated from the right internal jugular vein through the right forearm.    There findings of echogenic material with with near lack of flow at the IV site within the basilic vein. There is nonocclusive thrombus extending into the more proximal portions of the basilic vein.    Remaining venous structures of the right upper extremity are unremarkable.    IMPRESSION:  1. Nearly completely occlusive thrombus within the basilic vein at the site of the IV  insertion.  2. Remaining venous structures are unremarkable.    The sonographer verbally told to report to the nurse Simin as reported on the PACS system.    Electronically signed by:  Izaiah Verdin MD  7/13/2022 10:28 PM CDT Workstation: [id number]  Fl Modified Barium Swallow Speech  CMS MANDATED QUALITY [id number]    HISTORY: R13.10, R63.3, dysphagia, feeding difficulties, dysphagia s/p CVA and CABG .    FINDINGS: Fluoroscopy was provided by the radiology department for evaluation of the swallowing mechanism by speech pathology. Total fluoroscopic time for the procedure was 1 minute 42 seconds, with 10 spot fluoroscopic images or series obtained.    There was no penetration or aspiration observed with any of the consistencies.    IMPRESSION: Negative for penetration or aspiration. Please see accompanying speech pathology report for further details and recommendations.    Electronically signed by:  Perico Mullen MD  7/13/2022 10:31 AM CDT Workstation: [id number]      LASTECHO  Results for orders placed during the hospital encounter of 06/27/22    Echo Saline Bubble? No    Interpretation Summary  · The left ventricle is normal in size with mild predominantly mid septal mild asymmetric hypertrophy eccentric hypertrophy and normal systolic function.  · The estimated ejection fraction is 60%.  · Indeterminate left ventricular diastolic function.  · Normal right ventricular size with normal right ventricular systolic function.  · Mild left atrial enlargement.  · Normal central venous pressure (3 mmHg).  · The estimated PA systolic pressure is 19 mmHg.  · Mild mitral regurgitation.      CURRENT/PREVIOUS VISIT EKG  Results for orders placed or performed during the hospital encounter of 06/27/22   EKG 12-lead    Collection Time: 07/09/22 12:41 AM    Narrative    Test Reason : I21.3,    Vent. Rate : 088 BPM     Atrial Rate : 088 BPM     P-R Int : 232 ms          QRS Dur : 094 ms      QT Int : 384 ms       P-R-T Axes  : 043 101 132 degrees     QTc Int : 464 ms    Sinus rhythm with 1st degree A-V block with occasional Premature  ventricular complexes  Possible Inferior infarct ,age undetermined  Anterolateral infarct (cited on or before 27-JUN-2022)  Abnormal ECG  When compared with ECG of 08-JUL-2022 16:38,  Borderline criteria for Inferior infarct are now Present  Serial changes of Anterior infarct Present  Confirmed by Vincent Mann MD ([id number]) on 7/15/2022 12:55:05 PM    Referred By: [email or web address]   SELF           Confirmed By:Vincent Mann MD       ASSESSMENT/PLAN:     Active Hospital Problems    Diagnosis    *STEMI (ST elevation myocardial infarction)    PEG (percutaneous endoscopic gastrostomy) status    Superficial vein thrombosis    Acute CVA (cerebrovascular accident)    S/P CABG (coronary artery bypass graft)    NSVT (nonsustained ventricular tachycardia)    Coronary artery disease involving coronary bypass graft of native heart with angina pectoris       ASSESSMENT & PLAN:   1. Multivessel coronary artery disease  2. ST-elevation myocardial infarction status post PTCA  3. Nonsustained ventricular tachycardia  4. Mixed hyperlipidemia      RECOMMENDATIONS:  All his medications are being given through the PEG tube this includes  Amiodarone. Entresto to be initiated    Will tentatively scheduled for ASHLEY on Monday given some rest he was oropharyngeal swelling and discomfort    Jackson Mann MD  [facility]  Department of Cardiology  Date of Service: 07/16/2022  9:53 AM

## 2022-07-16 NOTE — CARE UPDATE
07/15/22 2136   Patient Assessment/Suction   Level of Consciousness (AVPU) alert   Respiratory Effort Unlabored   Expansion/Accessory Muscles/Retractions no use of accessory muscles   All Lung Fields Breath Sounds diminished   Rhythm/Pattern, Respiratory no shortness of breath reported   Cough Frequency no cough   PRE-TX-O2   O2 Device (Oxygen Therapy) room air   SpO2 96 %   Pulse Oximetry Type Continuous   $ Pulse Oximetry - Multiple Charge Pulse Oximetry - Multiple   Pulse 97   Resp (!) 28   Positioning   Head of Bed (HOB) Positioning HOB elevated;HOB at 30 degrees   Respiratory Evaluation   $ Care Plan Tech Time 15 min

## 2022-07-16 NOTE — CARE UPDATE
07/16/22 1042   Patient Assessment/Suction   Level of Consciousness (AVPU) alert   Respiratory Effort Normal;Unlabored   Expansion/Accessory Muscles/Retractions no use of accessory muscles   All Lung Fields Breath Sounds coarse  (slight)   Rhythm/Pattern, Respiratory no shortness of breath reported   Cough Frequency frequent   Cough Type productive   Suction Method oral   PRE-TX-O2   O2 Device (Oxygen Therapy) room air   SpO2 96 %   Pulse Oximetry Type Continuous   $ Pulse Oximetry - Multiple Charge Pulse Oximetry - Multiple   Pulse 82   Resp (!) 30   Incentive Spirometer   $ Incentive Spirometer Charges proper technique demonstrated   Incentive Spirometer Predicted Level (mL) 2000   Administration (IS) proper technique demonstrated;self-administered   Number of Repetitions (IS) 6   Level Incentive Spirometer (mL) 2000   Patient Tolerance (IS) good;no adverse signs/symptoms present   Education   $ Education Other (see comment);15 min  (sats)   Respiratory Evaluation   $ Care Plan Tech Time 15 min   $ Eval/Re-eval Charges Re-evaluation

## 2022-07-16 NOTE — PLAN OF CARE
Problem: Adult Inpatient Plan of Care  Goal: Plan of Care Review  Outcome: Ongoing, Progressing  Goal: Patient-Specific Goal (Individualized)  Outcome: Ongoing, Progressing  Goal: Absence of Hospital-Acquired Illness or Injury  Outcome: Ongoing, Progressing  Goal: Optimal Comfort and Wellbeing  Outcome: Ongoing, Progressing  Goal: Readiness for Transition of Care  Outcome: Ongoing, Progressing     Problem: Arrhythmia/Dysrhythmia (Cardiac Catheterization)  Goal: Stable Heart Rate and Rhythm  Outcome: Ongoing, Progressing     Problem: Bleeding (Cardiac Catheterization)  Goal: Absence of Bleeding  Outcome: Ongoing, Progressing     Problem: Fall Injury Risk  Goal: Absence of Fall and Fall-Related Injury  Outcome: Ongoing, Progressing     Problem: Infection  Goal: Absence of Infection Signs and Symptoms  Outcome: Ongoing, Progressing     Problem: Communication Impairment (Mechanical Ventilation, Invasive)  Goal: Effective Communication  Outcome: Ongoing, Progressing     Problem: Skin Injury Risk Increased  Goal: Skin Health and Integrity  Outcome: Ongoing, Progressing     Problem: Feeding Intolerance (Enteral Nutrition)  Goal: Feeding Tolerance  Outcome: Ongoing, Progressing     Problem: Impaired Wound Healing  Goal: Optimal Wound Healing  Outcome: Ongoing, Progressing

## 2022-07-16 NOTE — PROGRESS NOTES
[facility]  Department of Neurology  Progress Note  Date: 2022 9:42 AM          Patient Name: Matteo Fraire   MRN: [id number]   : 1960    AGE: 61 y.o.    LOS: 19 days Hospital Day: 20  Admit date: 2022  7:45 PM       HPI per EMR: Matteo Fraire is a 61 y.o. male with a history of 61-year-old with prior history of CAD status post PCI and CABG in  came with chest pain found to have STEMI status post balloon angioplasty to LAD - now status post redo 3 vessel CABG  .  Hospital stay notable for episodes of NSVT after which he was initiated on lidocaine infusion.  Postoperative stay notable for persistent ST elevation which improved with nitroglycerin infusion.      Postoperatively patient has been lethargic and slow to respond.  He has been oriented x3.  This morning left facial droop was noted for which neurology was consulted.         Neurology consult:  Patient was seen and examined by me.  He is lethargic however able to respond questions and commands.  He did not have any episodes of SVT since last night, mental status has slightly improving as per the nurse.  He is oriented x3. On exam, he has a L facial droop and left lid lag.  He does not any other focal neurological deficits including no weakness or sensory changes in extremities.  He denies any headaches, nausea, vomiting any new symptoms.    2022: No acute events overnight. Patient was seen and examined by me this morning. Neuro exam same as yesterday.  Continues to have left upper lower face weakness.  No other new symptoms.    2022:  Patient was seen examined by me this morning.  He denies any new symptoms morning.  Continues to have facial droop on the left side.  He is having difficulty swallowing for which speech is seen him and he was started on NG tube feeds.    2022:  Patient was seen examined by me this morning.  He denies any new symptoms this morning.  He is pending a transesophageal  echocardiogram and PEG tube placement today.    7/15/2022:  Patient was seen and examined by me this morning.  He is status post PEG tube placement.  Pending a transesophageal echocardiogram.    7/16: Patient was seen and examined by me this morning.  No acute overnight events, no new neurological complaints.  Vitals have been stable         Vitals:  Patient Vitals for the past 24 hrs:   BP Temp Temp src Pulse Resp SpO2 Weight   07/16/22 1127 127/69 -- -- -- -- -- --   07/16/22 1120 (!) 158/103 98.1 °F (36.7 °C) Oral 84 20 (!) 94 % --   07/16/22 1042 -- -- -- 82 (!) 30 96 % --   07/16/22 0921 -- -- -- 97 -- -- --   07/16/22 0709 133/87 97.8 °F (36.6 °C) Oral 100 (!) 22 95 % --   07/16/22 0520 -- -- -- -- -- -- 98.7 kg (217 lb 9.5 oz)   07/16/22 0310 137/64 98.3 °F (36.8 °C) Oral 92 (!) 22 (!) 93 % --   07/15/22 2300 (!) 141/76 98.1 °F (36.7 °C) Oral 79 (!) 22 (!) 94 % --   07/15/22 2136 -- -- -- 97 (!) 28 96 % --   07/15/22 1930 (!) 145/71 97.9 °F (36.6 °C) Oral 100 (!) 22 (!) 94 % --   07/15/22 1830 -- -- -- 104 (!) 26 (!) 93 % --   07/15/22 1451 (!) 156/73 -- -- 89 16 (!) 92 % --   07/15/22 1450 -- 97.9 °F (36.6 °C) Oral -- -- -- --     PHYSICAL EXAM:     GENERAL APPEARANCE: Lethargic, well-developed, well-nourished male in no acute distress.  HEENT: Normocephalic and atraumatic. PERRL. Oropharynx unremarkable.  PULM: Normal respiratory effort. No accessory muscle use.  CV: RRR.  ABDOMEN: Soft, nontender.  EXTREMITIES: No obvious signs of vascular compromise. Pulses present. No cyanosis, clubbing or edema.  SKIN: Clear; no rashes, lesions or skin breaks in exposed areas.     NEURO:  MENTAL STATUS: Lethargic, oriented x3.  Able to commands appropriately..  Affect labile.     CRANIAL NERVES:  CN I: Not tested.  CN II: Fundoscopic exam deferred.  CN III, IV, VI: Pupils equal, round and reactive to light.  Extraocular movements full and intact.  CN V: Facial sensation normal.  CN VII: Facial asymmetry noted for left  facial droop. Left upper face is involved as well.   CN VIII: Hearing grossly normal and equal bilaterally.  No skew deviation or pathologic nystagmus.  CN IX, X: Palate elevates symmetrically. Speech/articulation is mildly dysarthric.  CN XI: Shoulder shrug and chin rotation equal with good strength.  CN XII: Tongue protrusion midline.     MOTOR:  Bulk normal. Tone normal and symmetric throughout.  Abnormal movements absent.  Tremor: none present.  Strength 5/5 throughout.     REFLEXES:  DTRs 2+ throughout.  Plantar response downgoing bilaterally.  SENSATION: grossly intact throughout.  COORDINATION: normal finger-to-nose.  STATION: not tested.  GAIT: not tested.          CURRENT SCHEDULED MEDICATIONS:   amiodarone  200 mg Per G Tube BID    amLODIPine  5 mg Oral Daily    aspirin  81 mg Oral Daily    atorvastatin  80 mg Oral Daily    docusate sodium  100 mg Oral BID    metoprolol tartrate  25 mg Per G Tube BID    sacubitriL-valsartan  1 tablet Per G Tube BID    tamsulosin  0.4 mg Oral Daily     CURRENT INFUSIONS:   carboxymethylcellulose       DATA:  Recent Labs   Lab 07/10/22  0323 07/11/22  0337 07/12/22  0308 07/12/22  1947 07/13/22  0308 07/14/22  0515 07/15/22  0408 07/16/22  0410    137 138  --  139 139 136 133*   K 4.1 4.0 4.0  --  3.7 3.7 3.9 3.9    102 102  --  103 103 103 104   CO2 27 28 28  --  29 28 24 26   BUN 24* 25* 28*  --  28* 24* 20 25*   CREATININE 1.1 1.0 1.0  --  0.9 0.8 0.8 0.9   * 149* 149*  --  129* 155* 140* 172*   CALCIUM 7.8* 7.7* 7.9*  --  8.1* 8.3* 8.4* 8.0*   PHOS  --   --  2.3* 2.3* 3.2  --   --   --    MG 2.0 2.1 2.1  --   --   --   --   --    AST  --   --   --   --  58* 49* 30 22   ALT  --   --   --   --  60* 87* 73* 60*     Recent Labs   Lab 07/10/22  0323 07/11/22  0337 07/11/22  1036 07/12/22  0308 07/13/22  0307 07/14/22  0515 07/15/22  0408 07/16/22  0410   WBC 16.69* 10.88  --  9.42 9.67 12.82* 15.59* 13.95*   HGB 10.6* 10.1*  --  9.3* 9.4* 10.6*  11.9* 11.7*   HCT 33.0* 30.7* 32* 28.5* 30.5* 33.1* 35.7* 36.4*    141*  --  153 190 240 325 344     No results found for: PROTEINCSF, GLUCCSF, WBCCSF, RBCCSF  Hemoglobin A1C   Date Value Ref Range Status   06/28/2022 6.1 4.5 - 6.2 % Final     Comment:     According to ADA guidelines, hemoglobin A1C <7.0% represents  optimal control in non-pregnant diabetic patients.  Different  metrics may apply to specific populations.   Standards of Medical Care in Diabetes - 2016.    For the purpose of screening for the presence of diabetes:  <5.7%     Consistent with the absence of diabetes  5.7-6.4%  Consistent with increasing risk for diabetes   (prediabetes)  >or=6.5%  Consistent with diabetes    Currently no consensus exists for use of hemoglobin A1C  for diagnosis of diabetes for children.     01/20/2011 5.8 4.0 - 6.2 % Final   01/14/2011 5.8 4.0 - 6.2 % Final            I have personally reviewed and interpreted the pertinent imaging and lab results.  Imaging Results          X-Ray Chest AP Portable (Final result)  Result time 06/28/22 06:13:22    Final result by Waldemar De La Cruz MD (06/28/22 06:13:22)                 Narrative:    Reason: chest pain    FINDINGS:    PA and lateral chest with comparison chest x-ray November 20, 2019 show normal cardiomediastinal silhouette.  Mild bilateral perihilar hazy ill-defined opacities are noted. Pulmonary vasculature is normal. No acute osseous abnormality.    IMPRESSION:    Mild bilateral perihilar hazy ill-defined opacities could reflect mild pulmonary edema or infection in the proper clinical settings.    Electronically signed by:  Waldemar De La Cruz DO  6/28/2022 6:13 AM CDT Workstation: [id number]                                      ASSESSMENT AND PLAN:       Acute ischemic stroke  Post CABG  Dysphagia     Workup  · CTH: No acute intracranial abnormality. Prior R temporal and R BG infarcts  · MRI brain: Foci of restricted diffusion compatible with recent infarction  involving the right temporal lobe inferiorly, right lentiform nucleus and upper medulla/lower mele to the left of midline  · CTA head and neck :  Multifocal narrowing of intracranial vertebral arteries bilaterally with high-grade stenosis, atherosclerotic narrowing of proximal right ANTONIO.  No large vessel occlusion.  · Hemoglobin A1c:  6.1  · Lipid panel:  59  · Echocardiogram:  EF 43%, normal left atrium  · ASHLEY pending         Plan  · Admitted for further stroke workup  · Continue with Aspirin 81 mg and Lipitor 80mg for stroke prevention.  Will hold off on dual antiplatelet therapy due to moderate stroke burden   · SAHLEY ordered and pending   · Normalize BP  · Post CABG management per primary team and Cardiology/CT sugery  · PT OT  · Speech therapy  · PEG tube placement for dysphagia.  · DVT prophylaxis with chemo/SCD prophylaxis  · Discussed lifestyle modifications as prophylactic measures for stroke prevention including, adequate blood pressure management, healthy diet and regular exercise.           30 minutes of care time has been spent evaluating with the patient. Time includes chart review not limited to diagnostic imaging, labs, and vitals, patient assessment, discussion with family and nursing, current order evaluations, and new order entries.     Louis Jarquin MD  Neurology/vascular Neurology  Date of Service: 07/16/2022  9:42 AM    Please note: This note was transcribed using voice recognition software. Because of this technology there are often uinintended grammatical, spelling, and other transcription errors. Please disregard these errors.

## 2022-07-17 PROBLEM — J18.9 ACUTE PNEUMONIA: Status: ACTIVE | Noted: 2022-07-17

## 2022-07-17 LAB
ALBUMIN SERPL BCP-MCNC: 3.1 G/DL (ref 3.5–5.2)
ALP SERPL-CCNC: 54 U/L (ref 55–135)
ALT SERPL W/O P-5'-P-CCNC: 57 U/L (ref 10–44)
ANION GAP SERPL CALC-SCNC: 7 MMOL/L (ref 8–16)
AST SERPL-CCNC: 28 U/L (ref 10–40)
BILIRUB SERPL-MCNC: 1 MG/DL (ref 0.1–1)
BUN SERPL-MCNC: 29 MG/DL (ref 8–23)
CALCIUM SERPL-MCNC: 8.2 MG/DL (ref 8.7–10.5)
CHLORIDE SERPL-SCNC: 102 MMOL/L (ref 95–110)
CO2 SERPL-SCNC: 26 MMOL/L (ref 23–29)
CREAT SERPL-MCNC: 0.8 MG/DL (ref 0.5–1.4)
ERYTHROCYTE [DISTWIDTH] IN BLOOD BY AUTOMATED COUNT: 13.8 % (ref 11.5–14.5)
EST. GFR  (AFRICAN AMERICAN): >60 ML/MIN/1.73 M^2
EST. GFR  (NON AFRICAN AMERICAN): >60 ML/MIN/1.73 M^2
GLUCOSE SERPL-MCNC: 147 MG/DL (ref 70–110)
GLUCOSE SERPL-MCNC: 163 MG/DL (ref 70–110)
GLUCOSE SERPL-MCNC: 164 MG/DL (ref 70–110)
GLUCOSE SERPL-MCNC: 173 MG/DL (ref 70–110)
GLUCOSE SERPL-MCNC: 175 MG/DL (ref 70–110)
HCT VFR BLD AUTO: 36.1 % (ref 40–54)
HGB BLD-MCNC: 11.5 G/DL (ref 14–18)
MCH RBC QN AUTO: 28.8 PG (ref 27–31)
MCHC RBC AUTO-ENTMCNC: 31.9 G/DL (ref 32–36)
MCV RBC AUTO: 90 FL (ref 82–98)
PLATELET # BLD AUTO: 334 K/UL (ref 150–450)
PMV BLD AUTO: 11.1 FL (ref 9.2–12.9)
POTASSIUM SERPL-SCNC: 4.1 MMOL/L (ref 3.5–5.1)
PROT SERPL-MCNC: 5.7 G/DL (ref 6–8.4)
RBC # BLD AUTO: 4 M/UL (ref 4.6–6.2)
SODIUM SERPL-SCNC: 135 MMOL/L (ref 136–145)
WBC # BLD AUTO: 14.68 K/UL (ref 3.9–12.7)

## 2022-07-17 PROCEDURE — 85027 COMPLETE CBC AUTOMATED: CPT | Performed by: INTERNAL MEDICINE

## 2022-07-17 PROCEDURE — 25000003 PHARM REV CODE 250: Performed by: THORACIC SURGERY (CARDIOTHORACIC VASCULAR SURGERY)

## 2022-07-17 PROCEDURE — 99024 PR POST-OP FOLLOW-UP VISIT: ICD-10-PCS | Mod: ,,, | Performed by: THORACIC SURGERY (CARDIOTHORACIC VASCULAR SURGERY)

## 2022-07-17 PROCEDURE — 82962 GLUCOSE BLOOD TEST: CPT

## 2022-07-17 PROCEDURE — 25000003 PHARM REV CODE 250

## 2022-07-17 PROCEDURE — 99900035 HC TECH TIME PER 15 MIN (STAT)

## 2022-07-17 PROCEDURE — 99232 PR SUBSEQUENT HOSPITAL CARE,LEVL II: ICD-10-PCS | Mod: ,,, | Performed by: INTERNAL MEDICINE

## 2022-07-17 PROCEDURE — 99900031 HC PATIENT EDUCATION (STAT)

## 2022-07-17 PROCEDURE — 94761 N-INVAS EAR/PLS OXIMETRY MLT: CPT

## 2022-07-17 PROCEDURE — 99232 SBSQ HOSP IP/OBS MODERATE 35: CPT | Mod: ,,, | Performed by: INTERNAL MEDICINE

## 2022-07-17 PROCEDURE — 97116 GAIT TRAINING THERAPY: CPT

## 2022-07-17 PROCEDURE — 94799 UNLISTED PULMONARY SVC/PX: CPT

## 2022-07-17 PROCEDURE — 63600175 PHARM REV CODE 636 W HCPCS: Performed by: INTERNAL MEDICINE

## 2022-07-17 PROCEDURE — 99024 POSTOP FOLLOW-UP VISIT: CPT | Mod: ,,, | Performed by: THORACIC SURGERY (CARDIOTHORACIC VASCULAR SURGERY)

## 2022-07-17 PROCEDURE — 25000003 PHARM REV CODE 250: Performed by: INTERNAL MEDICINE

## 2022-07-17 PROCEDURE — 21000000 HC CCU ICU ROOM CHARGE

## 2022-07-17 PROCEDURE — 80053 COMPREHEN METABOLIC PANEL: CPT | Performed by: INTERNAL MEDICINE

## 2022-07-17 PROCEDURE — 25000003 PHARM REV CODE 250: Performed by: PHYSICIAN ASSISTANT

## 2022-07-17 RX ORDER — LEVOFLOXACIN 5 MG/ML
500 INJECTION, SOLUTION INTRAVENOUS
Status: DISCONTINUED | OUTPATIENT
Start: 2022-07-17 | End: 2022-07-18

## 2022-07-17 RX ORDER — DOCUSATE SODIUM 50 MG/5ML
100 LIQUID ORAL 2 TIMES DAILY
Status: DISCONTINUED | OUTPATIENT
Start: 2022-07-17 | End: 2022-07-18

## 2022-07-17 RX ADMIN — METOPROLOL TARTRATE 25 MG: 25 TABLET, FILM COATED ORAL at 08:07

## 2022-07-17 RX ADMIN — DOCUSATE SODIUM 100 MG: 50 LIQUID ORAL at 08:07

## 2022-07-17 RX ADMIN — AMIODARONE HYDROCHLORIDE 200 MG: 200 TABLET ORAL at 08:07

## 2022-07-17 RX ADMIN — LEVOFLOXACIN 500 MG: 500 INJECTION, SOLUTION INTRAVENOUS at 09:07

## 2022-07-17 RX ADMIN — ATORVASTATIN CALCIUM 80 MG: 40 TABLET, FILM COATED ORAL at 08:07

## 2022-07-17 RX ADMIN — ASPIRIN 81 MG CHEWABLE TABLET 81 MG: 81 TABLET CHEWABLE at 08:07

## 2022-07-17 RX ADMIN — SACUBITRIL AND VALSARTAN 1 TABLET: 24; 26 TABLET, FILM COATED ORAL at 08:07

## 2022-07-17 RX ADMIN — TAMSULOSIN HYDROCHLORIDE 0.4 MG: 0.4 CAPSULE ORAL at 08:07

## 2022-07-17 RX ADMIN — AMLODIPINE BESYLATE 5 MG: 5 TABLET ORAL at 08:07

## 2022-07-17 NOTE — SUBJECTIVE & OBJECTIVE
Interval History:     Review of Systems   Constitutional:  Negative for activity change and appetite change.   HENT:  Negative for congestion and dental problem.    Eyes:  Negative for discharge and itching.   Respiratory:  Negative for shortness of breath.    Cardiovascular:  Negative for chest pain.   Gastrointestinal:  Negative for abdominal distention and abdominal pain.   Endocrine: Negative for cold intolerance.   Genitourinary:  Negative for difficulty urinating and dysuria.   Musculoskeletal:  Negative for arthralgias and back pain.   Skin:  Negative for color change.   Neurological:  Positive for light-headedness. Negative for dizziness and facial asymmetry.   Hematological:  Negative for adenopathy.   Psychiatric/Behavioral:  Negative for agitation and behavioral problems.    Objective:     Vital Signs (Most Recent):  Temp: 98.9 °F (37.2 °C) (07/17/22 1515)  Pulse: 89 (07/17/22 1515)  Resp: 19 (07/17/22 1515)  BP: (!) 151/70 (07/17/22 1515)  SpO2: (!) 93 % (07/17/22 1515)   Vital Signs (24h Range):  Temp:  [97.7 °F (36.5 °C)-98.9 °F (37.2 °C)] 98.9 °F (37.2 °C)  Pulse:  [83-98] 89  Resp:  [17-21] 19  SpO2:  [82 %-98 %] 93 %  BP: (121-190)/() 151/70     Weight: 98.7 kg (217 lb 9.5 oz)  Body mass index is 32.13 kg/m².    Intake/Output Summary (Last 24 hours) at 7/17/2022 1629  Last data filed at 7/17/2022 1016  Gross per 24 hour   Intake 1000 ml   Output 700 ml   Net 300 ml      Physical Exam  Vitals and nursing note reviewed.   Constitutional:       Appearance: He is well-developed.   HENT:      Head: Atraumatic.      Right Ear: External ear normal.      Left Ear: External ear normal.      Nose: Nose normal.      Mouth/Throat:      Mouth: Mucous membranes are moist.   Eyes:      General: No scleral icterus.  Cardiovascular:      Rate and Rhythm: Normal rate.   Pulmonary:      Effort: Pulmonary effort is normal.   Musculoskeletal:         General: Normal range of motion.      Cervical back: Full  passive range of motion without pain and normal range of motion.   Skin:     General: Skin is warm.   Neurological:      Mental Status: He is alert and oriented to person, place, and time.   Psychiatric:         Behavior: Behavior normal.       Significant Labs: All pertinent labs within the past 24 hours have been reviewed.  CBC:   Recent Labs   Lab 07/16/22 0410 07/17/22  0358   WBC 13.95* 14.68*   HGB 11.7* 11.5*   HCT 36.4* 36.1*    334     CMP:   Recent Labs   Lab 07/16/22 0410 07/17/22  0358   * 135*   K 3.9 4.1    102   CO2 26 26   * 163*   BUN 25* 29*   CREATININE 0.9 0.8   CALCIUM 8.0* 8.2*   PROT 5.6* 5.7*   ALBUMIN 2.9* 3.1*   BILITOT 1.4* 1.0   ALKPHOS 53* 54*   AST 22 28   ALT 60* 57*   ANIONGAP 3* 7*   EGFRNONAA >60.0 >60.0       Significant Imaging: I have reviewed all pertinent imaging results/findings within the past 24 hours.

## 2022-07-17 NOTE — PLAN OF CARE
Problem: Adult Inpatient Plan of Care  Goal: Plan of Care Review  Outcome: Ongoing, Progressing  Goal: Patient-Specific Goal (Individualized)  Outcome: Ongoing, Progressing  Goal: Absence of Hospital-Acquired Illness or Injury  Outcome: Ongoing, Progressing  Goal: Optimal Comfort and Wellbeing  Outcome: Ongoing, Progressing  Goal: Readiness for Transition of Care  Outcome: Ongoing, Progressing     Problem: Arrhythmia/Dysrhythmia (Cardiac Catheterization)  Goal: Stable Heart Rate and Rhythm  Outcome: Ongoing, Progressing     Problem: Bleeding (Cardiac Catheterization)  Goal: Absence of Bleeding  Outcome: Ongoing, Progressing

## 2022-07-17 NOTE — SUBJECTIVE & OBJECTIVE
Interval History:     Review of Systems   Constitutional:  Negative for activity change and appetite change.   HENT:  Positive for sore throat. Negative for congestion and dental problem.    Eyes:  Negative for discharge and itching.   Respiratory:  Negative for shortness of breath.    Cardiovascular:  Negative for chest pain.   Gastrointestinal:  Negative for abdominal distention and abdominal pain.   Endocrine: Negative for cold intolerance.   Genitourinary:  Negative for difficulty urinating and dysuria.   Musculoskeletal:  Negative for arthralgias and back pain.   Skin:  Negative for color change.   Neurological:  Negative for dizziness and facial asymmetry.   Hematological:  Negative for adenopathy.   Psychiatric/Behavioral:  Negative for agitation and behavioral problems.    Objective:     Vital Signs (Most Recent):  Temp: 98.3 °F (36.8 °C) (07/16/22 1930)  Pulse: 98 (07/16/22 1930)  Resp: (!) 21 (07/16/22 1930)  BP: 132/68 (07/16/22 1930)  SpO2: (!) 92 % (07/16/22 1930)   Vital Signs (24h Range):  Temp:  [97.8 °F (36.6 °C)-98.4 °F (36.9 °C)] 98.3 °F (36.8 °C)  Pulse:  [] 98  Resp:  [20-30] 21  SpO2:  [92 %-96 %] 92 %  BP: (127-158)/() 132/68     Weight: 98.7 kg (217 lb 9.5 oz)  Body mass index is 32.13 kg/m².    Intake/Output Summary (Last 24 hours) at 7/16/2022 2024  Last data filed at 7/16/2022 1505  Gross per 24 hour   Intake 990 ml   Output 1600 ml   Net -610 ml      Physical Exam  Vitals and nursing note reviewed.   Constitutional:       Appearance: He is well-developed.   HENT:      Head: Atraumatic.      Right Ear: External ear normal.      Left Ear: External ear normal.      Nose: Nose normal.      Mouth/Throat:      Mouth: Mucous membranes are moist.   Eyes:      General: No scleral icterus.  Cardiovascular:      Rate and Rhythm: Normal rate.   Pulmonary:      Effort: Pulmonary effort is normal.   Abdominal:      Comments: PEG    Musculoskeletal:         General: Normal range of motion.       Cervical back: Full passive range of motion without pain and normal range of motion.   Skin:     General: Skin is warm.   Neurological:      Mental Status: He is alert and oriented to person, place, and time.   Psychiatric:         Behavior: Behavior normal.       Significant Labs: All pertinent labs within the past 24 hours have been reviewed.  CBC:   Recent Labs   Lab 07/15/22  0408 07/16/22  0410   WBC 15.59* 13.95*   HGB 11.9* 11.7*   HCT 35.7* 36.4*    344     CMP:   Recent Labs   Lab 07/15/22  0408 07/16/22  0410    133*   K 3.9 3.9    104   CO2 24 26   * 172*   BUN 20 25*   CREATININE 0.8 0.9   CALCIUM 8.4* 8.0*   PROT 5.9* 5.6*   ALBUMIN 3.2* 2.9*   BILITOT 1.7* 1.4*   ALKPHOS 57 53*   AST 30 22   ALT 73* 60*   ANIONGAP 9 3*   EGFRNONAA >60.0 >60.0       Significant Imaging: I have reviewed all pertinent imaging results/findings within the past 24 hours.

## 2022-07-17 NOTE — PROGRESS NOTES
"Matteo Fraire is a 61 y.o. male patient.    Chief Complaint   Patient presents with    Chest Pain     Started about 7 PM.  Pt has cardiac procedures in 2005 and 2011.  Pt took two 325 Aspirin at home prior to arrival to ED       Active Hospital Problems    Diagnosis  POA    *STEMI (ST elevation myocardial infarction) [I21.3]  Yes    PEG (percutaneous endoscopic gastrostomy) status [Z93.1]  Not Applicable    Superficial vein thrombosis [I82.890]  No    Acute CVA (cerebrovascular accident) [I63.9]  Unknown    S/P CABG (coronary artery bypass graft) [Z95.1]  Not Applicable    NSVT (nonsustained ventricular tachycardia) [I47.2]  No    Coronary artery disease involving coronary bypass graft of native heart with angina pectoris [I25.709]  Yes      Resolved Hospital Problems   No resolved problems to display.       Temp: 97.7 °F (36.5 °C) (07/17/22 0723)  Pulse: 90 (07/17/22 0723)  Resp: 20 (07/17/22 0723)  BP: (!) 141/67 (07/17/22 0723)  SpO2: 97 % (07/17/22 0723)  Weight: 98.7 kg (217 lb 9.5 oz) (07/16/22 0520)  Height: 5' 9" (175.3 cm) (06/27/22 2254)    CBC:  Recent Labs   Lab 07/15/22  0408 07/16/22  0410 07/17/22  0358   WBC 15.59* 13.95* 14.68*   HGB 11.9* 11.7* 11.5*   HCT 35.7* 36.4* 36.1*    344 334   MCV 87 90 90     BMP:  Recent Labs   Lab 07/15/22  0408 07/16/22  0410 07/17/22  0358   * 172* 163*    133* 135*   K 3.9 3.9 4.1    104 102   CO2 24 26 26   BUN 20 25* 29*   CREATININE 0.8 0.9 0.8   CALCIUM 8.4* 8.0* 8.2*     LFTs:  Recent Labs   Lab 07/15/22  0408 07/16/22  0410 07/17/22  0358   ALT 73* 60* 57*   AST 30 22 28   ALKPHOS 57 53* 54*   BILITOT 1.7* 1.4* 1.0   PROT 5.9* 5.6* 5.7*   ALBUMIN 3.2* 2.9* 3.1*     COAGS:  No results for input(s): PT, INR, APTT in the last 72 hours.  CARDIAC MARKERS:  No results for input(s): CPK, CPKMB, TROPONINI in the last 72 hours.    Output (mL)  Urine: 700 mL    Imaging Results          X-Ray Chest AP Portable (Final result)  Result " "time 06/28/22 06:13:22    Final result by Waldemar De La Cruz MD (06/28/22 06:13:22)                 Narrative:    Reason: chest pain    FINDINGS:    PA and lateral chest with comparison chest x-ray November 20, 2019 show normal cardiomediastinal silhouette.  Mild bilateral perihilar hazy ill-defined opacities are noted. Pulmonary vasculature is normal. No acute osseous abnormality.    IMPRESSION:    Mild bilateral perihilar hazy ill-defined opacities could reflect mild pulmonary edema or infection in the proper clinical settings.    Electronically signed by:  Waldemar De La Cruz DO  6/28/2022 6:13 AM CDT Workstation: [id number]                              Subjective:  Symptoms:  Stable.  No shortness of breath.    Diet:  Dietary issues: Tolerating PEG.    Pain:  He complains of pain that is mild.  (Left neck and shoulder soreness).       Objective:  General Appearance:  Comfortable.    Vital signs: (most recent): Blood pressure (!) 141/67, pulse 90, temperature 97.7 °F (36.5 °C), temperature source Oral, resp. rate 20, height 5' 9" (1.753 m), weight 98.7 kg (217 lb 9.5 oz), SpO2 97 %.  Vital signs are normal.    Lungs:  Breath sounds clear to auscultation.  (No rhonchi or crackles in the right upper lung field)  Heart: Normal rate.  Regular rhythm.    Chest: (Incision intact)      Assessment:    Condition: In serious condition.  Improving.   (Post redo CABG).     Plan:   Encourage ambulation.  Start/continue incentive spirometry.        Omar Ayon MD  7/17/2022    "

## 2022-07-17 NOTE — CARE UPDATE
07/16/22 1942   Patient Assessment/Suction   Level of Consciousness (AVPU) alert   Respiratory Effort Normal;Unlabored   Expansion/Accessory Muscles/Retractions no use of accessory muscles   All Lung Fields Breath Sounds clear;diminished   PRE-TX-O2   O2 Device (Oxygen Therapy) room air   SpO2 (!) 94 %   Pulse Oximetry Type Continuous   $ Pulse Oximetry - Multiple Charge Pulse Oximetry - Multiple   Pulse 97   Resp 17   Incentive Spirometer   $ Incentive Spirometer Charges done with encouragement   Incentive Spirometer Predicted Level (mL) 2000   Administration (IS) mouthpiece utilized   Number of Repetitions (IS) 12   Level Incentive Spirometer (mL) 2000   Patient Tolerance (IS) good   Encourage incentive spirometer

## 2022-07-17 NOTE — PROGRESS NOTES
[facility]  Department of Neurology  Progress Note  Date: 2022 9:42 AM          Patient Name: Matteo Fraire   MRN: [id number]   : 1960    AGE: 61 y.o.    LOS: 20 days Hospital Day: 21  Admit date: 2022  7:45 PM       HPI per EMR: Matteo Fraire is a 61 y.o. male with a history of 61-year-old with prior history of CAD status post PCI and CABG in  came with chest pain found to have STEMI status post balloon angioplasty to LAD - now status post redo 3 vessel CABG  .  Hospital stay notable for episodes of NSVT after which he was initiated on lidocaine infusion.  Postoperative stay notable for persistent ST elevation which improved with nitroglycerin infusion.      Postoperatively patient has been lethargic and slow to respond.  He has been oriented x3.  This morning left facial droop was noted for which neurology was consulted.         Neurology consult:  Patient was seen and examined by me.  He is lethargic however able to respond questions and commands.  He did not have any episodes of SVT since last night, mental status has slightly improving as per the nurse.  He is oriented x3. On exam, he has a L facial droop and left lid lag.  He does not any other focal neurological deficits including no weakness or sensory changes in extremities.  He denies any headaches, nausea, vomiting any new symptoms.    2022: No acute events overnight. Patient was seen and examined by me this morning. Neuro exam same as yesterday.  Continues to have left upper lower face weakness.  No other new symptoms.    2022:  Patient was seen examined by me this morning.  He denies any new symptoms morning.  Continues to have facial droop on the left side.  He is having difficulty swallowing for which speech is seen him and he was started on NG tube feeds.    2022:  Patient was seen examined by me this morning.  He denies any new symptoms this morning.  He is pending a transesophageal  echocardiogram and PEG tube placement today.    7/15/2022:  Patient was seen and examined by me this morning.  He is status post PEG tube placement.  Pending a transesophageal echocardiogram.    7/16: Patient was seen and examined by me this morning.  No acute overnight events, no new neurological complaints.  Vitals have been stable    7/16: Patient was seen and examined by me this morning.  No acute overnight events, no new neurological complaints.  Vitals have been stable.  Patient states that he feels better today and thinks his speech is better.  He is able to the bathroom with assistance.  Denies any new symptoms this morning.  Pending ASHLEY.         Vitals:  Patient Vitals for the past 24 hrs:   BP Temp Temp src Pulse Resp SpO2   07/17/22 0723 (!) 141/67 97.7 °F (36.5 °C) Oral 90 20 97 %   07/17/22 0350 134/64 98.2 °F (36.8 °C) Oral 87 18 96 %   07/16/22 2300 128/61 98.7 °F (37.1 °C) Oral 86 20 95 %   07/16/22 1942 -- -- -- 97 17 (!) 94 %   07/16/22 1930 132/68 98.3 °F (36.8 °C) Oral 98 (!) 21 (!) 92 %   07/16/22 1504 (!) 146/71 98.4 °F (36.9 °C) Oral 96 20 (!) 92 %   07/16/22 1127 127/69 -- -- -- -- --   07/16/22 1120 (!) 158/103 98.1 °F (36.7 °C) Oral 84 20 (!) 94 %   07/16/22 1042 -- -- -- 82 (!) 30 96 %     PHYSICAL EXAM:     GENERAL APPEARANCE: Lethargic, well-developed, well-nourished male in no acute distress.  HEENT: Normocephalic and atraumatic. PERRL. Oropharynx unremarkable.  PULM: Normal respiratory effort. No accessory muscle use.  CV: RRR.  ABDOMEN: Soft, nontender.  EXTREMITIES: No obvious signs of vascular compromise. Pulses present. No cyanosis, clubbing or edema.  SKIN: Clear; no rashes, lesions or skin breaks in exposed areas.     NEURO:  MENTAL STATUS: Lethargic, oriented x3.  Able to commands appropriately..  Affect labile.     CRANIAL NERVES:  CN I: Not tested.  CN II: Fundoscopic exam deferred.  CN III, IV, VI: Pupils equal, round and reactive to light.  Extraocular movements full and  intact.  CN V: Facial sensation normal.  CN VII: Facial asymmetry noted for left facial droop. Left upper face is involved as well.   CN VIII: Hearing grossly normal and equal bilaterally.  No skew deviation or pathologic nystagmus.  CN IX, X: Palate elevates symmetrically. Speech/articulation is mildly dysarthric.  CN XI: Shoulder shrug and chin rotation equal with good strength.  CN XII: Tongue protrusion midline.     MOTOR:  Bulk normal. Tone normal and symmetric throughout.  Abnormal movements absent.  Tremor: none present.  Strength 5/5 throughout.     REFLEXES:  DTRs 2+ throughout.  Plantar response downgoing bilaterally.  SENSATION: grossly intact throughout.  COORDINATION: normal finger-to-nose.  STATION: not tested.  GAIT: not tested.          CURRENT SCHEDULED MEDICATIONS:   amiodarone  200 mg Per G Tube BID    amLODIPine  5 mg Oral Daily    aspirin  81 mg Oral Daily    atorvastatin  80 mg Oral Daily    docusate  100 mg Oral BID    levoFLOXacin  500 mg Intravenous Q24H    metoprolol tartrate  25 mg Per G Tube BID    sacubitriL-valsartan  1 tablet Per G Tube BID    tamsulosin  0.4 mg Oral Daily     CURRENT INFUSIONS:   carboxymethylcellulose       DATA:  Recent Labs   Lab 07/11/22  0337 07/12/22  0308 07/12/22  1947 07/13/22  0308 07/14/22  0515 07/15/22  0408 07/16/22  0410 07/17/22  0358    138  --  139 139 136 133* 135*   K 4.0 4.0  --  3.7 3.7 3.9 3.9 4.1    102  --  103 103 103 104 102   CO2 28 28  --  29 28 24 26 26   BUN 25* 28*  --  28* 24* 20 25* 29*   CREATININE 1.0 1.0  --  0.9 0.8 0.8 0.9 0.8   * 149*  --  129* 155* 140* 172* 163*   CALCIUM 7.7* 7.9*  --  8.1* 8.3* 8.4* 8.0* 8.2*   PHOS  --  2.3* 2.3* 3.2  --   --   --   --    MG 2.1 2.1  --   --   --   --   --   --    AST  --   --   --  58* 49* 30 22 28   ALT  --   --   --  60* 87* 73* 60* 57*     Recent Labs   Lab 07/11/22  0337 07/11/22  1036 07/12/22  0308 07/13/22  0307 07/14/22  0515 07/15/22  0408  07/16/22  0410 07/17/22  0358   WBC 10.88  --  9.42 9.67 12.82* 15.59* 13.95* 14.68*   HGB 10.1*  --  9.3* 9.4* 10.6* 11.9* 11.7* 11.5*   HCT 30.7* 32* 28.5* 30.5* 33.1* 35.7* 36.4* 36.1*   *  --  153 190 240 325 344 334     No results found for: PROTEINCSF, GLUCCSF, WBCCSF, RBCCSF  Hemoglobin A1C   Date Value Ref Range Status   06/28/2022 6.1 4.5 - 6.2 % Final     Comment:     According to ADA guidelines, hemoglobin A1C <7.0% represents  optimal control in non-pregnant diabetic patients.  Different  metrics may apply to specific populations.   Standards of Medical Care in Diabetes - 2016.    For the purpose of screening for the presence of diabetes:  <5.7%     Consistent with the absence of diabetes  5.7-6.4%  Consistent with increasing risk for diabetes   (prediabetes)  >or=6.5%  Consistent with diabetes    Currently no consensus exists for use of hemoglobin A1C  for diagnosis of diabetes for children.     01/20/2011 5.8 4.0 - 6.2 % Final   01/14/2011 5.8 4.0 - 6.2 % Final            I have personally reviewed and interpreted the pertinent imaging and lab results.  Imaging Results          X-Ray Chest AP Portable (Final result)  Result time 06/28/22 06:13:22    Final result by Waldemar De La Cruz MD (06/28/22 06:13:22)                 Narrative:    Reason: chest pain    FINDINGS:    PA and lateral chest with comparison chest x-ray November 20, 2019 show normal cardiomediastinal silhouette.  Mild bilateral perihilar hazy ill-defined opacities are noted. Pulmonary vasculature is normal. No acute osseous abnormality.    IMPRESSION:    Mild bilateral perihilar hazy ill-defined opacities could reflect mild pulmonary edema or infection in the proper clinical settings.    Electronically signed by:  Waldemar De La Cruz DO  6/28/2022 6:13 AM CDT Workstation: [id number]                                      ASSESSMENT AND PLAN:       Acute ischemic stroke  Post CABG  Dysphagia     Workup  · CTH: No acute intracranial  abnormality. Prior R temporal and R BG infarcts  · MRI brain: Foci of restricted diffusion compatible with recent infarction involving the right temporal lobe inferiorly, right lentiform nucleus and upper medulla/lower mele to the left of midline  · CTA head and neck :  Multifocal narrowing of intracranial vertebral arteries bilaterally with high-grade stenosis, atherosclerotic narrowing of proximal right ANTONIO.  No large vessel occlusion.  · Hemoglobin A1c:  6.1  · Lipid panel:  59  · Echocardiogram:  EF 43%, normal left atrium  · ASHLEY pending         Plan  · Admitted for further stroke workup  · Continue with Aspirin 81 mg and Lipitor 80mg for stroke prevention.  Will hold off on dual antiplatelet therapy due to moderate stroke burden   · ASHLEY ordered and pending   · Normalize BP  · Post CABG management per primary team and Cardiology/CT sugery  · PT OT  · Speech therapy  · PEG tube placement for dysphagia.  · DVT prophylaxis with chemo/SCD prophylaxis  · Discussed lifestyle modifications as prophylactic measures for stroke prevention including, adequate blood pressure management, healthy diet and regular exercise.           31 minutes of care time has been spent evaluating with the patient. Time includes chart review not limited to diagnostic imaging, labs, and vitals, patient assessment, discussion with family and nursing, current order evaluations, and new order entries.     Louis Jarquin MD  Neurology/vascular Neurology  Date of Service: 07/17/2022  9:42 AM    Please note: This note was transcribed using voice recognition software. Because of this technology there are often uinintended grammatical, spelling, and other transcription errors. Please disregard these errors.

## 2022-07-17 NOTE — PLAN OF CARE
As per MD documentation, pt scheduled for ASHLEY 7/18    Plan for transfer to [facility] when medically cleared.        07/17/22 1533   Discharge Reassessment   Assessment Type Discharge Planning Reassessment   Did the patient's condition or plan change since previous assessment? No   Discharge Plan discussed with: Spouse/sig other   Name(s) and Number(s) jer grubervette (Significant other)   [phone] ([place])   Discharge Plan A Rehab   Discharge Plan B Rehab   DME Needed Upon Discharge  none   Discharge Barriers Identified None   Why the patient remains in the hospital Requires continued medical care   Post-Acute Status   Post-Acute Authorization Placement   Post-Acute Placement Status Pending medical clearance/testing   Discharge Delays None known at this time

## 2022-07-17 NOTE — PT/OT/SLP PROGRESS
Physical Therapy Treatment    Patient Name:  Matteo Fraire   MRN:  [id number]    Recommendations:     Discharge Recommendations:  rehabilitation facility   Discharge Equipment Recommendations:  (TBD)   Barriers to discharge: Decreased caregiver support    Assessment:     Matteo Fraire is a 61 y.o. male admitted with a medical diagnosis of STEMI (ST elevation myocardial infarction).  He presents with the following impairments/functional limitations:  impaired endurance, weakness, impaired self care skills, impaired functional mobility, gait instability, impaired cardiopulmonary response to activity .    Rehab Prognosis: Good; patient would benefit from acute skilled PT services to address these deficits and reach maximum level of function.    Recent Surgery: Procedure(s) (LRB):  EGD (ESOPHAGOGASTRODUODENOSCOPY) (N/A) 2 Days Post-Op    Plan:     During this hospitalization, patient to be seen daily to address the identified rehab impairments via gait training, therapeutic activities, therapeutic exercises and progress toward the following goals:    · Plan of Care Expires:  08/13/22    Subjective     Chief Complaint: fatigue   Patient/Family Comments/goals: PLOF  Pain/Comfort:  ·        Objective:     Communicated with nurse prior to session.  Patient found HOB elevated with PEG Tube, peripheral IV upon PT entry to room.     General Precautions: Standard, fall, sternal   Orthopedic Precautions:N/A   Braces:    Respiratory Status: Room air     Functional Mobility:  · Bed Mobility:     · Supine to Sit: minimum assistance  · Sit to Supine: minimum assistance  · Transfers:     · Sit to Stand:  contact guard assistance with rolling walker  · Gait: 75 ft RW and CGA      AM-PAC 6 CLICK MOBILITY          Therapeutic Activities and Exercises:  Gait training in the rivera. Sternal precautions adhered to during session.  Pt did not ambulate farther as he needed to return to room to use suction tube.    Patient left HOB elevated  with all lines intact, call button in reach and bed alarm on..    GOALS:   Multidisciplinary Problems     Physical Therapy Goals        Problem: Physical Therapy    Goal Priority Disciplines Outcome Goal Variances Interventions   Physical Therapy Goal     PT, PT/OT Ongoing, Progressing     Description: Goals to be met by: 22     Patient will increase functional independence with mobility by performin. Supine to sit with Supervision  2. Sit to stand transfer with Supervision  3. Bed to chair transfer with Supervision using Rolling Walker  4. Gait  x 150 feet with Supervision using Rolling Walker.                          Time Tracking:     PT Received On: 22  PT Start Time: 920     PT Stop Time: 938  PT Total Time (min): 18 min     Billable Minutes: Gait Training 18 minutes    Treatment Type: Treatment  PT/PTA: PT     PTA Visit Number: 0     2022

## 2022-07-17 NOTE — PROGRESS NOTES
[facility]  Department of Cardiology  Progress Note    PATIENT NAME: Matteo Fraire  MRN: [id number]  TODAY'S DATE: 07/17/2022  ADMIT DATE: 6/27/2022    SUBJECTIVE     PRINCIPLE PROBLEM: STEMI (ST elevation myocardial infarction)    INTERVAL HISTORY:      07/17/2022 Clinic are:  Patient appears more stable throat pain is improving but not resolved.  Generalized weakness is still persisting.  Still cannot swallow any secretions      7/16/2022 :  Patient is feeling a bit better content still complains of sore throat and discomfort on the left side of his neck.  A still unable to swallow any secretions  Are peg tube feeding has been initiated.  Is tolerating this well so far    10/15/2022   Successful G tube placement today, can start feedings via this route later this afternoon. No complaints from patient. VSS.     7/14/2022  Patient is hypertensive today. He is having more secretions and cough. PEG tub placement pending GI eval. Rhythm stable.     7/13/2022  Patient sitting up at bedside, receiving nutrition through NG tube. He feels hungry and mouth is dry. He could not even swallow for his barium swallow today he says. VSS.     7/12/2022  No further runs of NSVT. Mentation much improved today. Patient states he feels better. NG tube will be placed so patient can recieve some nutrition. VSS.     7/11/2022  No further runs of NSVT. Continues to have atypical mentation, now with facial droop and complaint of trouble seeing and focusing in left eye. CT head was negative for any acute ischemia. Only complaint from patient is the vision. He is tachycardic today. Failed swallow study again.     7/10/2022  Patient continues to asymptomatic NSVT. On oxymask, having a lot of congestion. Seems out of it still despite morphine being given 3 hours ago. BP stable on nitro gtt.     7/9/2022  Patient developed ST elevations overnight post CABG. Troponins were checked and are still rising at 62. He remains intubated  and sedated. BP stable on cleviprex gtt. ST elevations have improved.     7/7/2022  Patient had 42 beat run of Vtach this morning, asymp[tomatic. No further chest pain. VSS.     7/6/2022  PATIENT FOR CABG THIS Friday. Had some mild chest pains after walking for 40 minutes this morning. Has not required nitro yet. VSS.     7/5/2022  Patient still waiting for CABG, ate breakfast this AM. No chest pain events. VSS.     7/4/2022  Patient is awake alert lying comfortably in bed not in any acute distress.  Patient had walked in the hallway 4 times already.  Denies any chest pain or tightness or heaviness his breathing is good denies any shortness of breath.  Awaiting for CABG.    Review of patient's allergies indicates:  No Known Allergies    REVIEW OF SYSTEMS  Throat pain is improving are patient's  His tolerating tube feedings well  Had a good bowel movement.  Generalized weakness is still persisting.    OBJECTIVE     VITAL SIGNS (Most Recent)  Temp: 97.7 °F (36.5 °C) (07/17/22 0723)  Pulse: 90 (07/17/22 0723)  Resp: 20 (07/17/22 0723)  BP: (!) 141/67 (07/17/22 0723)  SpO2: 97 % (07/17/22 0723)    VENTILATION STATUS  Resp: 20 (07/17/22 0723)  SpO2: 97 % (07/17/22 0723)       I & O (Last 24H):    Intake/Output Summary (Last 24 hours) at 7/17/2022 1023  Last data filed at 7/17/2022 0600  Gross per 24 hour   Intake 900 ml   Output 1200 ml   Net -300 ml       WEIGHTS  Wt Readings from Last 1 Encounters:   07/16/22 0520 98.7 kg (217 lb 9.5 oz)   07/12/22 0400 97.4 kg (214 lb 11.7 oz)   07/07/22 0520 96.4 kg (212 lb 8.4 oz)   07/05/22 0301 96.9 kg (213 lb 10 oz)   07/01/22 0300 97.3 kg (214 lb 8.1 oz)   06/30/22 0300 100.5 kg (221 lb 9.6 oz)   06/27/22 2254 101.1 kg (222 lb 14.2 oz)   06/27/22 1954 99.8 kg (220 lb)       PHYSICAL EXAM  Unable to obtain as patient was sleeping during rounds and we did not want to disturb     SCHEDULED MEDS:   amiodarone  200 mg Per G Tube BID    amLODIPine  5 mg Oral Daily    aspirin  81 mg  Oral Daily    atorvastatin  80 mg Oral Daily    docusate  100 mg Oral BID    levoFLOXacin  500 mg Intravenous Q24H    metoprolol tartrate  25 mg Per G Tube BID    sacubitriL-valsartan  1 tablet Per G Tube BID    tamsulosin  0.4 mg Oral Daily       CONTINUOUS INFUSIONS:   carboxymethylcellulose         PRN MEDS:acetaminophen, albumin human 5%, ALPRAZolam, calcium gluconate IVPB, calcium gluconate IVPB, calcium gluconate IVPB, carboxymethylcellulose, dextrose 50%, dextrose 50%, hydrALAZINE, HYDROcodone-acetaminophen, insulin aspart U-100, labetalol, magnesium sulfate IVPB, magnesium sulfate IVPB, melatonin, metoprolol, morphine, mupirocin, ondansetron, potassium chloride in water **AND** potassium chloride in water **AND** potassium chloride in water, sodium phosphate IVPB, sodium phosphate IVPB, sodium phosphate IVPB    LABS AND DIAGNOSTICS     CBC LAST 3 DAYS  Recent Labs   Lab 07/11/22  0337 07/11/22  1036 07/15/22  0408 07/16/22  0410 07/17/22  0358   WBC 10.88   < > 15.59* 13.95* 14.68*   RBC 3.44*   < > 4.09* 4.06* 4.00*   HGB 10.1*   < > 11.9* 11.7* 11.5*   HCT 30.7*   < > 35.7* 36.4* 36.1*   MCV 89   < > 87 90 90   MCH 29.4   < > 29.1 28.8 28.8   MCHC 32.9   < > 33.3 32.1 31.9*   RDW 13.8   < > 13.6 13.6 13.8   *   < > 325 344 334   MPV 11.4   < > 10.7 10.9 11.1   GRAN 80.1*  8.7*  --   --   --   --    LYMPH 12.0*  1.3  --   --   --   --    MONO 7.4  0.8  --   --   --   --    BASO 0.01  --   --   --   --    NRBC 0  --   --   --   --     < > = values in this interval not displayed.       COAGULATION LAST 3 DAYS  No results for input(s): LABPT, INR, APTT in the last 168 hours.    CHEMISTRY LAST 3 DAYS  Recent Labs   Lab 07/11/22  0337 07/11/22  1036 07/12/22  0308 07/13/22  0308 07/15/22  0408 07/16/22  0410 07/17/22  0358     --  138   < > 136 133* 135*   K 4.0  --  4.0   < > 3.9 3.9 4.1     --  102   < > 103 104 102   CO2 28  --  28   < > 24 26 26   ANIONGAP 7*  --  8   < > 9 3* 7*   BUN  25*  --  28*   < > 20 25* 29*   CREATININE 1.0  --  1.0   < > 0.8 0.9 0.8   *  --  149*   < > 140* 172* 163*   CALCIUM 7.7*  --  7.9*   < > 8.4* 8.0* 8.2*   PH  --  7.459*  --   --   --   --   --    MG 2.1  --  2.1  --   --   --   --    ALBUMIN  --   --   --    < > 3.2* 2.9* 3.1*   PROT  --   --   --    < > 5.9* 5.6* 5.7*   ALKPHOS  --   --   --    < > 57 53* 54*   ALT  --   --   --    < > 73* 60* 57*   AST  --   --   --    < > 30 22 28   BILITOT  --   --   --    < > 1.7* 1.4* 1.0    < > = values in this interval not displayed.       CARDIAC PROFILE LAST 3 DAYS  Recent Labs   Lab 07/15/22  0408   *       ENDOCRINE LAST 3 DAYS  No results for input(s): TSH, PROCAL in the last 168 hours.    LAST ARTERIAL BLOOD GAS  ABG  Recent Labs   Lab 07/11/22  1036   PH 7.459*   PO2 57*   PCO2 36.8   HCO3 26.1   BE 2       LAST 7 DAYS MICROBIOLOGY   Microbiology Results (last 7 days)       ** No results found for the last 168 hours. **            MOST RECENT IMAGING  X-Ray Chest AP Portable  Portable chest x-ray at 5:48 AM is compared to prior study 8/17/2022    Clinical history is pneumonia    The right IJ catheter is in stable position. The mediastinal drains and left chest tube has been removed. The heart is normal in size. There are median sternotomy wires.    There is linear airspace disease in the right upper lobe and right mid lung suggestive of atelectasis versus infiltrate. The right lower lobe and left lung are clear. There are no acute osseous abnormalities.    IMPRESSION: Development of linear airspace disease in the right upper lobe and mid lung suggestive of atelectasis versus infiltrate    Prior median sternotomy    Electronically signed by:  Isis Blair MD  7/17/2022 6:43 AM CDT Workstation: [id number]      [facility]  Results for orders placed during the hospital encounter of 06/27/22    Echo Saline Bubble? No    Interpretation Summary  · The left ventricle is normal in size with mild  predominantly mid septal mild asymmetric hypertrophy eccentric hypertrophy and normal systolic function.  · The estimated ejection fraction is 60%.  · Indeterminate left ventricular diastolic function.  · Normal right ventricular size with normal right ventricular systolic function.  · Mild left atrial enlargement.  · Normal central venous pressure (3 mmHg).  · The estimated PA systolic pressure is 19 mmHg.  · Mild mitral regurgitation.      CURRENT/PREVIOUS VISIT EKG  Results for orders placed or performed during the hospital encounter of 06/27/22   EKG 12-lead    Collection Time: 07/09/22 12:41 AM    Narrative    Test Reason : I21.3,    Vent. Rate : 088 BPM     Atrial Rate : 088 BPM     P-R Int : 232 ms          QRS Dur : 094 ms      QT Int : 384 ms       P-R-T Axes : 043 101 132 degrees     QTc Int : 464 ms    Sinus rhythm with 1st degree A-V block with occasional Premature  ventricular complexes  Possible Inferior infarct ,age undetermined  Anterolateral infarct (cited on or before 27-JUN-2022)  Abnormal ECG  When compared with ECG of 08-JUL-2022 16:38,  Borderline criteria for Inferior infarct are now Present  Serial changes of Anterior infarct Present  Confirmed by Vincent Mann MD ([id number]) on 7/15/2022 12:55:05 PM    Referred By: [email or web address]   SELF           Confirmed By:Vincent Mann MD       ASSESSMENT/PLAN:     Active Hospital Problems    Diagnosis    *STEMI (ST elevation myocardial infarction)    PEG (percutaneous endoscopic gastrostomy) status    Superficial vein thrombosis    Acute CVA (cerebrovascular accident)    S/P CABG (coronary artery bypass graft)    NSVT (nonsustained ventricular tachycardia)    Coronary artery disease involving coronary bypass graft of native heart with angina pectoris       ASSESSMENT & PLAN:   1. Multivessel coronary artery disease  2. ST-elevation myocardial infarction status post PTCA  3. Nonsustained ventricular tachycardia  4. Mixed  hyperlipidemia      RECOMMENDATIONS:  07/17/2022  Risks and benefits of ASHLEY have been reviewed with the patient including more of anginal and mucosal damage.  Patient understands the risks and alternatives of be to consider regular echocardiogram.  Planned transesophageal echocardiography tomorrow as scheduled.  Patient seem to understand this risks and benefits and wishes to proceed with caution tomorrow.  All his medications are being given through the PEG tube this includes  Amiodarone. Entresto to be initiated    Will tentatively scheduled for ASHLEY on Monday given some rest he was oropharyngeal swelling and discomfort    Jackson Mann MD  [facility]  Department of Cardiology  Date of Service: 07/17/2022  9:53 AM

## 2022-07-17 NOTE — CARE UPDATE
07/17/22 1340   Patient Assessment/Suction   Level of Consciousness (AVPU) alert   Respiratory Effort Normal;Unlabored   Expansion/Accessory Muscles/Retractions no use of accessory muscles   All Lung Fields Breath Sounds diminished;clear   Rhythm/Pattern, Respiratory no shortness of breath reported   Cough Frequency infrequent   Cough Type productive   Suction Method oral   Secretions Amount small   PRE-TX-O2   O2 Device (Oxygen Therapy) room air   SpO2 98 %   Pulse Oximetry Type Continuous   $ Pulse Oximetry - Multiple Charge Pulse Oximetry - Multiple   Pulse 90   Resp 18   Incentive Spirometer   $ Incentive Spirometer Charges proper technique demonstrated   Incentive Spirometer Predicted Level (mL) 2000   Administration (IS) proper technique demonstrated   Number of Repetitions (IS) 10   Level Incentive Spirometer (mL) 2250   Patient Tolerance (IS) good   Education   $ Education Other (see comment);15 min  (SATS)   Respiratory Evaluation   $ Care Plan Tech Time 15 min   $ Eval/Re-eval Charges Re-evaluation      07/17/22 1340   Patient Assessment/Suction   Level of Consciousness (AVPU) alert   Respiratory Effort Normal;Unlabored   Expansion/Accessory Muscles/Retractions no use of accessory muscles   All Lung Fields Breath Sounds diminished;clear   Rhythm/Pattern, Respiratory no shortness of breath reported   Cough Frequency infrequent   Cough Type productive   Suction Method oral   Secretions Amount small   PRE-TX-O2   O2 Device (Oxygen Therapy) room air   SpO2 98 %   Pulse Oximetry Type Continuous   $ Pulse Oximetry - Multiple Charge Pulse Oximetry - Multiple   Pulse 90   Resp 18   Incentive Spirometer   $ Incentive Spirometer Charges proper technique demonstrated   Incentive Spirometer Predicted Level (mL) 2000   Administration (IS) proper technique demonstrated   Number of Repetitions (IS) 10   Level Incentive Spirometer (mL) 2250   Patient Tolerance (IS) good   Education   $ Education Other (see comment);15  min  (SATS)   Respiratory Evaluation   $ Care Plan Tech Time 15 min   $ Eval/Re-eval Charges Re-evaluation

## 2022-07-17 NOTE — PROGRESS NOTES
[facility] Medicine  Progress Note    Patient Name: Matteo Fraire  MRN: [id number]  Patient Class: IP- Inpatient   Admission Date: 6/27/2022  Length of Stay: 20 days  Attending Physician: Mik Mckinnon MD  Primary Care Provider: Primary Doctor No        Subjective:     Principal Problem:STEMI (ST elevation myocardial infarction)        HPI:    61-year-old male past medical history of coronary artery disease status post CABG 2011 and stent 2005 both occasions they were related with MIs course in the ER today because he started having chest pain about 1 hour prior to arrival it was a pressure-like pain associated with sweating and dry heaving is with radiation to both arms sensation was similar to his MI 2005 he got 2 aspirins and appears to present 1 hour later when he got to the EKG showed STEMI so he was taken to cath lab where he had angioplasty of LIMA to LAD by Dr. Powell he is currently on nitroglycerin drip due to high blood pressure he also received morphine IV 4 mg Zofran IV 4 and labetalol 10 mg IV admitted to ICU.      Overview/Hospital Course:  07/12  Pacer wires and Chest tubes removed  Still has Chamorro insitu  Pt sitting on chair and denies any issues    07/13  Agreed with PEG placement  ASHLEY tomorrow  NG tube insitu  and started on Tfs  Chamorro still on place  Restarted on amio gtt today  Overall he says he is better    07/14  ASHLEY cancelled  Will have PEG tmrw AM   BP levels on higher range    07/15  Pt had PEG placement  ASHLEY tomorrow ?  BP levels better    07/16  C/o sore throat  BP levels stable    07/17  Started on Levofloxacin for possible Mild Pneumonia  Pt feels dizzy on ambulation  ASHLEY tomorrow AM       Interval History:     Review of Systems   Constitutional:  Negative for activity change and appetite change.   HENT:  Negative for congestion and dental problem.    Eyes:  Negative for discharge and itching.   Respiratory:  Negative for shortness of breath.    Cardiovascular:   Negative for chest pain.   Gastrointestinal:  Negative for abdominal distention and abdominal pain.   Endocrine: Negative for cold intolerance.   Genitourinary:  Negative for difficulty urinating and dysuria.   Musculoskeletal:  Negative for arthralgias and back pain.   Skin:  Negative for color change.   Neurological:  Positive for light-headedness. Negative for dizziness and facial asymmetry.   Hematological:  Negative for adenopathy.   Psychiatric/Behavioral:  Negative for agitation and behavioral problems.    Objective:     Vital Signs (Most Recent):  Temp: 98.9 °F (37.2 °C) (07/17/22 1515)  Pulse: 89 (07/17/22 1515)  Resp: 19 (07/17/22 1515)  BP: (!) 151/70 (07/17/22 1515)  SpO2: (!) 93 % (07/17/22 1515)   Vital Signs (24h Range):  Temp:  [97.7 °F (36.5 °C)-98.9 °F (37.2 °C)] 98.9 °F (37.2 °C)  Pulse:  [83-98] 89  Resp:  [17-21] 19  SpO2:  [82 %-98 %] 93 %  BP: (121-190)/() 151/70     Weight: 98.7 kg (217 lb 9.5 oz)  Body mass index is 32.13 kg/m².    Intake/Output Summary (Last 24 hours) at 7/17/2022 1629  Last data filed at 7/17/2022 1016  Gross per 24 hour   Intake 1000 ml   Output 700 ml   Net 300 ml      Physical Exam  Vitals and nursing note reviewed.   Constitutional:       Appearance: He is well-developed.   HENT:      Head: Atraumatic.      Right Ear: External ear normal.      Left Ear: External ear normal.      Nose: Nose normal.      Mouth/Throat:      Mouth: Mucous membranes are moist.   Eyes:      General: No scleral icterus.  Cardiovascular:      Rate and Rhythm: Normal rate.   Pulmonary:      Effort: Pulmonary effort is normal.   Musculoskeletal:         General: Normal range of motion.      Cervical back: Full passive range of motion without pain and normal range of motion.   Skin:     General: Skin is warm.   Neurological:      Mental Status: He is alert and oriented to person, place, and time.   Psychiatric:         Behavior: Behavior normal.       Significant Labs: All pertinent labs  within the past 24 hours have been reviewed.  CBC:   Recent Labs   Lab 07/16/22  0410 07/17/22  0358   WBC 13.95* 14.68*   HGB 11.7* 11.5*   HCT 36.4* 36.1*    334     CMP:   Recent Labs   Lab 07/16/22  0410 07/17/22  0358   * 135*   K 3.9 4.1    102   CO2 26 26   * 163*   BUN 25* 29*   CREATININE 0.9 0.8   CALCIUM 8.0* 8.2*   PROT 5.6* 5.7*   ALBUMIN 2.9* 3.1*   BILITOT 1.4* 1.0   ALKPHOS 53* 54*   AST 22 28   ALT 60* 57*   ANIONGAP 3* 7*   EGFRNONAA >60.0 >60.0       Significant Imaging: I have reviewed all pertinent imaging results/findings within the past 24 hours.      Assessment/Plan:      * STEMI (ST elevation myocardial infarction)  STEMI status post balloon angioplasty to LAD  - 06/27   Status post redo 3 vessel CABG for recurrent severe atherosclerotic CAD - 07/08   Transferred  out of ICU on 07/12      PEG (percutaneous endoscopic gastrostomy) status  PEG insertion on 07/15  Started on TFs      Acute CVA (cerebrovascular accident)  Maintain aspirin  Once medically cleared he need rehab placement (mostly 07/18 after ASHLEY)  ASHLEY on 07/18      Superficial vein thrombosis  Nearly completely occlusive thrombus within the basilic vein R side  This is a superficial vein  No need for anticoagulation      Acute pneumonia  Continue iv levofloxacin  Aspiration precautions       S/P CABG (coronary artery bypass graft)  Stable       NSVT (nonsustained ventricular tachycardia)  Stable   Continue PO amiodarone      Coronary artery disease involving coronary bypass graft of native heart with angina pectoris  As above       VTE Risk Mitigation (From admission, onward)    None          Discharge Planning   MICHAEL: 7/18/2022     Code Status: Full Code   Is the patient medically ready for discharge?: No    Reason for patient still in hospital (select all that apply): Treatment  Discharge Plan A: Rehab   Discharge Delays: None known at this time              Mik Mckinnon MD  Department of Hospital Medicine    [facility]

## 2022-07-17 NOTE — PROGRESS NOTES
[facility] Medicine  Progress Note    Patient Name: Matteo Fraire  MRN: [id number]  Patient Class: IP- Inpatient   Admission Date: 6/27/2022  Length of Stay: 19 days  Attending Physician: Mik Mckinnon MD  Primary Care Provider: Primary Doctor No        Subjective:     Principal Problem:STEMI (ST elevation myocardial infarction)        HPI:    61-year-old male past medical history of coronary artery disease status post CABG 2011 and stent 2005 both occasions they were related with MIs course in the ER today because he started having chest pain about 1 hour prior to arrival it was a pressure-like pain associated with sweating and dry heaving is with radiation to both arms sensation was similar to his MI 2005 he got 2 aspirins and appears to present 1 hour later when he got to the EKG showed STEMI so he was taken to cath lab where he had angioplasty of LIMA to LAD by Dr. Powell he is currently on nitroglycerin drip due to high blood pressure he also received morphine IV 4 mg Zofran IV 4 and labetalol 10 mg IV admitted to ICU.      Overview/Hospital Course:  07/12  Pacer wires and Chest tubes removed  Still has Chamorro insitu  Pt sitting on chair and denies any issues    07/13  Agreed with PEG placement  ASHLEY tomorrow  NG tube insitu  and started on Tfs  Chamorro still on place  Restarted on amio gtt today  Overall he says he is better    07/14  ASHLEY cancelled  Will have PEG tmrw AM   BP levels on higher range    07/15  Pt had PEG placement  ASHLEY tomorrow ?  BP levels better    07/16  C/o sore throat  BP levels stable      Interval History:     Review of Systems   Constitutional:  Negative for activity change and appetite change.   HENT:  Positive for sore throat. Negative for congestion and dental problem.    Eyes:  Negative for discharge and itching.   Respiratory:  Negative for shortness of breath.    Cardiovascular:  Negative for chest pain.   Gastrointestinal:  Negative for abdominal distention and  abdominal pain.   Endocrine: Negative for cold intolerance.   Genitourinary:  Negative for difficulty urinating and dysuria.   Musculoskeletal:  Negative for arthralgias and back pain.   Skin:  Negative for color change.   Neurological:  Negative for dizziness and facial asymmetry.   Hematological:  Negative for adenopathy.   Psychiatric/Behavioral:  Negative for agitation and behavioral problems.    Objective:     Vital Signs (Most Recent):  Temp: 98.3 °F (36.8 °C) (07/16/22 1930)  Pulse: 98 (07/16/22 1930)  Resp: (!) 21 (07/16/22 1930)  BP: 132/68 (07/16/22 1930)  SpO2: (!) 92 % (07/16/22 1930)   Vital Signs (24h Range):  Temp:  [97.8 °F (36.6 °C)-98.4 °F (36.9 °C)] 98.3 °F (36.8 °C)  Pulse:  [] 98  Resp:  [20-30] 21  SpO2:  [92 %-96 %] 92 %  BP: (127-158)/() 132/68     Weight: 98.7 kg (217 lb 9.5 oz)  Body mass index is 32.13 kg/m².    Intake/Output Summary (Last 24 hours) at 7/16/2022 2024  Last data filed at 7/16/2022 1505  Gross per 24 hour   Intake 990 ml   Output 1600 ml   Net -610 ml      Physical Exam  Vitals and nursing note reviewed.   Constitutional:       Appearance: He is well-developed.   HENT:      Head: Atraumatic.      Right Ear: External ear normal.      Left Ear: External ear normal.      Nose: Nose normal.      Mouth/Throat:      Mouth: Mucous membranes are moist.   Eyes:      General: No scleral icterus.  Cardiovascular:      Rate and Rhythm: Normal rate.   Pulmonary:      Effort: Pulmonary effort is normal.   Abdominal:      Comments: PEG    Musculoskeletal:         General: Normal range of motion.      Cervical back: Full passive range of motion without pain and normal range of motion.   Skin:     General: Skin is warm.   Neurological:      Mental Status: He is alert and oriented to person, place, and time.   Psychiatric:         Behavior: Behavior normal.       Significant Labs: All pertinent labs within the past 24 hours have been reviewed.  CBC:   Recent Labs   Lab  07/15/22  0408 07/16/22  0410   WBC 15.59* 13.95*   HGB 11.9* 11.7*   HCT 35.7* 36.4*    344     CMP:   Recent Labs   Lab 07/15/22  0408 07/16/22  0410    133*   K 3.9 3.9    104   CO2 24 26   * 172*   BUN 20 25*   CREATININE 0.8 0.9   CALCIUM 8.4* 8.0*   PROT 5.9* 5.6*   ALBUMIN 3.2* 2.9*   BILITOT 1.7* 1.4*   ALKPHOS 57 53*   AST 30 22   ALT 73* 60*   ANIONGAP 9 3*   EGFRNONAA >60.0 >60.0       Significant Imaging: I have reviewed all pertinent imaging results/findings within the past 24 hours.      Assessment/Plan:      * STEMI (ST elevation myocardial infarction)  STEMI status post balloon angioplasty to LAD  - 06/27   Status post redo 3 vessel CABG for recurrent severe atherosclerotic CAD - 07/08   Transferred  out of ICU on 07/12      PEG (percutaneous endoscopic gastrostomy) status  PEG insertion on 07/15  Started on TFs      Acute CVA (cerebrovascular accident)  Maintain aspirin  Once medically cleared he need rehab placement (mostly 07/18 after ASHLEY)  ASHLEY on 07/18      Superficial vein thrombosis  Nearly completely occlusive thrombus within the basilic vein R side  This is a superficial vein  No need for anticoagulation      S/P CABG (coronary artery bypass graft)  Stable       NSVT (nonsustained ventricular tachycardia)  Stable   Continue PO amiodarone      Coronary artery disease involving coronary bypass graft of native heart with angina pectoris  As above       VTE Risk Mitigation (From admission, onward)    None          Discharge Planning   MICHAEL: 7/18/2022     Code Status: Full Code   Is the patient medically ready for discharge?: No    Reason for patient still in hospital (select all that apply): Treatment and Pending disposition  Discharge Plan A: Rehab   Discharge Delays: None known at this time              Mik Mckinnon MD  Department of Hospital Medicine   [facility]

## 2022-07-17 NOTE — ASSESSMENT & PLAN NOTE
Maintain aspirin  Once medically cleared he need rehab placement (mostly 07/18 after ASHLEY)  ASHLEY on 07/18

## 2022-07-18 ENCOUNTER — CLINICAL SUPPORT (OUTPATIENT)
Dept: CARDIOLOGY | Facility: HOSPITAL | Age: 62
DRG: 231 | End: 2022-07-18
Attending: INTERNAL MEDICINE
Payer: COMMERCIAL

## 2022-07-18 VITALS
DIASTOLIC BLOOD PRESSURE: 60 MMHG | HEIGHT: 69 IN | BODY MASS INDEX: 32.14 KG/M2 | HEART RATE: 88 BPM | WEIGHT: 217 LBS | SYSTOLIC BLOOD PRESSURE: 101 MMHG | OXYGEN SATURATION: 98 %

## 2022-07-18 LAB
ALBUMIN SERPL BCP-MCNC: 3.1 G/DL (ref 3.5–5.2)
ALP SERPL-CCNC: 54 U/L (ref 55–135)
ALT SERPL W/O P-5'-P-CCNC: 53 U/L (ref 10–44)
ANION GAP SERPL CALC-SCNC: 7 MMOL/L (ref 8–16)
AST SERPL-CCNC: 23 U/L (ref 10–40)
BILIRUB SERPL-MCNC: 1.2 MG/DL (ref 0.1–1)
BUN SERPL-MCNC: 30 MG/DL (ref 8–23)
CALCIUM SERPL-MCNC: 8.4 MG/DL (ref 8.7–10.5)
CHLORIDE SERPL-SCNC: 102 MMOL/L (ref 95–110)
CO2 SERPL-SCNC: 27 MMOL/L (ref 23–29)
CREAT SERPL-MCNC: 0.8 MG/DL (ref 0.5–1.4)
ERYTHROCYTE [DISTWIDTH] IN BLOOD BY AUTOMATED COUNT: 13.9 % (ref 11.5–14.5)
EST. GFR  (AFRICAN AMERICAN): >60 ML/MIN/1.73 M^2
EST. GFR  (NON AFRICAN AMERICAN): >60 ML/MIN/1.73 M^2
GLUCOSE SERPL-MCNC: 130 MG/DL (ref 70–110)
GLUCOSE SERPL-MCNC: 137 MG/DL (ref 70–110)
GLUCOSE SERPL-MCNC: 141 MG/DL (ref 70–110)
GLUCOSE SERPL-MCNC: 144 MG/DL (ref 70–110)
GLUCOSE SERPL-MCNC: 157 MG/DL (ref 70–110)
HCT VFR BLD AUTO: 35.5 % (ref 40–54)
HGB BLD-MCNC: 11.3 G/DL (ref 14–18)
MCH RBC QN AUTO: 28.4 PG (ref 27–31)
MCHC RBC AUTO-ENTMCNC: 31.8 G/DL (ref 32–36)
MCV RBC AUTO: 89 FL (ref 82–98)
PLATELET # BLD AUTO: 313 K/UL (ref 150–450)
PMV BLD AUTO: 10.7 FL (ref 9.2–12.9)
POTASSIUM SERPL-SCNC: 4.2 MMOL/L (ref 3.5–5.1)
PROT SERPL-MCNC: 5.7 G/DL (ref 6–8.4)
RBC # BLD AUTO: 3.98 M/UL (ref 4.6–6.2)
SODIUM SERPL-SCNC: 136 MMOL/L (ref 136–145)
WBC # BLD AUTO: 14.15 K/UL (ref 3.9–12.7)

## 2022-07-18 PROCEDURE — 25000003 PHARM REV CODE 250: Performed by: THORACIC SURGERY (CARDIOTHORACIC VASCULAR SURGERY)

## 2022-07-18 PROCEDURE — 80053 COMPREHEN METABOLIC PANEL: CPT | Performed by: INTERNAL MEDICINE

## 2022-07-18 PROCEDURE — 25000003 PHARM REV CODE 250: Performed by: INTERNAL MEDICINE

## 2022-07-18 PROCEDURE — 93320 PR DOPPLER ECHO HEART,COMPLETE: ICD-10-PCS | Mod: 26,,, | Performed by: INTERNAL MEDICINE

## 2022-07-18 PROCEDURE — 93312 ECHO TRANSESOPHAGEAL: CPT | Mod: 26,,, | Performed by: INTERNAL MEDICINE

## 2022-07-18 PROCEDURE — 25000003 PHARM REV CODE 250: Performed by: NURSE ANESTHETIST, CERTIFIED REGISTERED

## 2022-07-18 PROCEDURE — 63600175 PHARM REV CODE 636 W HCPCS: Performed by: NURSE ANESTHETIST, CERTIFIED REGISTERED

## 2022-07-18 PROCEDURE — 93312 PR ECHO HEART,TRANSESOPHAGEAL: ICD-10-PCS | Mod: 26,,, | Performed by: INTERNAL MEDICINE

## 2022-07-18 PROCEDURE — 82962 GLUCOSE BLOOD TEST: CPT

## 2022-07-18 PROCEDURE — 93320 DOPPLER ECHO COMPLETE: CPT

## 2022-07-18 PROCEDURE — 99900035 HC TECH TIME PER 15 MIN (STAT)

## 2022-07-18 PROCEDURE — 93312 ECHO TRANSESOPHAGEAL: CPT

## 2022-07-18 PROCEDURE — 99233 PR SUBSEQUENT HOSPITAL CARE,LEVL III: ICD-10-PCS | Mod: ,,, | Performed by: INTERNAL MEDICINE

## 2022-07-18 PROCEDURE — 93325 DOPPLER ECHO COLOR FLOW MAPG: CPT | Mod: 26,,, | Performed by: INTERNAL MEDICINE

## 2022-07-18 PROCEDURE — 93320 DOPPLER ECHO COMPLETE: CPT | Mod: 26,,, | Performed by: INTERNAL MEDICINE

## 2022-07-18 PROCEDURE — 85027 COMPLETE CBC AUTOMATED: CPT | Performed by: INTERNAL MEDICINE

## 2022-07-18 PROCEDURE — 63600175 PHARM REV CODE 636 W HCPCS: Performed by: INTERNAL MEDICINE

## 2022-07-18 PROCEDURE — 94799 UNLISTED PULMONARY SVC/PX: CPT

## 2022-07-18 PROCEDURE — 93325 PR DOPPLER COLOR FLOW VELOCITY MAP: ICD-10-PCS | Mod: 26,,, | Performed by: INTERNAL MEDICINE

## 2022-07-18 PROCEDURE — 25000003 PHARM REV CODE 250

## 2022-07-18 PROCEDURE — 94761 N-INVAS EAR/PLS OXIMETRY MLT: CPT

## 2022-07-18 PROCEDURE — 25000003 PHARM REV CODE 250: Performed by: PHYSICIAN ASSISTANT

## 2022-07-18 PROCEDURE — 99900031 HC PATIENT EDUCATION (STAT)

## 2022-07-18 PROCEDURE — 21000000 HC CCU ICU ROOM CHARGE

## 2022-07-18 PROCEDURE — 27000221 HC OXYGEN, UP TO 24 HOURS

## 2022-07-18 PROCEDURE — 99233 SBSQ HOSP IP/OBS HIGH 50: CPT | Mod: ,,, | Performed by: INTERNAL MEDICINE

## 2022-07-18 PROCEDURE — 97116 GAIT TRAINING THERAPY: CPT

## 2022-07-18 RX ORDER — LEVOFLOXACIN 250 MG/1
500 TABLET ORAL DAILY
Status: DISCONTINUED | OUTPATIENT
Start: 2022-07-19 | End: 2022-07-19 | Stop reason: HOSPADM

## 2022-07-18 RX ORDER — AMLODIPINE BESYLATE 5 MG/1
5 TABLET ORAL DAILY
Status: DISCONTINUED | OUTPATIENT
Start: 2022-07-19 | End: 2022-07-19 | Stop reason: HOSPADM

## 2022-07-18 RX ORDER — NAPROXEN SODIUM 220 MG/1
81 TABLET, FILM COATED ORAL DAILY
Status: DISCONTINUED | OUTPATIENT
Start: 2022-07-19 | End: 2022-07-19 | Stop reason: HOSPADM

## 2022-07-18 RX ORDER — ATORVASTATIN CALCIUM 40 MG/1
80 TABLET, FILM COATED ORAL DAILY
Status: DISCONTINUED | OUTPATIENT
Start: 2022-07-19 | End: 2022-07-19 | Stop reason: HOSPADM

## 2022-07-18 RX ORDER — DOCUSATE SODIUM 50 MG/5ML
100 LIQUID ORAL 2 TIMES DAILY
Status: DISCONTINUED | OUTPATIENT
Start: 2022-07-18 | End: 2022-07-19 | Stop reason: HOSPADM

## 2022-07-18 RX ORDER — ACETAMINOPHEN 325 MG/1
650 TABLET ORAL EVERY 6 HOURS PRN
Status: DISCONTINUED | OUTPATIENT
Start: 2022-07-18 | End: 2022-07-19 | Stop reason: HOSPADM

## 2022-07-18 RX ORDER — METOPROLOL TARTRATE 1 MG/ML
2.5 INJECTION, SOLUTION INTRAVENOUS EVERY 6 HOURS PRN
Status: DISCONTINUED | OUTPATIENT
Start: 2022-07-18 | End: 2022-07-19 | Stop reason: HOSPADM

## 2022-07-18 RX ORDER — PROPOFOL 10 MG/ML
VIAL (ML) INTRAVENOUS
Status: DISCONTINUED | OUTPATIENT
Start: 2022-07-18 | End: 2022-07-18

## 2022-07-18 RX ADMIN — PROPOFOL 20 MG: 10 INJECTION, EMULSION INTRAVENOUS at 09:07

## 2022-07-18 RX ADMIN — LEVOFLOXACIN 500 MG: 500 INJECTION, SOLUTION INTRAVENOUS at 10:07

## 2022-07-18 RX ADMIN — TAMSULOSIN HYDROCHLORIDE 0.4 MG: 0.4 CAPSULE ORAL at 10:07

## 2022-07-18 RX ADMIN — ATORVASTATIN CALCIUM 80 MG: 40 TABLET, FILM COATED ORAL at 10:07

## 2022-07-18 RX ADMIN — AMIODARONE HYDROCHLORIDE 200 MG: 200 TABLET ORAL at 10:07

## 2022-07-18 RX ADMIN — AMLODIPINE BESYLATE 5 MG: 5 TABLET ORAL at 10:07

## 2022-07-18 RX ADMIN — SODIUM CHLORIDE: 0.9 INJECTION, SOLUTION INTRAVENOUS at 09:07

## 2022-07-18 RX ADMIN — SACUBITRIL AND VALSARTAN 1 TABLET: 24; 26 TABLET, FILM COATED ORAL at 08:07

## 2022-07-18 RX ADMIN — PROPOFOL 50 MG: 10 INJECTION, EMULSION INTRAVENOUS at 09:07

## 2022-07-18 RX ADMIN — AMIODARONE HYDROCHLORIDE 200 MG: 200 TABLET ORAL at 08:07

## 2022-07-18 RX ADMIN — ASPIRIN 81 MG CHEWABLE TABLET 81 MG: 81 TABLET CHEWABLE at 10:07

## 2022-07-18 RX ADMIN — SACUBITRIL AND VALSARTAN 1 TABLET: 24; 26 TABLET, FILM COATED ORAL at 10:07

## 2022-07-18 RX ADMIN — DOCUSATE SODIUM 100 MG: 50 LIQUID ORAL at 10:07

## 2022-07-18 RX ADMIN — METOPROLOL TARTRATE 25 MG: 25 TABLET, FILM COATED ORAL at 08:07

## 2022-07-18 RX ADMIN — METOPROLOL TARTRATE 25 MG: 25 TABLET, FILM COATED ORAL at 10:07

## 2022-07-18 NOTE — TRANSFER OF CARE
"Anesthesia Transfer of Care Note    Patient: Matteo Fraire    Procedure(s) Performed: * No procedures listed *    Patient location: Other: cardio a 2537 nurse in room     Anesthesia Type: MAC    Transport from OR: Transported from OR on 6-10 L/min O2 by face mask with adequate spontaneous ventilation    Post pain: adequate analgesia    Post assessment: no apparent anesthetic complications    Post vital signs: stable    Level of consciousness: awake and alert    Nausea/Vomiting: no nausea/vomiting    Complications: none    Transfer of care protocol was followed      Last vitals:   Visit Vitals  /72 (BP Location: Right leg, Patient Position: Lying)   Pulse 89   Temp 36.6 °C (97.8 °F) (Oral)   Resp 18   Ht 5' 9" (1.753 m)   Wt 98.7 kg (217 lb 9.5 oz)   SpO2 95%   BMI 32.13 kg/m²     "

## 2022-07-18 NOTE — CARE UPDATE
07/17/22 2044   PRE-TX-O2   O2 Device (Oxygen Therapy) room air   SpO2 95 %   Pulse Oximetry Type Continuous   $ Pulse Oximetry - Multiple Charge Pulse Oximetry - Multiple   Pulse 90   Education   $ Education 15 min   Respiratory Evaluation   $ Care Plan Tech Time 15 min

## 2022-07-18 NOTE — PLAN OF CARE
Problem: Adult Inpatient Plan of Care  Goal: Plan of Care Review  Outcome: Ongoing, Progressing     Problem: Adult Inpatient Plan of Care  Goal: Patient-Specific Goal (Individualized)  Outcome: Ongoing, Progressing     Problem: Adult Inpatient Plan of Care  Goal: Absence of Hospital-Acquired Illness or Injury  Outcome: Ongoing, Progressing     Problem: Adult Inpatient Plan of Care  Goal: Optimal Comfort and Wellbeing  Outcome: Ongoing, Progressing     Problem: Adult Inpatient Plan of Care  Goal: Readiness for Transition of Care  Outcome: Ongoing, Progressing     Problem: Arrhythmia/Dysrhythmia (Cardiac Catheterization)  Goal: Stable Heart Rate and Rhythm  Outcome: Ongoing, Progressing     Problem: Bleeding (Cardiac Catheterization)  Goal: Absence of Bleeding  Outcome: Ongoing, Progressing     Problem: Contrast-Induced Injury Risk (Cardiac Catheterization)  Goal: Absence of Contrast-Induced Injury  Outcome: Ongoing, Progressing     Problem: Embolism (Cardiac Catheterization)  Goal: Absence of Embolism Signs and Symptoms  Outcome: Ongoing, Progressing     Problem: Pain (Cardiac Catheterization)  Goal: Acceptable Pain Control  Outcome: Ongoing, Progressing     Problem: Fall Injury Risk  Goal: Absence of Fall and Fall-Related Injury  Outcome: Ongoing, Progressing     Problem: Infection  Goal: Absence of Infection Signs and Symptoms  Outcome: Ongoing, Progressing     Problem: Fluid Imbalance (Pneumonia)  Goal: Fluid Balance  Outcome: Ongoing, Progressing     Problem: Infection (Pneumonia)  Goal: Resolution of Infection Signs and Symptoms  Outcome: Ongoing, Progressing     Problem: Respiratory Compromise (Pneumonia)  Goal: Effective Oxygenation and Ventilation  Outcome: Ongoing, Progressing

## 2022-07-18 NOTE — NURSING
Pt. sitting upright in chair and reports hasn't been there long.  Instructed him on measures to prevent worsening of Stage II ulcer to sacrum. Also demonstrated how to shift weight to avoid deep tissue injury.  Reports that he understands my instructions and will try to be mindful of shifting wt. about every 15 min. Pt. was also instructed not to sit in same spot for more than hour and half to decrease risk of getting new ulcer.

## 2022-07-18 NOTE — PROGRESS NOTES
[facility] hospital  Department of Neurology  Progress Note  Date: 2022          Patient Name: Matteo Fraire   MRN: [id number]   : 1960    AGE: 61 y.o.    LOS: 21 days Hospital Day: 22  Admit date: 2022  7:45 PM       HPI per EMR: Matteo Fraire is a 61 y.o. male with a history of 61-year-old with prior history of CAD status post PCI and CABG in  came with chest pain found to have STEMI status post balloon angioplasty to LAD - now status post redo 3 vessel CABG  .  Hospital stay notable for episodes of NSVT after which he was initiated on lidocaine infusion.  Postoperative stay notable for persistent ST elevation which improved with nitroglycerin infusion.      Postoperatively patient has been lethargic and slow to respond.  He has been oriented x3.  This morning left facial droop was noted for which neurology was consulted.         Neurology consult:  Patient was seen and examined by me.  He is lethargic however able to respond questions and commands.  He did not have any episodes of SVT since last night, mental status has slightly improving as per the nurse.  He is oriented x3. On exam, he has a L facial droop and left lid lag.  He does not any other focal neurological deficits including no weakness or sensory changes in extremities.  He denies any headaches, nausea, vomiting any new symptoms.    2022: No acute events overnight. Patient was seen and examined by me this morning. Neuro exam same as yesterday.  Continues to have left upper lower face weakness.  No other new symptoms.    2022:  Patient was seen examined by me this morning.  He denies any new symptoms morning.  Continues to have facial droop on the left side.  He is having difficulty swallowing for which speech is seen him and he was started on NG tube feeds.    2022:  Patient was seen examined by me this morning.  He denies any new symptoms this morning.  He is pending a transesophageal echocardiogram  and PEG tube placement today.    7/15/2022:  Patient was seen and examined by me this morning.  He is status post PEG tube placement.  Pending a transesophageal echocardiogram.    7/16: Patient was seen and examined by me this morning.  No acute overnight events, no new neurological complaints.  Vitals have been stable    7/17: Patient was seen and examined by me this morning.  No acute overnight events, no new neurological complaints.  Vitals have been stable.  Patient states that he feels better today and thinks his speech is better.  He is able to the bathroom with assistance.  Denies any new symptoms this morning.  Pending ASHLEY.    7/18/22: Patient was seen and examined by me today. There were no acute events overnight. His speech keeps improving, and he denies any new neuro deficits. ASHLEY was performed today, reportedly negative for thrombus or PFO, but formal read still pending.         Vitals:  Patient Vitals for the past 24 hrs:   BP Temp Temp src Pulse Resp SpO2   07/18/22 1135 133/66 97.3 °F (36.3 °C) Oral 80 20 100 %   07/18/22 1035 (!) 142/65 -- -- -- -- --   07/18/22 0803 -- -- -- 89 18 98 %   07/18/22 0726 132/72 97.8 °F (36.6 °C) Oral 89 18 95 %   07/18/22 0320 (!) 142/71 98.4 °F (36.9 °C) Oral 88 20 (!) 94 %   07/17/22 2300 (!) 144/74 98.1 °F (36.7 °C) Oral 79 18 96 %   07/17/22 2044 -- -- -- 90 -- 95 %   07/17/22 1911 128/70 98.4 °F (36.9 °C) Oral 92 19 98 %   07/17/22 1515 (!) 151/70 98.9 °F (37.2 °C) Oral 89 19 (!) 93 %   07/17/22 1412 (!) 170/122 -- -- 90 -- (!) 82 %   07/17/22 1411 (!) 190/104 -- -- 90 -- (!) 94 %   07/17/22 1409 133/70 -- -- 88 -- 98 %   07/17/22 1340 -- -- -- 90 18 98 %     PHYSICAL EXAM:     GENERAL APPEARANCE: awake and alert, well-developed, well-nourished male in no acute distress.  HEENT: Normocephalic and atraumatic. PERRL. Oropharynx unremarkable.  PULM: Normal respiratory effort. No accessory muscle use.  CV: RRR.  ABDOMEN: Soft, nontender, PEG tube in  place.  EXTREMITIES: No obvious signs of vascular compromise. Pulses present. No cyanosis, clubbing or edema.  SKIN: Clear; no rashes, lesions or skin breaks in exposed areas.     NEURO:  MENTAL STATUS: awake and alert, oriented x3.  Able to follow commands appropriately.      CRANIAL NERVES:  CN I: Not tested.  CN II: Fundoscopic exam deferred.  CN III, IV, VI: Pupils equal, round and reactive to light.  Extraocular movements full and intact.  CN V: Facial sensation normal.  CN VII: Facial asymmetry noted for left facial droop. Left upper face is involved as well.   CN VIII: Hearing grossly normal and equal bilaterally.  No skew deviation or pathologic nystagmus.  CN IX, X: Palate elevates symmetrically. Speech/articulation is mildly dysarthric.  CN XI: Shoulder shrug and chin rotation equal with good strength.  CN XII: Tongue protrusion midline.     MOTOR:  Bulk normal. Tone normal and symmetric throughout.  Abnormal movements absent.  Tremor: none present.  Strength 5/5 throughout.     REFLEXES:  DTRs 2+ throughout.  Plantar response downgoing bilaterally.  SENSATION: grossly intact throughout.  COORDINATION: normal finger-to-nose.  STATION: not tested.  GAIT: not tested.          CURRENT SCHEDULED MEDICATIONS:   amiodarone  200 mg Per G Tube BID    amLODIPine  5 mg Oral Daily    aspirin  81 mg Oral Daily    atorvastatin  80 mg Oral Daily    docusate  100 mg Oral BID    levoFLOXacin  500 mg Intravenous Q24H    metoprolol tartrate  25 mg Per G Tube BID    sacubitriL-valsartan  1 tablet Per G Tube BID    tamsulosin  0.4 mg Oral Daily     CURRENT INFUSIONS:   carboxymethylcellulose       DATA:  Recent Labs   Lab 07/12/22  0308 07/12/22  1947 07/13/22  0308 07/14/22  0515 07/15/22  0408 07/16/22  0410 07/17/22  0358 07/18/22  0336     --  139 139 136 133* 135* 136   K 4.0  --  3.7 3.7 3.9 3.9 4.1 4.2     --  103 103 103 104 102 102   CO2 28  --  29 28 24 26 26 27   BUN 28*  --  28* 24* 20 25*  29* 30*   CREATININE 1.0  --  0.9 0.8 0.8 0.9 0.8 0.8   *  --  129* 155* 140* 172* 163* 144*   CALCIUM 7.9*  --  8.1* 8.3* 8.4* 8.0* 8.2* 8.4*   PHOS 2.3* 2.3* 3.2  --   --   --   --   --    MG 2.1  --   --   --   --   --   --   --    AST  --   --  58* 49* 30 22 28 23   ALT  --   --  60* 87* 73* 60* 57* 53*     Recent Labs   Lab 07/12/22  0308 07/13/22  0307 07/14/22  0515 07/15/22  0408 07/16/22  0410 07/17/22  0358 07/18/22  0336   WBC 9.42 9.67 12.82* 15.59* 13.95* 14.68* 14.15*   HGB 9.3* 9.4* 10.6* 11.9* 11.7* 11.5* 11.3*   HCT 28.5* 30.5* 33.1* 35.7* 36.4* 36.1* 35.5*    190 240 325 344 334 313     No results found for: PROTEINCSF, GLUCCSF, WBCCSF, RBCCSF  Hemoglobin A1C   Date Value Ref Range Status   06/28/2022 6.1 4.5 - 6.2 % Final     Comment:     According to ADA guidelines, hemoglobin A1C <7.0% represents  optimal control in non-pregnant diabetic patients.  Different  metrics may apply to specific populations.   Standards of Medical Care in Diabetes - 2016.    For the purpose of screening for the presence of diabetes:  <5.7%     Consistent with the absence of diabetes  5.7-6.4%  Consistent with increasing risk for diabetes   (prediabetes)  >or=6.5%  Consistent with diabetes    Currently no consensus exists for use of hemoglobin A1C  for diagnosis of diabetes for children.     01/20/2011 5.8 4.0 - 6.2 % Final   01/14/2011 5.8 4.0 - 6.2 % Final            I have personally reviewed and interpreted the pertinent imaging and lab results.  Imaging Results          X-Ray Chest AP Portable (Final result)  Result time 06/28/22 06:13:22    Final result by Waldemar De La Cruz MD (06/28/22 06:13:22)                 Narrative:    Reason: chest pain    FINDINGS:    PA and lateral chest with comparison chest x-ray November 20, 2019 show normal cardiomediastinal silhouette.  Mild bilateral perihilar hazy ill-defined opacities are noted. Pulmonary vasculature is normal. No acute osseous  abnormality.    IMPRESSION:    Mild bilateral perihilar hazy ill-defined opacities could reflect mild pulmonary edema or infection in the proper clinical settings.    Electronically signed by:  Waldemar De La Cruz DO  6/28/2022 6:13 AM CDT Workstation: [id number]                                    ASSESSMENT AND PLAN:       Acute ischemic stroke: etiology large vessel in the setting of ICAD  Post CABG  Dysphagia     Workup  · CTH: No acute intracranial abnormality. Prior R temporal and R BG infarcts  · MRI brain: Foci of restricted diffusion compatible with recent infarction involving the right temporal lobe inferiorly, right lentiform nucleus and upper medulla/lower mele to the left of midline  · CTA head and neck :  Multifocal narrowing of intracranial vertebral arteries bilaterally with high-grade stenosis, atherosclerotic narrowing of proximal right ANTONIO.  No large vessel occlusion.  · Hemoglobin A1c:  6.1  · LDL  59  · Echocardiogram:  EF 43%, normal left atrium  · ASHLEY pending official read, preliminary result negative for intraatrial thrombus or PFO        Plan  · Admitted for further stroke workup  · Continue with Aspirin 81 mg and Lipitor 80mg for stroke prevention.  Will hold off on dual antiplatelet therapy due to moderate stroke burden, can consider starting DAPT 7-10 days after stroke  · ASHLEY as above  · Normalize BP  · Post CABG management per primary team and Cardiology/CT sugery  · PT OT  · Speech therapy  · PEG tube placement for dysphagia.  · DVT prophylaxis with chemo/SCD prophylaxis  · Discussed lifestyle modifications as prophylactic measures for stroke prevention including, adequate blood pressure management, healthy diet and regular exercise.  · Will follow official read of ASHLEY       35 minutes of care time has been spent evaluating with the patient. Time includes chart review not limited to diagnostic imaging, labs, and vitals, patient assessment, discussion with family and nursing, current order  evaluations, and new order entries.     Mara Hinkle MD  Neurology/vascular Neurology  Date of Service: 07/18/2022

## 2022-07-18 NOTE — ANESTHESIA POSTPROCEDURE EVALUATION
Anesthesia Post Evaluation    Patient: Matteo Fraire    Procedure(s) Performed: * No procedures listed *    Final Anesthesia Type: MAC      Patient location: Cardiology A.  Patient participation: Yes- Able to Participate  Level of consciousness: awake and alert  Post-procedure vital signs: reviewed and stable  Pain management: adequate  Airway patency: patent    PONV status at discharge: No PONV  Anesthetic complications: no      Cardiovascular status: blood pressure returned to baseline and stable  Respiratory status: unassisted and nasal cannula  Hydration status: euvolemic  Follow-up not needed.          Vitals Value Taken Time   /60 07/18/22 0939   Temp 36.6 °C (97.8 °F) 07/18/22 0726   Pulse 88 07/18/22 0939   Resp 18 07/18/22 0726   SpO2 98 % 07/18/22 0939         No case tracking events are documented in the log.      Pain/Lissy Score: No data recorded

## 2022-07-18 NOTE — PROGRESS NOTES
[facility]  Adult Nutrition   Progress Note (Nutrition Support Management)    SUMMARY      Recommendations:   Continue current PEG feeding of Vital AF at 70 mls/h which is meeting patient estimated energy and proein needs.       Goals:   1) TF tolerance.  2) Adequate intake to meet estimated nutritional needs    Dietitian Rounds Brief  F/U Nutrition Note: Spoke with patient today to see how he was tolerating and liking his new P tube and he said it was better than the NG tube. He complained that nursing turned it off a lot a would forget to turn I back on. I encouraged him to get them to show him how to turn it back on since he may have it for a while or possibly permanently but I will also mention it to them. LBM was 7/15/2022. Will continue to follow prn.    Diet order: NPO    TF rate/provision: 70/Vital AF    % Intake of Estimated Energy Needs: 75 - 100 %  % Meal Intake: NPO    Estimated/Assessed Needs  Weight Used For Calorie Calculations: 97.4 kg (214 lb 11.7 oz)  Energy Calorie Requirements (kcal): 2483-7146 (20-25 kcal/kg)  Energy Need Method: Kcal/kg  Protein Requirements: 105-140 (1.5-2 g/kg)  Weight Used For Protein Calculations: 70 kg (154 lb 5.2 oz)  Fluid Requirements (mL): 2886 (30 ml/kg)  Estimated Fluid Requirement Method: other (see comments)  RDA Method (mL): 1948       Weight History:  Wt Readings from Last 5 Encounters:   07/16/22 98.7 kg (217 lb 9.5 oz)   07/18/22 98.4 kg (217 lb)   07/09/22 96.2 kg (212 lb)   06/28/22 100.7 kg (222 lb)   11/21/19 105 kg (231 lb 7.7 oz)        Reason for Assessment  Reason For Assessment: consult  Diagnosis: other (see comments) (STEMI (ST elevation myocardial infarction))  Relevant Medical History: Hypertension, Heart attack, Psoriasis  Interdisciplinary Rounds: attended    Medications:Pertinent Medications Reviewed  Scheduled Meds:   amiodarone  200 mg Per G Tube BID    amLODIPine  5 mg Oral Daily    aspirin  81 mg Oral Daily    atorvastatin   80 mg Oral Daily    docusate  100 mg Oral BID    levoFLOXacin  500 mg Intravenous Q24H    metoprolol tartrate  25 mg Per G Tube BID    sacubitriL-valsartan  1 tablet Per G Tube BID    tamsulosin  0.4 mg Oral Daily     Continuous Infusions:   carboxymethylcellulose       PRN Meds:.acetaminophen, albumin human 5%, ALPRAZolam, calcium gluconate IVPB, calcium gluconate IVPB, calcium gluconate IVPB, carboxymethylcellulose, dextrose 50%, dextrose 50%, hydrALAZINE, HYDROcodone-acetaminophen, insulin aspart U-100, labetalol, magnesium sulfate IVPB, magnesium sulfate IVPB, melatonin, metoprolol, morphine, mupirocin, ondansetron, potassium chloride in water **AND** potassium chloride in water **AND** potassium chloride in water, sodium phosphate IVPB, sodium phosphate IVPB, sodium phosphate IVPB    Labs: Pertinent Labs Reviewed  Clinical Chemistry:  Recent Labs   Lab 07/12/22 0308 07/12/22  1947 07/13/22  0308 07/14/22  0515 07/18/22  0336     --  139   < > 136   K 4.0  --  3.7   < > 4.2     --  103   < > 102   CO2 28  --  29   < > 27   *  --  129*   < > 144*   BUN 28*  --  28*   < > 30*   CREATININE 1.0  --  0.9   < > 0.8   CALCIUM 7.9*  --  8.1*   < > 8.4*   PROT  --   --  5.6*   < > 5.7*   ALBUMIN  --   --  2.9*   < > 3.1*   BILITOT  --   --  1.2*   < > 1.2*   ALKPHOS  --   --  47*   < > 54*   AST  --   --  58*   < > 23   ALT  --   --  60*   < > 53*   ANIONGAP 8  --  7*   < > 7*   ESTGFRAFRICA >60.0  --  >60.0   < > >60.0   EGFRNONAA >60.0  --  >60.0   < > >60.0   MG 2.1  --   --   --   --    PHOS 2.3* 2.3* 3.2  --   --     < > = values in this interval not displayed.     CBC:   Recent Labs   Lab 07/18/22  0336   WBC 14.15*   RBC 3.98*   HGB 11.3*   HCT 35.5*      MCV 89   MCH 28.4   MCHC 31.8*     Lipid Panel:  Recent Labs   Lab 07/12/22  1810   CHOL 111*   HDL 29*   LDLCALC 59.8*   TRIG 111   CHOLHDL 26.1     Cardiac Profile:  Recent Labs   Lab 07/15/22  0408   *     Monitor and  Evaluation  Food and Nutrient Intake: enteral nutrition intake  Food and Nutrient Adminstration: enteral and parenteral nutrition administration  Knowledge/Beliefs/Attitudes: food and nutrition knowledge/skill, beliefs and attitudes  Physical Activity and Function: nutrition-related ADLs and IADLs  Anthropometric Measurements: weight, weight change, body mass index  Biochemical Data, Medical Tests and Procedures: glucose/endocrine profile, gastrointestinal profile, electrolyte and renal panel  Nutrition-Focused Physical Findings: overall appearance     Nutrition Risk  Level of Risk/Frequency of Follow-up: high     Nutrition Follow-Up  RD Follow-up?: Yes    Eloisa Burnham RD 07/18/2022 2:35 PM

## 2022-07-18 NOTE — PLAN OF CARE
07/18/22 0803   Patient Assessment/Suction   Level of Consciousness (AVPU) alert   Respiratory Effort Normal;Unlabored   PRE-TX-O2   O2 Device (Oxygen Therapy) room air   SpO2 98 %   Pulse Oximetry Type Continuous   $ Pulse Oximetry - Multiple Charge Pulse Oximetry - Multiple   Pulse 89   Resp 18   Incentive Spirometer   $ Incentive Spirometer Charges proper technique demonstrated   Administration (IS) self-administered   Education   $ Education 15 min   Respiratory Evaluation   $ Care Plan Tech Time 15 min

## 2022-07-18 NOTE — PT/OT/SLP PROGRESS
Physical Therapy Treatment    Patient Name:  Matteo Fraire   MRN:  [id number]    Recommendations:     Discharge Recommendations:  rehabilitation facility   Discharge Equipment Recommendations: walker, rolling   Barriers to discharge: Decreased caregiver support    Assessment:     Matteo Fraire is a 61 y.o. male admitted with a medical diagnosis of STEMI (ST elevation myocardial infarction).  He presents with the following impairments/functional limitations:  weakness, impaired endurance, decreased upper extremity function, gait instability, impaired cardiopulmonary response to activity, impaired functional mobility, pain, impaired skin, impaired self care skills .Pt found sleeping in bed following ASHLEY. Easily aroused and agreeable to PT. Required VC's for sternal precautions. HOB elevated then patient requiring min A to sit EOB. Stand with CGA/min A, no AD. Pt ambulated in hallway on 3 l NC, slow, steady and numerous brief standing rest periods. VC's required for nasal breathes for optimal use of supplemental O2.    Rehab Prognosis: Good; patient would benefit from acute skilled PT services to address these deficits and reach maximum level of function.    Recent Surgery: Procedure(s) (LRB):  EGD (ESOPHAGOGASTRODUODENOSCOPY) (N/A) 3 Days Post-Op    Plan:     During this hospitalization, patient to be seen daily to address the identified rehab impairments via gait training, therapeutic activities, therapeutic exercises and progress toward the following goals:    · Plan of Care Expires:  08/13/22    Subjective     Chief Complaint: concerned about wife's health,  Patient/Family Comments/goals: return to PLOF  Pain/Comfort:  Pain Rating 1: 0/10  Pain Addressed 1:  (pt premedicated for ASHLEY)  Pain Rating Post-Intervention 1: 0/10      Objective:     Communicated with nurse prior to session.  Patient found HOB elevated with PEG Tube, peripheral IV, PICC line upon PT entry to room.     General Precautions: Standard, fall,  sternal   Orthopedic Precautions:N/A   Braces: N/A  Respiratory Status: Room air     Functional Mobility:  · Bed Mobility:     · Supine to Sit: contact guard assistance and minimum assistance  · Transfers:     · Sit to Stand:  contact guard assistance with no AD  · Gait: 75 ft RW  x 2  CGA, slow, steady, numeour brief standing rest      AM-PAC 6 CLICK MOBILITY  Turning over in bed (including adjusting bedclothes, sheets and blankets)?: 3  Sitting down on and standing up from a chair with arms (e.g., wheelchair, bedside commode, etc.): 3  Moving from lying on back to sitting on the side of the bed?: 3  Moving to and from a bed to a chair (including a wheelchair)?: 3  Need to walk in hospital room?: 3  Climbing 3-5 steps with a railing?: 2  Basic Mobility Total Score: 17       Therapeutic Activities and Exercises:  Gait training in the rivera. Educated in Sternal precautions, safety with transfers, use of RW for gait, benefits of mobility and increased time out of bed..    Patient left up in chair with all lines intact, call button in reach and chair alarm on..    GOALS:   Multidisciplinary Problems     Physical Therapy Goals        Problem: Physical Therapy    Goal Priority Disciplines Outcome Goal Variances Interventions   Physical Therapy Goal     PT, PT/OT Ongoing, Progressing     Description: Goals to be met by: 22     Patient will increase functional independence with mobility by performin. Supine to sit with Supervision  2. Sit to stand transfer with Supervision  3. Bed to chair transfer with Supervision using Rolling Walker  4. Gait  x 150 feet with Supervision using Rolling Walker.                          Time Tracking:     PT Received On: 22  PT Start Time: 1016     PT Stop Time: 1035  PT Total Time (min): 19 min     Billable Minutes: Gait Training 14 and Therapeutic Activity 5    Treatment Type: Treatment  PT/PTA: PT     PTA Visit Number: 0     2022

## 2022-07-18 NOTE — PT/OT/SLP PROGRESS
Occupational Therapy      Patient Name:  Matteo Fraire   MRN:  [id number]    Patient not seen today secondary to Other (Comment) (1st attempt, pt having ASHLEY; 2nd attempt; RN care with wound care RN). Will follow-up next service date.    7/18/2022

## 2022-07-18 NOTE — NURSING
ASHLEY at bedside with Dr. STACIE Mann  Time out done- 0923  Probe in- 0926  Bubbles done-  0933  Probe out- 0935

## 2022-07-18 NOTE — PLAN OF CARE
[facility] for [facility] submitted.        07/18/22 1543   Post-Acute Status   Post-Acute Authorization Placement   Post-Acute Placement Status Pending payor review/awaiting authorization (if required)   Discharge Delays None known at this time   Discharge Plan   Discharge Plan A Rehab   Discharge Plan B Rehab

## 2022-07-18 NOTE — PROGRESS NOTES
Formerly [facility], later [facility]  Department of Cardiology  Progress Note    PATIENT NAME: Matteo Fraire  MRN: [id number]  TODAY'S DATE: 07/18/2022  ADMIT DATE: 6/27/2022    SUBJECTIVE     PRINCIPLE PROBLEM: STEMI (ST elevation myocardial infarction)    INTERVAL HISTORY:      7/18/22:  Patient seen resting in bed s/p ASHLEY. No distress noted. Breathing is stable.     07/17/2022 Clinic are:  Patient appears more stable throat pain is improving but not resolved.  Generalized weakness is still persisting.  Still cannot swallow any secretions      7/16/2022 :  Patient is feeling a bit better content still complains of sore throat and discomfort on the left side of his neck.  A still unable to swallow any secretions  Are peg tube feeding has been initiated.  Is tolerating this well so far    10/15/2022   Successful G tube placement today, can start feedings via this route later this afternoon. No complaints from patient. VSS.     7/14/2022  Patient is hypertensive today. He is having more secretions and cough. PEG tub placement pending GI eval. Rhythm stable.     7/13/2022  Patient sitting up at bedside, receiving nutrition through NG tube. He feels hungry and mouth is dry. He could not even swallow for his barium swallow today he says. VSS.     7/12/2022  No further runs of NSVT. Mentation much improved today. Patient states he feels better. NG tube will be placed so patient can recieve some nutrition. VSS.     7/11/2022  No further runs of NSVT. Continues to have atypical mentation, now with facial droop and complaint of trouble seeing and focusing in left eye. CT head was negative for any acute ischemia. Only complaint from patient is the vision. He is tachycardic today. Failed swallow study again.     7/10/2022  Patient continues to asymptomatic NSVT. On oxymask, having a lot of congestion. Seems out of it still despite morphine being given 3 hours ago. BP stable on nitro gtt.     7/9/2022  Patient developed ST elevations  overnight post CABG. Troponins were checked and are still rising at 62. He remains intubated and sedated. BP stable on cleviprex gtt. ST elevations have improved.     7/7/2022  Patient had 42 beat run of Vtach this morning, asymp[tomatic. No further chest pain. VSS.     7/6/2022  PATIENT FOR CABG THIS Friday. Had some mild chest pains after walking for 40 minutes this morning. Has not required nitro yet. VSS.     7/5/2022  Patient still waiting for CABG, ate breakfast this AM. No chest pain events. VSS.     7/4/2022  Patient is awake alert lying comfortably in bed not in any acute distress.  Patient had walked in the hallway 4 times already.  Denies any chest pain or tightness or heaviness his breathing is good denies any shortness of breath.  Awaiting for CABG.    Review of patient's allergies indicates:  No Known Allergies    REVIEW OF SYSTEMS  Throat pain is improving are patient's  His tolerating tube feedings well  Had a good bowel movement.  Generalized weakness is still persisting.    OBJECTIVE     VITAL SIGNS (Most Recent)  Temp: 97.8 °F (36.6 °C) (07/18/22 0726)  Pulse: 89 (07/18/22 0726)  Resp: 18 (07/18/22 0726)  BP: (!) 142/65 (07/18/22 1035)  SpO2: 95 % (07/18/22 0726)    VENTILATION STATUS  Resp: 18 (07/18/22 0726)  SpO2: 95 % (07/18/22 0726)       I & O (Last 24H):    Intake/Output Summary (Last 24 hours) at 7/18/2022 1038  Last data filed at 7/18/2022 0935  Gross per 24 hour   Intake 700 ml   Output 700 ml   Net 0 ml       WEIGHTS  Wt Readings from Last 1 Encounters:   07/16/22 0520 98.7 kg (217 lb 9.5 oz)   07/12/22 0400 97.4 kg (214 lb 11.7 oz)   07/07/22 0520 96.4 kg (212 lb 8.4 oz)   07/05/22 0301 96.9 kg (213 lb 10 oz)   07/01/22 0300 97.3 kg (214 lb 8.1 oz)   06/30/22 0300 100.5 kg (221 lb 9.6 oz)   06/27/22 2254 101.1 kg (222 lb 14.2 oz)   06/27/22 1954 99.8 kg (220 lb)       PHYSICAL EXAM  Unable to obtain as patient was sleeping during rounds and we did not want to disturb     SCHEDULED  MEDS:   amiodarone  200 mg Per G Tube BID    amLODIPine  5 mg Oral Daily    aspirin  81 mg Oral Daily    atorvastatin  80 mg Oral Daily    docusate  100 mg Oral BID    levoFLOXacin  500 mg Intravenous Q24H    metoprolol tartrate  25 mg Per G Tube BID    sacubitriL-valsartan  1 tablet Per G Tube BID    tamsulosin  0.4 mg Oral Daily       CONTINUOUS INFUSIONS:   carboxymethylcellulose         PRN MEDS:acetaminophen, albumin human 5%, ALPRAZolam, calcium gluconate IVPB, calcium gluconate IVPB, calcium gluconate IVPB, carboxymethylcellulose, dextrose 50%, dextrose 50%, hydrALAZINE, HYDROcodone-acetaminophen, insulin aspart U-100, labetalol, magnesium sulfate IVPB, magnesium sulfate IVPB, melatonin, metoprolol, morphine, mupirocin, ondansetron, potassium chloride in water **AND** potassium chloride in water **AND** potassium chloride in water, sodium phosphate IVPB, sodium phosphate IVPB, sodium phosphate IVPB    LABS AND DIAGNOSTICS     CBC LAST 3 DAYS  Recent Labs   Lab 07/16/22 0410 07/17/22 0358 07/18/22  0336   WBC 13.95* 14.68* 14.15*   RBC 4.06* 4.00* 3.98*   HGB 11.7* 11.5* 11.3*   HCT 36.4* 36.1* 35.5*   MCV 90 90 89   MCH 28.8 28.8 28.4   MCHC 32.1 31.9* 31.8*   RDW 13.6 13.8 13.9    334 313   MPV 10.9 11.1 10.7       COAGULATION LAST 3 DAYS  No results for input(s): LABPT, INR, APTT in the last 168 hours.    CHEMISTRY LAST 3 DAYS  Recent Labs   Lab 07/12/22  0308 07/13/22  0308 07/16/22  0410 07/17/22  0358 07/18/22  0336      < > 133* 135* 136   K 4.0   < > 3.9 4.1 4.2      < > 104 102 102   CO2 28   < > 26 26 27   ANIONGAP 8   < > 3* 7* 7*   BUN 28*   < > 25* 29* 30*   CREATININE 1.0   < > 0.9 0.8 0.8   *   < > 172* 163* 144*   CALCIUM 7.9*   < > 8.0* 8.2* 8.4*   MG 2.1  --   --   --   --    ALBUMIN  --    < > 2.9* 3.1* 3.1*   PROT  --    < > 5.6* 5.7* 5.7*   ALKPHOS  --    < > 53* 54* 54*   ALT  --    < > 60* 57* 53*   AST  --    < > 22 28 23   BILITOT  --    < > 1.4* 1.0 1.2*     < > = values in this interval not displayed.       CARDIAC PROFILE LAST 3 DAYS  Recent Labs   Lab 07/15/22  0408   *       ENDOCRINE LAST 3 DAYS  No results for input(s): TSH, PROCAL in the last 168 hours.    LAST ARTERIAL BLOOD GAS  ABG  No results for input(s): PH, PO2, PCO2, HCO3, BE in the last 168 hours.    LAST 7 DAYS MICROBIOLOGY   Microbiology Results (last 7 days)       ** No results found for the last 168 hours. **            MOST RECENT IMAGING  X-Ray Chest AP Portable  Portable chest x-ray at 5:48 AM is compared to prior study 8/17/2022    Clinical history is pneumonia    The right IJ catheter is in stable position. The mediastinal drains and left chest tube has been removed. The heart is normal in size. There are median sternotomy wires.    There is linear airspace disease in the right upper lobe and right mid lung suggestive of atelectasis versus infiltrate. The right lower lobe and left lung are clear. There are no acute osseous abnormalities.    IMPRESSION: Development of linear airspace disease in the right upper lobe and mid lung suggestive of atelectasis versus infiltrate    Prior median sternotomy    Electronically signed by:  Isis Blair MD  7/17/2022 6:43 AM CDT Workstation: [id number]      [facility]  Results for orders placed during the hospital encounter of 06/27/22    Echo Saline Bubble? No    Interpretation Summary  · The left ventricle is normal in size with mild predominantly mid septal mild asymmetric hypertrophy eccentric hypertrophy and normal systolic function.  · The estimated ejection fraction is 60%.  · Indeterminate left ventricular diastolic function.  · Normal right ventricular size with normal right ventricular systolic function.  · Mild left atrial enlargement.  · Normal central venous pressure (3 mmHg).  · The estimated PA systolic pressure is 19 mmHg.  · Mild mitral regurgitation.      CURRENT/PREVIOUS VISIT EKG  Results for orders placed or performed during the  hospital encounter of 06/27/22   EKG 12-lead    Collection Time: 07/09/22 12:41 AM    Narrative    Test Reason : I21.3,    Vent. Rate : 088 BPM     Atrial Rate : 088 BPM     P-R Int : 232 ms          QRS Dur : 094 ms      QT Int : 384 ms       P-R-T Axes : 043 101 132 degrees     QTc Int : 464 ms    Sinus rhythm with 1st degree A-V block with occasional Premature  ventricular complexes  Possible Inferior infarct ,age undetermined  Anterolateral infarct (cited on or before 27-JUN-2022)  Abnormal ECG  When compared with ECG of 08-JUL-2022 16:38,  Borderline criteria for Inferior infarct are now Present  Serial changes of Anterior infarct Present  Confirmed by Vincent Mann MD ([id number]) on 7/15/2022 12:55:05 PM    Referred By: ADEBAYO   SELF           Confirmed By:Vincent Mann MD       ASSESSMENT/PLAN:     Active Hospital Problems    Diagnosis    *STEMI (ST elevation myocardial infarction)    Acute pneumonia    PEG (percutaneous endoscopic gastrostomy) status    Superficial vein thrombosis    Acute CVA (cerebrovascular accident)    S/P CABG (coronary artery bypass graft)    NSVT (nonsustained ventricular tachycardia)    Coronary artery disease involving coronary bypass graft of native heart with angina pectoris       ASSESSMENT & PLAN:   1. Multivessel coronary artery disease  2. ST-elevation myocardial infarction status post PTCA  3. Nonsustained ventricular tachycardia  4. Mixed hyperlipidemia      RECOMMENDATIONS:    1. LUDWIG report to follow.    Ludwig showed no evidence of intracardiac thrombus or intracardiac shunt.  2. Continue current meds. BP and HR are stable.   Will adjust as needed.   3. Hopeful for dc soon if he continues to improve.     Shawanda Mcneal NP  [facility]  Department of Cardiology  Date of Service: 07/18/2022  9:53 AM    I have personally interviewed and examined the patient, I have reviewed the Nurse Practitioner's history and physical, assessment, and plan. I have personally  evaluated the patient at bedside and agree with the findings and made appropriate changes as necessary in recommendations.    Jackson Mann MD  Department of Cardiology  Critical access hospital  7/18/22

## 2022-07-19 VITALS
SYSTOLIC BLOOD PRESSURE: 122 MMHG | RESPIRATION RATE: 18 BRPM | HEART RATE: 70 BPM | DIASTOLIC BLOOD PRESSURE: 78 MMHG | OXYGEN SATURATION: 96 % | HEIGHT: 69 IN | TEMPERATURE: 98 F | WEIGHT: 201.25 LBS | BODY MASS INDEX: 29.81 KG/M2

## 2022-07-19 PROBLEM — I21.3 STEMI (ST ELEVATION MYOCARDIAL INFARCTION): Status: RESOLVED | Noted: 2022-06-28 | Resolved: 2022-07-19

## 2022-07-19 PROBLEM — I82.890 SUPERFICIAL VEIN THROMBOSIS: Status: RESOLVED | Noted: 2022-07-14 | Resolved: 2022-07-19

## 2022-07-19 PROBLEM — I63.9 ACUTE CVA (CEREBROVASCULAR ACCIDENT): Status: RESOLVED | Noted: 2022-07-13 | Resolved: 2022-07-19

## 2022-07-19 PROBLEM — I47.29 NSVT (NONSUSTAINED VENTRICULAR TACHYCARDIA): Status: RESOLVED | Noted: 2022-06-30 | Resolved: 2022-07-19

## 2022-07-19 LAB
ALBUMIN SERPL BCP-MCNC: 2.9 G/DL (ref 3.5–5.2)
ALP SERPL-CCNC: 61 U/L (ref 55–135)
ALT SERPL W/O P-5'-P-CCNC: 43 U/L (ref 10–44)
ANION GAP SERPL CALC-SCNC: 6 MMOL/L (ref 8–16)
AST SERPL-CCNC: 21 U/L (ref 10–40)
BILIRUB SERPL-MCNC: 1.2 MG/DL (ref 0.1–1)
BUN SERPL-MCNC: 29 MG/DL (ref 8–23)
CALCIUM SERPL-MCNC: 8.6 MG/DL (ref 8.7–10.5)
CHLORIDE SERPL-SCNC: 103 MMOL/L (ref 95–110)
CO2 SERPL-SCNC: 28 MMOL/L (ref 23–29)
CREAT SERPL-MCNC: 1 MG/DL (ref 0.5–1.4)
ERYTHROCYTE [DISTWIDTH] IN BLOOD BY AUTOMATED COUNT: 14 % (ref 11.5–14.5)
EST. GFR  (AFRICAN AMERICAN): >60 ML/MIN/1.73 M^2
EST. GFR  (NON AFRICAN AMERICAN): >60 ML/MIN/1.73 M^2
GLUCOSE SERPL-MCNC: 150 MG/DL (ref 70–110)
GLUCOSE SERPL-MCNC: 165 MG/DL (ref 70–110)
GLUCOSE SERPL-MCNC: 178 MG/DL (ref 70–110)
HCT VFR BLD AUTO: 34.5 % (ref 40–54)
HGB BLD-MCNC: 10.9 G/DL (ref 14–18)
MCH RBC QN AUTO: 28.2 PG (ref 27–31)
MCHC RBC AUTO-ENTMCNC: 31.6 G/DL (ref 32–36)
MCV RBC AUTO: 89 FL (ref 82–98)
PLATELET # BLD AUTO: 303 K/UL (ref 150–450)
PMV BLD AUTO: 10.8 FL (ref 9.2–12.9)
POTASSIUM SERPL-SCNC: 4.3 MMOL/L (ref 3.5–5.1)
PROT SERPL-MCNC: 5.7 G/DL (ref 6–8.4)
RBC # BLD AUTO: 3.86 M/UL (ref 4.6–6.2)
SODIUM SERPL-SCNC: 137 MMOL/L (ref 136–145)
WBC # BLD AUTO: 13.52 K/UL (ref 3.9–12.7)

## 2022-07-19 PROCEDURE — 97535 SELF CARE MNGMENT TRAINING: CPT

## 2022-07-19 PROCEDURE — 99222 PR INITIAL HOSPITAL CARE,LEVL II: ICD-10-PCS | Mod: ,,, | Performed by: PHYSICAL MEDICINE & REHABILITATION

## 2022-07-19 PROCEDURE — 25000003 PHARM REV CODE 250: Performed by: INTERNAL MEDICINE

## 2022-07-19 PROCEDURE — 99900031 HC PATIENT EDUCATION (STAT)

## 2022-07-19 PROCEDURE — 99900035 HC TECH TIME PER 15 MIN (STAT)

## 2022-07-19 PROCEDURE — 25000003 PHARM REV CODE 250: Performed by: THORACIC SURGERY (CARDIOTHORACIC VASCULAR SURGERY)

## 2022-07-19 PROCEDURE — 36592 COLLECT BLOOD FROM PICC: CPT

## 2022-07-19 PROCEDURE — 99233 SBSQ HOSP IP/OBS HIGH 50: CPT | Mod: ,,, | Performed by: NURSE PRACTITIONER

## 2022-07-19 PROCEDURE — 97110 THERAPEUTIC EXERCISES: CPT

## 2022-07-19 PROCEDURE — 80053 COMPREHEN METABOLIC PANEL: CPT | Performed by: INTERNAL MEDICINE

## 2022-07-19 PROCEDURE — 99233 PR SUBSEQUENT HOSPITAL CARE,LEVL III: ICD-10-PCS | Mod: ,,, | Performed by: NURSE PRACTITIONER

## 2022-07-19 PROCEDURE — 94761 N-INVAS EAR/PLS OXIMETRY MLT: CPT

## 2022-07-19 PROCEDURE — 82962 GLUCOSE BLOOD TEST: CPT

## 2022-07-19 PROCEDURE — 99222 1ST HOSP IP/OBS MODERATE 55: CPT | Mod: ,,, | Performed by: PHYSICAL MEDICINE & REHABILITATION

## 2022-07-19 PROCEDURE — 85027 COMPLETE CBC AUTOMATED: CPT | Performed by: INTERNAL MEDICINE

## 2022-07-19 PROCEDURE — 25000003 PHARM REV CODE 250

## 2022-07-19 RX ORDER — LEVOFLOXACIN 500 MG/1
500 TABLET, FILM COATED ORAL DAILY
Qty: 5 TABLET | Refills: 0
Start: 2022-07-20 | End: 2022-07-25

## 2022-07-19 RX ORDER — ATORVASTATIN CALCIUM 80 MG/1
80 TABLET, FILM COATED ORAL DAILY
Qty: 30 TABLET | Refills: 0
Start: 2022-07-20 | End: 2022-08-08 | Stop reason: SDUPTHER

## 2022-07-19 RX ORDER — AMLODIPINE BESYLATE 5 MG/1
5 TABLET ORAL DAILY
Qty: 30 TABLET | Refills: 0
Start: 2022-07-20 | End: 2022-08-08 | Stop reason: SDUPTHER

## 2022-07-19 RX ORDER — METOPROLOL TARTRATE 25 MG/1
25 TABLET, FILM COATED ORAL 2 TIMES DAILY
Qty: 60 TABLET | Refills: 0
Start: 2022-07-19 | End: 2022-08-08 | Stop reason: SDUPTHER

## 2022-07-19 RX ORDER — AMIODARONE HYDROCHLORIDE 200 MG/1
200 TABLET ORAL 2 TIMES DAILY
Qty: 60 TABLET | Refills: 0
Start: 2022-07-19 | End: 2022-08-08 | Stop reason: SDUPTHER

## 2022-07-19 RX ADMIN — ATORVASTATIN CALCIUM 80 MG: 40 TABLET, FILM COATED ORAL at 09:07

## 2022-07-19 RX ADMIN — SACUBITRIL AND VALSARTAN 1 TABLET: 24; 26 TABLET, FILM COATED ORAL at 09:07

## 2022-07-19 RX ADMIN — TAMSULOSIN HYDROCHLORIDE 0.4 MG: 0.4 CAPSULE ORAL at 09:07

## 2022-07-19 RX ADMIN — AMIODARONE HYDROCHLORIDE 200 MG: 200 TABLET ORAL at 09:07

## 2022-07-19 RX ADMIN — METOPROLOL TARTRATE 25 MG: 25 TABLET, FILM COATED ORAL at 09:07

## 2022-07-19 RX ADMIN — AMLODIPINE BESYLATE 5 MG: 5 TABLET ORAL at 09:07

## 2022-07-19 RX ADMIN — LEVOFLOXACIN 500 MG: 250 TABLET, FILM COATED ORAL at 09:07

## 2022-07-19 RX ADMIN — ASPIRIN 81 MG CHEWABLE TABLET 81 MG: 81 TABLET CHEWABLE at 09:07

## 2022-07-19 NOTE — PLAN OF CARE
Pt scheduled for  by Guardian at 11:30 to be transported to [facility]    Discharge orders and chart reviewed with no further post-acute discharge needs identified at this time.  At this time, patient is cleared for discharge from Case Management standpoint.        07/19/22 1218   Final Note   Assessment Type Final Discharge Note   Anticipated Discharge Disposition Rehab   Post-Acute Status   Post-Acute Authorization Placement   Post-Acute Placement Status Set-up Complete/Auth obtained   Discharge Delays None known at this time

## 2022-07-19 NOTE — PLAN OF CARE
07/19/22 1047   Patient Assessment/Suction   Level of Consciousness (AVPU) alert   Respiratory Effort Normal;Unlabored   RML Breath Sounds clear;diminished   PRE-TX-O2   O2 Device (Oxygen Therapy) room air   SpO2 95 %   Pulse Oximetry Type Continuous   $ Pulse Oximetry - Multiple Charge Pulse Oximetry - Multiple   Pulse 72   Resp 18   Education   $ Education 15 min   Respiratory Evaluation   $ Care Plan Tech Time 15 min

## 2022-07-19 NOTE — CARE UPDATE
07/18/22 2046   Patient Assessment/Suction   Respiratory Effort Unlabored   Rhythm/Pattern, Respiratory pattern regular   PRE-TX-O2   O2 Device (Oxygen Therapy) nasal cannula   $ Is the patient on Low Flow Oxygen? Yes   Flow (L/min) 3  (decreased to 2)   SpO2 97 %   Pulse Oximetry Type Continuous   Pulse 86   Resp 20   Incentive Spirometer   Administration (IS) proper technique demonstrated   Number of Repetitions (IS) 10   Level Incentive Spirometer (mL) 2000   Patient Tolerance (IS) good   Respiratory Evaluation   $ Care Plan Tech Time 15 min   $ Eval/Re-eval Charges Re-evaluation   Evaluation For Re-Eval 5+ day

## 2022-07-19 NOTE — PROGRESS NOTES
[facility]  Progress Note    Patient Name: Matteo Fraire  MRN: [id number]  Patient Class: IP- Inpatient   Admission Date: 6/27/2022  Length of Stay: 21 days  Attending Physician: Mik Mckinnon MD  Primary Care Provider: Primary Doctor No        Subjective:     Principal Problem:STEMI (ST elevation myocardial infarction)        HPI:    61-year-old male past medical history of coronary artery disease status post CABG 2011 and stent 2005 both occasions they were related with MIs course in the ER today because he started having chest pain about 1 hour prior to arrival it was a pressure-like pain associated with sweating and dry heaving is with radiation to both arms sensation was similar to his MI 2005 he got 2 aspirins and appears to present 1 hour later when he got to the EKG showed STEMI so he was taken to cath lab where he had angioplasty of LIMA to LAD by Dr. Powell he is currently on nitroglycerin drip due to high blood pressure he also received morphine IV 4 mg Zofran IV 4 and labetalol 10 mg IV admitted to ICU.      Overview/Hospital Course:  07/12  Pacer wires and Chest tubes removed  Still has Chamorro insitu  Pt sitting on chair and denies any issues    07/13  Agreed with PEG placement  ASHLEY tomorrow  NG tube insitu  and started on Tfs  Chamorro still on place  Restarted on amio gtt today  Overall he says he is better    07/14  ASHLEY cancelled  Will have PEG tmrw AM   BP levels on higher range    07/15  Pt had PEG placement  ASHLEY tomorrow ?  BP levels better    07/16  C/o sore throat  BP levels stable    07/17  Started on Levofloxacin for possible Mild Pneumonia  Pt feels dizzy on ambulation  ASHLEY tomorrow AM     07/18  Pt had ASHLEY today  Awaiting rehab placement       Interval History:     Review of Systems   Constitutional:  Negative for activity change and appetite change.   HENT:  Negative for congestion and dental problem.    Eyes:  Negative for discharge and itching.   Respiratory:   Negative for shortness of breath.    Cardiovascular:  Negative for chest pain.   Gastrointestinal:  Negative for abdominal distention and abdominal pain.   Endocrine: Negative for cold intolerance.   Genitourinary:  Negative for difficulty urinating and dysuria.   Musculoskeletal:  Negative for arthralgias and back pain.   Skin:  Negative for color change.   Neurological:  Negative for dizziness and facial asymmetry.   Hematological:  Negative for adenopathy.   Psychiatric/Behavioral:  Negative for agitation and behavioral problems.    Objective:     Vital Signs (Most Recent):  Temp: 98.4 °F (36.9 °C) (07/18/22 1920)  Pulse: 87 (07/18/22 1920)  Resp: 18 (07/18/22 1920)  BP: 136/74 (07/18/22 1920)  SpO2: 100 % (07/18/22 1920) Vital Signs (24h Range):  Temp:  [97.3 °F (36.3 °C)-98.4 °F (36.9 °C)] 98.4 °F (36.9 °C)  Pulse:  [79-92] 87  Resp:  [18-20] 18  SpO2:  [94 %-100 %] 100 %  BP: ()/(56-74) 136/74     Weight: 98.7 kg (217 lb 9.5 oz)  Body mass index is 32.13 kg/m².    Intake/Output Summary (Last 24 hours) at 7/18/2022 2030  Last data filed at 7/18/2022 1747  Gross per 24 hour   Intake 1310 ml   Output 550 ml   Net 760 ml      Physical Exam  Vitals and nursing note reviewed.   Constitutional:       Appearance: He is well-developed.   HENT:      Head: Atraumatic.      Right Ear: External ear normal.      Left Ear: External ear normal.      Nose: Nose normal.      Mouth/Throat:      Mouth: Mucous membranes are moist.   Eyes:      General: No scleral icterus.  Cardiovascular:      Rate and Rhythm: Normal rate.   Pulmonary:      Effort: Pulmonary effort is normal.   Abdominal:      General: There is no distension.   Musculoskeletal:         General: Normal range of motion.      Cervical back: Full passive range of motion without pain and normal range of motion.   Skin:     General: Skin is warm.   Neurological:      Mental Status: He is alert and oriented to person, place, and time.       Significant Labs: All  pertinent labs within the past 24 hours have been reviewed.  CBC:   Recent Labs   Lab 07/17/22  0358 07/18/22  0336   WBC 14.68* 14.15*   HGB 11.5* 11.3*   HCT 36.1* 35.5*    313     CMP:   Recent Labs   Lab 07/17/22  0358 07/18/22  0336   * 136   K 4.1 4.2    102   CO2 26 27   * 144*   BUN 29* 30*   CREATININE 0.8 0.8   CALCIUM 8.2* 8.4*   PROT 5.7* 5.7*   ALBUMIN 3.1* 3.1*   BILITOT 1.0 1.2*   ALKPHOS 54* 54*   AST 28 23   ALT 57* 53*   ANIONGAP 7* 7*   EGFRNONAA >60.0 >60.0       Significant Imaging: I have reviewed all pertinent imaging results/findings within the past 24 hours.      Assessment/Plan:      * STEMI (ST elevation myocardial infarction)  STEMI status post balloon angioplasty to LAD  - 06/27   Status post redo 3 vessel CABG for recurrent severe atherosclerotic CAD - 07/08   Transferred  out of ICU on 07/12      PEG (percutaneous endoscopic gastrostomy) status  PEG insertion on 07/15  Started on TFs      Acute CVA (cerebrovascular accident)  Maintain aspirin  Pt  medically cleared and he need rehab placement   Pt had ASHLEY on 07/18      Superficial vein thrombosis  Nearly completely occlusive thrombus within the basilic vein R side  This is a superficial vein  No need for anticoagulation      Acute pneumonia  Continue PO levofloxacin  Aspiration precautions       S/P CABG (coronary artery bypass graft)  Stable       NSVT (nonsustained ventricular tachycardia)  Stable   Continue PO amiodarone      Coronary artery disease involving coronary bypass graft of native heart with angina pectoris  As above       VTE Risk Mitigation (From admission, onward)    None          Discharge Planning   MICHAEL:      Code Status: Full Code   Is the patient medically ready for discharge?: No    Reason for patient still in hospital (select all that apply): Pending disposition  Discharge Plan A: Rehab   Discharge Delays: None known at this time              Mik Mckinnon MD  Department of Hospital Medicine    [facility]

## 2022-07-19 NOTE — SUBJECTIVE & OBJECTIVE
Interval History:     Review of Systems   Constitutional:  Negative for activity change and appetite change.   HENT:  Negative for congestion and dental problem.    Eyes:  Negative for discharge and itching.   Respiratory:  Negative for shortness of breath.    Cardiovascular:  Negative for chest pain.   Gastrointestinal:  Negative for abdominal distention and abdominal pain.   Endocrine: Negative for cold intolerance.   Genitourinary:  Negative for difficulty urinating and dysuria.   Musculoskeletal:  Negative for arthralgias and back pain.   Skin:  Negative for color change.   Neurological:  Negative for dizziness and facial asymmetry.   Hematological:  Negative for adenopathy.   Psychiatric/Behavioral:  Negative for agitation and behavioral problems.    Objective:     Vital Signs (Most Recent):  Temp: 98.4 °F (36.9 °C) (07/18/22 1920)  Pulse: 87 (07/18/22 1920)  Resp: 18 (07/18/22 1920)  BP: 136/74 (07/18/22 1920)  SpO2: 100 % (07/18/22 1920) Vital Signs (24h Range):  Temp:  [97.3 °F (36.3 °C)-98.4 °F (36.9 °C)] 98.4 °F (36.9 °C)  Pulse:  [79-92] 87  Resp:  [18-20] 18  SpO2:  [94 %-100 %] 100 %  BP: ()/(56-74) 136/74     Weight: 98.7 kg (217 lb 9.5 oz)  Body mass index is 32.13 kg/m².    Intake/Output Summary (Last 24 hours) at 7/18/2022 2030  Last data filed at 7/18/2022 1747  Gross per 24 hour   Intake 1310 ml   Output 550 ml   Net 760 ml      Physical Exam  Vitals and nursing note reviewed.   Constitutional:       Appearance: He is well-developed.   HENT:      Head: Atraumatic.      Right Ear: External ear normal.      Left Ear: External ear normal.      Nose: Nose normal.      Mouth/Throat:      Mouth: Mucous membranes are moist.   Eyes:      General: No scleral icterus.  Cardiovascular:      Rate and Rhythm: Normal rate.   Pulmonary:      Effort: Pulmonary effort is normal.   Abdominal:      General: There is no distension.   Musculoskeletal:         General: Normal range of motion.      Cervical back:  Full passive range of motion without pain and normal range of motion.   Skin:     General: Skin is warm.   Neurological:      Mental Status: He is alert and oriented to person, place, and time.       Significant Labs: All pertinent labs within the past 24 hours have been reviewed.  CBC:   Recent Labs   Lab 07/17/22 0358 07/18/22  0336   WBC 14.68* 14.15*   HGB 11.5* 11.3*   HCT 36.1* 35.5*    313     CMP:   Recent Labs   Lab 07/17/22 0358 07/18/22  0336   * 136   K 4.1 4.2    102   CO2 26 27   * 144*   BUN 29* 30*   CREATININE 0.8 0.8   CALCIUM 8.2* 8.4*   PROT 5.7* 5.7*   ALBUMIN 3.1* 3.1*   BILITOT 1.0 1.2*   ALKPHOS 54* 54*   AST 28 23   ALT 57* 53*   ANIONGAP 7* 7*   EGFRNONAA >60.0 >60.0       Significant Imaging: I have reviewed all pertinent imaging results/findings within the past 24 hours.

## 2022-07-19 NOTE — PT/OT/SLP PROGRESS
Physical Therapy Treatment    Patient Name:  Matteo Fraire   MRN:  [id number]    Recommendations:     Discharge Recommendations:  rehabilitation facility   Discharge Equipment Recommendations: walker, rolling   Barriers to discharge: increase assist with mobility     Assessment:     Matteo Fraire is a 61 y.o. male admitted with a medical diagnosis of STEMI (ST elevation myocardial infarction).  He presents with the following impairments/functional limitations:  weakness, impaired endurance, impaired self care skills, impaired functional mobility, decreased lower extremity function, decreased safety awareness, impaired cardiopulmonary response to activity.    Pt found HOB elevated on 2L of oxygen. SAO2 WFL, pt placed on room air for mobility. Supine to sit min A to follow sternal precautions. Sit to stand transfer CGA, pt was able to follow sternal precautions without verbal cueing. Pt ambulated 210 ft CGA using RW. Pt demonstrated decreased foot clearance, slow pace, and decrease stride length requiring min verbal cueing for proper gait pattern. Pt did not demonstrate shortness of breath or fatigue throughout ambulation. Pt performed TE sitting up in chair, on room air. SAO2 94% at end of session.     Rehab Prognosis: Fair; patient would benefit from acute skilled PT services to address these deficits and reach maximum level of function.    Recent Surgery: Procedure(s) (LRB):  EGD (ESOPHAGOGASTRODUODENOSCOPY) (N/A) 4 Days Post-Op    Plan:     During this hospitalization, patient to be seen daily to address the identified rehab impairments via gait training, therapeutic activities, therapeutic exercises, neuromuscular re-education and progress toward the following goals:    · Plan of Care Expires:  08/19/22    Subjective     Chief Complaint: none stated   Patient/Family Comments/goals: none stated   Pain/Comfort:  · Pain Rating Post-Intervention 1: 0/10      Objective:     Communicated with RN prior to session.   Patient found HOB elevated with telemetry, bed alarm, pulse ox (continuous), peripheral IV, PEG Tube, PICC line upon PT entry to room.     General Precautions: Standard, fall, sternal   Orthopedic Precautions:N/A   Braces: N/A  Respiratory Status: Nasal cannula, flow 2 L/min     Functional Mobility:  · Bed Mobility:     · Supine to Sit: minimum assistance  · Transfers:     · Sit to Stand:  contact guard assistance with no AD  · Gait: 210 ft CGA using RW      AM-PAC 6 CLICK MOBILITY          Therapeutic Activities and Exercises:  Pt educated on plan of care, importance of time out of bed, sternal precautions, weight-shifting while seated in chair, need for use of call bell for assistance and to prevent falls, and need for assistance with mobility    Pt performed hip flexion and knee extensions, 1 set x 10 repetitions. Verbal instructions throughout for proper exercise form and staying on task.    Patient left up in chair with all lines intact, call button in reach and chair alarm on..    GOALS:   Multidisciplinary Problems     Physical Therapy Goals        Problem: Physical Therapy    Goal Priority Disciplines Outcome Goal Variances Interventions   Physical Therapy Goal     PT, PT/OT Ongoing, Progressing     Description: Goals to be met by: 22     Patient will increase functional independence with mobility by performin. Supine to sit with Supervision  2. Sit to stand transfer with Supervision  3. Bed to chair transfer with Supervision using Rolling Walker  4. Gait  x 150 feet with Supervision using Rolling Walker.                          Time Tracking:     PT Received On: 22  PT Start Time: 856     PT Stop Time: 912  PT Total Time (min): 16 min     Billable Minutes: Therapeutic Exercise 16    Treatment Type: Treatment  PT/PTA: PT     PTA Visit Number: 0     2022

## 2022-07-19 NOTE — PROGRESS NOTES
[facility]  Department of Cardiology  Progress Note    PATIENT NAME: Matteo Fraire  MRN: [id number]  TODAY'S DATE: 07/19/2022  ADMIT DATE: 6/27/2022    SUBJECTIVE     PRINCIPLE PROBLEM: STEMI (ST elevation myocardial infarction)    INTERVAL HISTORY:      7/19/22:    Patient seen resting in bed. HR and BP are stable. No chest pain.     7/18/22:  Patient seen resting in bed s/p ASHLEY. No distress noted. Breathing is stable.     07/17/2022 Clinic are:  Patient appears more stable throat pain is improving but not resolved.  Generalized weakness is still persisting.  Still cannot swallow any secretions      7/16/2022 :  Patient is feeling a bit better content still complains of sore throat and discomfort on the left side of his neck.  A still unable to swallow any secretions  Are peg tube feeding has been initiated.  Is tolerating this well so far    10/15/2022   Successful G tube placement today, can start feedings via this route later this afternoon. No complaints from patient. VSS.     7/14/2022  Patient is hypertensive today. He is having more secretions and cough. PEG tub placement pending GI eval. Rhythm stable.     7/13/2022  Patient sitting up at bedside, receiving nutrition through NG tube. He feels hungry and mouth is dry. He could not even swallow for his barium swallow today he says. VSS.     7/12/2022  No further runs of NSVT. Mentation much improved today. Patient states he feels better. NG tube will be placed so patient can recieve some nutrition. VSS.     7/11/2022  No further runs of NSVT. Continues to have atypical mentation, now with facial droop and complaint of trouble seeing and focusing in left eye. CT head was negative for any acute ischemia. Only complaint from patient is the vision. He is tachycardic today. Failed swallow study again.     7/10/2022  Patient continues to asymptomatic NSVT. On oxymask, having a lot of congestion. Seems out of it still despite morphine being given 3  hours ago. BP stable on nitro gtt.     7/9/2022  Patient developed ST elevations overnight post CABG. Troponins were checked and are still rising at 62. He remains intubated and sedated. BP stable on cleviprex gtt. ST elevations have improved.     7/7/2022  Patient had 42 beat run of Vtach this morning, asymp[tomatic. No further chest pain. VSS.     7/6/2022  PATIENT FOR CABG THIS Friday. Had some mild chest pains after walking for 40 minutes this morning. Has not required nitro yet. VSS.     7/5/2022  Patient still waiting for CABG, ate breakfast this AM. No chest pain events. VSS.     7/4/2022  Patient is awake alert lying comfortably in bed not in any acute distress.  Patient had walked in the hallway 4 times already.  Denies any chest pain or tightness or heaviness his breathing is good denies any shortness of breath.  Awaiting for CABG.    Review of patient's allergies indicates:  No Known Allergies    REVIEW OF SYSTEMS  Throat pain is improving are patient's  His tolerating tube feedings well  Had a good bowel movement.  Generalized weakness is still persisting.    OBJECTIVE     VITAL SIGNS (Most Recent)  Temp: 98.1 °F (36.7 °C) (07/19/22 0701)  Pulse: 79 (07/19/22 0701)  Resp: 18 (07/19/22 0701)  BP: 113/72 (07/19/22 0701)  SpO2: 95 % (07/19/22 0701)    VENTILATION STATUS  Resp: 18 (07/19/22 0701)  SpO2: 95 % (07/19/22 0701)       I & O (Last 24H):    Intake/Output Summary (Last 24 hours) at 7/19/2022 0913  Last data filed at 7/19/2022 0701  Gross per 24 hour   Intake 890 ml   Output 750 ml   Net 140 ml       WEIGHTS  Wt Readings from Last 1 Encounters:   07/19/22 0510 91.3 kg (201 lb 4.5 oz)   07/16/22 0520 98.7 kg (217 lb 9.5 oz)   07/12/22 0400 97.4 kg (214 lb 11.7 oz)   07/07/22 0520 96.4 kg (212 lb 8.4 oz)   07/05/22 0301 96.9 kg (213 lb 10 oz)   07/01/22 0300 97.3 kg (214 lb 8.1 oz)   06/30/22 0300 100.5 kg (221 lb 9.6 oz)   06/27/22 2254 101.1 kg (222 lb 14.2 oz)   06/27/22 1954 99.8 kg (220 lb)        PHYSICAL EXAM  Unable to obtain as patient was sleeping during rounds and we did not want to disturb     SCHEDULED MEDS:   amiodarone  200 mg Per G Tube BID    amLODIPine  5 mg Per G Tube Daily    aspirin  81 mg Per G Tube Daily    atorvastatin  80 mg Per G Tube Daily    docusate  100 mg Per G Tube BID    levoFLOXacin  500 mg Oral Daily    metoprolol tartrate  25 mg Per G Tube BID    sacubitriL-valsartan  1 tablet Per G Tube BID    tamsulosin  0.4 mg Oral Daily       CONTINUOUS INFUSIONS:   carboxymethylcellulose         PRN MEDS:acetaminophen, albumin human 5%, ALPRAZolam, calcium gluconate IVPB, calcium gluconate IVPB, calcium gluconate IVPB, carboxymethylcellulose, dextrose 50%, dextrose 50%, hydrALAZINE, HYDROcodone-acetaminophen, insulin aspart U-100, labetalol, magnesium sulfate IVPB, magnesium sulfate IVPB, melatonin, metoprolol, morphine, mupirocin, ondansetron, potassium chloride in water **AND** potassium chloride in water **AND** potassium chloride in water, sodium phosphate IVPB, sodium phosphate IVPB, sodium phosphate IVPB    LABS AND DIAGNOSTICS     CBC LAST 3 DAYS  Recent Labs   Lab 07/17/22 0358 07/18/22  0336 07/19/22  0430   WBC 14.68* 14.15* 13.52*   RBC 4.00* 3.98* 3.86*   HGB 11.5* 11.3* 10.9*   HCT 36.1* 35.5* 34.5*   MCV 90 89 89   MCH 28.8 28.4 28.2   MCHC 31.9* 31.8* 31.6*   RDW 13.8 13.9 14.0    313 303   MPV 11.1 10.7 10.8       COAGULATION LAST 3 DAYS  No results for input(s): LABPT, INR, APTT in the last 168 hours.    CHEMISTRY LAST 3 DAYS  Recent Labs   Lab 07/17/22 0358 07/18/22  0336 07/19/22  0430   * 136 137   K 4.1 4.2 4.3    102 103   CO2 26 27 28   ANIONGAP 7* 7* 6*   BUN 29* 30* 29*   CREATININE 0.8 0.8 1.0   * 144* 178*   CALCIUM 8.2* 8.4* 8.6*   ALBUMIN 3.1* 3.1* 2.9*   PROT 5.7* 5.7* 5.7*   ALKPHOS 54* 54* 61   ALT 57* 53* 43   AST 28 23 21   BILITOT 1.0 1.2* 1.2*       CARDIAC PROFILE LAST 3 DAYS  Recent Labs   Lab 07/15/22  0407    *       ENDOCRINE LAST 3 DAYS  No results for input(s): TSH, PROCAL in the last 168 hours.    LAST ARTERIAL BLOOD GAS  ABG  No results for input(s): PH, PO2, PCO2, HCO3, BE in the last 168 hours.    LAST 7 DAYS MICROBIOLOGY   Microbiology Results (last 7 days)     ** No results found for the last 168 hours. **          MOST RECENT IMAGING  X-Ray Chest AP Portable  Portable chest x-ray at 5:48 AM is compared to prior study 8/17/2022    Clinical history is pneumonia    The right IJ catheter is in stable position. The mediastinal drains and left chest tube has been removed. The heart is normal in size. There are median sternotomy wires.    There is linear airspace disease in the right upper lobe and right mid lung suggestive of atelectasis versus infiltrate. The right lower lobe and left lung are clear. There are no acute osseous abnormalities.    IMPRESSION: Development of linear airspace disease in the right upper lobe and mid lung suggestive of atelectasis versus infiltrate    Prior median sternotomy    Electronically signed by:  Isis Blair MD  7/17/2022 6:43 AM CDT Workstation: [id number]      [facility]  Results for orders placed during the hospital encounter of 06/27/22    Echo Saline Bubble? No    Interpretation Summary  · The left ventricle is normal in size with mild predominantly mid septal mild asymmetric hypertrophy eccentric hypertrophy and normal systolic function.  · The estimated ejection fraction is 60%.  · Indeterminate left ventricular diastolic function.  · Normal right ventricular size with normal right ventricular systolic function.  · Mild left atrial enlargement.  · Normal central venous pressure (3 mmHg).  · The estimated PA systolic pressure is 19 mmHg.  · Mild mitral regurgitation.      CURRENT/PREVIOUS VISIT EKG  Results for orders placed or performed during the hospital encounter of 06/27/22   EKG 12-lead    Collection Time: 07/09/22 12:41 AM    Narrative    Test Reason :  I21.3,    Vent. Rate : 088 BPM     Atrial Rate : 088 BPM     P-R Int : 232 ms          QRS Dur : 094 ms      QT Int : 384 ms       P-R-T Axes : 043 101 132 degrees     QTc Int : 464 ms    Sinus rhythm with 1st degree A-V block with occasional Premature  ventricular complexes  Possible Inferior infarct ,age undetermined  Anterolateral infarct (cited on or before 27-JUN-2022)  Abnormal ECG  When compared with ECG of 08-JUL-2022 16:38,  Borderline criteria for Inferior infarct are now Present  Serial changes of Anterior infarct Present  Confirmed by Vincent Mann MD ([id number]) on 7/15/2022 12:55:05 PM    Referred By: [email or web address]   SELF           Confirmed By:Vincent Mann MD       ASSESSMENT/PLAN:     Active Hospital Problems    Diagnosis    *STEMI (ST elevation myocardial infarction)    Acute pneumonia    PEG (percutaneous endoscopic gastrostomy) status    Superficial vein thrombosis    Acute CVA (cerebrovascular accident)    S/P CABG (coronary artery bypass graft)    NSVT (nonsustained ventricular tachycardia)    Coronary artery disease involving coronary bypass graft of native heart with angina pectoris       ASSESSMENT & PLAN:   1. Multivessel coronary artery disease  2. ST-elevation myocardial infarction status post PTCA  3. Nonsustained ventricular tachycardia  4. Mixed hyperlipidemia      RECOMMENDATIONS:    1. ASHLEY negative per Dr. Mann.   2. He appears stable for dc soon. Patient will be going to rehab today hopefully.   Continue meds:  Amiodarone 200 mg  b.i.d., amlodipine 5 mg daily, aspirin 81 mg daily, atorvastatin 80 mg daily, metoprolol tartrate 25 mg  b.i.d. and Entresto 24-26 mg . B.i.d..  3. Follow up in our office in 2-3 weeks.       Shawanda Mcneal NP  [facility]  Department of Cardiology  Date of Service: 07/19/2022  9:53 AM

## 2022-07-19 NOTE — PT/OT/SLP PROGRESS
Occupational Therapy   Treatment    Name: Matteo Fraire  MRN: [id number]  Admitting Diagnosis:  STEMI (ST elevation myocardial infarction)  4 Days Post-Op    Recommendations:     Discharge Recommendations: rehabilitation facility  Discharge Equipment Recommendations:   (TBD)  Barriers to discharge:       Assessment:     Matteo Fraire is a 61 y.o. male with a medical diagnosis of STEMI (ST elevation myocardial infarction).. Performance deficits affecting function are weakness, impaired endurance, impaired self care skills, impaired functional mobility, gait instability, impaired balance, decreased safety awareness, impaired skin, impaired cardiopulmonary response to activity, decreased upper extremity function. Pt declined OOB activity due to pending d/c and just getting back into bed from working with PT and sitting up in the chair. OT reviewed sternal precautions with patient and their application to ADLs and transfers.     Rehab Prognosis:  Good; patient would benefit from acute skilled OT services to address these deficits and reach maximum level of function.       Plan:     Patient to be seen 5 x/week to address the above listed problems via self-care/home management, therapeutic activities, therapeutic exercises  · Plan of Care Expires: 08/11/22  · Plan of Care Reviewed with: patient    Subjective     Pain/Comfort:  · Pain Rating 1: 0/10    Objective:     Communicated with: nursing prior to session.  Patient found HOB elevated with PEG Tube, peripheral IV, PICC line, bed alarm, telemetry, pulse ox (continuous) upon OT entry to room.    General Precautions: Standard, fall, sternal   Orthopedic Precautions:N/A   Braces: N/A  Respiratory Status: Nasal cannula, flow 2 L/min     Occupational Performance:   Reviewed sternal precautions with patient and their application to ADLs and transfers in hospital and well as home environment. Pt verbalized understanding.    Treatment & Education:  Pt educated on role of  OT/POC, importance of OOB/EOB activity use of call bell, sternal precautions, and safety during ADLs, transfers, and functional mobility.     Patient left HOB elevated with all lines intact, call button in reach and bed alarm onEducation:      GOALS:   Multidisciplinary Problems     Occupational Therapy Goals        Problem: Occupational Therapy    Goal Priority Disciplines Outcome Interventions   Occupational Therapy Goal     OT, PT/OT Ongoing, Progressing    Description: Goals to be met by: 8/11/2022     Patient will increase functional independence with ADLs by performing:    UE Dressing with Supervision.  LE Dressing with Supervision.  Grooming while standing at sink with Supervision.  Toileting from toilet with Supervision for hygiene and clothing management.   Toilet transfer to toilet with Supervision.  Perform 30 minutes sitting/standing ADL activity while maintaining sternal precautions with no verbal cues.                     Time Tracking:     OT Date of Treatment: 07/19/22  OT Start Time: 1013  OT Stop Time: 1021  OT Total Time (min): 8 min    Billable Minutes:Self Care/Home Management 8    OT/LEONARDO: OT          7/19/2022

## 2022-07-19 NOTE — PLAN OF CARE
Problem: Adult Inpatient Plan of Care  Goal: Plan of Care Review  Outcome: Ongoing, Progressing  Goal: Patient-Specific Goal (Individualized)  Outcome: Ongoing, Progressing  Goal: Absence of Hospital-Acquired Illness or Injury  Outcome: Ongoing, Progressing  Goal: Optimal Comfort and Wellbeing  Outcome: Ongoing, Progressing  Goal: Readiness for Transition of Care  Outcome: Ongoing, Progressing     Problem: Arrhythmia/Dysrhythmia (Cardiac Catheterization)  Goal: Stable Heart Rate and Rhythm  Outcome: Ongoing, Progressing     Problem: Bleeding (Cardiac Catheterization)  Goal: Absence of Bleeding  Outcome: Ongoing, Progressing     Problem: Contrast-Induced Injury Risk (Cardiac Catheterization)  Goal: Absence of Contrast-Induced Injury  Outcome: Ongoing, Progressing     Problem: Embolism (Cardiac Catheterization)  Goal: Absence of Embolism Signs and Symptoms  Outcome: Ongoing, Progressing     Problem: Pain (Cardiac Catheterization)  Goal: Acceptable Pain Control  Outcome: Ongoing, Progressing     Problem: Vascular Access Protection (Cardiac Catheterization)  Goal: Absence of Vascular Access Complication  Outcome: Ongoing, Progressing     Problem: Fall Injury Risk  Goal: Absence of Fall and Fall-Related Injury  Outcome: Ongoing, Progressing     Problem: Infection  Goal: Absence of Infection Signs and Symptoms  Outcome: Ongoing, Progressing     Problem: Communication Impairment (Mechanical Ventilation, Invasive)  Goal: Effective Communication  Outcome: Ongoing, Progressing     Problem: Device-Related Complication Risk (Mechanical Ventilation, Invasive)  Goal: Optimal Device Function  Outcome: Ongoing, Progressing     Problem: Inability to Wean (Mechanical Ventilation, Invasive)  Goal: Mechanical Ventilation Liberation  Outcome: Ongoing, Progressing     Problem: Nutrition Impairment (Mechanical Ventilation, Invasive)  Goal: Optimal Nutrition Delivery  Outcome: Ongoing, Progressing     Problem: Skin and Tissue Injury  (Mechanical Ventilation, Invasive)  Goal: Absence of Device-Related Skin and Tissue Injury  Outcome: Ongoing, Progressing     Problem: Ventilator-Induced Lung Injury (Mechanical Ventilation, Invasive)  Goal: Absence of Ventilator-Induced Lung Injury  Outcome: Ongoing, Progressing     Problem: Communication Impairment (Artificial Airway)  Goal: Effective Communication  Outcome: Ongoing, Progressing     Problem: Device-Related Complication Risk (Artificial Airway)  Goal: Optimal Device Function  Outcome: Ongoing, Progressing     Problem: Skin and Tissue Injury (Artificial Airway)  Goal: Absence of Device-Related Skin or Tissue Injury  Outcome: Ongoing, Progressing     Problem: Noninvasive Ventilation Acute  Goal: Effective Unassisted Ventilation and Oxygenation  Outcome: Ongoing, Progressing     Problem: Skin Injury Risk Increased  Goal: Skin Health and Integrity  Outcome: Ongoing, Progressing     Problem: Feeding Intolerance (Enteral Nutrition)  Goal: Feeding Tolerance  Outcome: Ongoing, Progressing     Problem: Impaired Wound Healing  Goal: Optimal Wound Healing  Outcome: Ongoing, Progressing     Problem: Fluid Imbalance (Pneumonia)  Goal: Fluid Balance  Outcome: Ongoing, Progressing     Problem: Infection (Pneumonia)  Goal: Resolution of Infection Signs and Symptoms  Outcome: Ongoing, Progressing     Problem: Respiratory Compromise (Pneumonia)  Goal: Effective Oxygenation and Ventilation  Outcome: Ongoing, Progressing

## 2022-07-19 NOTE — PLAN OF CARE
As per [facility] via [facility], authorization received from insurance and will be able to accept pt when medically cleared. She also wanted to know when Lovenox will be resumed. She also stated Dr Wills would like for pt to keep his Right IJ cath.     Message sent to Dr Mckinnon via secured chat to very when medically cleared.     Received response from Dr Mckinnon and discharge order in place and he stated no Lovenox needed.     [facility] and [facility] requested for order to be faxed. She stated staff will reveiw order and then [facility]. Discharge order submitted via Epic.     Provided update of discharge status to nurse Prado.     Informed by Rhona/[facility] via [employer] that discharge order reviewed and need PEG feed order.   Informed Dr Mckinnon via [employer] and order updated and resend to [facility].  Rhona provided Phone number via secured chat  for nurse to nurse [phone].   Number for report  given to nurse Prado via [employer].       07/19/22 0842   Post-Acute Status   Post-Acute Authorization Placement   Post-Acute Placement Status Pending medical clearance/testing   Discharge Delays None known at this time   Discharge Plan   Discharge Plan A Rehab   Discharge Plan B Rehab

## 2022-07-19 NOTE — NURSING
Care handoff to [facility] at [facility]. Pt transported in wheelchair to facility.

## 2022-07-20 NOTE — DISCHARGE SUMMARY
[facility] Medicine  Discharge Summary      Patient Name: Matteo Fraire  MRN: [id number]  Patient Class: IP- Inpatient  Admission Date: 6/27/2022  Hospital Length of Stay: 22 days  Discharge Date and Time:  07/19/2022 9:51 PM  Attending Physician: Patsy att. providers found   Discharging Provider: Mik Mckinnon MD  Primary Care Provider: Primary Doctor Patsy      HPI:     61-year-old male past medical history of coronary artery disease status post CABG 2011 and stent 2005 both occasions they were related with MIs course in the ER today because he started having chest pain about 1 hour prior to arrival it was a pressure-like pain associated with sweating and dry heaving is with radiation to both arms sensation was similar to his MI 2005 he got 2 aspirins and appears to present 1 hour later when he got to the EKG showed STEMI so he was taken to cath lab where he had angioplasty of LIMA to LAD by Dr. Powell he is currently on nitroglycerin drip due to high blood pressure he also received morphine IV 4 mg Zofran IV 4 and labetalol 10 mg IV admitted to ICU.      Procedure(s) (LRB):  EGD (ESOPHAGOGASTRODUODENOSCOPY) (N/A)      Hospital Course:   Patient got admitted with STEMI and had  balloon angioplasty to LAD  (06/27 )  Eventually pt underwent  redo 3 vessel CABG for recurrent severe atherosclerotic CAD (07/08 )  Unfortunately pt suffered from Acute CVA and was started on  aspirin  Pt had ASHLEY which was reported negative  He was also started on abx for acute Pneumonia  Found to have NSVT (nonsustained ventricular tachycardia) and Cardio Team started pt on Amiodarone  Pt had PEG insertion on 07/15 and TF s were initiated  Later pt was discharged to inpatient rehab        Goals of Care Treatment Preferences:  Code Status: Full Code      Consults:   Consults (From admission, onward)        Status Ordering Provider     Inpatient consult to Registered Dietitian/Nutritionist  Once        Provider:  (Not yet  assigned)    Completed ONEIL CHAUDHARI     Inpatient consult to Registered Dietitian/Nutritionist  Once        Provider:  (Not yet assigned)    Completed ONEIL CHAUDHARI     Inpatient consult to Social Work/Case Management  Once        Provider:  (Not yet assigned)    Completed ONEIL CHAUDHARI     Inpatient consult to Neurology  Once        Provider:  Louis Jarquin MD    Completed ARACELI VELEZ     Inpatient consult to Registered Dietitian/Nutritionist  Once        Provider:  (Not yet assigned)    Completed KEILA TAVARES     Inpatient consult to Cardiothoracic Surgery  Once        Provider:  Eyal Stone MD    Completed CHACE GALLAGHER          No new Assessment & Plan notes have been filed under this hospital service since the last note was generated.  Service: Hospital Medicine    Final Active Diagnoses:    Diagnosis Date Noted POA    PEG (percutaneous endoscopic gastrostomy) status [Z93.1] 07/15/2022 Not Applicable    Acute pneumonia [J18.9] 07/17/2022 Unknown    S/P CABG (coronary artery bypass graft) [Z95.1]  Not Applicable    Coronary artery disease involving coronary bypass graft of native heart with angina pectoris [I25.709] 06/29/2022 Yes      Problems Resolved During this Admission:    Diagnosis Date Noted Date Resolved POA    PRINCIPAL PROBLEM:  STEMI (ST elevation myocardial infarction) [I21.3] 06/28/2022 07/19/2022 Yes    Acute CVA (cerebrovascular accident) [I63.9] 07/13/2022 07/19/2022 Unknown    Superficial vein thrombosis [I82.890] 07/14/2022 07/19/2022 No    NSVT (nonsustained ventricular tachycardia) [I47.2] 06/30/2022 07/19/2022 No       Discharged Condition: good    Disposition: Rehab Facility    Follow Up:   Contact information for follow-up providers     Eyal Stone MD Follow up in 1 week(s).    Specialties: Cardiothoracic Surgery, Vascular Surgery, Cardiovascular Disease, Cardiology  Contact information:  [address]  [place] [address]  [phone]                    Contact information for after-discharge care     Destination     [facility] .    Service: Inpatient Rehabilitation  Contact information:  [address] [address] [address]  [phone]                           Patient Instructions:      Tube Feedings/Formulas   Order Comments: Vital AF 1.2  Initiate at 20 ml/hr and advance by 10 ml q4 hours or as tolerated.  Goal rate: 70 ml/hr  FWF: 30 ml q4 hours     Order Specific Question Answer Comments   Select Adult Formula: Other    Route: Gastrostomy    Formula Rate (mL/hr): 20        Significant Diagnostic Studies: Labs:   CMP   Recent Labs   Lab 07/18/22 0336 07/19/22  0430    137   K 4.2 4.3    103   CO2 27 28   * 178*   BUN 30* 29*   CREATININE 0.8 1.0   CALCIUM 8.4* 8.6*   PROT 5.7* 5.7*   ALBUMIN 3.1* 2.9*   BILITOT 1.2* 1.2*   ALKPHOS 54* 61   AST 23 21   ALT 53* 43   ANIONGAP 7* 6*   ESTGFRAFRICA >60.0 >60.0   EGFRNONAA >60.0 >60.0    and CBC   Recent Labs   Lab 07/18/22 0336 07/19/22  0430   WBC 14.15* 13.52*   HGB 11.3* 10.9*   HCT 35.5* 34.5*    303       Pending Diagnostic Studies:     Procedure Component Value Units Date/Time    Basic metabolic panel [[id number]] Collected: 07/09/22 0140    Order Status: Sent Lab Status: In process Updated: 07/09/22 0141    Specimen: Blood     CBC auto differential [[id number]] Collected: 07/09/22 0140    Order Status: Sent Lab Status: In process Updated: 07/09/22 0141    Specimen: Blood     Magnesium [[id number]] Collected: 07/09/22 0140    Order Status: Sent Lab Status: In process Updated: 07/09/22 0141    Specimen: Blood     Phosphorus [[id number]] Collected: 07/09/22 0143    Order Status: Sent Lab Status: In process Updated: 07/09/22 0143    Specimen: Blood     Transesophageal echo (ASHLEY) [[id number]] Resulted: 07/18/22 0944    Order Status: Sent Lab Status: In process Updated: 07/18/22 0944     BSA 2.19 m2     Troponin I [[id number]] Collected: 07/09/22 0140    Order  Status: Sent Lab Status: In process Updated: 07/09/22 0141    Specimen: Blood          Medications:  Reconciled Home Medications:      Medication List      START taking these medications    amiodarone 200 MG Tab  Commonly known as: PACERONE  1 tablet (200 mg total) by Per G Tube route 2 (two) times daily.     amLODIPine 5 MG tablet  Commonly known as: NORVASC  1 tablet (5 mg total) by Per G Tube route once daily.  Start taking on: July 20, 2022     atorvastatin 80 MG tablet  Commonly known as: LIPITOR  1 tablet (80 mg total) by Per G Tube route once daily.  Start taking on: July 20, 2022     levoFLOXacin 500 MG tablet  Commonly known as: LEVAQUIN  Take 1 tablet (500 mg total) by mouth once daily. for 5 days  Start taking on: July 20, 2022     metoprolol tartrate 25 MG tablet  Commonly known as: LOPRESSOR  1 tablet (25 mg total) by Per G Tube route 2 (two) times daily.     sacubitriL-valsartan 24-26 mg per tablet  Commonly known as: ENTRESTO  Take 1 tablet by mouth 2 (two) times daily.        CHANGE how you take these medications    aspirin 81 MG Chew  Take 81 mg by mouth once daily.  What changed: Another medication with the same name was removed. Continue taking this medication, and follow the directions you see here.        STOP taking these medications    HYDROcodone-acetaminophen 5-325 mg per tablet  Commonly known as: NORCO     ibuprofen 600 MG tablet  Commonly known as: ADVIL,MOTRIN     ondansetron 4 MG tablet  Commonly known as: ZOFRAN     tamsulosin 0.4 mg Cap  Commonly known as: FLOMAX            Indwelling Lines/Drains at time of discharge:   Lines/Drains/Airways     Central Venous Catheter Line  Duration           Percutaneous Central Line Insertion/Assessment - Triple Lumen  07/08/22 0832 right internal jugular 11 days          Drain  Duration                Gastrostomy/Enterostomy 07/15/22 1000 feeding 4 days              Physical Exam  Cardiovascular:      Rate and Rhythm: Normal rate.   Neurological:       Mental Status: He is alert and oriented to person, place, and time.       Time spent on the discharge of patient: 45 minutes         Mik Mckinnon MD  Department of Hospital Medicine  [facility]

## 2022-07-21 NOTE — PT/OT/SLP DISCHARGE
Occupational Therapy Discharge Summary    Matteo Fraire  MRN: [id number]   Principal Problem: STEMI (ST elevation myocardial infarction)      Patient Discharged from acute Occupational Therapy on 7/19/2022.  Please refer to prior OT note dated 7/19/2022 for functional status.    Assessment:      Patient appropriate for care in another setting.    Objective:     GOALS:   Multidisciplinary Problems     Occupational Therapy Goals     Not on file          Multidisciplinary Problems (Resolved)        Problem: Occupational Therapy    Goal Priority Disciplines Outcome Interventions   Occupational Therapy Goal   (Resolved)     OT, PT/OT Met    Description: Goals to be met by: 8/11/2022     Patient will increase functional independence with ADLs by performing:    UE Dressing with Supervision.  LE Dressing with Supervision.  Grooming while standing at sink with Supervision.  Toileting from toilet with Supervision for hygiene and clothing management.   Toilet transfer to toilet with Supervision.  Perform 30 minutes sitting/standing ADL activity while maintaining sternal precautions with no verbal cues.                     Reasons for Discontinuation of Therapy Services  Transfer to alternate level of care.      Plan:     Patient Discharged to: Inpatient Rehab    7/21/2022

## 2022-07-27 DIAGNOSIS — I63.9 CVA (CEREBRAL VASCULAR ACCIDENT): Primary | ICD-10-CM

## 2022-07-28 LAB
BSA FOR ECHO PROCEDURE: 2.19 M2
EJECTION FRACTION: 60 %

## 2022-07-29 ENCOUNTER — TELEPHONE (OUTPATIENT)
Dept: CARDIOLOGY | Facility: CLINIC | Age: 62
End: 2022-07-29
Payer: COMMERCIAL

## 2022-07-30 PROCEDURE — G0180 MD CERTIFICATION HHA PATIENT: HCPCS | Mod: ,,, | Performed by: NURSE PRACTITIONER

## 2022-07-30 PROCEDURE — G0180 PR HOME HEALTH MD CERTIFICATION: ICD-10-PCS | Mod: ,,, | Performed by: NURSE PRACTITIONER

## 2022-08-04 ENCOUNTER — EXTERNAL HOME HEALTH (OUTPATIENT)
Dept: HOME HEALTH SERVICES | Facility: HOSPITAL | Age: 62
End: 2022-08-04
Payer: COMMERCIAL

## 2022-08-08 ENCOUNTER — OFFICE VISIT (OUTPATIENT)
Dept: FAMILY MEDICINE | Facility: CLINIC | Age: 62
End: 2022-08-08
Payer: COMMERCIAL

## 2022-08-08 VITALS
SYSTOLIC BLOOD PRESSURE: 124 MMHG | HEIGHT: 69 IN | DIASTOLIC BLOOD PRESSURE: 72 MMHG | TEMPERATURE: 98 F | HEART RATE: 81 BPM | OXYGEN SATURATION: 99 % | BODY MASS INDEX: 29.84 KG/M2 | WEIGHT: 201.5 LBS | RESPIRATION RATE: 16 BRPM

## 2022-08-08 DIAGNOSIS — E78.5 HYPERLIPIDEMIA, UNSPECIFIED HYPERLIPIDEMIA TYPE: ICD-10-CM

## 2022-08-08 DIAGNOSIS — Z00.00 ROUTINE HEALTH MAINTENANCE: ICD-10-CM

## 2022-08-08 DIAGNOSIS — R73.03 PREDIABETES: ICD-10-CM

## 2022-08-08 DIAGNOSIS — Z11.4 SCREENING FOR HIV WITHOUT PRESENCE OF RISK FACTORS: ICD-10-CM

## 2022-08-08 DIAGNOSIS — I10 HYPERTENSION, UNSPECIFIED TYPE: ICD-10-CM

## 2022-08-08 DIAGNOSIS — I25.709 CORONARY ARTERY DISEASE INVOLVING CORONARY BYPASS GRAFT OF NATIVE HEART WITH ANGINA PECTORIS: Primary | ICD-10-CM

## 2022-08-08 DIAGNOSIS — Z11.59 ENCOUNTER FOR HEPATITIS C SCREENING TEST FOR LOW RISK PATIENT: ICD-10-CM

## 2022-08-08 PROCEDURE — 1159F PR MEDICATION LIST DOCUMENTED IN MEDICAL RECORD: ICD-10-PCS | Mod: CPTII,S$GLB,, | Performed by: NURSE PRACTITIONER

## 2022-08-08 PROCEDURE — 3008F BODY MASS INDEX DOCD: CPT | Mod: CPTII,S$GLB,, | Performed by: NURSE PRACTITIONER

## 2022-08-08 PROCEDURE — 3044F PR MOST RECENT HEMOGLOBIN A1C LEVEL <7.0%: ICD-10-PCS | Mod: CPTII,S$GLB,, | Performed by: NURSE PRACTITIONER

## 2022-08-08 PROCEDURE — 3078F DIAST BP <80 MM HG: CPT | Mod: CPTII,S$GLB,, | Performed by: NURSE PRACTITIONER

## 2022-08-08 PROCEDURE — 4010F PR ACE/ARB THEARPY RXD/TAKEN: ICD-10-PCS | Mod: CPTII,S$GLB,, | Performed by: NURSE PRACTITIONER

## 2022-08-08 PROCEDURE — 3044F HG A1C LEVEL LT 7.0%: CPT | Mod: CPTII,S$GLB,, | Performed by: NURSE PRACTITIONER

## 2022-08-08 PROCEDURE — 99214 OFFICE O/P EST MOD 30 MIN: CPT | Mod: S$GLB,,, | Performed by: NURSE PRACTITIONER

## 2022-08-08 PROCEDURE — 3074F SYST BP LT 130 MM HG: CPT | Mod: CPTII,S$GLB,, | Performed by: NURSE PRACTITIONER

## 2022-08-08 PROCEDURE — 1159F MED LIST DOCD IN RCRD: CPT | Mod: CPTII,S$GLB,, | Performed by: NURSE PRACTITIONER

## 2022-08-08 PROCEDURE — 3078F PR MOST RECENT DIASTOLIC BLOOD PRESSURE < 80 MM HG: ICD-10-PCS | Mod: CPTII,S$GLB,, | Performed by: NURSE PRACTITIONER

## 2022-08-08 PROCEDURE — 3008F PR BODY MASS INDEX (BMI) DOCUMENTED: ICD-10-PCS | Mod: CPTII,S$GLB,, | Performed by: NURSE PRACTITIONER

## 2022-08-08 PROCEDURE — 1111F DSCHRG MED/CURRENT MED MERGE: CPT | Mod: CPTII,S$GLB,, | Performed by: NURSE PRACTITIONER

## 2022-08-08 PROCEDURE — 4010F ACE/ARB THERAPY RXD/TAKEN: CPT | Mod: CPTII,S$GLB,, | Performed by: NURSE PRACTITIONER

## 2022-08-08 PROCEDURE — 3074F PR MOST RECENT SYSTOLIC BLOOD PRESSURE < 130 MM HG: ICD-10-PCS | Mod: CPTII,S$GLB,, | Performed by: NURSE PRACTITIONER

## 2022-08-08 PROCEDURE — 99214 PR OFFICE/OUTPT VISIT, EST, LEVL IV, 30-39 MIN: ICD-10-PCS | Mod: S$GLB,,, | Performed by: NURSE PRACTITIONER

## 2022-08-08 PROCEDURE — 1111F PR DISCHARGE MEDS RECONCILED W/ CURRENT OUTPATIENT MED LIST: ICD-10-PCS | Mod: CPTII,S$GLB,, | Performed by: NURSE PRACTITIONER

## 2022-08-08 RX ORDER — AMIODARONE HYDROCHLORIDE 200 MG/1
200 TABLET ORAL 2 TIMES DAILY
Qty: 60 TABLET | Refills: 0
Start: 2022-08-08 | End: 2022-08-24 | Stop reason: SDUPTHER

## 2022-08-08 RX ORDER — TALC
6 POWDER (GRAM) TOPICAL
COMMUNITY
Start: 2022-07-28 | End: 2023-01-24

## 2022-08-08 RX ORDER — ATORVASTATIN CALCIUM 80 MG/1
80 TABLET, FILM COATED ORAL DAILY
Qty: 30 TABLET | Refills: 3
Start: 2022-08-08 | End: 2022-08-29 | Stop reason: SDUPTHER

## 2022-08-08 RX ORDER — POLYETHYLENE GLYCOL 3350 17 G/17G
17 POWDER, FOR SOLUTION ORAL DAILY PRN
COMMUNITY
Start: 2022-07-28 | End: 2023-01-24

## 2022-08-08 RX ORDER — METFORMIN HYDROCHLORIDE 500 MG/1
500 TABLET ORAL 2 TIMES DAILY WITH MEALS
Qty: 60 TABLET | Refills: 3 | Status: SHIPPED | OUTPATIENT
Start: 2022-08-08 | End: 2022-12-13 | Stop reason: SDUPTHER

## 2022-08-08 RX ORDER — METFORMIN HYDROCHLORIDE 500 MG/1
500 TABLET ORAL
COMMUNITY
Start: 2022-07-28 | End: 2022-08-08 | Stop reason: SDUPTHER

## 2022-08-08 RX ORDER — METOPROLOL TARTRATE 25 MG/1
12.5 TABLET, FILM COATED ORAL 2 TIMES DAILY
Qty: 30 TABLET | Refills: 0 | Status: SHIPPED | OUTPATIENT
Start: 2022-08-08 | End: 2022-09-22

## 2022-08-08 RX ORDER — NAPROXEN SODIUM 220 MG/1
81 TABLET, FILM COATED ORAL
COMMUNITY
Start: 2022-07-29 | End: 2022-08-08 | Stop reason: SDUPTHER

## 2022-08-08 RX ORDER — MIDODRINE HYDROCHLORIDE 10 MG/1
10 TABLET ORAL
COMMUNITY
Start: 2022-07-28 | End: 2022-08-08 | Stop reason: SDUPTHER

## 2022-08-08 RX ORDER — MIDODRINE HYDROCHLORIDE 10 MG/1
10 TABLET ORAL 2 TIMES DAILY
Qty: 60 TABLET | Refills: 3 | Status: SHIPPED | OUTPATIENT
Start: 2022-08-08 | End: 2022-12-13 | Stop reason: SDUPTHER

## 2022-08-08 RX ORDER — AMIODARONE HYDROCHLORIDE 200 MG/1
200 TABLET ORAL
COMMUNITY
Start: 2022-07-28 | End: 2022-08-08 | Stop reason: SDUPTHER

## 2022-08-08 RX ORDER — AMLODIPINE BESYLATE 5 MG/1
5 TABLET ORAL DAILY
Qty: 30 TABLET | Refills: 3
Start: 2022-08-08 | End: 2022-08-24 | Stop reason: SDUPTHER

## 2022-08-08 RX ORDER — ACETAMINOPHEN 325 MG/1
650 TABLET ORAL EVERY 6 HOURS PRN
COMMUNITY
Start: 2022-07-28 | End: 2023-01-24

## 2022-08-08 NOTE — PROGRESS NOTES
SUBJECTIVE:    Patient ID: Matteo Fraire is a 61 y.o. male.    Chief Complaint: Establish Care    Mr. Fraire presents today to establish care with me as her PCP. He was recently discharged from rehab facility after having CVA in July. He is S/P CABG and Echo done during that hospitalization. He states he feels much better at this time and he is improving daily with home rehabilitation. He has all scheduled follow up in place and he is currently awaiting most important follow up to Cardiology which he states is scheduled in September. He continues to have follow up with speech as well due to him  Needing a PEG tub placed after failing swallow study. He states this continues to improve and he has appointment scheduled with them on 8/16 for a reevaluation of swallowing. He has not been seen by primary care provider in several years and presents to urgent care for evaluation as needed. He denies any issues or complaints at this time. He is requesting refills be ordered until he is seen by Dr. Mann. Overdue health maintenance discussed in great detail. He would like to defer several items at this time until he is stabilized after CVA.       No results displayed because visit has over 200 results.          Past Medical History:   Diagnosis Date    Heart attack 2005;2011    Hypertension     Psoriasis     Stroke      Social History     Socioeconomic History    Marital status: Single   Tobacco Use    Smoking status: Never Smoker    Smokeless tobacco: Never Used   Substance and Sexual Activity    Drug use: Never    Sexual activity: Not Currently     Partners: Female     Past Surgical History:   Procedure Laterality Date    CORONARY ANGIOGRAPHY INCLUDING BYPASS GRAFTS WITH CATHETERIZATION OF LEFT HEART Left 6/27/2022    Procedure: Left heart cath;  Surgeon: Stevan Powell MD;  [facility] CATH[facility]EP LAB;  Service: Cardiology;  Laterality: Left;    CORONARY ARTERY BYPASS GRAFT      CYSTOSCOPY W/ URETERAL  STENT PLACEMENT Left 11/21/2019    Procedure: CYSTOSCOPY, WITH URETERAL STENT INSERTION;  Surgeon: Mickey Escudero MD;  Location: [facility] OR;  Service: Urology;  Laterality: Left;    ENDOSCOPIC HARVEST OF VEIN Right 7/8/2022    Procedure: HARVEST-VEIN-ENDOVASCULAR;  Surgeon: Eyal Stone MD;  Location: [facility] OR;  Service: Cardiothoracic;  Laterality: Right;    ESOPHAGOGASTRODUODENOSCOPY N/A 7/15/2022    Procedure: EGD (ESOPHAGOGASTRODUODENOSCOPY);  Surgeon: Art Pino MD;  Location: [facility] ENDO;  Service: Endoscopy;  Laterality: N/A;    EXTRACORPOREAL SHOCK WAVE LITHOTRIPSY Left 11/21/2019    Procedure: LITHOTRIPSY, ESWL;  Surgeon: Mickey Escudero MD;  Location: [facility] OR;  Service: Urology;  Laterality: Left;    HERNIA REPAIR      Inguinal just after birth    REPEAT CORONARY ARTERY BYPASS GRAFTING N/A 7/8/2022    Procedure: CABG, REPEAT;  Surgeon: Eyal Stone MD;  Location: [facility] OR;  Service: Cardiothoracic;  Laterality: N/A;  drugs ordered 7-7  @1322      SURGICAL PROCUREMENT, ARTERY, RADIAL, FOR CABG Left 7/8/2022    Procedure: SURGICAL PROCUREMENT,ARTERY,RADIAL,FOR CABG;  Surgeon: Eyal Stone MD;  Location: [facility] OR;  Service: Cardiothoracic;  Laterality: Left;     Family History   Problem Relation Age of Onset    Hearing loss Mother     Arthritis Mother        Review of patient's allergies indicates:   Allergen Reactions    Venom-wasp Anaphylaxis       Current Outpatient Medications:     acetaminophen (TYLENOL) 325 MG tablet, 650 mg by Tube route every 6 (six) hours as needed., Disp: , Rfl:     aspirin 81 MG Chew, Take 81 mg by mouth once daily., Disp: , Rfl:     melatonin (MELATIN) 3 mg tablet, Take 6 mg by mouth., Disp: , Rfl:     polyethylene glycol (GLYCOLAX) 17 gram/dose powder, Take 17 g by mouth daily as needed., Disp: , Rfl:     sacubitriL-valsartan (ENTRESTO) 24-26 mg per tablet, Take 1 tablet by mouth 2 (two) times daily., Disp: 60 tablet, Rfl: 0     amiodarone (PACERONE) 200 MG Tab, 1 tablet (200 mg total) by Per G Tube route 2 (two) times daily., Disp: 60 tablet, Rfl: 0    amLODIPine (NORVASC) 5 MG tablet, 1 tablet (5 mg total) by Per G Tube route once daily., Disp: 30 tablet, Rfl: 3    atorvastatin (LIPITOR) 80 MG tablet, 1 tablet (80 mg total) by Per G Tube route once daily., Disp: 30 tablet, Rfl: 3    metFORMIN (GLUCOPHAGE) 500 MG tablet, Take 1 tablet (500 mg total) by mouth 2 (two) times daily with meals., Disp: 60 tablet, Rfl: 3    metoprolol tartrate (LOPRESSOR) 25 MG tablet, 0.5 tablets (12.5 mg total) by Per G Tube route 2 (two) times daily., Disp: 30 tablet, Rfl: 0    midodrine (PROAMATINE) 10 MG tablet, Take 1 tablet (10 mg total) by mouth 2 (two) times a day., Disp: 60 tablet, Rfl: 3  No current facility-administered medications for this visit.    Facility-Administered Medications Ordered in Other Visits:     sodium bicarbonate 10 mEq, potassium chloride 30 mEq in cardioplegic solution (PLEGISOL) 1,000 mL, , Intravenous, Once, Eyal Stone MD    sodium bicarbonate 10 mEq, potassium chloride 70 mEq in cardioplegic solution (PLEGISOL) 1,000 mL, , Intravenous, Once, Eyal Stone MD    thromb,yd-enmsqc-uynagox,s-Ca (TISSEEL) 4 mL, , Topical (Top), Once, Eyal Stone MD    Review of Systems   Constitutional: Positive for fatigue. Negative for activity change, appetite change and fever.   HENT: Positive for trouble swallowing. Negative for congestion, dental problem, nosebleeds, postnasal drip, rhinorrhea, sinus pain, sore throat and tinnitus.    Eyes: Negative for pain, discharge, itching and visual disturbance.   Respiratory: Negative for cough, chest tightness, shortness of breath and wheezing.    Cardiovascular: Negative for chest pain, palpitations and leg swelling.   Gastrointestinal: Negative for abdominal pain, blood in stool, constipation, diarrhea, nausea and vomiting.   Endocrine: Negative for cold intolerance,  "heat intolerance, polydipsia, polyphagia and polyuria.   Genitourinary: Negative for difficulty urinating, flank pain, genital sores, hematuria and urgency.   Musculoskeletal: Positive for gait problem. Negative for arthralgias and myalgias.   Skin: Negative for rash and wound.   Allergic/Immunologic: Negative for environmental allergies and food allergies.   Neurological: Negative for dizziness, light-headedness and headaches.   Hematological: Negative for adenopathy. Does not bruise/bleed easily.   Psychiatric/Behavioral: Negative for behavioral problems, confusion, decreased concentration, sleep disturbance and suicidal ideas. The patient is not nervous/anxious and is not hyperactive.           Objective:      Vitals:    08/08/22 0828   BP: 124/72   Pulse: 81   Resp: 16   Temp: 97.8 °F (36.6 °C)   TempSrc: Oral   SpO2: 99%   Weight: 91.4 kg (201 lb 8 oz)   Height: 5' 9" (1.753 m)     Physical Exam  Vitals reviewed.   Constitutional:       General: He is not in acute distress.     Appearance: Normal appearance. He is normal weight. He is ill-appearing. He is not toxic-appearing or diaphoretic.   HENT:      Head: Normocephalic and atraumatic.      Right Ear: Tympanic membrane and external ear normal. There is no impacted cerumen.      Left Ear: Tympanic membrane and external ear normal. There is no impacted cerumen.      Nose: Nose normal. No congestion or rhinorrhea.      Mouth/Throat:      Mouth: Mucous membranes are moist.      Pharynx: Oropharynx is clear. No oropharyngeal exudate or posterior oropharyngeal erythema.   Eyes:      General: No scleral icterus.        Right eye: No discharge.         Left eye: No discharge.      Extraocular Movements: Extraocular movements intact.      Conjunctiva/sclera: Conjunctivae normal.      Pupils: Pupils are equal, round, and reactive to light.   Cardiovascular:      Rate and Rhythm: Normal rate and regular rhythm.      Pulses: Normal pulses.      Heart sounds: Normal " heart sounds. No murmur heard.    No friction rub. No gallop.   Pulmonary:      Effort: Pulmonary effort is normal. No respiratory distress.      Breath sounds: Normal breath sounds. No wheezing, rhonchi or rales.   Chest:      Chest wall: No tenderness.   Abdominal:      General: Abdomen is flat. Bowel sounds are normal.      Palpations: Abdomen is soft. There is no mass.      Tenderness: There is no abdominal tenderness. There is no guarding or rebound.      Comments: Peg Tube in place   Musculoskeletal:         General: No swelling or signs of injury. Normal range of motion.      Cervical back: Normal range of motion and neck supple. No rigidity or tenderness.      Right lower leg: No edema.      Left lower leg: No edema.   Skin:     General: Skin is warm and dry.      Capillary Refill: Capillary refill takes less than 2 seconds.      Coloration: Skin is not pale.      Findings: No bruising or erythema.   Neurological:      General: No focal deficit present.      Mental Status: He is alert and oriented to person, place, and time. Mental status is at baseline.      Motor: No weakness.      Coordination: Coordination normal.      Gait: Gait normal.   Psychiatric:         Mood and Affect: Mood normal.         Behavior: Behavior normal.         Thought Content: Thought content normal.         Judgment: Judgment normal.           Assessment:       1. Coronary artery disease involving coronary bypass graft of native heart with angina pectoris    2. Prediabetes    3. Hyperlipidemia, unspecified hyperlipidemia type    4. Hypertension, unspecified type    5. Routine health maintenance    6. Encounter for hepatitis C screening test for low risk patient    7. Screening for HIV without presence of risk factors         Plan:       Coronary artery disease involving coronary bypass graft of native heart with angina pectoris  -     amiodarone (PACERONE) 200 MG Tab; 1 tablet (200 mg total) by Per G Tube route 2 (two) times daily.   Dispense: 60 tablet; Refill: 0  -     metoprolol tartrate (LOPRESSOR) 25 MG tablet; 0.5 tablets (12.5 mg total) by Per G Tube route 2 (two) times daily.  Dispense: 30 tablet; Refill: 0    Prediabetes  -     metFORMIN (GLUCOPHAGE) 500 MG tablet; Take 1 tablet (500 mg total) by mouth 2 (two) times daily with meals.  Dispense: 60 tablet; Refill: 3    Hyperlipidemia, unspecified hyperlipidemia type  -     atorvastatin (LIPITOR) 80 MG tablet; 1 tablet (80 mg total) by Per G Tube route once daily.  Dispense: 30 tablet; Refill: 3  -     Lipid Panel; Future; Expected date: 08/08/2022    Hypertension, unspecified type  -     amLODIPine (NORVASC) 5 MG tablet; 1 tablet (5 mg total) by Per G Tube route once daily.  Dispense: 30 tablet; Refill: 3  -     CBC auto differential; Future; Expected date: 08/08/2022    Routine health maintenance  -     CBC auto differential; Future; Expected date: 08/08/2022  -     Comprehensive Metabolic Panel; Future; Expected date: 08/08/2022    Encounter for hepatitis C screening test for low risk patient  -     Hepatitis C Antibody; Future; Expected date: 08/08/2022    Screening for HIV without presence of risk factors  -     HIV 1/2 Ag/Ab (4th Gen); Future; Expected date: 08/08/2022    Other orders  -     midodrine (PROAMATINE) 10 MG tablet; Take 1 tablet (10 mg total) by mouth 2 (two) times a day.  Dispense: 60 tablet; Refill: 3      Follow up in about 4 weeks (around 9/5/2022), or if symptoms worsen or fail to improve, for Lab review/Follow up .        8/8/2022 Naomi Bliss M.D.

## 2022-08-16 ENCOUNTER — CLINICAL SUPPORT (OUTPATIENT)
Dept: REHABILITATION | Facility: HOSPITAL | Age: 62
End: 2022-08-16
Attending: PHYSICAL MEDICINE & REHABILITATION
Payer: COMMERCIAL

## 2022-08-16 DIAGNOSIS — R13.10 DYSPHAGIA, UNSPECIFIED TYPE: ICD-10-CM

## 2022-08-16 DIAGNOSIS — Z78.9 IMPAIRED INSTRUMENTAL ACTIVITIES OF DAILY LIVING (IADL): ICD-10-CM

## 2022-08-16 DIAGNOSIS — I63.9 CVA (CEREBRAL VASCULAR ACCIDENT): ICD-10-CM

## 2022-08-16 DIAGNOSIS — I63.9 CEREBROVASCULAR ACCIDENT (CVA), UNSPECIFIED MECHANISM: ICD-10-CM

## 2022-08-16 PROCEDURE — 97165 OT EVAL LOW COMPLEX 30 MIN: CPT | Mod: PN

## 2022-08-16 PROCEDURE — 92610 EVALUATE SWALLOWING FUNCTION: CPT | Mod: PN

## 2022-08-16 NOTE — PROGRESS NOTES
Occupational Therapy   Evaluation    Matteo Fraire   MRN: [id number]     OT eval complete. Please see attached POC for details. Thank you for consult!    ROXIE White, CHANO   8/16/2022

## 2022-08-16 NOTE — PLAN OF CARE
"  [facility] Occupational Therapy  Initial Neurological Evaluation     Date: 8/16/2022  Patient: Matteo Fraire  Chart Number: [id number]    Therapy Diagnosis:   Encounter Diagnoses   Name Primary?    CVA (cerebral vascular accident)     Impaired instrumental activities of daily living (IADL)      Physician: Susanne Wills MD    Physician Orders: OT eval & treat  Medical Diagnosis: CVA  Evaluation Date: 8/16/2022  Plan of Care Expiration Period: 10/14/2022  Insurance Authorization period Expiration: 12/31/2022  Date of Return to MD: primary care on 9/13, cardiology on 9/23   Visit # / Visits Authorized: 1 / 1  FOTO: to be assessed       Time In: 1000  Time Out: 1030  Total Billable (one on one) Time: 30 minutes    Precautions: fall, swallow, sternal precautions    Subjective     History of Current Condition:   Per pt, "on June 27, I had a heart attack in the evening. -maker. On July 8 during bypass reconstruction I had another heart attack and 3 strokes." D/C'd to rehab on 7/19/2022 and home on 7/29/2022. Still has PEG. He feels like most of his limitations currently are due to sternal precautions.    Per Dr. Wills's summary, pt is a 61-year-old gentleman with past medical history significant for hypertension, coronary artery disease, status post CABG, psoriasis, presented to hospital on 06/27/2022 with a chief complaint of chest pain. Workup consistent with coronary artery disease needing angioplasty and redo coronary artery bypass graft on 07/08/2022 by Dr. Stone. Patient postop with left facial weakness, left hemiparesis, workup consistent with bilateral CVA with dysarthria, dysphagia.    Involved Side: Right (decreased sensation mostly)  Dominant Side: Right  Date of Onset: 7/8/2022  Surgical Procedure: CABG  Imaging:   MRI Brain without Contrast on 7/12/2022  IMPRESSION:  Foci of restricted diffusion compatible with recent infarction involving the right temporal lobe inferiorly, " "right lentiform nucleus and upper medulla/lower mele to the left of midline. There is local mass effect and sulcal effacement involving the right temporal lobe. There is no midline shift or evidence of hemorrhagic transformation.  White matter changes, likely on the basis of chronic microvascular ischemia.  Irregular appearing flow void involving the right MCA which could be further assessed with MRA or CTA.    Previous Therapy: acute care therapy and inpatient rehab    Patient's Goals for Therapy: return to driving, be able to do the grocery shopping    Pain:  Pain Related Behaviors Observed: no   Pt denies pain.     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  1 = Unable, Total/Dependent Assistance  2 = A lot, Maximum/Moderate Assistance  3 = A little, Minimum/Contact Guard/Supervision  4 = None, Modified [place]/Independent    Eatin Wife managing tube feedings      Groomin        UB Dressin     LB Dressin        Bathin seated on built-in bench  Toiletin  Total: /    AM-PAC Raw Score CMS "G-Code Modifier Level of Impairment Assistance   6 % Total / Unable   7 - 9 CM 80 - 100% Maximal Assist   10-14 CL 60 - 80% Moderate Assist   15 - 19 CK 40 - 60% Moderate Assist   20 - 22 CJ 20 - 40% Minimal Assist   23 CI 1-20% SBA / CGA   24 CH 0% Independent/ Mod I     Home/Living environment: lives with wife   Home Access: no concerns, shower stall with built-in bench  DME: PEG supplies, long-handled sponge, suction machine (hasn't used)     Leisure: Marksmanship, " some hunting     Driving: hasn't driven since CVA    Past Medical History/Physical Systems Review:     Past Medical History:  Matteo Fraire  has a past medical history of Heart attack, Hypertension, Psoriasis, and Stroke.    Past Surgical History:  Matteo Fraire  has a past surgical history that includes Coronary artery bypass graft; Hernia repair; Extracorporeal shock wave lithotripsy (Left, 11/21/2019); Cystoscopy w/ ureteral stent placement (Left, 11/21/2019); Coronary angiography including bypass grafts with catheterization of left heart (Left, 6/27/2022); Repeat coronary artery bypass grafting (N/A, 7/8/2022); Endoscopic harvest of vein (Right, 7/8/2022); surgical procurement, artery, radial, for cabg (Left, 7/8/2022); and Esophagogastroduodenoscopy (N/A, 7/15/2022).    Current Medications:  Matteo has a current medication list which includes the following prescription(s): acetaminophen, amiodarone, amlodipine, aspirin, atorvastatin, melatonin, metformin, metoprolol tartrate, midodrine, polyethylene glycol, sacubitril-valsartan, and [DISCONTINUED] tamsulosin, and the following Facility-Administered Medications: cardioplegic solution, cardioplegic solution, and thromb,lw-pskbfn-kgosdmc,s-ca.    Allergies:  Review of patient's allergies indicates:   Allergen Reactions    Venom-wasp Anaphylaxis      Objective     Cognitive Exam:  Pt appears cognitively intact. No immediate concerns noted. See Speech Therapy evaluation for further details.     Visual/Perceptual:  After CVA, pt noted significant double vision, but this has resolved. To be further assessed to ensure safety for driving.     Physical Exam:  Postural examination/scapula alignment: Rounded shoulders  Joint integrity: no concerns noted  Skin integrity: 19cm well-healed scar on left forearm, 27cm well-healing sternal scar   Edema: none noted   Palpation: no concerns    Bilateral upper extremity AROM WFL for flexion & distally, (ER/IR, abduction,  "extension NT due to sternal precautions.)    Strength testing deferred due to sternal precautions.    Hand Strength (FARIDEH Dynamometer, Setting II)   (lbs) Left  8/16/2022  Right  8/16/2022    1 66 106   2 66 104   3 66 107   avg 66 105.7   [norms for men aged 60-64: R=89.7 +/-20.4; L=76.8 +/-20.3 (Mathiowetz et al, 1985)]    Pinch Left  8/16/2022  Right  8/16/2022    Lateral 18 21   Tip (2 point) 13 18   3 jaw emma 16 23     Tone: Modified Simone Scale: 0 - No increase in muscle tone    Gross motor coordination:   - NELSY (Rapid Alternating Movements): min decr right   - Finger to Nose (5 times): wfl with eyes closed (arms in scaption due to sternal precautions)  - Finger Flicks (coordination moving from digit flexion to digit extension): wfl bilaterally    Box and Block Assessment Left  8/16/2022  Right  8/16/2022     50 53   [norms for men aged 60-64: R=71.3 +/-8.8; L=70.5 /-8.1 (Mathiowetz et al, 1985)]    Fine motor coordination:   -   Thumb to Finger Opposition: left upper extremity wfl, right upper extremity very min decreased     9 Hole Peg Test (9HPT) Left  8/16/2022  Right  8/16/2022     34.21s 34.29   [norms for men aged 61-65: R=20.87s +/-3.50s; L=21.60s +/-2.98s (Renita et al, 2003)]    Sensation:  Matteo  reports improving sensation.  Light touch: bilateral intact  Proprioception: bilateral intact  Temperature:  Impaired on the right per pt report    Balance:   Static Sit - GOOD: Takes MODERATE challenges from all directions  Dynamic sit- not challenged  Static Stand - FAIR+: Able to take MINIMAL challenges from all directions  Dynamic stand - not challenged    Endurance Deficit: moderate                    Functional Status      Functional Mobility:  Pt reports no concerns. Modified techniques due to sternal precautions.   Supervision functional ambulation in hallway, pt carrying cane rather than using it for support. "I keep this thing for parking lots and grocery stores now."    ADL's:  Not " formally assessed today. See subjective above.     IADL's:  Not formally assessed today. See subjective above.   Treatment     Home Exercises and Patient Education Provided    Education provided:   -role of OT, goals for OT, scheduling/cancellations, insurance limitations with patient.  -Additional Education provided: plan to     Written Home Exercises Provided: Patient instructed to cont prior HEP from rehab.  Matteo demonstrated good  understanding of the education provided.     Assessment     Matteo Fraire is a 61 y.o. male referred to outpatient occupational therapy and presents with a medical diagnosis of CVA (following CABG), resulting in the performance deficits and functional limitations as described in the chart below. 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     Patient prognosis is Excellent due to progress made thus far, family support.  Patient will benefit from skilled outpatient Occupational Therapy to address the deficits stated above and in the chart below, provide patient/family education, and to maximize patient's level of independence.     Plan of care discussed with patient: Yes  Patient's spiritual, cultural and educational needs considered and patient is agreeable to the plan of care and goals as stated below:     Anticipated Barriers for therapy: none as sternal precautions should be expiring in the next 4-6 weeks.     Medical Necessity is demonstrated by the following  Profile and History Assessment of Occupational Performance Level of Clinical Decision Making Complexity Score   Occupational Profile:   Matteo Fraire is a 61 y.o. male who lives with their spouse and is currently retired, but responsible for land and home  maintenance, bill paying & grocery shopping. Matteo Fraire has difficulty with feeding, driving/ transportation management, shopping and housework/ household chores affecting his daily functional abilities. His main goal for therapy is return to driving, be able to do the grocery shopping.     Comorbidities:    has a past medical history of Heart attack, Hypertension, Psoriasis, and Stroke.    Medical and Therapy History Review:   Brief Performance Deficits    Physical:  Muscle Endurance,   Skin Integrity/ Scar Formation,  Strength, Fine Motor Coordination,  Visual Functions    Cognitive:  No Deficits    Psychosocial:    Habits  Routines  Rituals     Clinical Decision Making:  low    Assessment Process:  Problem- Focused Assessments    Modification/ Need for Assistance:  Not Necessary    Intervention Selection:  Several Treatment Options   low  Based on PMHX, co morbidities , data from assessments and functional level of assistance required with task and clinical presentation directly impacting function.       The following goals were discussed with the patient and patient is in agreement with them as to be addressed in the treatment plan.     Goals:  Short Term Goals: 4 weeks   1) Pt will be independent with initial HEP.  2) Pt will complete full visual assessment to ensure safety for return to driving once cleared by MD.  3) Pt will be able to push empty shopping cart x1000' without fatigue to increase potential for him to return to grocery shopping.  4) Handwriting to be assessed and goals added as appropriate to increase independence with bill paying & shopping.    Long Term Goals: 8 weeks   1) Pt will be appropriate for return to driving.  2) Pt will be able to push shopping cart with 30# x1000' without fatigue to increase independence with grocery shopping.  3) pt will be able to transfer groceries of various weights and sizes (x10 and up to 10#) from grocery cart to car seat with mod I and no discomfort  to increase independence with grocery shopping.  4) pt will complete on-line bill pay activity with mod I.  5) Pt will be independence with scar mobilization to decrease adhesions and allow for increased ease of movement in the chest and left arm.   Additional functional goals to be added as pt progresses and per his priorities.    Plan   Certification Period/Plan of care expiration: 8/16/2022 to 10/14/22.    Outpatient Occupational Therapy 2 times weekly for 8 weeks to include the following interventions: Manual Therapy, Neuromuscular Re-ed, Patient Education, Self Care, Therapeutic Activities and Therapeutic Exercise, and any other treatment modalities that will facilitate Matteo Fraire's ability to reach his goals.       Yelitza Linton, OT      I certify the need for these services furnished under this plan of treatment and while under my care.  ____________________________________ Physician/Referring Practitioner   Date of Signature

## 2022-08-17 PROBLEM — Z78.9 IMPAIRED INSTRUMENTAL ACTIVITIES OF DAILY LIVING (IADL): Status: ACTIVE | Noted: 2022-08-16

## 2022-08-17 NOTE — PATIENT INSTRUCTIONS
OCCUPATIONAL THERAPY HOME PROGRAM SUGGESTIONS    *PICK 3-4 ACTIVITIES PER DAY AND ALTERNATE.    GRASP  A. CRUMPLE SHEETS OF PAPER IN HAND. KEEP BALL OF HAND IN ONE PLACE.  B. ALTERNATELY SQUEEZE AND RELEASE: SPONGE, PUTTY, RUBBER BALL, YARN BALL.  C. STACK: PAPER CUPS, CONES, BLOCKS.  D. USE TOOLS: HAMMER, SCREWDRIVER, HOLE , WIRE CUTTERS, LEATHER PUNCH.  E. WRING OUT WET CLOTH    PINCH  A. STATIC PINCH: PAINTING, METAL TOOLING, LEATHER LACING, WRITING, CUTTING, CARRY BOOK OR NEWSPAPER BETWEEN THUMB AND INDEX HUGH.  B. TIP PINCH: STRING BEADS, POP BEADS, MAKE AN O BY TOUCHING THUMB TO EACH FINGERTIP ONE AT A TIME,  SMALL OBJECTS AS BEAD, NAIL, PIN, TOOTHPICK.  C. PALMAR AND LATERAL PINCH: USE TWEEZERS TO  SMALL OBJECTS, CLIP CLOTHES PINS ON EDGE OF CAN, PEGBOARD GAMES AS IQ, SQUEEZE AND RELEASE PUTTY, JAMEY ACTIVITIES,  PLACE AND TURN AND RELEASE VARIOUS SIZE AND SHAPED OBJECTS AS SPOOLS OF THREAD, BLOCKS, PEGS, GRASP CARDS ONE AT A TIME TO PALM OF HAND, [id number].    HAND STRENGTHENING  A. ACTIVITES: GARDENING, WEEDING, PIANO, TYPING, WASH DISHES, USE SCISSORS, HOLE .  B. CLIP CLOTHES PINS ON EDGE OF CAN, INTERLANCE FINGERS; TURN PALMS OUT.  C. SQUEEZE AND PULL APART PUTTY (OTHER PUTTY EXERCISES).  D. TAP FINGERS ON TABLE.  E. PALMS TOGETHER, PUSH WEAK FINGERS AGAINST STRONG FINGERS.  F. PLACE KNOTTED TOWEL IN WEAK HAND, PULL IT THROUGH.  G. PALMS FLAT ON TALBE: SPREAD FINGERS APART THEN BRING TOGETHER; RAUSE EACH FINGER OFF TABLE AND LOWER IT; FINGERS BENT FLIP OBJECT BY STGRAIGHTENING FINGERS.  H. RUBBER BAND EXERCISES.  I. WIRE CUTTERS, SCREWDRIVER.  J. SQUIRT GUN.  K. HAND GRIPPER EXERCISES.  L. PRACTICE SHOOTING BEANS OR MARBLES AT A TARGET.  M. TIDDLYWINKS, NO TOUCHING THUMBS.    HAND DEXTERITY (AS YOU IMPROVE, TRY TO INCREASE SPEED)  A. ACTIVITIES: TYPING, PIANO, WRITING, JIG-SAW PUZZLES, CHECKERS, CHESS, KNIT, SEW BUTTONS, OTHER FASTENERS, COMPUTERS, ADDING MACHINE,  CALCULATOR.  B.  AND STACK COINS. DO NOT SLIDE TO EDGE OF TABLE.  C.  INDIVIDUALLY AND RELEASE IN CONTINTER: MARBLES, NAILS, STRAIGHT PINS.  D. HOLD HANDFUL OF COINS (POKER CHIPS) AND SLIDE OFF FINGERTIP ONE AT A TIME.  E. TWIST NUTS AND BOLTS.  F. TAP FINGERS ON TABLE.  G. SHUFFLE CARDS, DEAL THEM,  ONE AT A TIME, PLAY CARD CAMES.  H. CHAIN PAPER CLIPS TOGETHER.  I. TURN PENCILS IN FINGERS.  J. TIE KNOTS, BRAID WITH HEAVY CORD, COTYE  K. STACK PENNIES (50) ROLL COINS.  L. STRING BEADS SEW BUTTONING.  M. WASH DISHES.  N. PICKUP, PLACE AND RELEASE VARIOUS SIZED AND SHAPED OBJECTS: PEGS, DRIED SEEDS, BLOCKS, TOOTHPICKS, SPOOLS OF THREAD.  O. FINGER WALK ACROSS TABLE.  P. FEEDING, DRINKING OUT OF A CUP AND GLASS, ALL FINGER FOODS.  Q. FINGER WARS.  R. PUT 2 BLOCKS IN HAND AND ROTATE THEM AROUND CLOCKWISE (COUNTER-CLOCKWISE IS HARDER) THEN TRY WITH BALLS.    After you have practiced any activity above, begin to time yourself and try to become faster at each task.

## 2022-08-18 ENCOUNTER — CLINICAL SUPPORT (OUTPATIENT)
Dept: REHABILITATION | Facility: HOSPITAL | Age: 62
End: 2022-08-18
Attending: PHYSICAL MEDICINE & REHABILITATION
Payer: COMMERCIAL

## 2022-08-18 DIAGNOSIS — I63.9 CVA (CEREBRAL VASCULAR ACCIDENT): ICD-10-CM

## 2022-08-18 DIAGNOSIS — I63.9 CEREBROVASCULAR ACCIDENT (CVA), UNSPECIFIED MECHANISM: ICD-10-CM

## 2022-08-18 PROBLEM — R13.10 DYSPHAGIA: Status: ACTIVE | Noted: 2022-08-18

## 2022-08-18 PROCEDURE — 97161 PT EVAL LOW COMPLEX 20 MIN: CPT | Mod: PN

## 2022-08-19 PROBLEM — I63.9 CVA (CEREBRAL VASCULAR ACCIDENT): Status: ACTIVE | Noted: 2022-08-19

## 2022-08-19 NOTE — PLAN OF CARE
"[facility]  Physical Therapy Neurological Rehabilitation Initial Evaluation/Discharge     Name: Matteo Fraire  Clinic Number: [id number]    Therapy Diagnosis:   Encounter Diagnosis   Name Primary?    CVA (cerebral vascular accident)      Physician: Susanne Wills MD    Physician Orders: PT Eval and Treat   Medical Diagnosis from Referral: I63.9 (ICD-10-CM) - CVA (cerebral vascular accident)  Evaluation Date: 8/18/2022  Authorization Period Expiration: 12/31/2022  Visit # / Visits authorized: 1/ 99    Time In: 1032  Time Out: 1115  Total Billable Time: 43 minutes    Precautions: Standard and Sternal precautions     Subjective   Date of onset: 7/8/2022   History of current condition: Matteo presents for outpatient physical therapy following referral by Dr. Susanne Wills for evaluation of impairments related to CVA.  Matteo reports, "I am actually doing well right now. I am doing better than they expected."     Patient reports he  had a heart attack in the evening on 6/27. On July 8 during bypass reconstruction he had "another heart attack and 3 strokes." Patient was discharged from acute care to inpatient rehabilitation on 7/19/2022. Discharged home on 7/29/2022.    He does still have PEG. Patient reports his biggest issue is swallowing. He was instructed to eat nothing by mouth until cleared by speech therapist. He also feels that most of his limitations currently are due to sternal precautions. Reports some right leg weakness but believes that is due to vein graft from right leg. Patient reports premorbid issues with balance due to accident in the late 80's while in the .     Patient has a follow-up with cardiology on 9/23/2022.     HPI from Discharge summary on 7/19/2022:      61-year-old male past medical history of coronary artery disease status post CABG 2011 and stent 2005 both occasions they were related with MIs course in the ER today because he started having chest " pain about 1 hour prior to arrival it was a pressure-like pain associated with sweating and dry heaving is with radiation to both arms sensation was similar to his MI 2005 he got 2 aspirins and appears to present 1 hour later when he got to the EKG showed STEMI so he was taken to cath lab where he had angioplasty of LIMA to LAD by Dr. Powell he is currently on nitroglycerin drip due to high blood pressure he also received morphine IV 4 mg Zofran IV 4 and labetalol 10 mg IV admitted to ICU.       Medical History:   Past Medical History:   Diagnosis Date    Heart attack 2005;2011    Hypertension     Psoriasis     Stroke        Surgical History:   Matteo Fraire  has a past surgical history that includes Coronary artery bypass graft; Hernia repair; Extracorporeal shock wave lithotripsy (Left, 11/21/2019); Cystoscopy w/ ureteral stent placement (Left, 11/21/2019); Coronary angiography including bypass grafts with catheterization of left heart (Left, 6/27/2022); Repeat coronary artery bypass grafting (N/A, 7/8/2022); Endoscopic harvest of vein (Right, 7/8/2022); surgical procurement, artery, radial, for cabg (Left, 7/8/2022); and Esophagogastroduodenoscopy (N/A, 7/15/2022).    Medications:   Matteo has a current medication list which includes the following prescription(s): acetaminophen, amiodarone, amlodipine, aspirin, atorvastatin, melatonin, metformin, metoprolol tartrate, midodrine, polyethylene glycol, and [DISCONTINUED] tamsulosin, and the following Facility-Administered Medications: cardioplegic solution, cardioplegic solution, and thromb,yp-mgtgcm-qhfbqcm,s-ca.    Allergies:   Review of patient's allergies indicates:   Allergen Reactions    Venom-wasp Anaphylaxis        Imaging:   MRI of the brain without contrast (7/12/2022)   IMPRESSION:     Foci of restricted diffusion compatible with recent infarction involving the right temporal lobe inferiorly, right lentiform nucleus and upper medulla/lower mele to the  "left of midline. There is local mass effect and sulcal effacement involving the right temporal lobe. There is no midline shift or evidence of hemorrhagic transformation.     White matter changes, likely on the basis of chronic microvascular ischemia.     Irregular appearing flow void involving the right MCA which could be further assessed with MRA or CTA.    Prior Therapy: inpatient rehabilitation and 2 weeks of home health physical therapy  Social History: lives with wife    Falls: denies any falls since CVA    DME: PEG supplies, long-handled sponge, suction machine (hasn't used)  Home Environment: Two story home, raised by 8-10 steps. Patient states, "I go up and down the steps all the time for exercise."   Exercise Routine / History: Patient reports he has a large property and walks outdoors daily.   Family Present at time of Eval: Patient's wife was in lobby during initial evaluation.    Occupation: Retired about 18 months prior (). Prior to CVA, patient was working on his 14 acres including edging, clearing trees, cutting lawn  Prior Level of Function: Independent   Current Level of Function: Patient is independent with mobility and ADLs. He is not driving at this time.  He arrived to session today with a straight cane, but reports he is not using cane with indoor or outdoor walking at home. Reports he has been trying to help his wife with yardwork.     Pain:  Current 0/10, worst 0/10, best 0/10   Location: no pain reported     Patient's goals: to improve leg strength     Objective     Vitals:   Blood Pressure: 125/85 mmHg  Heart Rate: 65 bpm        Transfers:     Transfers Level of Assistance  Comments   Roll Right not tested   Due to sternal precautions    Roll Left not tested   Due to sternal precautions    Roll Prone not tested   Due to sternal precautions    Supine to Sit Lakisha.    Sit to Supine Lakisha.    Sit to Stand "Ind. With no upper extremity support    Stand to Sit " ""Ind.    Transfer from bed to chair "Ind.        Strength:      Right Lower Extremity Strength Grade Left Lower Extremity Strength Grade   Hip Flexion 5/5 Hip Flexion 5/5   Hip Extension  *assessed in supine 4/5 Hip Extension  *assessed in supine 4/5   Hip Abduction  *assessed in supine  4/5 Hip Abduction  *assessed in supine 4/5   Knee Flexion  *assessed in supine 5/5 Knee Flexion  *assessed in supine 5/5   Knee Extension 5/5 Knee Extension 5/5   Ankle Dorsiflexion 5/5 Ankle Dorsiflexion 5/5   Ankle Plantarflexion  *assessed in sitting  5/5 Ankle Plantarflexion  *assessed in sitting  5/5       Balance:    Static sitting balance:Normal  Dynamic sitting balance: Normal    Static standing Balance: Normal  Dynamic standing balance: see Functional Gait Assessment score     Ambulation:     Gait Assessment:   - AD used: no assistive device   - Assistance: independent   - Distance: Patient ambulated into/out of and throughout therapy clinic today.  - Speed: 1.2 m/sec (6m/4.97s) "community ambulator" speed category  - Stairs: modified independent using hand rail and reciprocal foot placement     Gait Analysis:  Upon observation of gait, patient demonstrates deviations including but not limited to: no overt gait deviations noted with assessment of ambulation over smooth level surfaces. See Functional Gait Assessment below for gait deviations with dynamic activities.       Outcome Measures:     Functional Gait Assessment:   1. Gait on level surface =  3  (3) Normal: less than 5.5 sec, no A.D., no imbalance, normal gait pattern, deviates< 6in  (2) Mild impairment: 7-5.6 sec, uses A.D., mild gait deviations, or deviates 6-10 in  (1) Moderate impairment: > 7 sec, slow speed, imbalance, deviates 10-15 in.  (0) Severe impairment: needs assist, deviates >15 in, reach/touch wall  2. Change in Gait Speed = 3  (3) Normal: smooth change w/o loss of balance or gait deviation, deviates < 6 in, significant difference between speeds  (2) " Mild impairment: changes speed, but demonstrates mild gait deviations, deviates 6-10 in, OR no deviations but unable to significantly speed, OR uses A.D.  (1) Moderate impairment: minor changes to speed, OR changes speed w/ significant deviations, deviates 10-15 in, OR  Changes speed , but loses balance & recovers  (0) Severe impairment: cannot change speed, deviates >15 in, or loses balance & needs assist  3. Gait with horizontal head turns  = 3  (3) Normal: no change in gait, deviates <6 in  (2) Mild impairment: slight change in speed, deviates 6-10 in, OR uses A.D.  (1) Moderate impairment: moderate change in speed, deviates 10-15 in  (0) Severe impairment: severe disruption of gait, deviates >15in  4. Gait with vertical head turns = 3  (3) Normal: no change in gait, deviates <6 in  (2) Mild impairment: slight change in speed, deviates 6-10 in OR uses A.D.  (1) Moderate impairment: moderate change in speed, deviates 10-15 in  (0) Severe impairment: severe disruption of gait, deviates >15 in  5. Gait with pivot turns = 3  (3) Normal: performs safely in 3 sec, no LOB  (2) Mild impairment: performs in >3 sec & no LOB, OR turns safely & requires several steps to regain LOB  (1) Moderate impairment: turns slow, OR requires several small steps for balance following turn & stop  (0) Severe impairment: cannot turn safely, needs assist  6. Step over obstacle = 3  (3) Normal: steps over 2 stacked boxes w/o change in speed or LOB  (2) Mild impairment: able to step over 1 box w/o change in speed or LOB  (1) Moderate impairment: steps over 1 box but must slow down, may require VC  (0) Severe impairment: cannot perform w/o assist  7. Gait with Narrow ANJELICA = 0  (3) Normal: 10 steps no staggering  (2) Mild impairment: 7-9 steps  (1) Moderate impairment: 4-7 steps  (0) Severe impairment: < 4 steps or cannot perform w/o assist  8. Gait with eyes closed = 1  (3) Normal: < 7 sec, no A.D., no LOB, normal gait pattern, deviates <6  "in  (2) Mild impairment: 7.1-9 sec, mild gait deviations, deviates 6-10 in  (1) Moderate impairment: > 9 sec, abnormal pattern, LOB, deviates 10-15 in  (0) Severe impairment: cannot perform w/o assist, LOB, deviates >15in  9. Ambulating Backwards = 2  (3) Normal: no A.D., no LOB, normal gait pattern, deviates <6in  (2) Mild impairment: uses A.D., slower speed, mild gait deviations, deviates 6-10 in  (1) Moderate impairment: slow speed, abnormal gait pattern, LOB, deviates 10-15 in  (0) Severe impairment: severe gait deviations or LOB, deviates >15in  10. Steps = 2  (3) Normal: alternating feet, no rail  (2) Mild Impairment: alternating feet, uses rail  (1) Moderate impairment: step-to, uses rail  (0) Severe impairment: cannot perform safely    Score 23/30     Score:   <22/30 fall risk   <20/30 fall risk in older adults   <18/30 fall risk in Parkinsons            Evaluation   10 Meter Walk Test 1.2 m/sec (6m/4.97s)  "community ambulator" speed category     Functional Gait Assessment  23/30   5 Times Sit to Stand 9.12 seconds       Normative values for 5 Times Sit to Stand:       (Eliezer 2008)        TREATMENT   Treatment Time In: N/A  Treatment Time Out: N/A  Total Treatment time separate from Evaluation: 0 minutes    No treatment provided this date secondary to time needed to gather subjective history, complete objective testing, assess functional mobility and perform standardized outcome measures.     Home Exercises and Patient Education Provided    Education provided:   - Patient and his wife were provided with education regarding results of assessments performed today. physical therapist does not recommend any skilled physical therapist services at this time (see assessment below for details). Patient and his wife verbalized understanding and agreement with plan.       Written Home Exercises Provided: No home exercise program provided.     Assessment   Matteo is a 61 y.o. male referred to outpatient Physical " "Therapy with a medical diagnosis of CVA.    Patient presents with signs and symptoms consistent with diagnosis; however, he is demonstrating a good functional recovery from his impairments following inpatient rehabilitation and home health physical therapy. Per his report, he has nearly returned to his level of functioning prior to his CVA. He arrived with a straight cane today, but reports he does not use this with indoor or outdoor ambulation. During evaluation today, patient participated in assessment of lower extremity muscle strength via MMT. He presents with only minor decreases in his strength in proximal hip muscles. All muscle groups 4-5/5.     He ambulated with a "community level" gait speed and no significant deviations. Additionally     He performed standardized outcome measures to assess standing balance, gait speed, and fall risk including the Functional Gait Assessment, 10 Meter Walk Test and 5 Times Sit to Stand. He ambulated with a "community level" gait speed and no significant deviations. Additionally, his score on the 5 Times Sit to Stand assessment was within normal limits for his age-group. Patient scored somewhat lower on the Functional Gait Assessment, but it still above the "fall risk" cut-off score. Additionally, he reports having minor balance impairments for years prior to his stroke. Based on synthesis of these findings, physical therapist does not feel that Mr. Fraire requires skilled physical therapy services. He was instructed to continue with outdoor walking and daily activities as this will help to further improve his endurance and functional strength. He will participate in both speech therapy and occupational therapy at our clinic.     Upon completion of evaluation, patient and his wife were provided with education regarding recommendations with which they verbalized understanding and agreement.         Patient prognosis is Excellent.   Patient will benefit from skilled " outpatient Physical Therapy to address the deficits stated above and in the chart below, provide patient/family education, and to maximize patient's level of independence.     Plan of care discussed with patient: Yes  Patient's spiritual, cultural and educational needs considered and patient is agreeable to the plan of care and goals as stated below:     Anticipated Barriers for therapy: none identified at this time     Medical Necessity is demonstrated by the following  History  Co-morbidities and personal factors that may impact the plan of care Co-morbidities:   Heart attack   Hypertension   Psoriasis   Stroke       Personal Factors:   no deficits     moderate   Examination  Body Structures and Functions, activity limitations and participation restrictions that may impact the plan of care Body Regions:   lower extremities    Body Systems:    strength  gross coordinated movement  balance  gait  transfers  heart rate  blood pressure    Participation Restrictions:   Limited by sternal precautions     Activity limitations:   Learning and applying knowledge  no deficits    General Tasks and Commands  no deficits    Communication  no deficits    Mobility  driving (bike, car, motorcycle)    Self care  no deficits    Domestic Life  no deficits    Interactions/Relationships  no deficits    Life Areas  no deficits    Community and Social Life  no deficits         moderate   Clinical Presentation stable and uncomplicated low   Decision Making/ Complexity Score: low         Plan   No plan of care recommended. Patient does not require skilled physical therapy services.     ZEHRA MENON, PT

## 2022-08-19 NOTE — PLAN OF CARE
"[facility]  Speech Therapy Evaluation -Dysphagia    Date: 8/16/2022     Name: Matteo Fraire   MRN: [id number]    Therapy Diagnosis:   Encounter Diagnoses   Name Primary?    CVA (cerebral vascular accident)     Dysphagia, unspecified type       Physician: Susanne Wills MD  Physician Orders: Ambulatory referral to speech therapy  Medical Diagnosis: I63.9 (ICD-10-CM) - CVA (cerebral vascular accident)    Visit # / Visits Authorized: 1/1   Date of Evaluation:  8/16/2022   Insurance Authorization Period: 7/27/2022 - 12/31/2022  Plan of Care Certification: 8/16/2022 to 11/8/2022      Time In: 10:30AM  Time Out: 11:15AM  Procedure Min.   Swallow and Oral Function Evaluation   45     Precautions: Standard and dysphagia/NPO, PEG  Subjective   Date of Onset: 6/27/2022  History of Current Condition:   Patient was in the hospital from 6/27/2022 - 7/19/2022, then a short-term rehab facility in [place], followed by two weeks of home health. Lateral medullary stroke. Infarct noted on CT involving right basal ganglia.   Per Dr. Mik Mckinnon in discharge note, "Patient got admitted with STEMI and had  balloon angioplasty to LAD  (06/27 )  Eventually pt underwent  redo 3 vessel CABG for recurrent severe atherosclerotic CAD (07/08 )  Unfortunately pt suffered from Acute CVA and was started on  aspirin  Pt had ASHLEY which was reported negative  He was also started on abx for acute Pneumonia  Found to have NSVT (nonsustained ventricular tachycardia) and Cardio Team started pt on Amiodarone  Pt had PEG insertion on 07/15 and TF s were initiated.  Procedure: Esophagogastroduodenoscopy     Past Medical History: Matteo Fraire  has a past medical history of Heart attack (2005;2011), Hypertension, Psoriasis, and Stroke.  Matteo Fraire  has a past surgical history that includes Coronary artery bypass graft; Hernia repair; Extracorporeal shock wave lithotripsy (Left, 11/21/2019); Cystoscopy w/ ureteral stent placement " (Left, 11/21/2019); Coronary angiography including bypass grafts with catheterization of left heart (Left, 6/27/2022); Repeat coronary artery bypass grafting (N/A, 7/8/2022); Endoscopic harvest of vein (Right, 7/8/2022); surgical procurement, artery, radial, for cabg (Left, 7/8/2022); and Esophagogastroduodenoscopy (N/A, 7/15/2022).  Medical Hx and Allergies: Matteo has a current medication list which includes the following prescription(s): acetaminophen, amiodarone, amlodipine, aspirin, atorvastatin, melatonin, metformin, metoprolol tartrate, midodrine, polyethylene glycol, sacubitril-valsartan, and [DISCONTINUED] tamsulosin, and the following Facility-Administered Medications: cardioplegic solution, cardioplegic solution, and thromb,rd-uwadyr-hthdayz,s-ca.   Review of patient's allergies indicates:   Allergen Reactions    Venom-wasp Anaphylaxis     Prior Therapy: Inpatient rehabilitation, short-term rehab  Social History: Live at home with partner.   Prior Level of Function: Independent. Retired. Patient maintains 14 acres of land. No issues with chewing/swallowing.   Current Level of Function: PEG, NPO. Maintaining secreations. No drooling.   Pain: 0/10  Pain Location / Description: n/a  Nutrition: PEG  Patient's Therapy Goals:  Eat food  Objective   Formal Assessment:  Clinical Swallow Exam/ Cranial Nerve Exam:  Cranial Nerve Exam:  Trigeminal Nerve (V) Jaw Posture at rest: Closed  Mandible Elevation: WFL  Mandible Depression: WFL  Mandible lateralization: WFL  Facial Pain: None noted  Facial Sensation: decreased sensation to right side of face (both upper and lower quadrants)  Abnormal movement: absent Motor: within normal limits    Sensory: Reduced   Facial Nerve (VII) Facial Symmetry: right facial droop  Able to wrinkle forehead bilaterally: yes  Able to close eyes tightly: yes  Labial lateralization: decreased ROM  Labial protrusion: WFL  Labial seal: WFL  Abnormal Movement: absent Motor: Facial droop,  reduced coordination    Sensory: within normal limits   Glossopharyngeal Nerve (IX) and Vagus Nerve (X) Palatal symmetry: Symmetrical  Palatal movement with short production of ah: Symmetrical  Resonance: Normal  Voice Prior to PO intake: within normal limits  Volitional Cough: Strong  Abnormal movement: absent Motor: within normal limits    Sensory: within normal limits   Hypoglossal Nerve (XII) Tongue at rest: WFL  Lingual protrusion: WFL  Lingual Lateralization: WFL  Lingual retraction: WFL  Abnormal movement: absent Motor: within normal limits   Volitional Swallow: Limited and slow laryngeal rise  Mucosal Quality: within normal limits  Secretion Management: Patient reports being able to manage secretions the past four days. Patient was using suction prior.   Dentition: within normal limits    Laryngeal Function Examination:  -Vocal Quality: within normal limits  -MPT: 7 seconds  -S/Z Ratio: 6 seconds/9 seconds  -Pitch Range: within normal limits  -Cough: Strong    Predisposing dysphagia risk factors: lateral medullary Cerebrovascular accident (CVA)   Clinical signs of possible chronic dysphagia: weight loss  Precipitating dysphagia risk factors: none known    EAT-10 Score:    Eating Assessment Tool (EAT-10) is a questionnaire given to the patient to  his swallowing function.     Key: 0= no problem; 4= severe problem  1. My swallowing problem has caused me to lose weight. - 3  2. My swallowing problem interferes with my ability to go out for meals. - 4  3. Swallowing liquids takes extra effort. - 4  4. Swallowing solids takes extra effort. - 4  5. Swallowing pills takes extra effort. - 4  6. Swallowing is painful. - 0  7. The pleasure of eating is affected by my swallowing. - 4  8. When I swallow food sticks in my throat. - 4  9. I cough when I eat. - 4  10. Swallowing is stressful. - 4    Matteo ranked his swallowing function as a 35/40     Interpretation: Score of greater than 3 is indicative of a  swallowing disorder (Roshni et al., 2008); higher scores correlate with increased penetration-aspiration  scale scores, and scores greater than 15 results in the patient being 2.2 times more likely to aspirate (Radhames et al., 2015)    References:   DHARMESH Barakat., NINO Nassar, REG Cohen., MYA Jameson., JESSIE Bernal., MYA Jones, & JOSE Dubois. (2008). Validity and reliability of the Eating Assessment Tool (EAT-10). Annals of Otology, Rhinology & Laryngology, 117(12), 919-924. https://doi.org/10.1177/[email or web address]  NINO Ricci., RADHA Milan., MYA Tiwari., RADHA Jeong., & DHARMESH Barakat (2015). The ability of the 10-item eating assessment tool (EAT-10) to predict aspiration risk in persons with dysphagia. Annals of Otology, Rhinology & Laryngology, 124(5), 351-354. https://doi.org/10.1177/7355877136912324    PILL-5: (Roger et al., 2019)    Key: 0= never; 1= almost never; 2=sometimes; 3= almost always; 4=always  1. Pills stick in my throat- 4  2. Pills stick in my chest- 4  3. I have a fear of swallowing pills- 4  4. My problem swallowing pills interferes with my ability to take my medicine- 4  5. I cant take my pills without crushing, coating, or using other forms of assistance- 4  Total score: 20/20    Interpretation*: less than 6 results in minimal or no pill dysphagia; greater than or equal to 6 but less than 11 results in mild to moderate, may manage with pill lubricants*; and greater than or equal to 11 results in moderate to severe pill dysphagia, may require an altered formulation of medication    *Referral to a swallowing specialist is warranted in individuals with a PILL-5 is greater than or  equal to 6 to rule out obstructing pathology such an esophageal or cricopharyngeal stricture or web that may be easily treatable.    Patient's medication is going through PEG due to safety risk of per oral consumption.     Reference: RONA Duran, MARIKA Hutchinson, STACIE Dunn, RADHA Carrington,  MINA Hazel, RADHA Jeong, & DHARMESH Barakat (2019). Validation of the PILL-5: a 5-item patient reported outcome measure for pill dysphagia. Frontiers in surgery, 6, 43.https://doi.org/10.3389/fsurg.2019.31266    Reflux Severity Index:  The Reflux Severity Index (RSI) was completed to assess the possible presence and/or severity of laryngopharyngeal reflux symptoms and any relationship between this condition and the patient's dysphagia. A score of greater than or equal to 15 may indicate laryngopharyngeal reflux. Patient completed with a result of  19/45.     [place] Swallow Protocol:  The [place] Swallow Protocol was not administered as the patient is unable to consume 2.5 ml without coughing. He is currently NPO with Modified Barium Swallow Study within the last month.     PO Trials:   Patient presented with: (patient performed oral care prior to ice   THIN:- ice chip thin liquids and self regulated thin liquid via cup x2    *Thicken liquids were not used in this assessment. OAileen (2018) reported that thickened liquids have no sound evidence as reducing risk of pneumonia in patients with dysphagia and can cause harm by increasing risk of dehydration. It also presents an increased risk of UTI, electrolyte imbalance, constipation, fecal impaction, cognitive impairment, functional decline, and even death (Langmore, 2002; Moorcroft, 2016).  This supports the assertion that we should confirm a patient requires thickened liquids with an instrumental swallow study prior to recommending them.    Interpretation:   The patient had no anterior loss of the bolus with complete closure of the lips around the cup rim. No residue remained in the oral cavity following the swallow. Patient presents with a possibly inefficient swallow as indicated by audible swallow, limited and slow laryngeal rise. Patient did not exhibit overt s/s of aspiration.     MBS completed 7/13: Severe pharyngeal dysphagia with impaired swallow safety and  efficiency, absent pharyngeal swallow; acute in nature and consistent with lateral medullary stroke. No transit through the upper esophogeal sphincter secondary to absent hyolaryngeal displacement despite continued effort. Patient with intact sensation, producing continued cough effort and hacking to bring up material in pharynx and laryngeal vestibule. Unable to completely clear with material remaining in pyriform after effort.     Patient was recommended to participate in [facility] which is an intensive, bolus driven therapy designed for dysphagia rehabilitation for NPO and PEG dependent patients. Following this evaluation, I believe patient is appropriate for [facility] and agreeable to intensive outpatient therapy.     Reference:   American Speech-Language-Hearing Association. (n.d.b). Adult dysphagia. (Practice Portal). https://www.dereje.org/practice-portal/clinical-topics/adult-dysphagia/  JAROD Guillaume. (2018). Use of modified diets to prevent aspiration in oropharyngeal dysphagia: Is current practice justified?. BMC Geriatrics, 18(1), 1-10.  JAROD Shea., STEPHANIA Little, DHARMESH Gan, & ANTONIETTA Cormier. (2002). Predictors of aspiration pneumonia in nursing home residents.  Dysphagia, 17(4), 298-307.  JUDI Downing. (2016). Best practices for dehydration prevention. Perspectives of the [facility] Special Interest Groups - SIG 13, 1(2), 72- 80.    Treatment   Total Treatment Time Separate from Evaluation: not applicable   no treatment performed secondary to time to complete evaluation.    Education: Plan of Care, aspiration precautions  and oral hygiene goals was discussed with patient. Patient expressed understanding.    Home Program: Encouraged to continue performing oral care at least four times a days. Encouraged to continue completing chin tuck against resistance (CTAR).   Assessment     Matteo presents to [facility] status post medical diagnosis of cerebral  vascular accident.  Demonstrates impairments including limitations as described in the problem list. He presents severe pharyngeal dysphagia with impaired swallow safety and efficiency, absent pharyngeal swallow; acute in nature and consistent with lateral medullary stroke. No transit through the upper esophogeal sphincter secondary to absent hyolaryngeal displacement despite continued effort as reported on MBSS. Positive prognostic factors include: patient's intact cognitive state, compliance with recommendations, motivation, steady improvement. Negative prognostic factors include: severity of deficit. No barriers to therapy identified. Patient will benefit from skilled, outpatient neurological rehabilitation speech therapy.    He presents with severe to profound dysphagia characterized by no epiglottic inversion and very poor laryngeal elevation/hyolaryngeal excursion with poor upper esophageal sphincter relaxation as confirmed via Modified Barium Swallow Study.    Rehab Potential: good  Patient's spiritual, cultural, and educational needs considered and patient agreeable to plan of care and goals.    Short Term Goals (4 week):   1. Pt will complete isometric chin tuck against resistance (CTAR) OR Shaker with 60 second hold across 5 sets to improve hyolaryngeal elevation, excursion, airway protection and UES opening.  2. Pt will complete mendelsohn maneuver with 2 second hold 5-10x across 3-5 sets to improve duration of hyolaryngeal lift and UES opening hyolaryngeal elevation, excursion, airway protection and UES opening.   3. Pt will tolerate trials of food hierarchy level 1 (ice chips) with 2 or less signs of aspiration/expectoration per every 10 swallows completed.  4. Pt will tolerate trials of food hierarchy level 2 (5ml nectar) with 2 or less signs of aspiration/expectoration per every 10 swallows completed.  5. Pt will tolerate trials of food hierarchy level 3 (10 ml nectar) with 2 or less signs of  aspiration/expectoration per every 10 swallows completed.  6. Pt will tolerate trials of food hierarchy level 4 (5ml thin) with 2 or less signs of aspiration/expectoration per every 10 swallows completed.  7. Pt will tolerate trials of food hierarchy level 5 (10ml thin) with 2 or less signs of aspiration/expectoration per every 10 swallows completed.    Long Term Goals (12 weeks):   1. Patient will increase oral intake to 25% of daily needs without acute dysphagia pulmonary complication.     Plan     Plan of Care Certification Period: 8/16/2022 to 11/8/2022    Recommended Treatment Plan:  Patient will participate in the [facility] rehabilitation program for speech therapy 2 times per week for 12 weeks to address his Swallow deficits, to educate patient and their family, and to participate in a home exercise program.    Other Recommendations: Recommend objective swallow study as swallowing improves and treating clinician deems appropriate.     Therapist's Name:   Sade Garcia CCC-SLP   8/16/2022     I CERTIFY THE NEED FOR THESE SERVICES FURNISHED UNDER THIS PLAN OF TREATMENT AND WHILE UNDER MY CARE    Physician Name: _______________________________    Physician Signature: ____________________________

## 2022-08-24 ENCOUNTER — TELEPHONE (OUTPATIENT)
Dept: FAMILY MEDICINE | Facility: CLINIC | Age: 62
End: 2022-08-24

## 2022-08-24 DIAGNOSIS — I25.709 CORONARY ARTERY DISEASE INVOLVING CORONARY BYPASS GRAFT OF NATIVE HEART WITH ANGINA PECTORIS: ICD-10-CM

## 2022-08-24 DIAGNOSIS — I10 HYPERTENSION, UNSPECIFIED TYPE: ICD-10-CM

## 2022-08-24 RX ORDER — AMLODIPINE BESYLATE 5 MG/1
5 TABLET ORAL DAILY
Qty: 30 TABLET | Refills: 3 | Status: SHIPPED | OUTPATIENT
Start: 2022-08-24 | End: 2022-09-13

## 2022-08-24 RX ORDER — AMIODARONE HYDROCHLORIDE 200 MG/1
200 TABLET ORAL 2 TIMES DAILY
Qty: 60 TABLET | Refills: 3 | Status: SHIPPED | OUTPATIENT
Start: 2022-08-24 | End: 2022-10-07 | Stop reason: SDUPTHER

## 2022-08-24 NOTE — TELEPHONE ENCOUNTER
Called patient to let him know that his prescriptions were sent to the pharmacy. Patient expressed verbal understanding.

## 2022-08-24 NOTE — TELEPHONE ENCOUNTER
Patient called and left message stating that when he came in on 8/8/22, his amiodarone and atorvastatin was not refilled.

## 2022-08-29 DIAGNOSIS — E78.5 HYPERLIPIDEMIA, UNSPECIFIED HYPERLIPIDEMIA TYPE: ICD-10-CM

## 2022-08-29 RX ORDER — ATORVASTATIN CALCIUM 80 MG/1
80 TABLET, FILM COATED ORAL DAILY
Qty: 30 TABLET | Refills: 3
Start: 2022-08-29 | End: 2022-08-31 | Stop reason: SDUPTHER

## 2022-08-31 DIAGNOSIS — E78.5 HYPERLIPIDEMIA, UNSPECIFIED HYPERLIPIDEMIA TYPE: ICD-10-CM

## 2022-08-31 RX ORDER — ATORVASTATIN CALCIUM 80 MG/1
80 TABLET, FILM COATED ORAL DAILY
Qty: 30 TABLET | Refills: 3 | Status: SHIPPED | OUTPATIENT
Start: 2022-08-31 | End: 2022-12-13 | Stop reason: SDUPTHER

## 2022-09-02 ENCOUNTER — CLINICAL SUPPORT (OUTPATIENT)
Dept: REHABILITATION | Facility: HOSPITAL | Age: 62
End: 2022-09-02
Payer: COMMERCIAL

## 2022-09-02 DIAGNOSIS — R13.10 DYSPHAGIA, UNSPECIFIED TYPE: Primary | ICD-10-CM

## 2022-09-02 DIAGNOSIS — Z78.9 IMPAIRED INSTRUMENTAL ACTIVITIES OF DAILY LIVING (IADL): Primary | ICD-10-CM

## 2022-09-02 PROCEDURE — 97530 THERAPEUTIC ACTIVITIES: CPT | Mod: PN

## 2022-09-02 PROCEDURE — 92526 ORAL FUNCTION THERAPY: CPT | Mod: PN

## 2022-09-02 NOTE — PROGRESS NOTES
[facility]  Speech Therapy Treatment Note- Dysphagia  Date: 9/2/2022     Name: Matteo Fraire   MRN: [id number]   Therapy Diagnosis:   Encounter Diagnosis   Name Primary?    Dysphagia, unspecified type Yes   Physician: Susanne Wills MD  Physician Orders: Ambulatory referral to speech therapy  Medical Diagnosis: I63.9 (ICD-10-CM) - CVA (cerebral vascular accident)    Visit #/ Visits Authorized: 2/20  Date of Evaluation:  8/16/2022  Insurance Authorization Period: 8/18/2022 - 12/31/2022  Plan of Care Expiration Date:   11/8/2022  Extended Plan of Care:  n/a  Progress Note: 9/13/2022  Visits Cancelled: 0  Visits No Show: 0    Time In: 1:35PM  Time Out:  2:20PM  Total Billable Time: 45     Precautions: Standard and dysphagia/NPO, PEG  Subjective:   Patient reports: he has been completing his CTAR exercises 2x a day every day as recommended.   Patient states he still has decreased sensation on the left side of his face, throat, and arm. However he is starting to feel a tingle in his arm.    He was compliant to home exercise program.   Response to previous treatment: improvement with laryngeal elevation per laryngeal palpation    Pain Scale:  0/10 on a Visual Analog Scale currently.   Pain Location: n/a  Objective:   UNTIMED  Procedure Min.   Swallowing and Oral Function Evaluation 45   Total Timed Units: 0  Total Untimed Units: 1  Charges Billed/Number of units: 1/1    Short Term Goals: (4 weeks) Current Progress:   1. Pt will complete isometric chin tuck against resistance (CTAR) OR Shaker with 60 second hold across 5 sets to improve hyolaryngeal elevation, excursion, airway protection and UES opening.    Progressing/ Not Met 9/2/2022   X80 rep 2-second hold (2 reps of 40)    X2 rep 60 seconds hold   2. Pt will complete mendelsohn maneuver with 2 second hold 5-10x across 3-5 sets to improve duration of hyolaryngeal lift and UES opening hyolaryngeal elevation, excursion, airway protection and UES  opening.     Progressing/ Not Met 9/2/2022   X10 (1 set of 10)   3. Pt will tolerate trials of food hierarchy level 1 (ice chips) with 2 or less signs of aspiration/expectoration per every 10 swallows completed.    Progressing/ Not Met 9/2/2022   X10 with 0 s/s of aspiration    4. Pt will tolerate trials of food hierarchy level 2 (5ml nectar) with 2 or less signs of aspiration/expectoration per every 10 swallows completed.    Progressing/ Not Met 9/2/2022   X10 with 0 s/s of aspiration    5. Pt will tolerate trials of food hierarchy level 3 (10 ml nectar) with 2 or less signs of aspiration/expectoration per every 10 swallows completed.    Progressing/ Not Met 9/2/2022   -Not addressed this session.     6. Pt will tolerate trials of food hierarchy level 4 (5ml thin) with 2 or less signs of aspiration/expectoration per every 10 swallows completed.    Progressing/ Not Met 9/2/2022   -Not addressed this session.     7. Pt will tolerate trials of food hierarchy level 5 (10ml thin) with 2 or less signs of aspiration/expectoration per every 10 swallows completed.    Progressing/ Not Met 9/2/2022   -Not addressed this session.         Patient Education/Response:   Aspiration precautions, oral hygiene goals, mendelsohn maneuver.     Home program established: yes-Encouraged to continue completing x30 2-second hold CTAR and x10 Mendelsohn two times a day.  Exercises were reviewed and Matteo was able to demonstrate them prior to the end of the session.  Matteo demonstrated good  understanding of the education provided.     See Electronic Medical Record under Patient Instructions for exercises provided throughout therapy.  Assessment:   Matteo is progressing well towards his goals. Patient very compliant with home exercise program, exhibiting good recall of swallowing precautions. Patient exhibited an increase in laryngeal elevation via laryngeal palpation in comparison to the evaluation 2.5 weeks ago. Patient did not exhibit any  s/s of aspiration on thin liquid and 5 ml trials. Current goals remain appropriate. Goals to be updated as necessary.     Patient prognosis is Good. Patient will continue to benefit from skilled outpatient speech and language therapy to address the deficits listed in the problem list on initial evaluation, provide patient/family education and to maximize patient's level of independence in the home and community environment.   Medical necessity is demonstrated by the following IMPAIRMENTS:  Decreased safety and efficiency of swallowing function on PO intake without overt s/s of aspiration for the highest diet level.    Barriers to Therapy: none identified  Patient's spiritual, cultural and educational needs considered and patient agreeable to plan of care and goals.  Plan:   Continue Plan of Care 2x/week with focus on swallow function.     Sade Garcia CCC-SLP   9/2/2022

## 2022-09-02 NOTE — PROGRESS NOTES
[facility]  Occupational Therapy Treatment Note    Date: 9/2/2022  Name: Matteo Fraire  Clinic Number: [id number]    Therapy Diagnosis:   Encounter Diagnosis   Name Primary?    Impaired instrumental activities of daily living (IADL) Yes     Physician: Susanne Wills MD    Physician Orders: OT eval & treat  Medical Diagnosis: CVA  Evaluation Date: 8/16/2022  Plan of Care Expiration Period: 10/14/2022  Insurance Authorization period Expiration: 12/31/2022  Date of Return to MD: primary care on 9/13, cardiology on 9/23   Visit # / Visits authorized: 1 / 20 + eval   FOTO: 75/100 (9/2/2022)    Precautions:  Standard, sternal, swallow     Time In: 3:00 PM   Time Out: 3:40 PM   Total Billable Time: 40 minutes    SUBJECTIVE     Pt reports: Pt reports that he is still on sternal precautions. He also reports that he feels fine. No concerns at this point. He states that his sternal scar nor his forearm scar hurts at all. He states he would like to get back to doing what he normally does at home. He states that the only thing stopping him is the sternal precautions     He was compliant with home exercise program given last session.   Response to previous treatment:1st treatment   Functional change: no concerns     Pain: 0/10  Location: NA     OBJECTIVE     Objective Measures updated at progress report unless specified.    FOTO: 75/100     Treatment     Matteo received the treatments listed below:     Therapeutic exercises to develop strength, endurance, and ROM for 0 minutes, including:  NA     Therapeutic activities to improve functional performance for 40 minutes, including:    FOTO     Completed fishing lure tying for increased fine motor coordination    -No difficulty    - Independent     Finger <> palm translation / picking up 5 pennies (one at a time), transferring to palm, and stacking into towers (10 towers)   -no difficulty    -Independent     Crumple small pieces of paper to make  "ball for increase manipulation    -12 pieces of paper    -No difficulty    -Independent     Building / tool set    -Used fine motor coordination to take off and put back on 3 doors and hinges using small screwdriver    -Min difficulty using small screwdriver    -used the screwdriver with a built up handle due to the other screwdriver being "stripped"     Neuromuscular re-education activities to improve Balance, Coordination, Proprioception, and Posture for 0 minutes. The following activities were included:  NA    Patient Education and Home Exercises      Education provided:   - Progress towards goals     Written Home Exercises Provided: Patient instructed to cont prior HEP.  Exercises were reviewed and Matteo was able to demonstrate them prior to the end of the session.  Matteo demonstrated good  understanding of the HEP provided. See EMR under Patient Instructions for exercises provided during therapy sessions.       Assessment     Since Matteo is still on sternal precautions, he was limited with what he could work on today The focus of today was fine motor coordination. He did very well and has minimal deficits pertaining to fine motor coordination. He would benefit from strengthening and activity tolerance activities. He should be able to participate more next session as sternal cautions will be expiring. Pt would continue to benefit from skilled OT.     Matteo is progressing well towards his goals and there are no updates to goals at this time. Pt prognosis is Excellent.     Pt will continue to benefit from skilled outpatient occupational therapy to address the deficits listed in the problem list on initial evaluation provide pt/family education and to maximize pt's level of independence in the home and community environment.     Pt's spiritual, cultural and educational needs considered and pt agreeable to plan of care and goals.    Anticipated barriers to occupational therapy: none     Goals:  Short Term Goals: 4 " weeks   1) Pt will be independent with initial HEP. (progressing 9/2/2022)   2) Pt will complete full visual assessment to ensure safety for return to driving once cleared by MD. (progressing 9/2/2022)   3) Pt will be able to push empty shopping cart x1000' without fatigue to increase potential for him to return to grocery shopping. (progressing 9/2/2022)   4) Handwriting to be assessed and goals added as appropriate to increase independence with bill paying & shopping.(progressing 9/2/2022)      Long Term Goals: 8 weeks   1) Pt will be appropriate for return to driving. (progressing 9/2/2022)   2) Pt will be able to push shopping cart with 30# x1000' without fatigue to increase independence with grocery shopping. (progressing 9/2/2022)   3) pt will be able to transfer groceries of various weights and sizes (x10 and up to 10#) from grocery cart to car seat with mod I and no discomfort to increase independence with grocery shopping. (progressing 9/2/2022)   4) pt will complete on-line bill pay activity with mod I. (progressing 9/2/2022)   5) Pt will be independence with scar mobilization to decrease adhesions and allow for increased ease of movement in the chest and left arm.  (progressing 9/2/2022)   Additional functional goals to be added as pt progresses and per his priorities.    PLAN     Certification Period/Plan of care expiration: 8/16/2022 to 10/14/22.     Outpatient Occupational Therapy 2 times weekly for 8 weeks to include the following interventions: Manual Therapy, Neuromuscular Re-ed, Patient Education, Self Care, Therapeutic Activities and Therapeutic Exercise, and any other treatment modalities that will facilitate Matteo Fraire's ability to reach his goals.     Updates/Grading for next session: strengthening      Jami Motley, OT

## 2022-09-07 ENCOUNTER — TELEPHONE (OUTPATIENT)
Dept: FAMILY MEDICINE | Facility: CLINIC | Age: 62
End: 2022-09-07

## 2022-09-09 LAB
ALBUMIN SERPL-MCNC: 4.7 G/DL (ref 3.8–4.8)
ALBUMIN/GLOB SERPL: 2.5 {RATIO} (ref 1.2–2.2)
ALP SERPL-CCNC: 82 IU/L (ref 44–121)
ALT SERPL-CCNC: 48 IU/L (ref 0–44)
AST SERPL-CCNC: 34 IU/L (ref 0–40)
BASOPHILS # BLD AUTO: 0.1 X10E3/UL (ref 0–0.2)
BASOPHILS NFR BLD AUTO: 1 %
BILIRUB SERPL-MCNC: 1.1 MG/DL (ref 0–1.2)
BUN SERPL-MCNC: 23 MG/DL (ref 8–27)
BUN/CREAT SERPL: 24 (ref 10–24)
CALCIUM SERPL-MCNC: 9.7 MG/DL (ref 8.6–10.2)
CHLORIDE SERPL-SCNC: 100 MMOL/L (ref 96–106)
CHOLEST SERPL-MCNC: 99 MG/DL (ref 100–199)
CO2 SERPL-SCNC: 23 MMOL/L (ref 20–29)
CREAT SERPL-MCNC: 0.96 MG/DL (ref 0.76–1.27)
EOSINOPHIL # BLD AUTO: 0.6 X10E3/UL (ref 0–0.4)
EOSINOPHIL NFR BLD AUTO: 7 %
ERYTHROCYTE [DISTWIDTH] IN BLOOD BY AUTOMATED COUNT: 14.2 % (ref 11.6–15.4)
EST. GFR  (NO RACE VARIABLE): 90 ML/MIN/1.73
GLOBULIN SER CALC-MCNC: 1.9 G/DL (ref 1.5–4.5)
GLUCOSE SERPL-MCNC: 107 MG/DL (ref 65–99)
HCT VFR BLD AUTO: 42.7 % (ref 37.5–51)
HCV AB S/CO SERPL IA: <0.1 S/CO RATIO (ref 0–0.9)
HDLC SERPL-MCNC: 30 MG/DL
HGB BLD-MCNC: 13.5 G/DL (ref 13–17.7)
HIV 1+2 AB+HIV1 P24 AG SERPL QL IA: NON REACTIVE
IMM GRANULOCYTES # BLD AUTO: 0 X10E3/UL (ref 0–0.1)
IMM GRANULOCYTES NFR BLD AUTO: 0 %
LDLC SERPL CALC-MCNC: 51 MG/DL (ref 0–99)
LYMPHOCYTES # BLD AUTO: 1.6 X10E3/UL (ref 0.7–3.1)
LYMPHOCYTES NFR BLD AUTO: 20 %
MCH RBC QN AUTO: 28.2 PG (ref 26.6–33)
MCHC RBC AUTO-ENTMCNC: 31.6 G/DL (ref 31.5–35.7)
MCV RBC AUTO: 89 FL (ref 79–97)
MONOCYTES # BLD AUTO: 0.5 X10E3/UL (ref 0.1–0.9)
MONOCYTES NFR BLD AUTO: 6 %
NEUTROPHILS # BLD AUTO: 5 X10E3/UL (ref 1.4–7)
NEUTROPHILS NFR BLD AUTO: 66 %
PLATELET # BLD AUTO: 217 X10E3/UL (ref 150–450)
POTASSIUM SERPL-SCNC: 4.5 MMOL/L (ref 3.5–5.2)
PROT SERPL-MCNC: 6.6 G/DL (ref 6–8.5)
RBC # BLD AUTO: 4.78 X10E6/UL (ref 4.14–5.8)
SODIUM SERPL-SCNC: 140 MMOL/L (ref 134–144)
TRIGL SERPL-MCNC: 92 MG/DL (ref 0–149)
VLDLC SERPL CALC-MCNC: 18 MG/DL (ref 5–40)
WBC # BLD AUTO: 7.7 X10E3/UL (ref 3.4–10.8)

## 2022-09-12 ENCOUNTER — CLINICAL SUPPORT (OUTPATIENT)
Dept: REHABILITATION | Facility: HOSPITAL | Age: 62
End: 2022-09-12
Payer: COMMERCIAL

## 2022-09-12 DIAGNOSIS — Z78.9 IMPAIRED INSTRUMENTAL ACTIVITIES OF DAILY LIVING (IADL): Primary | ICD-10-CM

## 2022-09-12 DIAGNOSIS — R13.10 DYSPHAGIA, UNSPECIFIED TYPE: Primary | ICD-10-CM

## 2022-09-12 PROCEDURE — 92526 ORAL FUNCTION THERAPY: CPT | Mod: PN

## 2022-09-12 PROCEDURE — 97530 THERAPEUTIC ACTIVITIES: CPT | Mod: PN

## 2022-09-12 NOTE — PROGRESS NOTES
[facility]  Speech Therapy Treatment Note- Dysphagia  PROGRESS NOTE  Date: 9/12/2022     Name: Matteo Fraire   MRN: [id number]   Therapy Diagnosis:   Encounter Diagnosis   Name Primary?    Dysphagia, unspecified type Yes   Physician: Susanne Wills MD  Physician Orders: Ambulatory referral to speech therapy  Medical Diagnosis: I63.9 (ICD-10-CM) - CVA (cerebral vascular accident)    Visit #/ Visits Authorized: 5/20  Date of Evaluation:  8/16/2022  Insurance Authorization Period: 8/18/2022 - 12/31/2022  Plan of Care Expiration Date:   11/8/2022  Extended Plan of Care:  n/a  Progress Note: 9/13/2022 - completed on this date   Visits Cancelled: 0  Visits No Show: 0    Time In: 2:15 PM  Time Out:  3:00 PM  Total Billable Time: 45     Precautions: Standard and dysphagia/NPO, PEG  Subjective:   Patient reports: he has been completing his CTAR exercises 2x a day every day as recommended.   Patient states the sensation has improved in his lower right leg as he could feel the warm shower more this morning.  Patient states he has been very cautious in trialing thin liquids at home: water, broth, grape juice, coffee. Patient takes two swallows for each sip of thin liquids.     He was compliant to home exercise program.   Response to previous treatment: Tolerated well   Pain Scale:  0/10 on a Visual Analog Scale currently.   Pain Location: n/a  Objective:   UNTIMED  Procedure Min.   Swallowing and Oral Function Evaluation 45   Total Timed Units: 0  Total Untimed Units: 1  Charges Billed/Number of units: 1/1    Short Term Goals: (4 weeks) Current Progress:   1. Pt will complete isometric chin tuck against resistance (CTAR) OR Shaker with 60 second hold across 5 sets to improve hyolaryngeal elevation, excursion, airway protection and UES opening.    Progressing/ Not Met 9/12/2022   X80 rep 2-second hold (2 reps of 40)    X2 rep 60 seconds hold   2. Pt will complete mendelsohn maneuver with 2 second hold  5-10x across 3-5 sets to improve duration of hyolaryngeal lift and UES opening hyolaryngeal elevation, excursion, airway protection and UES opening.     Progressing/ Not Met 9/12/2022   X10 (1 set of 10)   3. Pt will tolerate trials of food hierarchy level 1 (ice chips) with 2 or less signs of aspiration/expectoration per every 10 swallows completed.    Progressing/ Not Met 9/12/2022   X10 with 0 s/s of aspiration           Met x2 sessions   4. Pt will tolerate trials of food hierarchy level 2 (5ml nectar) with 2 or less signs of aspiration/expectoration per every 10 swallows completed.    Progressing/ Not Met 9/12/2022   X10 with 0 s/s of aspiration           Met x2 sessions   5. Pt will tolerate trials of food hierarchy level 3 (10 ml nectar) with 2 or less signs of aspiration/expectoration per every 10 swallows completed.    Progressing/ Not Met 9/12/2022   X10 with 0 s/s of aspiration          Met x1 session   6. Pt will tolerate trials of food hierarchy level 4 (5ml thin) with 2 or less signs of aspiration/expectoration per every 10 swallows completed.    Progressing/ Not Met 9/12/2022   X10 with 0 s/s of aspiration           Met x1 session   7. Pt will tolerate trials of food hierarchy level 5 (10ml thin) with 2 or less signs of aspiration/expectoration per every 10 swallows completed.    Progressing/ Not Met 9/12/2022   -Not addressed this session.     8. Pt will tolerate trials of food hierarchy level 6 (5ml puree) with 2 or less signs of aspiration/expectoration per every 10 swallows completed.     Progressing/Not Met 9/12/2022 NEW GOAL   9. Pt will tolerate trials of food hierarchy level 7 (10ml puree) with 2 or less signs of aspiration/expectoration per every 10 swallows completed.     Progressing/Not Met 9/12/2022 NEW GOAL   10. Pt will tolerate trials of food hierarchy level 8 (soft masticated) with 2 or less signs of aspiration/expectoration per every 10 swallows completed.  NEW GOAL       Patient  Education/Response:   Aspiration precautions, oral hygiene goals, mendelsohn maneuver.     Home program established: yes-Encouraged to continue completing x30 2-second hold CTAR and x10 Mendelsohn two times a day.  Exercises were reviewed and Matteo was able to demonstrate them prior to the end of the session.  Matteo demonstrated good  understanding of the education provided.     See Electronic Medical Record under Patient Instructions for exercises provided throughout therapy.  Assessment:   Matteo is progressing well towards his goals. Patient very compliant with home exercise program and participated well in all prompted therapy tasks. Patient exhibits good recall of swallowing precautions and s/s aspiration. Patient did not exhibit any s/s of aspiration on thin liquid and 5 ml trials. Patient is very motivated to get back to eating/drinking. Current goals remain appropriate. Goals to be updated as necessary.     Patient prognosis is Good. Patient will continue to benefit from skilled outpatient speech and language therapy to address the deficits listed in the problem list on initial evaluation, provide patient/family education and to maximize patient's level of independence in the home and community environment.   Medical necessity is demonstrated by the following IMPAIRMENTS:  Decreased safety and efficiency of swallowing function on PO intake without overt s/s of aspiration for the highest diet level.    Barriers to Therapy: none identified  Patient's spiritual, cultural and educational needs considered and patient agreeable to plan of care and goals.  Plan:   Continue Plan of Care 2x/week with focus on swallow function.     Sade Garcia CCC-SLP   9/12/2022

## 2022-09-12 NOTE — PROGRESS NOTES
[facility]  Occupational Therapy Treatment Note    Date: 9/12/2022  Name: Matteo Fraire  Clinic Number: [id number]    Therapy Diagnosis:   Encounter Diagnosis   Name Primary?    Impaired instrumental activities of daily living (IADL) Yes       Physician: Susanne Wills MD    Physician Orders: OT eval & treat  Medical Diagnosis: CVA  Evaluation Date: 8/16/2022  Plan of Care Expiration Period: 10/14/2022  Insurance Authorization period Expiration: 12/31/2022  Date of Return to MD: primary care on 9/13, cardiology on 9/23   Visit # / Visits authorized: 2 / 20 + eval   FOTO: 75/100 (9/2/2022)    Precautions:  Standard, sternal, swallow     Time In: 3:05 PM   Time Out: 3:43 PM   Total Billable Time: 38 minutes    SUBJECTIVE     Pt reports: Pt reports that his doctor said he could drive again (~1 week ago). He has been driving for a few days with no problems. He thinks he is still on sternal precautions and hopes to have them lifted tomorrow at this doctors appointments. He has no complaints or concerns.     He was compliant with home exercise program given last session.   Response to previous treatment: no concerns   Functional change: no concerns     Pain: 0/10  Location: NA     Adult Vision Questionnaire:   Directions: please check the answer that best describes your situation. If you wear glasses or contact lenses, answer the questions assuming that you are wearing them.   Never = never  Occasionally = less than 1 time/week  Frequently = at least 1 time/week  Always = everyday     Do you have headaches or facial pain? Never  Do you have pain in your eyes with eye movement? Never  Do you experience neck or shoulder discomfort? Never  Do you have dizziness, light headed or nausea while performing close up work? (computer work; reading; writing) Never  Do you have dizziness, light headed or nausea while performing far distance activities? (driving, tv, movies) Never  Do you experience  dizziness when bending down and standing back up, or when getting up quickly? Occasionally - 1x 3-4 weeks ago   Do you feel unsteady with walking, or drift to one side? Never  Do you feel overwhelmed or anxious while walking in a large department store or walking in a crowd? Never  Do you feel dizzy or off balance when walking down a long hallway or on bold patterned carpeting? Never  Does riding in a car make you feel dizzy or uncomfortable? Never  Do you find yourself with your head tilted to one side? Occasionally   Does your posture tend to be leaning more forward or backward than your used to? Never  Do you experience poor depth perception or have difficulty estimating distances accurately? Never  Do you experience double/overlapping/shadowed vision at far distances? Never  Do you experience double/overlapping/shadowed vision at near distances? Never  Do you experience glare or have sensitivity to bright lights? Never  Do you close or cover one eye with near or far tasks? Never  Do you skip lines or lose your place while reading? Never  Do you use your finger to keep your place on the page? Never  Do you tire easily with close-up tasks? Never  Do you experience blurred vision with far distance tasks? (driving, television, movies, chalkboard at school) Never  Do you experience blurred vision with close up tasks? (computer, reading) Never  Do you experience words running together or appearing to move on the page? Never    History:  Have you ever been diagnosed with:  Traumatic brain injury (TBI) or concussion? yes  Reading disability? no  Lazy eye? no  Have you ever had an eye operation? no     OBJECTIVE     Objective Measures updated at progress report unless specified    Treatment     Matteo received the treatments listed below:     Therapeutic exercises to develop strength, endurance, and ROM for 0 minutes, including:  NA     Therapeutic activities to improve functional performance for 38 minutes,  including:    See subjective     Walked 1000 feet with empty shopping cart with no difficulty and no fatigue    -Goal met     Scar massage to the left forearm for increased scar mobilization     Neuromuscular re-education activities to improve Balance, Coordination, Proprioception, and Posture for 0 minutes. The following activities were included:  NA    Patient Education and Home Exercises      Education provided:   - Progress towards goals     Written Home Exercises Provided: Patient instructed to cont prior HEP.  Exercises were reviewed and Matteo was able to demonstrate them prior to the end of the session.  Matteo demonstrated good  understanding of the HEP provided. See EMR under Patient Instructions for exercises provided during therapy sessions.       Assessment     Since Matteo is still on sternal precautions, he was limited with what he could work on today The focus of today was a subjective visual assessment, activity tolerance, and scar massage.He did very well and had no difficulty walking the 1000 feet with the shopping cart. He stated that he could've gone farther . He would benefit from strengthening and activity tolerance activities. He is finding out when he can complete strengthening activity. Pt would continue to benefit from skilled OT.     Matteo is progressing well towards his goals and there are no updates to goals at this time. Pt prognosis is Excellent.     Pt will continue to benefit from skilled outpatient occupational therapy to address the deficits listed in the problem list on initial evaluation provide pt/family education and to maximize pt's level of independence in the home and community environment.     Pt's spiritual, cultural and educational needs considered and pt agreeable to plan of care and goals.    Anticipated barriers to occupational therapy: none     Goals:  Short Term Goals: 4 weeks   1) Pt will be independent with initial HEP. (progressing 9/12/2022)   2) Pt will complete  full visual assessment to ensure safety for return to driving once cleared by MD. (progressing 9/12/2022)   3) Pt will be able to push empty shopping cart x1000' without fatigue to increase potential for him to return to grocery shopping. (Met 9/12/2022)   4) Handwriting to be assessed and goals added as appropriate to increase independence with bill paying & shopping. Not addressed (pt is pleased with his ability to complete these tasks)      Long Term Goals: 8 weeks   1) Pt will be appropriate for return to driving. (progressing 9/12/2022)   2) Pt will be able to push shopping cart with 30# x1000' without fatigue to increase independence with grocery shopping. (progressing 9/12/2022)   3) pt will be able to transfer groceries of various weights and sizes (x10 and up to 10#) from grocery cart to car seat with mod I and no discomfort to increase independence with grocery shopping. (progressing 9/12/2022)   4) pt will complete on-line bill pay activity with mod I. (progressing 9/12/2022)   5) Pt will be independence with scar mobilization to decrease adhesions and allow for increased ease of movement in the chest and left arm.  (progressing 9/12/2022)   Additional functional goals to be added as pt progresses and per his priorities.    PLAN     Certification Period/Plan of care expiration: 8/16/2022 to 10/14/22.     Outpatient Occupational Therapy 2 times weekly for 8 weeks to include the following interventions: Manual Therapy, Neuromuscular Re-ed, Patient Education, Self Care, Therapeutic Activities and Therapeutic Exercise, and any other treatment modalities that will facilitate Matteo BALTAZAR LyonsFraire's ability to reach his goals.     Updates/Grading for next session: strengthening      Jami Motley, OT

## 2022-09-13 ENCOUNTER — OFFICE VISIT (OUTPATIENT)
Dept: FAMILY MEDICINE | Facility: CLINIC | Age: 62
End: 2022-09-13
Payer: COMMERCIAL

## 2022-09-13 VITALS
OXYGEN SATURATION: 98 % | BODY MASS INDEX: 29.62 KG/M2 | RESPIRATION RATE: 16 BRPM | HEIGHT: 69 IN | HEART RATE: 66 BPM | WEIGHT: 200 LBS | DIASTOLIC BLOOD PRESSURE: 80 MMHG | SYSTOLIC BLOOD PRESSURE: 120 MMHG

## 2022-09-13 DIAGNOSIS — I10 HYPERTENSION, UNSPECIFIED TYPE: ICD-10-CM

## 2022-09-13 DIAGNOSIS — Z12.5 SCREENING PSA (PROSTATE SPECIFIC ANTIGEN): Primary | ICD-10-CM

## 2022-09-13 PROCEDURE — 3044F PR MOST RECENT HEMOGLOBIN A1C LEVEL <7.0%: ICD-10-PCS | Mod: CPTII,S$GLB,, | Performed by: NURSE PRACTITIONER

## 2022-09-13 PROCEDURE — 1159F MED LIST DOCD IN RCRD: CPT | Mod: CPTII,S$GLB,, | Performed by: NURSE PRACTITIONER

## 2022-09-13 PROCEDURE — 3074F PR MOST RECENT SYSTOLIC BLOOD PRESSURE < 130 MM HG: ICD-10-PCS | Mod: CPTII,S$GLB,, | Performed by: NURSE PRACTITIONER

## 2022-09-13 PROCEDURE — 99213 OFFICE O/P EST LOW 20 MIN: CPT | Mod: S$GLB,,, | Performed by: NURSE PRACTITIONER

## 2022-09-13 PROCEDURE — 1159F PR MEDICATION LIST DOCUMENTED IN MEDICAL RECORD: ICD-10-PCS | Mod: CPTII,S$GLB,, | Performed by: NURSE PRACTITIONER

## 2022-09-13 PROCEDURE — 3008F PR BODY MASS INDEX (BMI) DOCUMENTED: ICD-10-PCS | Mod: CPTII,S$GLB,, | Performed by: NURSE PRACTITIONER

## 2022-09-13 PROCEDURE — 3044F HG A1C LEVEL LT 7.0%: CPT | Mod: CPTII,S$GLB,, | Performed by: NURSE PRACTITIONER

## 2022-09-13 PROCEDURE — 3008F BODY MASS INDEX DOCD: CPT | Mod: CPTII,S$GLB,, | Performed by: NURSE PRACTITIONER

## 2022-09-13 PROCEDURE — 99213 PR OFFICE/OUTPT VISIT, EST, LEVL III, 20-29 MIN: ICD-10-PCS | Mod: S$GLB,,, | Performed by: NURSE PRACTITIONER

## 2022-09-13 PROCEDURE — 3079F PR MOST RECENT DIASTOLIC BLOOD PRESSURE 80-89 MM HG: ICD-10-PCS | Mod: CPTII,S$GLB,, | Performed by: NURSE PRACTITIONER

## 2022-09-13 PROCEDURE — 3074F SYST BP LT 130 MM HG: CPT | Mod: CPTII,S$GLB,, | Performed by: NURSE PRACTITIONER

## 2022-09-13 PROCEDURE — 3079F DIAST BP 80-89 MM HG: CPT | Mod: CPTII,S$GLB,, | Performed by: NURSE PRACTITIONER

## 2022-09-13 NOTE — PROGRESS NOTES
Patient ID: Matteo Fraire is a 61 y.o. male.    Chief Complaint: Results (Lab review)    Mr. Fraire presents today for one month follow up and lab review. He states he continues to do well in occupational and speech therapy. He continues to do primarily tube feeds at this time although he is able to increase his  oral intake. He denies any major interval changes and he has required medication refills.     We reviewed [facility].     Past Medical History:   Diagnosis Date    Heart attack 2005;2011    Hypertension     Psoriasis     Stroke      Past Surgical History:   Procedure Laterality Date    CORONARY ANGIOGRAPHY INCLUDING BYPASS GRAFTS WITH CATHETERIZATION OF LEFT HEART Left 6/27/2022    Procedure: Left heart cath;  Surgeon: Stevan Powell MD;  Location: [facility] CATH/EP LAB;  Service: Cardiology;  Laterality: Left;    CORONARY ARTERY BYPASS GRAFT      CYSTOSCOPY W/ URETERAL STENT PLACEMENT Left 11/21/2019    Procedure: CYSTOSCOPY, WITH URETERAL STENT INSERTION;  Surgeon: Mickey Escudero MD;  Location: [facility] OR;  Service: Urology;  Laterality: Left;    ENDOSCOPIC HARVEST OF VEIN Right 7/8/2022    Procedure: HARVEST-VEIN-ENDOVASCULAR;  Surgeon: Eyal Stone MD;  Location: [facility] OR;  Service: Cardiothoracic;  Laterality: Right;    ESOPHAGOGASTRODUODENOSCOPY N/A 7/15/2022    Procedure: EGD (ESOPHAGOGASTRODUODENOSCOPY);  Surgeon: Art Pino MD;  Location: [facility] ENDO;  Service: Endoscopy;  Laterality: N/A;    EXTRACORPOREAL SHOCK WAVE LITHOTRIPSY Left 11/21/2019    Procedure: LITHOTRIPSY, ESWL;  Surgeon: Mickey Escudero MD;  Location: [facility] OR;  Service: Urology;  Laterality: Left;    HERNIA REPAIR      Inguinal just after birth    REPEAT CORONARY ARTERY BYPASS GRAFTING N/A 7/8/2022    Procedure: CABG, REPEAT;  Surgeon: Eyal Stone MD;  Location: [facility] OR;  Service: Cardiothoracic;  Laterality: N/A;  drugs ordered 7-7  @1322      SURGICAL PROCUREMENT, ARTERY, RADIAL,  FOR CABG Left 7/8/2022    Procedure: SURGICAL PROCUREMENT,ARTERY,RADIAL,FOR CABG;  Surgeon: Eyal Stone MD;  Location: [facility];  Service: Cardiothoracic;  Laterality: Left;         Tobacco History:  reports that he has never smoked. He has never used smokeless tobacco.      Review of patient's allergies indicates:   Allergen Reactions    Venom-wasp Anaphylaxis       Current Outpatient Medications:     acetaminophen (TYLENOL) 325 MG tablet, 650 mg by Tube route every 6 (six) hours as needed., Disp: , Rfl:     amiodarone (PACERONE) 200 MG Tab, 1 tablet (200 mg total) by Per G Tube route 2 (two) times daily., Disp: 60 tablet, Rfl: 3    aspirin 81 MG Chew, Take 81 mg by mouth once daily., Disp: , Rfl:     atorvastatin (LIPITOR) 80 MG tablet, 1 tablet (80 mg total) by Per G Tube route once daily., Disp: 30 tablet, Rfl: 3    melatonin (MELATIN) 3 mg tablet, Take 6 mg by mouth., Disp: , Rfl:     metFORMIN (GLUCOPHAGE) 500 MG tablet, Take 1 tablet (500 mg total) by mouth 2 (two) times daily with meals., Disp: 60 tablet, Rfl: 3    midodrine (PROAMATINE) 10 MG tablet, Take 1 tablet (10 mg total) by mouth 2 (two) times a day., Disp: 60 tablet, Rfl: 3    polyethylene glycol (GLYCOLAX) 17 gram/dose powder, Take 17 g by mouth daily as needed., Disp: , Rfl:     metoprolol tartrate (LOPRESSOR) 25 MG tablet, 0.5 tablets (12.5 mg total) by Per G Tube route 2 (two) times daily., Disp: 30 tablet, Rfl: 0  No current facility-administered medications for this visit.    Facility-Administered Medications Ordered in Other Visits:     sodium bicarbonate 10 mEq, potassium chloride 30 mEq in cardioplegic solution (PLEGISOL) 1,000 mL, , Intravenous, Once, Eyal Stone MD    sodium bicarbonate 10 mEq, potassium chloride 70 mEq in cardioplegic solution (PLEGISOL) 1,000 mL, , Intravenous, Once, Eyal Stone MD    thromb,aj-ylhniw-cuyvrbm,s-Ca (TISSEEL) 4 mL, , Topical (Top), Once, Eyal Stone MD    Review of  "Systems   Constitutional:  Positive for fatigue. Negative for activity change, appetite change and fever.   HENT:  Positive for trouble swallowing. Negative for congestion, dental problem, nosebleeds, postnasal drip, rhinorrhea, sinus pain, sore throat and tinnitus.    Eyes:  Negative for pain, discharge, itching and visual disturbance.   Respiratory:  Negative for cough, chest tightness, shortness of breath and wheezing.    Cardiovascular:  Negative for chest pain, palpitations and leg swelling.   Gastrointestinal:  Negative for abdominal pain, blood in stool, constipation, diarrhea, nausea and vomiting.   Endocrine: Negative for cold intolerance, heat intolerance, polydipsia, polyphagia and polyuria.   Genitourinary:  Negative for difficulty urinating, flank pain, genital sores, hematuria and urgency.   Musculoskeletal:  Positive for gait problem. Negative for arthralgias and myalgias.   Skin:  Negative for rash and wound.   Allergic/Immunologic: Negative for environmental allergies and food allergies.   Neurological:  Negative for dizziness, light-headedness and headaches.   Hematological:  Negative for adenopathy. Does not bruise/bleed easily.   Psychiatric/Behavioral:  Negative for behavioral problems, confusion, decreased concentration, sleep disturbance and suicidal ideas. The patient is not nervous/anxious and is not hyperactive.         Objective:      Vitals:    09/13/22 1021   BP: 120/80   Pulse: 66   Resp: 16   SpO2: 98%   Weight: 90.7 kg (200 lb)   Height: 5' 9" (1.753 m)     Physical Exam  Vitals reviewed.   Constitutional:       General: He is not in acute distress.     Appearance: Normal appearance. He is normal weight. He is not ill-appearing, toxic-appearing or diaphoretic.   HENT:      Head: Normocephalic and atraumatic.      Right Ear: Tympanic membrane and external ear normal. There is no impacted cerumen.      Left Ear: Tympanic membrane and external ear normal. There is no impacted cerumen.    "   Nose: Nose normal. No congestion or rhinorrhea.      Mouth/Throat:      Mouth: Mucous membranes are moist.      Pharynx: Oropharynx is clear. No oropharyngeal exudate or posterior oropharyngeal erythema.   Eyes:      General: No scleral icterus.        Right eye: No discharge.         Left eye: No discharge.      Extraocular Movements: Extraocular movements intact.      Conjunctiva/sclera: Conjunctivae normal.      Pupils: Pupils are equal, round, and reactive to light.   Cardiovascular:      Rate and Rhythm: Normal rate and regular rhythm.      Pulses: Normal pulses.      Heart sounds: Normal heart sounds. No murmur heard.    No friction rub. No gallop.   Pulmonary:      Effort: Pulmonary effort is normal. No respiratory distress.      Breath sounds: Normal breath sounds. No wheezing, rhonchi or rales.   Chest:      Chest wall: No tenderness.   Abdominal:      General: Abdomen is flat. Bowel sounds are normal.      Palpations: Abdomen is soft. There is no mass.      Tenderness: There is no abdominal tenderness. There is no guarding or rebound.      Comments: Peg Tube in place   Musculoskeletal:         General: No swelling or signs of injury. Normal range of motion.      Cervical back: Normal range of motion and neck supple. No rigidity or tenderness.      Right lower leg: No edema.      Left lower leg: No edema.   Skin:     General: Skin is warm and dry.      Capillary Refill: Capillary refill takes less than 2 seconds.      Coloration: Skin is not pale.      Findings: No bruising or erythema.   Neurological:      General: No focal deficit present.      Mental Status: He is alert and oriented to person, place, and time. Mental status is at baseline.      Motor: No weakness.      Coordination: Coordination normal.      Gait: Gait normal.   Psychiatric:         Mood and Affect: Mood normal.         Behavior: Behavior normal.         Thought Content: Thought content normal.         Judgment: Judgment normal.          Assessment:       1. Screening PSA (prostate specific antigen)    2. Hypertension, unspecified type           Plan:       Screening PSA (prostate specific antigen)  -     PSA, Screening; Future; Expected date: 12/13/2022    Hypertension, unspecified type  - Continue medication as prescribed. Keep logs of blood pressure at home.     Follow up in about 3 months (around 12/13/2022), or if symptoms worsen or fail to improve, for Follow up/Hypertension/Lab review.        9/13/2022 KARLA Mcintyre, FNP-C

## 2022-09-14 ENCOUNTER — CLINICAL SUPPORT (OUTPATIENT)
Dept: REHABILITATION | Facility: HOSPITAL | Age: 62
End: 2022-09-14
Payer: COMMERCIAL

## 2022-09-14 DIAGNOSIS — R13.10 DYSPHAGIA, UNSPECIFIED TYPE: Primary | ICD-10-CM

## 2022-09-14 PROCEDURE — 92526 ORAL FUNCTION THERAPY: CPT | Mod: PN

## 2022-09-14 NOTE — PROGRESS NOTES
[facility]  Speech Therapy Treatment Note- Dysphagia  Date: 9/14/2022     Name: Matteo Fraire   MRN: [id number]   Therapy Diagnosis:   Encounter Diagnosis   Name Primary?    Dysphagia, unspecified type Yes   Physician: Susanne Wills MD  Physician Orders: Ambulatory referral to speech therapy  Medical Diagnosis: I63.9 (ICD-10-CM) - CVA (cerebral vascular accident)    Visit #/ Visits Authorized: 6/20  Date of Evaluation:  8/16/2022  Insurance Authorization Period: 8/18/2022 - 12/31/2022  Plan of Care Expiration Date:   11/8/2022  Extended Plan of Care:  n/a  Progress Note: 10/10/2022  Visits Cancelled: 0  Visits No Show: 0    Time In: 1:30 PM  Time Out:  2:15 PM  Total Billable Time: 45 minutes    Precautions: Standard and dysphagia/NPO, PEG  Subjective:   Patient reports: I am feeling good. Patient is waiting for his sternal precautions to be lifted so he can start fixing things around the house/yard.    Patient continues to report completing regular oral care and swallowing exercises.     He was compliant to home exercise program.   Response to previous treatment: Tolerated well   Pain Scale:  0/10 on a Visual Analog Scale currently.   Pain Location: n/a  Objective:   UNTIMED  Procedure Min.   Swallowing and Oral Function Evaluation 45   Total Timed Units: 0  Total Untimed Units: 1  Charges Billed/Number of units: 1/1    Short Term Goals: (4 weeks) Current Progress:   1. Pt will complete isometric chin tuck against resistance (CTAR) OR Shaker with 60 second hold across 5 sets to improve hyolaryngeal elevation, excursion, airway protection and UES opening.    Progressing/ Not Met 9/14/2022   X80 rep 2-second hold (2 reps of 40)    X3 rep 60 seconds hold   2. Pt will complete mendelsohn maneuver with 2 second hold 5-10x across 3-5 sets to improve duration of hyolaryngeal lift and UES opening hyolaryngeal elevation, excursion, airway protection and UES opening.     Progressing/ Not Met 9/14/2022    X30 (3 set of 10)   3. Pt will tolerate trials of food hierarchy level 1 (ice chips) with 2 or less signs of aspiration/expectoration per every 10 swallows completed.    Met 9/14/2022 X10 with 0 s/s of aspiration           Met x3/3 sessions   4. Pt will tolerate trials of food hierarchy level 2 (5ml nectar) with 2 or less signs of aspiration/expectoration per every 10 swallows completed.    Met 9/14/2022 X10 with 0 s/s of aspiration           Met x3/3 sessions   5. Pt will tolerate trials of food hierarchy level 3 (10 ml nectar) with 2 or less signs of aspiration/expectoration per every 10 swallows completed.    Met 9/14/2022 X10 with 0 s/s of aspiration          Met x3/3 session   6. Pt will tolerate trials of food hierarchy level 4 (5ml thin) with 2 or less signs of aspiration/expectoration per every 10 swallows completed.    Progressing/ Not Met 9/14/2022   X10 with 0 s/s of aspiration           Met x2/3 session   7. Pt will tolerate trials of food hierarchy level 5 (10ml thin) with 2 or less signs of aspiration/expectoration per every 10 swallows completed.    Progressing/ Not Met 9/14/2022   X10 with 0 s/s of aspiration          Met 1/3   8. Pt will tolerate trials of food hierarchy level 6 (5ml puree) with 2 or less signs of aspiration/expectoration per every 10 swallows completed.     Progressing/Not Met 9/14/2022 X10 with 0 s/s of aspiration          Met 1/3   9. Pt will tolerate trials of food hierarchy level 7 (10ml puree) with 2 or less signs of aspiration/expectoration per every 10 swallows completed.     Progressing/Not Met 9/14/2022 X10 with 0 s/s of aspiration          Met 1/3   10. Pt will tolerate trials of food hierarchy level 8 (soft masticated) with 2 or less signs of aspiration/expectoration per every 10 swallows completed.     Progressing/Not Met 9/14/2022 X10 with 0 s/s of aspiration   11. Pt will tolerate trials of food hierarchy level 8 (Hard masticated) with 2 or less signs of aspiration  per every 10 swallows completed.    Progressing/Not Met 9/14/2022 NEW GOAL       Patient Education/Response:   Aspiration precautions, oral hygiene goals, swallowing exercises, eating/drinking precautions (small bites, slow, no straws).     Home program established: yes-Encouraged to continue completing x30 2-second hold CTAR and x10 Mendelsohn two times a day.  Encouraged patient to has his doctor about a nutritional consult/referral for when patient can start eating on a more regular basis to identify how much he should receive via tube feeding.   Exercises were reviewed and Matteo was able to demonstrate them prior to the end of the session.  Matteo demonstrated good  understanding of the education provided.     See Electronic Medical Record under Patient Instructions for exercises provided throughout therapy.  Assessment:   Matteo is progressing well towards his goals. Patient very compliant with home exercise program and participated well in all prompted therapy tasks. Patient exhibits good recall of swallowing precautions and s/s aspiration. Patient did not exhibit any s/s of aspiration on any trials of thins, purees (pudding), and soft masticated food (peaches). Patient is very motivated to get back to eating/drinking. Current goals remain appropriate. Goals to be updated as necessary.     Patient prognosis is Good. Patient will continue to benefit from skilled outpatient speech and language therapy to address the deficits listed in the problem list on initial evaluation, provide patient/family education and to maximize patient's level of independence in the home and community environment.   Medical necessity is demonstrated by the following IMPAIRMENTS:  Decreased safety and efficiency of swallowing function on PO intake without overt s/s of aspiration for the highest diet level.    Barriers to Therapy: none identified  Patient's spiritual, cultural and educational needs considered and patient agreeable to plan  of care and goals.  Plan:   Continue Plan of Care 2x/week with focus on swallow function.   Recommend follow up swallow study - clinician sent an in basket message to Dr. Wills.     Sade Garcia CCC-SLP   9/14/2022

## 2022-09-19 DIAGNOSIS — R13.10 DYSPHAGIA, UNSPECIFIED TYPE: Primary | ICD-10-CM

## 2022-09-21 ENCOUNTER — TELEPHONE (OUTPATIENT)
Dept: FAMILY MEDICINE | Facility: CLINIC | Age: 62
End: 2022-09-21

## 2022-09-21 ENCOUNTER — TELEPHONE (OUTPATIENT)
Dept: CARDIOLOGY | Facility: CLINIC | Age: 62
End: 2022-09-21
Payer: COMMERCIAL

## 2022-09-21 NOTE — TELEPHONE ENCOUNTER
Spoke to pt and he states that he has a feeding tube and states that no one ever told him how to go about getting it removed once he started back eating solid foods. Pt states he has started eating solid foods and needs to know what provider to speak with for removal.     Advised that he should reach out to GI first and if he needs to then he can reach back out to us. Advised. Pt that I would still forward message back to you to confirm.

## 2022-09-21 NOTE — TELEPHONE ENCOUNTER
----- Message from Kaia Damian sent at 9/21/2022 12:46 PM CDT -----  Contact: pt  Type:  Patient Returning Call    Who Called:  pt  Who Left Message for Patient:  Yelitza  Does the patient know what this is regarding?:  rescheduled and pt said needs to see doctor ASAP. He has issues going on and needs to be seen. This appt is a hospital F/u and was to be 7 days from being discharged.   Pt has a feeding tube in and needing it out also        Best Call Back Number:  [phone]    Additional Information:

## 2022-09-21 NOTE — TELEPHONE ENCOUNTER
----- Message from Kaia Damian sent at 9/21/2022  1:28 PM CDT -----  Contact: pt  I donlt know which nurse is for Ezra and this pt needs rescheduled?      ----- Message -----  From: Kaia Damian  Sent: 9/21/2022   1:04 PM CDT  To: Yelitza Suarez MA    Type:  Patient Returning Call    Who Called:  pt  Who Left Message for Patient:  Yelitza  Does the patient know what this is regarding?:  rescheduled and pt said needs to see doctor ASAP. He has issues going on and needs to be seen. This appt is a hospital F/u and was to be 7 days from being discharged.   Pt has a feeding tube in and needing it out also        Best Call Back Number:  [phone]    Additional Information:

## 2022-09-21 NOTE — TELEPHONE ENCOUNTER
----- Message from Luis M Mosley sent at 9/21/2022 12:47 PM CDT -----  Type:  Patient Returning Call    Who Called:  Pt  Who Left Message for Patient:  Yulissa  Does the patient know what this is regarding?:  appt   Best Call Back Number: [phone]    Additional Information:  Pt is VERY upset his appt has been reset again.  Sts he has a lot of important questions and information he needs since his discharge.  Wants to know if there is anyone else who can see him.  Please advise -- Thank you

## 2022-09-21 NOTE — TELEPHONE ENCOUNTER
----- Message from JARRETT Townsend sent at 9/21/2022  1:12 PM CDT -----  Contact: pt  Can we call him and get clarification on what this is saying  ----- Message -----  From: Barry Fisher LPN  Sent: 9/21/2022   1:10 PM CDT  To: JARRETT Townsend      ----- Message -----  From: Kaia Damian  Sent: 9/21/2022   1:07 PM CDT  To: Ruthy Salgado Staff    Pt is needing his tube out to eat correct and needs someone.. he wants you to call him and help. Cant get into [facility] anytime soon      [phone]

## 2022-09-23 ENCOUNTER — CLINICAL SUPPORT (OUTPATIENT)
Dept: REHABILITATION | Facility: HOSPITAL | Age: 62
End: 2022-09-23
Payer: COMMERCIAL

## 2022-09-23 DIAGNOSIS — R13.10 DYSPHAGIA, UNSPECIFIED TYPE: Primary | ICD-10-CM

## 2022-09-23 DIAGNOSIS — Z78.9 IMPAIRED INSTRUMENTAL ACTIVITIES OF DAILY LIVING (IADL): Primary | ICD-10-CM

## 2022-09-23 PROCEDURE — 97530 THERAPEUTIC ACTIVITIES: CPT | Mod: PN

## 2022-09-23 PROCEDURE — 92526 ORAL FUNCTION THERAPY: CPT | Mod: PN

## 2022-09-23 NOTE — PROGRESS NOTES
"  [facility]  Occupational Therapy Treatment Note, Progress note, and Discharge note     Date: 9/23/2022  Name: Matteo Fraire  Clinic Number: [id number]    Therapy Diagnosis:   Encounter Diagnosis   Name Primary?    Impaired instrumental activities of daily living (IADL) Yes     Physician: Susanne Wills MD    Physician Orders: OT eval & treat  Medical Diagnosis: CVA  Evaluation Date: 8/16/2022  Plan of Care Expiration Period: 10/14/2022  Insurance Authorization period Expiration: 12/31/2022  Date of Return to MD: primary care on 9/13, cardiology on 9/23   Visit # / Visits authorized: 3 / 20 + eval   FOTO: 75/100 (9/2/2022)  86/100 on 9/23/2022    Precautions:  Standard, sternal, swallow     Time In: 10:35 AM   Time Out: 11:15 AM   Total Billable Time: 40 minutes    SUBJECTIVE     Pt reports: Pt reports that he is feeling good. He is not having any concerns whatsoever. He is driving at home and in public with no difficulty. He is also working out in this yard, completing all the tasks he needs to do everyday plus some. He is taking breaks a few times an hour to make sure he is taking it slow. His scars on his arm and chest are not sensitive and he is experiencing 0/10 pain on a daily basis. He is aggravated that his cardiology appointment was cancelled. They are not telling him when his sternal precautions will be discontinued. He is wanted to discontinue therapy at this time due to feelings of lack of necessity and "to make room for people who really need the spot in your schedule."     He was compliant with home exercise program given last session.   Response to previous treatment: no concerns   Functional change: no concerns     Pain: 0/10  Location: NA     OBJECTIVE     Objective Measures updated at progress report unless specified    FOTO: 86/100    AM-PAC 6 CLICK ADL  How much help from another person does this patient currently need?  1 = Unable, Total/Dependent Assistance  2 " "= A lot, Maximum/Moderate Assistance  3 = A little, Minimum/Contact Guard/Supervision  4 = None, Modified [place]/Independent     Eatin  (is now eating solids and soft food - getting PEG removed)   Groomin        UB Dressin     LB Dressin        Bathin seated on built-in bench  Toiletin  Total: 24     AM-PAC Raw Score CMS "G-Code Modifier Level of Impairment Assistance   6 % Total / Unable   7 - 9 CM 80 - 100% Maximal Assist   10-14 CL 60 - 80% Moderate Assist   15 - 19 CK 40 - 60% Moderate Assist   20 - 22 CJ 20 - 40% Minimal Assist   23 CI 1-20% SBA / CGA   24 CH 0% Independent/ Mod I      Hand Strength (FARIDEH Dynamometer, Setting II)   (lbs) Left  2022 Right  2022 Left  2022  Right  2022    1 76 106 66 106   2 75 107 66 104   3 76 110 66 107   avg 75.6 107.6 66 105.7   [norms for men aged 60-64: R=89.7 +/-20.4; L=76.8 +/-20.3 (Mathiowetz et al, 1985)]     Pinch Left  2022 Right  2022 Left  2022  Right  2022    Lateral 21 24 18 21   Tip (2 point) 15 21 13 18   3 jaw emma 15 27 16 23         Gross motor coordination:     Box and Block Assessment Left  2022 Right  2022 Left  2022  Right  2022      52 54 50 53   [norms for men aged 60-64: R=71.3 +/-8.8; L=70.5 /-8.1 (Mathiowetz et al, 1985)]     Fine motor coordination:      9 Hole Peg Test (9HPT) Left  2022 Right  2022 Left  2022  Right  2022      29s  30s 34.21s 34.29   [norms for men aged 61-65: R=20.87s +/-3.50s; L=21.60s +/-2.98s (Renita et al, 2003)]    Treatment     Matteo received the treatments listed below:     Therapeutic exercises to develop strength, endurance, and ROM for 0 minutes, including:  NA     Therapeutic activities to improve functional performance for 45 minutes, including:    See subjective     See measurements above     Neuromuscular re-education activities to improve Balance, Coordination, Proprioception, and Posture " for 0 minutes. The following activities were included:  NA    Patient Education and Home Exercises      Education provided:   - Progress towards goals     Written Home Exercises Provided: Patient instructed to cont prior HEP.  Exercises were reviewed and Matteo was able to demonstrate them prior to the end of the session.  Matteo demonstrated good  understanding of the HEP provided. See EMR under Patient Instructions for exercises provided during therapy sessions.       Assessment   Matteo requested discharge from occupational therapy. According to measurements taken today, Matteo has increased his strength, fine motor coordination, and gross motor coordination. According to his AMPAC score, he is fully independent, but taking it slow due to not knowing whether his sternal precautions have lifted since it has been 8+ weeks post surgery with no cardiac appointment in the near future. Agreeable for discharge at this time, due to level of independence. Pt urged to reach out with concerns and complications for possible re eval when going back to outside heavy lifting and strenuous activities.     Matteo is progressing well towards his goals and there are no updates to goals at this time. Pt prognosis is Excellent.     Pt will continue to benefit from skilled outpatient occupational therapy to address the deficits listed in the problem list on initial evaluation provide pt/family education and to maximize pt's level of independence in the home and community environment.     Pt's spiritual, cultural and educational needs considered and pt agreeable to plan of care and goals.    Anticipated barriers to occupational therapy: none     Goals:  Short Term Goals: 4 weeks   1) Pt will be independent with initial HEP. (progressing 9/23/2022)   2) Pt will complete full visual assessment to ensure safety for return to driving once cleared by MD. (Not addressed - cleared by MD and been driving for week)   3) Pt will be able to push empty  shopping cart x1000' without fatigue to increase potential for him to return to grocery shopping. (Met 9/12/2022)   4) Handwriting to be assessed and goals added as appropriate to increase independence with bill paying & shopping. Not addressed (pt is pleased with his ability to complete these tasks)      Long Term Goals: 8 weeks   1) Pt will be appropriate for return to driving. (progressing 9/23/2022)   2) Pt will be able to push shopping cart with 30# x1000' without fatigue to increase independence with grocery shopping. (progressing 9/23/2022)   3) pt will be able to transfer groceries of various weights and sizes (x10 and up to 10#) from grocery cart to car seat with mod I and no discomfort to increase independence with grocery shopping. (progressing 9/23/2022)   4) pt will complete on-line bill pay activity with mod I. (progressing 9/23/2022)   5) Pt will be independence with scar mobilization to decrease adhesions and allow for increased ease of movement in the chest and left arm.  (progressing 9/23/2022)   Additional functional goals to be added as pt progresses and per his priorities.    PLAN     Certification Period/Plan of care expiration: 8/16/2022 to 10/14/22.     Outpatient Occupational Therapy 2 times weekly for 8 weeks to include the following interventions: Manual Therapy, Neuromuscular Re-ed, Patient Education, Self Care, Therapeutic Activities and Therapeutic Exercise, and any other treatment modalities that will facilitate Matteo Fraire's ability to reach his goals.      Heather Williams, OT [facility]   Discharge Note    Name: Matteo Fraire  Clinic Number: [id number]    Therapy Diagnosis:   Encounter Diagnosis   Name Primary?    Impaired instrumental activities of daily living (IADL) Yes     Physician: Susanne Wills MD    Physician Orders: OT eval and treat   Medical Diagnosis: CVA  Evaluation Date: 8/16/2022      Date of Last visit: 9/23/2022  Total  Visits Received: 3    ASSESSMENT      Matteo requested discharge from occupational therapy. According to measurements taken today, Matteo has increased his strength, fine motor coordination, and gross motor coordination. According to his AMPAC score, he is fully independent, but taking it slow due to not knowing whether his sternal precautions have lifted since it has been 8+ weeks post surgery with no cardiac appointment in the near future. Agreeable for discharge at this time, due to level of independence. Pt urged to reach out with concerns and complications for possible re eval when going back to outside heavy lifting and strenuous activities.     Discharge reason: Patient requested discharge, independent in all tasks needed    Discharge FOTO Score: 86/100    Goals: Short Term Goals: 4 weeks   1) Pt will be independent with initial HEP. (progressing 9/23/2022)   2) Pt will complete full visual assessment to ensure safety for return to driving once cleared by MD. (Not addressed - cleared by MD and been driving for week)   3) Pt will be able to push empty shopping cart x1000' without fatigue to increase potential for him to return to grocery shopping. (Met 9/12/2022)   4) Handwriting to be assessed and goals added as appropriate to increase independence with bill paying & shopping. Not addressed (pt is pleased with his ability to complete these tasks)      Long Term Goals: 8 weeks   1) Pt will be appropriate for return to driving. (Not addressed - pt already cleared for driving and is doing so)   2) Pt will be able to push shopping cart with 30# x1000' without fatigue to increase independence with grocery shopping. (Not addressed due to sternal precautions)   3) pt will be able to transfer groceries of various weights and sizes (x10 and up to 10#) from grocery cart to car seat with mod I and no discomfort to increase independence with grocery shopping. (Not addressed due to sternal precautions)  4) pt will complete  on-line bill pay activity with mod I. (Pt reports (I) in this activity    5) Pt will be independence with scar mobilization to decrease adhesions and allow for increased ease of movement in the chest and left arm. (not addressed due to lack of sensitivity of scars - but taught how to self mob)   Additional functional goals to be added as pt progresses and per his priorities.    PLAN   This patient is discharged from occupational therapy at this time.       Jami Motley, OT

## 2022-09-23 NOTE — PROGRESS NOTES
[facility]  Speech Therapy Treatment Note- Dysphagia  Date: 9/23/2022     Name: Matteo Fraire   MRN: [id number]   Therapy Diagnosis:   Encounter Diagnosis   Name Primary?    Dysphagia, unspecified type Yes   Physician: Susanne Wills MD  Physician Orders: Ambulatory referral to speech therapy  Medical Diagnosis: I63.9 (ICD-10-CM) - CVA (cerebral vascular accident)    Visit #/ Visits Authorized: 8/20  Date of Evaluation:  8/16/2022  Insurance Authorization Period: 8/18/2022 - 12/31/2022  Plan of Care Expiration Date:   11/8/2022  Extended Plan of Care:  n/a  Progress Note: 10/10/2022  Visits Cancelled: 0  Visits No Show: 0    Time In: 11:15 AM  Time Out:  12:00 PM  Total Billable Time: 45 minutes    Precautions: Standard and dysphagia/NPO, PEG  Subjective:   Patient reports: Patient reports he has had no difficulties with liquids and purees (mashed potatoes with gravy, grits). Patient continues to report completing regular oral care and swallowing exercises.     Patient reports patient is getting the feeding tube out next Tuesday, 9/27/2022. Patient reports the doctor has concerns with the feeding tube getting infected.    Clinician has a MBSS  referral placed. Clinician called radiology scheduling requesting they call patient to get him scheduled.     He was compliant to home exercise program.   Response to previous treatment: Tolerated well   Pain Scale:  0/10 on a Visual Analog Scale currently.   Pain Location: n/a  Objective:   UNTIMED  Procedure Min.   Swallowing and Oral Function Evaluation 45   Total Timed Units: 0  Total Untimed Units: 1  Charges Billed/Number of units: 1/1    Short Term Goals: (4 weeks) Current Progress:   1. Pt will complete isometric chin tuck against resistance (CTAR) OR Shaker with 60 second hold across 5 sets to improve hyolaryngeal elevation, excursion, airway protection and UES opening.    Progressing/ Not Met 9/23/2022   X3 rep 60 seconds hold   2. Pt will  complete mendelsohn maneuver with 2 second hold 5-10x across 3-5 sets to improve duration of hyolaryngeal lift and UES opening hyolaryngeal elevation, excursion, airway protection and UES opening.     Progressing/ Not Met 9/23/2022   X30 (3 set of 10)   3. Pt will tolerate trials of food hierarchy level 1 (ice chips) with 2 or less signs of aspiration/expectoration per every 10 swallows completed.    Met 9/14/2022 X10 with 0 s/s of aspiration           Met x3/3 sessions   4. Pt will tolerate trials of food hierarchy level 2 (5ml nectar) with 2 or less signs of aspiration/expectoration per every 10 swallows completed.    Met 9/14/2022 X10 with 0 s/s of aspiration           Met x3/3 sessions   5. Pt will tolerate trials of food hierarchy level 3 (10 ml nectar) with 2 or less signs of aspiration/expectoration per every 10 swallows completed.    Met 9/14/2022 X10 with 0 s/s of aspiration          Met x3/3 session   6. Pt will tolerate trials of food hierarchy level 4 (5ml thin) with 2 or less signs of aspiration/expectoration per every 10 swallows completed.    Met 9/23/2022 X10 with 0 s/s of aspiration           Met 3/3   7. Pt will tolerate trials of food hierarchy level 5 (10ml thin) with 2 or less signs of aspiration/expectoration per every 10 swallows completed.    Progressing/ Not Met 9/23/2022   X10 with 0 s/s of aspiration          Met 2/3   8. Pt will tolerate trials of food hierarchy level 6 (5ml puree) with 2 or less signs of aspiration/expectoration per every 10 swallows completed.     Progressing/Not Met 9/23/2022 X10 with 0 s/s of aspiration          Met 2/3   9. Pt will tolerate trials of food hierarchy level 7 (10ml puree) with 2 or less signs of aspiration/expectoration per every 10 swallows completed.     Progressing/Not Met 9/23/2022 X10 with 0 s/s of aspiration          Met 2/3   10. Pt will tolerate trials of food hierarchy level 8 (soft masticated) with 2 or less signs of aspiration/expectoration  per every 10 swallows completed.     Progressing/Not Met 9/23/2022 X10 with 0 s/s of aspiration          Met 2/3   11. Pt will tolerate trials of food hierarchy level 8 (Hard masticated) with 2 or less signs of aspiration per every 10 swallows completed.    Progressing/Not Met 9/23/2022 X10 with 0 s/s of aspiration        Met 1/3   12. Patient will independently demonstrate adequate use of effortful swallow technique to improve overall swallow strength, safety and efficiency 60x per session.      Progressing/Not Met 9/23/2022 NEW GOAL         Patient Education/Response:   Aspiration precautions, oral hygiene goals, swallowing exercises, eating/drinking precautions (small bites, slow, no straws).   Education on IDDSI level 6 soft and bite sized diet.     Home program established: yes-Encouraged to complete x30 effortful swallows, x30 mendelsohn, 3 1-minute hold CTAR.  Provided an IDDSI level 6 soft and bite sized patient handout.   Encouraged patient to has his doctor about a nutritional consult/referral for when patient can start eating on a more regular basis to identify how much he should receive via tube feeding.   Exercises were reviewed and Matteo was able to demonstrate them prior to the end of the session.  Matteo demonstrated good  understanding of the education provided.     See Electronic Medical Record under Patient Instructions for exercises provided throughout therapy.  Assessment:   Matteo is progressing well towards his goals. Patient very compliant with home exercise program and participated well in all prompted therapy tasks. Patient exhibits good recall of swallowing precautions and s/s aspiration. Patient did not exhibit any s/s of aspiration on any trials of thins, purees (pudding), and soft masticated food (peaches), and hard mastication (romeo cracker). Patient is very motivated to get back to eating/drinking. Current goals remain appropriate. Goals to be updated as necessary.     Patient  prognosis is Good. Patient will continue to benefit from skilled outpatient speech and language therapy to address the deficits listed in the problem list on initial evaluation, provide patient/family education and to maximize patient's level of independence in the home and community environment.   Medical necessity is demonstrated by the following IMPAIRMENTS:  Decreased safety and efficiency of swallowing function on PO intake without overt s/s of aspiration for the highest diet level.    Barriers to Therapy: none identified  Patient's spiritual, cultural and educational needs considered and patient agreeable to plan of care and goals.  Plan:   Continue Plan of Care 2x/week with focus on swallow function.     Sade Garcia CCC-SLP   9/23/2022

## 2022-09-26 ENCOUNTER — CLINICAL SUPPORT (OUTPATIENT)
Dept: REHABILITATION | Facility: HOSPITAL | Age: 62
End: 2022-09-26
Payer: COMMERCIAL

## 2022-09-26 DIAGNOSIS — R13.10 DYSPHAGIA, UNSPECIFIED TYPE: Primary | ICD-10-CM

## 2022-09-26 PROCEDURE — 92526 ORAL FUNCTION THERAPY: CPT | Mod: PN

## 2022-09-26 NOTE — PROGRESS NOTES
[facility]  Speech Therapy Treatment Note- Dysphagia  Date: 9/26/2022     Name: Matteo Fraire   MRN: [id number]   Therapy Diagnosis:   Encounter Diagnosis   Name Primary?    Dysphagia, unspecified type Yes   Physician: Susanne Wills MD  Physician Orders: Ambulatory referral to speech therapy  Medical Diagnosis: I63.9 (ICD-10-CM) - CVA (cerebral vascular accident)    Visit #/ Visits Authorized: 9/20  Date of Evaluation:  8/16/2022  Insurance Authorization Period: 8/18/2022 - 12/31/2022  Plan of Care Expiration Date:   11/8/2022  Extended Plan of Care:  n/a  Progress Note: 10/10/2022  Visits Cancelled: 0  Visits No Show: 0    Time In: 2:30 PM  Time Out: 3:10 PM  Total Billable Time: 40 minutes    Precautions: Standard and dysphagia/NPO, PEG  Subjective:   Patient reports:   Patient reports he is getting his feeding tube out tomorrow 9/27/2022. Patient reports the doctor has concerns with the feeding tube getting infected.    Patient scheduled for a MBSS on 10/25/2022.    Patient reports swallowing pills is going well. Patient states he looks at the IDDSI 6 handout every day to see what he can eat. Patient reports no difficulties with eating soft and bite sized food as well as thin liquids.     He was compliant to home exercise program.   Response to previous treatment: Tolerated well   Pain Scale:  0/10 on a Visual Analog Scale currently.   Pain Location: n/a  Objective:   UNTIMED  Procedure Min.   Swallowing and Oral Function Evaluation 40   Total Timed Units: 0  Total Untimed Units: 1  Charges Billed/Number of units: 1/1    Short Term Goals: (4 weeks) Current Progress:   1. Pt will complete isometric chin tuck against resistance (CTAR) OR Shaker with 60 second hold across 5 sets to improve hyolaryngeal elevation, excursion, airway protection and UES opening.    Progressing/ Not Met 9/26/2022   X2 reps 60 seconds hold    Isometric 2 second hold 1 set of x30   2. Pt will complete mendelsohn  maneuver with 2 second hold 5-10x across 3-5 sets to improve duration of hyolaryngeal lift and UES opening hyolaryngeal elevation, excursion, airway protection and UES opening.     Progressing/ Not Met 9/26/2022   X10 (1 set of 10)   3. Pt will tolerate trials of food hierarchy level 1 (ice chips) with 2 or less signs of aspiration/expectoration per every 10 swallows completed.    Met 9/14/2022 X10 with 0 s/s of aspiration           Met x3/3 sessions   4. Pt will tolerate trials of food hierarchy level 2 (5ml nectar) with 2 or less signs of aspiration/expectoration per every 10 swallows completed.    Met 9/14/2022 X10 with 0 s/s of aspiration           Met x3/3 sessions   5. Pt will tolerate trials of food hierarchy level 3 (10 ml nectar) with 2 or less signs of aspiration/expectoration per every 10 swallows completed.    Met 9/14/2022 X10 with 0 s/s of aspiration          Met x3/3 session   6. Pt will tolerate trials of food hierarchy level 4 (5ml thin) with 2 or less signs of aspiration/expectoration per every 10 swallows completed.    Met 9/23/2022 X10 with 0 s/s of aspiration           Met 3/3   7. Pt will tolerate trials of food hierarchy level 5 (10ml thin) with 2 or less signs of aspiration/expectoration per every 10 swallows completed.    Met 9/26/2022 X10 with 0 s/s of aspiration          Met 3/3   8. Pt will tolerate trials of food hierarchy level 6 (5ml puree) with 2 or less signs of aspiration/expectoration per every 10 swallows completed.     Met 9/26/2022 X10 with 0 s/s of aspiration          Met 3/3   9. Pt will tolerate trials of food hierarchy level 7 (10ml puree) with 2 or less signs of aspiration/expectoration per every 10 swallows completed.     Met 9/26/2022 X10 with 0 s/s of aspiration          Met 3/3   10. Pt will tolerate trials of food hierarchy level 8 (soft masticated) with 2 or less signs of aspiration/expectoration per every 10 swallows completed.     Met 9/26/2022 X10 with 0 s/s of  aspiration          Met 3/3   11. Pt will tolerate trials of food hierarchy level 8 (Hard masticated) with 2 or less signs of aspiration per every 10 swallows completed.    Progressing/Not Met 9/26/2022 X20 with 0 s/s of aspiration (marc doone)        Met 2/3   12. Patient will independently demonstrate adequate use of effortful swallow technique to improve overall swallow strength, safety and efficiency 60x per session.      Progressing/Not Met 9/26/2022 X20 (2 sets of 10)       Patient Education/Response:   Aspiration precautions, oral hygiene goals, swallowing exercises, eating/drinking precautions (small bites, slow, no straws).     Home program established: yes-Encouraged to complete x30 effortful swallows, x30 mendelsohn, 3 1-minute hold CTAR.  Encouraged patient to has his doctor about a nutritional consult/referral for when patient can start eating on a more regular basis to identify how much he should receive via tube feeding.   Exercises were reviewed and Matteo was able to demonstrate them prior to the end of the session.  Matteo demonstrated good  understanding of the education provided.     See Electronic Medical Record under Patient Instructions for exercises provided throughout therapy.  Assessment:   Matteo is progressing well towards his goals. Patient very compliant with tasks within therapy and home exercise program. Patient exhibits good recall of swallowing precautions and s/s aspiration. Patient did not exhibit any s/s of aspiration on any trials of thins and hard mastication (marc doone). Patient exhibits good safety awareness and impulse control when eating/drinking. Current goals remain appropriate. Goals to be updated as necessary.     Patient prognosis is Good. Patient will continue to benefit from skilled outpatient speech and language therapy to address the deficits listed in the problem list on initial evaluation, provide patient/family education and to maximize patient's level of  independence in the home and community environment.   Medical necessity is demonstrated by the following IMPAIRMENTS:  Decreased safety and efficiency of swallowing function on PO intake without overt s/s of aspiration for the highest diet level.    Barriers to Therapy: none identified  Patient's spiritual, cultural and educational needs considered and patient agreeable to plan of care and goals.  Plan:   Continue Plan of Care 2x/week with focus on swallow function.     Sade Garcia CCC-SLP   9/26/2022

## 2022-09-28 ENCOUNTER — CLINICAL SUPPORT (OUTPATIENT)
Dept: REHABILITATION | Facility: HOSPITAL | Age: 62
End: 2022-09-28
Payer: COMMERCIAL

## 2022-09-28 DIAGNOSIS — R13.10 DYSPHAGIA, UNSPECIFIED TYPE: Primary | ICD-10-CM

## 2022-09-28 PROCEDURE — 92526 ORAL FUNCTION THERAPY: CPT | Mod: PN

## 2022-09-28 NOTE — PROGRESS NOTES
[facility]  Speech Therapy Treatment Note- Dysphagia  Date: 9/28/2022     Name: Matteo Fraire   MRN: [id number]   Therapy Diagnosis:   Encounter Diagnosis   Name Primary?    Dysphagia, unspecified type Yes   Physician: Susanne Wills MD  Physician Orders: Ambulatory referral to speech therapy  Medical Diagnosis: I63.9 (ICD-10-CM) - CVA (cerebral vascular accident)    Visit #/ Visits Authorized: 10/20  Date of Evaluation:  8/16/2022  Insurance Authorization Period: 8/18/2022 - 12/31/2022  Plan of Care Expiration Date:   11/8/2022  Extended Plan of Care:  n/a  Progress Note: 10/10/2022  Visits Cancelled: 0  Visits No Show: 0    Time In: 1:30 PM  Time Out: 2:15 PM  Total Billable Time: 45 minutes    Precautions: Standard and dysphagia/NPO, PEG  Subjective:   Patient reports:   Patient reports he had his feeding tube removed 9/27/2022. Patient states the procedure was easier than he was expecting.    Patient scheduled for a MBSS on 10/25/2022.    Patient states he continues to take small bites and use water to wash solids down. Patient states he still needs to concentrate when eating/drinking. Patient states he was distracted when taking his pill and felt like it was stuck until drinking enough water to push it down.     Patient states he weighs the same as he did when he left inpatient rehab.     He was compliant to home exercise program.   Response to previous treatment: Tolerated well   Pain Scale:  0/10 on a Visual Analog Scale currently.   Pain Location: n/a  Objective:   UNTIMED  Procedure Min.   Swallowing and Oral Function Evaluation 45   Total Timed Units: 0  Total Untimed Units: 1  Charges Billed/Number of units: 1/1    Short Term Goals: (4 weeks) Current Progress:   1. Pt will complete isometric chin tuck against resistance (CTAR) OR Shaker with 60 second hold across 5 sets to improve hyolaryngeal elevation, excursion, airway protection and UES opening.    Progressing/ Not Met 9/28/2022    X2 rep 60 seconds hold    Isometric 2 second hold 1 set of x30   2. Pt will complete mendelsohn maneuver with 2 second hold 5-10x across 3-5 sets to improve duration of hyolaryngeal lift and UES opening hyolaryngeal elevation, excursion, airway protection and UES opening.     Progressing/ Not Met 9/28/2022   X10 (1 set of 10)   3. Pt will tolerate trials of food hierarchy level 1 (ice chips) with 2 or less signs of aspiration/expectoration per every 10 swallows completed.    Met 9/14/2022 X10 with 0 s/s of aspiration           Met x3/3 sessions   4. Pt will tolerate trials of food hierarchy level 2 (5ml nectar) with 2 or less signs of aspiration/expectoration per every 10 swallows completed.    Met 9/14/2022 X10 with 0 s/s of aspiration           Met x3/3 sessions   5. Pt will tolerate trials of food hierarchy level 3 (10 ml nectar) with 2 or less signs of aspiration/expectoration per every 10 swallows completed.    Met 9/14/2022 X10 with 0 s/s of aspiration          Met x3/3 session   6. Pt will tolerate trials of food hierarchy level 4 (5ml thin) with 2 or less signs of aspiration/expectoration per every 10 swallows completed.    Met 9/23/2022 X10 with 0 s/s of aspiration           Met 3/3   7. Pt will tolerate trials of food hierarchy level 5 (10ml thin) with 2 or less signs of aspiration/expectoration per every 10 swallows completed.    Met 9/26/2022 X10 with 0 s/s of aspiration          Met 3/3   8. Pt will tolerate trials of food hierarchy level 6 (5ml puree) with 2 or less signs of aspiration/expectoration per every 10 swallows completed.     Met 9/26/2022 X10 with 0 s/s of aspiration          Met 3/3   9. Pt will tolerate trials of food hierarchy level 7 (10ml puree) with 2 or less signs of aspiration/expectoration per every 10 swallows completed.     Met 9/26/2022 X10 with 0 s/s of aspiration          Met 3/3   10. Pt will tolerate trials of food hierarchy level 8 (soft masticated) with 2 or less signs of  aspiration/expectoration per every 10 swallows completed.     Met 9/26/2022 X10 with 0 s/s of aspiration          Met 3/3   11. Pt will tolerate trials of food hierarchy level 8 (Hard masticated) with 2 or less signs of aspiration per every 10 swallows completed.    Met 9/28/2022 X20 with 0 s/s of aspiration (marc doone)      Met 3/3   12. Patient will independently demonstrate adequate use of effortful swallow technique to improve overall swallow strength, safety and efficiency 60x per session.      Progressing/Not Met 9/28/2022 X40 (2 sets of 20)       Patient Education/Response:   Aspiration precautions, oral hygiene goals, swallowing exercises, eating/drinking precautions (small bites, slow, no straws).     Home program established: yes-Encouraged to complete x30 effortful swallows, x30 mendelsohn, 3 1-minute hold CTAR every day.  Encouraged IDDSI 6 diet level.   Exercises were reviewed and Matteo was able to demonstrate them prior to the end of the session.  Matteo demonstrated good  understanding of the education provided.     See Electronic Medical Record under Patient Instructions for exercises provided throughout therapy.  Assessment:   Matteo is progressing well towards his goals. Patient very compliant with tasks within therapy and home exercise program. Patient exhibits good recall of swallowing precautions and s/s aspiration. Patient did not exhibit any s/s of aspiration on any trials of thins and hard mastication (marc doone). Patient exhibits good safety awareness and impulse control when eating/drinking. Changing recommendation from 2x to 1x a week. Patient requiring minimal cues for swallowing exercises, maintaining soft and bite sized diet, and regularly doing swallow exercises at home. Current goals remain appropriate. Goals to be updated as necessary.     Patient prognosis is Good. Patient will continue to benefit from skilled outpatient speech and language therapy to address the deficits listed  in the problem list on initial evaluation, provide patient/family education and to maximize patient's level of independence in the home and community environment.   Medical necessity is demonstrated by the following IMPAIRMENTS:  Decreased safety and efficiency of swallowing function on PO intake without overt s/s of aspiration for the highest diet level.    Barriers to Therapy: none identified  Patient's spiritual, cultural and educational needs considered and patient agreeable to plan of care and goals.  Plan:   Continue Plan of Care 1x/week with focus on swallow function.     Sade Garcia CCC-SLP   9/28/2022

## 2022-10-03 ENCOUNTER — CLINICAL SUPPORT (OUTPATIENT)
Dept: REHABILITATION | Facility: HOSPITAL | Age: 62
End: 2022-10-03
Payer: COMMERCIAL

## 2022-10-03 DIAGNOSIS — R13.10 DYSPHAGIA, UNSPECIFIED TYPE: Primary | ICD-10-CM

## 2022-10-03 PROCEDURE — 92526 ORAL FUNCTION THERAPY: CPT | Mod: PN

## 2022-10-03 NOTE — PROGRESS NOTES
[facility]  Speech Therapy Treatment Note- Dysphagia  Date: 10/3/2022     Name: Matteo Fraire   MRN: [id number]   Therapy Diagnosis:   Encounter Diagnosis   Name Primary?    Dysphagia, unspecified type Yes   Physician: Susanne Wills MD  Physician Orders: Ambulatory referral to speech therapy  Medical Diagnosis: I63.9 (ICD-10-CM) - CVA (cerebral vascular accident)    Visit #/ Visits Authorized: 11/20  Date of Evaluation:  8/16/2022  Insurance Authorization Period: 8/18/2022 - 12/31/2022  Plan of Care Expiration Date:   11/8/2022  Extended Plan of Care:  n/a  Progress Note: 10/10/2022  Visits Cancelled: 0  Visits No Show: 0    Time In: 2:15 PM  Time Out: 3:00 PM  Total Billable Time: 45 minutes    Precautions: Standard   Subjective:   Patient reports:   PEG removed 9/27/2022. Patient states the wound is healing well.     Patient scheduled for a MBSS on 10/25/2022.    Patient states he takes his pills one at a time.     Patient reports no changes/concerns with soft diet with thin liquids.     He was compliant to home exercise program.   Response to previous treatment: Tolerated well   Pain Scale:  0/10 on a Visual Analog Scale currently.   Pain Location: n/a  Objective:   UNTIMED  Procedure Min.   Swallowing and Oral Function Evaluation 45   Total Timed Units: 0  Total Untimed Units: 1  Charges Billed/Number of units: 1/1    Short Term Goals: (4 weeks) Current Progress:   1. Pt will complete isometric chin tuck against resistance (CTAR) OR Shaker with 60 second hold across 5 sets to improve hyolaryngeal elevation, excursion, airway protection and UES opening.    Progressing/ Not Met 10/3/2022   X3 rep 60 seconds hold    Isometric 2 second hold 3 set of x30   2. Pt will complete mendelsohn maneuver with 2 second hold 5-10x across 3-5 sets to improve duration of hyolaryngeal lift and UES opening hyolaryngeal elevation, excursion, airway protection and UES opening.     Progressing/ Not Met  10/3/2022   X30 (3 set of 10)            Met 1/3   3. Pt will tolerate trials of food hierarchy level 1 (ice chips) with 2 or less signs of aspiration/expectoration per every 10 swallows completed.    Met 9/14/2022 X10 with 0 s/s of aspiration           Met x3/3 sessions   4. Pt will tolerate trials of food hierarchy level 2 (5ml nectar) with 2 or less signs of aspiration/expectoration per every 10 swallows completed.    Met 9/14/2022 X10 with 0 s/s of aspiration           Met x3/3 sessions   5. Pt will tolerate trials of food hierarchy level 3 (10 ml nectar) with 2 or less signs of aspiration/expectoration per every 10 swallows completed.    Met 9/14/2022 X10 with 0 s/s of aspiration          Met x3/3 session   6. Pt will tolerate trials of food hierarchy level 4 (5ml thin) with 2 or less signs of aspiration/expectoration per every 10 swallows completed.    Met 9/23/2022 X10 with 0 s/s of aspiration           Met 3/3   7. Pt will tolerate trials of food hierarchy level 5 (10ml thin) with 2 or less signs of aspiration/expectoration per every 10 swallows completed.    Met 9/26/2022 X10 with 0 s/s of aspiration          Met 3/3   8. Pt will tolerate trials of food hierarchy level 6 (5ml puree) with 2 or less signs of aspiration/expectoration per every 10 swallows completed.     Met 9/26/2022 X10 with 0 s/s of aspiration          Met 3/3   9. Pt will tolerate trials of food hierarchy level 7 (10ml puree) with 2 or less signs of aspiration/expectoration per every 10 swallows completed.     Met 9/26/2022 X10 with 0 s/s of aspiration          Met 3/3   10. Pt will tolerate trials of food hierarchy level 8 (soft masticated) with 2 or less signs of aspiration/expectoration per every 10 swallows completed.     Met 9/26/2022 X10 with 0 s/s of aspiration          Met 3/3   11. Pt will tolerate trials of food hierarchy level 8 (Hard masticated) with 2 or less signs of aspiration per every 10 swallows completed.    Met 9/28/2022  X20 with 0 s/s of aspiration (marc kramer)      Met 3/3   12. Patient will independently demonstrate adequate use of effortful swallow technique to improve overall swallow strength, safety and efficiency 60x per session.      Progressing/Not Met 10/3/2022 X60 (3 sets of 20)          Met 1/3       Patient Education/Response:   Aspiration precautions, oral hygiene goals, swallowing exercises, eating/drinking precautions (small bites, slow).     Home program established: yes-Encouraged to complete x30 effortful swallows, x30 mendelsohn, 3 1-minute hold CTAR every day.  Encouraged IDDSI 7 diet level with regular easy to chew  Exercises were reviewed and Matteo was able to demonstrate them prior to the end of the session.  Matteo demonstrated good  understanding of the education provided.     See Electronic Medical Record under Patient Instructions for exercises provided throughout therapy.  Assessment:   Matteo is progressing well towards his goals. Patient very compliant with tasks within therapy and home exercise program. Patient exhibits good recall of swallowing precautions and s/s aspiration. Patient exhibits good safety awareness and impulse control when eating/drinking. Patient reports being very cautious when eating/drinking and when choosing what he feels safe eating/drinking. Patient requiring minimal cues for swallowing exercises, maintaining IDDSI 7 diet with regular easy to chew. Patient is regularly doing swallow exercises at home. Increase in swallow exercises completed on this date in comparison to previous sessions. Current goals remain appropriate. Goals to be updated as necessary.     Patient prognosis is Good. Patient will continue to benefit from skilled outpatient speech and language therapy to address the deficits listed in the problem list on initial evaluation, provide patient/family education and to maximize patient's level of independence in the home and community environment.   Medical necessity  is demonstrated by the following IMPAIRMENTS:  Decreased safety and efficiency of swallowing function on PO intake without overt s/s of aspiration for the highest diet level.    Barriers to Therapy: none identified  Patient's spiritual, cultural and educational needs considered and patient agreeable to plan of care and goals.  Plan:   Continue Plan of Care 1x/week with focus on swallow function.     Sade Garcia CCC-SLP   10/3/2022

## 2022-10-07 ENCOUNTER — OFFICE VISIT (OUTPATIENT)
Dept: CARDIOLOGY | Facility: CLINIC | Age: 62
End: 2022-10-07
Payer: COMMERCIAL

## 2022-10-07 VITALS
SYSTOLIC BLOOD PRESSURE: 120 MMHG | BODY MASS INDEX: 29.78 KG/M2 | WEIGHT: 201.06 LBS | RESPIRATION RATE: 16 BRPM | OXYGEN SATURATION: 98 % | HEART RATE: 61 BPM | HEIGHT: 69 IN | DIASTOLIC BLOOD PRESSURE: 68 MMHG

## 2022-10-07 DIAGNOSIS — E78.5 HYPERLIPIDEMIA, UNSPECIFIED HYPERLIPIDEMIA TYPE: ICD-10-CM

## 2022-10-07 DIAGNOSIS — I63.9 CEREBROVASCULAR ACCIDENT (CVA), UNSPECIFIED MECHANISM: ICD-10-CM

## 2022-10-07 DIAGNOSIS — I25.709 CORONARY ARTERY DISEASE INVOLVING CORONARY BYPASS GRAFT OF NATIVE HEART WITH ANGINA PECTORIS: Primary | ICD-10-CM

## 2022-10-07 DIAGNOSIS — I10 HYPERTENSION, UNSPECIFIED TYPE: ICD-10-CM

## 2022-10-07 DIAGNOSIS — Z95.1 S/P CABG (CORONARY ARTERY BYPASS GRAFT): ICD-10-CM

## 2022-10-07 DIAGNOSIS — I47.29 NSVT (NONSUSTAINED VENTRICULAR TACHYCARDIA): ICD-10-CM

## 2022-10-07 PROCEDURE — 3078F PR MOST RECENT DIASTOLIC BLOOD PRESSURE < 80 MM HG: ICD-10-PCS | Mod: CPTII,S$GLB,, | Performed by: NURSE PRACTITIONER

## 2022-10-07 PROCEDURE — 1159F MED LIST DOCD IN RCRD: CPT | Mod: CPTII,S$GLB,, | Performed by: NURSE PRACTITIONER

## 2022-10-07 PROCEDURE — 1159F PR MEDICATION LIST DOCUMENTED IN MEDICAL RECORD: ICD-10-PCS | Mod: CPTII,S$GLB,, | Performed by: NURSE PRACTITIONER

## 2022-10-07 PROCEDURE — 3078F DIAST BP <80 MM HG: CPT | Mod: CPTII,S$GLB,, | Performed by: NURSE PRACTITIONER

## 2022-10-07 PROCEDURE — 3008F BODY MASS INDEX DOCD: CPT | Mod: CPTII,S$GLB,, | Performed by: NURSE PRACTITIONER

## 2022-10-07 PROCEDURE — 99214 PR OFFICE/OUTPT VISIT, EST, LEVL IV, 30-39 MIN: ICD-10-PCS | Mod: S$GLB,,, | Performed by: NURSE PRACTITIONER

## 2022-10-07 PROCEDURE — 3008F PR BODY MASS INDEX (BMI) DOCUMENTED: ICD-10-PCS | Mod: CPTII,S$GLB,, | Performed by: NURSE PRACTITIONER

## 2022-10-07 PROCEDURE — 3074F SYST BP LT 130 MM HG: CPT | Mod: CPTII,S$GLB,, | Performed by: NURSE PRACTITIONER

## 2022-10-07 PROCEDURE — 99214 OFFICE O/P EST MOD 30 MIN: CPT | Mod: S$GLB,,, | Performed by: NURSE PRACTITIONER

## 2022-10-07 PROCEDURE — 3044F HG A1C LEVEL LT 7.0%: CPT | Mod: CPTII,S$GLB,, | Performed by: NURSE PRACTITIONER

## 2022-10-07 PROCEDURE — 3074F PR MOST RECENT SYSTOLIC BLOOD PRESSURE < 130 MM HG: ICD-10-PCS | Mod: CPTII,S$GLB,, | Performed by: NURSE PRACTITIONER

## 2022-10-07 PROCEDURE — 3044F PR MOST RECENT HEMOGLOBIN A1C LEVEL <7.0%: ICD-10-PCS | Mod: CPTII,S$GLB,, | Performed by: NURSE PRACTITIONER

## 2022-10-07 RX ORDER — AMIODARONE HYDROCHLORIDE 200 MG/1
200 TABLET ORAL DAILY
Qty: 90 TABLET | Refills: 3 | Status: SHIPPED | OUTPATIENT
Start: 2022-10-07 | End: 2023-03-13 | Stop reason: SDUPTHER

## 2022-10-07 RX ORDER — METOPROLOL TARTRATE 25 MG/1
12.5 TABLET, FILM COATED ORAL 2 TIMES DAILY
Qty: 90 TABLET | Refills: 3 | Status: SHIPPED | OUTPATIENT
Start: 2022-10-07 | End: 2023-09-14 | Stop reason: SDUPTHER

## 2022-10-07 NOTE — PROGRESS NOTES
Subjective:    Patient ID:  Matteo Fraire is a 61 y.o. male       HPI:  Patient seen today for follow up appointment. He states he has been doing well and has completed PT/OT. He's nearly completed with ST. He has resumed driving as he is now near 12 weeks post op. Denies any chest pain or shortness of breath. He has been compliant with medications.     Review of patient's allergies indicates:   Allergen Reactions    Venom-wasp Anaphylaxis       Past Medical History:   Diagnosis Date    Heart attack 2005;2011    Hypertension     Psoriasis     Stroke      Past Surgical History:   Procedure Laterality Date    CORONARY ANGIOGRAPHY INCLUDING BYPASS GRAFTS WITH CATHETERIZATION OF LEFT HEART Left 6/27/2022    Procedure: Left heart cath;  Surgeon: Stevan Powell MD;  Location: [facility] CATH/EP LAB;  Service: Cardiology;  Laterality: Left;    CORONARY ARTERY BYPASS GRAFT      CYSTOSCOPY W/ URETERAL STENT PLACEMENT Left 11/21/2019    Procedure: CYSTOSCOPY, WITH URETERAL STENT INSERTION;  Surgeon: Mickey Escudero MD;  Location: [facility] OR;  Service: Urology;  Laterality: Left;    ENDOSCOPIC HARVEST OF VEIN Right 7/8/2022    Procedure: HARVEST-VEIN-ENDOVASCULAR;  Surgeon: Eyal Stone MD;  Location: [facility] OR;  Service: Cardiothoracic;  Laterality: Right;    ESOPHAGOGASTRODUODENOSCOPY N/A 7/15/2022    Procedure: EGD (ESOPHAGOGASTRODUODENOSCOPY);  Surgeon: Art Pino MD;  Location: [facility] ENDO;  Service: Endoscopy;  Laterality: N/A;    EXTRACORPOREAL SHOCK WAVE LITHOTRIPSY Left 11/21/2019    Procedure: LITHOTRIPSY, ESWL;  Surgeon: Mickey Escudero MD;  Location: [facility] OR;  Service: Urology;  Laterality: Left;    HERNIA REPAIR      Inguinal just after birth    REPEAT CORONARY ARTERY BYPASS GRAFTING N/A 7/8/2022    Procedure: CABG, REPEAT;  Surgeon: Eyal Stone MD;  Location: [facility] OR;  Service: Cardiothoracic;  Laterality: N/A;  drugs ordered 7-7  @1322      SURGICAL PROCUREMENT, ARTERY, RADIAL, FOR  CABG Left 7/8/2022    Procedure: SURGICAL PROCUREMENT,ARTERY,RADIAL,FOR CABG;  Surgeon: Eyal Stone MD;  Location: [facility];  Service: Cardiothoracic;  Laterality: Left;     Social History     Tobacco Use    Smoking status: Never    Smokeless tobacco: Never   Substance Use Topics    Drug use: Never     Family History   Problem Relation Age of Onset    Hearing loss Mother     Arthritis Mother         Review of Systems:   Constitution: Negative for diaphoresis and fever.   HEENT: Negative for nosebleeds.    Cardiovascular: Negative for chest pain       No dyspnea on exertion       No leg swelling        No palpitations  Respiratory: Negative for shortness of breath and wheezing.    Hematologic/Lymphatic: Negative for bleeding problem. Does not bruise/bleed easily.   Skin: Negative for color change and rash.   Musculoskeletal: Negative for falls and myalgias.   Gastrointestinal: Negative for hematemesis and hematochezia.   Genitourinary: Negative for hematuria.   Neurological: Negative for dizziness and light-headedness.   Psychiatric/Behavioral: Negative for altered mental status and memory loss.          Objective:        Vitals:    10/07/22 1130   BP: 120/68   Pulse: 61   Resp: 16       Lab Results   Component Value Date    WBC 7.7 09/08/2022    HGB 13.5 09/08/2022    HCT 42.7 09/08/2022     09/08/2022    CHOL 99 (L) 09/08/2022    TRIG 92 09/08/2022    HDL 30 (L) 09/08/2022    ALT 48 (H) 09/08/2022    AST 34 09/08/2022     09/08/2022    K 4.5 09/08/2022     09/08/2022    CREATININE 0.96 09/08/2022    BUN 23 09/08/2022    CO2 23 09/08/2022    TSH 3.44 01/20/2011    PSA 0.44 11/20/2019    INR 1.1 06/27/2022    HGBA1C 6.1 06/28/2022        ECHOCARDIOGRAM RESULTS  Results for orders placed during the hospital encounter of 06/27/22    Transesophageal echo ([facility]LEY)    Interpretation Summary  · The left ventricle is normal in size with normal systolic function.  · The estimated ejection fraction is  60%.  · Normal left ventricular diastolic function.  · Normal right ventricular size with normal right ventricular systolic function.  · Mild left atrial enlargement.  · No interatrial septal defect present.  · Normal appearing left atrial appendage. No thrombus is present in the appendage. JOSEPHINE occluder is absent. Normal appendage velocities.  · Agitated saline contrast study did not show any intracardiac shunt  · There is no cardiac source of emboli.        CURRENT/PREVIOUS VISIT EKG  Results for orders placed or performed during the hospital encounter of 06/27/22   EKG 12-lead    Collection Time: 07/09/22 12:41 AM    Narrative    Test Reason : I21.3,    Vent. Rate : 088 BPM     Atrial Rate : 088 BPM     P-R Int : 232 ms          QRS Dur : 094 ms      QT Int : 384 ms       P-R-T Axes : 043 101 132 degrees     QTc Int : 464 ms    Sinus rhythm with 1st degree A-V block with occasional Premature  ventricular complexes  Possible Inferior infarct ,age undetermined  Anterolateral infarct (cited on or before 27-JUN-2022)  Abnormal ECG  When compared with ECG of 08-JUL-2022 16:38,  Borderline criteria for Inferior infarct are now Present  Serial changes of Anterior infarct Present  Confirmed by Vincent Mann MD ([id number]) on 7/15/2022 12:55:05 PM    Referred By: [email or web address]   SELF           Confirmed By:Vincent Mann MD     No valid procedures specified.   No results found for this or any previous visit.      Physical Exam:  CONSTITUTIONAL: No fever, no chills  HEENT: Normocephalic, atraumatic,pupils reactive to light                 NECK:  No JVD no carotid bruit  CVS: S1S2+, RRR  LUNGS: Clear  ABDOMEN: Soft, NT, BS+  EXTREMITIES: No cyanosis, edema  : No lee catheter  NEURO: AAO X 3  PSY: Normal affect      Medication List with Changes/Refills   Current Medications    ACETAMINOPHEN (TYLENOL) 325 MG TABLET    650 mg by Tube route every 6 (six) hours as needed.    ASPIRIN 81 MG CHEW    Take 81 mg by mouth once daily.     ATORVASTATIN (LIPITOR) 80 MG TABLET    1 tablet (80 mg total) by Per G Tube route once daily.    MELATONIN (MELATIN) 3 MG TABLET    Take 6 mg by mouth.    METFORMIN (GLUCOPHAGE) 500 MG TABLET    Take 1 tablet (500 mg total) by mouth 2 (two) times daily with meals.    MIDODRINE (PROAMATINE) 10 MG TABLET    Take 1 tablet (10 mg total) by mouth 2 (two) times a day.    POLYETHYLENE GLYCOL (GLYCOLAX) 17 GRAM/DOSE POWDER    Take 17 g by mouth daily as needed.   Changed and/or Refilled Medications    Modified Medication Previous Medication    AMIODARONE (PACERONE) 200 MG TAB amiodarone (PACERONE) 200 MG Tab       Take 1 tablet (200 mg total) by mouth once daily.    1 tablet (200 mg total) by Per G Tube route 2 (two) times daily.    METOPROLOL TARTRATE (LOPRESSOR) 25 MG TABLET metoprolol tartrate (LOPRESSOR) 25 MG tablet       Take 0.5 tablets (12.5 mg total) by mouth 2 (two) times daily.    TAKE 1/2 TABLET(12.5 MG) VIA GTUBE TWICE DAILY             Assessment:       1. Coronary artery disease involving coronary bypass graft of native heart with angina pectoris    2. S/P CABG (coronary artery bypass graft)    3. Hyperlipidemia, unspecified hyperlipidemia type    4. Hypertension, unspecified type    5. Cerebrovascular accident (CVA), unspecified mechanism    6. NSVT (nonsustained ventricular tachycardia)         Plan:     Problem List Items Addressed This Visit          Unprioritized    Coronary artery disease involving coronary bypass graft of native heart with angina pectoris - Primary    Current Assessment & Plan     Recommend continuing aspirin 81 mg p.o. daily.  Continue risk factor modification including low-fat low-cholesterol diet and regular exercise.           Relevant Medications    metoprolol tartrate (LOPRESSOR) 25 MG tablet    amiodarone (PACERONE) 200 MG Tab    Other Relevant Orders    Ambulatory referral/consult to Cardiac Rehab    NSVT (nonsustained ventricular tachycardia)    Current Assessment & Plan      No further events noted since hospitalization. He remains on metoprolol and amiodarone.   Decrease amiodarone to 200 mg po daily.          S/P CABG (coronary artery bypass graft)    Current Assessment & Plan     Referred to cardiac rehab.         Relevant Orders    Ambulatory referral/consult to Cardiac Rehab    Hypertension    Current Assessment & Plan     Blood pressure is stable on current regimen.  Continue the same.         Hyperlipidemia    Current Assessment & Plan     Goal for LDL is less than 70.  Continue atorvastatin 80 mg po daily and aspirin 81 mg po daily.            CVA (cerebral vascular accident)    Current Assessment & Plan     Patient has recovered well and has continued doing therapy.  He is currently finishing up with speech therapy.            Follow up in about 4 months (around 2/7/2023).

## 2022-10-07 NOTE — ASSESSMENT & PLAN NOTE
No further events noted since hospitalization. He remains on metoprolol and amiodarone.   Decrease amiodarone to 200 mg po daily.

## 2022-10-07 NOTE — ASSESSMENT & PLAN NOTE
Patient has recovered well and has continued doing therapy.  He is currently finishing up with speech therapy.

## 2022-10-07 NOTE — ASSESSMENT & PLAN NOTE
Recommend continuing aspirin 81 mg p.o. daily.  Continue risk factor modification including low-fat low-cholesterol diet and regular exercise.

## 2022-10-07 NOTE — ASSESSMENT & PLAN NOTE
Goal for LDL is less than 70.  Continue atorvastatin 80 mg po daily and aspirin 81 mg po daily.

## 2022-10-10 ENCOUNTER — CLINICAL SUPPORT (OUTPATIENT)
Dept: REHABILITATION | Facility: HOSPITAL | Age: 62
End: 2022-10-10
Payer: COMMERCIAL

## 2022-10-10 DIAGNOSIS — R13.10 DYSPHAGIA, UNSPECIFIED TYPE: Primary | ICD-10-CM

## 2022-10-10 PROCEDURE — 92526 ORAL FUNCTION THERAPY: CPT | Mod: PN

## 2022-10-10 NOTE — PROGRESS NOTES
[facility]  Speech Therapy Treatment Note- Dysphagia  PROGRESS NOTE  Date: 10/10/2022     Name: Matteo Fraire   MRN: [id number]   Therapy Diagnosis:   Encounter Diagnosis   Name Primary?    Dysphagia, unspecified type Yes   Physician: Susanne Wills MD  Physician Orders: Ambulatory referral to speech therapy  Medical Diagnosis: I63.9 (ICD-10-CM) - CVA (cerebral vascular accident)    Visit #/ Visits Authorized: 12/20  Date of Evaluation:  8/16/2022  Insurance Authorization Period: 8/18/2022 - 12/31/2022  Plan of Care Expiration Date:   11/8/2022  Extended Plan of Care:  n/a  Progress Note: 10/10/2022 - progress note  Visits Cancelled: 0  Visits No Show: 0    Time In: 2:15 PM  Time Out: 3:00 PM  Total Billable Time: 45 minutes    Precautions: Standard   Subjective:   Patient reports: he had spaghetti and meatballs with garlic bread, pizza, and pot pie over the past week. Patient reports no deficits with eating or swallowing as long as he goes slow and eats small bites.     PEG removed 9/27/2022. Patient states his wound is healing well. No bandages anymore.   Patient scheduled for a MBSS on 10/25/2022.    Sternal precautions dropped per cardiologist.     He was compliant to home exercise program.   Response to previous treatment: Tolerated well   Pain Scale:  0/10 on a Visual Analog Scale currently.   Pain Location: n/a  Objective:   UNTIMED  Procedure Min.   Swallowing and Oral Function Evaluation 45   Total Timed Units: 0  Total Untimed Units: 1  Charges Billed/Number of units: 1/1    Short Term Goals: (4 weeks) Current Progress:   1. Pt will complete isometric chin tuck against resistance (CTAR) OR Shaker with 60 second hold across 5 sets to improve hyolaryngeal elevation, excursion, airway protection and UES opening.    Progressing/ Not Met 10/10/2022   X4 rep 60 second hold    Isometric 2 second hold 3 set of x30   2. Pt will complete mendelsohn maneuver with 2 second hold 5-10x across 3-5  sets to improve duration of hyolaryngeal lift and UES opening hyolaryngeal elevation, excursion, airway protection and UES opening.     Progressing/ Not Met 10/10/2022   X30 (3 set of 10)            Met 2/3   3. Pt will tolerate trials of food hierarchy level 1 (ice chips) with 2 or less signs of aspiration/expectoration per every 10 swallows completed.    Met 9/14/2022 X10 with 0 s/s of aspiration           Met x3/3 sessions   4. Pt will tolerate trials of food hierarchy level 2 (5ml nectar) with 2 or less signs of aspiration/expectoration per every 10 swallows completed.    Met 9/14/2022 X10 with 0 s/s of aspiration           Met x3/3 sessions   5. Pt will tolerate trials of food hierarchy level 3 (10 ml nectar) with 2 or less signs of aspiration/expectoration per every 10 swallows completed.    Met 9/14/2022 X10 with 0 s/s of aspiration          Met x3/3 session   6. Pt will tolerate trials of food hierarchy level 4 (5ml thin) with 2 or less signs of aspiration/expectoration per every 10 swallows completed.    Met 9/23/2022 X10 with 0 s/s of aspiration           Met 3/3   7. Pt will tolerate trials of food hierarchy level 5 (10ml thin) with 2 or less signs of aspiration/expectoration per every 10 swallows completed.    Met 9/26/2022 X10 with 0 s/s of aspiration          Met 3/3   8. Pt will tolerate trials of food hierarchy level 6 (5ml puree) with 2 or less signs of aspiration/expectoration per every 10 swallows completed.     Met 9/26/2022 X10 with 0 s/s of aspiration          Met 3/3   9. Pt will tolerate trials of food hierarchy level 7 (10ml puree) with 2 or less signs of aspiration/expectoration per every 10 swallows completed.     Met 9/26/2022 X10 with 0 s/s of aspiration          Met 3/3   10. Pt will tolerate trials of food hierarchy level 8 (soft masticated) with 2 or less signs of aspiration/expectoration per every 10 swallows completed.     Met 9/26/2022 X10 with 0 s/s of aspiration          Met 3/3    11. Pt will tolerate trials of food hierarchy level 8 (Hard masticated) with 2 or less signs of aspiration per every 10 swallows completed.    Met 9/28/2022 X20 with 0 s/s of aspiration (marc nia)      Met 3/3   12. Patient will independently demonstrate adequate use of effortful swallow technique to improve overall swallow strength, safety and efficiency 60x per session.      Progressing/Not Met 10/10/2022 X60 (3 sets of 20)          Met 2/3   13. Patient will complete an updated Modified Barium Swallow Study to determine current swallow function.    Progressing/Not Met 10/10/2022 NEW GOAL       Patient Education/Response:   Aspiration precautions, oral hygiene goals, swallowing exercises, eating/drinking precautions (small bites, slow).     Home program established: yes-Encouraged to complete x30 effortful swallows, x30 mendelsohn, 3 1-minute hold CTAR every day.  Encouraged IDDSI 7 diet level with regular easy to chew  Exercises were reviewed and Matteo was able to demonstrate them prior to the end of the session.  Matteo demonstrated good  understanding of the education provided.     See Electronic Medical Record under Patient Instructions for exercises provided throughout therapy.  Assessment:   Progress Note: Matteo is progressing well towards his goals. Patient very compliant with tasks within therapy and home exercise program. Patient cautiously trying more food textures. Patient reports no deficits with his current diet, IDDSI 7 diet with regular easy to chew. Patient is eating regular food with thin liquids, avoiding crunchy and rough foods. Patient exhibits good recall of swallowing precautions and s/s aspiration. Patient exhibits good safety awareness and impulse control when eating/drinking. Increase in swallow exercises completed on this date in comparison to previous sessions. Current goals remain appropriate. Goals to be updated as necessary.     Patient prognosis is Good. Patient will continue to  benefit from skilled outpatient speech and language therapy to address the deficits listed in the problem list on initial evaluation, provide patient/family education and to maximize patient's level of independence in the home and community environment.   Medical necessity is demonstrated by the following IMPAIRMENTS:  Decreased safety and efficiency of swallowing function on PO intake without overt s/s of aspiration for the highest diet level.    Barriers to Therapy: none identified  Patient's spiritual, cultural and educational needs considered and patient agreeable to plan of care and goals.  Plan:   Continue Plan of Care 1x/week with focus on swallow function.     Sade Garcia CCC-SLP   10/10/2022

## 2022-10-17 ENCOUNTER — CLINICAL SUPPORT (OUTPATIENT)
Dept: REHABILITATION | Facility: HOSPITAL | Age: 62
End: 2022-10-17
Payer: COMMERCIAL

## 2022-10-17 DIAGNOSIS — R13.10 DYSPHAGIA, UNSPECIFIED TYPE: Primary | ICD-10-CM

## 2022-10-17 PROCEDURE — 92526 ORAL FUNCTION THERAPY: CPT | Mod: PN

## 2022-10-17 NOTE — PROGRESS NOTES
[facility]  Speech Therapy Treatment Note- Dysphagia    Date: 10/17/2022     Name: Matteo Fraire   MRN: [id number]   Therapy Diagnosis:   Encounter Diagnosis   Name Primary?    Dysphagia, unspecified type Yes   Physician: Susanne Wills MD  Physician Orders: Ambulatory referral to speech therapy  Medical Diagnosis: I63.9 (ICD-10-CM) - CVA (cerebral vascular accident)    Visit #/ Visits Authorized: 13/20  Date of Evaluation:  8/16/2022  Insurance Authorization Period: 8/18/2022 - 12/31/2022  Plan of Care Expiration Date:   11/8/2022  Extended Plan of Care:  n/a  Progress Note: 11/7/2022  Visits Cancelled: 0  Visits No Show: 0    Time In: 11:15 AM  Time Out: 12:00 PM  Total Billable Time: 45 minutes    Precautions: Standard   Subjective:   Patient reports: Patient reports no deficits with eating or swallowing as long as he goes slow, eats small bites, and alternates liquids and solids. Patient states he is still being cautious and knows his limits. He stated he tried a small chip with dip this week without any issues.    Patient states he is getting more of a tingle in his left arm hoping he is regaining sensation.     PEG removed 9/27/2022. Patient states his wound is healing well.     Patient scheduled for a MBSS on 10/25/2022.    He was compliant to home exercise program.   Response to previous treatment: Tolerated well   Pain Scale:  0/10 on a Visual Analog Scale currently.   Pain Location: n/a  Objective:   UNTIMED  Procedure Min.   Swallowing and Oral Function Evaluation 45   Total Timed Units: 0  Total Untimed Units: 1  Charges Billed/Number of units: 1/1    Short Term Goals: (4 weeks) Current Progress:   1. Pt will complete isometric chin tuck against resistance (CTAR) OR Shaker with 60 second hold across 5 sets to improve hyolaryngeal elevation, excursion, airway protection and UES opening.    Progressing/ Not Met 10/17/2022   X3 rep 60 second hold    Isometric 2 second hold 3 set of  x30   2. Pt will complete mendelsohn maneuver with 2 second hold 5-10x across 3-5 sets to improve duration of hyolaryngeal lift and UES opening hyolaryngeal elevation, excursion, airway protection and UES opening.     Met 10/17/2022 X30 (3 set of 10)            Met 3/3   3. Pt will tolerate trials of food hierarchy level 1 (ice chips) with 2 or less signs of aspiration/expectoration per every 10 swallows completed.    Met 9/14/2022 X10 with 0 s/s of aspiration           Met x3/3 sessions   4. Pt will tolerate trials of food hierarchy level 2 (5ml nectar) with 2 or less signs of aspiration/expectoration per every 10 swallows completed.    Met 9/14/2022 X10 with 0 s/s of aspiration           Met x3/3 sessions   5. Pt will tolerate trials of food hierarchy level 3 (10 ml nectar) with 2 or less signs of aspiration/expectoration per every 10 swallows completed.    Met 9/14/2022 X10 with 0 s/s of aspiration          Met x3/3 session   6. Pt will tolerate trials of food hierarchy level 4 (5ml thin) with 2 or less signs of aspiration/expectoration per every 10 swallows completed.    Met 9/23/2022 X10 with 0 s/s of aspiration           Met 3/3   7. Pt will tolerate trials of food hierarchy level 5 (10ml thin) with 2 or less signs of aspiration/expectoration per every 10 swallows completed.    Met 9/26/2022 X10 with 0 s/s of aspiration          Met 3/3   8. Pt will tolerate trials of food hierarchy level 6 (5ml puree) with 2 or less signs of aspiration/expectoration per every 10 swallows completed.     Met 9/26/2022 X10 with 0 s/s of aspiration          Met 3/3   9. Pt will tolerate trials of food hierarchy level 7 (10ml puree) with 2 or less signs of aspiration/expectoration per every 10 swallows completed.     Met 9/26/2022 X10 with 0 s/s of aspiration          Met 3/3   10. Pt will tolerate trials of food hierarchy level 8 (soft masticated) with 2 or less signs of aspiration/expectoration per every 10 swallows completed.      Met 9/26/2022 X10 with 0 s/s of aspiration          Met 3/3   11. Pt will tolerate trials of food hierarchy level 8 (Hard masticated) with 2 or less signs of aspiration per every 10 swallows completed.    Met 9/28/2022 X20 with 0 s/s of aspiration (marc doone)      Met 3/3   12. Patient will independently demonstrate adequate use of effortful swallow technique to improve overall swallow strength, safety and efficiency 60x per session.      Met 10/17/2022 X60 (3 sets of 20)          Met 3/3   13. Patient will complete an updated Modified Barium Swallow Study to determine current swallow function.    Progressing/Not Met 10/17/2022 Scheduled for 10/25/2022       Patient Education/Response:   Aspiration precautions, oral hygiene goals, swallowing exercises, eating/drinking precautions (small bites, slow).     Home program established: yes-Encouraged to complete x30 effortful swallows, x30 mendelsohn, 3 1-minute hold CTAR every day.  Encouraged IDDSI 7 diet level with regular easy to chew.  Exercises were reviewed and Matteo was able to demonstrate them prior to the end of the session.  Matteo demonstrated good  understanding of the education provided.     See Electronic Medical Record under Patient Instructions for exercises provided throughout therapy.  Assessment:   Progress Note: Matteo is progressing well towards his goals. Patient very compliant with tasks within therapy and home exercise program. Patient reports no deficits with his current diet, IDDSI 7 diet with regular easy to chew. Patient is eating regular food with thin liquids, avoiding crunchy and rough foods for the most part. Patient exhibits good recall of swallowing precautions, s/s aspiration, and his limits with swallowing. Patient exhibits good safety awareness and impulse control when eating/drinking. Increase in swallow exercises completed on this date in comparison to previous sessions. Patient has increased his endurance with swallow  strategies. Patient has an MBSS appointment next week. Current goals remain appropriate. Goals to be updated as necessary.     Patient prognosis is Good. Patient will continue to benefit from skilled outpatient speech and language therapy to address the deficits listed in the problem list on initial evaluation, provide patient/family education and to maximize patient's level of independence in the home and community environment.   Medical necessity is demonstrated by the following IMPAIRMENTS:  Decreased safety and efficiency of swallowing function on PO intake without overt s/s of aspiration for the highest diet level.    Barriers to Therapy: none identified  Patient's spiritual, cultural and educational needs considered and patient agreeable to plan of care and goals.  Plan:   Continue Plan of Care 1x/week with focus on swallow function.     Sade Garcia CCC-SLP   10/17/2022

## 2022-10-25 ENCOUNTER — CLINICAL SUPPORT (OUTPATIENT)
Dept: REHABILITATION | Facility: HOSPITAL | Age: 62
End: 2022-10-25
Attending: NURSE PRACTITIONER
Payer: COMMERCIAL

## 2022-10-25 ENCOUNTER — HOSPITAL ENCOUNTER (OUTPATIENT)
Dept: RADIOLOGY | Facility: HOSPITAL | Age: 62
Discharge: HOME | End: 2022-10-25
Attending: NURSE PRACTITIONER
Payer: COMMERCIAL

## 2022-10-25 DIAGNOSIS — R13.10 DYSPHAGIA, UNSPECIFIED TYPE: ICD-10-CM

## 2022-10-25 PROCEDURE — 92611 MOTION FLUOROSCOPY/SWALLOW: CPT | Mod: PN

## 2022-10-25 PROCEDURE — 74230 FL MODIFIED BARIUM SWALLOW SPEECH STUDY: ICD-10-PCS | Mod: 26,,, | Performed by: RADIOLOGY

## 2022-10-25 PROCEDURE — 74230 X-RAY XM SWLNG FUNCJ C+: CPT | Mod: 26,,, | Performed by: RADIOLOGY

## 2022-10-25 PROCEDURE — 74230 X-RAY XM SWLNG FUNCJ C+: CPT | Mod: TC

## 2022-10-26 ENCOUNTER — TELEPHONE (OUTPATIENT)
Dept: FAMILY MEDICINE | Facility: CLINIC | Age: 62
End: 2022-10-26

## 2022-10-26 ENCOUNTER — CLINICAL SUPPORT (OUTPATIENT)
Dept: REHABILITATION | Facility: HOSPITAL | Age: 62
End: 2022-10-26
Payer: COMMERCIAL

## 2022-10-26 DIAGNOSIS — R13.10 DYSPHAGIA, UNSPECIFIED TYPE: Primary | ICD-10-CM

## 2022-10-26 PROCEDURE — 92526 ORAL FUNCTION THERAPY: CPT | Mod: PN

## 2022-10-26 NOTE — PLAN OF CARE
"[facility]  MODIFIED BARIUM SWALLOW STUDY      Date: 10/25/2022     Name: Matteo Fraire   MRN: [id number]    Therapy Diagnosis: mild pharyngeal dysphagia    Physician: Naomi Bliss, *  Physician Orders: SLP Video Swallow  Medical Diagnosis from Referral: Dysphagia unspecified     Date of Evaluation:  10/25/2022    Time In:  1300  Time Out:  1330  Total Billable Time: 30     Procedure Min.   Fl Modified Barium Swallow Speech  30     Precautions: Standard    Subjective   Date of Onset: July 2022    Past Medical History: Matteo Fraire  has a past medical history of Heart attack (2005;2011), Hypertension, Psoriasis, and Stroke.  Matteo Fraire  has a past surgical history that includes Coronary artery bypass graft; Hernia repair; Extracorporeal shock wave lithotripsy (Left, 11/21/2019); Cystoscopy w/ ureteral stent placement (Left, 11/21/2019); Coronary angiography including bypass grafts with catheterization of left heart (Left, 6/27/2022); Repeat coronary artery bypass grafting (N/A, 7/8/2022); Endoscopic harvest of vein (Right, 7/8/2022); surgical procurement, artery, radial, for cabg (Left, 7/8/2022); and Esophagogastroduodenoscopy (N/A, 7/15/2022).    History was provided by patient, and/or taken from chart review:   -Current diet at home: Eats softer meals with thin liquids  -Recommended diet from previous study: NPO- "Severe pharyngeal dysphagia with impaired swallow safety and efficiency, absent pharyngeal swallow; acute in nature and consistent with lateral medullary stroke. No transit through the upper esophogeal sphincter secondary to absent hyolaryngeal displacement despite continued effort. Patient with intact sensation, producing continued cough effort and hacking to bring up material in pharynx and laryngeal vestibule. Unable to completely clear with material remaining in pyriform after effort."  -Therapy received: Current outpatient Speech Therapy " "  -Neurological: Pt endorsed neurological diagnosis of CVA. Pt denied all other neurological diagnoses.  -Gastroenterologist (GI) :  Previous PEG status    -Pulmonary: Pt denied any pulmonary diagnoses.   -Surgery: status post CABG    The following observations were made:   -Mental status: Alert and Cooperative  -Factors affecting performance: no difficulties participating in the study  -Feeding Method: independent in self-feeding    Respiratory Status:   -Respiratory Status: room air    Medical Hx and Allergies:    Review of patient's allergies indicates:   Allergen Reactions    Venom-wasp Anaphylaxis     Pain Scale:  0/10 on VAS currently.   Pain Location: no pain indicated throughout session     Objective     Modified Barium Swallow Study  Purpose: to evaluate anatomy and physiology of the oropharyngeal swallow, to determine effectiveness of rehabilitation strategies, and to determine diet consistency and intervention recommendations. The study was performed using the "Gold Standard" of 30 fps with as low as reasonably achievable (ALARA) exposure.     The patient was seen in radiology seated in [place]'s position in a video imaging chair for lateral views of the larynx and an A/P view. The study was conducted using Varibar thin liquid (IDDSI 0), Varibar nectar liquid (IDDSI 2), Varibar pudding (IDDSI 4), Peaches covered in Varibar powder (IDDSI 6/2), and solid coated in Varibar pudding (IDDSI 7). He tolerated the procedure well.     CONSISTENCIES ADMINISTERED:    Consistency  Presentation  Findings Rosenbeck's Penetration/Aspiration Scale (PAS) Strategy Attempted    Thin (IDDSI 0) Self-regulated cup sip x3 Rapid consecutive straw sip x1    Views:  - Lateral view  - AP view Oral phase: WFL- trace oral residue noted    Pharyngeal phase: none to mild vallecular residue and pyriform sinus residue, trace high laryngeal penetration which did not full clear occurred during the swallow    Esophageal screen: WFL Best: " (1) Material does not enter the airway    Worst: (3) Material enters the airway, remains above the vocal folds, and is not ejected from the airway   No strategies completed or needed   Nectar thick (mildly thick/IDDSI 2) Self-regulated cup sip x2    Views:  - Lateral view  - AP view Oral phase: WFL- trace oral residue    Pharyngeal phase: none to mild vallecular residue and pyriform sinus residue, trace high laryngeal penetration which did not full clear occurred during the swallow     Esophogeal screen: WFL   Best: (1) Material does not enter the airway    Worst: (3) Material enters the airway, remains above the vocal folds, and is not ejected from the airway   No strategies completed or needed   Puree (extremely thick/ IDDSI 4) 5ml x2  10 ml x1    Views:  - Lateral view  - AP view Oral phase: WFL- none to trace oral residue     Pharyngeal phase: mild to moderate vallecular residue which was reduced with spontaneous swallow, none to trace pyriform sinus residue    Esophageal screen: WFL   Best: (1) Material does not enter the airway    Worst: (1) Material does not enter the airway   No strategies completed or needed   Mixed consistency (thin/ IDDSI 0 + soft and bite sized/ IDDSI 6) - 2 peaches in juice x2    View:  - Lateral view   Oral phase: WFL- trace oral residue     Pharyngeal phase: no pyriform sinus residue noted and trace oral residue     Esophageal screen: WFL Best: (1) Material does not enter the airway    Worst: (1) Material does not enter the airway   No strategies completed or needed   Solid (regular/ IDDSI 7) - bite of romeo cracker x2    View:  - Lateral view  Oral phase:  mild oral residue which was reduced with independent spontaneous swallow    Pharyngeal phase:  mild residue noted in the vallecula which was  reduced by spontaneous  swallow, no residue in the pyriform sinuses     Esophageal screen: WFL Best: (1) Material does not enter the airway    Worst: (1) Material does not enter the airway    No strategies completed or needed       Treatment   Total Treatment Time: n/a  Patient educated regarding results and recommendations of the evaluation. See the recommendations section below.    Education: Plan of Care, role of SLP in care, and anatomy and physiology of swallow mechanism as it relates to MBSS findings and recommendations were discussed with the patient. Patient expressed understanding. All questions were answered.     Assessment     Matteo Fraire is a 61 y.o. male referred for Modified Barium Swallow Study with a medical diagnosis of dysphagia. The patient presents with mild pharyngeal dysphagia as determined by the Dysphagia Outcome and Severity Scale (CHUY). Level 5: Mild Dysphagia.    Modified Barium Swallow Study (MBSS) revealed oral phase characterized by lingual and labial strength and range of motion WFL for tongue control, bolus preparation and transport. Lip closure was WFL with no labial escape. Bolus prep and mastication was timely and efficient. Lingual motion was brisk for adequate bolus transport. There was no significant oral residue. The swallow was initiated when the head of the bolus passed the ramus of the mandible.    Pharyngeal phase characterized by timely initiation of swallow. The soft palate elevated for complete closure of the velopharyngeal port. During pharyngeal swallow, reduced base of tongue retraction, reduced anterior hyoid excursion, complete laryngeal elevation, and functional pharyngeal stripping wave resulted in partial epiglottic inversion and complete UES opening. This resulted in  penetration but not aspiration and residue in the valleculae which was reduced with spontaneous sequential swallow. There was no aspiration observed in this study.      Esophageal screen was completed and no deficits were observed.    Impressions: Patient presents with mild pharyngeal dysphagia, likely chronic related to s/p CABG and CVA. Both swallow safety and swallow efficiency  are preserved. Patient appears to be at low risk for aspiration related pneumonia from a primary oropharyngeal dysphagia in consideration of three pillars of aspiration pneumonia (Caleb, 2005) including oral health status, overall health/immune status, and laryngeal vestibule closure/severity of dysphagia. Patient appears to be a good candidate for behavioral swallow rehabilitation as he has demonstrated significant improvement with Speech Therapy services since previous Modified Barium Swallow Study completed in July.    References:  MYA Ellis (2005, March). Pneumonia: Factors Beyond Aspiration. Perspectives in Swallowing and Swallowing Disorders (Dysphagia), 14, 10-16.    Recommendations:     Consistency Recommendations:  thin liquids (IDDSI 0) and regular consistencies (IDDSI 7).  Medications should be taken Whole in thin liquids.   Risk Management: use good oral hygiene , sit upright for all PO intake, and increase physical mobility as tolerated  Therapy: Consult current treating Speech Language Pathologist   Follow-up exam: Follow up swallow study is not indicated at this time.    Please contact [facility] at [phone] if there are questions re: the above or if we can be of additional service to this patient.    Therapist's Name:   Usha Tyler CCC-SLP   Speech Language Pathologist  Certified Brain Injury Specialist    Date: 10/25/2022

## 2022-10-26 NOTE — PROGRESS NOTES
[facility]  Speech Therapy Treatment Note- Dysphagia    Date: 10/26/2022     Name: Matteo Fraire   MRN: [id number]   Therapy Diagnosis:   Encounter Diagnosis   Name Primary?    Dysphagia, unspecified type Yes   Physician: Susanne Wills MD  Physician Orders: Ambulatory referral to speech therapy  Medical Diagnosis: I63.9 (ICD-10-CM) - CVA (cerebral vascular accident)    Visit #/ Visits Authorized: 13/20  Date of Evaluation:  8/16/2022  Insurance Authorization Period: 8/18/2022 - 12/31/2022  Plan of Care Expiration Date:   11/8/2022  Extended Plan of Care:  n/a  Progress Note: 11/7/2022  Visits Cancelled: 0  Visits No Show: 0    Time In: 10:30 AM  Time Out: 11:10 AM  Total Billable Time: 40 minutes    Precautions: Standard   Subjective:   Patient reports: Patient reports no deficits with eating or swallowing as long as he goes slow, eats small bites, and alternates liquids and solids. Patient states he is still being cautious and knows his limits. Patient makes sure to be extra cautious with crunchy and tough foods. Patient states it is becoming easier to eat bread. Patient still has some decreased taste (e.g. patient states soda does not taste the same). Patient thinks his overall physical strength and endurance has improved significantly. Patient states the wound from where his PEG tube was is healing well.     He was compliant to home exercise program.   Response to previous treatment: Tolerated well   Pain Scale:  0/10 on a Visual Analog Scale currently.   Pain Location: n/a  Objective:   UNTIMED  Procedure Min.   Swallowing and Oral Function Evaluation 40   Total Timed Units: 0  Total Untimed Units: 1  Charges Billed/Number of units: 1/1    Short Term Goals: (4 weeks) Current Progress:   1. Pt will complete isometric chin tuck against resistance (CTAR) OR Shaker with 60 second hold across 5 sets to improve hyolaryngeal elevation, excursion, airway protection and UES  opening.    Progressing/ Not Met 10/26/2022   X3 rep 60 second hold    Isometric 2 second hold 3 set of x30   2. Pt will complete mendelsohn maneuver with 2 second hold 5-10x across 3-5 sets to improve duration of hyolaryngeal lift and UES opening hyolaryngeal elevation, excursion, airway protection and UES opening.     Met 10/17/2022 X30 (3 set of 10)            Met 3/3   3. Pt will tolerate trials of food hierarchy level 1 (ice chips) with 2 or less signs of aspiration/expectoration per every 10 swallows completed.    Met 9/14/2022 X10 with 0 s/s of aspiration           Met x3/3 sessions   4. Pt will tolerate trials of food hierarchy level 2 (5ml nectar) with 2 or less signs of aspiration/expectoration per every 10 swallows completed.    Met 9/14/2022 X10 with 0 s/s of aspiration           Met x3/3 sessions   5. Pt will tolerate trials of food hierarchy level 3 (10 ml nectar) with 2 or less signs of aspiration/expectoration per every 10 swallows completed.    Met 9/14/2022 X10 with 0 s/s of aspiration          Met x3/3 session   6. Pt will tolerate trials of food hierarchy level 4 (5ml thin) with 2 or less signs of aspiration/expectoration per every 10 swallows completed.    Met 9/23/2022 X10 with 0 s/s of aspiration           Met 3/3   7. Pt will tolerate trials of food hierarchy level 5 (10ml thin) with 2 or less signs of aspiration/expectoration per every 10 swallows completed.    Met 9/26/2022 X10 with 0 s/s of aspiration          Met 3/3   8. Pt will tolerate trials of food hierarchy level 6 (5ml puree) with 2 or less signs of aspiration/expectoration per every 10 swallows completed.     Met 9/26/2022 X10 with 0 s/s of aspiration          Met 3/3   9. Pt will tolerate trials of food hierarchy level 7 (10ml puree) with 2 or less signs of aspiration/expectoration per every 10 swallows completed.     Met 9/26/2022 X10 with 0 s/s of aspiration          Met 3/3   10. Pt will tolerate trials of food hierarchy level  8 (soft masticated) with 2 or less signs of aspiration/expectoration per every 10 swallows completed.     Met 9/26/2022 X10 with 0 s/s of aspiration          Met 3/3   11. Pt will tolerate trials of food hierarchy level 8 (Hard masticated) with 2 or less signs of aspiration per every 10 swallows completed.    Met 9/28/2022 X20 with 0 s/s of aspiration (marc doone)      Met 3/3   12. Patient will independently demonstrate adequate use of effortful swallow technique to improve overall swallow strength, safety and efficiency 60x per session.      Met 10/17/2022 X60 (3 sets of 20)          Met 3/3   13. Patient will complete an updated Modified Barium Swallow Study to determine current swallow function.    Met 10/25/2022 Completed on 10/25/2022       Patient Education/Response:   Provided education:  Reviewed patient's modified barium swallow study results from yesterday.  Reviewed aspiration precautions  Reviewed importance of oral hygiene  Reviewed compensatory strategies: small bites, slow, alternating liquids and solids, effortful swallow, swallowing multiple times on a bolus as needed.   Reviewed importance of continuing swallowing exercises.     Home program established: yes-Encouraged to complete x30 effortful swallows, x30 mendelsohn, 3 1-minute hold CTAR every day.  Encouraged IDDSI 7 diet level with thin liquids.   Exercises were reviewed and Matteo was able to demonstrate them prior to the end of the session.  Matteo demonstrated good  understanding of the education provided.     See Electronic Medical Record under Patient Instructions for exercises provided throughout therapy.  Assessment:   Matteo is progressing well towards his goals. Patient very compliant with tasks within therapy and home exercise program. Patient completed x20 effortful swallows, x20 mendelsohn, x3 1-minute CTAR hold, x3 30-immediate release during today's session. Patient reports no deficits with his current diet, IDDSI 7 diet with  regular easy to chew. Patient is eating regular food with thin liquids, avoiding crunchy and rough foods for the most part. Patient exhibits good recall of swallowing precautions, s/s aspiration, and his limits with swallowing. Patient exhibits good safety awareness and impulse control when eating/drinking. Patient is able to independently complete swallowing exercises. Recommend discharge. Current goals remain appropriate. Goals to be updated as necessary.     Patient prognosis is Good. Patient will continue to benefit from skilled outpatient speech and language therapy to address the deficits listed in the problem list on initial evaluation, provide patient/family education and to maximize patient's level of independence in the home and community environment.   Medical necessity is demonstrated by the following IMPAIRMENTS:  Decreased safety and efficiency of swallowing function on PO intake without overt s/s of aspiration for the highest diet level.    Barriers to Therapy: none identified  Patient's spiritual, cultural and educational needs considered and patient agreeable to plan of care and goals.  Plan:   Continue Plan of Care 1x/week with focus on swallow function.     Sade Garcia CCC-SLP   10/26/2022

## 2022-10-26 NOTE — TELEPHONE ENCOUNTER
Attempted to contact pt to notify him that the metoprolol comes from his cardiologist and that he has a rx for 90 day supply with 3 refills. No answer. LM

## 2022-10-26 NOTE — PLAN OF CARE
Outpatient Therapy Discharge Summary   Discharge Date: 10/26/2022   Name: Matteo Fraire  Clinic Number: [id number]  Therapy Diagnosis:   Encounter Diagnosis   Name Primary?    Dysphagia, unspecified type Yes     Physician: Susanne Wills MD  Physician Orders: Ambulatory referral to speech therapy  Medical Diagnosis:  I63.9 (ICD-10-CM) - CVA (cerebral vascular accident)  Evaluation Date: 8/16/2022    Date of Last visit: 10/26/2022  Total Visits Received: 13  Cancelled Visits: 0  No Show Visits: 0    Assessment    Assessment of Current Status:   Matteo is progressing well towards his goals. Patient very compliant with tasks within therapy and home exercise program. Patient completed x20 effortful swallows, x20 mendelsohn, x3 1-minute CTAR hold, x3 30-immediate release during today's session. Patient reports no deficits with his current diet, IDDSI 7 diet with regular easy to chew. Patient is eating regular food with thin liquids, avoiding crunchy and rough foods for the most part. Patient exhibits good recall of swallowing precautions, s/s aspiration, and his limits with swallowing. Patient exhibits good safety awareness and impulse control when eating/drinking. Patient is able to independently complete swallowing exercises. Recommend discharge.     Goals:   Short Term Goals: (4 weeks) Current Progress:   1. Pt will complete isometric chin tuck against resistance (CTAR) OR Shaker with 60 second hold across 5 sets to improve hyolaryngeal elevation, excursion, airway protection and UES opening.     Progressing/ Not Met 10/26/2022   Progressing   2. Pt will complete mendelsohn maneuver with 2 second hold 5-10x across 3-5 sets to improve duration of hyolaryngeal lift and UES opening hyolaryngeal elevation, excursion, airway protection and UES opening.      Met 10/17/2022 Met 3/3   3. Pt will tolerate trials of food hierarchy level 1 (ice chips) with 2 or less signs of aspiration/expectoration per every 10 swallows  completed.     Met 9/14/2022 Met x3/3 sessions   4. Pt will tolerate trials of food hierarchy level 2 (5ml nectar) with 2 or less signs of aspiration/expectoration per every 10 swallows completed.     Met 9/14/2022 Met x3/3 sessions   5. Pt will tolerate trials of food hierarchy level 3 (10 ml nectar) with 2 or less signs of aspiration/expectoration per every 10 swallows completed.     Met 9/14/2022 Met x3/3 session   6. Pt will tolerate trials of food hierarchy level 4 (5ml thin) with 2 or less signs of aspiration/expectoration per every 10 swallows completed.     Met 9/23/2022 Met 3/3   7. Pt will tolerate trials of food hierarchy level 5 (10ml thin) with 2 or less signs of aspiration/expectoration per every 10 swallows completed.     Met 9/26/2022 Met 3/3   8. Pt will tolerate trials of food hierarchy level 6 (5ml puree) with 2 or less signs of aspiration/expectoration per every 10 swallows completed.      Met 9/26/2022 Met 3/3   9. Pt will tolerate trials of food hierarchy level 7 (10ml puree) with 2 or less signs of aspiration/expectoration per every 10 swallows completed.      Met 9/26/2022 Met 3/3   10. Pt will tolerate trials of food hierarchy level 8 (soft masticated) with 2 or less signs of aspiration/expectoration per every 10 swallows completed.      Met 9/26/2022 Met 3/3   11. Pt will tolerate trials of food hierarchy level 8 (Hard masticated) with 2 or less signs of aspiration per every 10 swallows completed.     Met 9/28/2022 Met 3/3   12. Patient will independently demonstrate adequate use of effortful swallow technique to improve overall swallow strength, safety and efficiency 60x per session.       Met 10/17/2022 Met 3/3   13. Patient will complete an updated Modified Barium Swallow Study to determine current swallow function.     Met 10/25/2022 Completed on 10/25/2022       Long Term Goals:  1. Patient will increase oral intake to 25% of daily needs without acute dysphagia pulmonary complication.  - MET     Discharge reason: Patient has met all of his/her goals and is independent with completing swallowing exercises.     Plan   This patient is discharged from Speech Therapy    Sade Garcia CCC-SLP   10/26/2022

## 2022-11-21 PROBLEM — I63.9 CVA (CEREBRAL VASCULAR ACCIDENT): Status: RESOLVED | Noted: 2022-08-19 | Resolved: 2022-11-21

## 2022-12-09 LAB — PSA SERPL-MCNC: 0.2 NG/ML (ref 0–4)

## 2022-12-13 ENCOUNTER — OFFICE VISIT (OUTPATIENT)
Dept: FAMILY MEDICINE | Facility: CLINIC | Age: 62
End: 2022-12-13
Payer: COMMERCIAL

## 2022-12-13 VITALS
OXYGEN SATURATION: 99 % | DIASTOLIC BLOOD PRESSURE: 66 MMHG | RESPIRATION RATE: 18 BRPM | HEIGHT: 69 IN | WEIGHT: 207.19 LBS | TEMPERATURE: 98 F | SYSTOLIC BLOOD PRESSURE: 122 MMHG | HEART RATE: 60 BPM | BODY MASS INDEX: 30.69 KG/M2

## 2022-12-13 DIAGNOSIS — L40.9 GENERALIZED PSORIASIS: Primary | ICD-10-CM

## 2022-12-13 DIAGNOSIS — R73.03 PREDIABETES: ICD-10-CM

## 2022-12-13 DIAGNOSIS — E78.5 HYPERLIPIDEMIA, UNSPECIFIED HYPERLIPIDEMIA TYPE: ICD-10-CM

## 2022-12-13 PROCEDURE — 99213 OFFICE O/P EST LOW 20 MIN: CPT | Mod: S$GLB,,, | Performed by: NURSE PRACTITIONER

## 2022-12-13 PROCEDURE — 3044F HG A1C LEVEL LT 7.0%: CPT | Mod: CPTII,S$GLB,, | Performed by: NURSE PRACTITIONER

## 2022-12-13 PROCEDURE — 3008F BODY MASS INDEX DOCD: CPT | Mod: CPTII,S$GLB,, | Performed by: NURSE PRACTITIONER

## 2022-12-13 PROCEDURE — 3074F PR MOST RECENT SYSTOLIC BLOOD PRESSURE < 130 MM HG: ICD-10-PCS | Mod: CPTII,S$GLB,, | Performed by: NURSE PRACTITIONER

## 2022-12-13 PROCEDURE — 1159F MED LIST DOCD IN RCRD: CPT | Mod: CPTII,S$GLB,, | Performed by: NURSE PRACTITIONER

## 2022-12-13 PROCEDURE — 3078F PR MOST RECENT DIASTOLIC BLOOD PRESSURE < 80 MM HG: ICD-10-PCS | Mod: CPTII,S$GLB,, | Performed by: NURSE PRACTITIONER

## 2022-12-13 PROCEDURE — 3078F DIAST BP <80 MM HG: CPT | Mod: CPTII,S$GLB,, | Performed by: NURSE PRACTITIONER

## 2022-12-13 PROCEDURE — 99213 PR OFFICE/OUTPT VISIT, EST, LEVL III, 20-29 MIN: ICD-10-PCS | Mod: S$GLB,,, | Performed by: NURSE PRACTITIONER

## 2022-12-13 PROCEDURE — 3074F SYST BP LT 130 MM HG: CPT | Mod: CPTII,S$GLB,, | Performed by: NURSE PRACTITIONER

## 2022-12-13 PROCEDURE — 3008F PR BODY MASS INDEX (BMI) DOCUMENTED: ICD-10-PCS | Mod: CPTII,S$GLB,, | Performed by: NURSE PRACTITIONER

## 2022-12-13 PROCEDURE — 3044F PR MOST RECENT HEMOGLOBIN A1C LEVEL <7.0%: ICD-10-PCS | Mod: CPTII,S$GLB,, | Performed by: NURSE PRACTITIONER

## 2022-12-13 PROCEDURE — 1159F PR MEDICATION LIST DOCUMENTED IN MEDICAL RECORD: ICD-10-PCS | Mod: CPTII,S$GLB,, | Performed by: NURSE PRACTITIONER

## 2022-12-13 RX ORDER — MIDODRINE HYDROCHLORIDE 10 MG/1
10 TABLET ORAL 2 TIMES DAILY
Qty: 180 TABLET | Refills: 1 | Status: SHIPPED | OUTPATIENT
Start: 2022-12-13 | End: 2022-12-13 | Stop reason: SDUPTHER

## 2022-12-13 RX ORDER — METFORMIN HYDROCHLORIDE 500 MG/1
500 TABLET ORAL 2 TIMES DAILY WITH MEALS
Qty: 180 TABLET | Refills: 1 | Status: SHIPPED | OUTPATIENT
Start: 2022-12-13 | End: 2022-12-13 | Stop reason: SDUPTHER

## 2022-12-13 RX ORDER — MIDODRINE HYDROCHLORIDE 10 MG/1
10 TABLET ORAL 2 TIMES DAILY
Qty: 180 TABLET | Refills: 1 | Status: SHIPPED | OUTPATIENT
Start: 2022-12-13 | End: 2023-06-14 | Stop reason: SDUPTHER

## 2022-12-13 RX ORDER — METFORMIN HYDROCHLORIDE 500 MG/1
500 TABLET ORAL 2 TIMES DAILY WITH MEALS
Qty: 180 TABLET | Refills: 1 | Status: SHIPPED | OUTPATIENT
Start: 2022-12-13 | End: 2023-06-14 | Stop reason: SDUPTHER

## 2022-12-13 RX ORDER — ATORVASTATIN CALCIUM 80 MG/1
80 TABLET, FILM COATED ORAL DAILY
Qty: 90 TABLET | Refills: 1 | Status: SHIPPED | OUTPATIENT
Start: 2022-12-13 | End: 2023-06-14 | Stop reason: SDUPTHER

## 2022-12-13 NOTE — PROGRESS NOTES
Patient ID: Matteo Fraire is a 62 y.o. male.    Chief Complaint: Hypertension (3mo f/u), psa results (Had blood work done last week), and Medication Refill (Midodrine, metformin, atorvastatin, amiodarone )    Mr. Fraire presents today for evaluation of chronic comorbidities and medication refills. He is doing well at this time as he is now back to his pre stroke baseline and has resumed nourishment by mouth. He has also regained mobility and is able to do a lot of yard work and upkeep of property that he was doing prior to stroke. His blood pressure has been under control and he denies any other issues or complaints.     Hypertension  This is a chronic problem. The current episode started more than 1 year ago. The problem has been resolved since onset. The problem is controlled. Pertinent negatives include no chest pain, headaches, palpitations or shortness of breath. There are no associated agents to hypertension. Risk factors for coronary artery disease include sedentary lifestyle and male gender. Past treatments include beta blockers. The current treatment provides significant improvement. There are no compliance problems.        We reviewed overdue health maintenance. He does not wish to have vaccinations at this time but we will revisit this during the next visit.        Past Medical History:   Diagnosis Date    Heart attack 2005;2011    Hypertension     Psoriasis     Stroke      Past Surgical History:   Procedure Laterality Date    CORONARY ANGIOGRAPHY INCLUDING BYPASS GRAFTS WITH CATHETERIZATION OF LEFT HEART Left 6/27/2022    Procedure: Left heart cath;  Surgeon: tSevan Powell MD;  Location: [facility] CATH/EP LAB;  Service: Cardiology;  Laterality: Left;    CORONARY ARTERY BYPASS GRAFT      CYSTOSCOPY W/ URETERAL STENT PLACEMENT Left 11/21/2019    Procedure: CYSTOSCOPY, WITH URETERAL STENT INSERTION;  Surgeon: Mickey Escudero MD;  Location: [facility] OR;  Service: Urology;  Laterality: Left;    ENDOSCOPIC  HARVEST OF VEIN Right 7/8/2022    Procedure: HARVEST-VEIN-ENDOVASCULAR;  Surgeon: Eyal Stone MD;  Location: [facility] OR;  Service: Cardiothoracic;  Laterality: Right;    ESOPHAGOGASTRODUODENOSCOPY N/A 7/15/2022    Procedure: EGD (ESOPHAGOGASTRODUODENOSCOPY);  Surgeon: Art Pino MD;  Location: [facility] ENDO;  Service: Endoscopy;  Laterality: N/A;    EXTRACORPOREAL SHOCK WAVE LITHOTRIPSY Left 11/21/2019    Procedure: LITHOTRIPSY, ESWL;  Surgeon: Mickey Escudero MD;  Location: [facility] OR;  Service: Urology;  Laterality: Left;    HERNIA REPAIR      Inguinal just after birth    REPEAT CORONARY ARTERY BYPASS GRAFTING N/A 7/8/2022    Procedure: CABG, REPEAT;  Surgeon: Eyal Stone MD;  Location: [facility] OR;  Service: Cardiothoracic;  Laterality: N/A;  drugs ordered 7-7  @1322      SURGICAL PROCUREMENT, ARTERY, RADIAL, FOR CABG Left 7/8/2022    Procedure: SURGICAL PROCUREMENT,ARTERY,RADIAL,FOR CABG;  Surgeon: Eyal Stone MD;  Location: [facility] OR;  Service: Cardiothoracic;  Laterality: Left;         Tobacco History:  reports that he has never smoked. He has never used smokeless tobacco.      Review of patient's allergies indicates:   Allergen Reactions    Venom-wasp Anaphylaxis       Current Outpatient Medications:     acetaminophen (TYLENOL) 325 MG tablet, 650 mg by Tube route every 6 (six) hours as needed., Disp: , Rfl:     amiodarone (PACERONE) 200 MG Tab, Take 1 tablet (200 mg total) by mouth once daily., Disp: 90 tablet, Rfl: 3    aspirin 81 MG Chew, Take 81 mg by mouth once daily., Disp: , Rfl:     melatonin (MELATIN) 3 mg tablet, Take 6 mg by mouth., Disp: , Rfl:     metoprolol tartrate (LOPRESSOR) 25 MG tablet, Take 0.5 tablets (12.5 mg total) by mouth 2 (two) times daily., Disp: 90 tablet, Rfl: 3    polyethylene glycol (GLYCOLAX) 17 gram/dose powder, Take 17 g by mouth daily as needed., Disp: , Rfl:     atorvastatin (LIPITOR) 80 MG tablet, Take 1 tablet (80 mg total) by mouth once daily.,  Disp: 90 tablet, Rfl: 1    metFORMIN (GLUCOPHAGE) 500 MG tablet, Take 1 tablet (500 mg total) by mouth 2 (two) times daily with meals., Disp: 180 tablet, Rfl: 1    midodrine (PROAMATINE) 10 MG tablet, Take 1 tablet (10 mg total) by mouth 2 (two) times a day., Disp: 180 tablet, Rfl: 1  No current facility-administered medications for this visit.    Facility-Administered Medications Ordered in Other Visits:     sodium bicarbonate 10 mEq, potassium chloride 30 mEq in cardioplegic solution (PLEGISOL) 1,000 mL, , Intravenous, Once, Eyal Stone MD    sodium bicarbonate 10 mEq, potassium chloride 70 mEq in cardioplegic solution (PLEGISOL) 1,000 mL, , Intravenous, Once, Eyal Stone MD    thromb,ux-wrfqoi-sdyrawg,s-Ca (TISSEEL) 4 mL, , Topical (Top), Once, Eyal Stone MD    Review of Systems   Constitutional:  Positive for fatigue. Negative for activity change, appetite change and fever.   HENT:  Positive for trouble swallowing (improved). Negative for congestion, dental problem, nosebleeds, postnasal drip, rhinorrhea, sinus pain, sore throat and tinnitus.    Eyes:  Negative for pain, discharge, itching and visual disturbance.   Respiratory:  Negative for cough, chest tightness, shortness of breath and wheezing.    Cardiovascular:  Negative for chest pain, palpitations and leg swelling.   Gastrointestinal:  Negative for abdominal pain, blood in stool, constipation, diarrhea, nausea and vomiting.   Endocrine: Negative for cold intolerance, heat intolerance, polydipsia, polyphagia and polyuria.   Genitourinary:  Negative for difficulty urinating, flank pain, genital sores, hematuria and urgency.   Musculoskeletal:  Positive for gait problem. Negative for arthralgias and myalgias.   Skin:  Negative for rash and wound.   Allergic/Immunologic: Negative for environmental allergies and food allergies.   Neurological:  Negative for dizziness, light-headedness and headaches.   Hematological:  Negative for  "adenopathy. Does not bruise/bleed easily.   Psychiatric/Behavioral:  Negative for behavioral problems, confusion, decreased concentration, sleep disturbance and suicidal ideas. The patient is not nervous/anxious and is not hyperactive.         Objective:      Vitals:    12/13/22 1042   BP: 122/66   Pulse: 60   Resp: 18   Temp: 98 °F (36.7 °C)   SpO2: 99%   Weight: 94 kg (207 lb 3.2 oz)   Height: 5' 9" (1.753 m)     Physical Exam  Vitals reviewed.   Constitutional:       General: He is not in acute distress.     Appearance: Normal appearance. He is normal weight. He is not ill-appearing, toxic-appearing or diaphoretic.   HENT:      Head: Normocephalic and atraumatic.      Right Ear: Tympanic membrane and external ear normal. There is no impacted cerumen.      Left Ear: Tympanic membrane and external ear normal. There is no impacted cerumen.      Nose: Nose normal. No congestion or rhinorrhea.      Mouth/Throat:      Mouth: Mucous membranes are moist.      Pharynx: Oropharynx is clear. No oropharyngeal exudate or posterior oropharyngeal erythema.   Eyes:      General: No scleral icterus.        Right eye: No discharge.         Left eye: No discharge.      Extraocular Movements: Extraocular movements intact.      Conjunctiva/sclera: Conjunctivae normal.      Pupils: Pupils are equal, round, and reactive to light.   Cardiovascular:      Rate and Rhythm: Normal rate and regular rhythm.      Pulses: Normal pulses.      Heart sounds: Normal heart sounds. No murmur heard.    No friction rub. No gallop.   Pulmonary:      Effort: Pulmonary effort is normal. No respiratory distress.      Breath sounds: Normal breath sounds. No wheezing, rhonchi or rales.   Chest:      Chest wall: No tenderness.   Abdominal:      General: Abdomen is flat. Bowel sounds are normal.      Palpations: Abdomen is soft. There is no mass.      Tenderness: There is no abdominal tenderness. There is no guarding or rebound.      Comments: Peg Tube removed " in September now well healed/   Musculoskeletal:         General: No swelling or signs of injury. Normal range of motion.      Cervical back: Normal range of motion and neck supple. No rigidity or tenderness.      Right lower leg: No edema.      Left lower leg: No edema.   Skin:     General: Skin is warm and dry.      Capillary Refill: Capillary refill takes less than 2 seconds.      Coloration: Skin is not pale.      Findings: No bruising or erythema.   Neurological:      General: No focal deficit present.      Mental Status: He is alert and oriented to person, place, and time. Mental status is at baseline.      Motor: No weakness.      Coordination: Coordination normal.      Gait: Gait normal.   Psychiatric:         Mood and Affect: Mood normal.         Behavior: Behavior normal.         Thought Content: Thought content normal.         Judgment: Judgment normal.         Assessment:       1. Generalized psoriasis    2. Hyperlipidemia, unspecified hyperlipidemia type    3. Prediabetes           Plan:       Generalized psoriasis  -     Ambulatory referral/consult to Dermatology; Future; Expected date: 12/20/2022    Hyperlipidemia, unspecified hyperlipidemia type  -     atorvastatin (LIPITOR) 80 MG tablet; Take 1 tablet (80 mg total) by mouth once daily.  Dispense: 90 tablet; Refill: 1    Prediabetes  -     Discontinue: metFORMIN (GLUCOPHAGE) 500 MG tablet; Take 1 tablet (500 mg total) by mouth 2 (two) times daily with meals.  Dispense: 180 tablet; Refill: 1  -     Hemoglobin A1C; Future; Expected date: 12/13/2022  -     Comprehensive Metabolic Panel; Future; Expected date: 12/13/2022  -     metFORMIN (GLUCOPHAGE) 500 MG tablet; Take 1 tablet (500 mg total) by mouth 2 (two) times daily with meals.  Dispense: 180 tablet; Refill: 1    Other orders  -     Discontinue: midodrine (PROAMATINE) 10 MG tablet; Take 1 tablet (10 mg total) by mouth 2 (two) times a day.  Dispense: 180 tablet; Refill: 1  -     midodrine  (PROAMATINE) 10 MG tablet; Take 1 tablet (10 mg total) by mouth 2 (two) times a day.  Dispense: 180 tablet; Refill: 1      Follow up in about 3 months (around 3/13/2023), or if symptoms worsen or fail to improve, for Lab review/Prediabetes.        12/13/2022 Naomi Bliss, NP

## 2023-01-24 ENCOUNTER — OFFICE VISIT (OUTPATIENT)
Dept: CARDIOLOGY | Facility: CLINIC | Age: 63
End: 2023-01-24
Payer: COMMERCIAL

## 2023-01-24 VITALS
HEART RATE: 60 BPM | BODY MASS INDEX: 31.55 KG/M2 | DIASTOLIC BLOOD PRESSURE: 76 MMHG | SYSTOLIC BLOOD PRESSURE: 146 MMHG | HEIGHT: 69 IN | OXYGEN SATURATION: 99 % | WEIGHT: 213 LBS

## 2023-01-24 DIAGNOSIS — I10 HYPERTENSION, UNSPECIFIED TYPE: ICD-10-CM

## 2023-01-24 DIAGNOSIS — I25.709 CORONARY ARTERY DISEASE INVOLVING CORONARY BYPASS GRAFT OF NATIVE HEART WITH ANGINA PECTORIS: Primary | ICD-10-CM

## 2023-01-24 DIAGNOSIS — Z95.1 S/P CABG (CORONARY ARTERY BYPASS GRAFT): ICD-10-CM

## 2023-01-24 DIAGNOSIS — Z86.73 HISTORY OF STROKE: ICD-10-CM

## 2023-01-24 DIAGNOSIS — L40.9 PSORIASIS: ICD-10-CM

## 2023-01-24 DIAGNOSIS — I47.29 NSVT (NONSUSTAINED VENTRICULAR TACHYCARDIA): ICD-10-CM

## 2023-01-24 DIAGNOSIS — E78.5 HYPERLIPIDEMIA, UNSPECIFIED HYPERLIPIDEMIA TYPE: ICD-10-CM

## 2023-01-24 PROCEDURE — 1159F PR MEDICATION LIST DOCUMENTED IN MEDICAL RECORD: ICD-10-PCS | Mod: CPTII,S$GLB,, | Performed by: NURSE PRACTITIONER

## 2023-01-24 PROCEDURE — 3077F SYST BP >= 140 MM HG: CPT | Mod: CPTII,S$GLB,, | Performed by: NURSE PRACTITIONER

## 2023-01-24 PROCEDURE — 3077F PR MOST RECENT SYSTOLIC BLOOD PRESSURE >= 140 MM HG: ICD-10-PCS | Mod: CPTII,S$GLB,, | Performed by: NURSE PRACTITIONER

## 2023-01-24 PROCEDURE — 1159F MED LIST DOCD IN RCRD: CPT | Mod: CPTII,S$GLB,, | Performed by: NURSE PRACTITIONER

## 2023-01-24 PROCEDURE — 99999 PR PBB SHADOW E&M-EST. PATIENT-LVL V: CPT | Mod: PBBFAC,,, | Performed by: NURSE PRACTITIONER

## 2023-01-24 PROCEDURE — 3008F BODY MASS INDEX DOCD: CPT | Mod: CPTII,S$GLB,, | Performed by: NURSE PRACTITIONER

## 2023-01-24 PROCEDURE — 3008F PR BODY MASS INDEX (BMI) DOCUMENTED: ICD-10-PCS | Mod: CPTII,S$GLB,, | Performed by: NURSE PRACTITIONER

## 2023-01-24 PROCEDURE — 3078F PR MOST RECENT DIASTOLIC BLOOD PRESSURE < 80 MM HG: ICD-10-PCS | Mod: CPTII,S$GLB,, | Performed by: NURSE PRACTITIONER

## 2023-01-24 PROCEDURE — 3078F DIAST BP <80 MM HG: CPT | Mod: CPTII,S$GLB,, | Performed by: NURSE PRACTITIONER

## 2023-01-24 PROCEDURE — 99999 PR PBB SHADOW E&M-EST. PATIENT-LVL V: ICD-10-PCS | Mod: PBBFAC,,, | Performed by: NURSE PRACTITIONER

## 2023-01-24 PROCEDURE — 99214 PR OFFICE/OUTPT VISIT, EST, LEVL IV, 30-39 MIN: ICD-10-PCS | Mod: S$GLB,,, | Performed by: NURSE PRACTITIONER

## 2023-01-24 PROCEDURE — 99214 OFFICE O/P EST MOD 30 MIN: CPT | Mod: S$GLB,,, | Performed by: NURSE PRACTITIONER

## 2023-01-24 NOTE — ASSESSMENT & PLAN NOTE
BP has been controlled on current regimen. It is slightly higher at today's visit. He states it has been 120/70s. Continue current regimen.

## 2023-01-24 NOTE — ASSESSMENT & PLAN NOTE
Continue low fat low cholesterol diet and regular exercise.   Continue risk factor management.   Goal for LDL is less than 70.

## 2023-01-24 NOTE — PROGRESS NOTES
Subjective:    Patient ID:  Matteo Fraire is a 62 y.o. male   Chief Complaint   Patient presents with    Follow-up       HPI:    Patient presents today for follow-up appointment.  He reports recently having a flare of psoriasis.  He has been referred to Dermatology but has not heard back from them.  He denies any chest discomfort or shortness of breath.  He does have some abnormal sensation after having the stroke.  He has not seen neurology.    Review of patient's allergies indicates:   Allergen Reactions    Venom-wasp Anaphylaxis       Past Medical History:   Diagnosis Date    Heart attack 2005;2011    Hypertension     Psoriasis     Stroke      Past Surgical History:   Procedure Laterality Date    CORONARY ANGIOGRAPHY INCLUDING BYPASS GRAFTS WITH CATHETERIZATION OF LEFT HEART Left 6/27/2022    Procedure: Left heart cath;  Surgeon: Stevan Powell MD;  Location: [facility] CATH/EP LAB;  Service: Cardiology;  Laterality: Left;    CORONARY ARTERY BYPASS GRAFT      CYSTOSCOPY W/ URETERAL STENT PLACEMENT Left 11/21/2019    Procedure: CYSTOSCOPY, WITH URETERAL STENT INSERTION;  Surgeon: Mickey Escudero MD;  Location: [facility] OR;  Service: Urology;  Laterality: Left;    ENDOSCOPIC HARVEST OF VEIN Right 7/8/2022    Procedure: HARVEST-VEIN-ENDOVASCULAR;  Surgeon: Eyal Stone MD;  Location: [facility] OR;  Service: Cardiothoracic;  Laterality: Right;    ESOPHAGOGASTRODUODENOSCOPY N/A 7/15/2022    Procedure: EGD (ESOPHAGOGASTRODUODENOSCOPY);  Surgeon: Art Pino MD;  Location: [facility] ENDO;  Service: Endoscopy;  Laterality: N/A;    EXTRACORPOREAL SHOCK WAVE LITHOTRIPSY Left 11/21/2019    Procedure: LITHOTRIPSY, ESWL;  Surgeon: Mickey Escudero MD;  Location: [facility] OR;  Service: Urology;  Laterality: Left;    HERNIA REPAIR      Inguinal just after birth    REPEAT CORONARY ARTERY BYPASS GRAFTING N/A 7/8/2022    Procedure: CABG, REPEAT;  Surgeon: Eyal Stone MD;  Location: [facility] OR;  Service: Cardiothoracic;   Laterality: N/A;  drugs ordered 7-7  @1322      SURGICAL PROCUREMENT, ARTERY, RADIAL, FOR CABG Left 7/8/2022    Procedure: SURGICAL PROCUREMENT,ARTERY,RADIAL,FOR CABG;  Surgeon: Eyal Stone MD;  Location: [facility];  Service: Cardiothoracic;  Laterality: Left;     Social History     Tobacco Use    Smoking status: Never    Smokeless tobacco: Never   Substance Use Topics    Drug use: Never     Family History   Problem Relation Age of Onset    Hearing loss Mother     Arthritis Mother         Review of Systems:   Constitution: Negative for diaphoresis and fever.   HEENT: Negative for nosebleeds.    Cardiovascular: Negative for chest pain       No dyspnea on exertion       No leg swelling        No palpitations  Respiratory: Negative for shortness of breath and wheezing.    Hematologic/Lymphatic: Negative for bleeding problem. Does not bruise/bleed easily.   Skin: + psoriasis outbreaks  Musculoskeletal: Negative for falls and myalgias.   Gastrointestinal: Negative for hematemesis and hematochezia.   Genitourinary: Negative for hematuria.   Neurological: Negative for dizziness and light-headedness.   Psychiatric/Behavioral: Negative for altered mental status and memory loss.          Objective:        Vitals:    01/24/23 1123   BP: (!) 146/76   Pulse: 60       Lab Results   Component Value Date    WBC 7.7 09/08/2022    HGB 13.5 09/08/2022    HCT 42.7 09/08/2022     09/08/2022    CHOL 99 (L) 09/08/2022    TRIG 92 09/08/2022    HDL 30 (L) 09/08/2022    ALT 48 (H) 09/08/2022    AST 34 09/08/2022     09/08/2022    K 4.5 09/08/2022     09/08/2022    CREATININE 0.96 09/08/2022    BUN 23 09/08/2022    CO2 23 09/08/2022    TSH 3.44 01/20/2011    PSA 0.44 11/20/2019    INR 1.1 06/27/2022    HGBA1C 6.1 06/28/2022        ECHOCARDIOGRAM RESULTS  Results for orders placed during the hospital encounter of 06/27/22    Transesophageal echo (ASHLEY)    Interpretation Summary  · The left ventricle is normal in size  with normal systolic function.  · The estimated ejection fraction is 60%.  · Normal left ventricular diastolic function.  · Normal right ventricular size with normal right ventricular systolic function.  · Mild left atrial enlargement.  · No interatrial septal defect present.  · Normal appearing left atrial appendage. No thrombus is present in the appendage. JOSEPHINE occluder is absent. Normal appendage velocities.  · Agitated saline contrast study did not show any intracardiac shunt  · There is no cardiac source of emboli.        CURRENT/PREVIOUS VISIT EKG  Results for orders placed or performed during the hospital encounter of 06/27/22   EKG 12-lead    Collection Time: 07/09/22 12:41 AM    Narrative    Test Reason : I21.3,    Vent. Rate : 088 BPM     Atrial Rate : 088 BPM     P-R Int : 232 ms          QRS Dur : 094 ms      QT Int : 384 ms       P-R-T Axes : 043 101 132 degrees     QTc Int : 464 ms    Sinus rhythm with 1st degree A-V block with occasional Premature  ventricular complexes  Possible Inferior infarct ,age undetermined  Anterolateral infarct (cited on or before 27-JUN-2022)  Abnormal ECG  When compared with ECG of 08-JUL-2022 16:38,  Borderline criteria for Inferior infarct are now Present  Serial changes of Anterior infarct Present  Confirmed by Vincent Mann MD ([id number]) on 7/15/2022 12:55:05 PM    Referred By: ADEBAYO   SELF           Confirmed By:Vincent Mann MD     No valid procedures specified.   No results found for this or any previous visit.      Physical Exam:  CONSTITUTIONAL: No fever, no chills  HEENT: Normocephalic, atraumatic,pupils reactive to light                 NECK:  No JVD no carotid bruit  CVS: S1S2+, RRR  LUNGS: Clear  ABDOMEN: Soft, NT, BS+  EXTREMITIES: No cyanosis, edema  : No lee catheter  NEURO: AAO X 3  PSY: Normal affect      Medication List with Changes/Refills   Current Medications    AMIODARONE (PACERONE) 200 MG TAB    Take 1 tablet (200 mg total) by mouth once daily.     ASPIRIN 81 MG CHEW    Take 81 mg by mouth once daily.    ATORVASTATIN (LIPITOR) 80 MG TABLET    Take 1 tablet (80 mg total) by mouth once daily.    METFORMIN (GLUCOPHAGE) 500 MG TABLET    Take 1 tablet (500 mg total) by mouth 2 (two) times daily with meals.    METOPROLOL TARTRATE (LOPRESSOR) 25 MG TABLET    Take 0.5 tablets (12.5 mg total) by mouth 2 (two) times daily.    MIDODRINE (PROAMATINE) 10 MG TABLET    Take 1 tablet (10 mg total) by mouth 2 (two) times a day.   Discontinued Medications    ACETAMINOPHEN (TYLENOL) 325 MG TABLET    650 mg by Tube route every 6 (six) hours as needed.    MELATONIN (MELATIN) 3 MG TABLET    Take 6 mg by mouth.    POLYETHYLENE GLYCOL (GLYCOLAX) 17 GRAM/DOSE POWDER    Take 17 g by mouth daily as needed.             Assessment:       1. Coronary artery disease involving coronary bypass graft of native heart with angina pectoris    2. Hyperlipidemia, unspecified hyperlipidemia type    3. Hypertension, unspecified type    4. NSVT (nonsustained ventricular tachycardia)    5. S/P CABG (coronary artery bypass graft)    6. Psoriasis    7. History of stroke         Plan:     Problem List Items Addressed This Visit          Unprioritized    Coronary artery disease involving coronary bypass graft of native heart with angina pectoris - Primary    Current Assessment & Plan     Continue low fat low cholesterol diet and regular exercise.   Continue risk factor management.   Goal for LDL is less than 70.          NSVT (nonsustained ventricular tachycardia)    Current Assessment & Plan     Continue amiodarone 200 mg po daily.          S/P CABG (coronary artery bypass graft)    Hypertension    Current Assessment & Plan     BP has been controlled on current regimen. It is slightly higher at today's visit. He states it has been 120/70s. Continue current regimen.         Hyperlipidemia    History of stroke    Current Assessment & Plan     Refer to neuro.          Relevant Orders    Ambulatory  referral/consult to Neurology     Other Visit Diagnoses       Psoriasis        Relevant Orders    Ambulatory referral/consult to Dermatology            Follow up in about 6 months (around 7/24/2023).

## 2023-02-15 ENCOUNTER — TELEPHONE (OUTPATIENT)
Dept: NEUROLOGY | Facility: CLINIC | Age: 63
End: 2023-02-15
Payer: COMMERCIAL

## 2023-02-15 NOTE — TELEPHONE ENCOUNTER
Left message to call in regards to scheduling an appt for  hx of CVA on both home and cell numbers.

## 2023-02-16 ENCOUNTER — TELEPHONE (OUTPATIENT)
Dept: NEUROLOGY | Facility: CLINIC | Age: 63
End: 2023-02-16
Payer: COMMERCIAL

## 2023-03-03 NOTE — PROGRESS NOTES
Due colonoscopy. KM  Due shingles. KM  Pt states he received T-dap inj at Urgent Care times 10 days ago. NICOLE

## 2023-03-10 LAB
ALBUMIN SERPL-MCNC: 4.2 G/DL (ref 3.8–4.8)
ALBUMIN/GLOB SERPL: 2.1 {RATIO} (ref 1.2–2.2)
ALP SERPL-CCNC: 68 IU/L (ref 44–121)
ALT SERPL-CCNC: 26 IU/L (ref 0–44)
AST SERPL-CCNC: 20 IU/L (ref 0–40)
BILIRUB SERPL-MCNC: 0.6 MG/DL (ref 0–1.2)
BUN SERPL-MCNC: 20 MG/DL (ref 8–27)
BUN/CREAT SERPL: 16 (ref 10–24)
CALCIUM SERPL-MCNC: 9.5 MG/DL (ref 8.6–10.2)
CHLORIDE SERPL-SCNC: 102 MMOL/L (ref 96–106)
CO2 SERPL-SCNC: 23 MMOL/L (ref 20–29)
CREAT SERPL-MCNC: 1.26 MG/DL (ref 0.76–1.27)
EST. GFR  (NO RACE VARIABLE): 64 ML/MIN/1.73
GLOBULIN SER CALC-MCNC: 2 G/DL (ref 1.5–4.5)
GLUCOSE SERPL-MCNC: 111 MG/DL (ref 70–99)
HBA1C MFR BLD: 5.8 % (ref 4.8–5.6)
POTASSIUM SERPL-SCNC: 4.7 MMOL/L (ref 3.5–5.2)
PROT SERPL-MCNC: 6.2 G/DL (ref 6–8.5)
SODIUM SERPL-SCNC: 139 MMOL/L (ref 134–144)

## 2023-03-13 ENCOUNTER — OFFICE VISIT (OUTPATIENT)
Dept: FAMILY MEDICINE | Facility: CLINIC | Age: 63
End: 2023-03-13
Payer: COMMERCIAL

## 2023-03-13 VITALS
BODY MASS INDEX: 30.83 KG/M2 | HEART RATE: 80 BPM | SYSTOLIC BLOOD PRESSURE: 136 MMHG | DIASTOLIC BLOOD PRESSURE: 74 MMHG | WEIGHT: 208.13 LBS | TEMPERATURE: 98 F | RESPIRATION RATE: 20 BRPM | HEIGHT: 69 IN

## 2023-03-13 DIAGNOSIS — I25.709 CORONARY ARTERY DISEASE INVOLVING CORONARY BYPASS GRAFT OF NATIVE HEART WITH ANGINA PECTORIS: ICD-10-CM

## 2023-03-13 DIAGNOSIS — L40.9 GENERALIZED PSORIASIS: ICD-10-CM

## 2023-03-13 DIAGNOSIS — Z48.02 VISIT FOR SUTURE REMOVAL: ICD-10-CM

## 2023-03-13 DIAGNOSIS — Z12.11 COLON CANCER SCREENING: ICD-10-CM

## 2023-03-13 DIAGNOSIS — R73.03 PREDIABETES: Primary | ICD-10-CM

## 2023-03-13 PROCEDURE — 3078F DIAST BP <80 MM HG: CPT | Mod: CPTII,S$GLB,, | Performed by: NURSE PRACTITIONER

## 2023-03-13 PROCEDURE — 3075F PR MOST RECENT SYSTOLIC BLOOD PRESS GE 130-139MM HG: ICD-10-PCS | Mod: CPTII,S$GLB,, | Performed by: NURSE PRACTITIONER

## 2023-03-13 PROCEDURE — 3044F HG A1C LEVEL LT 7.0%: CPT | Mod: CPTII,S$GLB,, | Performed by: NURSE PRACTITIONER

## 2023-03-13 PROCEDURE — 3075F SYST BP GE 130 - 139MM HG: CPT | Mod: CPTII,S$GLB,, | Performed by: NURSE PRACTITIONER

## 2023-03-13 PROCEDURE — 3008F PR BODY MASS INDEX (BMI) DOCUMENTED: ICD-10-PCS | Mod: CPTII,S$GLB,, | Performed by: NURSE PRACTITIONER

## 2023-03-13 PROCEDURE — 1159F MED LIST DOCD IN RCRD: CPT | Mod: CPTII,S$GLB,, | Performed by: NURSE PRACTITIONER

## 2023-03-13 PROCEDURE — 99214 OFFICE O/P EST MOD 30 MIN: CPT | Mod: S$GLB,,, | Performed by: NURSE PRACTITIONER

## 2023-03-13 PROCEDURE — 1159F PR MEDICATION LIST DOCUMENTED IN MEDICAL RECORD: ICD-10-PCS | Mod: CPTII,S$GLB,, | Performed by: NURSE PRACTITIONER

## 2023-03-13 PROCEDURE — 3078F PR MOST RECENT DIASTOLIC BLOOD PRESSURE < 80 MM HG: ICD-10-PCS | Mod: CPTII,S$GLB,, | Performed by: NURSE PRACTITIONER

## 2023-03-13 PROCEDURE — 3044F PR MOST RECENT HEMOGLOBIN A1C LEVEL <7.0%: ICD-10-PCS | Mod: CPTII,S$GLB,, | Performed by: NURSE PRACTITIONER

## 2023-03-13 PROCEDURE — 3008F BODY MASS INDEX DOCD: CPT | Mod: CPTII,S$GLB,, | Performed by: NURSE PRACTITIONER

## 2023-03-13 PROCEDURE — 99214 PR OFFICE/OUTPT VISIT, EST, LEVL IV, 30-39 MIN: ICD-10-PCS | Mod: S$GLB,,, | Performed by: NURSE PRACTITIONER

## 2023-03-13 RX ORDER — AMIODARONE HYDROCHLORIDE 200 MG/1
200 TABLET ORAL DAILY
Qty: 90 TABLET | Refills: 3 | Status: SHIPPED | OUTPATIENT
Start: 2023-03-13 | End: 2023-09-14 | Stop reason: SDUPTHER

## 2023-03-13 RX ORDER — MUPIROCIN 20 MG/G
OINTMENT TOPICAL 2 TIMES DAILY
COMMUNITY
Start: 2023-03-02 | End: 2023-06-14 | Stop reason: ALTCHOICE

## 2023-03-13 NOTE — PROGRESS NOTES
Patient ID: Matteo Fraire is a 62 y.o. male.    Chief Complaint: Follow-up (61 yo male here for follow up. Pt states he was seen at ED times 10 days ago due laceration on the arch of right foot. Pt did receive sutures and may need to be removed.)    Mr. Fraire presents today for 3 month follow up for several comorbidities. He is also states he cut his foot and is in need of suture removal. He will have follow up with Neurology in the coming weeks and recently had follow up with Cardiology. He does not need refills at this time. Blood work was also reviewed in great detail. He denies any major interval changes or issues at this time. He is requesting new referral to dermatology to evaluate his psoriasis.     Suture / Staple Removal  The sutures were placed 7 to 10 days ago. He tried antibiotic ointment use since the wound repair. The treatment provided moderate relief. His temperature was unmeasured prior to arrival. There has been clear discharge from the wound. There is no redness present. The swelling has improved. There is no pain present. There is difficulty moving the extremity or digit due to pain.         Past Medical History:   Diagnosis Date    Heart attack 2005;2011    Hypertension     Psoriasis     Stroke      Past Surgical History:   Procedure Laterality Date    CORONARY ANGIOGRAPHY INCLUDING BYPASS GRAFTS WITH CATHETERIZATION OF LEFT HEART Left 6/27/2022    Procedure: Left heart cath;  Surgeon: Stevan Powell MD;  Location: [facility] CATH/EP LAB;  Service: Cardiology;  Laterality: Left;    CORONARY ARTERY BYPASS GRAFT      CYSTOSCOPY W/ URETERAL STENT PLACEMENT Left 11/21/2019    Procedure: CYSTOSCOPY, WITH URETERAL STENT INSERTION;  Surgeon: Mickey Escudero MD;  Location: [facility] OR;  Service: Urology;  Laterality: Left;    ENDOSCOPIC HARVEST OF VEIN Right 7/8/2022    Procedure: HARVEST-VEIN-ENDOVASCULAR;  Surgeon: Eyal Stone MD;  Location: [facility] OR;  Service: Cardiothoracic;  Laterality:  Right;    ESOPHAGOGASTRODUODENOSCOPY N/A 7/15/2022    Procedure: EGD (ESOPHAGOGASTRODUODENOSCOPY);  Surgeon: Art Pino MD;  Location: [facility] ENDO;  Service: Endoscopy;  Laterality: N/A;    EXTRACORPOREAL SHOCK WAVE LITHOTRIPSY Left 11/21/2019    Procedure: LITHOTRIPSY, ESWL;  Surgeon: Mickey Escudero MD;  Location: [facility] OR;  Service: Urology;  Laterality: Left;    HERNIA REPAIR      Inguinal just after birth    REPEAT CORONARY ARTERY BYPASS GRAFTING N/A 7/8/2022    Procedure: CABG, REPEAT;  Surgeon: Eyal Stone MD;  Location: [facility] OR;  Service: Cardiothoracic;  Laterality: N/A;  drugs ordered 7-7  @1322      SURGICAL PROCUREMENT, ARTERY, RADIAL, FOR CABG Left 7/8/2022    Procedure: SURGICAL PROCUREMENT,ARTERY,RADIAL,FOR CABG;  Surgeon: Eyal Stone MD;  Location: [facility] OR;  Service: Cardiothoracic;  Laterality: Left;         Tobacco History:  reports that he has never smoked. He has never used smokeless tobacco.      Review of patient's allergies indicates:   Allergen Reactions    Venom-wasp Anaphylaxis       Current Outpatient Medications:     aspirin 81 MG Chew, Take 81 mg by mouth once daily., Disp: , Rfl:     atorvastatin (LIPITOR) 80 MG tablet, Take 1 tablet (80 mg total) by mouth once daily., Disp: 90 tablet, Rfl: 1    metFORMIN (GLUCOPHAGE) 500 MG tablet, Take 1 tablet (500 mg total) by mouth 2 (two) times daily with meals., Disp: 180 tablet, Rfl: 1    metoprolol tartrate (LOPRESSOR) 25 MG tablet, Take 0.5 tablets (12.5 mg total) by mouth 2 (two) times daily., Disp: 90 tablet, Rfl: 3    midodrine (PROAMATINE) 10 MG tablet, Take 1 tablet (10 mg total) by mouth 2 (two) times a day., Disp: 180 tablet, Rfl: 1    mupirocin (BACTROBAN) 2 % ointment, Apply topically 2 (two) times daily., Disp: , Rfl:     amiodarone (PACERONE) 200 MG Tab, Take 1 tablet (200 mg total) by mouth once daily., Disp: 90 tablet, Rfl: 3  No current facility-administered medications for this  visit.    Facility-Administered Medications Ordered in Other Visits:     sodium bicarbonate 10 mEq, potassium chloride 30 mEq in cardioplegic solution (PLEGISOL) 1,000 mL, , Intravenous, Once, Eyal Stone MD    sodium bicarbonate 10 mEq, potassium chloride 70 mEq in cardioplegic solution (PLEGISOL) 1,000 mL, , Intravenous, Once, Eyal Stone MD    thromb,tb-rmyydh-ywrckay,s-Ca (TISSEEL) 4 mL, , Topical (Top), Once, Eyal Stone MD    Review of Systems   Constitutional:  Positive for fatigue. Negative for activity change, appetite change and fever.   HENT:  Positive for trouble swallowing (improved). Negative for congestion, dental problem, nosebleeds, postnasal drip, rhinorrhea, sinus pain, sore throat and tinnitus.    Eyes:  Negative for pain, discharge, itching and visual disturbance.   Respiratory:  Negative for cough, chest tightness, shortness of breath and wheezing.    Cardiovascular:  Negative for chest pain, palpitations and leg swelling.   Gastrointestinal:  Negative for abdominal pain, blood in stool, constipation, diarrhea, nausea and vomiting.   Endocrine: Negative for cold intolerance, heat intolerance, polydipsia, polyphagia and polyuria.   Genitourinary:  Negative for difficulty urinating, flank pain, genital sores, hematuria and urgency.   Musculoskeletal:  Positive for gait problem. Negative for arthralgias and myalgias.   Skin:  Negative for rash and wound.   Allergic/Immunologic: Negative for environmental allergies and food allergies.   Neurological:  Negative for dizziness, light-headedness and headaches.   Hematological:  Negative for adenopathy. Does not bruise/bleed easily.   Psychiatric/Behavioral:  Negative for behavioral problems, confusion, decreased concentration, sleep disturbance and suicidal ideas. The patient is not nervous/anxious and is not hyperactive.         Objective:      Vitals:    03/13/23 0908   BP: 136/74   Pulse: 80   Resp: 20   Temp: 97.9 °F  "(36.6 °C)   TempSrc: Oral   Weight: 94.4 kg (208 lb 1.6 oz)   Height: 5' 9" (1.753 m)     Physical Exam  Vitals reviewed.   Constitutional:       General: He is not in acute distress.     Appearance: Normal appearance. He is normal weight. He is not ill-appearing, toxic-appearing or diaphoretic.   HENT:      Head: Normocephalic and atraumatic.      Right Ear: Tympanic membrane and external ear normal. There is no impacted cerumen.      Left Ear: Tympanic membrane and external ear normal. There is no impacted cerumen.      Nose: Nose normal. No congestion or rhinorrhea.      Mouth/Throat:      Mouth: Mucous membranes are moist.      Pharynx: Oropharynx is clear. No oropharyngeal exudate or posterior oropharyngeal erythema.   Eyes:      General: No scleral icterus.        Right eye: No discharge.         Left eye: No discharge.      Extraocular Movements: Extraocular movements intact.      Conjunctiva/sclera: Conjunctivae normal.      Pupils: Pupils are equal, round, and reactive to light.   Cardiovascular:      Rate and Rhythm: Normal rate and regular rhythm.      Pulses: Normal pulses.      Heart sounds: Normal heart sounds. No murmur heard.    No friction rub. No gallop.   Pulmonary:      Effort: Pulmonary effort is normal. No respiratory distress.      Breath sounds: Normal breath sounds. No wheezing, rhonchi or rales.   Chest:      Chest wall: No tenderness.   Abdominal:      General: Abdomen is flat. Bowel sounds are normal.      Palpations: Abdomen is soft. There is no mass.      Tenderness: There is no abdominal tenderness. There is no guarding or rebound.      Comments: Peg Tube removed in September now well healed/   Musculoskeletal:         General: No swelling or signs of injury. Normal range of motion.      Cervical back: Normal range of motion and neck supple. No rigidity or tenderness.      Right lower leg: No edema.      Left lower leg: No edema.        Feet:    Feet:      Comments: Area of wound " slightly edematous.  Skin:     General: Skin is warm and dry.      Capillary Refill: Capillary refill takes less than 2 seconds.      Coloration: Skin is not pale.      Findings: Laceration present. No bruising or erythema.      Comments: Laceration to foot with 6 stitches.   Neurological:      General: No focal deficit present.      Mental Status: He is alert and oriented to person, place, and time. Mental status is at baseline.      Motor: No weakness.      Coordination: Coordination normal.      Gait: Gait normal.   Psychiatric:         Mood and Affect: Mood normal.         Behavior: Behavior normal.         Thought Content: Thought content normal.         Judgment: Judgment normal.           Assessment:       1. Prediabetes    2. Coronary artery disease involving coronary bypass graft of native heart with angina pectoris    3. Visit for suture removal    4. Colon cancer screening    5. Generalized psoriasis           Plan:       Prediabetes  -     Hemoglobin A1C; Future; Expected date: 06/13/2023  -     Comprehensive Metabolic Panel; Future; Expected date: 06/13/2023    Coronary artery disease involving coronary bypass graft of native heart with angina pectoris  -     amiodarone (PACERONE) 200 MG Tab; Take 1 tablet (200 mg total) by mouth once daily.  Dispense: 90 tablet; Refill: 3    Visit for suture removal    Colon cancer screening  -     Ambulatory referral/consult to Gastroenterology; Future; Expected date: 03/20/2023    Generalized psoriasis  -     Ambulatory referral/consult to Dermatology; Future; Expected date: 03/20/2023      Follow up in about 3 months (around 6/13/2023), or if symptoms worsen or fail to improve.        3/13/2023 Naomi Bliss NP

## 2023-03-16 ENCOUNTER — OFFICE VISIT (OUTPATIENT)
Dept: FAMILY MEDICINE | Facility: CLINIC | Age: 63
End: 2023-03-16
Payer: COMMERCIAL

## 2023-03-16 VITALS
RESPIRATION RATE: 20 BRPM | WEIGHT: 207.5 LBS | HEIGHT: 69 IN | DIASTOLIC BLOOD PRESSURE: 80 MMHG | SYSTOLIC BLOOD PRESSURE: 142 MMHG | HEART RATE: 64 BPM | TEMPERATURE: 98 F | BODY MASS INDEX: 30.73 KG/M2

## 2023-03-16 DIAGNOSIS — Z48.02 VISIT FOR SUTURE REMOVAL: Primary | ICD-10-CM

## 2023-03-16 PROCEDURE — 3008F PR BODY MASS INDEX (BMI) DOCUMENTED: ICD-10-PCS | Mod: CPTII,S$GLB,, | Performed by: NURSE PRACTITIONER

## 2023-03-16 PROCEDURE — 99213 OFFICE O/P EST LOW 20 MIN: CPT | Mod: S$GLB,,, | Performed by: NURSE PRACTITIONER

## 2023-03-16 PROCEDURE — 3077F SYST BP >= 140 MM HG: CPT | Mod: CPTII,S$GLB,, | Performed by: NURSE PRACTITIONER

## 2023-03-16 PROCEDURE — 3079F DIAST BP 80-89 MM HG: CPT | Mod: CPTII,S$GLB,, | Performed by: NURSE PRACTITIONER

## 2023-03-16 PROCEDURE — 3008F BODY MASS INDEX DOCD: CPT | Mod: CPTII,S$GLB,, | Performed by: NURSE PRACTITIONER

## 2023-03-16 PROCEDURE — 99024 SUTURE REMOVAL: ICD-10-PCS | Mod: S$GLB,,, | Performed by: NURSE PRACTITIONER

## 2023-03-16 PROCEDURE — 99213 PR OFFICE/OUTPT VISIT, EST, LEVL III, 20-29 MIN: ICD-10-PCS | Mod: S$GLB,,, | Performed by: NURSE PRACTITIONER

## 2023-03-16 PROCEDURE — 99024 POSTOP FOLLOW-UP VISIT: CPT | Mod: S$GLB,,, | Performed by: NURSE PRACTITIONER

## 2023-03-16 PROCEDURE — 3077F PR MOST RECENT SYSTOLIC BLOOD PRESSURE >= 140 MM HG: ICD-10-PCS | Mod: CPTII,S$GLB,, | Performed by: NURSE PRACTITIONER

## 2023-03-16 PROCEDURE — 3044F HG A1C LEVEL LT 7.0%: CPT | Mod: CPTII,S$GLB,, | Performed by: NURSE PRACTITIONER

## 2023-03-16 PROCEDURE — 3044F PR MOST RECENT HEMOGLOBIN A1C LEVEL <7.0%: ICD-10-PCS | Mod: CPTII,S$GLB,, | Performed by: NURSE PRACTITIONER

## 2023-03-16 PROCEDURE — 3079F PR MOST RECENT DIASTOLIC BLOOD PRESSURE 80-89 MM HG: ICD-10-PCS | Mod: CPTII,S$GLB,, | Performed by: NURSE PRACTITIONER

## 2023-03-16 NOTE — PROGRESS NOTES
Patient ID: Matteo Fraire is a 62 y.o. male.    Chief Complaint: Suture / Staple Removal (63 yo male here for suture removal, No c/o. KM)    Mr. Fraire presents today to complete removal of sutures. He had one suture that needed to be removed as it was not ready to be taken out during his last visit. He states he has been well and denies any changes since his last visit.     Suture / Staple Removal  The sutures were placed 11 to 14 days ago. He tried antibiotic ointment use and regular soap and water washings since the wound repair. The treatment provided significant relief. There has been no drainage from the wound. There is no redness present. The swelling has improved. There is no pain present. He has no difficulty moving the affected extremity or digit.         Past Medical History:   Diagnosis Date    Heart attack 2005;2011    Hypertension     Psoriasis     Stroke      Past Surgical History:   Procedure Laterality Date    CORONARY ANGIOGRAPHY INCLUDING BYPASS GRAFTS WITH CATHETERIZATION OF LEFT HEART Left 6/27/2022    Procedure: Left heart cath;  Surgeon: Stevan Powell MD;  Location: [facility] CATH/EP LAB;  Service: Cardiology;  Laterality: Left;    CORONARY ARTERY BYPASS GRAFT      CYSTOSCOPY W/ URETERAL STENT PLACEMENT Left 11/21/2019    Procedure: CYSTOSCOPY, WITH URETERAL STENT INSERTION;  Surgeon: Mickey Escudero MD;  Location: [facility] OR;  Service: Urology;  Laterality: Left;    ENDOSCOPIC HARVEST OF VEIN Right 7/8/2022    Procedure: HARVEST-VEIN-ENDOVASCULAR;  Surgeon: Eyal Stone MD;  Location: [facility] OR;  Service: Cardiothoracic;  Laterality: Right;    ESOPHAGOGASTRODUODENOSCOPY N/A 7/15/2022    Procedure: EGD (ESOPHAGOGASTRODUODENOSCOPY);  Surgeon: Art Pnio MD;  Location: [facility] ENDO;  Service: Endoscopy;  Laterality: N/A;    EXTRACORPOREAL SHOCK WAVE LITHOTRIPSY Left 11/21/2019    Procedure: LITHOTRIPSY, ESWL;  Surgeon: Mickey Escudero MD;  Location: [facility] OR;  Service:  Urology;  Laterality: Left;    HERNIA REPAIR      Inguinal just after birth    REPEAT CORONARY ARTERY BYPASS GRAFTING N/A 7/8/2022    Procedure: CABG, REPEAT;  Surgeon: Eyal Stone MD;  Location: [facility];  Service: Cardiothoracic;  Laterality: N/A;  drugs ordered 7-7  @1322      SURGICAL PROCUREMENT, ARTERY, RADIAL, FOR CABG Left 7/8/2022    Procedure: SURGICAL PROCUREMENT,ARTERY,RADIAL,FOR CABG;  Surgeon: Eyal Stone MD;  Location: [facility];  Service: Cardiothoracic;  Laterality: Left;         Tobacco History:  reports that he has never smoked. He has never used smokeless tobacco.      Review of patient's allergies indicates:   Allergen Reactions    Venom-wasp Anaphylaxis       Current Outpatient Medications:     amiodarone (PACERONE) 200 MG Tab, Take 1 tablet (200 mg total) by mouth once daily., Disp: 90 tablet, Rfl: 3    aspirin 81 MG Chew, Take 81 mg by mouth once daily., Disp: , Rfl:     atorvastatin (LIPITOR) 80 MG tablet, Take 1 tablet (80 mg total) by mouth once daily., Disp: 90 tablet, Rfl: 1    metFORMIN (GLUCOPHAGE) 500 MG tablet, Take 1 tablet (500 mg total) by mouth 2 (two) times daily with meals., Disp: 180 tablet, Rfl: 1    metoprolol tartrate (LOPRESSOR) 25 MG tablet, Take 0.5 tablets (12.5 mg total) by mouth 2 (two) times daily., Disp: 90 tablet, Rfl: 3    midodrine (PROAMATINE) 10 MG tablet, Take 1 tablet (10 mg total) by mouth 2 (two) times a day., Disp: 180 tablet, Rfl: 1    mupirocin (BACTROBAN) 2 % ointment, Apply topically 2 (two) times daily., Disp: , Rfl:   No current facility-administered medications for this visit.    Facility-Administered Medications Ordered in Other Visits:     sodium bicarbonate 10 mEq, potassium chloride 30 mEq in cardioplegic solution (PLEGISOL) 1,000 mL, , Intravenous, Once, Eyal Stone MD    sodium bicarbonate 10 mEq, potassium chloride 70 mEq in cardioplegic solution (PLEGISOL) 1,000 mL, , Intravenous, Once, Eyal Stone MD     "thromb,ui-hmedud-ovzrllr,s-Ca (TISSEEL) 4 mL, , Topical (Top), Once, Eyal Stone MD    Review of Systems   Constitutional:  Negative for activity change, appetite change, fatigue and fever.   HENT:  Negative for congestion, dental problem, nosebleeds, postnasal drip, rhinorrhea, sinus pain, sore throat and tinnitus.    Eyes:  Negative for pain, discharge, itching and visual disturbance.   Respiratory:  Negative for cough, chest tightness, shortness of breath and wheezing.    Cardiovascular:  Negative for chest pain.   Gastrointestinal:  Negative for abdominal pain, blood in stool, constipation, diarrhea, nausea and vomiting.   Endocrine: Negative for cold intolerance, heat intolerance, polydipsia, polyphagia and polyuria.   Genitourinary:  Negative for difficulty urinating, flank pain, genital sores, hematuria and urgency.   Musculoskeletal:  Negative for arthralgias and myalgias.   Skin:  Negative for rash and wound.   Allergic/Immunologic: Negative for environmental allergies and food allergies.   Neurological:  Negative for dizziness, light-headedness and headaches.   Hematological:  Negative for adenopathy. Does not bruise/bleed easily.   Psychiatric/Behavioral:  Negative for behavioral problems, confusion, decreased concentration, sleep disturbance and suicidal ideas. The patient is not nervous/anxious and is not hyperactive.         Objective:      Vitals:    03/16/23 0759   BP: (!) 142/80   Pulse: 64   Resp: 20   Temp: 97.6 °F (36.4 °C)   TempSrc: Oral   Weight: 94.1 kg (207 lb 8 oz)   Height: 5' 9" (1.753 m)     Physical Exam  Vitals reviewed.   Constitutional:       General: He is not in acute distress.     Appearance: Normal appearance. He is normal weight. He is not ill-appearing, toxic-appearing or diaphoretic.   HENT:      Head: Normocephalic and atraumatic.      Right Ear: Tympanic membrane and external ear normal. There is no impacted cerumen.      Left Ear: Tympanic membrane and external ear " normal. There is no impacted cerumen.      Nose: Nose normal. No congestion or rhinorrhea.      Mouth/Throat:      Mouth: Mucous membranes are moist.      Pharynx: Oropharynx is clear. No oropharyngeal exudate or posterior oropharyngeal erythema.   Eyes:      General: No scleral icterus.        Right eye: No discharge.         Left eye: No discharge.      Extraocular Movements: Extraocular movements intact.      Conjunctiva/sclera: Conjunctivae normal.      Pupils: Pupils are equal, round, and reactive to light.   Cardiovascular:      Rate and Rhythm: Normal rate and regular rhythm.      Pulses: Normal pulses.      Heart sounds: Normal heart sounds. No murmur heard.    No friction rub. No gallop.   Pulmonary:      Effort: Pulmonary effort is normal. No respiratory distress.      Breath sounds: Normal breath sounds. No wheezing, rhonchi or rales.   Chest:      Chest wall: No tenderness.   Abdominal:      General: Abdomen is flat. Bowel sounds are normal.      Palpations: Abdomen is soft. There is no mass.      Tenderness: There is no abdominal tenderness. There is no guarding or rebound.   Musculoskeletal:         General: No swelling or signs of injury. Normal range of motion.      Cervical back: Normal range of motion and neck supple. No rigidity or tenderness.      Right lower leg: No edema.      Left lower leg: No edema.   Feet:      Right foot:      Skin integrity: Skin integrity normal.      Left foot:      Skin integrity: Skin integrity normal.   Skin:     General: Skin is warm and dry.      Capillary Refill: Capillary refill takes less than 2 seconds.      Coloration: Skin is not pale.      Findings: No bruising or erythema.   Neurological:      General: No focal deficit present.      Mental Status: He is alert and oriented to person, place, and time. Mental status is at baseline.      Motor: No weakness.      Coordination: Coordination normal.      Gait: Gait normal.   Psychiatric:         Mood and Affect:  Mood normal.         Behavior: Behavior normal.         Thought Content: Thought content normal.         Judgment: Judgment normal.         Assessment:       1. Visit for suture removal           Plan:       Visit for suture removal      Follow up if symptoms worsen or fail to improve.        3/16/2023 Naomi Bliss NP

## 2023-03-16 NOTE — PROCEDURES
Suture Removal    Date/Time: 3/16/2023 8:00 AM  Location procedure was performed: [facility] [address] [facility] / INTERNAL MEDICINE  Performed by: JARRETT Townsend  Authorized by: JARRETT Townsend   Body area: lower extremity  Location details: right foot  Wound Appearance: well healed, normal color, nontender and no drainage  Post-removal: antibiotic ointment applied  Complications: No  Estimated blood loss (mL): 0

## 2023-04-20 NOTE — Clinical Note
Date/Time: 04/20/23 1400    Scheduled providers: Tay Forte M.D.; Shiva Silva M.D.    Procedures:       EC-[facility] W/O CONT      CL-CARDIOVERSION    Diagnosis:       Atrial fibrillation with rapid ventricular response (HCC) [I48.91]      Atrial fibrillation with rapid ventricular response (HCC) [I48.91]      Atrial fibrillation with rapid ventricular response (HCC) [I48.91]    Indications: See Assoicated Dx    Location: [facility] IMAGING - ECHOCARDIOLOGY [facility]      Atrial fibrillation with RVR vs flutter, acute CHF with severely reduced EF.     Improved with diuresis and medical management.     Denies angina, dyspnea.     NPO.     Relevant Problems   CARDIAC   (positive) Atrial fibrillation/flutter with rapid ventricular response (HCC)   (positive) HTN (hypertension)       Physical Exam    Airway   Mallampati: II  TM distance: >3 FB  Neck ROM: full       Cardiovascular - normal exam  Rhythm: regular  Rate: normal  (-) murmur     Dental - normal exam           Pulmonary - normal exam  Breath sounds clear to auscultation     Abdominal    Neurological - normal exam                 Anesthesia Plan    ASA 4   ASA physical status 4 criteria: severe reduction of ejection fractions    Plan - general       Airway plan will be natural airway          Induction: intravenous    Postoperative Plan: Postoperative administration of opioids is intended.    Pertinent diagnostic labs and testing reviewed    Informed Consent:    Anesthetic plan and risks discussed with patient.    Use of blood products discussed with: patient whom consented to blood products.          The catheter was inserted into the ostial LIMA graft.

## 2023-06-14 ENCOUNTER — OFFICE VISIT (OUTPATIENT)
Dept: FAMILY MEDICINE | Facility: CLINIC | Age: 63
End: 2023-06-14
Payer: COMMERCIAL

## 2023-06-14 VITALS
SYSTOLIC BLOOD PRESSURE: 138 MMHG | HEIGHT: 69 IN | RESPIRATION RATE: 20 BRPM | HEART RATE: 60 BPM | BODY MASS INDEX: 29.23 KG/M2 | DIASTOLIC BLOOD PRESSURE: 78 MMHG | WEIGHT: 197.38 LBS | TEMPERATURE: 99 F

## 2023-06-14 DIAGNOSIS — R20.2 NUMBNESS AND TINGLING OF RIGHT ARM: ICD-10-CM

## 2023-06-14 DIAGNOSIS — R73.03 PREDIABETES: ICD-10-CM

## 2023-06-14 DIAGNOSIS — Z86.73 HISTORY OF STROKE: Primary | ICD-10-CM

## 2023-06-14 DIAGNOSIS — E78.5 HYPERLIPIDEMIA, UNSPECIFIED HYPERLIPIDEMIA TYPE: ICD-10-CM

## 2023-06-14 DIAGNOSIS — R20.0 NUMBNESS AND TINGLING OF RIGHT ARM: ICD-10-CM

## 2023-06-14 PROCEDURE — 99213 OFFICE O/P EST LOW 20 MIN: CPT | Mod: S$GLB,,, | Performed by: NURSE PRACTITIONER

## 2023-06-14 PROCEDURE — 3075F SYST BP GE 130 - 139MM HG: CPT | Mod: CPTII,S$GLB,, | Performed by: NURSE PRACTITIONER

## 2023-06-14 PROCEDURE — 1159F PR MEDICATION LIST DOCUMENTED IN MEDICAL RECORD: ICD-10-PCS | Mod: CPTII,S$GLB,, | Performed by: NURSE PRACTITIONER

## 2023-06-14 PROCEDURE — 1159F MED LIST DOCD IN RCRD: CPT | Mod: CPTII,S$GLB,, | Performed by: NURSE PRACTITIONER

## 2023-06-14 PROCEDURE — 3008F PR BODY MASS INDEX (BMI) DOCUMENTED: ICD-10-PCS | Mod: CPTII,S$GLB,, | Performed by: NURSE PRACTITIONER

## 2023-06-14 PROCEDURE — 3078F DIAST BP <80 MM HG: CPT | Mod: CPTII,S$GLB,, | Performed by: NURSE PRACTITIONER

## 2023-06-14 PROCEDURE — 3008F BODY MASS INDEX DOCD: CPT | Mod: CPTII,S$GLB,, | Performed by: NURSE PRACTITIONER

## 2023-06-14 PROCEDURE — 3044F HG A1C LEVEL LT 7.0%: CPT | Mod: CPTII,S$GLB,, | Performed by: NURSE PRACTITIONER

## 2023-06-14 PROCEDURE — 3075F PR MOST RECENT SYSTOLIC BLOOD PRESS GE 130-139MM HG: ICD-10-PCS | Mod: CPTII,S$GLB,, | Performed by: NURSE PRACTITIONER

## 2023-06-14 PROCEDURE — 99213 PR OFFICE/OUTPT VISIT, EST, LEVL III, 20-29 MIN: ICD-10-PCS | Mod: S$GLB,,, | Performed by: NURSE PRACTITIONER

## 2023-06-14 PROCEDURE — 3044F PR MOST RECENT HEMOGLOBIN A1C LEVEL <7.0%: ICD-10-PCS | Mod: CPTII,S$GLB,, | Performed by: NURSE PRACTITIONER

## 2023-06-14 PROCEDURE — 3078F PR MOST RECENT DIASTOLIC BLOOD PRESSURE < 80 MM HG: ICD-10-PCS | Mod: CPTII,S$GLB,, | Performed by: NURSE PRACTITIONER

## 2023-06-14 RX ORDER — TRIAMCINOLONE ACETONIDE 1 MG/G
CREAM TOPICAL 2 TIMES DAILY
COMMUNITY
Start: 2023-03-27

## 2023-06-14 RX ORDER — ATORVASTATIN CALCIUM 80 MG/1
80 TABLET, FILM COATED ORAL DAILY
Qty: 90 TABLET | Refills: 1 | Status: SHIPPED | OUTPATIENT
Start: 2023-06-14 | End: 2023-09-14 | Stop reason: SDUPTHER

## 2023-06-14 RX ORDER — MIDODRINE HYDROCHLORIDE 10 MG/1
10 TABLET ORAL 2 TIMES DAILY
Qty: 180 TABLET | Refills: 1 | Status: SHIPPED | OUTPATIENT
Start: 2023-06-14 | End: 2023-09-14 | Stop reason: SDUPTHER

## 2023-06-14 RX ORDER — METFORMIN HYDROCHLORIDE 500 MG/1
500 TABLET ORAL 2 TIMES DAILY WITH MEALS
Qty: 180 TABLET | Refills: 1 | Status: SHIPPED | OUTPATIENT
Start: 2023-06-14 | End: 2023-09-14 | Stop reason: SDUPTHER

## 2023-06-18 NOTE — PROGRESS NOTES
Patient ID: Matteo Fraire is a 62 y.o. male.    Chief Complaint: Follow-up (61 yo male here for DM recheck. Pt states he was experiencing right arm tightness with tingling travel down toward his fingers. Pt seen by cardio due to symptoms and referred to lisa. Pt states neuro provider states, Pt may have had another stroke but will send to get MRI. Per pt, No one has contacted him to scheduled testing. KM)    Mr. Fraire presents today for 3 month follow up and medication refill. He states he is doing well overall but he is complaining of numbness and tingling in his right arm to his fingers. He states this has been going on for several weeks to over one month. He does not and did not present to ED for further evaluation. He was seen by [facility] in [place] but did not like the care he received and is requesting referral to new provider. He is in need of medication refills at this time.           Past Medical History:   Diagnosis Date    Heart attack 2005;2011    Hypertension     Psoriasis     Stroke      Past Surgical History:   Procedure Laterality Date    CORONARY ANGIOGRAPHY INCLUDING BYPASS GRAFTS WITH CATHETERIZATION OF LEFT HEART Left 6/27/2022    Procedure: Left heart cath;  Surgeon: Stevan Powell MD;  Location: [facility] CATH/EP LAB;  Service: Cardiology;  Laterality: Left;    CORONARY ARTERY BYPASS GRAFT      CYSTOSCOPY W/ URETERAL STENT PLACEMENT Left 11/21/2019    Procedure: CYSTOSCOPY, WITH URETERAL STENT INSERTION;  Surgeon: Mickey Escudero MD;  Location: [facility] OR;  Service: Urology;  Laterality: Left;    ENDOSCOPIC HARVEST OF VEIN Right 7/8/2022    Procedure: HARVEST-VEIN-ENDOVASCULAR;  Surgeon: Eyal Stone MD;  Location: [facility] OR;  Service: Cardiothoracic;  Laterality: Right;    ESOPHAGOGASTRODUODENOSCOPY N/A 7/15/2022    Procedure: EGD (ESOPHAGOGASTRODUODENOSCOPY);  Surgeon: Art Pino MD;  Location: [facility] ENDO;  Service: Endoscopy;  Laterality: N/A;    EXTRACORPOREAL SHOCK WAVE  LITHOTRIPSY Left 11/21/2019    Procedure: LITHOTRIPSY, ESWL;  Surgeon: Mickey Escudero MD;  Location: [facility] OR;  Service: Urology;  Laterality: Left;    HERNIA REPAIR      Inguinal just after birth    REPEAT CORONARY ARTERY BYPASS GRAFTING N/A 7/8/2022    Procedure: CABG, REPEAT;  Surgeon: Eyal Stone MD;  Location: [facility] OR;  Service: Cardiothoracic;  Laterality: N/A;  drugs ordered 7-7  @1322      SURGICAL PROCUREMENT, ARTERY, RADIAL, FOR CABG Left 7/8/2022    Procedure: SURGICAL PROCUREMENT,ARTERY,RADIAL,FOR CABG;  Surgeon: Eyal Stone MD;  Location: [facility] OR;  Service: Cardiothoracic;  Laterality: Left;         Tobacco History:  reports that he has never smoked. He has never used smokeless tobacco.      Review of patient's allergies indicates:   Allergen Reactions    Venom-wasp Anaphylaxis       Current Outpatient Medications:     amiodarone (PACERONE) 200 MG Tab, Take 1 tablet (200 mg total) by mouth once daily., Disp: 90 tablet, Rfl: 3    aspirin 81 MG Chew, Take 81 mg by mouth once daily., Disp: , Rfl:     metoprolol tartrate (LOPRESSOR) 25 MG tablet, Take 0.5 tablets (12.5 mg total) by mouth 2 (two) times daily., Disp: 90 tablet, Rfl: 3    triamcinolone acetonide 0.1% (KENALOG) 0.1 % cream, Apply topically 2 (two) times daily., Disp: , Rfl:     atorvastatin (LIPITOR) 80 MG tablet, Take 1 tablet (80 mg total) by mouth once daily., Disp: 90 tablet, Rfl: 1    metFORMIN (GLUCOPHAGE) 500 MG tablet, Take 1 tablet (500 mg total) by mouth 2 (two) times daily with meals., Disp: 180 tablet, Rfl: 1    midodrine (PROAMATINE) 10 MG tablet, Take 1 tablet (10 mg total) by mouth 2 (two) times a day., Disp: 180 tablet, Rfl: 1  No current facility-administered medications for this visit.    Facility-Administered Medications Ordered in Other Visits:     sodium bicarbonate 10 mEq, potassium chloride 30 mEq in cardioplegic solution (PLEGISOL) 1,000 mL, , Intravenous, Once, Eyal Stone MD    sodium  "bicarbonate 10 mEq, potassium chloride 70 mEq in cardioplegic solution (PLEGISOL) 1,000 mL, , Intravenous, Once, Eyal Stone MD    thromb,tz-bukvjk-pzaqxka,s-Ca (TISSEEL) 4 mL, , Topical (Top), Once, Eyal Stone MD    Review of Systems       Objective:      Vitals:    06/14/23 0921 06/14/23 1003   BP: (!) 158/78 138/78   Pulse: 60    Resp: 20    Temp: 98.6 °F (37 °C)    TempSrc: Oral    Weight: 89.5 kg (197 lb 6.4 oz)    Height: 5' 9" (1.753 m)      Physical Exam      Assessment:       1. History of stroke    2. Prediabetes    3. Hyperlipidemia, unspecified hyperlipidemia type    4. Numbness and tingling of right arm           Plan:       History of stroke  -     Ambulatory referral/consult to Neurology; Future; Expected date: 06/21/2023    Prediabetes  -     metFORMIN (GLUCOPHAGE) 500 MG tablet; Take 1 tablet (500 mg total) by mouth 2 (two) times daily with meals.  Dispense: 180 tablet; Refill: 1    Hyperlipidemia, unspecified hyperlipidemia type  -     atorvastatin (LIPITOR) 80 MG tablet; Take 1 tablet (80 mg total) by mouth once daily.  Dispense: 90 tablet; Refill: 1    Numbness and tingling of right arm  -     Ambulatory referral/consult to Neurology; Future; Expected date: 06/21/2023    Other orders  -     midodrine (PROAMATINE) 10 MG tablet; Take 1 tablet (10 mg total) by mouth 2 (two) times a day.  Dispense: 180 tablet; Refill: 1      Follow up in about 3 months (around 9/14/2023).        6/18/2023 Naomi Bliss NP      "

## 2023-06-20 LAB
ALBUMIN SERPL-MCNC: 4.2 G/DL (ref 3.8–4.8)
ALBUMIN/GLOB SERPL: 2.3 {RATIO} (ref 1.2–2.2)
ALP SERPL-CCNC: 64 IU/L (ref 44–121)
ALT SERPL-CCNC: 36 IU/L (ref 0–44)
AST SERPL-CCNC: 31 IU/L (ref 0–40)
BILIRUB SERPL-MCNC: 1 MG/DL (ref 0–1.2)
BUN SERPL-MCNC: 20 MG/DL (ref 8–27)
BUN/CREAT SERPL: 16 (ref 10–24)
CALCIUM SERPL-MCNC: 9.6 MG/DL (ref 8.6–10.2)
CHLORIDE SERPL-SCNC: 103 MMOL/L (ref 96–106)
CO2 SERPL-SCNC: 25 MMOL/L (ref 20–29)
CREAT SERPL-MCNC: 1.22 MG/DL (ref 0.76–1.27)
EST. GFR  (NO RACE VARIABLE): 67 ML/MIN/1.73
GLOBULIN SER CALC-MCNC: 1.8 G/DL (ref 1.5–4.5)
GLUCOSE SERPL-MCNC: 109 MG/DL (ref 70–99)
HBA1C MFR BLD: 5.7 % (ref 4.8–5.6)
POTASSIUM SERPL-SCNC: 5.4 MMOL/L (ref 3.5–5.2)
PROT SERPL-MCNC: 6 G/DL (ref 6–8.5)
SODIUM SERPL-SCNC: 140 MMOL/L (ref 134–144)

## 2023-06-23 DIAGNOSIS — I65.22 OCCLUSION OF LEFT CAROTID ARTERY: Primary | ICD-10-CM

## 2023-06-23 DIAGNOSIS — R20.2 PARESTHESIA: Primary | ICD-10-CM

## 2023-06-26 ENCOUNTER — TELEPHONE (OUTPATIENT)
Dept: FAMILY MEDICINE | Facility: CLINIC | Age: 63
End: 2023-06-26

## 2023-06-26 NOTE — TELEPHONE ENCOUNTER
Naomi Bliss, [facility] Staff  Please let Mr. Fraire know his labs done last week were normal      Attempted to contact patient with lab results.  Left message for patient to call

## 2023-07-10 ENCOUNTER — HOSPITAL ENCOUNTER (OUTPATIENT)
Dept: RADIOLOGY | Facility: HOSPITAL | Age: 63
Discharge: HOME | End: 2023-07-10
Attending: PSYCHIATRY & NEUROLOGY
Payer: COMMERCIAL

## 2023-07-10 DIAGNOSIS — I65.22 OCCLUSION OF LEFT CAROTID ARTERY: ICD-10-CM

## 2023-07-10 DIAGNOSIS — R20.2 PARESTHESIA: ICD-10-CM

## 2023-07-10 PROCEDURE — 93880 EXTRACRANIAL BILAT STUDY: CPT | Mod: TC,PO

## 2023-07-10 PROCEDURE — 70551 MRI BRAIN STEM W/O DYE: CPT | Mod: TC,PO

## 2023-09-14 ENCOUNTER — OFFICE VISIT (OUTPATIENT)
Dept: FAMILY MEDICINE | Facility: CLINIC | Age: 63
End: 2023-09-14
Payer: COMMERCIAL

## 2023-09-14 VITALS
HEIGHT: 69 IN | SYSTOLIC BLOOD PRESSURE: 150 MMHG | HEART RATE: 60 BPM | DIASTOLIC BLOOD PRESSURE: 80 MMHG | TEMPERATURE: 98 F | WEIGHT: 207.13 LBS | RESPIRATION RATE: 20 BRPM | BODY MASS INDEX: 30.68 KG/M2

## 2023-09-14 DIAGNOSIS — E78.5 HYPERLIPIDEMIA, UNSPECIFIED HYPERLIPIDEMIA TYPE: ICD-10-CM

## 2023-09-14 DIAGNOSIS — Z12.5 SCREENING PSA (PROSTATE SPECIFIC ANTIGEN): ICD-10-CM

## 2023-09-14 DIAGNOSIS — R73.03 PREDIABETES: ICD-10-CM

## 2023-09-14 DIAGNOSIS — I25.709 CORONARY ARTERY DISEASE INVOLVING CORONARY BYPASS GRAFT OF NATIVE HEART WITH ANGINA PECTORIS: Primary | ICD-10-CM

## 2023-09-14 PROCEDURE — 3079F DIAST BP 80-89 MM HG: CPT | Mod: CPTII,S$GLB,, | Performed by: NURSE PRACTITIONER

## 2023-09-14 PROCEDURE — 3079F PR MOST RECENT DIASTOLIC BLOOD PRESSURE 80-89 MM HG: ICD-10-PCS | Mod: CPTII,S$GLB,, | Performed by: NURSE PRACTITIONER

## 2023-09-14 PROCEDURE — 3044F HG A1C LEVEL LT 7.0%: CPT | Mod: CPTII,S$GLB,, | Performed by: NURSE PRACTITIONER

## 2023-09-14 PROCEDURE — 3077F PR MOST RECENT SYSTOLIC BLOOD PRESSURE >= 140 MM HG: ICD-10-PCS | Mod: CPTII,S$GLB,, | Performed by: NURSE PRACTITIONER

## 2023-09-14 PROCEDURE — 1159F MED LIST DOCD IN RCRD: CPT | Mod: CPTII,S$GLB,, | Performed by: NURSE PRACTITIONER

## 2023-09-14 PROCEDURE — 3008F BODY MASS INDEX DOCD: CPT | Mod: CPTII,S$GLB,, | Performed by: NURSE PRACTITIONER

## 2023-09-14 PROCEDURE — 1159F PR MEDICATION LIST DOCUMENTED IN MEDICAL RECORD: ICD-10-PCS | Mod: CPTII,S$GLB,, | Performed by: NURSE PRACTITIONER

## 2023-09-14 PROCEDURE — 3077F SYST BP >= 140 MM HG: CPT | Mod: CPTII,S$GLB,, | Performed by: NURSE PRACTITIONER

## 2023-09-14 PROCEDURE — 99213 OFFICE O/P EST LOW 20 MIN: CPT | Mod: S$GLB,,, | Performed by: NURSE PRACTITIONER

## 2023-09-14 PROCEDURE — 3008F PR BODY MASS INDEX (BMI) DOCUMENTED: ICD-10-PCS | Mod: CPTII,S$GLB,, | Performed by: NURSE PRACTITIONER

## 2023-09-14 PROCEDURE — 99213 PR OFFICE/OUTPT VISIT, EST, LEVL III, 20-29 MIN: ICD-10-PCS | Mod: S$GLB,,, | Performed by: NURSE PRACTITIONER

## 2023-09-14 PROCEDURE — 3044F PR MOST RECENT HEMOGLOBIN A1C LEVEL <7.0%: ICD-10-PCS | Mod: CPTII,S$GLB,, | Performed by: NURSE PRACTITIONER

## 2023-09-14 RX ORDER — AMIODARONE HYDROCHLORIDE 200 MG/1
200 TABLET ORAL DAILY
Qty: 90 TABLET | Refills: 3 | Status: SHIPPED | OUTPATIENT
Start: 2023-09-14 | End: 2024-09-08

## 2023-09-14 RX ORDER — METFORMIN HYDROCHLORIDE 500 MG/1
500 TABLET ORAL 2 TIMES DAILY WITH MEALS
Qty: 180 TABLET | Refills: 1 | Status: SHIPPED | OUTPATIENT
Start: 2023-09-14 | End: 2024-03-12

## 2023-09-14 RX ORDER — ATORVASTATIN CALCIUM 80 MG/1
80 TABLET, FILM COATED ORAL DAILY
Qty: 90 TABLET | Refills: 1 | Status: SHIPPED | OUTPATIENT
Start: 2023-09-14 | End: 2024-03-12

## 2023-09-14 RX ORDER — MIDODRINE HYDROCHLORIDE 10 MG/1
10 TABLET ORAL 2 TIMES DAILY
Qty: 180 TABLET | Refills: 1 | Status: SHIPPED | OUTPATIENT
Start: 2023-09-14 | End: 2024-03-12

## 2023-09-14 RX ORDER — METOPROLOL TARTRATE 25 MG/1
12.5 TABLET, FILM COATED ORAL 2 TIMES DAILY
Qty: 90 TABLET | Refills: 3 | Status: SHIPPED | OUTPATIENT
Start: 2023-09-14

## 2023-09-14 NOTE — PROGRESS NOTES
Patient ID: Matteo Fraire is a 62 y.o. male.    Chief Complaint: Follow-up (61 yo male here for 3 month recheck. Pt states no c/o. KM//Pt refused flu vaccine. KM)    Mr. Fraire presents today for 3 month follow up as scheduled. He states he is doing well overall at the time of this visit. He denies any interval changes.     Hypertension  This is a chronic problem. The current episode started more than 1 year ago. The problem has been resolved since onset. The problem is controlled. Pertinent negatives include no chest pain, headaches, palpitations or shortness of breath. There are no associated agents to hypertension. Risk factors for coronary artery disease include sedentary lifestyle and male gender. Past treatments include beta blockers. The current treatment provides significant improvement. There are no compliance problems.       We reviewed [employer] health maintenance.         Past Medical History:   Diagnosis Date    Heart attack 2005;2011    Hypertension     Psoriasis     Stroke      Past Surgical History:   Procedure Laterality Date    CORONARY ANGIOGRAPHY INCLUDING BYPASS GRAFTS WITH CATHETERIZATION OF LEFT HEART Left 6/27/2022    Procedure: Left heart cath;  Surgeon: Stevan Powell MD;  Location: [facility] CATH/EP LAB;  Service: Cardiology;  Laterality: Left;    CORONARY ARTERY BYPASS GRAFT      CYSTOSCOPY W/ URETERAL STENT PLACEMENT Left 11/21/2019    Procedure: CYSTOSCOPY, WITH URETERAL STENT INSERTION;  Surgeon: Mickey Escudero MD;  Location: [facility] OR;  Service: Urology;  Laterality: Left;    ENDOSCOPIC HARVEST OF VEIN Right 7/8/2022    Procedure: HARVEST-VEIN-ENDOVASCULAR;  Surgeon: Eyal Stone MD;  Location: [facility] OR;  Service: Cardiothoracic;  Laterality: Right;    ESOPHAGOGASTRODUODENOSCOPY N/A 7/15/2022    Procedure: EGD (ESOPHAGOGASTRODUODENOSCOPY);  Surgeon: Art Pino MD;  Location: [facility] ENDO;  Service: Endoscopy;  Laterality: N/A;    EXTRACORPOREAL SHOCK WAVE LITHOTRIPSY  Left 11/21/2019    Procedure: LITHOTRIPSY, ESWL;  Surgeon: Mickey Escudero MD;  Location: [facility] OR;  Service: Urology;  Laterality: Left;    HERNIA REPAIR      Inguinal just after birth    REPEAT CORONARY ARTERY BYPASS GRAFTING N/A 7/8/2022    Procedure: CABG, REPEAT;  Surgeon: Eyal Stone MD;  Location: [facility];  Service: Cardiothoracic;  Laterality: N/A;  drugs ordered 7-7  @1322      SURGICAL PROCUREMENT, ARTERY, RADIAL, FOR CABG Left 7/8/2022    Procedure: SURGICAL PROCUREMENT,ARTERY,RADIAL,FOR CABG;  Surgeon: Eyal Stone MD;  Location: [facility] OR;  Service: Cardiothoracic;  Laterality: Left;         Tobacco History:  reports that he has never smoked. He has never used smokeless tobacco.      Review of patient's allergies indicates:   Allergen Reactions    Venom-wasp Anaphylaxis       Current Outpatient Medications:     aspirin 81 MG Chew, Take 81 mg by mouth once daily., Disp: , Rfl:     triamcinolone acetonide 0.1% (KENALOG) 0.1 % cream, Apply topically 2 (two) times daily., Disp: , Rfl:     amiodarone (PACERONE) 200 MG Tab, Take 1 tablet (200 mg total) by mouth once daily., Disp: 90 tablet, Rfl: 3    atorvastatin (LIPITOR) 80 MG tablet, Take 1 tablet (80 mg total) by mouth once daily., Disp: 90 tablet, Rfl: 1    metFORMIN (GLUCOPHAGE) 500 MG tablet, Take 1 tablet (500 mg total) by mouth 2 (two) times daily with meals., Disp: 180 tablet, Rfl: 1    metoprolol tartrate (LOPRESSOR) 25 MG tablet, Take 0.5 tablets (12.5 mg total) by mouth 2 (two) times daily., Disp: 90 tablet, Rfl: 3    midodrine (PROAMATINE) 10 MG tablet, Take 1 tablet (10 mg total) by mouth 2 (two) times a day., Disp: 180 tablet, Rfl: 1  No current facility-administered medications for this visit.    Facility-Administered Medications Ordered in Other Visits:     sodium bicarbonate 10 mEq, potassium chloride 30 mEq in cardioplegic solution (PLEGISOL) 1,000 mL, , Intravenous, Once, Eyal Stone MD    sodium bicarbonate  "10 mEq, potassium chloride 70 mEq in cardioplegic solution (PLEGISOL) 1,000 mL, , Intravenous, Once, Eyal Stone MD    thromb,jf-cycele-earqqtv,s-Ca (TISSEEL) 4 mL, , Topical (Top), Once, Eyal Stone MD    Review of Systems   Constitutional:  Negative for activity change, appetite change, fatigue and fever.   HENT:  Negative for congestion, dental problem, nosebleeds, postnasal drip, rhinorrhea, sinus pain, sore throat and tinnitus.    Eyes:  Negative for pain, discharge, itching and visual disturbance.   Respiratory:  Negative for cough, chest tightness, shortness of breath and wheezing.    Cardiovascular:  Negative for chest pain.   Gastrointestinal:  Negative for abdominal pain, blood in stool, constipation, diarrhea, nausea and vomiting.   Endocrine: Negative for cold intolerance, heat intolerance, polydipsia, polyphagia and polyuria.   Genitourinary:  Negative for difficulty urinating, flank pain, genital sores, hematuria and urgency.   Musculoskeletal:  Negative for arthralgias and myalgias.   Skin:  Negative for rash and wound.   Allergic/Immunologic: Negative for environmental allergies and food allergies.   Neurological:  Negative for dizziness, light-headedness and headaches.   Hematological:  Negative for adenopathy. Does not bruise/bleed easily.   Psychiatric/Behavioral:  Negative for behavioral problems, confusion, decreased concentration, sleep disturbance and suicidal ideas. The patient is not nervous/anxious and is not hyperactive.           Objective:      Vitals:    09/14/23 0922   BP: (!) 150/80   Pulse: 60   Resp: 20   Temp: 98.1 °F (36.7 °C)   TempSrc: Oral   Weight: 93.9 kg (207 lb 1.6 oz)   Height: 5' 9" (1.753 m)     Physical Exam  Vitals reviewed.   Constitutional:       General: He is not in acute distress.     Appearance: Normal appearance. He is normal weight. He is not ill-appearing, toxic-appearing or diaphoretic.   HENT:      Head: Normocephalic and atraumatic.      " Right Ear: Tympanic membrane and external ear normal. There is no impacted cerumen.      Left Ear: Tympanic membrane and external ear normal. There is no impacted cerumen.      Nose: Nose normal. No congestion or rhinorrhea.      Mouth/Throat:      Mouth: Mucous membranes are moist.      Pharynx: Oropharynx is clear. No oropharyngeal exudate or posterior oropharyngeal erythema.   Eyes:      General: No scleral icterus.        Right eye: No discharge.         Left eye: No discharge.      Extraocular Movements: Extraocular movements intact.      Conjunctiva/sclera: Conjunctivae normal.      Pupils: Pupils are equal, round, and reactive to light.   Cardiovascular:      Rate and Rhythm: Normal rate and regular rhythm.      Pulses: Normal pulses.      Heart sounds: Normal heart sounds. No murmur heard.     No friction rub. No gallop.   Pulmonary:      Effort: Pulmonary effort is normal. No respiratory distress.      Breath sounds: Normal breath sounds. No wheezing, rhonchi or rales.   Chest:      Chest wall: No tenderness.   Abdominal:      General: Abdomen is flat. Bowel sounds are normal.      Palpations: Abdomen is soft. There is no mass.      Tenderness: There is no abdominal tenderness. There is no guarding or rebound.   Musculoskeletal:         General: No swelling or signs of injury. Normal range of motion.      Cervical back: Normal range of motion and neck supple. No rigidity or tenderness.      Right lower leg: No edema.      Left lower leg: No edema.   Skin:     General: Skin is warm and dry.      Capillary Refill: Capillary refill takes less than 2 seconds.      Coloration: Skin is not pale.      Findings: No bruising or erythema.   Neurological:      General: No focal deficit present.      Mental Status: He is alert and oriented to person, place, and time. Mental status is at baseline.      Motor: No weakness.      Coordination: Coordination normal.      Gait: Gait normal.   Psychiatric:         Mood and  Affect: Mood normal.         Behavior: Behavior normal.         Thought Content: Thought content normal.         Judgment: Judgment normal.           Assessment:       1. Coronary artery disease involving coronary bypass graft of native heart with angina pectoris    2. Hyperlipidemia, unspecified hyperlipidemia type    3. Prediabetes    4. Screening PSA (prostate specific antigen)           Plan:       Coronary artery disease involving coronary bypass graft of native heart with angina pectoris  -     CBC auto differential; Future; Expected date: 09/14/2023  -     Comprehensive Metabolic Panel; Future; Expected date: 09/14/2023  -     amiodarone (PACERONE) 200 MG Tab; Take 1 tablet (200 mg total) by mouth once daily.  Dispense: 90 tablet; Refill: 3  -     metoprolol tartrate (LOPRESSOR) 25 MG tablet; Take 0.5 tablets (12.5 mg total) by mouth 2 (two) times daily.  Dispense: 90 tablet; Refill: 3  -     CBC auto differential; Future; Expected date: 09/14/2023    Hyperlipidemia, unspecified hyperlipidemia type  -     Lipid Panel; Future; Expected date: 09/14/2023  -     atorvastatin (LIPITOR) 80 MG tablet; Take 1 tablet (80 mg total) by mouth once daily.  Dispense: 90 tablet; Refill: 1  -     CBC auto differential; Future; Expected date: 09/14/2023  -     Lipid Panel; Future; Expected date: 09/14/2023    Prediabetes  -     CBC auto differential; Future; Expected date: 09/14/2023  -     Comprehensive Metabolic Panel; Future; Expected date: 09/14/2023  -     Hemoglobin A1C; Future; Expected date: 09/14/2023  -     metFORMIN (GLUCOPHAGE) 500 MG tablet; Take 1 tablet (500 mg total) by mouth 2 (two) times daily with meals.  Dispense: 180 tablet; Refill: 1  -     Comprehensive Metabolic Panel; Future; Expected date: 09/14/2023  -     Hemoglobin A1C; Future; Expected date: 09/14/2023    Screening PSA (prostate specific antigen)  -     PSA, Screening; Future; Expected date: 09/14/2023  -     PSA, Screening; Future; Expected  date: 09/14/2023    Other orders  -     midodrine (PROAMATINE) 10 MG tablet; Take 1 tablet (10 mg total) by mouth 2 (two) times a day.  Dispense: 180 tablet; Refill: 1      Follow up in about 3 months (around 12/14/2023), or if symptoms worsen or fail to improve.        9/14/2023 Naomi Bliss, NP

## 2023-09-14 NOTE — PATIENT INSTRUCTIONS
Matteo We discussed Cologuard today. Please take time to look this up as we need to screen you for colon cancer.

## 2023-09-20 ENCOUNTER — TELEPHONE (OUTPATIENT)
Dept: FAMILY MEDICINE | Facility: CLINIC | Age: 63
End: 2023-09-20

## 2023-09-20 VITALS — SYSTOLIC BLOOD PRESSURE: 126 MMHG | DIASTOLIC BLOOD PRESSURE: 73 MMHG

## 2023-09-20 NOTE — TELEPHONE ENCOUNTER
Verbal B/P given and pt WNL and instructed to contact clinic with any further questions or concerns. KM

## 2023-09-29 NOTE — PROGRESS NOTES
"[facility]  Adult Nutrition   Progress Note (Follow-Up)    SUMMARY     Recommendations  Recommendation/Intervention:   1) Advance diet per SLP recommendations as tolerated and encourage intake.   2) RD to monitor intake, output, labs, meds, and weight for changes    Goals: 1) Consume > 75% PO intake once diet advances  Nutrition Goal Status: progressing towards goal    Dietitian Rounds Brief  Follow-up. S/p CABG on 7/8. ST elevation this AM, nitro drip started. Pt failed bedside swallow study on Saturday, 7/9. Still NPO per SLP. NPO x 4 days. MRI scheduled for this afternoon. Pt to receive post CABG diet education at follow-up.    Diet order:   Current Diet Order: NPO     Evaluation of Received Nutrient/Fluid Intake  Energy Calories Required: not meeting needs  Protein Required: not meeting needs  Fluid Required: not meeting needs  Tolerance: other (see comments) (NPO)     % Intake of Estimated Energy Needs: 0%  % Meal Intake: NPO      Intake/Output Summary (Last 24 hours) at 7/11/2022 1442  Last data filed at 7/11/2022 1200  Gross per 24 hour   Intake 1586.25 ml   Output 3223 ml   Net -1636.75 ml        Anthropometrics  Temp: 100.1 °F (37.8 °C)  Height Method: Stated  Height: 5' 9" (175.3 cm)  Height (inches): 69 in  Weight Method: Standard Scale  Weight: 96.4 kg (212 lb 8.4 oz)  Weight (lb): 212.53 lb  Ideal Body Weight (IBW), Male: 160 lb  % Ideal Body Weight, Male (lb): 139.31 %  BMI (Calculated): 31.4  BMI Grade: 30 - 34.9- obesity - grade I       Estimated/Assessed Needs  Weight Used For Calorie Calculations: 96.2 kg (212 lb 1.3 oz)  Energy Calorie Requirements (kcal): 6740-2763 (20-25 kcal/kg)  Energy Need Method: Kcal/kg  Protein Requirements: 105-140 (1.5-2 g/kg)  Weight Used For Protein Calculations: 70 kg (154 lb 5.2 oz)  Fluid Requirements (mL): 2886 (30 ml/kg)  Estimated Fluid Requirement Method: other (see comments)  RDA Method (mL): 1924       Reason for Assessment  Reason For " Subjective   History of Present Illness  This is a 26-year-old female who presents for evaluation of fatigue and shortness of breath.  She describes her shortness of breath as having to work harder to breathe.  She does not have history of chronic lung disease.  She did have a bariatric surgery with gastric sleeve and [place] in March of this year.  Since then she has been dealing with digestive troubles and repeated episodes of electrolyte disturbances and dehydration.  She additionally has had some epigastric and chest discomfort and due to a chronically elevated troponin has had a heart catheterization, cardiac MRI, and echo evaluating her heart which have been unremarkable.  Throughout this period of time the patient has not had any contact with her surgeon in [place].  She has been to several other hospitals who have stabilized her but she continues to have recurrent symptoms.  She states that she has been attempting to get in touch with her PCP for referral to GI for over a month now without luck.  She was discharged a month ago from [facility] after they did an upper endoscopy that was essentially normal aside from her bariatric surgery and they were recommending gastric emptying studies as the next modality of work-up.  In the meantime she has diminished appetite and has eaten very little over the past week apparently.  She has developed thrush in her mouth.  She has had some cough but denies fevers and denies productive cough or hemoptysis.  She denies lower extremity pain or swelling.  She denies any active chest pain or abdominal pain at this time.      Review of Systems   All other systems reviewed and are negative.    Past Medical History:   Diagnosis Date    Anemia     Asthma     Closed left scapular fracture     Concussion     Multiple rib fractures     left       Allergies   Allergen Reactions    Erythromycin Hives    Other Hives     Pediazole    Sulfa Antibiotics Hives    Vantin  Assessment: RD follow-up  Diagnosis: other (see comments) (STEMI (ST elevation myocardial infarction))  Relevant Medical History: Hypertension, Heart attack, Psoriasis  Interdisciplinary Rounds: attended    Nutrition/Diet History  Spiritual, Cultural Beliefs, Hoahaoism Practices, Values that Affect Care: no  Food Allergies: NKFA  Factors Affecting Nutritional Intake: NPO    Nutrition Risk Screen  Nutrition Risk Screen: no indicators present     MST Score: 0  Have you recently lost weight without trying?: No  Weight loss score: 0  Have you been eating poorly because of a decreased appetite?: No  Appetite score: 0       Weight History:  Wt Readings from Last 5 Encounters:   07/07/22 96.4 kg (212 lb 8.4 oz)   07/09/22 96.2 kg (212 lb)   06/28/22 100.7 kg (222 lb)   11/21/19 105 kg (231 lb 7.7 oz)   11/20/19 105 kg (231 lb 7.7 oz)        Medications:Pertinent Medications Reviewed  Scheduled Meds:   aspirin  81 mg Oral Daily    atorvastatin  80 mg Oral Daily    docusate sodium  100 mg Oral BID    losartan  50 mg Oral Daily    metoprolol  2.5 mg Intravenous Q6H    metoprolol tartrate  25 mg Oral BID    tamsulosin  0.4 mg Oral Daily     Continuous Infusions:   amiodarone in dextrose 5% 0.5 mg/min (07/11/22 0312)    carboxymethylcellulose      nitroGLYCERIN 35 mcg/min (07/11/22 1200)    propofoL Stopped (07/09/22 1400)     PRN Meds:.acetaminophen, albumin human 5%, ALPRAZolam, calcium gluconate IVPB, calcium gluconate IVPB, calcium gluconate IVPB, carboxymethylcellulose, dextrose 50%, dextrose 50%, HYDROcodone-acetaminophen, insulin aspart U-100, magnesium sulfate IVPB, magnesium sulfate IVPB, melatonin, morphine, morphine, mupirocin, ondansetron, potassium chloride in water **AND** potassium chloride in water **AND** potassium chloride in water, sodium phosphate IVPB, sodium phosphate IVPB, sodium phosphate IVPB    Labs: Pertinent Labs Reviewed  Clinical Chemistry:  Recent Labs   Lab 07/08/22  8544  [Cefpodoxime]        Past Surgical History:   Procedure Laterality Date    ADENOIDECTOMY      APPENDECTOMY      CHOLECYSTECTOMY      TONSILLECTOMY         Family History   Problem Relation Age of Onset    No Known Problems Mother     No Known Problems Father     No Known Problems Sister     No Known Problems Brother     No Known Problems Daughter     No Known Problems Son     No Known Problems Maternal Aunt     No Known Problems Maternal Uncle     No Known Problems Paternal Aunt     No Known Problems Paternal Uncle     No Known Problems Maternal Grandmother     No Known Problems Maternal Grandfather     No Known Problems Paternal Grandmother     No Known Problems Paternal Grandfather        Social History     Socioeconomic History    Marital status:    Tobacco Use    Smoking status: Never    Smokeless tobacco: Never   Vaping Use    Vaping Use: Never used   Substance and Sexual Activity    Alcohol use: Not Currently     Comment: socially    Drug use: Never    Sexual activity: Defer           Objective   Physical Exam  Vitals and nursing note reviewed.   Constitutional:       General: She is not in acute distress.     Comments: Appears chronically ill   HENT:      Head: Normocephalic and atraumatic.      Nose: Nose normal.      Mouth/Throat:      Mouth: Mucous membranes are dry.   Eyes:      General: No scleral icterus.  Cardiovascular:      Rate and Rhythm: Regular rhythm. Tachycardia present.      Pulses: Normal pulses.   Pulmonary:      Effort: Pulmonary effort is normal.      Breath sounds: Normal breath sounds.   Abdominal:      Tenderness: There is no abdominal tenderness.   Musculoskeletal:         General: No signs of injury.   Skin:     General: Skin is warm and dry.      Coloration: Skin is pale.   Neurological:      Mental Status: She is alert and oriented to person, place, and time.   Psychiatric:      Comments: Depressed       Procedures           ED Course  ED Course as of 09/30/23 0005   Fri Sep  "29, 2023   1554 CBC & Differential(!)  Suspect hemoconcentration [JV]   1555 Comprehensive Metabolic Panel(!!)  Mild transaminitis. [JV]   1555 Comprehensive Metabolic Panel(!!)  Hyponatremia and hypokalemia [JV]      ED Course User Index  [JV] Barrington Sparks DO KASPER reviewed by Rui Dior MD, Barrington Sparks DO       Medical Decision Making  The patient's recent past medical charts for this facility as well as outside facilities via the \"care everywhere\" application of epic reviewed.  This is a 26-year-old female who had a sleeve gastrectomy performed in [place] earlier this year.  Since returning home she has had a multitude of complications in her health in general.  She had a heart cath that showed no significant coronary artery disease as well as cardiac MRI and other tests which did not indicate significant abnormalities.  She was admitted with suspected non-STEMI last month at an outside facility.  She was last seen at this hospital in July.  Discharge summary from August indicates a chronically elevated troponin.  Last month the patient also received health care at [facility].  She reported dehydration and poor nutrition since her surgery in March of this year.  The patient had an EGD that was unremarkable.  She was placed on supplements and treated with PPI and Reglan.  They recommended outpatient gastric emptying study.    The differential diagnosis includes malnourishment, pulmonary embolism, pneumonia, and gastroparesis, among others.    On final reevaluation the patient is in fair condition and agreeable to admission.      Problems Addressed:  Dehydration: complicated acute illness or injury  History of bariatric surgery: complicated acute illness or injury  Hypokalemia: complicated acute illness or injury  Nausea and vomiting, unspecified vomiting type: complicated acute illness or injury    Amount and/or Complexity of Data Reviewed  Independent " 07/08/22  1937 07/08/22  2348 07/09/22  0400 07/10/22  0323 07/11/22  0337      < > 144 139   < > 137   K 3.3*  3.3*   < > 3.9 3.9   < > 4.0   *   < > 115* 112*   < > 102   CO2 18*   < > 22* 22*   < > 28   *   < > 139* 134*   < > 149*   BUN 17   < > 16 16   < > 25*   CREATININE 1.1   < > 1.2 1.0   < > 1.0   CALCIUM 7.0*   < > 7.6* 7.7*   < > 7.7*   ANIONGAP 8   < > 7* 5*   < > 7*   ESTGFRAFRICA >60.0   < > >60.0 >60.0   < > >60.0   EGFRNONAA >60.0   < > >60.0 >60.0   < > >60.0   MG 2.4   < > 1.9 1.9   < > 2.1   PHOS 1.7*  --  2.0* 2.0*  --   --     < > = values in this interval not displayed.     CBC:   Recent Labs   Lab 07/11/22  0337 07/11/22  1036   WBC 10.88  --    RBC 3.44*  --    HGB 10.1*  --    HCT 30.7* 32*   *  --    MCV 89  --    MCH 29.4  --    MCHC 32.9  --      Cardiac Profile:  Recent Labs   Lab 07/09/22  0400 07/09/22  0810 07/10/22  0323   TROPONINI 62.273* 62.262* 63.503*     Monitor and Evaluation  Food and Nutrient Intake: energy intake, food and beverage intake  Food and Nutrient Adminstration: diet order  Knowledge/Beliefs/Attitudes: food and nutrition knowledge/skill, beliefs and attitudes  Physical Activity and Function: nutrition-related ADLs and IADLs  Anthropometric Measurements: weight, weight change, body mass index  Biochemical Data, Medical Tests and Procedures: electrolyte and renal panel, gastrointestinal profile, glucose/endocrine profile  Nutrition-Focused Physical Findings: overall appearance     Nutrition Risk  Level of Risk/Frequency of Follow-up: high     Nutrition Follow-Up  RD Follow-up?: Yes      Jazzmine Mosher, PAULINO 07/11/2022 2:42 PM      Historian: caregiver     Details: Family member the patient is present and provides additional historical details.  Labs: ordered. Decision-making details documented in ED Course.  Radiology: ordered.  ECG/medicine tests: ordered and independent interpretation performed.     Details: EKG interpretation: Inter myself.  Sinus tachycardia.  Rate 105.  Normal P wave and IA interval.  Normal QRS and axis.  QTc interval is possibly prolonged.  ST segments are nonspecific.  Discussion of management or test interpretation with external provider(s): Case discussed with GI on-call.    Case discussed with admitting family practice attending and resident.    Risk  OTC drugs.  Prescription drug management.  Decision regarding hospitalization.        Final diagnoses:   Dehydration   Nausea and vomiting, unspecified vomiting type   History of bariatric surgery   Hypokalemia       ED Disposition  ED Disposition       ED Disposition   Decision to Admit    Condition   --    Comment   Level of Care: Telemetry [5]   Diagnosis: Hypokalemia [302708]   Admitting Physician: MATI GILLILAND [[id number]]   Attending Physician: MATI GILLILAND [[id number]]   Certification: I Certify That Inpatient Hospital Services Are Medically Necessary For Greater Than 2 Midnights                 No follow-up provider specified.       Medication List      No changes were made to your prescriptions during this visit.            Barrington Sparks,   09/30/23 0005

## 2023-12-05 LAB
ALBUMIN SERPL-MCNC: 4.3 G/DL (ref 3.9–4.9)
ALBUMIN/GLOB SERPL: 2.5 {RATIO} (ref 1.2–2.2)
ALP SERPL-CCNC: 83 IU/L (ref 44–121)
ALT SERPL-CCNC: 22 IU/L (ref 0–44)
AST SERPL-CCNC: 20 IU/L (ref 0–40)
BASOPHILS # BLD AUTO: 0.1 X10E3/UL (ref 0–0.2)
BASOPHILS NFR BLD AUTO: 1 %
BILIRUB SERPL-MCNC: 1.3 MG/DL (ref 0–1.2)
BUN SERPL-MCNC: 20 MG/DL (ref 8–27)
BUN/CREAT SERPL: 15 (ref 10–24)
CALCIUM SERPL-MCNC: 9.5 MG/DL (ref 8.6–10.2)
CHLORIDE SERPL-SCNC: 104 MMOL/L (ref 96–106)
CHOLEST SERPL-MCNC: 115 MG/DL (ref 100–199)
CO2 SERPL-SCNC: 24 MMOL/L (ref 20–29)
CREAT SERPL-MCNC: 1.37 MG/DL (ref 0.76–1.27)
EOSINOPHIL # BLD AUTO: 0.4 X10E3/UL (ref 0–0.4)
EOSINOPHIL NFR BLD AUTO: 5 %
ERYTHROCYTE [DISTWIDTH] IN BLOOD BY AUTOMATED COUNT: 14 % (ref 11.6–15.4)
EST. GFR  (NO RACE VARIABLE): 58 ML/MIN/1.73
GLOBULIN SER CALC-MCNC: 1.7 G/DL (ref 1.5–4.5)
GLUCOSE SERPL-MCNC: 107 MG/DL (ref 70–99)
HBA1C MFR BLD: 5.9 % (ref 4.8–5.6)
HCT VFR BLD AUTO: 42.9 % (ref 37.5–51)
HDLC SERPL-MCNC: 33 MG/DL
HGB BLD-MCNC: 13 G/DL (ref 13–17.7)
IMM GRANULOCYTES # BLD AUTO: 0 X10E3/UL (ref 0–0.1)
IMM GRANULOCYTES NFR BLD AUTO: 0 %
LDLC SERPL CALC-MCNC: 63 MG/DL (ref 0–99)
LYMPHOCYTES # BLD AUTO: 2 X10E3/UL (ref 0.7–3.1)
LYMPHOCYTES NFR BLD AUTO: 25 %
MCH RBC QN AUTO: 26.1 PG (ref 26.6–33)
MCHC RBC AUTO-ENTMCNC: 30.3 G/DL (ref 31.5–35.7)
MCV RBC AUTO: 86 FL (ref 79–97)
MONOCYTES # BLD AUTO: 0.5 X10E3/UL (ref 0.1–0.9)
MONOCYTES NFR BLD AUTO: 7 %
NEUTROPHILS # BLD AUTO: 5 X10E3/UL (ref 1.4–7)
NEUTROPHILS NFR BLD AUTO: 62 %
PLATELET # BLD AUTO: 286 X10E3/UL (ref 150–450)
POTASSIUM SERPL-SCNC: 4.5 MMOL/L (ref 3.5–5.2)
PROT SERPL-MCNC: 6 G/DL (ref 6–8.5)
PSA SERPL-MCNC: 0.5 NG/ML (ref 0–4)
RBC # BLD AUTO: 4.99 X10E6/UL (ref 4.14–5.8)
SODIUM SERPL-SCNC: 141 MMOL/L (ref 134–144)
TRIGL SERPL-MCNC: 98 MG/DL (ref 0–149)
VLDLC SERPL CALC-MCNC: 19 MG/DL (ref 5–40)
WBC # BLD AUTO: 8 X10E3/UL (ref 3.4–10.8)

## 2023-12-14 ENCOUNTER — OFFICE VISIT (OUTPATIENT)
Dept: FAMILY MEDICINE | Facility: CLINIC | Age: 63
End: 2023-12-14
Payer: COMMERCIAL

## 2023-12-14 VITALS
BODY MASS INDEX: 31.08 KG/M2 | DIASTOLIC BLOOD PRESSURE: 80 MMHG | TEMPERATURE: 98 F | HEART RATE: 60 BPM | RESPIRATION RATE: 20 BRPM | SYSTOLIC BLOOD PRESSURE: 120 MMHG | HEIGHT: 69 IN | WEIGHT: 209.81 LBS

## 2023-12-14 DIAGNOSIS — R73.03 PREDIABETES: Primary | ICD-10-CM

## 2023-12-14 DIAGNOSIS — I10 PRIMARY HYPERTENSION: ICD-10-CM

## 2023-12-14 PROCEDURE — 3008F BODY MASS INDEX DOCD: CPT | Mod: CPTII,S$GLB,, | Performed by: NURSE PRACTITIONER

## 2023-12-14 PROCEDURE — 1159F MED LIST DOCD IN RCRD: CPT | Mod: CPTII,S$GLB,, | Performed by: NURSE PRACTITIONER

## 2023-12-14 PROCEDURE — 3044F PR MOST RECENT HEMOGLOBIN A1C LEVEL <7.0%: ICD-10-PCS | Mod: CPTII,S$GLB,, | Performed by: NURSE PRACTITIONER

## 2023-12-14 PROCEDURE — 1159F PR MEDICATION LIST DOCUMENTED IN MEDICAL RECORD: ICD-10-PCS | Mod: CPTII,S$GLB,, | Performed by: NURSE PRACTITIONER

## 2023-12-14 PROCEDURE — 3044F HG A1C LEVEL LT 7.0%: CPT | Mod: CPTII,S$GLB,, | Performed by: NURSE PRACTITIONER

## 2023-12-14 PROCEDURE — 3008F PR BODY MASS INDEX (BMI) DOCUMENTED: ICD-10-PCS | Mod: CPTII,S$GLB,, | Performed by: NURSE PRACTITIONER

## 2023-12-14 PROCEDURE — 3079F DIAST BP 80-89 MM HG: CPT | Mod: CPTII,S$GLB,, | Performed by: NURSE PRACTITIONER

## 2023-12-14 PROCEDURE — 99213 OFFICE O/P EST LOW 20 MIN: CPT | Mod: S$GLB,,, | Performed by: NURSE PRACTITIONER

## 2023-12-14 PROCEDURE — 3079F PR MOST RECENT DIASTOLIC BLOOD PRESSURE 80-89 MM HG: ICD-10-PCS | Mod: CPTII,S$GLB,, | Performed by: NURSE PRACTITIONER

## 2023-12-14 PROCEDURE — 3074F SYST BP LT 130 MM HG: CPT | Mod: CPTII,S$GLB,, | Performed by: NURSE PRACTITIONER

## 2023-12-14 PROCEDURE — 3074F PR MOST RECENT SYSTOLIC BLOOD PRESSURE < 130 MM HG: ICD-10-PCS | Mod: CPTII,S$GLB,, | Performed by: NURSE PRACTITIONER

## 2023-12-14 PROCEDURE — 99213 PR OFFICE/OUTPT VISIT, EST, LEVL III, 20-29 MIN: ICD-10-PCS | Mod: S$GLB,,, | Performed by: NURSE PRACTITIONER

## 2023-12-14 RX ORDER — PREGABALIN 25 MG/1
25 CAPSULE ORAL 2 TIMES DAILY
COMMUNITY
Start: 2023-10-23

## 2023-12-14 NOTE — PROGRESS NOTES
Patient ID: Matteo Fraire is a 63 y.o. male.    Chief Complaint: Follow-up (64 yo male here for 3 month recheck. KM)    HPI    Mr. Fraire presents today for 3 month follow up as scheduled. He states he is doing well overall at the time of this visit. He denies any interval changes.      Hypertension  This is a chronic problem. The current episode started more than 1 year ago. The problem has been resolved since onset. The problem is controlled. Pertinent negatives include no chest pain, headaches, palpitations or shortness of breath. There are no associated agents to hypertension. Risk factors for coronary artery disease include sedentary lifestyle and male gender. Past treatments include beta blockers. The current treatment provides significant improvement. There are no compliance problems.       We reviewed [facility] [employer] health maintenance.      Past Medical History:   Diagnosis Date    Heart attack 2005;2011    Hypertension     Psoriasis     Stroke      Past Surgical History:   Procedure Laterality Date    CORONARY ANGIOGRAPHY INCLUDING BYPASS GRAFTS WITH CATHETERIZATION OF LEFT HEART Left 6/27/2022    Procedure: Left heart cath;  Surgeon: Stevan Powell MD;  Location: [facility] CATH/EP LAB;  Service: Cardiology;  Laterality: Left;    CORONARY ARTERY BYPASS GRAFT      CYSTOSCOPY W/ URETERAL STENT PLACEMENT Left 11/21/2019    Procedure: CYSTOSCOPY, WITH URETERAL STENT INSERTION;  Surgeon: Mickey Escudero MD;  Location: [facility] OR;  Service: Urology;  Laterality: Left;    ENDOSCOPIC HARVEST OF VEIN Right 7/8/2022    Procedure: HARVEST-VEIN-ENDOVASCULAR;  Surgeon: Eyal Stone MD;  Location: [facility] OR;  Service: Cardiothoracic;  Laterality: Right;    ESOPHAGOGASTRODUODENOSCOPY N/A 7/15/2022    Procedure: EGD (ESOPHAGOGASTRODUODENOSCOPY);  Surgeon: Art Pino MD;  Location: [facility] ENDO;  Service: Endoscopy;  Laterality: N/A;    EXTRACORPOREAL SHOCK WAVE LITHOTRIPSY Left 11/21/2019    Procedure: LITHOTRIPSY,  ESWL;  Surgeon: Mickey Escudero MD;  Location: [facility] OR;  Service: Urology;  Laterality: Left;    HERNIA REPAIR      Inguinal just after birth    REPEAT CORONARY ARTERY BYPASS GRAFTING N/A 7/8/2022    Procedure: CABG, REPEAT;  Surgeon: Eyal Stone MD;  Location: [facility] OR;  Service: Cardiothoracic;  Laterality: N/A;  drugs ordered 7-7  @1322      SURGICAL PROCUREMENT, ARTERY, RADIAL, FOR CABG Left 7/8/2022    Procedure: SURGICAL PROCUREMENT,ARTERY,RADIAL,FOR CABG;  Surgeon: Eyal Stone MD;  Location: [facility] OR;  Service: Cardiothoracic;  Laterality: Left;         Tobacco History:  reports that he has never smoked. He has never been exposed to tobacco smoke. He has never used smokeless tobacco.      Review of patient's allergies indicates:   Allergen Reactions    Venom-wasp Anaphylaxis       Current Outpatient Medications:     amiodarone (PACERONE) 200 MG Tab, Take 1 tablet (200 mg total) by mouth once daily., Disp: 90 tablet, Rfl: 3    aspirin 81 MG Chew, Take 81 mg by mouth once daily., Disp: , Rfl:     atorvastatin (LIPITOR) 80 MG tablet, Take 1 tablet (80 mg total) by mouth once daily., Disp: 90 tablet, Rfl: 1    metFORMIN (GLUCOPHAGE) 500 MG tablet, Take 1 tablet (500 mg total) by mouth 2 (two) times daily with meals., Disp: 180 tablet, Rfl: 1    metoprolol tartrate (LOPRESSOR) 25 MG tablet, Take 0.5 tablets (12.5 mg total) by mouth 2 (two) times daily., Disp: 90 tablet, Rfl: 3    midodrine (PROAMATINE) 10 MG tablet, Take 1 tablet (10 mg total) by mouth 2 (two) times a day., Disp: 180 tablet, Rfl: 1    triamcinolone acetonide 0.1% (KENALOG) 0.1 % cream, Apply topically 2 (two) times daily., Disp: , Rfl:     pregabalin (LYRICA) 25 MG capsule, Take 25 mg by mouth 2 (two) times daily., Disp: , Rfl:   No current facility-administered medications for this visit.    Facility-Administered Medications Ordered in Other Visits:     sodium bicarbonate 10 mEq, potassium chloride 30 mEq in cardioplegic  "solution (PLEGISOL) 1,000 mL, , Intravenous, Once, Eyal Stone MD    sodium bicarbonate 10 mEq, potassium chloride 70 mEq in cardioplegic solution (PLEGISOL) 1,000 mL, , Intravenous, Once, Eyal Stone MD    thromb,db-dpltjo-qvjndhr,s-Ca (TISSEEL) 4 mL, , Topical (Top), Once, Eyal Stone MD    Review of Systems   Constitutional:  Negative for activity change, appetite change, fatigue and fever.   HENT:  Negative for congestion, dental problem, nosebleeds, postnasal drip, rhinorrhea, sinus pain, sore throat and tinnitus.    Eyes:  Negative for pain, discharge, itching and visual disturbance.   Respiratory:  Negative for cough, chest tightness, shortness of breath and wheezing.    Cardiovascular:  Negative for chest pain.   Gastrointestinal:  Negative for abdominal pain, blood in stool, constipation, diarrhea, nausea and vomiting.   Endocrine: Negative for cold intolerance, heat intolerance, polydipsia, polyphagia and polyuria.   Genitourinary:  Negative for difficulty urinating, flank pain, genital sores, hematuria and urgency.   Musculoskeletal:  Negative for arthralgias and myalgias.   Skin:  Negative for rash and wound.   Allergic/Immunologic: Negative for environmental allergies and food allergies.   Neurological:  Negative for dizziness, light-headedness and headaches.   Hematological:  Negative for adenopathy. Does not bruise/bleed easily.   Psychiatric/Behavioral:  Negative for behavioral problems, confusion, decreased concentration, sleep disturbance and suicidal ideas. The patient is not nervous/anxious and is not hyperactive.           Objective:      Vitals:    12/14/23 0928   BP: 120/80   Pulse: 60   Resp: 20   Temp: 97.8 °F (36.6 °C)   TempSrc: Oral   Weight: 95.2 kg (209 lb 12.8 oz)   Height: 5' 9" (1.753 m)     Physical Exam  Vitals reviewed.   Constitutional:       General: He is not in acute distress.     Appearance: Normal appearance. He is normal weight. He is not " ill-appearing, toxic-appearing or diaphoretic.   HENT:      Head: Normocephalic and atraumatic.      Right Ear: Tympanic membrane and external ear normal. There is no impacted cerumen.      Left Ear: Tympanic membrane and external ear normal. There is no impacted cerumen.      Nose: Nose normal. No congestion or rhinorrhea.      Mouth/Throat:      Mouth: Mucous membranes are moist.      Pharynx: Oropharynx is clear. No oropharyngeal exudate or posterior oropharyngeal erythema.   Eyes:      General: No scleral icterus.        Right eye: No discharge.         Left eye: No discharge.      Extraocular Movements: Extraocular movements intact.      Conjunctiva/sclera: Conjunctivae normal.      Pupils: Pupils are equal, round, and reactive to light.   Cardiovascular:      Rate and Rhythm: Normal rate and regular rhythm.      Pulses: Normal pulses.      Heart sounds: Normal heart sounds. No murmur heard.     No friction rub. No gallop.   Pulmonary:      Effort: Pulmonary effort is normal. No respiratory distress.      Breath sounds: Normal breath sounds. No wheezing, rhonchi or rales.   Chest:      Chest wall: No tenderness.   Abdominal:      General: Abdomen is flat. Bowel sounds are normal.      Palpations: Abdomen is soft. There is no mass.      Tenderness: There is no abdominal tenderness. There is no guarding or rebound.   Musculoskeletal:         General: No swelling or signs of injury. Normal range of motion.      Cervical back: Normal range of motion and neck supple. No rigidity or tenderness.      Right lower leg: No edema.      Left lower leg: No edema.   Skin:     General: Skin is warm and dry.      Capillary Refill: Capillary refill takes less than 2 seconds.      Coloration: Skin is not pale.      Findings: No bruising or erythema.   Neurological:      General: No focal deficit present.      Mental Status: He is alert and oriented to person, place, and time. Mental status is at baseline.      Motor: No  weakness.      Coordination: Coordination normal.      Gait: Gait normal.   Psychiatric:         Mood and Affect: Mood normal.         Behavior: Behavior normal.         Thought Content: Thought content normal.         Judgment: Judgment normal.           Assessment:       1. Prediabetes    2. Primary hypertension           Plan:       Prediabetes  -     Comprehensive Metabolic Panel; Future; Expected date: 03/14/2024  -     Hemoglobin A1C; Future; Expected date: 03/14/2024    Primary hypertension  -     Comprehensive Metabolic Panel; Future; Expected date: 03/14/2024      Follow up in about 3 months (around 3/14/2024), or if symptoms worsen or fail to improve.        12/14/2023 Naomi Bliss NP

## 2024-03-14 ENCOUNTER — OFFICE VISIT (OUTPATIENT)
Dept: FAMILY MEDICINE | Facility: CLINIC | Age: 64
End: 2024-03-14
Payer: COMMERCIAL

## 2024-03-14 VITALS
DIASTOLIC BLOOD PRESSURE: 70 MMHG | TEMPERATURE: 98 F | BODY MASS INDEX: 32.05 KG/M2 | HEIGHT: 69 IN | WEIGHT: 216.38 LBS | SYSTOLIC BLOOD PRESSURE: 118 MMHG | HEART RATE: 60 BPM | RESPIRATION RATE: 20 BRPM

## 2024-03-14 DIAGNOSIS — R06.02 SHORTNESS OF BREATH ON EXERTION: Primary | ICD-10-CM

## 2024-03-14 DIAGNOSIS — I47.29 NSVT (NONSUSTAINED VENTRICULAR TACHYCARDIA): ICD-10-CM

## 2024-03-14 DIAGNOSIS — I25.709 CORONARY ARTERY DISEASE INVOLVING CORONARY BYPASS GRAFT OF NATIVE HEART WITH ANGINA PECTORIS: ICD-10-CM

## 2024-03-14 LAB
ALBUMIN SERPL-MCNC: 4.4 G/DL (ref 3.9–4.9)
ALBUMIN/GLOB SERPL: 2.4 {RATIO} (ref 1.2–2.2)
ALP SERPL-CCNC: 68 IU/L (ref 44–121)
ALT SERPL-CCNC: 18 IU/L (ref 0–44)
AST SERPL-CCNC: 16 IU/L (ref 0–40)
BILIRUB SERPL-MCNC: 1.4 MG/DL (ref 0–1.2)
BUN SERPL-MCNC: 21 MG/DL (ref 8–27)
BUN/CREAT SERPL: 15 (ref 10–24)
CALCIUM SERPL-MCNC: 9.1 MG/DL (ref 8.6–10.2)
CHLORIDE SERPL-SCNC: 105 MMOL/L (ref 96–106)
CO2 SERPL-SCNC: 22 MMOL/L (ref 20–29)
CREAT SERPL-MCNC: 1.44 MG/DL (ref 0.76–1.27)
EST. GFR  (NO RACE VARIABLE): 55 ML/MIN/1.73
GLOBULIN SER CALC-MCNC: 1.8 G/DL (ref 1.5–4.5)
GLUCOSE SERPL-MCNC: 129 MG/DL (ref 70–99)
HBA1C MFR BLD: 6.2 % (ref 4.8–5.6)
POTASSIUM SERPL-SCNC: 5 MMOL/L (ref 3.5–5.2)
PROT SERPL-MCNC: 6.2 G/DL (ref 6–8.5)
SODIUM SERPL-SCNC: 140 MMOL/L (ref 134–144)

## 2024-03-14 PROCEDURE — 99417 PROLNG OP E/M EACH 15 MIN: CPT | Mod: S$GLB,,, | Performed by: NURSE PRACTITIONER

## 2024-03-14 PROCEDURE — 3008F BODY MASS INDEX DOCD: CPT | Mod: CPTII,S$GLB,, | Performed by: NURSE PRACTITIONER

## 2024-03-14 PROCEDURE — 3074F SYST BP LT 130 MM HG: CPT | Mod: CPTII,S$GLB,, | Performed by: NURSE PRACTITIONER

## 2024-03-14 PROCEDURE — 99215 OFFICE O/P EST HI 40 MIN: CPT | Mod: S$GLB,,, | Performed by: NURSE PRACTITIONER

## 2024-03-14 PROCEDURE — 99999 PR PBB SHADOW E&M-EST. PATIENT-LVL V: CPT | Mod: PBBFAC,,, | Performed by: NURSE PRACTITIONER

## 2024-03-14 PROCEDURE — 3044F HG A1C LEVEL LT 7.0%: CPT | Mod: CPTII,S$GLB,, | Performed by: NURSE PRACTITIONER

## 2024-03-14 PROCEDURE — 1159F MED LIST DOCD IN RCRD: CPT | Mod: CPTII,S$GLB,, | Performed by: NURSE PRACTITIONER

## 2024-03-14 PROCEDURE — 3078F DIAST BP <80 MM HG: CPT | Mod: CPTII,S$GLB,, | Performed by: NURSE PRACTITIONER

## 2024-03-14 NOTE — PROGRESS NOTES
Patient ID: Matteo Fraire is a 63 y.o. male.    Chief Complaint: Follow-up (64 yo male here for 3 month recheck. Pt states concern of SOB after being active in the yard or mowing the grass and have to prop himself up with a pillow while laying down at night. Pt also states concern of runny nose after med in take times 3 months. KM)    Mr Fraire presents today for follow up. He states he has been experiencing shortness of breath on exertion. He states after doing strenuous yard work it takes him several minutes to breath normally. He is also concerned about not being able to lay flat at night without feels short of breath. He is up on at least 2-3 pillows in order to sleep. We also discussed change in kidney function over the course of 2 years. We discussed fluid intake as he appears somewhat dehydrated on blood work. He states he will actively try to increase his intake. We reviewed all [employer].nance.Medication refilled as needed/ He denies any other issues or complaints.     Shortness of Breath  This is a new problem. The current episode started more than 1 month ago. The problem occurs daily. The problem has been unchanged. The average episode lasts 20 minutes. Associated symptoms include orthopnea and wheezing. The symptoms are aggravated by odors, exercise and lying flat. The patient has no known risk factors for DVT/PE. He has tried nothing for the symptoms. The treatment provided no relief.           Past Medical History:   Diagnosis Date    Heart attack 2005;2011    Hypertension     Psoriasis     Stroke      Past Surgical History:   Procedure Laterality Date    CORONARY ANGIOGRAPHY INCLUDING BYPASS GRAFTS WITH CATHETERIZATION OF LEFT HEART Left 6/27/2022    Procedure: Left heart cath;  Surgeon: Stevan Powell MD;  Location: [facility] CATH/EP LAB;  Service: Cardiology;  Laterality: Left;    CORONARY ARTERY BYPASS GRAFT      CYSTOSCOPY W/ URETERAL STENT PLACEMENT Left 11/21/2019    Procedure:  CYSTOSCOPY, WITH URETERAL STENT INSERTION;  Surgeon: Mickey Escudero MD;  Location: [facility] OR;  Service: Urology;  Laterality: Left;    ENDOSCOPIC HARVEST OF VEIN Right 7/8/2022    Procedure: HARVEST-VEIN-ENDOVASCULAR;  Surgeon: Eyal Stone MD;  Location: [facility] OR;  Service: Cardiothoracic;  Laterality: Right;    ESOPHAGOGASTRODUODENOSCOPY N/A 7/15/2022    Procedure: EGD (ESOPHAGOGASTRODUODENOSCOPY);  Surgeon: Art Pino MD;  Location: [facility] ENDO;  Service: Endoscopy;  Laterality: N/A;    EXTRACORPOREAL SHOCK WAVE LITHOTRIPSY Left 11/21/2019    Procedure: LITHOTRIPSY, ESWL;  Surgeon: Mickey Escudero MD;  Location: [facility] OR;  Service: Urology;  Laterality: Left;    HERNIA REPAIR      Inguinal just after birth    REPEAT CORONARY ARTERY BYPASS GRAFTING N/A 7/8/2022    Procedure: CABG, REPEAT;  Surgeon: Eyal Stone MD;  Location: [facility] OR;  Service: Cardiothoracic;  Laterality: N/A;  drugs ordered 7-7  @1322      SURGICAL PROCUREMENT, ARTERY, RADIAL, FOR CABG Left 7/8/2022    Procedure: SURGICAL PROCUREMENT,ARTERY,RADIAL,FOR CABG;  Surgeon: Eyal Stone MD;  Location: [facility] OR;  Service: Cardiothoracic;  Laterality: Left;         Tobacco History:  reports that he has never smoked. He has never been exposed to tobacco smoke. He has never used smokeless tobacco.      Review of patient's allergies indicates:   Allergen Reactions    Venom-wasp Anaphylaxis       Current Outpatient Medications:     amiodarone (PACERONE) 200 MG Tab, Take 1 tablet (200 mg total) by mouth once daily., Disp: 90 tablet, Rfl: 3    aspirin 81 MG Chew, Take 81 mg by mouth once daily., Disp: , Rfl:     metoprolol tartrate (LOPRESSOR) 25 MG tablet, Take 0.5 tablets (12.5 mg total) by mouth 2 (two) times daily., Disp: 90 tablet, Rfl: 3    pregabalin (LYRICA) 25 MG capsule, Take 25 mg by mouth 2 (two) times daily., Disp: , Rfl:     triamcinolone acetonide 0.1% (KENALOG) 0.1 % cream, Apply topically 2 (two) times  "daily., Disp: , Rfl:     atorvastatin (LIPITOR) 80 MG tablet, Take 1 tablet (80 mg total) by mouth once daily., Disp: 90 tablet, Rfl: 1    metFORMIN (GLUCOPHAGE) 500 MG tablet, Take 1 tablet (500 mg total) by mouth 2 (two) times daily with meals., Disp: 180 tablet, Rfl: 1    midodrine (PROAMATINE) 10 MG tablet, Take 1 tablet (10 mg total) by mouth 2 (two) times a day., Disp: 180 tablet, Rfl: 1  No current facility-administered medications for this visit.    Facility-Administered Medications Ordered in Other Visits:     sodium bicarbonate 10 mEq, potassium chloride 30 mEq in cardioplegic solution (PLEGISOL) 1,000 mL, , Intravenous, Once, Eyal Stone MD    sodium bicarbonate 10 mEq, potassium chloride 70 mEq in cardioplegic solution (PLEGISOL) 1,000 mL, , Intravenous, Once, Eyal Stone MD    thromb,hv-umhxgu-arkquyv,s-Ca (TISSEEL) 4 mL, , Topical (Top), Once, Eyal Stone MD    Review of Systems   Constitutional:  Positive for activity change and fatigue.   Respiratory:  Positive for shortness of breath and wheezing.    Cardiovascular:  Positive for orthopnea.   Musculoskeletal:  Positive for arthralgias.          Objective:      Vitals:    03/14/24 1314   BP: 118/70   Pulse: 60   Resp: 20   Temp: 97.6 °F (36.4 °C)   TempSrc: Oral   Weight: 98.2 kg (216 lb 6.4 oz)   Height: 5' 9" (1.753 m)     Physical Exam  Constitutional:       Appearance: Normal appearance.   Cardiovascular:      Rate and Rhythm: Normal rate and regular rhythm.      Pulses: Normal pulses.      Heart sounds: Normal heart sounds.   Pulmonary:      Effort: Pulmonary effort is normal.      Breath sounds: Normal breath sounds.   Abdominal:      General: Bowel sounds are normal.      Palpations: Abdomen is soft.   Musculoskeletal:         General: Normal range of motion.   Neurological:      Mental Status: He is alert and oriented to person, place, and time.   Psychiatric:         Mood and Affect: Mood normal.         " Behavior: Behavior normal.           Assessment:       1. Shortness of breath on exertion    2. Coronary artery disease involving coronary bypass graft of native heart with angina pectoris    3. NSVT (nonsustained ventricular tachycardia)           Plan:       Shortness of breath on exertion  -     EKG 12-lead; Future  -     Echo; Future  -     X-Ray Chest PA And Lateral; Future; Expected date: 03/14/2024  -     Complete PFT w/ bronchodilator; Future  -     B-TYPE NATRIURETIC PEPTIDE; Future; Expected date: 03/14/2024  -     TSH; Future; Expected date: 03/14/2024    Coronary artery disease involving coronary bypass graft of native heart with angina pectoris  -     Ambulatory referral/consult to Cardiology; Future; Expected date: 03/21/2024    NSVT (nonsustained ventricular tachycardia)  -     Ambulatory referral/consult to Cardiology; Future; Expected date: 03/21/2024      Follow up in about 3 months (around 6/14/2024).        3/14/2024 Naomi Bliss NP    Spent amalia 40 minutes with patient which involved review of pts medical conditions, labs, medications and with 50% of time face-to-face discussion about medical problems, management and any applicable changes.

## 2024-03-18 ENCOUNTER — HOSPITAL ENCOUNTER (OUTPATIENT)
Dept: RADIOLOGY | Facility: HOSPITAL | Age: 64
Discharge: HOME | End: 2024-03-18
Attending: NURSE PRACTITIONER
Payer: COMMERCIAL

## 2024-03-18 DIAGNOSIS — R06.02 SHORTNESS OF BREATH ON EXERTION: ICD-10-CM

## 2024-03-18 PROCEDURE — 71046 X-RAY EXAM CHEST 2 VIEWS: CPT | Mod: TC,PO

## 2024-03-21 LAB
BNP SERPL-MCNC: 486.6 PG/ML (ref 0–100)
TSH SERPL DL<=0.005 MIU/L-ACNC: 2.91 UIU/ML (ref 0.45–4.5)

## 2024-03-27 ENCOUNTER — HOSPITAL ENCOUNTER (OUTPATIENT)
Dept: PULMONOLOGY | Facility: HOSPITAL | Age: 64
Discharge: HOME | End: 2024-03-27
Attending: NURSE PRACTITIONER
Payer: COMMERCIAL

## 2024-03-27 ENCOUNTER — HOSPITAL ENCOUNTER (OUTPATIENT)
Dept: CARDIOLOGY | Facility: HOSPITAL | Age: 64
Discharge: HOME | End: 2024-03-27
Attending: NURSE PRACTITIONER
Payer: COMMERCIAL

## 2024-03-27 ENCOUNTER — TELEPHONE (OUTPATIENT)
Dept: FAMILY MEDICINE | Facility: CLINIC | Age: 64
End: 2024-03-27
Payer: COMMERCIAL

## 2024-03-27 VITALS — HEIGHT: 69 IN | WEIGHT: 216 LBS | BODY MASS INDEX: 31.99 KG/M2

## 2024-03-27 DIAGNOSIS — R06.02 SHORTNESS OF BREATH ON EXERTION: ICD-10-CM

## 2024-03-27 LAB
AORTIC ROOT ANNULUS: 3.6 CM
AORTIC VALVE CUSP SEPERATION: 1.7 CM
AV INDEX (PROSTH): 0.68
AV MEAN GRADIENT: 4 MMHG
AV PEAK GRADIENT: 8 MMHG
AV VALVE AREA BY VELOCITY RATIO: 2.85 CM²
AV VALVE AREA: 2.6 CM²
AV VELOCITY RATIO: 0.75
BSA FOR ECHO PROCEDURE: 2.18 M2
CV ECHO LV RWT: 0.34 CM
DOP CALC AO PEAK VEL: 1.4 M/S
DOP CALC AO VTI: 29.7 CM
DOP CALC LVOT AREA: 3.8 CM2
DOP CALC LVOT DIAMETER: 2.2 CM
DOP CALC LVOT PEAK VEL: 1.05 M/S
DOP CALC LVOT STROKE VOLUME: 77.13 CM3
DOP CALCLVOT PEAK VEL VTI: 20.3 CM
E WAVE DECELERATION TIME: 169 MSEC
E/A RATIO: 3.2
E/E' RATIO: 22.4 M/S
ECHO LV POSTERIOR WALL: 1.12 CM (ref 0.6–1.1)
FRACTIONAL SHORTENING: 18 % (ref 28–44)
INTERVENTRICULAR SEPTUM: 1.65 CM (ref 0.6–1.1)
IVRT: 90 MSEC
LEFT ATRIUM SIZE: 5.3 CM
LEFT INTERNAL DIMENSION IN SYSTOLE: 5.39 CM (ref 2.1–4)
LEFT VENTRICLE DIASTOLIC VOLUME INDEX: 103.76 ML/M2
LEFT VENTRICLE DIASTOLIC VOLUME: 221 ML
LEFT VENTRICLE MASS INDEX: 207 G/M2
LEFT VENTRICLE SYSTOLIC VOLUME INDEX: 66.2 ML/M2
LEFT VENTRICLE SYSTOLIC VOLUME: 141 ML
LEFT VENTRICULAR INTERNAL DIMENSION IN DIASTOLE: 6.56 CM (ref 3.5–6)
LEFT VENTRICULAR MASS: 441.45 G
LV LATERAL E/E' RATIO: 18.67 M/S
LV SEPTAL E/E' RATIO: 28 M/S
LVOT MG: 2 MMHG
LVOT MV: 0.64 CM/S
MV PEAK A VEL: 0.35 M/S
MV PEAK E VEL: 1.12 M/S
MV STENOSIS PRESSURE HALF TIME: 57 MS
MV VALVE AREA P 1/2 METHOD: 3.86 CM2
PISA TR MAX VEL: 2.51 M/S
RA PRESSURE ESTIMATED: 3 MMHG
RIGHT VENTRICULAR END-DIASTOLIC DIMENSION: 3.34 CM
RV TB RVSP: 6 MMHG
TDI LATERAL: 0.06 M/S
TDI SEPTAL: 0.04 M/S
TDI: 0.05 M/S
TR MAX PG: 25 MMHG
TV REST PULMONARY ARTERY PRESSURE: 28 MMHG
Z-SCORE OF LEFT VENTRICULAR DIMENSION IN END DIASTOLE: -0.34
Z-SCORE OF LEFT VENTRICULAR DIMENSION IN END SYSTOLE: 2.08

## 2024-03-27 PROCEDURE — 93306 TTE W/DOPPLER COMPLETE: CPT

## 2024-03-27 PROCEDURE — 94010 BREATHING CAPACITY TEST: CPT

## 2024-03-27 PROCEDURE — 94727 GAS DIL/WSHOT DETER LNG VOL: CPT

## 2024-03-27 PROCEDURE — 94729 DIFFUSING CAPACITY: CPT

## 2024-03-27 PROCEDURE — 93306 TTE W/DOPPLER COMPLETE: CPT | Mod: 26,,, | Performed by: INTERNAL MEDICINE

## 2024-03-27 NOTE — TELEPHONE ENCOUNTER
Spoke with Татьяна with [facility] regard referral. Per Татьяна, No referral on file and no appt scheduled. I faxed referral with clinical notes,labs, and echo with confirmation to [phone]. Per Татьяна with [facility], Once she receive referral today she will schedule pt a appt. I adv pt of information given and to be looking out for provider listed number or strange numbers. Pt aware of information given. KM

## 2024-06-17 ENCOUNTER — OFFICE VISIT (OUTPATIENT)
Dept: FAMILY MEDICINE | Facility: CLINIC | Age: 64
End: 2024-06-17
Payer: COMMERCIAL

## 2024-06-17 VITALS
HEART RATE: 68 BPM | HEIGHT: 69 IN | TEMPERATURE: 98 F | DIASTOLIC BLOOD PRESSURE: 82 MMHG | BODY MASS INDEX: 31.97 KG/M2 | RESPIRATION RATE: 20 BRPM | WEIGHT: 215.88 LBS | SYSTOLIC BLOOD PRESSURE: 132 MMHG

## 2024-06-17 DIAGNOSIS — I47.29 NSVT (NONSUSTAINED VENTRICULAR TACHYCARDIA): ICD-10-CM

## 2024-06-17 DIAGNOSIS — R06.02 SHORTNESS OF BREATH ON EXERTION: Primary | ICD-10-CM

## 2024-06-17 DIAGNOSIS — I25.709 CORONARY ARTERY DISEASE INVOLVING CORONARY BYPASS GRAFT OF NATIVE HEART WITH ANGINA PECTORIS: ICD-10-CM

## 2024-06-17 DIAGNOSIS — R73.03 PREDIABETES: ICD-10-CM

## 2024-06-17 DIAGNOSIS — E78.5 HYPERLIPIDEMIA, UNSPECIFIED HYPERLIPIDEMIA TYPE: ICD-10-CM

## 2024-06-17 PROCEDURE — 99214 OFFICE O/P EST MOD 30 MIN: CPT | Mod: S$GLB,,, | Performed by: NURSE PRACTITIONER

## 2024-06-17 PROCEDURE — 99999 PR PBB SHADOW E&M-EST. PATIENT-LVL IV: CPT | Mod: PBBFAC,,, | Performed by: NURSE PRACTITIONER

## 2024-06-17 PROCEDURE — 3008F BODY MASS INDEX DOCD: CPT | Mod: CPTII,S$GLB,, | Performed by: NURSE PRACTITIONER

## 2024-06-17 PROCEDURE — 3079F DIAST BP 80-89 MM HG: CPT | Mod: CPTII,S$GLB,, | Performed by: NURSE PRACTITIONER

## 2024-06-17 PROCEDURE — 1159F MED LIST DOCD IN RCRD: CPT | Mod: CPTII,S$GLB,, | Performed by: NURSE PRACTITIONER

## 2024-06-17 PROCEDURE — 3044F HG A1C LEVEL LT 7.0%: CPT | Mod: CPTII,S$GLB,, | Performed by: NURSE PRACTITIONER

## 2024-06-17 PROCEDURE — 3075F SYST BP GE 130 - 139MM HG: CPT | Mod: CPTII,S$GLB,, | Performed by: NURSE PRACTITIONER

## 2024-06-17 RX ORDER — ATORVASTATIN CALCIUM 80 MG/1
80 TABLET, FILM COATED ORAL DAILY
Qty: 90 TABLET | Refills: 1 | Status: SHIPPED | OUTPATIENT
Start: 2024-06-17 | End: 2024-12-14

## 2024-06-17 RX ORDER — METFORMIN HYDROCHLORIDE 500 MG/1
500 TABLET ORAL 2 TIMES DAILY WITH MEALS
Qty: 180 TABLET | Refills: 1 | Status: SHIPPED | OUTPATIENT
Start: 2024-06-17 | End: 2024-12-14

## 2024-06-17 RX ORDER — MIDODRINE HYDROCHLORIDE 10 MG/1
10 TABLET ORAL 2 TIMES DAILY
Qty: 180 TABLET | Refills: 1 | Status: SHIPPED | OUTPATIENT
Start: 2024-06-17 | End: 2024-12-14

## 2024-06-17 NOTE — PROGRESS NOTES
Patient ID: Matteo Friare is a 63 y.o. male.    Chief Complaint: Follow-up (64 yo male here for 3 month recheck. Pt states he is still experiencing some SOB and no appt with Cardio or EKG scheduled. KM )    Mr. Fraire presents today for follow up. He states he is doing well overall. He continues to have shortness of breath after prolonged activity. His oxygenation is completely normal. He has not followed up with cardiology so I will place new referral at this time. He was seen by specialist immediately following discharge from stroke and angio but he somehow fell out of follow up. His blood pressure is somewhat elevated upon arrival but improved throughout the visit. He has been taking all of his medication as prescribed.  He is in need of updated blood work as well. He denies any other issues or complaints.       Past Medical History:   Diagnosis Date    Heart attack 2005;2011    Hypertension     Psoriasis     Stroke      Past Surgical History:   Procedure Laterality Date    CORONARY ANGIOGRAPHY INCLUDING BYPASS GRAFTS WITH CATHETERIZATION OF LEFT HEART Left 6/27/2022    Procedure: Left heart cath;  Surgeon: Stevan Powell MD;  Location: [facility] CATH/EP LAB;  Service: Cardiology;  Laterality: Left;    CORONARY ARTERY BYPASS GRAFT      CYSTOSCOPY W/ URETERAL STENT PLACEMENT Left 11/21/2019    Procedure: CYSTOSCOPY, WITH URETERAL STENT INSERTION;  Surgeon: Mickey Escudero MD;  Location: [facility] OR;  Service: Urology;  Laterality: Left;    ENDOSCOPIC HARVEST OF VEIN Right 7/8/2022    Procedure: HARVEST-VEIN-ENDOVASCULAR;  Surgeon: Eyal Stone MD;  Location: [facility] OR;  Service: Cardiothoracic;  Laterality: Right;    ESOPHAGOGASTRODUODENOSCOPY N/A 7/15/2022    Procedure: EGD (ESOPHAGOGASTRODUODENOSCOPY);  Surgeon: Art Pino MD;  Location: [facility] ENDO;  Service: Endoscopy;  Laterality: N/A;    EXTRACORPOREAL SHOCK WAVE LITHOTRIPSY Left 11/21/2019    Procedure: LITHOTRIPSY, ESWL;  Surgeon: Mickey  MD Kena;  Location: [facility] OR;  Service: Urology;  Laterality: Left;    HERNIA REPAIR      Inguinal just after birth    REPEAT CORONARY ARTERY BYPASS GRAFTING N/A 7/8/2022    Procedure: CABG, REPEAT;  Surgeon: Eyal Stone MD;  Location: [facility] OR;  Service: Cardiothoracic;  Laterality: N/A;  drugs ordered 7-7  @1322      SURGICAL PROCUREMENT, ARTERY, RADIAL, FOR CABG Left 7/8/2022    Procedure: SURGICAL PROCUREMENT,ARTERY,RADIAL,FOR CABG;  Surgeon: Eyal Stone MD;  Location: [facility] OR;  Service: Cardiothoracic;  Laterality: Left;         Tobacco History:  reports that he has never smoked. He has never been exposed to tobacco smoke. He has never used smokeless tobacco.      Review of patient's allergies indicates:   Allergen Reactions    Venom-wasp Anaphylaxis       Current Outpatient Medications:     amiodarone (PACERONE) 200 MG Tab, Take 1 tablet (200 mg total) by mouth once daily., Disp: 90 tablet, Rfl: 3    aspirin 81 MG Chew, Take 81 mg by mouth once daily., Disp: , Rfl:     metoprolol tartrate (LOPRESSOR) 25 MG tablet, Take 0.5 tablets (12.5 mg total) by mouth 2 (two) times daily., Disp: 90 tablet, Rfl: 3    triamcinolone acetonide 0.1% (KENALOG) 0.1 % cream, Apply topically 2 (two) times daily., Disp: , Rfl:     atorvastatin (LIPITOR) 80 MG tablet, Take 1 tablet (80 mg total) by mouth once daily., Disp: 90 tablet, Rfl: 1    metFORMIN (GLUCOPHAGE) 500 MG tablet, Take 1 tablet (500 mg total) by mouth 2 (two) times daily with meals., Disp: 180 tablet, Rfl: 1    midodrine (PROAMATINE) 10 MG tablet, Take 1 tablet (10 mg total) by mouth 2 (two) times a day., Disp: 180 tablet, Rfl: 1    pregabalin (LYRICA) 25 MG capsule, Take 25 mg by mouth 2 (two) times daily., Disp: , Rfl:   No current facility-administered medications for this visit.    Facility-Administered Medications Ordered in Other Visits:     sodium bicarbonate 10 mEq, potassium chloride 30 mEq in cardioplegic solution (PLEGISOL) 1,000  "mL, , Intravenous, Once, Eyal Stone MD    sodium bicarbonate 10 mEq, potassium chloride 70 mEq in cardioplegic solution (PLEGISOL) 1,000 mL, , Intravenous, Once, Eyal Stone MD    thromb,od-kkdtke-lmgfyek,s-Ca (TISSEEL) 4 mL, , Topical (Top), Once, Eyal Stone MD    Review of Systems   Constitutional:  Positive for fatigue. Negative for activity change, appetite change and fever.   HENT:  Negative for congestion, dental problem, nosebleeds, postnasal drip, rhinorrhea, sinus pain, sore throat and tinnitus.    Eyes:  Negative for pain, discharge, itching and visual disturbance.   Respiratory:  Positive for shortness of breath. Negative for cough, chest tightness and wheezing.    Cardiovascular:  Negative for chest pain.   Gastrointestinal:  Negative for abdominal pain, blood in stool, constipation, diarrhea, nausea and vomiting.   Endocrine: Negative for cold intolerance, heat intolerance, polydipsia, polyphagia and polyuria.   Genitourinary:  Negative for difficulty urinating, flank pain, genital sores, hematuria and urgency.   Musculoskeletal:  Negative for arthralgias and myalgias.   Skin:  Negative for rash and wound.   Allergic/Immunologic: Negative for environmental allergies and food allergies.   Neurological:  Negative for dizziness, light-headedness and headaches.   Hematological:  Negative for adenopathy. Does not bruise/bleed easily.   Psychiatric/Behavioral:  Negative for behavioral problems, confusion, decreased concentration, sleep disturbance and suicidal ideas. The patient is not nervous/anxious and is not hyperactive.           Objective:      Vitals:    06/17/24 1048 06/17/24 1126   BP: (!) 156/88 132/82   Pulse: 68    Resp: 20    Temp: 97.9 °F (36.6 °C)    TempSrc: Oral    Weight: 97.9 kg (215 lb 14.4 oz)    Height: 5' 9" (1.753 m)      Physical Exam  Constitutional:       Appearance: Normal appearance.   Cardiovascular:      Rate and Rhythm: Normal rate and regular " rhythm.      Pulses: Normal pulses.      Heart sounds: Normal heart sounds.   Pulmonary:      Effort: Pulmonary effort is normal.      Breath sounds: Normal breath sounds.   Abdominal:      General: Bowel sounds are normal.      Palpations: Abdomen is soft.   Musculoskeletal:         General: Normal range of motion.   Neurological:      Mental Status: He is alert and oriented to person, place, and time.   Psychiatric:         Mood and Affect: Mood normal.         Behavior: Behavior normal.           Assessment:       1. Shortness of breath on exertion    2. Hyperlipidemia, unspecified hyperlipidemia type    3. Prediabetes    4. Coronary artery disease involving coronary bypass graft of native heart with angina pectoris    5. NSVT (nonsustained ventricular tachycardia)           Plan:       Shortness of breath on exertion  -     Ambulatory referral/consult to Cardiology; Future; Expected date: 06/24/2024  -     CBC W/ AUTO DIFFERENTIAL; Future; Expected date: 06/17/2024    Hyperlipidemia, unspecified hyperlipidemia type  -     atorvastatin (LIPITOR) 80 MG tablet; Take 1 tablet (80 mg total) by mouth once daily.  Dispense: 90 tablet; Refill: 1    Prediabetes  -     metFORMIN (GLUCOPHAGE) 500 MG tablet; Take 1 tablet (500 mg total) by mouth 2 (two) times daily with meals.  Dispense: 180 tablet; Refill: 1  -     HEMOGLOBIN A1C; Future; Expected date: 06/17/2024  -     COMPREHENSIVE METABOLIC PANEL; Future; Expected date: 06/17/2024    Coronary artery disease involving coronary bypass graft of native heart with angina pectoris  -     Ambulatory referral/consult to Cardiology; Future; Expected date: 06/24/2024    NSVT (nonsustained ventricular tachycardia)  -     Ambulatory referral/consult to Cardiology; Future; Expected date: 06/24/2024    Other orders  -     midodrine (PROAMATINE) 10 MG tablet; Take 1 tablet (10 mg total) by mouth 2 (two) times a day.  Dispense: 180 tablet; Refill: 1      Follow up in about 6 months  (around 12/17/2024), or if symptoms worsen or fail to improve.        6/17/2024 Naomi Bliss NP    Spent amalia 30 minutes with patient which involved review of pts medical conditions, labs, medications and with 50% of time face-to-face discussion about medical problems, management and any applicable changes.

## 2024-07-18 ENCOUNTER — OFFICE VISIT (OUTPATIENT)
Dept: CARDIOLOGY | Facility: CLINIC | Age: 64
End: 2024-07-18
Payer: COMMERCIAL

## 2024-07-18 VITALS
SYSTOLIC BLOOD PRESSURE: 140 MMHG | OXYGEN SATURATION: 98 % | WEIGHT: 223.88 LBS | BODY MASS INDEX: 33.16 KG/M2 | HEART RATE: 64 BPM | HEIGHT: 69 IN | DIASTOLIC BLOOD PRESSURE: 70 MMHG

## 2024-07-18 DIAGNOSIS — Z86.73 HISTORY OF STROKE: ICD-10-CM

## 2024-07-18 DIAGNOSIS — N18.31 CKD STAGE 3A, GFR 45-59 ML/MIN: ICD-10-CM

## 2024-07-18 DIAGNOSIS — I25.10 CORONARY ARTERY DISEASE INVOLVING NATIVE HEART WITHOUT ANGINA PECTORIS, UNSPECIFIED VESSEL OR LESION TYPE: ICD-10-CM

## 2024-07-18 DIAGNOSIS — R06.02 SHORTNESS OF BREATH ON EXERTION: Primary | ICD-10-CM

## 2024-07-18 DIAGNOSIS — I50.20 HEART FAILURE WITH REDUCED EJECTION FRACTION: ICD-10-CM

## 2024-07-18 PROCEDURE — 99999 PR PBB SHADOW E&M-EST. PATIENT-LVL IV: CPT | Mod: PBBFAC,,, | Performed by: STUDENT IN AN ORGANIZED HEALTH CARE EDUCATION/TRAINING PROGRAM

## 2024-07-18 PROCEDURE — 3008F BODY MASS INDEX DOCD: CPT | Mod: CPTII,S$GLB,, | Performed by: STUDENT IN AN ORGANIZED HEALTH CARE EDUCATION/TRAINING PROGRAM

## 2024-07-18 PROCEDURE — 3044F HG A1C LEVEL LT 7.0%: CPT | Mod: CPTII,S$GLB,, | Performed by: STUDENT IN AN ORGANIZED HEALTH CARE EDUCATION/TRAINING PROGRAM

## 2024-07-18 PROCEDURE — 1160F RVW MEDS BY RX/DR IN RCRD: CPT | Mod: CPTII,S$GLB,, | Performed by: STUDENT IN AN ORGANIZED HEALTH CARE EDUCATION/TRAINING PROGRAM

## 2024-07-18 PROCEDURE — 4010F ACE/ARB THERAPY RXD/TAKEN: CPT | Mod: CPTII,S$GLB,, | Performed by: STUDENT IN AN ORGANIZED HEALTH CARE EDUCATION/TRAINING PROGRAM

## 2024-07-18 PROCEDURE — 99215 OFFICE O/P EST HI 40 MIN: CPT | Mod: S$GLB,,, | Performed by: STUDENT IN AN ORGANIZED HEALTH CARE EDUCATION/TRAINING PROGRAM

## 2024-07-18 PROCEDURE — 3078F DIAST BP <80 MM HG: CPT | Mod: CPTII,S$GLB,, | Performed by: STUDENT IN AN ORGANIZED HEALTH CARE EDUCATION/TRAINING PROGRAM

## 2024-07-18 PROCEDURE — 1159F MED LIST DOCD IN RCRD: CPT | Mod: CPTII,S$GLB,, | Performed by: STUDENT IN AN ORGANIZED HEALTH CARE EDUCATION/TRAINING PROGRAM

## 2024-07-18 PROCEDURE — 3077F SYST BP >= 140 MM HG: CPT | Mod: CPTII,S$GLB,, | Performed by: STUDENT IN AN ORGANIZED HEALTH CARE EDUCATION/TRAINING PROGRAM

## 2024-07-18 RX ORDER — FUROSEMIDE 20 MG/1
TABLET ORAL
Qty: 60 TABLET | Refills: 1 | Status: SHIPPED | OUTPATIENT
Start: 2024-07-18

## 2024-07-18 RX ORDER — SODIUM CHLORIDE 9 MG/ML
INJECTION, SOLUTION INTRAVENOUS ONCE
OUTPATIENT
Start: 2024-07-18 | End: 2024-07-18

## 2024-07-18 RX ORDER — SACUBITRIL AND VALSARTAN 24; 26 MG/1; MG/1
1 TABLET, FILM COATED ORAL 2 TIMES DAILY
Qty: 180 TABLET | Refills: 0 | Status: SHIPPED | OUTPATIENT
Start: 2024-07-18

## 2024-07-18 RX ORDER — METOPROLOL SUCCINATE 25 MG/1
25 TABLET, EXTENDED RELEASE ORAL DAILY
Qty: 30 TABLET | Refills: 1 | Status: SHIPPED | OUTPATIENT
Start: 2024-07-18 | End: 2024-09-16

## 2024-07-18 NOTE — PROGRESS NOTES
Patient ID:  Matteo Fraire  63 y.o.  male      Assessment:       1. Shortness of breath on exertion    2. Coronary artery disease involving native heart without angina pectoris, unspecified vessel or lesion type    3. Heart failure with reduced ejection fraction    4. History of stroke    5. CKD stage 3a, GFR 45-59 ml/min      PCI of LAD in 2005 after an MI,  CABGX2 (LIMA to LAD and SVG to OM) in 2011 and redo CABGX3 with complete pericardiectomy in 2022 ( SVG to RPDA, SVG to OM and left radial artery to LAD - ?sequential from SVG to OM).    Plan:     He has been experiencing dyspnea on exertion for past 4-5 months.  Clinically does not appear volume overloaded at this time.  Recent echo shows reduction in LV function compared to the previous echo.  LVEF is 35-40%.  He was lost to follow-up after January 2023.      Recommend coronary and bypass angiogram given the reduction in LV function to define anatomy/pathology and guide treatment. He is also symptomatic.     Will initiate GFMT for cardiomyopathy and optimize as tolerated.  Start Entresto 24-26 mg b.i.d. This should help with better BP control as well. His BP is slightly elevated today.  Change Lopressor to Toprol 25 mg daily.  Obtain BMP and BNP.    Lasix 20 mg p.r.n. for weight gain more than 3 lb in 24 hours or more than 5 lb in a week.  Contact office if that is the case.    Continue aspirin 81 mg daily and Lipitor 80 mg daily for CAD.  Last LDL 63 in December 2023.    Continue amiodarone 200 mg daily for now.  He has been on it for NSVT episodes during his hospitalization in 2022.    Counseled on daily fluid and sodium restrictions; <1.5 and 2g respectively.       Risks, benefits and alternatives were explained to the patient.    Risks include but not limited to bleeding, allergic reaction to contrast, vascular damage, renal failure with potential need for dialysis, infection, rhythm abnormalities, stroke, myocardial infarction, emergency bypass surgery  and death.    The risks of moderate sedation include hypotension, respiratory depression, arrhythmias, bronchospasm and death.  Patient does understand these risks and wants to proceed with cardiac catheterization.  Should stenting be indicated, patient has agreed to dual antiplatelet therapy for 12 to 18 months with drug-eluting stent and a minimum of 1 month of dual antiplatelet therapy with the use of bare metal stent.  Additionally, patient is aware that noncompliance with medications is likely to result in the stent clotting with heart attack, heart failure and/or death.  All questions have been answered to the patient's satisfaction and the patient has voiced to proceed with the procedure.  Verbal consent has been obtained and the patient is agreeable to proceed with the procedure.     Subjective:     Chief Complaint   Patient presents with    Shortness of Breath    Coronary Artery Disease       HPI:  Matteo Fraire is a 63 y.o. male who presents today to reestablish care.  Lost to follow-up after January, 2023.  He has history of extensive CAD with PCI of LAD in 2005 after an MI,  CABGX2 (LIMA to LAD and SVG to OM) in 2011 and redo CABGX3 with complete pericardiectomy in 2022 ( SVG to RPDA, SVG to OM and left radial artery to LAD - ?sequential from SVG to OM).  He had presented with anterior STEMI, had balloon angioplasty of LAD at the LIMA anastomosis site and later on during that hospitalization had the redo CABG.  His hospital course was complicated by post CABG stroke (has residual defect).  LVEF at that time was noted to be around 45%.  He also has a history of hypertension , CKD III aand psoriasis.    Today he reports experiencing shortness of breath on exertion with activities such as yd work of 25-30 minutes or climbing stairs > 2 flights that has been going on for 4-5 months. It has not progressed/worsened over this period of time. Prior to that he was able to do these activities without  experiencing any significant difficulty breathing.  He denies any chest pain at rest or with exertion.  He has noticed mild bilateral ankle swelling over the last few days.  Denies orthopnea, PND, palpitations, syncope or near-syncope.  Has chronic paresthesia of his extremities.  Does follow with Neurology, last visit was 5 months ago as per him.  He has been under a lot of emotional stress lately as his younger brother was recently placed on hospice.      PREVIOUS CARDIAC TESTING/PROCEDURES HISTORY:  Most Recent Echocardiogram Results  Results for orders placed during the hospital encounter of 03/27/24    Echo    Interpretation Summary    Left Ventricle: The left ventricle is moderately dilated. Normal wall thickness. There is eccentric hypertrophy. Regional wall motion abnormalities present. Septal motion is abnormal. There is moderately reduced systolic function with a visually estimated ejection fraction of 30 - 40%. There is normal diastolic function.    Right Ventricle: Normal right ventricular cavity size. Wall thickness is normal. Right ventricle wall motion  is normal. Systolic function is normal.    Left Atrium: Left atrium is moderately dilated.    Mitral Valve: There is mild to moderate regurgitation.    Tricuspid Valve: There is mild regurgitation with a centrally directed jet.    Pulmonary Artery: The estimated pulmonary artery systolic pressure is 28 mmHg.    IVC/SVC: Normal venous pressure at 3 mmHg.      Most Recent Cardiovascular Angiogram results  Results for orders placed during the hospital encounter of 06/27/22    Cardiac catheterization    Conclusion  · The Insertion lesion before Mid LAD was 100% stenosed with 30% stenosis post-intervention.  · The RPDA lesion was 90% stenosed.  · The RPAV lesion was 50% stenosed.  · The Mid RCA-2 lesion was 90% stenosed.  · The Mid RCA-1 lesion was 60% stenosed.  · The 1st Mrg-2 lesion was 75% stenosed.  · The 1st Mrg-3 lesion was 60% stenosed.  · The 1st  Mrg-4 lesion was 70% stenosed.  · The 1st Mrg-1 lesion was 95% stenosed.  · The Prox LAD lesion was 60% stenosed.  · The Ost LAD to Prox LAD lesion was 90% stenosed.  · The Mid LM to Ost LAD lesion was 80% stenosed.  · The Ost Cx to Prox Cx lesion was 99% stenosed.  · The Mid LAD-1 lesion was 99% stenosed.  · The Mid LAD-2 lesion was 100% stenosed with 20% stenosis post-intervention.  · The Mid LAD-3 lesion was 80% stenosed with 30% stenosis post-intervention.  · The estimated blood loss was none.  · This was a successful PCI for acute myocardial infarction.    The procedure log was documented by No documenter listed and verified by Stevan Powell MD.    Date: 6/27/2022  Time: 11:06 PM        Most Recent EKG Results  Results for orders placed or performed during the hospital encounter of 06/27/22   EKG 12-lead    Collection Time: 07/09/22 12:41 AM    Narrative    Test Reason : I21.3,    Vent. Rate : 088 BPM     Atrial Rate : 088 BPM     P-R Int : 232 ms          QRS Dur : 094 ms      QT Int : 384 ms       P-R-T Axes : 043 101 132 degrees     QTc Int : 464 ms    Sinus rhythm with 1st degree A-V block with occasional Premature  ventricular complexes  Possible Inferior infarct ,age undetermined  Anterolateral infarct (cited on or before 27-JUN-2022)  Abnormal ECG  When compared with ECG of 08-JUL-2022 16:38,  Borderline criteria for Inferior infarct are now Present  Serial changes of Anterior infarct Present  Confirmed by Vincent Mann MD ([id number]) on 7/15/2022 12:55:05 PM    Referred By: [email or web address]   SELF           Confirmed By:Vincent Mann MD       The ASCVD Risk score (Guiseppe FAGAN, et al., 2019) failed to calculate for the following reasons:    The patient has a prior MI or stroke diagnosis    Review of patient's allergies indicates:   Allergen Reactions    Venom-wasp Anaphylaxis       Past Medical History:   Diagnosis Date    Coronary artery disease     Heart attack 2005;2011    Hypertension     Psoriasis     Stroke       Past Surgical History:   Procedure Laterality Date    CORONARY ANGIOGRAPHY INCLUDING BYPASS GRAFTS WITH CATHETERIZATION OF LEFT HEART Left 6/27/2022    Procedure: Left heart cath;  Surgeon: Stevan Powell MD;  Location: [facility] CATH/EP LAB;  Service: Cardiology;  Laterality: Left;    CORONARY ARTERY BYPASS GRAFT      CYSTOSCOPY W/ URETERAL STENT PLACEMENT Left 11/21/2019    Procedure: CYSTOSCOPY, WITH URETERAL STENT INSERTION;  Surgeon: Mickey Escudero MD;  Location: [facility] OR;  Service: Urology;  Laterality: Left;    ENDOSCOPIC HARVEST OF VEIN Right 7/8/2022    Procedure: HARVEST-VEIN-ENDOVASCULAR;  Surgeon: Eyal Stone MD;  Location: [facility] OR;  Service: Cardiothoracic;  Laterality: Right;    ESOPHAGOGASTRODUODENOSCOPY N/A 7/15/2022    Procedure: EGD (ESOPHAGOGASTRODUODENOSCOPY);  Surgeon: Art Pino MD;  Location: [facility] ENDO;  Service: Endoscopy;  Laterality: N/A;    EXTRACORPOREAL SHOCK WAVE LITHOTRIPSY Left 11/21/2019    Procedure: LITHOTRIPSY, ESWL;  Surgeon: Mickey Escudero MD;  Location: [facility] OR;  Service: Urology;  Laterality: Left;    HERNIA REPAIR      Inguinal just after birth    REPEAT CORONARY ARTERY BYPASS GRAFTING N/A 7/8/2022    Procedure: CABG, REPEAT;  Surgeon: Eyal Stone MD;  Location: [facility] OR;  Service: Cardiothoracic;  Laterality: N/A;  drugs ordered 7-7  @1322      SURGICAL PROCUREMENT, ARTERY, RADIAL, FOR CABG Left 7/8/2022    Procedure: SURGICAL PROCUREMENT,ARTERY,RADIAL,FOR CABG;  Surgeon: Eyal Stone MD;  Location: [facility] OR;  Service: Cardiothoracic;  Laterality: Left;     Social History     Tobacco Use    Smoking status: Never     Passive exposure: Never    Smokeless tobacco: Never   Substance Use Topics    Drug use: Never          REVIEW OF SYSTEMS  CONSTITUTIONAL: No chills, no fatigue, no fever.   EYES: No double vision, no blurred vision  NEURO: No headaches, no dizziness  RESPIRATORY: No cough, no wheezing.    CARDIOVASCULAR: See HPI   GI:  "No abdominal pain, no melena, no diarrhea, no nausea or vomiting.   : No  dysuria and frequency, no hematuria  SKIN: no bruising, no discoloration  ENDOCRINE: no polyphagia, no heat intolerance, no cold intolerance  PSYCHIATRIC: no depression, no anxiety, no memory loss  MUSCULOSKELETAL: no  neck pain, no muscle weakness,no back pain          Objective:        Vitals:    07/18/24 1009   BP: (!) 140/70   Pulse: 64       PHYSICAL EXAM  CONSTITUTIONAL: In no apparent distress  HEENT: Normocephalic. Pupils normal and conjunctivae normal. No pallor  NECK: No JVD  LUNGS: B/L air entry to the lungs, clear to auscultation. No rales, wheezing or rhonchi.  HEART: Normal rate and regular rhythm. Normal S1 and S2.  No murmur   ABDOMEN: soft, non-tender; bowel sounds normal  EXTREMITIES: trace b/l ankle edema. Good palpable distal pulses.  NEURO: AAO X 3, chronic right facial droop  SKIN:  abdominal rash (chronic)  Psych:  Normal affect    Lab Results   Component Value Date    WBC 8.0 12/04/2023    HGB 13.0 12/04/2023    HCT 42.9 12/04/2023     12/04/2023    CHOL 115 12/04/2023    TRIG 98 12/04/2023    HDL 33 (L) 12/04/2023    ALT 18 03/12/2024    AST 16 03/12/2024     03/12/2024    K 5.0 03/12/2024     03/12/2024    CREATININE 1.44 (H) 03/12/2024    BUN 21 03/12/2024    CO2 22 03/12/2024    TSH 2.910 03/20/2024    PSA 0.44 11/20/2019    INR 1.1 06/27/2022    HGBA1C 6.2 (H) 03/12/2024        @  Lab Results   Component Value Date    CHOL 115 12/04/2023    CHOL 99 (L) 09/08/2022    CHOL 111 (L) 07/12/2022     Lab Results   Component Value Date    HDL 33 (L) 12/04/2023    HDL 30 (L) 09/08/2022    HDL 29 (L) 07/12/2022     Lab Results   Component Value Date    LDLCALC 63 12/04/2023    LDLCALC 51 09/08/2022    LDLCALC 59.8 (L) 07/12/2022     No results found for: "DLDL"  Lab Results   Component Value Date    TRIG 98 12/04/2023    TRIG 92 09/08/2022    TRIG 111 07/12/2022       f1   Lab Results   Component Value " Date    CHOLHDL 26.1 07/12/2022    CHOLHDL 16.1 (L) 06/27/2022    CHOLHDL 14.9 (L) 01/20/2011            Current Outpatient Medications   Medication Instructions    amiodarone (PACERONE) 200 mg, Oral, Daily    aspirin 81 mg, Oral, Daily    atorvastatin (LIPITOR) 80 mg, Oral, Daily    furosemide (LASIX) 20 MG tablet As needed (take 1 tablet for weight gain>3lb in 24 hours or >5lb in a week)    metFORMIN (GLUCOPHAGE) 500 mg, Oral, 2 times daily with meals    metoprolol succinate (TOPROL-XL) 25 mg, Oral, Daily    sacubitriL-valsartan (ENTRESTO) 24-26 mg per tablet 1 tablet, Oral, 2 times daily    triamcinolone acetonide 0.1% (KENALOG) 0.1 % cream Topical (Top), 2 times daily       Medication List with Changes/Refills   New Medications    FUROSEMIDE (LASIX) 20 MG TABLET    As needed (take 1 tablet for weight gain>3lb in 24 hours or >5lb in a week)    METOPROLOL SUCCINATE (TOPROL-XL) 25 MG 24 HR TABLET    Take 1 tablet (25 mg total) by mouth once daily.    SACUBITRIL-VALSARTAN (ENTRESTO) 24-26 MG PER TABLET    Take 1 tablet by mouth 2 (two) times daily.   Current Medications    AMIODARONE (PACERONE) 200 MG TAB    Take 1 tablet (200 mg total) by mouth once daily.    ASPIRIN 81 MG CHEW    Take 81 mg by mouth once daily.    ATORVASTATIN (LIPITOR) 80 MG TABLET    Take 1 tablet (80 mg total) by mouth once daily.    METFORMIN (GLUCOPHAGE) 500 MG TABLET    Take 1 tablet (500 mg total) by mouth 2 (two) times daily with meals.    TRIAMCINOLONE ACETONIDE 0.1% (KENALOG) 0.1 % CREAM    Apply topically 2 (two) times daily.   Discontinued Medications    METOPROLOL TARTRATE (LOPRESSOR) 25 MG TABLET    Take 0.5 tablets (12.5 mg total) by mouth 2 (two) times daily.    MIDODRINE (PROAMATINE) 10 MG TABLET    Take 1 tablet (10 mg total) by mouth 2 (two) times a day.               All pertinent labs, imaging, and EKGs reviewed.  Patient's most recent EKG tracing was personally interpreted by this provider.    Problem List Items Addressed  This Visit          Neuro    History of stroke       Cardiac/Vascular    Coronary artery disease involving coronary bypass graft of native heart with angina pectoris     Other Visit Diagnoses       Shortness of breath on exertion    -  Primary    Relevant Orders    IN OFFICE EKG 12-LEAD (to Muse)    Case Request-Cath Lab: Angiogram, Coronary, with Left Heart Cath (Completed)    Heart failure with reduced ejection fraction        Relevant Orders    Vital signs    Cardiac Monitoring - Adult    Full code    Comprehensive metabolic panel    Protime-INR    CBC auto differential    B-TYPE NATRIURETIC PEPTIDE    CKD stage 3a, GFR 45-59 ml/min                Follow up in about 6 weeks (around 8/29/2024).

## 2024-07-18 NOTE — H&P (VIEW-ONLY)
Patient ID:  Matteo Fraire  63 y.o.  male      Assessment:       1. Shortness of breath on exertion    2. Coronary artery disease involving native heart without angina pectoris, unspecified vessel or lesion type    3. Heart failure with reduced ejection fraction    4. History of stroke    5. CKD stage 3a, GFR 45-59 ml/min      PCI of LAD in 2005 after an MI,  CABGX2 (LIMA to LAD and SVG to OM) in 2011 and redo CABGX3 with complete pericardiectomy in 2022 ( SVG to RPDA, SVG to OM and left radial artery to LAD - ?sequential from SVG to OM).    Plan:     He has been experiencing dyspnea on exertion for past 4-5 months.  Clinically does not appear volume overloaded at this time.  Recent echo shows reduction in LV function compared to the previous echo.  LVEF is 35-40%.  He was lost to follow-up after January 2023.      Recommend coronary and bypass angiogram given the reduction in LV function to define anatomy/pathology and guide treatment. He is also symptomatic.     Will initiate GFMT for cardiomyopathy and optimize as tolerated.  Start Entresto 24-26 mg b.i.d. This should help with better BP control as well. His BP is slightly elevated today.  Change Lopressor to Toprol 25 mg daily.  Obtain BMP and BNP.    Lasix 20 mg p.r.n. for weight gain more than 3 lb in 24 hours or more than 5 lb in a week.  Contact office if that is the case.    Continue aspirin 81 mg daily and Lipitor 80 mg daily for CAD.  Last LDL 63 in December 2023.    Continue amiodarone 200 mg daily for now.  He has been on it for NSVT episodes during his hospitalization in 2022.    Counseled on daily fluid and sodium restrictions; <1.5 and 2g respectively.       Risks, benefits and alternatives were explained to the patient.    Risks include but not limited to bleeding, allergic reaction to contrast, vascular damage, renal failure with potential need for dialysis, infection, rhythm abnormalities, stroke, myocardial infarction, emergency bypass surgery  and death.    The risks of moderate sedation include hypotension, respiratory depression, arrhythmias, bronchospasm and death.  Patient does understand these risks and wants to proceed with cardiac catheterization.  Should stenting be indicated, patient has agreed to dual antiplatelet therapy for 12 to 18 months with drug-eluting stent and a minimum of 1 month of dual antiplatelet therapy with the use of bare metal stent.  Additionally, patient is aware that noncompliance with medications is likely to result in the stent clotting with heart attack, heart failure and/or death.  All questions have been answered to the patient's satisfaction and the patient has voiced to proceed with the procedure.  Verbal consent has been obtained and the patient is agreeable to proceed with the procedure.     Subjective:     Chief Complaint   Patient presents with    Shortness of Breath    Coronary Artery Disease       HPI:  Matteo Fraire is a 63 y.o. male who presents today to reestablish care.  Lost to follow-up after January, 2023.  He has history of extensive CAD with PCI of LAD in 2005 after an MI,  CABGX2 (LIMA to LAD and SVG to OM) in 2011 and redo CABGX3 with complete pericardiectomy in 2022 ( SVG to RPDA, SVG to OM and left radial artery to LAD - ?sequential from SVG to OM).  He had presented with anterior STEMI, had balloon angioplasty of LAD at the LIMA anastomosis site and later on during that hospitalization had the redo CABG.  His hospital course was complicated by post CABG stroke (has residual defect).  LVEF at that time was noted to be around 45%.  He also has a history of hypertension , CKD III aand psoriasis.    Today he reports experiencing shortness of breath on exertion with activities such as yd work of 25-30 minutes or climbing stairs > 2 flights that has been going on for 4-5 months. It has not progressed/worsened over this period of time. Prior to that he was able to do these activities without  experiencing any significant difficulty breathing.  He denies any chest pain at rest or with exertion.  He has noticed mild bilateral ankle swelling over the last few days.  Denies orthopnea, PND, palpitations, syncope or near-syncope.  Has chronic paresthesia of his extremities.  Does follow with Neurology, last visit was 5 months ago as per him.  He has been under a lot of emotional stress lately as his younger brother was recently placed on hospice.      PREVIOUS CARDIAC TESTING/PROCEDURES HISTORY:  Most Recent Echocardiogram Results  Results for orders placed during the hospital encounter of 03/27/24    Echo    Interpretation Summary    Left Ventricle: The left ventricle is moderately dilated. Normal wall thickness. There is eccentric hypertrophy. Regional wall motion abnormalities present. Septal motion is abnormal. There is moderately reduced systolic function with a visually estimated ejection fraction of 30 - 40%. There is normal diastolic function.    Right Ventricle: Normal right ventricular cavity size. Wall thickness is normal. Right ventricle wall motion  is normal. Systolic function is normal.    Left Atrium: Left atrium is moderately dilated.    Mitral Valve: There is mild to moderate regurgitation.    Tricuspid Valve: There is mild regurgitation with a centrally directed jet.    Pulmonary Artery: The estimated pulmonary artery systolic pressure is 28 mmHg.    IVC/SVC: Normal venous pressure at 3 mmHg.      Most Recent Cardiovascular Angiogram results  Results for orders placed during the hospital encounter of 06/27/22    Cardiac catheterization    Conclusion  · The Insertion lesion before Mid LAD was 100% stenosed with 30% stenosis post-intervention.  · The RPDA lesion was 90% stenosed.  · The RPAV lesion was 50% stenosed.  · The Mid RCA-2 lesion was 90% stenosed.  · The Mid RCA-1 lesion was 60% stenosed.  · The 1st Mrg-2 lesion was 75% stenosed.  · The 1st Mrg-3 lesion was 60% stenosed.  · The 1st  Mrg-4 lesion was 70% stenosed.  · The 1st Mrg-1 lesion was 95% stenosed.  · The Prox LAD lesion was 60% stenosed.  · The Ost LAD to Prox LAD lesion was 90% stenosed.  · The Mid LM to Ost LAD lesion was 80% stenosed.  · The Ost Cx to Prox Cx lesion was 99% stenosed.  · The Mid LAD-1 lesion was 99% stenosed.  · The Mid LAD-2 lesion was 100% stenosed with 20% stenosis post-intervention.  · The Mid LAD-3 lesion was 80% stenosed with 30% stenosis post-intervention.  · The estimated blood loss was none.  · This was a successful PCI for acute myocardial infarction.    The procedure log was documented by No documenter listed and verified by Stevan Powell MD.    Date: 6/27/2022  Time: 11:06 PM        Most Recent EKG Results  Results for orders placed or performed during the hospital encounter of 06/27/22   EKG 12-lead    Collection Time: 07/09/22 12:41 AM    Narrative    Test Reason : I21.3,    Vent. Rate : 088 BPM     Atrial Rate : 088 BPM     P-R Int : 232 ms          QRS Dur : 094 ms      QT Int : 384 ms       P-R-T Axes : 043 101 132 degrees     QTc Int : 464 ms    Sinus rhythm with 1st degree A-V block with occasional Premature  ventricular complexes  Possible Inferior infarct ,age undetermined  Anterolateral infarct (cited on or before 27-JUN-2022)  Abnormal ECG  When compared with ECG of 08-JUL-2022 16:38,  Borderline criteria for Inferior infarct are now Present  Serial changes of Anterior infarct Present  Confirmed by Vincent Mann MD ([id number]) on 7/15/2022 12:55:05 PM    Referred By: [email or web address]   SELF           Confirmed By:Vincent Mann MD       The ASCVD Risk score (Giuseppe FAGAN, et al., 2019) failed to calculate for the following reasons:    The patient has a prior MI or stroke diagnosis    Review of patient's allergies indicates:   Allergen Reactions    Venom-wasp Anaphylaxis       Past Medical History:   Diagnosis Date    Coronary artery disease     Heart attack 2005;2011    Hypertension     Psoriasis     Stroke       Past Surgical History:   Procedure Laterality Date    CORONARY ANGIOGRAPHY INCLUDING BYPASS GRAFTS WITH CATHETERIZATION OF LEFT HEART Left 6/27/2022    Procedure: Left heart cath;  Surgeon: Stevan Powell MD;  Location: [facility] CATH/EP LAB;  Service: Cardiology;  Laterality: Left;    CORONARY ARTERY BYPASS GRAFT      CYSTOSCOPY W/ URETERAL STENT PLACEMENT Left 11/21/2019    Procedure: CYSTOSCOPY, WITH URETERAL STENT INSERTION;  Surgeon: Mickey Escudero MD;  Location: [facility] OR;  Service: Urology;  Laterality: Left;    ENDOSCOPIC HARVEST OF VEIN Right 7/8/2022    Procedure: HARVEST-VEIN-ENDOVASCULAR;  Surgeon: Eyal Stone MD;  Location: [facility] OR;  Service: Cardiothoracic;  Laterality: Right;    ESOPHAGOGASTRODUODENOSCOPY N/A 7/15/2022    Procedure: EGD (ESOPHAGOGASTRODUODENOSCOPY);  Surgeon: Art Pino MD;  Location: [facility] ENDO;  Service: Endoscopy;  Laterality: N/A;    EXTRACORPOREAL SHOCK WAVE LITHOTRIPSY Left 11/21/2019    Procedure: LITHOTRIPSY, ESWL;  Surgeon: Mickey Escudero MD;  Location: [facility] OR;  Service: Urology;  Laterality: Left;    HERNIA REPAIR      Inguinal just after birth    REPEAT CORONARY ARTERY BYPASS GRAFTING N/A 7/8/2022    Procedure: CABG, REPEAT;  Surgeon: Eyal Stone MD;  Location: [facility] OR;  Service: Cardiothoracic;  Laterality: N/A;  drugs ordered 7-7  @1322      SURGICAL PROCUREMENT, ARTERY, RADIAL, FOR CABG Left 7/8/2022    Procedure: SURGICAL PROCUREMENT,ARTERY,RADIAL,FOR CABG;  Surgeon: Eyal Stone MD;  Location: [facility] OR;  Service: Cardiothoracic;  Laterality: Left;     Social History     Tobacco Use    Smoking status: Never     Passive exposure: Never    Smokeless tobacco: Never   Substance Use Topics    Drug use: Never          REVIEW OF SYSTEMS  CONSTITUTIONAL: No chills, no fatigue, no fever.   EYES: No double vision, no blurred vision  NEURO: No headaches, no dizziness  RESPIRATORY: No cough, no wheezing.    CARDIOVASCULAR: See HPI   GI:  "No abdominal pain, no melena, no diarrhea, no nausea or vomiting.   : No  dysuria and frequency, no hematuria  SKIN: no bruising, no discoloration  ENDOCRINE: no polyphagia, no heat intolerance, no cold intolerance  PSYCHIATRIC: no depression, no anxiety, no memory loss  MUSCULOSKELETAL: no  neck pain, no muscle weakness,no back pain          Objective:        Vitals:    07/18/24 1009   BP: (!) 140/70   Pulse: 64       PHYSICAL EXAM  CONSTITUTIONAL: In no apparent distress  HEENT: Normocephalic. Pupils normal and conjunctivae normal. No pallor  NECK: No JVD  LUNGS: B/L air entry to the lungs, clear to auscultation. No rales, wheezing or rhonchi.  HEART: Normal rate and regular rhythm. Normal S1 and S2.  No murmur   ABDOMEN: soft, non-tender; bowel sounds normal  EXTREMITIES: trace b/l ankle edema. Good palpable distal pulses.  NEURO: AAO X 3, chronic right facial droop  SKIN:  abdominal rash (chronic)  Psych:  Normal affect    Lab Results   Component Value Date    WBC 8.0 12/04/2023    HGB 13.0 12/04/2023    HCT 42.9 12/04/2023     12/04/2023    CHOL 115 12/04/2023    TRIG 98 12/04/2023    HDL 33 (L) 12/04/2023    ALT 18 03/12/2024    AST 16 03/12/2024     03/12/2024    K 5.0 03/12/2024     03/12/2024    CREATININE 1.44 (H) 03/12/2024    BUN 21 03/12/2024    CO2 22 03/12/2024    TSH 2.910 03/20/2024    PSA 0.44 11/20/2019    INR 1.1 06/27/2022    HGBA1C 6.2 (H) 03/12/2024        @  Lab Results   Component Value Date    CHOL 115 12/04/2023    CHOL 99 (L) 09/08/2022    CHOL 111 (L) 07/12/2022     Lab Results   Component Value Date    HDL 33 (L) 12/04/2023    HDL 30 (L) 09/08/2022    HDL 29 (L) 07/12/2022     Lab Results   Component Value Date    LDLCALC 63 12/04/2023    LDLCALC 51 09/08/2022    LDLCALC 59.8 (L) 07/12/2022     No results found for: "DLDL"  Lab Results   Component Value Date    TRIG 98 12/04/2023    TRIG 92 09/08/2022    TRIG 111 07/12/2022       f1   Lab Results   Component Value " Date    CHOLHDL 26.1 07/12/2022    CHOLHDL 16.1 (L) 06/27/2022    CHOLHDL 14.9 (L) 01/20/2011            Current Outpatient Medications   Medication Instructions    amiodarone (PACERONE) 200 mg, Oral, Daily    aspirin 81 mg, Oral, Daily    atorvastatin (LIPITOR) 80 mg, Oral, Daily    furosemide (LASIX) 20 MG tablet As needed (take 1 tablet for weight gain>3lb in 24 hours or >5lb in a week)    metFORMIN (GLUCOPHAGE) 500 mg, Oral, 2 times daily with meals    metoprolol succinate (TOPROL-XL) 25 mg, Oral, Daily    sacubitriL-valsartan (ENTRESTO) 24-26 mg per tablet 1 tablet, Oral, 2 times daily    triamcinolone acetonide 0.1% (KENALOG) 0.1 % cream Topical (Top), 2 times daily       Medication List with Changes/Refills   New Medications    FUROSEMIDE (LASIX) 20 MG TABLET    As needed (take 1 tablet for weight gain>3lb in 24 hours or >5lb in a week)    METOPROLOL SUCCINATE (TOPROL-XL) 25 MG 24 HR TABLET    Take 1 tablet (25 mg total) by mouth once daily.    SACUBITRIL-VALSARTAN (ENTRESTO) 24-26 MG PER TABLET    Take 1 tablet by mouth 2 (two) times daily.   Current Medications    AMIODARONE (PACERONE) 200 MG TAB    Take 1 tablet (200 mg total) by mouth once daily.    ASPIRIN 81 MG CHEW    Take 81 mg by mouth once daily.    ATORVASTATIN (LIPITOR) 80 MG TABLET    Take 1 tablet (80 mg total) by mouth once daily.    METFORMIN (GLUCOPHAGE) 500 MG TABLET    Take 1 tablet (500 mg total) by mouth 2 (two) times daily with meals.    TRIAMCINOLONE ACETONIDE 0.1% (KENALOG) 0.1 % CREAM    Apply topically 2 (two) times daily.   Discontinued Medications    METOPROLOL TARTRATE (LOPRESSOR) 25 MG TABLET    Take 0.5 tablets (12.5 mg total) by mouth 2 (two) times daily.    MIDODRINE (PROAMATINE) 10 MG TABLET    Take 1 tablet (10 mg total) by mouth 2 (two) times a day.               All pertinent labs, imaging, and EKGs reviewed.  Patient's most recent EKG tracing was personally interpreted by this provider.    Problem List Items Addressed  This Visit          Neuro    History of stroke       Cardiac/Vascular    Coronary artery disease involving coronary bypass graft of native heart with angina pectoris     Other Visit Diagnoses       Shortness of breath on exertion    -  Primary    Relevant Orders    IN OFFICE EKG 12-LEAD (to Muse)    Case Request-Cath Lab: Angiogram, Coronary, with Left Heart Cath (Completed)    Heart failure with reduced ejection fraction        Relevant Orders    Vital signs    Cardiac Monitoring - Adult    Full code    Comprehensive metabolic panel    Protime-INR    CBC auto differential    B-TYPE NATRIURETIC PEPTIDE    CKD stage 3a, GFR 45-59 ml/min                Follow up in about 6 weeks (around 8/29/2024).

## 2024-07-22 ENCOUNTER — TELEPHONE (OUTPATIENT)
Dept: CARDIOLOGY | Facility: CLINIC | Age: 64
End: 2024-07-22
Payer: COMMERCIAL

## 2024-08-09 ENCOUNTER — HOSPITAL ENCOUNTER (OUTPATIENT)
Dept: PREADMISSION TESTING | Facility: HOSPITAL | Age: 64
Discharge: HOME | End: 2024-08-09
Attending: STUDENT IN AN ORGANIZED HEALTH CARE EDUCATION/TRAINING PROGRAM
Payer: COMMERCIAL

## 2024-08-09 VITALS
WEIGHT: 221 LBS | RESPIRATION RATE: 18 BRPM | BODY MASS INDEX: 32.73 KG/M2 | DIASTOLIC BLOOD PRESSURE: 88 MMHG | HEART RATE: 58 BPM | HEIGHT: 69 IN | SYSTOLIC BLOOD PRESSURE: 137 MMHG | TEMPERATURE: 98 F | OXYGEN SATURATION: 98 %

## 2024-08-09 DIAGNOSIS — I50.20 HEART FAILURE WITH REDUCED EJECTION FRACTION: ICD-10-CM

## 2024-08-09 DIAGNOSIS — I25.10 CORONARY ARTERY DISEASE INVOLVING NATIVE HEART WITHOUT ANGINA PECTORIS, UNSPECIFIED VESSEL OR LESION TYPE: ICD-10-CM

## 2024-08-09 LAB
ALBUMIN SERPL BCP-MCNC: 4.1 G/DL (ref 3.5–5.2)
ALP SERPL-CCNC: 52 U/L (ref 55–135)
ALT SERPL W/O P-5'-P-CCNC: 16 U/L (ref 10–44)
ANION GAP SERPL CALC-SCNC: 6 MMOL/L (ref 8–16)
AST SERPL-CCNC: 14 U/L (ref 10–40)
BASOPHILS # BLD AUTO: 0.06 K/UL (ref 0–0.2)
BASOPHILS NFR BLD: 0.9 % (ref 0–1.9)
BILIRUB SERPL-MCNC: 1.3 MG/DL (ref 0.1–1)
BNP SERPL-MCNC: 344 PG/ML (ref 0–99)
BUN SERPL-MCNC: 20 MG/DL (ref 8–23)
CALCIUM SERPL-MCNC: 8.8 MG/DL (ref 8.7–10.5)
CHLORIDE SERPL-SCNC: 107 MMOL/L (ref 95–110)
CO2 SERPL-SCNC: 26 MMOL/L (ref 23–29)
CREAT SERPL-MCNC: 1.3 MG/DL (ref 0.5–1.4)
DIFFERENTIAL METHOD BLD: ABNORMAL
EOSINOPHIL # BLD AUTO: 0.3 K/UL (ref 0–0.5)
EOSINOPHIL NFR BLD: 5 % (ref 0–8)
ERYTHROCYTE [DISTWIDTH] IN BLOOD BY AUTOMATED COUNT: 17.7 % (ref 11.5–14.5)
EST. GFR  (NO RACE VARIABLE): >60 ML/MIN/1.73 M^2
GLUCOSE SERPL-MCNC: 165 MG/DL (ref 70–110)
HCT VFR BLD AUTO: 39.3 % (ref 40–54)
HGB BLD-MCNC: 11.4 G/DL (ref 14–18)
IMM GRANULOCYTES # BLD AUTO: 0.01 K/UL (ref 0–0.04)
IMM GRANULOCYTES NFR BLD AUTO: 0.1 % (ref 0–0.5)
INR PPP: 1 (ref 0.8–1.2)
LYMPHOCYTES # BLD AUTO: 1.5 K/UL (ref 1–4.8)
LYMPHOCYTES NFR BLD: 22.4 % (ref 18–48)
MCH RBC QN AUTO: 22.7 PG (ref 27–31)
MCHC RBC AUTO-ENTMCNC: 29 G/DL (ref 32–36)
MCV RBC AUTO: 78 FL (ref 82–98)
MONOCYTES # BLD AUTO: 0.4 K/UL (ref 0.3–1)
MONOCYTES NFR BLD: 6.3 % (ref 4–15)
NEUTROPHILS # BLD AUTO: 4.5 K/UL (ref 1.8–7.7)
NEUTROPHILS NFR BLD: 65.3 % (ref 38–73)
NRBC BLD-RTO: 0 /100 WBC
PLATELET # BLD AUTO: 267 K/UL (ref 150–450)
PMV BLD AUTO: 10.6 FL (ref 9.2–12.9)
POTASSIUM SERPL-SCNC: 4.6 MMOL/L (ref 3.5–5.1)
PROT SERPL-MCNC: 6.5 G/DL (ref 6–8.4)
PROTHROMBIN TIME: 11 SEC (ref 9–12.5)
RBC # BLD AUTO: 5.02 M/UL (ref 4.6–6.2)
SODIUM SERPL-SCNC: 139 MMOL/L (ref 136–145)
WBC # BLD AUTO: 6.84 K/UL (ref 3.9–12.7)

## 2024-08-09 PROCEDURE — 83880 ASSAY OF NATRIURETIC PEPTIDE: CPT | Performed by: STUDENT IN AN ORGANIZED HEALTH CARE EDUCATION/TRAINING PROGRAM

## 2024-08-09 PROCEDURE — 80053 COMPREHEN METABOLIC PANEL: CPT | Performed by: STUDENT IN AN ORGANIZED HEALTH CARE EDUCATION/TRAINING PROGRAM

## 2024-08-09 PROCEDURE — 85610 PROTHROMBIN TIME: CPT | Performed by: STUDENT IN AN ORGANIZED HEALTH CARE EDUCATION/TRAINING PROGRAM

## 2024-08-09 PROCEDURE — 85025 COMPLETE CBC W/AUTO DIFF WBC: CPT | Performed by: STUDENT IN AN ORGANIZED HEALTH CARE EDUCATION/TRAINING PROGRAM

## 2024-08-09 NOTE — PRE-PROCEDURE INSTRUCTIONS
History and medicines reviewed with patient. Verbalized understanding of pre op instructions as per AVS. EKG done in office. Escorted to lab.     [FreeTextEntry1] : This is a 61-year-old male who is on immunosuppressive therapy for psoriatic arthritis.  I received a call from his rheumatologist because he has lesions on his skin after speaking to the infectious disease doctor is concerned that these may be fungal infections.  They have requested that this can be biopsied before starting antifungal treatment.  Patient states he has had these lesions throughout his body for a long time now but that his right anterior leg has been the most suspicious.  He recently had a punch biopsy biopsy done by dermatologist but it was a different appearing lesion and not that is of concern.\par \par Review of systems: General-denies fatigue.  Cardiac- denies chest pain.  Respiratory- denies shortness of breath.  GI-denies nausea or vomiting.  -denies dysuria.  Musculoskeletal-denies back pain.  Psychiatric-denies hearing voices.\par \par Limited exam.  On evaluation of his skin he has lesions around his fingernails and his cuticles he has multiple small bumps throughout his trunk.  On his anterior right leg there is an area of multiple red spots forming a constellation have a slightly pink area.  He appears to have venous stasis disease on both his legs as well.\par \par Plan:\par There is request for biopsy to rule out a fungus or other type of malignancy.  We will send 1 specimen to microbiology for fungus and 1 specimen to pathology.  I spoke to the pathologist and the pathology specimen should be in formalin in the microbiology specimen should be in a sterile cup.  I also explained to the patient that because of his venous stasis disease and abnormal skin that is very likely that this biopsy site will heal poorly and he could be left with an open wound for several months.  He is still agreeable.\par \par Procedure: The patient was placed in the supine position.  After informed consent was taken verbally and in writing timeout is done.  The right leg is prepped draped sterile fashion.  5 mL of lidocaine with epinephrine is used to excise overlying skin a 2 and half centimeter by 1 cm transverse ellipse is done through the center part of the affected skin.  one third of the biopsy is sent to microbiology and two thirds to pathology.  3 Prolene sutures are placed over the vertical mattress.  Neosporin dressing is placed.\par \par Plan: The patient will remove the dressing in 2 days.  He has an okay to shower.  He will apply Neosporin and over-the-counter antifungal cream to the incision daily.  He will follow with me in 2 weeks time for suture removal.  We will review the results of his pathology and microbiology.

## 2024-08-09 NOTE — DISCHARGE INSTRUCTIONS
Your procedure is scheduled for: Tuesday, August 13, 2024          [facility] at: 5:30 AM    Nothing to eat or drink after midnight the night before your procedure.  Do not take any medications the morning of your procedure  Bring all your medications with you in the original pill bottles from pharmacy.  If you take blood thinners, ask your doctor if you should stop taking them.  Do not take metformin 24 hours prior to your procedure.  Adjust your insulin or other diabetes medications if needed.   Do your chlorhexidine wash the night before and morning of your procedure.  If you use a CPAP or BiPAP at home, please bring it with you the day of your procedure.  Make arrangements for someone you know to drive you home after your procedure. Taxi and Uber are not acceptable.         Any questions call the [facility] at [phone]

## 2024-08-13 ENCOUNTER — HOSPITAL ENCOUNTER (OUTPATIENT)
Facility: HOSPITAL | Age: 64
Discharge: HOME | End: 2024-08-13
Attending: STUDENT IN AN ORGANIZED HEALTH CARE EDUCATION/TRAINING PROGRAM | Admitting: STUDENT IN AN ORGANIZED HEALTH CARE EDUCATION/TRAINING PROGRAM
Payer: COMMERCIAL

## 2024-08-13 VITALS
SYSTOLIC BLOOD PRESSURE: 130 MMHG | OXYGEN SATURATION: 97 % | TEMPERATURE: 98 F | RESPIRATION RATE: 12 BRPM | WEIGHT: 215 LBS | DIASTOLIC BLOOD PRESSURE: 72 MMHG | HEART RATE: 55 BPM | HEIGHT: 69 IN | BODY MASS INDEX: 31.84 KG/M2

## 2024-08-13 DIAGNOSIS — R06.02 SHORTNESS OF BREATH ON EXERTION: ICD-10-CM

## 2024-08-13 DIAGNOSIS — I50.20 HEART FAILURE WITH REDUCED EJECTION FRACTION: ICD-10-CM

## 2024-08-13 DIAGNOSIS — I25.10 CORONARY ARTERY DISEASE INVOLVING NATIVE HEART WITHOUT ANGINA PECTORIS, UNSPECIFIED VESSEL OR LESION TYPE: ICD-10-CM

## 2024-08-13 DIAGNOSIS — Z01.818 PRE-OP EVALUATION: ICD-10-CM

## 2024-08-13 DIAGNOSIS — I25.709 CORONARY ARTERY DISEASE INVOLVING CORONARY BYPASS GRAFT OF NATIVE HEART WITH ANGINA PECTORIS: ICD-10-CM

## 2024-08-13 DIAGNOSIS — Z95.1 S/P CABG (CORONARY ARTERY BYPASS GRAFT): Primary | ICD-10-CM

## 2024-08-13 LAB
POC ACTIVATED CLOTTING TIME K: 275 SEC (ref 74–137)
SAMPLE: ABNORMAL

## 2024-08-13 PROCEDURE — C9604 PERC D-E COR REVASC T CABG S: HCPCS | Mod: LC | Performed by: STUDENT IN AN ORGANIZED HEALTH CARE EDUCATION/TRAINING PROGRAM

## 2024-08-13 PROCEDURE — 99153 MOD SED SAME PHYS/QHP EA: CPT | Performed by: STUDENT IN AN ORGANIZED HEALTH CARE EDUCATION/TRAINING PROGRAM

## 2024-08-13 PROCEDURE — C1725 CATH, TRANSLUMIN NON-LASER: HCPCS | Performed by: STUDENT IN AN ORGANIZED HEALTH CARE EDUCATION/TRAINING PROGRAM

## 2024-08-13 PROCEDURE — 93005 ELECTROCARDIOGRAM TRACING: CPT | Performed by: INTERNAL MEDICINE

## 2024-08-13 PROCEDURE — C1874 STENT, COATED/COV W/DEL SYS: HCPCS | Performed by: STUDENT IN AN ORGANIZED HEALTH CARE EDUCATION/TRAINING PROGRAM

## 2024-08-13 PROCEDURE — 92937 PRQ TRLUML REVSC CAB GRF 1: CPT | Mod: LC,,, | Performed by: STUDENT IN AN ORGANIZED HEALTH CARE EDUCATION/TRAINING PROGRAM

## 2024-08-13 PROCEDURE — C1753 CATH, INTRAVAS ULTRASOUND: HCPCS | Performed by: STUDENT IN AN ORGANIZED HEALTH CARE EDUCATION/TRAINING PROGRAM

## 2024-08-13 PROCEDURE — 25000003 PHARM REV CODE 250: Performed by: STUDENT IN AN ORGANIZED HEALTH CARE EDUCATION/TRAINING PROGRAM

## 2024-08-13 PROCEDURE — 93459 L HRT ART/GRFT ANGIO: CPT | Mod: 26,XU,, | Performed by: STUDENT IN AN ORGANIZED HEALTH CARE EDUCATION/TRAINING PROGRAM

## 2024-08-13 PROCEDURE — 92978 ENDOLUMINL IVUS OCT C 1ST: CPT | Performed by: STUDENT IN AN ORGANIZED HEALTH CARE EDUCATION/TRAINING PROGRAM

## 2024-08-13 PROCEDURE — 93010 ELECTROCARDIOGRAM REPORT: CPT | Mod: ,,, | Performed by: INTERNAL MEDICINE

## 2024-08-13 PROCEDURE — C1769 GUIDE WIRE: HCPCS | Performed by: STUDENT IN AN ORGANIZED HEALTH CARE EDUCATION/TRAINING PROGRAM

## 2024-08-13 PROCEDURE — C1894 INTRO/SHEATH, NON-LASER: HCPCS | Performed by: STUDENT IN AN ORGANIZED HEALTH CARE EDUCATION/TRAINING PROGRAM

## 2024-08-13 PROCEDURE — 99152 MOD SED SAME PHYS/QHP 5/>YRS: CPT | Performed by: STUDENT IN AN ORGANIZED HEALTH CARE EDUCATION/TRAINING PROGRAM

## 2024-08-13 PROCEDURE — 63600175 PHARM REV CODE 636 W HCPCS: Performed by: STUDENT IN AN ORGANIZED HEALTH CARE EDUCATION/TRAINING PROGRAM

## 2024-08-13 PROCEDURE — C1760 CLOSURE DEV, VASC: HCPCS | Performed by: STUDENT IN AN ORGANIZED HEALTH CARE EDUCATION/TRAINING PROGRAM

## 2024-08-13 PROCEDURE — 93459 L HRT ART/GRFT ANGIO: CPT | Mod: XU | Performed by: STUDENT IN AN ORGANIZED HEALTH CARE EDUCATION/TRAINING PROGRAM

## 2024-08-13 PROCEDURE — 99152 MOD SED SAME PHYS/QHP 5/>YRS: CPT | Mod: ,,, | Performed by: STUDENT IN AN ORGANIZED HEALTH CARE EDUCATION/TRAINING PROGRAM

## 2024-08-13 PROCEDURE — 25500020 PHARM REV CODE 255: Performed by: STUDENT IN AN ORGANIZED HEALTH CARE EDUCATION/TRAINING PROGRAM

## 2024-08-13 PROCEDURE — C1887 CATHETER, GUIDING: HCPCS | Performed by: STUDENT IN AN ORGANIZED HEALTH CARE EDUCATION/TRAINING PROGRAM

## 2024-08-13 PROCEDURE — 92978 ENDOLUMINL IVUS OCT C 1ST: CPT | Mod: 26,,, | Performed by: STUDENT IN AN ORGANIZED HEALTH CARE EDUCATION/TRAINING PROGRAM

## 2024-08-13 DEVICE — EVEROLIMUS-ELUTING PLATINUM CHROMIUM CORONARY STENT SYSTEM
Type: IMPLANTABLE DEVICE | Site: CORONARY | Status: FUNCTIONAL
Brand: SYNERGY™ XD

## 2024-08-13 DEVICE — ANGIO-SEAL VIP VASCULAR CLOSURE DEVICE
Type: IMPLANTABLE DEVICE | Site: GROIN | Status: FUNCTIONAL
Brand: ANGIO-SEAL

## 2024-08-13 RX ORDER — MIDAZOLAM HYDROCHLORIDE 2 MG/2ML
INJECTION, SOLUTION INTRAMUSCULAR; INTRAVENOUS
Status: DISCONTINUED | OUTPATIENT
Start: 2024-08-13 | End: 2024-08-13 | Stop reason: HOSPADM

## 2024-08-13 RX ORDER — VERAPAMIL HYDROCHLORIDE 2.5 MG/ML
INJECTION, SOLUTION INTRAVENOUS
Status: DISCONTINUED | OUTPATIENT
Start: 2024-08-13 | End: 2024-08-13 | Stop reason: HOSPADM

## 2024-08-13 RX ORDER — LIDOCAINE HYDROCHLORIDE 10 MG/ML
INJECTION, SOLUTION EPIDURAL; INFILTRATION; INTRACAUDAL; PERINEURAL
Status: DISCONTINUED | OUTPATIENT
Start: 2024-08-13 | End: 2024-08-13 | Stop reason: HOSPADM

## 2024-08-13 RX ORDER — CLOPIDOGREL BISULFATE 75 MG/1
75 TABLET ORAL DAILY
Qty: 30 TABLET | Refills: 1 | Status: SHIPPED | OUTPATIENT
Start: 2024-08-13 | End: 2025-08-13

## 2024-08-13 RX ORDER — CLOPIDOGREL BISULFATE 75 MG/1
75 TABLET ORAL DAILY
Qty: 90 TABLET | Refills: 3 | Status: SHIPPED | OUTPATIENT
Start: 2024-08-13 | End: 2024-08-29 | Stop reason: SDUPTHER

## 2024-08-13 RX ORDER — SODIUM CHLORIDE 9 MG/ML
INJECTION, SOLUTION INTRAVENOUS ONCE
Status: COMPLETED | OUTPATIENT
Start: 2024-08-13 | End: 2024-08-13

## 2024-08-13 RX ORDER — HEPARIN SODIUM 10000 [USP'U]/ML
INJECTION, SOLUTION INTRAVENOUS; SUBCUTANEOUS
Status: DISCONTINUED | OUTPATIENT
Start: 2024-08-13 | End: 2024-08-13 | Stop reason: HOSPADM

## 2024-08-13 RX ORDER — FENTANYL CITRATE 50 UG/ML
INJECTION, SOLUTION INTRAMUSCULAR; INTRAVENOUS
Status: DISCONTINUED | OUTPATIENT
Start: 2024-08-13 | End: 2024-08-13 | Stop reason: HOSPADM

## 2024-08-13 RX ORDER — CLOPIDOGREL BISULFATE 75 MG/1
TABLET ORAL
Status: DISCONTINUED | OUTPATIENT
Start: 2024-08-13 | End: 2024-08-13 | Stop reason: HOSPADM

## 2024-08-13 RX ORDER — NITROGLYCERIN 5 MG/ML
INJECTION, SOLUTION INTRAVENOUS
Status: DISCONTINUED | OUTPATIENT
Start: 2024-08-13 | End: 2024-08-13 | Stop reason: HOSPADM

## 2024-08-13 RX ORDER — IODIXANOL 320 MG/ML
INJECTION, SOLUTION INTRAVASCULAR
Status: DISCONTINUED | OUTPATIENT
Start: 2024-08-13 | End: 2024-08-13 | Stop reason: HOSPADM

## 2024-08-13 RX ORDER — SODIUM CHLORIDE 9 MG/ML
INJECTION, SOLUTION INTRAVENOUS CONTINUOUS
Status: ACTIVE | OUTPATIENT
Start: 2024-08-13 | End: 2024-08-13

## 2024-08-13 RX ADMIN — SODIUM CHLORIDE: 0.9 INJECTION, SOLUTION INTRAVENOUS at 06:08

## 2024-08-13 NOTE — Clinical Note
An angiography was performed of the graft. The angiography was performed via power injection. The injected amount was 6 mL contrast at 3 mL/s. The PSI from the power injection was 300. SVG to OM

## 2024-08-13 NOTE — Clinical Note
The catheter was repositioned into the and insertion attempt was made into the radial graft. The catheter was unable to engage the area..

## 2024-08-13 NOTE — PLAN OF CARE
Ambulated around unit and to restroom without difficulty. Returned to room with no complaints of pain or distress noted. VSS. Dressing to right groin and right radial remains CDI. Proceed with discharge as ordered.

## 2024-08-13 NOTE — Clinical Note
The catheter was inserted over the wire into the ostial SVG graft. SVG to OM. IVUS performed to distal graft

## 2024-08-13 NOTE — Clinical Note
The catheter insertion attempt was made into the and was inserted over the wire into the SVG graft. The catheter was unable to engage the area..

## 2024-08-13 NOTE — Clinical Note
The catheter was inserted over the wire into the radial graft. An angiography was performed of the graft. Multiple views were taken. The angiography was performed via power injection. The injected amount was 6 mL contrast at 3 mL/s. The PSI from the power injection was 300. Left radial artery to LAD

## 2024-08-13 NOTE — Clinical Note
The catheter was inserted over the wire into the ostial SVG graft. An angiography was performed of the graft. Multiple views were taken. The angiography was performed via power injection. The injected amount was 6 mL contrast at 3 mL/s. The PSI from the power injection was 300. SVG to OM

## 2024-08-13 NOTE — PLAN OF CARE
Bedside hand off report given to GEORGE Nunes .  Chart given. Left to cath lab via stretcher for scheduled procedure.

## 2024-08-13 NOTE — DISCHARGE INSTRUCTIONS
Post Radial Cath Discharge Instructions  Keep puncture site covered with current bandage for 24 hours.  You may shower in 24 hours. Do not take a tub bath or submerge the puncture site in water for 72 hours.   You may cover the site with a Band-Aid. Keep Band-Aid clean and dry at all times.    No lifting over 5 pounds with the arm your puncture site is on for 72 hours.  No strenuous activity such as bowling, tennis, etc for 72 hours  Do not operate lawnmower, motorcycle, chainsaw, or all terrain vehicle for 72 hours.  No blood pressure or tourniquets on affected arm for 72 hours.   The puncture site may be slightly bruised and sore following your procedure.   Drink 6-8 glasses of clear liquids during the next 24 hours to flush the dye out.     If Bleeding Occurs, DO NOT PANIC  Place 1 or 2 fingers over the puncture site and hold firm pressure to stop bleeding. You may be able to feel your pulse as you hold pressure  Lift you fingers after 5 minutes to see if the bleeding stopped.  Once has stopped, gently wipe the wrist area clean and cover with a bandage.  If the bleeding does not stop after 30 minutes, or if there is a large amount of bleeding or spurting, call 911! Do not drive yourself to the hospital.     Post angiogram discharge care  You have a puncture site in your right groin  You can remove your current dressing in 24 hours and take a shower. Showers only for the next week. Do not sit in a bathtub or pool of water 7 days until the puncture site is healed.   Gently clean the puncture site daily using soap and water while showering, dry thoroughly and cover with a Band-Aid. Make sure the Band-Aid and puncture site stay clean and dry.  Inspect the site daily for tenderness, discharge or signs of infection.   Observe the puncture site for signs of bleeding, obvious oozing, formation on knot under skin, or bruising. If any of these were to occur you should immediately lie down flat and apply firm pressure at  the insertion site for 10 minutes. If bleeding or swelling does not stop, call 911! Do NOT try to drive yourself to the hospital.   Drink at least 6-8 glasses of clear liquids during the next 24 hours to flush out the dye.  You must not be alone for the next 24 hours.   You may experience soreness or tenderness that my last for 1 week.   You may have mild oozing from the puncture site and/or possible bruising that could last up to 2 weeks.  You may also have a small lump form that may last up to 6 weeks.       Activity  Do not drive or operate an automobile or hazardous machinery for 24 hours  Do note engage in sports for 1 week.  Limit lifting over 10 pounds for the nest week, or until puncture site heals.  Limit you activities for the next 48 hours. Avoid excessive bending or squatting.  You may resume normal activity in 2-3 days, let pain be your guide.    Call your Doctor immediately if you experience any of the following:  Chest pain, palpitations, shortness of breath, excessive dizziness, fainting, nausea or vomiting.  Any signs of infection: redness, warmth, swelling, pain, drainage (other than a little blood on Band-Aid), chills, poorly healing puncture site, fever greater than 101.0 F  Change in color, coolness, swelling, numbness, or pain to the involved extremity  Significant bleeding from the puncture site.

## 2024-08-13 NOTE — PLAN OF CARE
Verbalizes understanding of discharge instructions, angio site care, and follow up care. Belongings gathered and dressed in personal clothing. Left via wheelchair to private auto accompanied by significant other.

## 2024-08-13 NOTE — Clinical Note
The catheter was inserted over the wire into the ostial SVG graft. An angiography was performed of the graft. Multiple views were taken. The angiography was performed via power injection. The injected amount was 6 mL contrast at 3 mL/s. The PSI from the power injection was 300. SVG to RPDA

## 2024-08-13 NOTE — Clinical Note
The catheter was repositioned into the and insertion attempt was made into the SVG graft. The catheter was unable to engage the area..

## 2024-08-13 NOTE — PLAN OF CARE
Report received from GEORGE Nunes. Arrived back from cath lab via stretcher. No complaints of pain or distress noted. Dressing to right groin CDI and TR band to right radial CDI with 12ml of pressure per cath lab report. Post angio instructions given. Resting in bed with call light in reach.

## 2024-08-14 NOTE — PROCEDURES
Brief cardiac cath report:    S/p successful PCI of OM.   LIMA to LAD is patent. Radial graft to LAD from redo CABG is occluded. LAD is diffusely disease and small in size in mid to distal segment distal to the graft anastomosis --- it's not amenable to PCI due to the diffuse nature of the disease and small size of the vessel -- will be managed medically.  SVG to OM is patent but there is an obstructive lesion in OM distal to the anastomosis.  SVG to RPDA patent with no obstructive lesion distally.     He tolerated the procedure well. DAPT with ASA and plavix.

## 2024-08-16 NOTE — TELEPHONE ENCOUNTER
----- Message from Hazel Espinal sent at 8/16/2024 10:17 AM CDT -----  Contact: self  Type:  RX Refill Request    Who Called:  pt  Refill or New Rx:  rew rx  RX Name and Strength:  metoprolol succinate (TOPROL-XL) 25 MG 24 hr tablet, and sacubitriL-valsartan (ENTRESTO) 24-26 mg per tablet  How is the patient currently taking it? (ex. 1XDay):  as directed   Is this a 30 day or 90 day RX:  90  Preferred Pharmacy with phone number:    [id number] [place] [address] [facility] AT [facility] & [address] [address]  Phone: [phone] Fax: [phone]  Local or Mail Order:  local  Ordering Provider:  Dr Lynda Kline Call Back Number:  [phone]  Additional Information:  pt saw you on 7/18/24 and you changed some of his meds and they were sent to the [facility] downstairs and after filling the meds one time they were supposed to be sent to the [facility] but he only rec'd the Metoprolol and not the sacubitril-valsartin and is about to run out to that one.  The metoprolot was only sent over for a 30 day script w/1 refill and he wants both at 90 day refills so call pt back to advise and thanks

## 2024-08-18 LAB
OHS QRS DURATION: 120 MS
OHS QTC CALCULATION: 463 MS

## 2024-08-19 RX ORDER — SACUBITRIL AND VALSARTAN 24; 26 MG/1; MG/1
1 TABLET, FILM COATED ORAL 2 TIMES DAILY
Qty: 180 TABLET | Refills: 2 | Status: SHIPPED | OUTPATIENT
Start: 2024-08-19

## 2024-08-19 RX ORDER — METOPROLOL SUCCINATE 25 MG/1
25 TABLET, EXTENDED RELEASE ORAL NIGHTLY
Qty: 90 TABLET | Refills: 2 | Status: SHIPPED | OUTPATIENT
Start: 2024-08-19 | End: 2025-05-16

## 2024-08-29 ENCOUNTER — OFFICE VISIT (OUTPATIENT)
Dept: CARDIOLOGY | Facility: CLINIC | Age: 64
End: 2024-08-29
Payer: COMMERCIAL

## 2024-08-29 VITALS
HEART RATE: 62 BPM | BODY MASS INDEX: 33.14 KG/M2 | WEIGHT: 223.75 LBS | OXYGEN SATURATION: 98 % | DIASTOLIC BLOOD PRESSURE: 60 MMHG | HEIGHT: 69 IN | SYSTOLIC BLOOD PRESSURE: 128 MMHG

## 2024-08-29 DIAGNOSIS — I34.0 NONRHEUMATIC MITRAL VALVE REGURGITATION: ICD-10-CM

## 2024-08-29 DIAGNOSIS — I25.10 CORONARY ARTERY DISEASE INVOLVING NATIVE CORONARY ARTERY OF NATIVE HEART WITHOUT ANGINA PECTORIS: ICD-10-CM

## 2024-08-29 DIAGNOSIS — I50.20 HEART FAILURE WITH REDUCED EJECTION FRACTION: Primary | ICD-10-CM

## 2024-08-29 DIAGNOSIS — Z86.73 HISTORY OF STROKE: ICD-10-CM

## 2024-08-29 PROCEDURE — 99999 PR PBB SHADOW E&M-EST. PATIENT-LVL IV: CPT | Mod: PBBFAC,,, | Performed by: STUDENT IN AN ORGANIZED HEALTH CARE EDUCATION/TRAINING PROGRAM

## 2024-08-29 NOTE — PROGRESS NOTES
Patient ID:  Matteo Fraire  63 y.o.  male      Assessment:       1. Heart failure with reduced ejection fraction    2. Coronary artery disease involving native coronary artery of native heart without angina pectoris    3. History of stroke    4. Nonrheumatic mitral valve regurgitation      PCI of LAD in 2005 after an MI,  CABGX2 (LIMA to LAD and SVG to OM) in 2011 and redo CABGX3 with complete pericardiectomy in 2022 ( SVG to RPDA, SVG to OM and left radial artery to LAD - ?sequential from SVG to OM).  He had presented with anterior STEMI, had balloon angioplasty of LAD at the LIMA anastomosis site and later on during that hospitalization had the redo CABG.     Angiogram on 8/13/2024 showed patent SVG to RPDA with good flow, patent SVG to OM however distal to the anastomosis OM had 90% stenosis. PCI was performed for that. No radial artery graft to LAD was found but LIMA to LAD was widely patent with DAMI-3 distal flow however LAD is small in size and has extensive diffuse disease throughout distal to the anastomosis and not amenable to PCI.    LVEF is 35-40% on echo from March 2024. Was not on GDMT. Initiated in July.     Plan:     He is doing really well post-PCI of OM. His dyspnea on exertion has resolved. Able to do more physically. No chest pain. He is NYHA class I/II at this point    Continue aspirin 81 mg daily and Plavix 75 mg daily for 12 months post-PCI followed by aspirin 81 mg daily monotherapy indefinitely from CAD perspective.  Last LDL 63 in December 2023.  Repeat lipid panel prior to next visit.  Ideally would like LDL less than 55.  Will add Zetia if needed.    Continue optimizing GDMT as tolerated.  Has been tolerating Entresto 24-26 mg b.i.d. and Toprol 25 mg daily well.  Will continue and add Jardiance 10 mg daily. Repeat echo after GDMT optimization.    Euvolemic. Lasix 20 mg p.r.n. for weight gain more than 3 lb in 24 hours or more than 5 lb in a week.  Contact office if that is the case.  Weight has been stable and has not had to take any.    Continue amiodarone 200 mg daily for now.  He was started on it for NSVT episodes during his hospitalization in 2022.     Counseled on daily fluid and sodium restrictions; <1.5 and 2g respectively.     Counseled on ASCVD risk factor control and heart healthy lifestyle including diet, exercise and weight loss.      Routine surveillance echo periodically as per guidelines for mild to moderate mitral/tricuspid valvular regurgitation.      Subjective:     Chief Complaint   Patient presents with    Hospital Follow Up     Angio f/u s/p stent    Dizziness    Coronary Artery Disease       HPI:  Matteo Fraire is a 63 y.o. male who presents today for follow-up after PCI of OM on August 13, 2024.  He was experiencing shortness of breath on exertion like yard work of 25-30 minutes or climbing stairs > 2 flights for 4-5 months prior to that.  His shortness of breath has resolved now after the PCI.  He is able to climb multiple flights of stairs and able do yard work including heavy strenuous activities without any shortness of breath or chest pain. Denies orthopnea, PND, palpitations, swelling of extremities, syncope or near-syncope.  Weight has been stable and has not had to take any Lasix. Has chronic paresthesia of his extremities and follows with Neurology.    He has history of extensive CAD with PCI of LAD in 2005 after an MI,  CABGX2 (LIMA to LAD and SVG to OM) in 2011 and redo CABGX3 with complete pericardiectomy in 2022 ( SVG to RPDA, SVG to OM and left radial artery to LAD - ?sequential from SVG to OM).  He had presented with anterior STEMI, had balloon angioplasty of LAD at the LIMA anastomosis site and later on during that hospitalization had the redo CABG.  His hospital course was complicated by post CABG stroke (has residual defect).  LVEF at that time was noted to be around 45%.  He also has a history of hypertension , CKD III and psoriasis.       PREVIOUS  CARDIAC TESTING/PROCEDURES HISTORY:  Most Recent Echocardiogram Results  Results for orders placed during the hospital encounter of 03/27/24    Echo    Interpretation Summary    Left Ventricle: The left ventricle is moderately dilated. Normal wall thickness. There is eccentric hypertrophy. Regional wall motion abnormalities present. Septal motion is abnormal. There is moderately reduced systolic function with a visually estimated ejection fraction of 30 - 40%. There is normal diastolic function.    Right Ventricle: Normal right ventricular cavity size. Wall thickness is normal. Right ventricle wall motion  is normal. Systolic function is normal.    Left Atrium: Left atrium is moderately dilated.    Mitral Valve: There is mild to moderate regurgitation.    Tricuspid Valve: There is mild regurgitation with a centrally directed jet.    Pulmonary Artery: The estimated pulmonary artery systolic pressure is 28 mmHg.    IVC/SVC: Normal venous pressure at 3 mmHg.        Most Recent Cardiovascular Angiogram results  Results for orders placed during the hospital encounter of 08/13/24    Cardiac catheterization (Preliminary)      Conclusion    The 1st Mrg lesion was 90% stenosed.    1st Mrg lesion: A STENT SYNERGY XD [id number] stent was successfully placed at 11 TARYN for 15 sec.      The procedure log was documented by Documenter: Nicole Grullon RN and verified by Danica Howell MD.    Date: 8/22/2024  Time: 6:44 PM        Most Recent EKG Results  Results for orders placed or performed during the hospital encounter of 08/13/24   EKG 12-LEAD on arrival to floor    Collection Time: 08/13/24 11:01 AM   Result Value Ref Range    QRS Duration 120 ms    OHS QTC Calculation 463 ms    Narrative    Test Reason : R06.02,I25.709,    Vent. Rate : 056 BPM     Atrial Rate : 056 BPM     P-R Int : 240 ms          QRS Dur : 120 ms      QT Int : 480 ms       P-R-T Axes : 053 100 113 degrees     QTc Int : 463 ms    Sinus bradycardia with 1st  degree A-V block  Anterolateral infarct (cited on or before 27-JUN-2022)  Abnormal ECG  When compared with ECG of 09-AUG-2024 09:43,  T wave inversion no longer evident in Inferior leads  Confirmed by Jackson Mann MD ([id number]) on 8/18/2024 12:47:40 PM    Referred By: ARLENE HARRIS           Confirmed By:Jackson Mann MD       The ASCVD Risk score (Giuseppe DK, et al., 2019) failed to calculate for the following reasons:    The patient has a prior MI or stroke diagnosis    Review of patient's allergies indicates:   Allergen Reactions    Venom-wasp Anaphylaxis       Past Medical History:   Diagnosis Date    Coronary artery disease     Heart attack 2005;2011    Hx of CABG     Hypertension 2008    Psoriasis     Stroke 2022     Past Surgical History:   Procedure Laterality Date    ANGIOGRAM, CORONARY, WITH LEFT HEART CATHETERIZATION N/A 8/13/2024    Procedure: Angiogram, Coronary, with Left Heart Cath;  Surgeon: Arlene Harris MD;  Location: [facility] CATH/EP LAB;  Service: Cardiology;  Laterality: N/A;    CORONARY ANGIOGRAPHY INCLUDING BYPASS GRAFTS WITH CATHETERIZATION OF LEFT HEART Left 6/27/2022    Procedure: Left heart cath;  Surgeon: Stevan Powell MD;  Location: [facility] CATH/EP LAB;  Service: Cardiology;  Laterality: Left;    CORONARY ARTERY BYPASS GRAFT      CYSTOSCOPY W/ URETERAL STENT PLACEMENT Left 11/21/2019    Procedure: CYSTOSCOPY, WITH URETERAL STENT INSERTION;  Surgeon: Mickey Escudero MD;  Location: [facility] OR;  Service: Urology;  Laterality: Left;    ENDOSCOPIC HARVEST OF VEIN Right 7/8/2022    Procedure: HARVEST-VEIN-ENDOVASCULAR;  Surgeon: Eyal Stone MD;  Location: [facility] OR;  Service: Cardiothoracic;  Laterality: Right;    ESOPHAGOGASTRODUODENOSCOPY N/A 7/15/2022    Procedure: EGD (ESOPHAGOGASTRODUODENOSCOPY);  Surgeon: Art Pino MD;  Location: [facility] ENDO;  Service: Endoscopy;  Laterality: N/A;    EXTRACORPOREAL SHOCK WAVE LITHOTRIPSY Left 11/21/2019    Procedure: LITHOTRIPSY,  ESWL;  Surgeon: Mickey Escudero MD;  Location: [facility];  Service: Urology;  Laterality: Left;    HERNIA REPAIR      Inguinal just after birth    IVUS, CORONARY  8/13/2024    Procedure: IVUS, Coronary;  Surgeon: Danica Howell MD;  Location: [facility] CATH/EP LAB;  Service: Cardiology;;    PERCUTANEOUS CORONARY INTERVENTION, ARTERY N/A 8/13/2024    Procedure: Percutaneous coronary intervention;  Surgeon: Danica Howell MD;  Location: [facility] CATH/EP LAB;  Service: Cardiology;  Laterality: N/A;    REPEAT CORONARY ARTERY BYPASS GRAFTING N/A 7/8/2022    Procedure: CABG, REPEAT;  Surgeon: Eyal Stone MD;  Location: [facility];  Service: Cardiothoracic;  Laterality: N/A;  drugs ordered 7-7  @1322      SURGICAL PROCUREMENT, ARTERY, RADIAL, FOR CABG Left 7/8/2022    Procedure: SURGICAL PROCUREMENT,ARTERY,RADIAL,FOR CABG;  Surgeon: Eyal Stone MD;  Location: [facility];  Service: Cardiothoracic;  Laterality: Left;     Social History     Tobacco Use    Smoking status: Never     Passive exposure: Never    Smokeless tobacco: Never   Substance Use Topics    Alcohol use: Not Currently    Drug use: Never          REVIEW OF SYSTEMS  CONSTITUTIONAL: No chills, no fatigue, no fever.   EYES: No double vision, no blurred vision  NEURO: No headaches, no dizziness  RESPIRATORY: No cough, no wheezing.    CARDIOVASCULAR: See HPI   GI: No abdominal pain, no melena, no diarrhea, no nausea or vomiting.   : No  dysuria and frequency, no hematuria  SKIN: no bruising, no discoloration  ENDOCRINE: no polyphagia, no heat intolerance, no cold intolerance  PSYCHIATRIC: no depression, no anxiety, no memory loss  MUSCULOSKELETAL: no  neck pain, no muscle weakness,no back pain          Objective:        Vitals:    08/29/24 1345   BP: 128/60   Pulse: 62       PHYSICAL EXAM  CONSTITUTIONAL: In no apparent distress  HEENT: Normocephalic. Pupils normal and conjunctivae normal. No pallor  NECK: No JVD  LUNGS: B/L air entry to the lungs,  "clear to auscultation. No rales, wheezing or rhonchi.  HEART: Normal rate and regular rhythm. Normal S1 and S2.  No murmur   ABDOMEN: soft, non-tender; bowel sounds normal  EXTREMITIES: No edema. No hematoma at the right wrist or write groin. Good palpable distal pulses.  NEURO: AAO X 3, no gross sensory or motor deficits. SKIN:  No rash  Psych:  Normal affect    Lab Results   Component Value Date    WBC 6.84 08/09/2024    HGB 11.4 (L) 08/09/2024    HCT 39.3 (L) 08/09/2024     08/09/2024    CHOL 115 12/04/2023    TRIG 98 12/04/2023    HDL 33 (L) 12/04/2023    ALT 16 08/09/2024    AST 14 08/09/2024     08/09/2024    K 4.6 08/09/2024     08/09/2024    CREATININE 1.3 08/09/2024    BUN 20 08/09/2024    CO2 26 08/09/2024    TSH 2.910 03/20/2024    PSA 0.44 11/20/2019    INR 1.0 08/09/2024    HGBA1C 6.2 (H) 03/12/2024        @  Lab Results   Component Value Date    CHOL 115 12/04/2023    CHOL 99 (L) 09/08/2022    CHOL 111 (L) 07/12/2022     Lab Results   Component Value Date    HDL 33 (L) 12/04/2023    HDL 30 (L) 09/08/2022    HDL 29 (L) 07/12/2022     Lab Results   Component Value Date    LDLCALC 63 12/04/2023    LDLCALC 51 09/08/2022    LDLCALC 59.8 (L) 07/12/2022     No results found for: "DLDL"  Lab Results   Component Value Date    TRIG 98 12/04/2023    TRIG 92 09/08/2022    TRIG 111 07/12/2022       f1   Lab Results   Component Value Date    CHOLHDL 26.1 07/12/2022    CHOLHDL 16.1 (L) 06/27/2022    CHOLHDL 14.9 (L) 01/20/2011            Current Outpatient Medications   Medication Instructions    amiodarone (PACERONE) 200 mg, Oral, Daily    aspirin 81 mg, Oral, Daily    atorvastatin (LIPITOR) 80 mg, Oral, Daily    clopidogreL (PLAVIX) 75 mg, Oral, Daily    empagliflozin (JARDIANCE) 10 mg, Oral, Daily    furosemide (LASIX) 20 MG tablet As needed (take 1 tablet for weight gain>3lb in 24 hours or >5lb in a week)    metFORMIN (GLUCOPHAGE) 500 mg, Oral, 2 times daily with meals    metoprolol succinate " (TOPROL-XL) 25 mg, Oral, Nightly    sacubitriL-valsartan (ENTRESTO) 24-26 mg per tablet 1 tablet, Oral, 2 times daily    triamcinolone acetonide 0.1% (KENALOG) 0.1 % cream Topical (Top), 2 times daily       Medication List with Changes/Refills   New Medications    EMPAGLIFLOZIN (JARDIANCE) 10 MG TABLET    Take 1 tablet (10 mg total) by mouth once daily.   Current Medications    AMIODARONE (PACERONE) 200 MG TAB    Take 1 tablet (200 mg total) by mouth once daily.    ASPIRIN 81 MG CHEW    Take 81 mg by mouth once daily.    ATORVASTATIN (LIPITOR) 80 MG TABLET    Take 1 tablet (80 mg total) by mouth once daily.    CLOPIDOGREL (PLAVIX) 75 MG TABLET    Take 1 tablet (75 mg total) by mouth once daily.    FUROSEMIDE (LASIX) 20 MG TABLET    As needed (take 1 tablet for weight gain>3lb in 24 hours or >5lb in a week)    METFORMIN (GLUCOPHAGE) 500 MG TABLET    Take 1 tablet (500 mg total) by mouth 2 (two) times daily with meals.    METOPROLOL SUCCINATE (TOPROL-XL) 25 MG 24 HR TABLET    Take 1 tablet (25 mg total) by mouth every evening.    SACUBITRIL-VALSARTAN (ENTRESTO) 24-26 MG PER TABLET    Take 1 tablet by mouth 2 (two) times daily.    TRIAMCINOLONE ACETONIDE 0.1% (KENALOG) 0.1 % CREAM    Apply topically 2 (two) times daily.                     All pertinent labs, imaging, and EKGs reviewed.  Patient's most recent EKG tracing was personally interpreted by this provider.    Problem List Items Addressed This Visit          Neuro    History of stroke     Other Visit Diagnoses       Heart failure with reduced ejection fraction    -  Primary    Coronary artery disease involving native coronary artery of native heart without angina pectoris        Relevant Orders    Lipid Panel    Nonrheumatic mitral valve regurgitation                Follow up in about 7 weeks (around 10/17/2024).

## 2024-09-03 ENCOUNTER — TELEPHONE (OUTPATIENT)
Dept: CARDIOLOGY | Facility: CLINIC | Age: 64
End: 2024-09-03
Payer: COMMERCIAL

## 2024-09-03 DIAGNOSIS — I25.709 CORONARY ARTERY DISEASE INVOLVING CORONARY BYPASS GRAFT OF NATIVE HEART WITH ANGINA PECTORIS: Primary | ICD-10-CM

## 2024-09-03 NOTE — TELEPHONE ENCOUNTER
----- Message from Alma Devlin RN sent at 9/3/2024  3:58 PM CDT -----  Regardin/9 EKG Order  The EKG performed on 24 is missing an order.  Please place an order so that the EKG can be read in [place].    Thank you,  Alma Devlin RN  [facility]   [facility]  3rd Floor Cardiology Clinic  [phone]/ [id number]

## 2024-09-17 ENCOUNTER — OFFICE VISIT (OUTPATIENT)
Dept: FAMILY MEDICINE | Facility: CLINIC | Age: 64
End: 2024-09-17
Payer: COMMERCIAL

## 2024-09-17 VITALS
SYSTOLIC BLOOD PRESSURE: 100 MMHG | RESPIRATION RATE: 20 BRPM | WEIGHT: 217.81 LBS | HEART RATE: 60 BPM | DIASTOLIC BLOOD PRESSURE: 62 MMHG | TEMPERATURE: 98 F | HEIGHT: 69 IN | BODY MASS INDEX: 32.26 KG/M2

## 2024-09-17 DIAGNOSIS — I10 PRIMARY HYPERTENSION: Primary | ICD-10-CM

## 2024-09-17 DIAGNOSIS — R73.03 PREDIABETES: ICD-10-CM

## 2024-09-17 DIAGNOSIS — E78.5 HYPERLIPIDEMIA, UNSPECIFIED HYPERLIPIDEMIA TYPE: ICD-10-CM

## 2024-09-17 DIAGNOSIS — Z12.5 SCREENING PSA (PROSTATE SPECIFIC ANTIGEN): ICD-10-CM

## 2024-09-17 PROCEDURE — 3008F BODY MASS INDEX DOCD: CPT | Mod: CPTII,S$GLB,, | Performed by: NURSE PRACTITIONER

## 2024-09-17 PROCEDURE — 1159F MED LIST DOCD IN RCRD: CPT | Mod: CPTII,S$GLB,, | Performed by: NURSE PRACTITIONER

## 2024-09-17 PROCEDURE — 3078F DIAST BP <80 MM HG: CPT | Mod: CPTII,S$GLB,, | Performed by: NURSE PRACTITIONER

## 2024-09-17 PROCEDURE — 99999 PR PBB SHADOW E&M-EST. PATIENT-LVL IV: CPT | Mod: PBBFAC,,, | Performed by: NURSE PRACTITIONER

## 2024-09-17 PROCEDURE — 3074F SYST BP LT 130 MM HG: CPT | Mod: CPTII,S$GLB,, | Performed by: NURSE PRACTITIONER

## 2024-09-17 PROCEDURE — 3044F HG A1C LEVEL LT 7.0%: CPT | Mod: CPTII,S$GLB,, | Performed by: NURSE PRACTITIONER

## 2024-09-17 PROCEDURE — 99213 OFFICE O/P EST LOW 20 MIN: CPT | Mod: S$GLB,,, | Performed by: NURSE PRACTITIONER

## 2024-09-17 PROCEDURE — 4010F ACE/ARB THERAPY RXD/TAKEN: CPT | Mod: CPTII,S$GLB,, | Performed by: NURSE PRACTITIONER

## 2024-09-17 NOTE — PROGRESS NOTES
Patient ID: Matteo Fraire is a 63 y.o. male.    Chief Complaint: Follow-up (62 yo male here for 3 month recheck. Pt states he had stent placement since last visit. KM)    Mr. Fraire presents today for 3 month follow up. He has been seen  by cardiology recently for dyspnea on exertion and needed a stent placed due to having a blockage. He states his dyspnea has since improved significantly. He is able to work and activity as normal. We reviewed overdue health maintenance and he was offered to have colonoscopy which he refused again at this time. We discussed importance of screening. He denies any new issues or complaints.     His blood pressure is well controlled. He is in need of updated blood work.         Past Medical History:   Diagnosis Date    Coronary artery disease     Heart attack 2005;2011    Hx of CABG     Hypertension 2008    Psoriasis     Stroke 2022     Past Surgical History:   Procedure Laterality Date    ANGIOGRAM, CORONARY, WITH LEFT HEART CATHETERIZATION N/A 8/13/2024    Procedure: Angiogram, Coronary, with Left Heart Cath;  Surgeon: Danica Howell MD;  Location: [facility] CATH/EP LAB;  Service: Cardiology;  Laterality: N/A;    CORONARY ANGIOGRAPHY INCLUDING BYPASS GRAFTS WITH CATHETERIZATION OF LEFT HEART Left 6/27/2022    Procedure: Left heart cath;  Surgeon: Stevan Powell MD;  Location: [facility] CATH/EP LAB;  Service: Cardiology;  Laterality: Left;    CORONARY ARTERY BYPASS GRAFT      CYSTOSCOPY W/ URETERAL STENT PLACEMENT Left 11/21/2019    Procedure: CYSTOSCOPY, WITH URETERAL STENT INSERTION;  Surgeon: Mickey Escudero MD;  Location: [facility] OR;  Service: Urology;  Laterality: Left;    ENDOSCOPIC HARVEST OF VEIN Right 7/8/2022    Procedure: HARVEST-VEIN-ENDOVASCULAR;  Surgeon: Eyal Stone MD;  Location: [facility] OR;  Service: Cardiothoracic;  Laterality: Right;    ESOPHAGOGASTRODUODENOSCOPY N/A 7/15/2022    Procedure: EGD (ESOPHAGOGASTRODUODENOSCOPY);  Surgeon: Art Pino MD;   Location: [facility] ENDO;  Service: Endoscopy;  Laterality: N/A;    EXTRACORPOREAL SHOCK WAVE LITHOTRIPSY Left 11/21/2019    Procedure: LITHOTRIPSY, ESWL;  Surgeon: Mickey Escudero MD;  Location: [facility] OR;  Service: Urology;  Laterality: Left;    HERNIA REPAIR      Inguinal just after birth    IVUS, CORONARY  8/13/2024    Procedure: IVUS, Coronary;  Surgeon: Danica Howell MD;  Location: [facility] CATH/EP LAB;  Service: Cardiology;;    PERCUTANEOUS CORONARY INTERVENTION, ARTERY N/A 8/13/2024    Procedure: Percutaneous coronary intervention;  Surgeon: Danica Howell MD;  Location: [facility] CATH/EP LAB;  Service: Cardiology;  Laterality: N/A;    REPEAT CORONARY ARTERY BYPASS GRAFTING N/A 7/8/2022    Procedure: CABG, REPEAT;  Surgeon: Eyal Stone MD;  Location: [facility] OR;  Service: Cardiothoracic;  Laterality: N/A;  drugs ordered 7-7  @1322      SURGICAL PROCUREMENT, ARTERY, RADIAL, FOR CABG Left 7/8/2022    Procedure: SURGICAL PROCUREMENT,ARTERY,RADIAL,FOR CABG;  Surgeon: Eyal Stone MD;  Location: [facility] OR;  Service: Cardiothoracic;  Laterality: Left;         Tobacco History:  reports that he has never smoked. He has never been exposed to tobacco smoke. He has never used smokeless tobacco.      Review of patient's allergies indicates:   Allergen Reactions    Venom-wasp Anaphylaxis       Current Outpatient Medications:     aspirin 81 MG Chew, Take 81 mg by mouth once daily., Disp: , Rfl:     atorvastatin (LIPITOR) 80 MG tablet, Take 1 tablet (80 mg total) by mouth once daily. (Patient taking differently: Take 80 mg by mouth every evening.), Disp: 90 tablet, Rfl: 1    clopidogreL (PLAVIX) 75 mg tablet, Take 1 tablet (75 mg total) by mouth once daily., Disp: 30 tablet, Rfl: 1    empagliflozin (JARDIANCE) 10 mg tablet, Take 1 tablet (10 mg total) by mouth once daily., Disp: 90 tablet, Rfl: 0    furosemide (LASIX) 20 MG tablet, As needed (take 1 tablet for weight gain>3lb in 24 hours or >5lb in a week)  (Patient taking differently: Take 20 mg by mouth daily as needed. As needed (take 1 tablet for weight gain>3lb in 24 hours or >5lb in a week)), Disp: 60 tablet, Rfl: 1    metFORMIN (GLUCOPHAGE) 500 MG tablet, Take 1 tablet (500 mg total) by mouth 2 (two) times daily with meals., Disp: 180 tablet, Rfl: 1    metoprolol succinate (TOPROL-XL) 25 MG 24 hr tablet, Take 1 tablet (25 mg total) by mouth every evening., Disp: 90 tablet, Rfl: 2    sacubitriL-valsartan (ENTRESTO) 24-26 mg per tablet, Take 1 tablet by mouth 2 (two) times daily., Disp: 180 tablet, Rfl: 2    triamcinolone acetonide 0.1% (KENALOG) 0.1 % cream, Apply topically 2 (two) times daily., Disp: , Rfl:     amiodarone (PACERONE) 200 MG Tab, Take 1 tablet (200 mg total) by mouth once daily., Disp: 90 tablet, Rfl: 3  No current facility-administered medications for this visit.    Facility-Administered Medications Ordered in Other Visits:     sodium bicarbonate 10 mEq, potassium chloride 30 mEq in cardioplegic solution (PLEGISOL) 1,000 mL, , Intravenous, Once, Eyal Stone MD    sodium bicarbonate 10 mEq, potassium chloride 70 mEq in cardioplegic solution (PLEGISOL) 1,000 mL, , Intravenous, Once, Eyal Stone MD    thromb,yq-bxuzrm-qlgnzuu,s-Ca (TISSEEL) 4 mL, , Topical (Top), Once, Eyal Stone MD    Review of Systems   Constitutional:  Negative for activity change, appetite change, fatigue and fever.   HENT:  Negative for congestion, dental problem, nosebleeds, postnasal drip, rhinorrhea, sinus pain, sore throat and tinnitus.    Eyes:  Negative for pain, discharge, itching and visual disturbance.   Respiratory:  Negative for cough, chest tightness, shortness of breath and wheezing.    Cardiovascular:  Negative for chest pain.   Gastrointestinal:  Negative for abdominal pain, blood in stool, constipation, diarrhea, nausea and vomiting.   Endocrine: Negative for cold intolerance, heat intolerance, polydipsia, polyphagia and polyuria.  "  Genitourinary:  Negative for difficulty urinating, flank pain, genital sores, hematuria and urgency.   Musculoskeletal:  Negative for arthralgias and myalgias.   Skin:  Negative for rash and wound.   Allergic/Immunologic: Negative for environmental allergies and food allergies.   Neurological:  Negative for dizziness, light-headedness and headaches.   Hematological:  Negative for adenopathy. Does not bruise/bleed easily.   Psychiatric/Behavioral:  Negative for behavioral problems, confusion, decreased concentration, sleep disturbance and suicidal ideas. The patient is not nervous/anxious and is not hyperactive.           Objective:      Vitals:    09/17/24 0942   BP: 100/62   Pulse: 60   Resp: 20   Temp: 98 °F (36.7 °C)   TempSrc: Oral   Weight: 98.8 kg (217 lb 12.8 oz)   Height: 5' 9" (1.753 m)     Physical Exam  Constitutional:       Appearance: Normal appearance.   Cardiovascular:      Rate and Rhythm: Normal rate.      Pulses: Normal pulses.      Heart sounds: Normal heart sounds.   Pulmonary:      Effort: Pulmonary effort is normal.      Breath sounds: Normal breath sounds.   Abdominal:      General: Bowel sounds are normal.      Palpations: Abdomen is soft.   Skin:     General: Skin is warm.   Neurological:      Mental Status: He is alert and oriented to person, place, and time. Mental status is at baseline.   Psychiatric:         Mood and Affect: Mood normal.         Behavior: Behavior normal.           Assessment:       1. Primary hypertension    2. Prediabetes    3. Hyperlipidemia, unspecified hyperlipidemia type    4. Screening PSA (prostate specific antigen)           Plan:       Primary hypertension  -     HEMOGLOBIN A1C; Future; Expected date: 09/17/2024  -     COMPREHENSIVE METABOLIC PANEL; Future; Expected date: 09/17/2024  -     CBC W/ AUTO DIFFERENTIAL; Future; Expected date: 09/17/2024    Prediabetes  -     HEMOGLOBIN A1C; Future; Expected date: 09/17/2024  -     COMPREHENSIVE METABOLIC PANEL; " Future; Expected date: 09/17/2024    Hyperlipidemia, unspecified hyperlipidemia type    Screening PSA (prostate specific antigen)  -     PSA, SCREENING; Future; Expected date: 09/17/2024      Follow up in about 3 months (around 12/17/2024), or if symptoms worsen or fail to improve.        9/17/2024 Naomi Bliss, NP

## 2024-09-19 DIAGNOSIS — I25.709 CORONARY ARTERY DISEASE INVOLVING CORONARY BYPASS GRAFT OF NATIVE HEART WITH ANGINA PECTORIS: Primary | ICD-10-CM

## 2024-10-14 ENCOUNTER — OFFICE VISIT (OUTPATIENT)
Dept: CARDIOLOGY | Facility: CLINIC | Age: 64
End: 2024-10-14
Payer: COMMERCIAL

## 2024-10-14 VITALS
HEART RATE: 59 BPM | BODY MASS INDEX: 33.33 KG/M2 | SYSTOLIC BLOOD PRESSURE: 120 MMHG | RESPIRATION RATE: 16 BRPM | WEIGHT: 225 LBS | OXYGEN SATURATION: 99 % | DIASTOLIC BLOOD PRESSURE: 78 MMHG | HEIGHT: 69 IN

## 2024-10-14 DIAGNOSIS — I50.20 HEART FAILURE WITH REDUCED EJECTION FRACTION: ICD-10-CM

## 2024-10-14 DIAGNOSIS — E78.5 HYPERLIPIDEMIA, UNSPECIFIED HYPERLIPIDEMIA TYPE: ICD-10-CM

## 2024-10-14 DIAGNOSIS — Z95.1 S/P CABG (CORONARY ARTERY BYPASS GRAFT): Primary | ICD-10-CM

## 2024-10-14 DIAGNOSIS — R73.03 PREDIABETES: ICD-10-CM

## 2024-10-14 DIAGNOSIS — I25.709 CORONARY ARTERY DISEASE INVOLVING CORONARY BYPASS GRAFT OF NATIVE HEART WITH ANGINA PECTORIS: ICD-10-CM

## 2024-10-14 PROCEDURE — 1159F MED LIST DOCD IN RCRD: CPT | Mod: CPTII,S$GLB,, | Performed by: NURSE PRACTITIONER

## 2024-10-14 PROCEDURE — 99214 OFFICE O/P EST MOD 30 MIN: CPT | Mod: S$GLB,,, | Performed by: NURSE PRACTITIONER

## 2024-10-14 PROCEDURE — 4010F ACE/ARB THERAPY RXD/TAKEN: CPT | Mod: CPTII,S$GLB,, | Performed by: NURSE PRACTITIONER

## 2024-10-14 PROCEDURE — 99999 PR PBB SHADOW E&M-EST. PATIENT-LVL IV: CPT | Mod: PBBFAC,,, | Performed by: NURSE PRACTITIONER

## 2024-10-14 PROCEDURE — 3074F SYST BP LT 130 MM HG: CPT | Mod: CPTII,S$GLB,, | Performed by: NURSE PRACTITIONER

## 2024-10-14 PROCEDURE — 3078F DIAST BP <80 MM HG: CPT | Mod: CPTII,S$GLB,, | Performed by: NURSE PRACTITIONER

## 2024-10-14 PROCEDURE — 3008F BODY MASS INDEX DOCD: CPT | Mod: CPTII,S$GLB,, | Performed by: NURSE PRACTITIONER

## 2024-10-14 PROCEDURE — 3044F HG A1C LEVEL LT 7.0%: CPT | Mod: CPTII,S$GLB,, | Performed by: NURSE PRACTITIONER

## 2024-10-14 RX ORDER — ATORVASTATIN CALCIUM 80 MG/1
80 TABLET, FILM COATED ORAL NIGHTLY
Qty: 90 TABLET | Refills: 3 | Status: SHIPPED | OUTPATIENT
Start: 2024-10-14 | End: 2025-10-14

## 2024-10-14 RX ORDER — CLOPIDOGREL BISULFATE 75 MG/1
75 TABLET ORAL DAILY
Qty: 90 TABLET | Refills: 3 | Status: SHIPPED | OUTPATIENT
Start: 2024-10-14 | End: 2025-10-14

## 2024-10-14 RX ORDER — METFORMIN HYDROCHLORIDE 500 MG/1
500 TABLET ORAL 2 TIMES DAILY WITH MEALS
Qty: 180 TABLET | Refills: 3 | Status: SHIPPED | OUTPATIENT
Start: 2024-10-14 | End: 2025-10-14

## 2024-10-14 RX ORDER — AMIODARONE HYDROCHLORIDE 200 MG/1
200 TABLET ORAL DAILY
Qty: 90 TABLET | Refills: 3 | Status: SHIPPED | OUTPATIENT
Start: 2024-10-14 | End: 2025-10-09

## 2024-10-14 NOTE — PROGRESS NOTES
Subjective:    Patient ID:  Matteo Fraire is a 63 y.o. male who presents for follow-up   Chief Complaint   Patient presents with    Follow-up     Angio in August, he is doing well       HPI:      Matteo Fraire is here for follow-up visit. Denies chest pain or shortness of breath. Denies recent fever cough chills or congestion. Denies blood in the urine or blood in the stool.  Denies myalgias. Denies orthopnea or peripheral edema. Denies nausea vomiting or dyspepsia. No recent arm neck or jaw pain. No lightheadedness or dizziness. Cutting and splitting wood with no issues.       Review of patient's allergies indicates:   Allergen Reactions    Venom-wasp Anaphylaxis       Past Medical History:   Diagnosis Date    Coronary artery disease     Heart attack 2005;2011    Hx of CABG     Hypertension 2008    Psoriasis     Stroke 2022     Past Surgical History:   Procedure Laterality Date    ANGIOGRAM, CORONARY, WITH LEFT HEART CATHETERIZATION N/A 8/13/2024    Procedure: Angiogram, Coronary, with Left Heart Cath;  Surgeon: Danica Howell MD;  Location: [facility] CATH/EP LAB;  Service: Cardiology;  Laterality: N/A;    CORONARY ANGIOGRAPHY INCLUDING BYPASS GRAFTS WITH CATHETERIZATION OF LEFT HEART Left 6/27/2022    Procedure: Left heart cath;  Surgeon: Stevan Powell MD;  Location: [facility] CATH/EP LAB;  Service: Cardiology;  Laterality: Left;    CORONARY ARTERY BYPASS GRAFT      CYSTOSCOPY W/ URETERAL STENT PLACEMENT Left 11/21/2019    Procedure: CYSTOSCOPY, WITH URETERAL STENT INSERTION;  Surgeon: Mickey Escudero MD;  Location: [facility] OR;  Service: Urology;  Laterality: Left;    ENDOSCOPIC HARVEST OF VEIN Right 7/8/2022    Procedure: HARVEST-VEIN-ENDOVASCULAR;  Surgeon: Eyal Stone MD;  Location: [facility] OR;  Service: Cardiothoracic;  Laterality: Right;    ESOPHAGOGASTRODUODENOSCOPY N/A 7/15/2022    Procedure: EGD (ESOPHAGOGASTRODUODENOSCOPY);  Surgeon: Art Pino MD;  Location: [facility] ENDO;  Service:  Endoscopy;  Laterality: N/A;    EXTRACORPOREAL SHOCK WAVE LITHOTRIPSY Left 11/21/2019    Procedure: LITHOTRIPSY, ESWL;  Surgeon: Mickey Escudero MD;  Location: [facility] OR;  Service: Urology;  Laterality: Left;    HERNIA REPAIR      Inguinal just after birth    IVUS, CORONARY  8/13/2024    Procedure: IVUS, Coronary;  Surgeon: Danica Howell MD;  Location: [facility] CATH/EP LAB;  Service: Cardiology;;    PERCUTANEOUS CORONARY INTERVENTION, ARTERY N/A 8/13/2024    Procedure: Percutaneous coronary intervention;  Surgeon: Danica Howell MD;  Location: [facility] CATH/EP LAB;  Service: Cardiology;  Laterality: N/A;    REPEAT CORONARY ARTERY BYPASS GRAFTING N/A 7/8/2022    Procedure: CABG, REPEAT;  Surgeon: Eyal Stone MD;  Location: [facility] OR;  Service: Cardiothoracic;  Laterality: N/A;  drugs ordered 7-7  @1322      SURGICAL PROCUREMENT, ARTERY, RADIAL, FOR CABG Left 7/8/2022    Procedure: SURGICAL PROCUREMENT,ARTERY,RADIAL,FOR CABG;  Surgeon: Eyal Stone MD;  Location: [facility];  Service: Cardiothoracic;  Laterality: Left;     Social History     Tobacco Use    Smoking status: Never     Passive exposure: Never    Smokeless tobacco: Never   Substance Use Topics    Alcohol use: Not Currently    Drug use: Never     Family History   Problem Relation Name Age of Onset    Hearing loss Mother      Arthritis Mother          Review of Systems:     PER HPI       Objective:        Vitals:    10/14/24 1113   BP: 120/78   Pulse: (!) 59   Resp: 16       Lab Results   Component Value Date    WBC 6.84 08/09/2024    HGB 11.4 (L) 08/09/2024    HCT 39.3 (L) 08/09/2024     08/09/2024    CHOL 115 12/04/2023    TRIG 98 12/04/2023    HDL 33 (L) 12/04/2023    ALT 16 08/09/2024    AST 14 08/09/2024     08/09/2024    K 4.6 08/09/2024     08/09/2024    CREATININE 1.3 08/09/2024    BUN 20 08/09/2024    CO2 26 08/09/2024    TSH 2.910 03/20/2024    PSA 0.44 11/20/2019    INR 1.0 08/09/2024    HGBA1C 6.2 (H) 03/12/2024         ECHOCARDIOGRAM RESULTS  Results for orders placed during the hospital encounter of 03/27/24    Echo    Interpretation Summary    Left Ventricle: The left ventricle is moderately dilated. Normal wall thickness. There is eccentric hypertrophy. Regional wall motion abnormalities present. Septal motion is abnormal. There is moderately reduced systolic function with a visually estimated ejection fraction of 30 - 40%. There is normal diastolic function.    Right Ventricle: Normal right ventricular cavity size. Wall thickness is normal. Right ventricle wall motion  is normal. Systolic function is normal.    Left Atrium: Left atrium is moderately dilated.    Mitral Valve: There is mild to moderate regurgitation.    Tricuspid Valve: There is mild regurgitation with a centrally directed jet.    Pulmonary Artery: The estimated pulmonary artery systolic pressure is 28 mmHg.    IVC/SVC: Normal venous pressure at 3 mmHg.      cath    The 1st Mrg lesion was 90% stenosed with 0% stenosis post-intervention.    1st Mrg lesion: A STENT SYNERGY XD [id number] stent was successfully placed.    Patent SVG to RPDA, patent SVG to OM and patent LIMA to LAD.    The left ventricular end diastolic pressure was 16.     The procedure log was documented by Documenter: Nicole Grullon RN and verified by Danica Howell MD.     Date: 8/22/2024  Time: 6:44 PM    CURRENT/PREVIOUS VISIT EKG  Results for orders placed or performed during the hospital encounter of 08/13/24   EKG 12-LEAD on arrival to floor    Collection Time: 08/13/24 11:01 AM   Result Value Ref Range    QRS Duration 120 ms    [facility] QTC Calculation 463 ms    Narrative    Test Reason : R06.02,I25.709,    Vent. Rate : 056 BPM     Atrial Rate : 056 BPM     P-R Int : 240 ms          QRS Dur : 120 ms      QT Int : 480 ms       P-R-T Axes : 053 100 113 degrees     QTc Int : 463 ms    Sinus bradycardia with 1st degree A-V block  Anterolateral infarct (cited on or before  27-JUN-2022)  Abnormal ECG  When compared with ECG of 09-AUG-2024 09:43,  T wave inversion no longer evident in Inferior leads  Confirmed by Jackson Mann MD ([id number]) on 8/18/2024 12:47:40 PM    Referred By: ARLENE HARRIS           Confirmed By:Jackson Mann MD         Physical Exam:  CONSTITUTIONAL: No fever, no chills  HEENT: Normocephalic, atraumatic,pupils reactive to light                 NECK:  No JVD no carotid bruit  CVS: S1S2+, RRR, no murmurs,   LUNGS: Clear  ABDOMEN: Soft, NT, BS+  EXTREMITIES: No cyanosis, edema  : No lee catheter  NEURO: AAO X 3  PSY: Normal affect      Medication List with Changes/Refills   Current Medications    ASPIRIN 81 MG CHEW    Take 81 mg by mouth once daily.    EMPAGLIFLOZIN (JARDIANCE) 10 MG TABLET    Take 1 tablet (10 mg total) by mouth once daily.    FUROSEMIDE (LASIX) 20 MG TABLET    As needed (take 1 tablet for weight gain>3lb in 24 hours or >5lb in a week)    METOPROLOL SUCCINATE (TOPROL-XL) 25 MG 24 HR TABLET    Take 1 tablet (25 mg total) by mouth every evening.    SACUBITRIL-VALSARTAN (ENTRESTO) 24-26 MG PER TABLET    Take 1 tablet by mouth 2 (two) times daily.    TRIAMCINOLONE ACETONIDE 0.1% (KENALOG) 0.1 % CREAM    Apply topically 2 (two) times daily.   Changed and/or Refilled Medications    Modified Medication Previous Medication    AMIODARONE (PACERONE) 200 MG TAB amiodarone (PACERONE) 200 MG Tab       Take 1 tablet (200 mg total) by mouth once daily.    Take 1 tablet (200 mg total) by mouth once daily.    ATORVASTATIN (LIPITOR) 80 MG TABLET atorvastatin (LIPITOR) 80 MG tablet       Take 1 tablet (80 mg total) by mouth every evening.    Take 1 tablet (80 mg total) by mouth once daily.    CLOPIDOGREL (PLAVIX) 75 MG TABLET clopidogreL (PLAVIX) 75 mg tablet       Take 1 tablet (75 mg total) by mouth once daily.    Take 1 tablet (75 mg total) by mouth once daily.    METFORMIN (GLUCOPHAGE) 500 MG TABLET metFORMIN (GLUCOPHAGE) 500 MG tablet       Take 1 tablet  (500 mg total) by mouth 2 (two) times daily with meals.    Take 1 tablet (500 mg total) by mouth 2 (two) times daily with meals.             Assessment:       1. S/P CABG (coronary artery bypass graft)    2. Coronary artery disease involving coronary bypass graft of native heart with angina pectoris    3. Prediabetes    4. Hyperlipidemia, unspecified hyperlipidemia type    5. Heart failure with reduced ejection fraction         Plan:                 Problem List Items Addressed This Visit       Coronary artery disease involving coronary bypass graft of native heart with angina pectoris    Current Assessment & Plan     Continue aspirin and statin therapy   Continue metoprolol.         Relevant Medications    amiodarone (PACERONE) 200 MG Tab    S/P CABG (coronary artery bypass graft) - Primary    Relevant Orders    BNP    Prediabetes    Relevant Medications    metFORMIN (GLUCOPHAGE) 500 MG tablet    Hyperlipidemia    Current Assessment & Plan     Continue atorvastatin 80 daily   Continue aspirin 81 daily         Relevant Medications    atorvastatin (LIPITOR) 80 MG tablet    Other Relevant Orders    BNP    Heart failure with reduced ejection fraction    Current Assessment & Plan     Continue metoprolol daily   Continue Entresto b.i.d.   Continue Jardiance daily  Continue Lasix as needed            No follow-ups on file.       Siobhan Tran, AG-ACNP, CVNP-BC

## 2024-10-14 NOTE — ASSESSMENT & PLAN NOTE
Continue metoprolol daily   Continue Entresto b.i.d.   Continue Jardiance daily  Continue Lasix as needed

## 2024-12-17 ENCOUNTER — OFFICE VISIT (OUTPATIENT)
Dept: FAMILY MEDICINE | Facility: CLINIC | Age: 64
End: 2024-12-17
Payer: COMMERCIAL

## 2024-12-17 VITALS
TEMPERATURE: 98 F | HEIGHT: 69 IN | HEART RATE: 68 BPM | BODY MASS INDEX: 33.9 KG/M2 | RESPIRATION RATE: 20 BRPM | DIASTOLIC BLOOD PRESSURE: 80 MMHG | SYSTOLIC BLOOD PRESSURE: 138 MMHG | WEIGHT: 228.88 LBS

## 2024-12-17 DIAGNOSIS — I10 PRIMARY HYPERTENSION: Primary | ICD-10-CM

## 2024-12-17 DIAGNOSIS — R73.03 PREDIABETES: ICD-10-CM

## 2024-12-17 DIAGNOSIS — E78.2 MIXED HYPERLIPIDEMIA: ICD-10-CM

## 2024-12-17 PROBLEM — Z93.1 PEG (PERCUTANEOUS ENDOSCOPIC GASTROSTOMY) STATUS: Status: RESOLVED | Noted: 2022-07-15 | Resolved: 2024-12-17

## 2024-12-17 PROCEDURE — 3079F DIAST BP 80-89 MM HG: CPT | Mod: CPTII,S$GLB,, | Performed by: NURSE PRACTITIONER

## 2024-12-17 PROCEDURE — 3075F SYST BP GE 130 - 139MM HG: CPT | Mod: CPTII,S$GLB,, | Performed by: NURSE PRACTITIONER

## 2024-12-17 PROCEDURE — 3044F HG A1C LEVEL LT 7.0%: CPT | Mod: CPTII,S$GLB,, | Performed by: NURSE PRACTITIONER

## 2024-12-17 PROCEDURE — 3008F BODY MASS INDEX DOCD: CPT | Mod: CPTII,S$GLB,, | Performed by: NURSE PRACTITIONER

## 2024-12-17 PROCEDURE — 1159F MED LIST DOCD IN RCRD: CPT | Mod: CPTII,S$GLB,, | Performed by: NURSE PRACTITIONER

## 2024-12-17 PROCEDURE — 99213 OFFICE O/P EST LOW 20 MIN: CPT | Mod: S$GLB,,, | Performed by: NURSE PRACTITIONER

## 2024-12-17 PROCEDURE — 99999 PR PBB SHADOW E&M-EST. PATIENT-LVL IV: CPT | Mod: PBBFAC,,, | Performed by: NURSE PRACTITIONER

## 2024-12-17 PROCEDURE — 4010F ACE/ARB THERAPY RXD/TAKEN: CPT | Mod: CPTII,S$GLB,, | Performed by: NURSE PRACTITIONER

## 2024-12-17 NOTE — PROGRESS NOTES
Patient ID: Matteo Fraire is a 64 y.o. male.    Chief Complaint: Follow-up (63 yo male here for 3 month recheck. Pt states c/o dizzy spells off/on after starting Jardiance times 4 month. KM)    History of Present Illness    CHIEF COMPLAINT:  Mr. Fraire presents for a follow-up visit to discuss medication management and recent health events affecting family members.    HPI:  Mr. Fraire reports decreased energy levels since starting new medications. He feels fatigue approximately 1.5 hours after taking his morning medications, requiring caffeine or energy-boosting substances to maintain alertness. He questions the necessity of his current medication regimen, particularly the combination of metformin, Jardiance, and Entresto. He has gained approximately 10 lbs since starting the new medication regimen while maintaining a healthy diet.    Mr. Fraire discusses recent cardiac issues, including a prior episode of shortness of breath that led to a heart catheterization. He is currently taking Entresto for heart function preservation. He underwent bypass surgery in the past to address cholesterol-related blockages and is uncertain about the need for heart medication post-bypass surgery.    Mr. Fraire reports a recent episode of left ankle swelling after returning from [place]. His cardiologist attributed this to water retention and prescribed medication to be taken in response to specific weight gain thresholds.    He reports being very active, engaging in yard work and planning to cut down dead oak trees. He is concerned about his ability to perform these tasks given his current energy levels and medication regimen.    MEDICATIONS:  Mr. Fraire is on Entresto twice daily for heart function and Jardiance daily in the morning for A1C and cardiovascular benefit. He is also taking Metformin twice daily for diabetes and some cholesterol medication. He has been instructed to take a medication for water retention if he  experiences a weight gain of 3 lbs in a day or 5 lbs in a week.    MEDICAL HISTORY:  Mr. Fraire has a history of diabetes and a heart condition.    FAMILY HISTORY:  Family history is significant for brother who passed away from stage 4 cancer throughout his body. His mother has stage 4 kidney failure at age 85.    SURGICAL HISTORY:  Mr. Fraire underwent heart bypass surgery to remove blockages.    TEST RESULTS:  Mr. Fraire's A1C results are pending. An echocardiogram is to be scheduled in the near future by the cardiologist to check the patient's EF.    SOCIAL HISTORY:  Mr. Frarie does not drink alcohol. He is retired, having previously worked in law enforcement. 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      ROS:  General: -fever, -chills, +fatigue, +weight gain, -weight loss  Eyes: -vision changes, -redness, -discharge  ENT: -ear pain, -nasal congestion, -sore throat  Cardiovascular: -chest pain, -palpitations, -lower extremity edema  Respiratory: -cough, +shortness of breath  Gastrointestinal: -abdominal pain, -nausea, -vomiting, -diarrhea, -constipation, -blood in stool  Genitourinary: -dysuria, -hematuria, -frequency  Musculoskeletal: -joint pain, -muscle pain  Skin: -rash, -lesion  Neurological: -headache, -dizziness, -numbness, -tingling  Psychiatric: -anxiety, -depression, -sleep difficulty             Past Medical History:   Diagnosis Date    Coronary artery disease     Heart attack 2005;2011    Hx of CABG     Hypertension 2008    Psoriasis     Stroke 2022     Past Surgical History:   Procedure Laterality Date    ANGIOGRAM, CORONARY, WITH LEFT HEART CATHETERIZATION N/A 8/13/2024    Procedure: Angiogram, Coronary, with Left Heart Cath;  Surgeon: Danica Howell MD;  Location: [facility] CATH/EP LAB;  Service: Cardiology;  Laterality: N/A;    CORONARY ANGIOGRAPHY INCLUDING BYPASS GRAFTS WITH CATHETERIZATION OF LEFT HEART Left 6/27/2022    Procedure: Left  heart cath;  Surgeon: Stevan Powell MD;  Location: [facility] CATH/EP LAB;  Service: Cardiology;  Laterality: Left;    CORONARY ARTERY BYPASS GRAFT      CYSTOSCOPY W/ URETERAL STENT PLACEMENT Left 11/21/2019    Procedure: CYSTOSCOPY, WITH URETERAL STENT INSERTION;  Surgeon: Mickey Escudero MD;  Location: [facility] OR;  Service: Urology;  Laterality: Left;    ENDOSCOPIC HARVEST OF VEIN Right 7/8/2022    Procedure: HARVEST-VEIN-ENDOVASCULAR;  Surgeon: Eyal Stone MD;  Location: [facility] OR;  Service: Cardiothoracic;  Laterality: Right;    ESOPHAGOGASTRODUODENOSCOPY N/A 7/15/2022    Procedure: EGD (ESOPHAGOGASTRODUODENOSCOPY);  Surgeon: Art Pino MD;  Location: [facility] ENDO;  Service: Endoscopy;  Laterality: N/A;    EXTRACORPOREAL SHOCK WAVE LITHOTRIPSY Left 11/21/2019    Procedure: LITHOTRIPSY, ESWL;  Surgeon: Mickey Escudero MD;  Location: [facility] OR;  Service: Urology;  Laterality: Left;    HERNIA REPAIR      Inguinal just after birth    IVUS, CORONARY  8/13/2024    Procedure: IVUS, Coronary;  Surgeon: Danica Howell MD;  Location: [facility] CATH/EP LAB;  Service: Cardiology;;    PERCUTANEOUS CORONARY INTERVENTION, ARTERY N/A 8/13/2024    Procedure: Percutaneous coronary intervention;  Surgeon: Danica Howell MD;  Location: [facility] CATH/EP LAB;  Service: Cardiology;  Laterality: N/A;    REPEAT CORONARY ARTERY BYPASS GRAFTING N/A 7/8/2022    Procedure: CABG, REPEAT;  Surgeon: Eyal Stone MD;  Location: [facility] OR;  Service: Cardiothoracic;  Laterality: N/A;  drugs ordered 7-7  @1322      SURGICAL PROCUREMENT, ARTERY, RADIAL, FOR CABG Left 7/8/2022    Procedure: SURGICAL PROCUREMENT,ARTERY,RADIAL,FOR CABG;  Surgeon: Eyal Stone MD;  Location: [facility];  Service: Cardiothoracic;  Laterality: Left;         Tobacco History:  reports that he has never smoked. He has never been exposed to tobacco smoke. He has never used smokeless tobacco.      Review of patient's allergies indicates:   Allergen  "Reactions    Venom-wasp Anaphylaxis       Current Outpatient Medications:     amiodarone (PACERONE) 200 MG Tab, Take 1 tablet (200 mg total) by mouth once daily., Disp: 90 tablet, Rfl: 3    aspirin 81 MG Chew, Take 81 mg by mouth once daily., Disp: , Rfl:     atorvastatin (LIPITOR) 80 MG tablet, Take 1 tablet (80 mg total) by mouth every evening., Disp: 90 tablet, Rfl: 3    clopidogreL (PLAVIX) 75 mg tablet, Take 1 tablet (75 mg total) by mouth once daily., Disp: 90 tablet, Rfl: 3    empagliflozin (JARDIANCE) 10 mg tablet, Take 1 tablet (10 mg total) by mouth once daily., Disp: 90 tablet, Rfl: 0    furosemide (LASIX) 20 MG tablet, As needed (take 1 tablet for weight gain>3lb in 24 hours or >5lb in a week), Disp: 60 tablet, Rfl: 1    metFORMIN (GLUCOPHAGE) 500 MG tablet, Take 1 tablet (500 mg total) by mouth 2 (two) times daily with meals., Disp: 180 tablet, Rfl: 3    metoprolol succinate (TOPROL-XL) 25 MG 24 hr tablet, Take 1 tablet (25 mg total) by mouth every evening., Disp: 90 tablet, Rfl: 2    sacubitriL-valsartan (ENTRESTO) 24-26 mg per tablet, Take 1 tablet by mouth 2 (two) times daily., Disp: 180 tablet, Rfl: 2    triamcinolone acetonide 0.1% (KENALOG) 0.1 % cream, Apply topically 2 (two) times daily., Disp: , Rfl:   No current facility-administered medications for this visit.    Facility-Administered Medications Ordered in Other Visits:     sodium bicarbonate 10 mEq, potassium chloride 30 mEq in cardioplegic solution (PLEGISOL) 1,000 mL, , Intravenous, Once, Eyal Stone MD    sodium bicarbonate 10 mEq, potassium chloride 70 mEq in cardioplegic solution (PLEGISOL) 1,000 mL, , Intravenous, Once, Eyal Stone MD    thromb,ys-okvyzb-gqzauva,s-Ca (TISSEEL) 4 mL, , Topical (Top), Once, Eyal Stone MD           Objective:      Vitals:    12/17/24 0936   BP: 138/80   Pulse: 68   Resp: 20   Temp: 97.8 °F (36.6 °C)   TempSrc: Oral   Weight: 103.8 kg (228 lb 14.4 oz)   Height: 5' 9" (1.753 " m)     Physical Exam  Constitutional:       Appearance: Normal appearance.   Cardiovascular:      Rate and Rhythm: Normal rate.      Pulses: Normal pulses.      Heart sounds: Normal heart sounds.   Pulmonary:      Effort: Pulmonary effort is normal.      Breath sounds: Normal breath sounds.   Abdominal:      General: Bowel sounds are normal.      Palpations: Abdomen is soft.   Skin:     General: Skin is warm.   Neurological:      Mental Status: He is alert and oriented to person, place, and time. Mental status is at baseline.   Psychiatric:         Mood and Affect: Mood normal.         Behavior: Behavior normal.           Assessment:       1. Primary hypertension    2. Mixed hyperlipidemia    3. Prediabetes           Plan:       Assessment & Plan    IMPRESSION:  - Considered reduction of metformin based on upcoming A1C results  - Maintained Entresto for heart function preservation despite patient's fatigue concerns  - Awaiting Dr. Starks's updated echocardiogram to reassess heart function and medication needs  - Acknowledged patient's reported weight gain, potentially related to medication side effects or reduced activity    CHRONIC SYSTOLIC HEART FAILURE:  - Explained the different functions of Entresto in managing heart condition.  - Discussed the importance of maintaining heart medication despite side effects for optimal heart function.  - Continued Entresto as prescribed for heart function preservation.    TYPE 2 DIABETES MELLITUS:  - Explained the different functions of metformin and Jardiance in managing diabetes.  - Continued Jardiance for diabetes management and cardiovascular benefit.  - Blood work including A1C ordered.  - Contact the office once labs results are available to discuss potential metformin adjustment.    FATIGUE AND WEIGHT MANAGEMENT:  - Educated on the relationship between medication, energy levels, and weight gain.    PHYSICAL ACTIVITY RECOMMENDATIONS:  - Mr. Fraire to take caution when  performing physical activities like tree cutting; work slowly and take frequent breaks.  - Recommend continuing current level of yard work and property maintenance, but avoid overexertion.    FOLLOW-UP:  - Follow up in 1-2 days to get labs done.  - Call cardiology office in early January to schedule follow-up visit with Dr. Starks.         Primary hypertension  -Stable on current therapy. Continue medication as prescribed.     Mixed hyperlipidemia  Counseled on heart healthy diet rich in fiber, fresh vegetables and fruit and low in saturated fats (fried foods, red meat, etc.).  I also recommend regular exercise including a minimum of 150 minutes of cardio exercise per week and 20-30 minute workouts of strength training like light weights, yoga, pilates, etc.      Prediabetes  Stable on current therapy. Get labs.     Follow up in about 4 months (around 4/17/2025).        12/17/2024 Naomi Bliss NP    This note was generated with the assistance of ambient listening technology. Verbal consent was obtained by the patient and accompanying visitor(s) for the recording of patient appointment to facilitate this note. I attest to having reviewed and edited the generated note for accuracy, though some syntax or spelling errors may persist. Please contact the author of this note for any clarification.

## 2024-12-18 ENCOUNTER — LAB VISIT (OUTPATIENT)
Dept: LAB | Facility: HOSPITAL | Age: 64
End: 2024-12-18
Attending: NURSE PRACTITIONER
Payer: COMMERCIAL

## 2024-12-18 DIAGNOSIS — Z12.5 SCREENING PSA (PROSTATE SPECIFIC ANTIGEN): ICD-10-CM

## 2024-12-18 DIAGNOSIS — R73.03 PREDIABETES: ICD-10-CM

## 2024-12-18 DIAGNOSIS — I25.10 CORONARY ARTERY DISEASE INVOLVING NATIVE CORONARY ARTERY OF NATIVE HEART WITHOUT ANGINA PECTORIS: ICD-10-CM

## 2024-12-18 DIAGNOSIS — I10 PRIMARY HYPERTENSION: ICD-10-CM

## 2024-12-18 DIAGNOSIS — R06.02 SHORTNESS OF BREATH ON EXERTION: ICD-10-CM

## 2024-12-18 LAB
ALBUMIN SERPL BCP-MCNC: 3.9 G/DL (ref 3.5–5.2)
ALBUMIN SERPL BCP-MCNC: 3.9 G/DL (ref 3.5–5.2)
ALP SERPL-CCNC: 64 U/L (ref 40–150)
ALP SERPL-CCNC: 64 U/L (ref 40–150)
ALT SERPL W/O P-5'-P-CCNC: 15 U/L (ref 10–44)
ALT SERPL W/O P-5'-P-CCNC: 15 U/L (ref 10–44)
ANION GAP SERPL CALC-SCNC: 8 MMOL/L (ref 8–16)
ANION GAP SERPL CALC-SCNC: 8 MMOL/L (ref 8–16)
AST SERPL-CCNC: 16 U/L (ref 10–40)
AST SERPL-CCNC: 16 U/L (ref 10–40)
BASOPHILS # BLD AUTO: 0.06 K/UL (ref 0–0.2)
BASOPHILS # BLD AUTO: 0.06 K/UL (ref 0–0.2)
BASOPHILS NFR BLD: 1 % (ref 0–1.9)
BASOPHILS NFR BLD: 1 % (ref 0–1.9)
BILIRUB SERPL-MCNC: 1.4 MG/DL (ref 0.1–1)
BILIRUB SERPL-MCNC: 1.4 MG/DL (ref 0.1–1)
BUN SERPL-MCNC: 16 MG/DL (ref 8–23)
BUN SERPL-MCNC: 16 MG/DL (ref 8–23)
CALCIUM SERPL-MCNC: 9.1 MG/DL (ref 8.7–10.5)
CALCIUM SERPL-MCNC: 9.1 MG/DL (ref 8.7–10.5)
CHLORIDE SERPL-SCNC: 108 MMOL/L (ref 95–110)
CHLORIDE SERPL-SCNC: 108 MMOL/L (ref 95–110)
CHOLEST SERPL-MCNC: 183 MG/DL (ref 120–199)
CHOLEST/HDLC SERPL: 6.3 {RATIO} (ref 2–5)
CO2 SERPL-SCNC: 23 MMOL/L (ref 23–29)
CO2 SERPL-SCNC: 23 MMOL/L (ref 23–29)
COMPLEXED PSA SERPL-MCNC: 0.56 NG/ML (ref 0–4)
CREAT SERPL-MCNC: 1.4 MG/DL (ref 0.5–1.4)
CREAT SERPL-MCNC: 1.4 MG/DL (ref 0.5–1.4)
DIFFERENTIAL METHOD BLD: ABNORMAL
DIFFERENTIAL METHOD BLD: ABNORMAL
EOSINOPHIL # BLD AUTO: 0.7 K/UL (ref 0–0.5)
EOSINOPHIL # BLD AUTO: 0.7 K/UL (ref 0–0.5)
EOSINOPHIL NFR BLD: 10.5 % (ref 0–8)
EOSINOPHIL NFR BLD: 10.5 % (ref 0–8)
ERYTHROCYTE [DISTWIDTH] IN BLOOD BY AUTOMATED COUNT: 17.6 % (ref 11.5–14.5)
ERYTHROCYTE [DISTWIDTH] IN BLOOD BY AUTOMATED COUNT: 17.6 % (ref 11.5–14.5)
EST. GFR  (NO RACE VARIABLE): 56.1 ML/MIN/1.73 M^2
EST. GFR  (NO RACE VARIABLE): 56.1 ML/MIN/1.73 M^2
ESTIMATED AVG GLUCOSE: 128 MG/DL (ref 68–131)
GLUCOSE SERPL-MCNC: 132 MG/DL (ref 70–110)
GLUCOSE SERPL-MCNC: 132 MG/DL (ref 70–110)
HBA1C MFR BLD: 6.1 % (ref 4–5.6)
HCT VFR BLD AUTO: 41.3 % (ref 40–54)
HCT VFR BLD AUTO: 41.3 % (ref 40–54)
HDLC SERPL-MCNC: 29 MG/DL (ref 40–75)
HDLC SERPL: 15.8 % (ref 20–50)
HGB BLD-MCNC: 11.6 G/DL (ref 14–18)
HGB BLD-MCNC: 11.6 G/DL (ref 14–18)
IMM GRANULOCYTES # BLD AUTO: 0.02 K/UL (ref 0–0.04)
IMM GRANULOCYTES # BLD AUTO: 0.02 K/UL (ref 0–0.04)
IMM GRANULOCYTES NFR BLD AUTO: 0.3 % (ref 0–0.5)
IMM GRANULOCYTES NFR BLD AUTO: 0.3 % (ref 0–0.5)
LDLC SERPL CALC-MCNC: 118.8 MG/DL (ref 63–159)
LYMPHOCYTES # BLD AUTO: 1.3 K/UL (ref 1–4.8)
LYMPHOCYTES # BLD AUTO: 1.3 K/UL (ref 1–4.8)
LYMPHOCYTES NFR BLD: 21.6 % (ref 18–48)
LYMPHOCYTES NFR BLD: 21.6 % (ref 18–48)
MCH RBC QN AUTO: 22.6 PG (ref 27–31)
MCH RBC QN AUTO: 22.6 PG (ref 27–31)
MCHC RBC AUTO-ENTMCNC: 28.1 G/DL (ref 32–36)
MCHC RBC AUTO-ENTMCNC: 28.1 G/DL (ref 32–36)
MCV RBC AUTO: 81 FL (ref 82–98)
MCV RBC AUTO: 81 FL (ref 82–98)
MONOCYTES # BLD AUTO: 0.5 K/UL (ref 0.3–1)
MONOCYTES # BLD AUTO: 0.5 K/UL (ref 0.3–1)
MONOCYTES NFR BLD: 8.1 % (ref 4–15)
MONOCYTES NFR BLD: 8.1 % (ref 4–15)
NEUTROPHILS # BLD AUTO: 3.6 K/UL (ref 1.8–7.7)
NEUTROPHILS # BLD AUTO: 3.6 K/UL (ref 1.8–7.7)
NEUTROPHILS NFR BLD: 58.5 % (ref 38–73)
NEUTROPHILS NFR BLD: 58.5 % (ref 38–73)
NONHDLC SERPL-MCNC: 154 MG/DL
NRBC BLD-RTO: 0 /100 WBC
NRBC BLD-RTO: 0 /100 WBC
PLATELET # BLD AUTO: 247 K/UL (ref 150–450)
PLATELET # BLD AUTO: 247 K/UL (ref 150–450)
PMV BLD AUTO: 11.4 FL (ref 9.2–12.9)
PMV BLD AUTO: 11.4 FL (ref 9.2–12.9)
POTASSIUM SERPL-SCNC: 5.1 MMOL/L (ref 3.5–5.1)
POTASSIUM SERPL-SCNC: 5.1 MMOL/L (ref 3.5–5.1)
PROT SERPL-MCNC: 6.5 G/DL (ref 6–8.4)
PROT SERPL-MCNC: 6.5 G/DL (ref 6–8.4)
RBC # BLD AUTO: 5.13 M/UL (ref 4.6–6.2)
RBC # BLD AUTO: 5.13 M/UL (ref 4.6–6.2)
SODIUM SERPL-SCNC: 139 MMOL/L (ref 136–145)
SODIUM SERPL-SCNC: 139 MMOL/L (ref 136–145)
TRIGL SERPL-MCNC: 176 MG/DL (ref 30–150)
WBC # BLD AUTO: 6.19 K/UL (ref 3.9–12.7)
WBC # BLD AUTO: 6.19 K/UL (ref 3.9–12.7)

## 2024-12-18 PROCEDURE — 84153 ASSAY OF PSA TOTAL: CPT | Performed by: NURSE PRACTITIONER

## 2024-12-18 PROCEDURE — 85025 COMPLETE CBC W/AUTO DIFF WBC: CPT | Performed by: NURSE PRACTITIONER

## 2024-12-18 PROCEDURE — 83036 HEMOGLOBIN GLYCOSYLATED A1C: CPT | Performed by: NURSE PRACTITIONER

## 2024-12-18 PROCEDURE — 80053 COMPREHEN METABOLIC PANEL: CPT | Performed by: NURSE PRACTITIONER

## 2024-12-18 PROCEDURE — 80061 LIPID PANEL: CPT | Performed by: STUDENT IN AN ORGANIZED HEALTH CARE EDUCATION/TRAINING PROGRAM

## 2025-04-17 ENCOUNTER — LAB VISIT (OUTPATIENT)
Dept: LAB | Facility: HOSPITAL | Age: 65
End: 2025-04-17
Attending: NURSE PRACTITIONER
Payer: COMMERCIAL

## 2025-04-17 ENCOUNTER — HOSPITAL ENCOUNTER (OUTPATIENT)
Dept: RADIOLOGY | Facility: HOSPITAL | Age: 65
Discharge: HOME | End: 2025-04-17
Attending: NURSE PRACTITIONER
Payer: COMMERCIAL

## 2025-04-17 ENCOUNTER — OFFICE VISIT (OUTPATIENT)
Dept: FAMILY MEDICINE | Facility: CLINIC | Age: 65
End: 2025-04-17
Payer: COMMERCIAL

## 2025-04-17 ENCOUNTER — HOSPITAL ENCOUNTER (INPATIENT)
Facility: HOSPITAL | Age: 65
LOS: 4 days | Discharge: HOME | DRG: 871 | End: 2025-04-21
Attending: EMERGENCY MEDICINE | Admitting: INTERNAL MEDICINE
Payer: COMMERCIAL

## 2025-04-17 VITALS
HEIGHT: 69 IN | DIASTOLIC BLOOD PRESSURE: 70 MMHG | BODY MASS INDEX: 33.69 KG/M2 | SYSTOLIC BLOOD PRESSURE: 118 MMHG | RESPIRATION RATE: 20 BRPM | WEIGHT: 227.5 LBS | HEART RATE: 80 BPM | TEMPERATURE: 100 F

## 2025-04-17 DIAGNOSIS — R06.02 SHORTNESS OF BREATH: ICD-10-CM

## 2025-04-17 DIAGNOSIS — R68.83 CHILLS: ICD-10-CM

## 2025-04-17 DIAGNOSIS — I10 PRIMARY HYPERTENSION: ICD-10-CM

## 2025-04-17 DIAGNOSIS — R50.9 FEVER, UNSPECIFIED FEVER CAUSE: ICD-10-CM

## 2025-04-17 DIAGNOSIS — R42 DIZZINESS: Primary | ICD-10-CM

## 2025-04-17 DIAGNOSIS — R42 DIZZINESS: ICD-10-CM

## 2025-04-17 DIAGNOSIS — R73.03 PREDIABETES: ICD-10-CM

## 2025-04-17 DIAGNOSIS — A41.9 SEPSIS: ICD-10-CM

## 2025-04-17 DIAGNOSIS — R50.9 FEBRILE ILLNESS, ACUTE: ICD-10-CM

## 2025-04-17 DIAGNOSIS — R07.9 CHEST PAIN: ICD-10-CM

## 2025-04-17 PROBLEM — I48.91 ATRIAL FIBRILLATION: Status: ACTIVE | Noted: 2025-04-17

## 2025-04-17 PROBLEM — R79.89 TROPONIN LEVEL ELEVATED: Status: ACTIVE | Noted: 2025-04-17

## 2025-04-17 PROBLEM — E11.9 DIABETES: Status: ACTIVE | Noted: 2025-04-17

## 2025-04-17 PROBLEM — N17.9 AKI (ACUTE KIDNEY INJURY): Status: ACTIVE | Noted: 2025-04-17

## 2025-04-17 PROBLEM — E80.6 HYPERBILIRUBINEMIA: Status: ACTIVE | Noted: 2025-04-17

## 2025-04-17 LAB
ABSOLUTE EOSINOPHIL (SMH): 0 K/UL
ABSOLUTE MONOCYTE (SMH): 1.7 K/UL (ref 0.3–1)
ABSOLUTE NEUTROPHIL COUNT (SMH): 14.2 K/UL (ref 1.8–7.7)
ALBUMIN SERPL-MCNC: 4.4 G/DL (ref 3.5–5.2)
ALP SERPL-CCNC: 62 UNIT/L (ref 55–135)
ALT SERPL-CCNC: 9 UNIT/L (ref 10–44)
ANION GAP (SMH): 9 MMOL/L (ref 8–16)
AST SERPL-CCNC: 11 UNIT/L (ref 10–40)
BACTERIA #/AREA URNS AUTO: NORMAL /HPF
BASOPHILS # BLD AUTO: 0.06 K/UL
BASOPHILS NFR BLD AUTO: 0.4 %
BILIRUB SERPL-MCNC: 2.9 MG/DL (ref 0.1–1)
BILIRUB UR QL STRIP.AUTO: NEGATIVE
BILIRUB UR QL STRIP.AUTO: NEGATIVE
BNP SERPL-MCNC: 260 PG/ML
BUN SERPL-MCNC: 25 MG/DL (ref 8–23)
CALCIUM SERPL-MCNC: 9.5 MG/DL (ref 8.7–10.5)
CHLORIDE SERPL-SCNC: 103 MMOL/L (ref 95–110)
CLARITY UR: CLEAR
CLARITY UR: CLEAR
CO2 SERPL-SCNC: 22 MMOL/L (ref 23–29)
COLOR UR AUTO: YELLOW
COLOR UR AUTO: YELLOW
CREAT SERPL-MCNC: 2.1 MG/DL (ref 0.5–1.4)
CTP QC/QA: YES
CTP QC/QA: YES
D DIMER PPP IA.FEU-MCNC: 0.71 MG/L FEU
ERYTHROCYTE [DISTWIDTH] IN BLOOD BY AUTOMATED COUNT: 17.8 % (ref 11.5–14.5)
FLUAV AG NPH QL: NEGATIVE
FLUBV AG NPH QL: NEGATIVE
GFR SERPLBLD CREATININE-BSD FMLA CKD-EPI: 35 ML/MIN/1.73/M2
GLUCOSE SERPL-MCNC: 158 MG/DL (ref 70–110)
GLUCOSE UR QL STRIP: ABNORMAL
GLUCOSE UR QL STRIP: ABNORMAL
HCT VFR BLD AUTO: 43.3 % (ref 40–54)
HGB BLD-MCNC: 12.7 GM/DL (ref 14–18)
HGB UR QL STRIP: ABNORMAL
HGB UR QL STRIP: NEGATIVE
HYALINE CASTS UR QL AUTO: 0 /LPF (ref 0–1)
IMM GRANULOCYTES # BLD AUTO: 0.11 K/UL (ref 0–0.04)
IMM GRANULOCYTES NFR BLD AUTO: 0.6 % (ref 0–0.5)
INFLUENZA A MOLECULAR (OHS): NEGATIVE
INFLUENZA B MOLECULAR (OHS): NEGATIVE
KETONES UR QL STRIP: ABNORMAL
KETONES UR QL STRIP: ABNORMAL
LACTATE SERPL-SCNC: 1.4 MMOL/L (ref 0.5–1.9)
LDH SERPL L TO P-CCNC: 2.18 MMOL/L (ref 0.5–2.2)
LEUKOCYTE ESTERASE UR QL STRIP: NEGATIVE
LEUKOCYTE ESTERASE UR QL STRIP: NEGATIVE
LYMPHOCYTES # BLD AUTO: 0.97 K/UL (ref 1–4.8)
MAGNESIUM SERPL-MCNC: 2 MG/DL (ref 1.6–2.6)
MCH RBC QN AUTO: 23.2 PG (ref 27–31)
MCHC RBC AUTO-ENTMCNC: 29.3 G/DL (ref 32–36)
MCV RBC AUTO: 79 FL (ref 82–98)
MICROSCOPIC COMMENT: ABNORMAL
MICROSCOPIC COMMENT: NORMAL
NITRITE UR QL STRIP: NEGATIVE
NITRITE UR QL STRIP: NEGATIVE
NUCLEATED RBC (/100WBC) (SMH): 0 /100 WBC
OHS QRS DURATION: 110 MS
OHS QTC CALCULATION: 457 MS
PH UR STRIP: 6 [PH]
PH UR STRIP: 6 [PH]
PHOSPHATE SERPL-MCNC: 1.7 MG/DL (ref 2.7–4.5)
PLATELET # BLD AUTO: 248 K/UL (ref 150–450)
PMV BLD AUTO: 11.2 FL (ref 9.2–12.9)
POCT GLUCOSE: 119 MG/DL (ref 70–110)
POTASSIUM SERPL-SCNC: 4.8 MMOL/L (ref 3.5–5.1)
PROT SERPL-MCNC: 7.5 GM/DL (ref 6–8.4)
PROT UR QL STRIP: ABNORMAL
PROT UR QL STRIP: ABNORMAL
RBC # BLD AUTO: 5.48 M/UL (ref 4.6–6.2)
RBC #/AREA URNS AUTO: 1 /HPF (ref 0–4)
RBC #/AREA URNS AUTO: 5 /HPF
RELATIVE EOSINOPHIL (SMH): 0 % (ref 0–8)
RELATIVE LYMPHOCYTE (SMH): 5.7 % (ref 18–48)
RELATIVE MONOCYTE (SMH): 10 % (ref 4–15)
RELATIVE NEUTROPHIL (SMH): 83.3 % (ref 38–73)
SAMPLE: NORMAL
SARS-COV-2 RDRP RESP QL NAA+PROBE: NEGATIVE
SARS-COV-2 RDRP RESP QL NAA+PROBE: NEGATIVE
SODIUM SERPL-SCNC: 134 MMOL/L (ref 136–145)
SP GR UR STRIP: >=1.03
SP GR UR STRIP: >=1.03
SQUAMOUS #/AREA URNS AUTO: <1 /HPF
TROPONIN HIGH SENSITIVE (SMH): 50.9 PG/ML
TROPONIN HIGH SENSITIVE (SMH): 54 PG/ML
TROPONIN HIGH SENSITIVE (SMH): 66 PG/ML
UROBILINOGEN UR STRIP-ACNC: NEGATIVE EU/DL
UROBILINOGEN UR STRIP-ACNC: NEGATIVE EU/DL
WBC # BLD AUTO: 16.99 K/UL (ref 3.9–12.7)
WBC #/AREA URNS AUTO: 1 /HPF (ref 0–5)
WBC #/AREA URNS AUTO: 2 /HPF
YEAST UR QL AUTO: NORMAL /HPF

## 2025-04-17 PROCEDURE — 83880 ASSAY OF NATRIURETIC PEPTIDE: CPT | Performed by: EMERGENCY MEDICINE

## 2025-04-17 PROCEDURE — 25000003 PHARM REV CODE 250: Performed by: EMERGENCY MEDICINE

## 2025-04-17 PROCEDURE — 85025 COMPLETE CBC W/AUTO DIFF WBC: CPT

## 2025-04-17 PROCEDURE — 25000003 PHARM REV CODE 250: Performed by: INTERNAL MEDICINE

## 2025-04-17 PROCEDURE — 83735 ASSAY OF MAGNESIUM: CPT

## 2025-04-17 PROCEDURE — 84100 ASSAY OF PHOSPHORUS: CPT

## 2025-04-17 PROCEDURE — 87086 URINE CULTURE/COLONY COUNT: CPT | Performed by: INTERNAL MEDICINE

## 2025-04-17 PROCEDURE — 84484 ASSAY OF TROPONIN QUANT: CPT | Performed by: INTERNAL MEDICINE

## 2025-04-17 PROCEDURE — 63600175 PHARM REV CODE 636 W HCPCS: Performed by: EMERGENCY MEDICINE

## 2025-04-17 PROCEDURE — 96375 TX/PRO/DX INJ NEW DRUG ADDON: CPT

## 2025-04-17 PROCEDURE — 84484 ASSAY OF TROPONIN QUANT: CPT | Performed by: EMERGENCY MEDICINE

## 2025-04-17 PROCEDURE — 83605 ASSAY OF LACTIC ACID: CPT

## 2025-04-17 PROCEDURE — 63600175 PHARM REV CODE 636 W HCPCS: Performed by: INTERNAL MEDICINE

## 2025-04-17 PROCEDURE — 81003 URINALYSIS AUTO W/O SCOPE: CPT

## 2025-04-17 PROCEDURE — 87154 CUL TYP ID BLD PTHGN 6+ TRGT: CPT

## 2025-04-17 PROCEDURE — 12000002 HC ACUTE/MED SURGE SEMI-PRIVATE ROOM

## 2025-04-17 PROCEDURE — 71046 X-RAY EXAM CHEST 2 VIEWS: CPT | Mod: TC

## 2025-04-17 PROCEDURE — 87040 BLOOD CULTURE FOR BACTERIA: CPT

## 2025-04-17 PROCEDURE — 85379 FIBRIN DEGRADATION QUANT: CPT | Performed by: EMERGENCY MEDICINE

## 2025-04-17 PROCEDURE — 36415 COLL VENOUS BLD VENIPUNCTURE: CPT

## 2025-04-17 PROCEDURE — 82040 ASSAY OF SERUM ALBUMIN: CPT

## 2025-04-17 PROCEDURE — 99999 PR PBB SHADOW E&M-EST. PATIENT-LVL V: CPT | Mod: PBBFAC,,, | Performed by: NURSE PRACTITIONER

## 2025-04-17 PROCEDURE — 87502 INFLUENZA DNA AMP PROBE: CPT | Performed by: EMERGENCY MEDICINE

## 2025-04-17 PROCEDURE — U0002 COVID-19 LAB TEST NON-CDC: HCPCS | Performed by: EMERGENCY MEDICINE

## 2025-04-17 PROCEDURE — 96365 THER/PROPH/DIAG IV INF INIT: CPT

## 2025-04-17 PROCEDURE — 99285 EMERGENCY DEPT VISIT HI MDM: CPT | Mod: 25

## 2025-04-17 RX ORDER — LANOLIN ALCOHOL/MO/W.PET/CERES
800 CREAM (GRAM) TOPICAL
Status: DISCONTINUED | OUTPATIENT
Start: 2025-04-17 | End: 2025-04-21 | Stop reason: HOSPADM

## 2025-04-17 RX ORDER — ACETAMINOPHEN 325 MG/1
650 TABLET ORAL EVERY 4 HOURS PRN
Status: DISCONTINUED | OUTPATIENT
Start: 2025-04-17 | End: 2025-04-21 | Stop reason: HOSPADM

## 2025-04-17 RX ORDER — SODIUM,POTASSIUM PHOSPHATES 280-250MG
2 POWDER IN PACKET (EA) ORAL
Status: DISCONTINUED | OUTPATIENT
Start: 2025-04-17 | End: 2025-04-21 | Stop reason: HOSPADM

## 2025-04-17 RX ORDER — MECLIZINE HCL 12.5 MG 12.5 MG/1
25 TABLET ORAL
Status: COMPLETED | OUTPATIENT
Start: 2025-04-17 | End: 2025-04-17

## 2025-04-17 RX ORDER — AMIODARONE HYDROCHLORIDE 200 MG/1
200 TABLET ORAL DAILY
Status: DISCONTINUED | OUTPATIENT
Start: 2025-04-18 | End: 2025-04-21 | Stop reason: HOSPADM

## 2025-04-17 RX ORDER — HYDROCODONE BITARTRATE AND ACETAMINOPHEN 5; 325 MG/1; MG/1
1 TABLET ORAL EVERY 6 HOURS PRN
Refills: 0 | Status: DISCONTINUED | OUTPATIENT
Start: 2025-04-17 | End: 2025-04-21 | Stop reason: HOSPADM

## 2025-04-17 RX ORDER — INSULIN ASPART 100 [IU]/ML
0-10 INJECTION, SOLUTION INTRAVENOUS; SUBCUTANEOUS
Status: DISCONTINUED | OUTPATIENT
Start: 2025-04-17 | End: 2025-04-21 | Stop reason: HOSPADM

## 2025-04-17 RX ORDER — ALUMINUM HYDROXIDE, MAGNESIUM HYDROXIDE, AND SIMETHICONE 1200; 120; 1200 MG/30ML; MG/30ML; MG/30ML
30 SUSPENSION ORAL 4 TIMES DAILY PRN
Status: DISCONTINUED | OUTPATIENT
Start: 2025-04-17 | End: 2025-04-21 | Stop reason: HOSPADM

## 2025-04-17 RX ORDER — ENOXAPARIN SODIUM 100 MG/ML
40 INJECTION SUBCUTANEOUS EVERY 24 HOURS
Status: DISCONTINUED | OUTPATIENT
Start: 2025-04-17 | End: 2025-04-21 | Stop reason: HOSPADM

## 2025-04-17 RX ORDER — ONDANSETRON HYDROCHLORIDE 2 MG/ML
4 INJECTION, SOLUTION INTRAVENOUS EVERY 6 HOURS PRN
Status: DISCONTINUED | OUTPATIENT
Start: 2025-04-17 | End: 2025-04-21 | Stop reason: HOSPADM

## 2025-04-17 RX ORDER — NAPROXEN SODIUM 220 MG/1
81 TABLET, FILM COATED ORAL DAILY
Status: DISCONTINUED | OUTPATIENT
Start: 2025-04-18 | End: 2025-04-21 | Stop reason: HOSPADM

## 2025-04-17 RX ORDER — CEFTRIAXONE 2 G/1
2 INJECTION, POWDER, FOR SOLUTION INTRAMUSCULAR; INTRAVENOUS
Status: DISCONTINUED | OUTPATIENT
Start: 2025-04-18 | End: 2025-04-17

## 2025-04-17 RX ORDER — ENOXAPARIN SODIUM 100 MG/ML
30 INJECTION SUBCUTANEOUS EVERY 24 HOURS
Status: DISCONTINUED | OUTPATIENT
Start: 2025-04-17 | End: 2025-04-17

## 2025-04-17 RX ORDER — ACETAMINOPHEN 325 MG/1
650 TABLET ORAL EVERY 8 HOURS PRN
Status: DISCONTINUED | OUTPATIENT
Start: 2025-04-17 | End: 2025-04-21 | Stop reason: HOSPADM

## 2025-04-17 RX ORDER — IBUPROFEN 200 MG
16 TABLET ORAL
Status: DISCONTINUED | OUTPATIENT
Start: 2025-04-17 | End: 2025-04-21 | Stop reason: HOSPADM

## 2025-04-17 RX ORDER — TALC
6 POWDER (GRAM) TOPICAL NIGHTLY PRN
Status: DISCONTINUED | OUTPATIENT
Start: 2025-04-17 | End: 2025-04-21 | Stop reason: HOSPADM

## 2025-04-17 RX ORDER — GLUCAGON 1 MG
1 KIT INJECTION
Status: DISCONTINUED | OUTPATIENT
Start: 2025-04-17 | End: 2025-04-21 | Stop reason: HOSPADM

## 2025-04-17 RX ORDER — CLOPIDOGREL BISULFATE 75 MG/1
75 TABLET ORAL DAILY
Status: DISCONTINUED | OUTPATIENT
Start: 2025-04-18 | End: 2025-04-21 | Stop reason: HOSPADM

## 2025-04-17 RX ORDER — IBUPROFEN 200 MG
24 TABLET ORAL
Status: DISCONTINUED | OUTPATIENT
Start: 2025-04-17 | End: 2025-04-21 | Stop reason: HOSPADM

## 2025-04-17 RX ORDER — CEFTRIAXONE 2 G/1
2 INJECTION, POWDER, FOR SOLUTION INTRAMUSCULAR; INTRAVENOUS
Status: DISCONTINUED | OUTPATIENT
Start: 2025-04-18 | End: 2025-04-20

## 2025-04-17 RX ORDER — SODIUM CHLORIDE 9 MG/ML
1000 INJECTION, SOLUTION INTRAVENOUS
Status: COMPLETED | OUTPATIENT
Start: 2025-04-17 | End: 2025-04-17

## 2025-04-17 RX ORDER — NALOXONE HCL 0.4 MG/ML
0.02 VIAL (ML) INJECTION
Status: DISCONTINUED | OUTPATIENT
Start: 2025-04-17 | End: 2025-04-21 | Stop reason: HOSPADM

## 2025-04-17 RX ORDER — PANTOPRAZOLE SODIUM 40 MG/1
40 TABLET, DELAYED RELEASE ORAL DAILY
Status: DISCONTINUED | OUTPATIENT
Start: 2025-04-18 | End: 2025-04-21 | Stop reason: HOSPADM

## 2025-04-17 RX ORDER — ATORVASTATIN CALCIUM 40 MG/1
80 TABLET, FILM COATED ORAL NIGHTLY
Status: DISCONTINUED | OUTPATIENT
Start: 2025-04-17 | End: 2025-04-21 | Stop reason: HOSPADM

## 2025-04-17 RX ORDER — ACETAMINOPHEN 500 MG
1000 TABLET ORAL
Status: COMPLETED | OUTPATIENT
Start: 2025-04-17 | End: 2025-04-17

## 2025-04-17 RX ORDER — CEFTRIAXONE 2 G/1
2 INJECTION, POWDER, FOR SOLUTION INTRAMUSCULAR; INTRAVENOUS
Status: COMPLETED | OUTPATIENT
Start: 2025-04-17 | End: 2025-04-17

## 2025-04-17 RX ORDER — SODIUM CHLORIDE 9 MG/ML
INJECTION, SOLUTION INTRAVENOUS CONTINUOUS
Status: DISCONTINUED | OUTPATIENT
Start: 2025-04-17 | End: 2025-04-18

## 2025-04-17 RX ADMIN — CEFTRIAXONE 2 G: 2 INJECTION, POWDER, FOR SOLUTION INTRAMUSCULAR; INTRAVENOUS at 01:04

## 2025-04-17 RX ADMIN — SODIUM CHLORIDE 1000 ML: 9 INJECTION, SOLUTION INTRAVENOUS at 05:04

## 2025-04-17 RX ADMIN — SODIUM CHLORIDE: 9 INJECTION, SOLUTION INTRAVENOUS at 10:04

## 2025-04-17 RX ADMIN — MECLIZINE 25 MG: 12.5 TABLET ORAL at 05:04

## 2025-04-17 RX ADMIN — AZITHROMYCIN MONOHYDRATE 500 MG: 500 INJECTION, POWDER, LYOPHILIZED, FOR SOLUTION INTRAVENOUS at 01:04

## 2025-04-17 RX ADMIN — VANCOMYCIN HYDROCHLORIDE 2000 MG: 2 INJECTION, POWDER, LYOPHILIZED, FOR SOLUTION INTRAVENOUS at 07:04

## 2025-04-17 RX ADMIN — ACETAMINOPHEN 1000 MG: 500 TABLET ORAL at 12:04

## 2025-04-17 RX ADMIN — ATORVASTATIN CALCIUM 80 MG: 40 TABLET, FILM COATED ORAL at 09:04

## 2025-04-17 RX ADMIN — ENOXAPARIN SODIUM 40 MG: 40 INJECTION SUBCUTANEOUS at 07:04

## 2025-04-17 NOTE — ASSESSMENT & PLAN NOTE
Maintain iv abx as below    Antibiotics (From admission, onward)      Start     Stop Route Frequency Ordered    04/18/25 1500  azithromycin (ZITHROMAX) 500 mg in 0.9% NaCl 250 mL IVPB (admixture device)         -- IV Every 24 hours (non-standard times) 04/17/25 1747    04/18/25 1330  cefTRIAXone injection 2 g         -- IV Every 24 hours (non-standard times) 04/17/25 1747    04/17/25 1900  vancomycin 1,500 mg in 0.9% NaCl 250 mL IVPB (admixture device)  (vancomycin IVPB (PEDS and ADULTS))         -- IV Once 04/17/25 1747            Microbiology Results (last 7 days)       Procedure Component Value Units Date/Time    Urine Culture High Risk [[id number]]     Order Status: Sent Specimen: Urine, Clean Catch     Influenza A & B by Molecular [[id number]]  (Normal) Collected: 04/17/25 1345    Order Status: Completed Specimen: Nasopharyngeal Swab Updated: 04/17/25 1438     INFLUENZA A MOLECULAR Negative     INFLUENZA B MOLECULAR  Negative    Blood culture x two cultures. Draw prior to antibiotics [[id number]] Collected: 04/17/25 1252    Order Status: Sent Specimen: Blood Updated: 04/17/25 1349    Blood culture x two cultures. Draw prior to antibiotics [[id number]] Collected: 04/17/25 1257    Order Status: Sent Specimen: Blood Updated: 04/17/25 1348

## 2025-04-17 NOTE — PROGRESS NOTES
Patient ID: Matteo Fraire is a 64 y.o. male.    Chief Complaint: Follow-up (63 yo male here for 4 month recheck. Pt states concern of feeling off balance with trouble standing and chills times 4 days. NO report of cough, congestion, fever, nausea, headaches. KM)    History of Present Illness    CHIEF COMPLAINT:  Mr. Fraire presents with sudden onset of fatigue, dizziness, and difficulty breathing that started 2-3 days ago while working in the yard.    HPI:  Mr. Fraire reports symptom onset 2-3 days ago while working in the yard. He had difficulty standing and breathing upon bending over and standing up. After going inside, he had trouble moving around without feeling off balance. He has had chills, primarily at night, for the last 2-3 days. For the past 2 nights, he felt extremely cold despite multiple layers of clothing and using a blanket. This morning, he had significant instability when getting out of bed and required assistance from his wife to prepare for this appointment. He feels disoriented and overwhelmed. He denies any known fever. His appetite has decreased over the last couple of days due to not feeling well. He denies coughing, sneezing, pain on urination, changes in urine color, changes in bowel movements, insect bites, and cuts. He was last evaluated by cardiology a few months ago and was due for follow-up soon. He typically does not go to many places, and his recent activities mainly involved yard work such as cutting weeds and planting flowers.    MEDICATIONS:  Mr. Fraire discontinued a medication due to it exacerbating their condition.    MEDICAL HISTORY:  Mr. Fraire has a history of a cardiac condition.    TEST RESULTS:  Mr. Fraire underwent COVID and flu tests today, both of which returned negative results.      ROS:  General: +fever, +chills, +fatigue, -weight gain, -weight loss  Eyes: -vision changes, -redness, -discharge  ENT: -ear pain, -nasal congestion, -sore  throat  Cardiovascular: -chest pain, -palpitations, -lower extremity edema  Respiratory: -cough, +shortness of breath, +exertional dyspnea, +difficulty breathing  Gastrointestinal: -abdominal pain, -nausea, -vomiting, -diarrhea, -constipation, -blood in stool, +loss of appetite  Genitourinary: -dysuria, -hematuria, -frequency  Musculoskeletal: -joint pain, -muscle pain  Skin: -rash, -lesion  Neurological: -headache, +dizziness, -numbness, -tingling, +near-syncope  Psychiatric: -anxiety, -depression, -sleep difficulty             Past Medical History:   Diagnosis Date    Coronary artery disease     Heart attack 2005;2011    Hx of CABG     Hypertension 2008    Psoriasis     Stroke 2022     Past Surgical History:   Procedure Laterality Date    ANGIOGRAM, CORONARY, WITH LEFT HEART CATHETERIZATION N/A 8/13/2024    Procedure: Angiogram, Coronary, with Left Heart Cath;  Surgeon: Danica Howell MD;  Location: [facility] CATH/EP LAB;  Service: Cardiology;  Laterality: N/A;    CORONARY ANGIOGRAPHY INCLUDING BYPASS GRAFTS WITH CATHETERIZATION OF LEFT HEART Left 6/27/2022    Procedure: Left heart cath;  Surgeon: Stevan Powell MD;  Location: [facility] CATH/EP LAB;  Service: Cardiology;  Laterality: Left;    CORONARY ARTERY BYPASS GRAFT      CYSTOSCOPY W/ URETERAL STENT PLACEMENT Left 11/21/2019    Procedure: CYSTOSCOPY, WITH URETERAL STENT INSERTION;  Surgeon: Mickey Escudero MD;  Location: [facility] OR;  Service: Urology;  Laterality: Left;    ENDOSCOPIC HARVEST OF VEIN Right 7/8/2022    Procedure: HARVEST-VEIN-ENDOVASCULAR;  Surgeon: Eyal Stone MD;  Location: [facility] OR;  Service: Cardiothoracic;  Laterality: Right;    ESOPHAGOGASTRODUODENOSCOPY N/A 7/15/2022    Procedure: EGD (ESOPHAGOGASTRODUODENOSCOPY);  Surgeon: Art Pino MD;  Location: [facility] ENDO;  Service: Endoscopy;  Laterality: N/A;    EXTRACORPOREAL SHOCK WAVE LITHOTRIPSY Left 11/21/2019    Procedure: LITHOTRIPSY, ESWL;  Surgeon: Mickey Escudero MD;   "Location: [facility];  Service: Urology;  Laterality: Left;    HERNIA REPAIR      Inguinal just after birth    IVUS, CORONARY  8/13/2024    Procedure: IVUS, Coronary;  Surgeon: Danica Howell MD;  Location: [facility] CATH/EP LAB;  Service: Cardiology;;    PERCUTANEOUS CORONARY INTERVENTION, ARTERY N/A 8/13/2024    Procedure: Percutaneous coronary intervention;  Surgeon: Danica Howell MD;  Location: [facility] CATH/EP LAB;  Service: Cardiology;  Laterality: N/A;    REPEAT CORONARY ARTERY BYPASS GRAFTING N/A 7/8/2022    Procedure: CABG, REPEAT;  Surgeon: Eyal Stone MD;  Location: [facility];  Service: Cardiothoracic;  Laterality: N/A;  drugs ordered 7-7  @1322      SURGICAL PROCUREMENT, ARTERY, RADIAL, FOR CABG Left 7/8/2022    Procedure: SURGICAL PROCUREMENT,ARTERY,RADIAL,FOR CABG;  Surgeon: Eyal Stone MD;  Location: [facility];  Service: Cardiothoracic;  Laterality: Left;         Tobacco History:  reports that he has never smoked. He has never been exposed to tobacco smoke. He has never used smokeless tobacco.      Review of patient's allergies indicates:   Allergen Reactions    Venom-wasp Anaphylaxis     Current Medications[1]           Objective:      Vitals:    04/17/25 0928   BP: 118/70   Pulse: 80   Resp: 20   Temp: 99.8 °F (37.7 °C)   TempSrc: Oral   Weight: 103.2 kg (227 lb 8.2 oz)   Height: 5' 9" (1.753 m)     Physical Exam  Constitutional:       Appearance: He is ill-appearing, toxic-appearing and diaphoretic.   Cardiovascular:      Rate and Rhythm: Normal rate.   Pulmonary:      Effort: Tachypnea and respiratory distress present.   Skin:     General: Skin is warm.   Neurological:      Mental Status: He is alert and oriented to person, place, and time.      Motor: Weakness present.           Assessment:       1. Dizziness    2. Chills    3. Fever, unspecified fever cause    4. Shortness of breath    5. Prediabetes    6. Primary hypertension           Plan:       Assessment & Plan    I50.20 Heart " failure with reduced EF  I47.29 NSVT (nonsustained ventricular tachycardia)  I51.89 Other ill-defined heart diseases  R50.9 Fever, unspecified  R53.83 Other fatigue  R42 Dizziness and giddiness  R06.02 Shortness of breath  R26.81 Unsteadiness on feet  R63.0 Anorexia    IMPRESSION:  - Presenting with sudden onset of fatigue, dizziness, shortness of breath, and low-grade fever.  - Rapid test negative for COVID-19 and flu.  - Concerned about possible cardiac or pulmonary issues given symptoms and history.  - Ordered comprehensive workup including labs, urinalysis, chest XR, and EKG to rule out pneumonia and other potential causes.  - Weighing necessity of emergency room evaluation vs. outpatient management.  - Will review all test results promptly and adjust treatment plan as needed.    HEART FAILURE WITH REDUCED EJECTION FRACTION:  - Mr. Fraire reports dyspnea, difficulty standing up after bending over, feeling unsteady, and having difficulty ambulating.  - Auscultation of heart and lungs revealed no immediate concerns.  - Ordered an electrocardiogram and referred patient to an imaging center for further cardiac evaluation.  - Scheduled a follow-up visit in 10 days to ensure improvement.  - Instructed patient to go to the emergency room if symptoms worsen, particularly increased dyspnea.    NSVT (NONSUSTAINED VENTRICULAR TACHYCARDIA):  - Noted that the patient's heart rate appears normal.  - Ordered an electrocardiogram and referred the patient to an imaging center for further evaluation.    HEART DISEASE:  - Expressed concern about potential cardiac issues and considered emergency room visit.  - Planned to review previous cardiology records.    FEVER:  - Mr. Fraire reports experiencing rigors for the past 2-3 days, primarily nocturnal.  - Measured temperature at 99.8°F, indicating low-grade pyrexia.  - Acknowledged pyrexia and considered potential sources of infection.  - Instructed patient to go to the emergency  room if symptoms worsen, including pyrexia and rigors.    FATIGUE:  - Mr. Fraire reports sudden onset of fatigue and weakness.  - Observed unusual appearance and weakness.  - Considered various potential etiologies for fatigue.    DIZZINESS AND VERTIGO:  - Mr. Fraire reports feeling vertiginous and unsteady.  - Reports near-syncope when getting out of bed.  - Considered vertigo as part of the overall symptoms requiring investigation.    SHORTNESS OF BREATH:  - Ordered a chest radiograph.    UNSTEADINESS:  - Observed patient using an assistive device and appearing weak.    ANOREXIA:  - Mr. Fraire reports decreased appetite in the last 2 days.    GENERAL:  - Ordered comprehensive tests including labs, urinalysis, chest radiograph, and electrocardiogram to investigate the cause of fatigue, vertigo, and unsteadiness.  - Ordered urine testing with reflex culture.         Dizziness  -     POCT COVID-19 Rapid Screening  -     POCT Influenza A/B  -     CBC W/ AUTO DIFFERENTIAL; Future; Expected date: 04/17/2025  -     EKG 12-lead; Future  -     X-Ray Chest PA And Lateral; Future; Expected date: 04/17/2025  -     Sedimentation rate; Future; Expected date: 04/17/2025    Chills  -     POCT COVID-19 Rapid Screening  -     POCT Influenza A/B  -     CBC W/ AUTO DIFFERENTIAL; Future; Expected date: 04/17/2025  -     Comprehensive Metabolic Panel; Future; Expected date: 04/17/2025  -     Urinalysis, Reflex to Urine Culture; Future; Expected date: 04/17/2025  -     X-Ray Chest PA And Lateral; Future; Expected date: 04/17/2025  -     Sedimentation rate; Future; Expected date: 04/17/2025    Fever, unspecified fever cause  -     CBC W/ AUTO DIFFERENTIAL; Future; Expected date: 04/17/2025  -     Comprehensive Metabolic Panel; Future; Expected date: 04/17/2025  -     Urinalysis, Reflex to Urine Culture; Future; Expected date: 04/17/2025  -     EKG 12-lead; Future  -     X-Ray Chest PA And Lateral; Future; Expected date: 04/17/2025  -      Sedimentation rate; Future; Expected date: 04/17/2025    Shortness of breath  -     BNP; Future; Expected date: 04/17/2025  -     Magnesium; Future; Expected date: 04/17/2025  -     CK; Future; Expected date: 04/17/2025  -     CK-MB; Future; Expected date: 04/17/2025  -     Troponin I; Future; Expected date: 04/17/2025    Prediabetes  -     Hemoglobin A1C; Future; Expected date: 04/17/2025    Primary hypertension    Mr. Felipe will be sent to ED. He has elevated WBC count and increased liver enzymes as well as profound weakness and fatigue. He need further workup for infection. He was called at approximately 1150 on 4/17 to be evaluated.     Follow up in about 10 days (around 4/27/2025), or if symptoms worsen or fail to improve, for Report to ED if symptoms worsen.        4/17/2025 Naomi Bliss NP    This note was generated with the assistance of ambient listening technology. Verbal consent was obtained by the patient and accompanying visitor(s) for the recording of patient appointment to facilitate this note. I attest to having reviewed and edited the generated note for accuracy, though some syntax or spelling errors may persist. Please contact the author of this note for any clarification.    Spent amalia 30 minutes with patient which involved review of pts medical conditions, labs, medications and with 50% of time face-to-face discussion about medical problems, management and any applicable changes.           [1]   Current Outpatient Medications:     amiodarone (PACERONE) 200 MG Tab, Take 1 tablet (200 mg total) by mouth once daily., Disp: 90 tablet, Rfl: 3    aspirin 81 MG Chew, Take 81 mg by mouth once daily., Disp: , Rfl:     atorvastatin (LIPITOR) 80 MG tablet, Take 1 tablet (80 mg total) by mouth every evening., Disp: 90 tablet, Rfl: 3    clopidogreL (PLAVIX) 75 mg tablet, Take 1 tablet (75 mg total) by mouth once daily., Disp: 90 tablet, Rfl: 3    empagliflozin (JARDIANCE) 10 mg tablet, Take 1 tablet  (10 mg total) by mouth once daily., Disp: 90 tablet, Rfl: 0    furosemide (LASIX) 20 MG tablet, As needed (take 1 tablet for weight gain>3lb in 24 hours or >5lb in a week), Disp: 60 tablet, Rfl: 1    metFORMIN (GLUCOPHAGE) 500 MG tablet, Take 1 tablet (500 mg total) by mouth 2 (two) times daily with meals., Disp: 180 tablet, Rfl: 3    metoprolol succinate (TOPROL-XL) 25 MG 24 hr tablet, Take 1 tablet (25 mg total) by mouth every evening., Disp: 90 tablet, Rfl: 2    sacubitriL-valsartan (ENTRESTO) 24-26 mg per tablet, Take 1 tablet by mouth 2 (two) times daily., Disp: 180 tablet, Rfl: 2    triamcinolone acetonide 0.1% (KENALOG) 0.1 % cream, Apply topically 2 (two) times daily., Disp: , Rfl:   No current facility-administered medications for this visit.    Facility-Administered Medications Ordered in Other Visits:     sodium bicarbonate 10 mEq, potassium chloride 30 mEq in cardioplegic solution (PLEGISOL) 1,000 mL, , Intravenous, Once, Eyal Stone MD    sodium bicarbonate 10 mEq, potassium chloride 70 mEq in cardioplegic solution (PLEGISOL) 1,000 mL, , Intravenous, Once, Eyal Stone MD    thromb,ij-xgvrmh-gurvgaj,s-Ca (TISSEEL) 4 mL, , Topical (Top), Once, Eyal Stone MD

## 2025-04-17 NOTE — SUBJECTIVE & OBJECTIVE
Past Medical History:   Diagnosis Date    Coronary artery disease     Heart attack 2005;2011    Hx of CABG     Hypertension 2008    Psoriasis     Stroke 2022       Past Surgical History:   Procedure Laterality Date    ANGIOGRAM, CORONARY, WITH LEFT HEART CATHETERIZATION N/A 8/13/2024    Procedure: Angiogram, Coronary, with Left Heart Cath;  Surgeon: Danica Howell MD;  Location: [facility] CATH/EP LAB;  Service: Cardiology;  Laterality: N/A;    CORONARY ANGIOGRAPHY INCLUDING BYPASS GRAFTS WITH CATHETERIZATION OF LEFT HEART Left 6/27/2022    Procedure: Left heart cath;  Surgeon: Stevan Powell MD;  Location: [facility] CATH/EP LAB;  Service: Cardiology;  Laterality: Left;    CORONARY ARTERY BYPASS GRAFT      CYSTOSCOPY W/ URETERAL STENT PLACEMENT Left 11/21/2019    Procedure: CYSTOSCOPY, WITH URETERAL STENT INSERTION;  Surgeon: Mickey Escudero MD;  Location: [facility] OR;  Service: Urology;  Laterality: Left;    ENDOSCOPIC HARVEST OF VEIN Right 7/8/2022    Procedure: HARVEST-VEIN-ENDOVASCULAR;  Surgeon: Eyal Stone MD;  Location: [facility] OR;  Service: Cardiothoracic;  Laterality: Right;    ESOPHAGOGASTRODUODENOSCOPY N/A 7/15/2022    Procedure: EGD (ESOPHAGOGASTRODUODENOSCOPY);  Surgeon: Art Pino MD;  Location: [facility];  Service: Endoscopy;  Laterality: N/A;    EXTRACORPOREAL SHOCK WAVE LITHOTRIPSY Left 11/21/2019    Procedure: LITHOTRIPSY, ESWL;  Surgeon: Mickey Escudero MD;  Location: [facility] OR;  Service: Urology;  Laterality: Left;    HERNIA REPAIR      Inguinal just after birth    IVUS, CORONARY  8/13/2024    Procedure: IVUS, Coronary;  Surgeon: Danica Howell MD;  Location: [facility] CATH/EP LAB;  Service: Cardiology;;    PERCUTANEOUS CORONARY INTERVENTION, ARTERY N/A 8/13/2024    Procedure: Percutaneous coronary intervention;  Surgeon: Danica Howell MD;  Location: [facility] CATH/EP LAB;  Service: Cardiology;  Laterality: N/A;    REPEAT CORONARY ARTERY BYPASS GRAFTING N/A 7/8/2022    Procedure: CABG,  REPEAT;  Surgeon: Eyal Stone MD;  Location: [facility] OR;  Service: Cardiothoracic;  Laterality: N/A;  drugs ordered 7-7  @1322      SURGICAL PROCUREMENT, ARTERY, RADIAL, FOR CABG Left 7/8/2022    Procedure: SURGICAL PROCUREMENT,ARTERY,RADIAL,FOR CABG;  Surgeon: Eyal Stone MD;  Location: [facility] OR;  Service: Cardiothoracic;  Laterality: Left;       Review of patient's allergies indicates:   Allergen Reactions    Venom-wasp Anaphylaxis       Current Facility-Administered Medications on File Prior to Encounter   Medication    sodium bicarbonate 10 mEq, potassium chloride 30 mEq in cardioplegic solution (PLEGISOL) 1,000 mL    sodium bicarbonate 10 mEq, potassium chloride 70 mEq in cardioplegic solution (PLEGISOL) 1,000 mL    thromb,el-cbacql-sehoupn,s-Ca (TISSEEL) 4 mL     Current Outpatient Medications on File Prior to Encounter   Medication Sig    amiodarone (PACERONE) 200 MG Tab Take 1 tablet (200 mg total) by mouth once daily.    aspirin 81 MG Chew Take 81 mg by mouth once daily.    atorvastatin (LIPITOR) 80 MG tablet Take 1 tablet (80 mg total) by mouth every evening.    clopidogreL (PLAVIX) 75 mg tablet Take 1 tablet (75 mg total) by mouth once daily.    empagliflozin (JARDIANCE) 10 mg tablet Take 1 tablet (10 mg total) by mouth once daily.    metFORMIN (GLUCOPHAGE) 500 MG tablet Take 1 tablet (500 mg total) by mouth 2 (two) times daily with meals.    metoprolol succinate (TOPROL-XL) 25 MG 24 hr tablet Take 1 tablet (25 mg total) by mouth every evening.    sacubitriL-valsartan (ENTRESTO) 24-26 mg per tablet Take 1 tablet by mouth 2 (two) times daily.    furosemide (LASIX) 20 MG tablet As needed (take 1 tablet for weight gain>3lb in 24 hours or >5lb in a week)    [DISCONTINUED] tamsulosin (FLOMAX) 0.4 mg Cap Take 1 capsule (0.4 mg total) by mouth once daily. for 5 doses    [DISCONTINUED] triamcinolone acetonide 0.1% (KENALOG) 0.1 % cream Apply topically 2 (two) times daily.     Family History        Problem Relation (Age of Onset)    Arthritis Mother    Hearing loss Mother          Tobacco Use    Smoking status: Never     Passive exposure: Never    Smokeless tobacco: Never   Substance and Sexual Activity    Alcohol use: Not Currently    Drug use: Never    Sexual activity: Not Currently     Review of Systems   Constitutional:  Positive for chills, diaphoresis, fatigue and fever. Negative for activity change and appetite change.   HENT:  Negative for congestion and dental problem.    Eyes:  Negative for discharge and itching.   Respiratory:  Negative for shortness of breath.    Cardiovascular:  Negative for chest pain.   Gastrointestinal:  Negative for abdominal distention and abdominal pain.   Endocrine: Negative for cold intolerance.   Genitourinary:  Negative for difficulty urinating and dysuria.   Musculoskeletal:  Negative for arthralgias and back pain.   Skin:  Negative for color change.   Neurological:  Positive for light-headedness. Negative for dizziness and facial asymmetry.   Hematological:  Negative for adenopathy.   Psychiatric/Behavioral:  Negative for agitation and behavioral problems.      Objective:     Vital Signs (Most Recent):  Temp: (!) 100.6 °F (38.1 °C) (04/17/25 1245)  Pulse: 78 (04/17/25 1430)  Resp: 17 (04/17/25 1216)  BP: 113/61 (04/17/25 1430)  SpO2: 95 % (04/17/25 1400) Vital Signs (24h Range):  Temp:  [99.8 °F (37.7 °C)-100.6 °F (38.1 °C)] 100.6 °F (38.1 °C)  Pulse:  [78-94] 78  Resp:  [17-20] 17  SpO2:  [95 %-97 %] 95 %  BP: (113-129)/(59-77) 113/61     Weight: 103.2 kg (227 lb 8 oz)  Body mass index is 33.6 kg/m².     Physical Exam  Vitals and nursing note reviewed.   Constitutional:       Appearance: He is well-developed.   HENT:      Head: Atraumatic.      Right Ear: External ear normal.      Left Ear: External ear normal.      Nose: Nose normal.      Mouth/Throat:      Mouth: Mucous membranes are moist.   Eyes:      Extraocular Movements: Extraocular movements intact.    Cardiovascular:      Rate and Rhythm: Normal rate.   Pulmonary:      Effort: Pulmonary effort is normal.   Abdominal:      Palpations: Abdomen is soft.   Musculoskeletal:         General: Normal range of motion.      Cervical back: Full passive range of motion without pain and normal range of motion.   Skin:     General: Skin is warm.   Neurological:      Mental Status: He is alert and oriented to person, place, and time.      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                Significant Labs: All pertinent labs within the past 24 hours have been reviewed.  CBC:   Recent Labs   Lab 04/17/25  1047 04/17/25  1240   WBC 15.90* 16.99*   HGB 12.7* 12.7*   HCT 43.5 43.3    248     CMP:   Recent Labs   Lab 04/17/25  1047 04/17/25  1239   * 134*   K 4.6 4.8   CO2 22* 22*   BUN 23 25*   CREATININE 2.0* 2.1*   CALCIUM 9.5 9.5   ALBUMIN 4.6 4.4   BILITOT 3.0* 2.9*   ALKPHOS 65 62   AST 10 11   ALT 9* 9*       Significant Imaging: I have reviewed all pertinent imaging results/findings within the past 24 hours.

## 2025-04-17 NOTE — ASSESSMENT & PLAN NOTE
This patient does have evidence of infective focus  My overall impression is sepsis.  Source: Respiratory  Antibiotics given-   Antibiotics (72h ago, onward)      Start     Stop Route Frequency Ordered    04/18/25 1500  azithromycin (ZITHROMAX) 500 mg in 0.9% NaCl 250 mL IVPB (admixture device)         -- IV Every 24 hours (non-standard times) 04/17/25 1747    04/18/25 1330  cefTRIAXone injection 2 g         -- IV Every 24 hours (non-standard times) 04/17/25 1747 04/17/25 1900  vancomycin 1,500 mg in 0.9% NaCl 250 mL IVPB (admixture device)  (vancomycin IVPB (PEDS and ADULTS))         -- IV Once 04/17/25 1747          Latest lactate reviewed-  Recent Labs   Lab 04/17/25  1239   POCLAC 2.18     Organ dysfunction indicated by Acute kidney injury    Fluid challenge Ideal Body Weight- The patient is obese (BMI>30) and their ideal body weight of Ideal body weight: 70.7 kg (155 lb 13.8 oz) will be used to calculate fluid bolus of 30 ml/kg.     Post- resuscitation assessment No - Post resuscitation assessment not needed       Will Not start Pressors- Levophed for MAP of 65  Source control achieved by:

## 2025-04-17 NOTE — ASSESSMENT & PLAN NOTE
Maintain iv fluids     Recent Labs     04/17/25  1047 04/17/25  1239   CREATININE 2.0* 2.1*   EGFRNORACEVR 37* 35*

## 2025-04-17 NOTE — H&P
[facility] - Emergency Dept  Hospital Medicine  History & Physical    Patient Name: Matteo Fraire  MRN: [id number]  Patient Class: IP- Inpatient  Admission Date: 4/17/2025  Attending Physician: Mik Mckinnon MD   Primary Care Provider: Naomi Bliss FNP-C         Patient information was obtained from patient, past medical records, ER records, and ER MD  .     Subjective:     Principal Problem:Sepsis    Chief Complaint:   Chief Complaint   Patient presents with    Dizziness     Pt was at doctors office and c/o dizziness x 3 days. MD sent him here. Creatinine elevated w/ labs done this morning.         HPI: 64 year old pt getting admitted with sepsis/Pneumonia  Pt has been suffering from dizziness for past several days  Also has been sweating profusely   Yesterday he was doing yard work and dizziness got worse  Also reports chills and rigors at night  Denied chest pain/SOB/cough/Abdominal pain  Today he had an appt with PCP and was referred to ER and got admitted     Past Medical History:   Diagnosis Date    Coronary artery disease     Heart attack 2005;2011    Hx of CABG     Hypertension 2008    Psoriasis     Stroke 2022       Past Surgical History:   Procedure Laterality Date    ANGIOGRAM, CORONARY, WITH LEFT HEART CATHETERIZATION N/A 8/13/2024    Procedure: Angiogram, Coronary, with Left Heart Cath;  Surgeon: Danica Howell MD;  Location: [facility] CATH/EP LAB;  Service: Cardiology;  Laterality: N/A;    CORONARY ANGIOGRAPHY INCLUDING BYPASS GRAFTS WITH CATHETERIZATION OF LEFT HEART Left 6/27/2022    Procedure: Left heart cath;  Surgeon: Stevan Powell MD;  Location: [facility] CATH/EP LAB;  Service: Cardiology;  Laterality: Left;    CORONARY ARTERY BYPASS GRAFT      CYSTOSCOPY W/ URETERAL STENT PLACEMENT Left 11/21/2019    Procedure: CYSTOSCOPY, WITH URETERAL STENT INSERTION;  Surgeon: Mickey Escudero MD;  Location: [facility] OR;  Service: Urology;  Laterality: Left;    ENDOSCOPIC HARVEST OF  VEIN Right 7/8/2022    Procedure: HARVEST-VEIN-ENDOVASCULAR;  Surgeon: Eyal Stone MD;  Location: [facility] OR;  Service: Cardiothoracic;  Laterality: Right;    ESOPHAGOGASTRODUODENOSCOPY N/A 7/15/2022    Procedure: EGD (ESOPHAGOGASTRODUODENOSCOPY);  Surgeon: Art Pino MD;  Location: [facility] ENDO;  Service: Endoscopy;  Laterality: N/A;    EXTRACORPOREAL SHOCK WAVE LITHOTRIPSY Left 11/21/2019    Procedure: LITHOTRIPSY, ESWL;  Surgeon: Mickey Escudero MD;  Location: [facility] OR;  Service: Urology;  Laterality: Left;    HERNIA REPAIR      Inguinal just after birth    IVUS, CORONARY  8/13/2024    Procedure: IVUS, Coronary;  Surgeon: Danica Howell MD;  Location: [facility] CATH/EP LAB;  Service: Cardiology;;    PERCUTANEOUS CORONARY INTERVENTION, ARTERY N/A 8/13/2024    Procedure: Percutaneous coronary intervention;  Surgeon: Danica Howell MD;  Location: [facility] CATH/EP LAB;  Service: Cardiology;  Laterality: N/A;    REPEAT CORONARY ARTERY BYPASS GRAFTING N/A 7/8/2022    Procedure: CABG, REPEAT;  Surgeon: Eyal Stone MD;  Location: [facility] OR;  Service: Cardiothoracic;  Laterality: N/A;  drugs ordered 7-7  @1322      SURGICAL PROCUREMENT, ARTERY, RADIAL, FOR CABG Left 7/8/2022    Procedure: SURGICAL PROCUREMENT,ARTERY,RADIAL,FOR CABG;  Surgeon: Eyal Stone MD;  Location: [facility] OR;  Service: Cardiothoracic;  Laterality: Left;       Review of patient's allergies indicates:   Allergen Reactions    Venom-wasp Anaphylaxis       Current Facility-Administered Medications on File Prior to Encounter   Medication    sodium bicarbonate 10 mEq, potassium chloride 30 mEq in cardioplegic solution (PLEGISOL) 1,000 mL    sodium bicarbonate 10 mEq, potassium chloride 70 mEq in cardioplegic solution (PLEGISOL) 1,000 mL    thromb,yy-gfivxg-mpwatfg,s-Ca (TISSEEL) 4 mL     Current Outpatient Medications on File Prior to Encounter   Medication Sig    amiodarone (PACERONE) 200 MG Tab Take 1 tablet (200 mg total) by  mouth once daily.    aspirin 81 MG Chew Take 81 mg by mouth once daily.    atorvastatin (LIPITOR) 80 MG tablet Take 1 tablet (80 mg total) by mouth every evening.    clopidogreL (PLAVIX) 75 mg tablet Take 1 tablet (75 mg total) by mouth once daily.    empagliflozin (JARDIANCE) 10 mg tablet Take 1 tablet (10 mg total) by mouth once daily.    metFORMIN (GLUCOPHAGE) 500 MG tablet Take 1 tablet (500 mg total) by mouth 2 (two) times daily with meals.    metoprolol succinate (TOPROL-XL) 25 MG 24 hr tablet Take 1 tablet (25 mg total) by mouth every evening.    sacubitriL-valsartan (ENTRESTO) 24-26 mg per tablet Take 1 tablet by mouth 2 (two) times daily.    furosemide (LASIX) 20 MG tablet As needed (take 1 tablet for weight gain>3lb in 24 hours or >5lb in a week)    [DISCONTINUED] tamsulosin (FLOMAX) 0.4 mg Cap Take 1 capsule (0.4 mg total) by mouth once daily. for 5 doses    [DISCONTINUED] triamcinolone acetonide 0.1% (KENALOG) 0.1 % cream Apply topically 2 (two) times daily.     Family History       Problem Relation (Age of Onset)    Arthritis Mother    Hearing loss Mother          Tobacco Use    Smoking status: Never     Passive exposure: Never    Smokeless tobacco: Never   Substance and Sexual Activity    Alcohol use: Not Currently    Drug use: Never    Sexual activity: Not Currently     Review of Systems   Constitutional:  Positive for chills, diaphoresis, fatigue and fever. Negative for activity change and appetite change.   HENT:  Negative for congestion and dental problem.    Eyes:  Negative for discharge and itching.   Respiratory:  Negative for shortness of breath.    Cardiovascular:  Negative for chest pain.   Gastrointestinal:  Negative for abdominal distention and abdominal pain.   Endocrine: Negative for cold intolerance.   Genitourinary:  Negative for difficulty urinating and dysuria.   Musculoskeletal:  Negative for arthralgias and back pain.   Skin:  Negative for color change.   Neurological:  Positive for  light-headedness. Negative for dizziness and facial asymmetry.   Hematological:  Negative for adenopathy.   Psychiatric/Behavioral:  Negative for agitation and behavioral problems.      Objective:     Vital Signs (Most Recent):  Temp: (!) 100.6 °F (38.1 °C) (04/17/25 1245)  Pulse: 78 (04/17/25 1430)  Resp: 17 (04/17/25 1216)  BP: 113/61 (04/17/25 1430)  SpO2: 95 % (04/17/25 1400) Vital Signs (24h Range):  Temp:  [99.8 °F (37.7 °C)-100.6 °F (38.1 °C)] 100.6 °F (38.1 °C)  Pulse:  [78-94] 78  Resp:  [17-20] 17  SpO2:  [95 %-97 %] 95 %  BP: (113-129)/(59-77) 113/61     Weight: 103.2 kg (227 lb 8 oz)  Body mass index is 33.6 kg/m².     Physical Exam  Vitals and nursing note reviewed.   Constitutional:       Appearance: He is well-developed.   HENT:      Head: Atraumatic.      Right Ear: External ear normal.      Left Ear: External ear normal.      Nose: Nose normal.      Mouth/Throat:      Mouth: Mucous membranes are moist.   Eyes:      Extraocular Movements: Extraocular movements intact.   Cardiovascular:      Rate and Rhythm: Normal rate.   Pulmonary:      Effort: Pulmonary effort is normal.   Abdominal:      Palpations: Abdomen is soft.   Musculoskeletal:         General: Normal range of motion.      Cervical back: Full passive range of motion without pain and normal range of motion.   Skin:     General: Skin is warm.   Neurological:      Mental Status: He is alert and oriented to person, place, and time.      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                Significant Labs: All pertinent labs within the past 24 hours have been reviewed.  CBC:   Recent Labs   Lab 04/17/25  1047 04/17/25  1240   WBC 15.90* 16.99*   HGB 12.7* 12.7*   HCT 43.5 43.3    248     CMP:   Recent Labs   Lab 04/17/25  1047 04/17/25  1239   * 134*   K 4.6 4.8   CO2 22* 22*   BUN 23 25*   CREATININE 2.0* 2.1*   CALCIUM 9.5 9.5   ALBUMIN 4.6 4.4   BILITOT 3.0*  2.9*   ALKPHOS 65 62   AST 10 11   ALT 9* 9*       Significant Imaging: I have reviewed all pertinent imaging results/findings within the past 24 hours.  Assessment/Plan:     Assessment & Plan  Sepsis  This patient does have evidence of infective focus  My overall impression is sepsis.  Source: Respiratory  Antibiotics given-   Antibiotics (72h ago, onward)      Start     Stop Route Frequency Ordered    04/18/25 1500  azithromycin (ZITHROMAX) 500 mg in 0.9% NaCl 250 mL IVPB (admixture device)         -- IV Every 24 hours (non-standard times) 04/17/25 1747 04/18/25 1330  cefTRIAXone injection 2 g         -- IV Every 24 hours (non-standard times) 04/17/25 1747 04/17/25 1900  vancomycin 1,500 mg in 0.9% NaCl 250 mL IVPB (admixture device)  (vancomycin IVPB (PEDS and ADULTS))         -- IV Once 04/17/25 1747          Latest lactate reviewed-  Recent Labs   Lab 04/17/25  1239   POCLAC 2.18     Organ dysfunction indicated by Acute kidney injury    Fluid challenge Ideal Body Weight- The patient is obese (BMI>30) and their ideal body weight of Ideal body weight: 70.7 kg (155 lb 13.8 oz) will be used to calculate fluid bolus of 30 ml/kg.     Post- resuscitation assessment No - Post resuscitation assessment not needed       Will Not start Pressors- Levophed for MAP of 65  Source control achieved by:   Acute pneumonia  Maintain iv abx as below    Antibiotics (From admission, onward)      Start     Stop Route Frequency Ordered    04/18/25 1500  azithromycin (ZITHROMAX) 500 mg in 0.9% NaCl 250 mL IVPB (admixture device)         -- IV Every 24 hours (non-standard times) 04/17/25 1747 04/18/25 1330  cefTRIAXone injection 2 g         -- IV Every 24 hours (non-standard times) 04/17/25 1747 04/17/25 1900  vancomycin 1,500 mg in 0.9% NaCl 250 mL IVPB (admixture device)  (vancomycin IVPB (PEDS and ADULTS))         -- IV Once 04/17/25 1747            Microbiology Results (last 7 days)       Procedure Component Value Units  "Date/Time    Urine Culture High Risk [[id number]]     Order Status: Sent Specimen: Urine, Clean Catch     Influenza A & B by Molecular [[id number]]  (Normal) Collected: 04/17/25 1345    Order Status: Completed Specimen: Nasopharyngeal Swab Updated: 04/17/25 1438     INFLUENZA A MOLECULAR Negative     INFLUENZA B MOLECULAR  Negative    Blood culture x two cultures. Draw prior to antibiotics [[id number]] Collected: 04/17/25 1252    Order Status: Sent Specimen: Blood Updated: 04/17/25 1349    Blood culture x two cultures. Draw prior to antibiotics [[id number]] Collected: 04/17/25 1257    Order Status: Sent Specimen: Blood Updated: 04/17/25 1348          BETHANY (acute kidney injury)  Maintain iv fluids     Recent Labs     04/17/25  1047 04/17/25  1239   CREATININE 2.0* 2.1*   EGFRNORACEVR 37* 35*        Coronary artery disease involving coronary bypass graft of native heart with angina pectoris  H.o aware  Maintain GDMT  Hold BB in view with relative hypotension  S/P CABG (coronary artery bypass graft)  Aware     History of stroke  Aware     Heart failure with reduced ejection fraction  H/o aware  Hold entresto   Pt takes lasix PRN basis at home    Troponin level elevated  Demand ischemia from sepsis mostly  EKG WNL  Trend trops    Hyperbilirubinemia  USS abdomen ordered from ER    Atrial fibrillation  [id number] Score: 1. The patients heart rate in the last 24 hours is as follows:  Pulse  Min: 78  Max: 94     Antiarrhythmics  amiodarone tablet 200 mg, Daily, Oral    Anticoagulants  enoxaparin injection 30 mg, Every 24 hours, Subcutaneous    Diabetes  Patient's FSGs are controlled on current medication regimen.  Last A1c reviewed-   Lab Results   Component Value Date    HGBA1C 6.4 (H) 04/17/2025     Most recent fingerstick glucose reviewed- No results for input(s): "POCTGLUCOSE" in the last 24 hours.  Current correctional scale  Medium  Maintain anti-hyperglycemic dose as follows-   Antihyperglycemics (From admission, " onward)      Start     Stop Route Frequency Ordered    04/17/25 1845  insulin aspart U-100 pen 0-10 Units         -- SubQ Before meals & nightly PRN 04/17/25 1745          Hold Oral hypoglycemics while patient is in the hospital.    VTE Risk Mitigation (From admission, onward)           Ordered     enoxaparin injection 30 mg  Daily         04/17/25 1745     IP VTE HIGH RISK PATIENT  Once         04/17/25 1745     Place sequential compression device  Until discontinued         04/17/25 1745                                    Mik Mckinnon MD  Department of Hospital Medicine  [facility] - Emergency Dept

## 2025-04-17 NOTE — ED PROVIDER NOTES
Encounter Date: 4/17/2025       History     Chief Complaint   Patient presents with    Dizziness     Pt was at doctors office and c/o dizziness x 3 days. MD sent him here. Creatinine elevated w/ labs done this morning.      HPI patient is a 64-year-old man who presents emergency department for evaluation of 2 days of chills, dizziness, generalized weakness, decreased appetite.  He endorses some associated mild shortness of breath.  History of CAD, previous MI, CABG, hypertension psoriasis and previous stroke.  Found to be febrile to 100.6 or triage.  Denies any dysuria urinary frequency, no abdominal pain or diarrhea nausea or vomiting.  Review of patient's allergies indicates:   Allergen Reactions    Venom-wasp Anaphylaxis     Past Medical History:   Diagnosis Date    Coronary artery disease     Heart attack 2005;2011    Hx of CABG     Hypertension 2008    Psoriasis     Stroke 2022     Past Surgical History:   Procedure Laterality Date    ANGIOGRAM, CORONARY, WITH LEFT HEART CATHETERIZATION N/A 8/13/2024    Procedure: Angiogram, Coronary, with Left Heart Cath;  Surgeon: Danica Howell MD;  Location: [facility] CATH/EP LAB;  Service: Cardiology;  Laterality: N/A;    CORONARY ANGIOGRAPHY INCLUDING BYPASS GRAFTS WITH CATHETERIZATION OF LEFT HEART Left 6/27/2022    Procedure: Left heart cath;  Surgeon: Stevan Powell MD;  Location: [facility] CATH/EP LAB;  Service: Cardiology;  Laterality: Left;    CORONARY ARTERY BYPASS GRAFT      CYSTOSCOPY W/ URETERAL STENT PLACEMENT Left 11/21/2019    Procedure: CYSTOSCOPY, WITH URETERAL STENT INSERTION;  Surgeon: Mickey Escudero MD;  Location: [facility] OR;  Service: Urology;  Laterality: Left;    ENDOSCOPIC HARVEST OF VEIN Right 7/8/2022    Procedure: HARVEST-VEIN-ENDOVASCULAR;  Surgeon: Eyal Stone MD;  Location: [facility] OR;  Service: Cardiothoracic;  Laterality: Right;    ESOPHAGOGASTRODUODENOSCOPY N/A 7/15/2022    Procedure: EGD (ESOPHAGOGASTRODUODENOSCOPY);  Surgeon: Art  MYA Pino MD;  Location: [facility] ENDO;  Service: Endoscopy;  Laterality: N/A;    EXTRACORPOREAL SHOCK WAVE LITHOTRIPSY Left 11/21/2019    Procedure: LITHOTRIPSY, ESWL;  Surgeon: Mickey Escudero MD;  Location: [facility] OR;  Service: Urology;  Laterality: Left;    HERNIA REPAIR      Inguinal just after birth    IVUS, CORONARY  8/13/2024    Procedure: IVUS, Coronary;  Surgeon: Danica Howell MD;  Location: [facility] CATH/EP LAB;  Service: Cardiology;;    PERCUTANEOUS CORONARY INTERVENTION, ARTERY N/A 8/13/2024    Procedure: Percutaneous coronary intervention;  Surgeon: Danica Howell MD;  Location: [facility] CATH/EP LAB;  Service: Cardiology;  Laterality: N/A;    REPEAT CORONARY ARTERY BYPASS GRAFTING N/A 7/8/2022    Procedure: CABG, REPEAT;  Surgeon: Eyal Stone MD;  Location: [facility] OR;  Service: Cardiothoracic;  Laterality: N/A;  drugs ordered 7-7  @1322      SURGICAL PROCUREMENT, ARTERY, RADIAL, FOR CABG Left 7/8/2022    Procedure: SURGICAL PROCUREMENT,ARTERY,RADIAL,FOR CABG;  Surgeon: Eyal Stone MD;  Location: [facility];  Service: Cardiothoracic;  Laterality: Left;     Family History   Problem Relation Name Age of Onset    Hearing loss Mother      Arthritis Mother       Social History[1]  Review of Systems   Constitutional:  Positive for activity change, appetite change, chills, fatigue and fever.   HENT:  Negative for sore throat.    Respiratory:  Positive for shortness of breath. Negative for cough.    Cardiovascular:  Negative for chest pain.   Gastrointestinal:  Negative for abdominal pain, diarrhea, nausea and vomiting.   Genitourinary:  Negative for dysuria.   Musculoskeletal:  Positive for gait problem.   Skin:  Negative for rash.   Neurological:  Positive for dizziness and weakness.   Hematological:  Does not bruise/bleed easily.       Physical Exam     Initial Vitals [04/17/25 1216]   BP Pulse Resp Temp SpO2   126/77 94 17 (!) 100.6 °F (38.1 °C) 97 %      MAP       --         Physical  Exam    Constitutional: He appears well-developed and well-nourished. No distress.   HENT:   Head: Normocephalic and atraumatic. Mouth/Throat: Oropharynx is clear and moist.   Eyes: EOM are normal. Pupils are equal, round, and reactive to light. No scleral icterus.   Neck: Neck supple.   Normal range of motion.  Cardiovascular:  Normal rate, regular rhythm and intact distal pulses.           Pulmonary/Chest: Breath sounds normal. No stridor. No respiratory distress. He has no wheezes. He has no rhonchi. He has no rales.   Abdominal: He exhibits no distension. There is no abdominal tenderness. There is no rebound and no guarding.   Musculoskeletal:         General: No tenderness or edema. Normal range of motion.      Cervical back: Normal range of motion and neck supple.     Neurological: He is alert and oriented to person, place, and time. He has normal strength. He displays normal reflexes. A sensory deficit is present. GCS score is 15. GCS eye subscore is 4. GCS verbal subscore is 5. GCS motor subscore is 6.   Mild right facial droop, residual numbness of the right side of face and right arm.  Speech is clear.   Skin: Skin is warm. Capillary refill takes less than 2 seconds.   Psychiatric: He has a normal mood and affect.         ED Course   Procedures  Labs Reviewed   COMPREHENSIVE METABOLIC PANEL - Abnormal       Result Value    Sodium 134 (*)     Potassium 4.8      Chloride 103      CO2 22 (*)     Glucose 158 (*)     BUN 25 (*)     Creatinine 2.1 (*)     Calcium 9.5      Protein Total 7.5      Albumin 4.4      Bilirubin Total 2.9 (*)     ALP 62      AST 11      ALT 9 (*)     Anion Gap 9      eGFR 35 (*)    PHOSPHORUS - Abnormal    Phosphorus Level 1.7 (*)    CBC WITH DIFFERENTIAL - Abnormal    WBC 16.99 (*)     RBC 5.48      Hgb 12.7 (*)     Hct 43.3      MCV 79 (*)     MCH 23.2 (*)     MCHC 29.3 (*)     RDW 17.8 (*)     Platelet Count 248      MPV 11.2      Nucleated RBC 0      Neut % 83.3 (*)     Lymph % 5.7  (*)     Mono % 10.0      Eos % 0.0      Basophil % 0.4      Imm Grans % 0.6 (*)     Neut # 14.2 (*)     Lymph # 0.97 (*)     Mono # 1.70 (*)     Eos # 0.00      Baso # 0.06      Imm Grans # 0.11 (*)    TROPONIN I HIGH SENSITIVITY - Abnormal    Troponin High Sensitive 54.0 (*)    B-TYPE NATRIURETIC PEPTIDE - Abnormal     (*)    D DIMER, QUANTITATIVE - Abnormal    D-Dimer 0.71 (*)    INFLUENZA A & B BY MOLECULAR - Normal    INFLUENZA A MOLECULAR Negative      INFLUENZA B MOLECULAR  Negative     MAGNESIUM - Normal    Magnesium 2.0     SARS-COV-2 RNA AMPLIFICATION, QUAL - Normal    SARS COV-2 Molecular Negative     CULTURE, BLOOD   CULTURE, BLOOD   CULTURE, URINE   CBC W/ AUTO DIFFERENTIAL    Narrative:     The following orders were created for panel order CBC auto differential.  Procedure                               Abnormality         Status                     ---------                               -----------         ------                     CBC with Differential[[id number]]       Abnormal            Final result                 Please view results for these tests on the individual orders.   URINALYSIS, REFLEX TO URINE CULTURE   EXTRA TUBES    Narrative:     The following orders were created for panel order EXTRA TUBES.  Procedure                               Abnormality         Status                     ---------                               -----------         ------                     Light Blue Top Hold[[id number]]                             In process                 Gold Top Hold[[id number]]                                   In process                   Please view results for these tests on the individual orders.   LIGHT BLUE TOP HOLD   GOLD TOP HOLD   LACTIC ACID, PLASMA   TROPONIN I HIGH SENSITIVITY   ISTAT LACTATE    POC Lactate 2.18      Sample VENOUS     POCT LACTATE   POCT GLUCOSE MONITORING CONTINUOUS          Imaging Results              US Abdomen Limited (In process)                       CT Head Without Contrast (Final result)  Result time 04/17/25 17:05:56      Final result by Marlon Russo MD (04/17/25 17:05:56)                   Impression:      1. No acute intracranial abnormality.  2. Unchanged right anterior temporal lobe encephalomalacia, suggesting sequelae of prior ischemia or trauma.  3. Circumscribed hypodensities in right lentiform nucleus unchanged either reflecting old lacunar infarcts or prominent perivascular spaces.      Electronically signed by: Marlon Russo  Date:    04/17/2025  Time:    17:05               Narrative:    EXAMINATION:  CT HEAD WITHOUT CONTRAST    CLINICAL HISTORY:  Dizziness, persistent/recurrent, cardiac or vascular cause suspected;    TECHNIQUE:  Head CT without IV contrast.    COMPARISON:  MRI 07/10/2023    FINDINGS:  Gray-white differentiation is maintained without hemorrhage, midline shift, or mass effect.    Anterior right temporal lobe encephalomalacia unchanged.  Circumscribed hypodensities in right lentiform nucleus unchanged.    The ventricles and cisterns are maintained.    Calvarium is intact. Visualized sinuses are clear.                                       X-Ray Chest AP Portable (Final result)  Result time 04/17/25 12:52:00   Procedure changed from X-Ray Chest 1 View     Final result by Waldemar De La Cruz DO (04/17/25 12:52:00)                   Impression:      Hazy ill-defined opacity of the right upper lung could reflect pneumonia in the proper clinical setting.  Short-term follow-up chest radiograph recommended.      Electronically signed by: Waldemar De La Cruz  Date:    04/17/2025  Time:    12:52               Narrative:    EXAMINATION:  XR CHEST AP PORTABLE    CLINICAL HISTORY:  Sepsis;    FINDINGS:  Portable chest with comparison chest x-ray 04/17/2025 at 11:15..  Normal cardiomediastinal silhouette.There is a hazy ill-defined opacity of the right upper lung.  Left lung is clear.  Pulmonary vasculature is normal. No acute osseous  abnormality.                                       Medications   amiodarone tablet 200 mg (has no administration in time range)   aspirin chewable tablet 81 mg (has no administration in time range)   atorvastatin tablet 80 mg (has no administration in time range)   clopidogreL tablet 75 mg (has no administration in time range)   melatonin tablet 6 mg (has no administration in time range)   ondansetron injection 4 mg (has no administration in time range)   acetaminophen tablet 650 mg (has no administration in time range)   aluminum-magnesium hydroxide-simethicone 200-200-20 mg/5 mL suspension 30 mL (has no administration in time range)   acetaminophen tablet 650 mg (has no administration in time range)   HYDROcodone-acetaminophen 5-325 mg per tablet 1 tablet (has no administration in time range)   naloxone 0.4 mg/mL injection 0.02 mg (has no administration in time range)   potassium bicarbonate disintegrating tablet 50 mEq (has no administration in time range)   potassium bicarbonate disintegrating tablet 35 mEq (has no administration in time range)   potassium bicarbonate disintegrating tablet 60 mEq (has no administration in time range)   magnesium oxide tablet 800 mg (has no administration in time range)   magnesium oxide tablet 800 mg (has no administration in time range)   potassium, sodium phosphates 280-160-250 mg packet 2 packet (has no administration in time range)   potassium, sodium phosphates 280-160-250 mg packet 2 packet (has no administration in time range)   potassium, sodium phosphates 280-160-250 mg packet 2 packet (has no administration in time range)   glucose chewable tablet 16 g (has no administration in time range)   glucose chewable tablet 24 g (has no administration in time range)   dextrose 50% injection 12.5 g (has no administration in time range)   dextrose 50% injection 25 g (has no administration in time range)   glucagon (human recombinant) injection 1 mg (has no administration in time  range)   insulin aspart U-100 pen 0-10 Units (has no administration in time range)   0.9% NaCl infusion (has no administration in time range)   pantoprazole EC tablet 40 mg (has no administration in time range)   azithromycin (ZITHROMAX) 500 mg in 0.9% NaCl 250 mL IVPB (admixture device) (has no administration in time range)   vancomycin 2,000 mg in 0.9% NaCl 500 mL IVPB (admixture device) (has no administration in time range)   enoxaparin injection 40 mg (has no administration in time range)   cefTRIAXone injection 2 g (has no administration in time range)   acetaminophen tablet 1,000 mg (1,000 mg Oral Given 4/17/25 1245)   cefTRIAXone injection 2 g (2 g Intravenous Given 4/17/25 1335)   azithromycin (ZITHROMAX) 500 mg in 0.9% NaCl 250 mL IVPB (admixture device) (0 mg Intravenous Stopped 4/17/25 1448)   meclizine tablet 25 mg (25 mg Oral Given 4/17/25 1709)   0.9% NaCl infusion (1,000 mLs Intravenous New Bag 4/17/25 1729)     Medical Decision Making  64-year-old man who presents emergency department for evaluation of 2 days of chills, dizziness, generalized weakness, decreased appetite.  He endorses some associated mild shortness of breath.  History of CAD, previous MI, CABG, hypertension psoriasis and previous stroke.  Found to be febrile to 100.6 or triage.  Denies any dysuria urinary frequency, no abdominal pain or diarrhea nausea or vomiting.  On examination he has residual right-sided facial droop with some mild right facial and right upper extremity numbness that he states is from his old stroke and not new.  He has no focal weakness, no vision loss or speech deficits to suspect an acute CVA.  He has dizziness is positional making CVA less likely.  Patient is treated with meclizine in the ED. CT head is performed and shows no acute abnormalities.  Influenza and COVID are negative.  Patient is febrile with temperature 100.6°.  He has findings on the x-ray suspicious for right upper lobe infiltrate per my  evaluation.  D-dimer is mildly elevated therefore can not rule out PE.  Renal function is decreased can not get a CT scan with contrast at this time.  Will attempt to hydrate patient and re-evaluate.  He does have elevated white blood cell count of 17 K with 83 % neutrophils suggesting infectious etiology.  Creatinine is 2.1 with BUN of 25 suggestive BETHANY and patient given fluids.  Bilirubin elevated 2.9 without right upper quadrant tenderness, I doubt acute cholecystitis.  Ultrasound abdomen ordered to evaluate his hyperbilirubinemia.  Troponin 54 and phosphorus is low at 1.7.  I doubt ACS.  Patient is treated empirically with antibiotics Rocephin and azithromycin.  Patient meets criteria for severe sepsis with his BETHANY and hyperbilirubinemia.  Discussed Hospital Medicine who agrees to admit the patient for further management.    Amount and/or Complexity of Data Reviewed  Radiology: ordered.    Risk  Prescription drug management.  Decision regarding hospitalization.              Attending Attestation:             Attending ED Notes:   Critical Care Time MDM    The high probability of sudden, clinically significant deterioration in the patient's condition required the highest level of my preparedness to intervene urgently.    The services I provided to this patient were to treat and/or prevent clinically significant deterioration that could result in permanent disability, chronic pain and/or death.     Services included the following: chart data review, reviewing nursing notes and/or old charts, documentation time, consultant collaboration regarding findings and treatment options, medication orders and management, direct patient care, vital sign assessments and ordering, interpreting and reviewing diagnostic studies/lab tests.    Aggregate critical care time was 50 minutes, which includes only time during which I was engaged in work directly related to the patient's care, as described above, whether at the bedside or  elsewhere in the Emergency Department.     Pradeep Lyon M.D.                                      Clinical Impression:  Final diagnoses:  [R50.9] Febrile illness, acute  [A41.9] Sepsis          ED Disposition Condition    Admit                     [1]   Social History  Tobacco Use    Smoking status: Never     Passive exposure: Never    Smokeless tobacco: Never   Substance Use Topics    Alcohol use: Not Currently    Drug use: Never        Pradeep Lyon MD  04/17/25 [id number]

## 2025-04-17 NOTE — ASSESSMENT & PLAN NOTE
[id number] Score: 1. The patients heart rate in the last 24 hours is as follows:  Pulse  Min: 78  Max: 94     Antiarrhythmics  amiodarone tablet 200 mg, Daily, Oral    Anticoagulants  enoxaparin injection 30 mg, Every 24 hours, Subcutaneous     Labs/sono result explained to patient   patient with hyperkalemia Labs/sono result explained to patient   patient with hyperkalemia, Will give Lokelma 10 mg.  Case discussed with Dr. Calle nephrologist   advised if condition improved, to discharge home and then dialyzed tomorrow Patient claims she is feeling better, but still slightly nauseous, will give Reglan and reassess Patient claims she is feeling better, but still slightly nauseous, will give Reglan and reassess  Sono showed mild thickened GB wall with no cholelithiasis.  Patient's LFTs are normal, advised low fat diet Patient is feeling better, no nauseousness, complaining of mild headache, will give Tylenol.  Advised to follow-up with PCP

## 2025-04-17 NOTE — PROGRESS NOTES
Pharmacist Renal Dose Adjustment Note    Matteo Fraire is a 64 y.o. male being treated with the medication enoxaparin    Patient Data:    Vital Signs (Most Recent):  Temp: (!) 100.6 °F (38.1 °C) (04/17/25 1245)  Pulse: 78 (04/17/25 1430)  Resp: 17 (04/17/25 1216)  BP: 113/61 (04/17/25 1430)  SpO2: 95 % (04/17/25 1400) Vital Signs (72h Range):  Temp:  [99.8 °F (37.7 °C)-100.6 °F (38.1 °C)]   Pulse:  [78-94]   Resp:  [17-20]   BP: (113-129)/(59-77)   SpO2:  [95 %-97 %]      Recent Labs   Lab 04/17/25  1047 04/17/25  1239   CREATININE 2.0* 2.1*     Serum creatinine: 2.1 mg/dL (H) 04/17/25 1239  Estimated creatinine clearance: 42.1 mL/min (A)    Medication:enoxaparin dose: 30 mg frequency q24h will be changed to medication:enoxaparin dose:40 mg frequency:q24h  Due to CrCl > 30 mL/min    Pharmacist's Name: James Kramer  Pharmacist's Extension: 1432

## 2025-04-17 NOTE — HPI
64 year old pt getting admitted with sepsis/Pneumonia  Pt has been suffering from dizziness for past several days  Also has been sweating profusely   Yesterday he was doing yard work and dizziness got worse  Also reports chills and rigors at night  Denied chest pain/SOB/cough/Abdominal pain  Today he had an appt with PCP and was referred to ER and got admitted

## 2025-04-17 NOTE — ASSESSMENT & PLAN NOTE
"Patient's FSGs are controlled on current medication regimen.  Last A1c reviewed-   Lab Results   Component Value Date    HGBA1C 6.4 (H) 04/17/2025     Most recent fingerstick glucose reviewed- No results for input(s): "POCTGLUCOSE" in the last 24 hours.  Current correctional scale  Medium  Maintain anti-hyperglycemic dose as follows-   Antihyperglycemics (From admission, onward)      Start     Stop Route Frequency Ordered    04/17/25 1845  insulin aspart U-100 pen 0-10 Units         -- SubQ Before meals & nightly PRN 04/17/25 1745          Hold Oral hypoglycemics while patient is in the hospital.  "

## 2025-04-18 PROBLEM — R79.89 TROPONIN LEVEL ELEVATED: Status: RESOLVED | Noted: 2025-04-17 | Resolved: 2025-04-18

## 2025-04-18 LAB
ABSOLUTE EOSINOPHIL (SMH): 0.03 K/UL
ABSOLUTE MONOCYTE (SMH): 0.85 K/UL (ref 0.3–1)
ABSOLUTE NEUTROPHIL COUNT (SMH): 6.9 K/UL (ref 1.8–7.7)
ACINETOBACTER CALCOACETICUS-BAUMANNII COMPLEX (SMH): NOT DETECTED
ALBUMIN SERPL-MCNC: 3.7 G/DL (ref 3.5–5.2)
ALP SERPL-CCNC: 52 UNIT/L (ref 55–135)
ALT SERPL-CCNC: 7 UNIT/L (ref 10–44)
ANION GAP (SMH): 10 MMOL/L (ref 8–16)
AST SERPL-CCNC: 11 UNIT/L (ref 10–40)
BACTEROIDES FRAGILIS (SMH): NOT DETECTED
BASOPHILS # BLD AUTO: 0.04 K/UL
BASOPHILS NFR BLD AUTO: 0.5 %
BILIRUB SERPL-MCNC: 1.4 MG/DL (ref 0.1–1)
BUN SERPL-MCNC: 25 MG/DL (ref 8–23)
C AURIS DNA BLD POS QL NAA+NON-PROBE: NOT DETECTED
C GATTII+NEOFOR DNA CSF QL NAA+NON-PROBE: NOT DETECTED
C-REACTIVE PROTEIN (SMH): 21 MG/DL
CALCIUM SERPL-MCNC: 8.3 MG/DL (ref 8.7–10.5)
CANDIDA ALBICANS (SMH): NOT DETECTED
CANDIDA GLABRATA (SMH): NOT DETECTED
CANDIDA KRUSEI (SMH): NOT DETECTED
CANDIDA PARAPSILOSIS (SMH): NOT DETECTED
CANDIDA TROPICALIS (SMH): NOT DETECTED
CHLORIDE SERPL-SCNC: 108 MMOL/L (ref 95–110)
CO2 SERPL-SCNC: 19 MMOL/L (ref 23–29)
CREAT SERPL-MCNC: 1.6 MG/DL (ref 0.5–1.4)
CTX-M (SMH): ABNORMAL
ENTEROBACTER CLOACAE COMPLEX (SMH): NOT DETECTED
ENTEROBACTERALES (SMH): NOT DETECTED
ENTEROCOCCUS FAECALIS (SMH): NOT DETECTED
ENTEROCOCCUS FAECIUM (SMH): NOT DETECTED
ERYTHROCYTE [DISTWIDTH] IN BLOOD BY AUTOMATED COUNT: 17.5 % (ref 11.5–14.5)
ESCHERICHIA COLI (SMH): NOT DETECTED
GFR SERPLBLD CREATININE-BSD FMLA CKD-EPI: 48 ML/MIN/1.73/M2
GLUCOSE SERPL-MCNC: 113 MG/DL (ref 70–110)
GP B STREP DNA CSF QL NAA+NON-PROBE: NOT DETECTED
HAEM INFLU DNA CSF QL NAA+NON-PROBE: NOT DETECTED
HCT VFR BLD AUTO: 36.7 % (ref 40–54)
HGB BLD-MCNC: 10.9 GM/DL (ref 14–18)
IMM GRANULOCYTES # BLD AUTO: 0.04 K/UL (ref 0–0.04)
IMM GRANULOCYTES NFR BLD AUTO: 0.5 % (ref 0–0.5)
IMP (SMH): ABNORMAL
KLEBSIELLA AEROGENES (SMH): NOT DETECTED
KLEBSIELLA OXYTOCA (SMH): NOT DETECTED
KLEBSIELLA PNEUMONIAE GROUP (SMH): NOT DETECTED
KPC (SMH): ABNORMAL
L MONOCYTOG DNA CSF QL NAA+NON-PROBE: NOT DETECTED
LYMPHOCYTES # BLD AUTO: 0.97 K/UL (ref 1–4.8)
MAGNESIUM SERPL-MCNC: 2 MG/DL (ref 1.6–2.6)
MCH RBC QN AUTO: 23.5 PG (ref 27–31)
MCHC RBC AUTO-ENTMCNC: 29.7 G/DL (ref 32–36)
MCR-1 (SMH): ABNORMAL
MCV RBC AUTO: 79 FL (ref 82–98)
MECA/C (SMH): NOT DETECTED
MECA/C AND MREJ (MRSA)(SMH): ABNORMAL
MRSA PCR SCRN (SMH): NOT DETECTED
N MEN DNA CSF QL NAA+NON-PROBE: NOT DETECTED
NDM (SMH): ABNORMAL
NUCLEATED RBC (/100WBC) (SMH): 0 /100 WBC
OXA-48-LIKE (SMH): ABNORMAL
PHOSPHATE SERPL-MCNC: 1.6 MG/DL (ref 2.7–4.5)
PLATELET # BLD AUTO: 180 K/UL (ref 150–450)
PMV BLD AUTO: 11.5 FL (ref 9.2–12.9)
POCT GLUCOSE: 125 MG/DL (ref 70–110)
POCT GLUCOSE: 133 MG/DL (ref 70–110)
POTASSIUM SERPL-SCNC: 4.2 MMOL/L (ref 3.5–5.1)
PROCALCITONIN SERPL-MCNC: 0.68 NG/ML
PROT SERPL-MCNC: 6.1 GM/DL (ref 6–8.4)
PROTEUS SPP. (SMH): NOT DETECTED
PSEUDOMONAS AERUGINOSA (SMH): NOT DETECTED
RBC # BLD AUTO: 4.63 M/UL (ref 4.6–6.2)
RELATIVE EOSINOPHIL (SMH): 0.3 % (ref 0–8)
RELATIVE LYMPHOCYTE (SMH): 11 % (ref 18–48)
RELATIVE MONOCYTE (SMH): 9.6 % (ref 4–15)
RELATIVE NEUTROPHIL (SMH): 78.1 % (ref 38–73)
S ENT+BONG DNA STL QL NAA+NON-PROBE: NOT DETECTED
S PNEUM DNA CSF QL NAA+NON-PROBE: NOT DETECTED
SERRATIA MARCESCENS (SMH): NOT DETECTED
SODIUM SERPL-SCNC: 137 MMOL/L (ref 136–145)
STAPHYLOCOCCUS AUREUS (SMH): NOT DETECTED
STAPHYLOCOCCUS EPIDERMIDIS (SMH): DETECTED
STAPHYLOCOCCUS LUGDUNENSIS (SMH): NOT DETECTED
STAPHYLOCOCCUS SPP. (SMH): ABNORMAL
STENOTROPHOMONAS MALTOPHILIA (SMH): NOT DETECTED
STREPTOCOCCUS PYOGENES (GROUP A)(SMH): NOT DETECTED
STREPTOCOCCUS SPP. (SMH): NOT DETECTED
TROPONIN HIGH SENSITIVE (SMH): 66.2 PG/ML
VANA/B (SMH): ABNORMAL
VANCOMYCIN TROUGH SERPL-MCNC: 5.9 UG/ML (ref ?–20)
VIM (SMH): ABNORMAL
WBC # BLD AUTO: 8.85 K/UL (ref 3.9–12.7)

## 2025-04-18 PROCEDURE — 85025 COMPLETE CBC W/AUTO DIFF WBC: CPT | Performed by: INTERNAL MEDICINE

## 2025-04-18 PROCEDURE — 87641 MR-STAPH DNA AMP PROBE: CPT

## 2025-04-18 PROCEDURE — 25000003 PHARM REV CODE 250: Performed by: INTERNAL MEDICINE

## 2025-04-18 PROCEDURE — 63600175 PHARM REV CODE 636 W HCPCS

## 2025-04-18 PROCEDURE — 63600175 PHARM REV CODE 636 W HCPCS: Performed by: INTERNAL MEDICINE

## 2025-04-18 PROCEDURE — 36415 COLL VENOUS BLD VENIPUNCTURE: CPT | Performed by: INTERNAL MEDICINE

## 2025-04-18 PROCEDURE — 86140 C-REACTIVE PROTEIN: CPT | Performed by: INTERNAL MEDICINE

## 2025-04-18 PROCEDURE — 83735 ASSAY OF MAGNESIUM: CPT | Performed by: INTERNAL MEDICINE

## 2025-04-18 PROCEDURE — 36415 COLL VENOUS BLD VENIPUNCTURE: CPT

## 2025-04-18 PROCEDURE — 84145 PROCALCITONIN (PCT): CPT | Performed by: INTERNAL MEDICINE

## 2025-04-18 PROCEDURE — 80202 ASSAY OF VANCOMYCIN: CPT | Performed by: INTERNAL MEDICINE

## 2025-04-18 PROCEDURE — 80053 COMPREHEN METABOLIC PANEL: CPT | Performed by: INTERNAL MEDICINE

## 2025-04-18 PROCEDURE — 25000003 PHARM REV CODE 250

## 2025-04-18 PROCEDURE — 84484 ASSAY OF TROPONIN QUANT: CPT | Performed by: INTERNAL MEDICINE

## 2025-04-18 PROCEDURE — 84100 ASSAY OF PHOSPHORUS: CPT

## 2025-04-18 PROCEDURE — 12000002 HC ACUTE/MED SURGE SEMI-PRIVATE ROOM

## 2025-04-18 RX ADMIN — CLOPIDOGREL BISULFATE 75 MG: 75 TABLET, FILM COATED ORAL at 09:04

## 2025-04-18 RX ADMIN — ATORVASTATIN CALCIUM 80 MG: 40 TABLET, FILM COATED ORAL at 09:04

## 2025-04-18 RX ADMIN — AMIODARONE HYDROCHLORIDE 200 MG: 200 TABLET ORAL at 09:04

## 2025-04-18 RX ADMIN — VANCOMYCIN HYDROCHLORIDE 2000 MG: 500 INJECTION, POWDER, LYOPHILIZED, FOR SOLUTION INTRAVENOUS at 10:04

## 2025-04-18 RX ADMIN — ACETAMINOPHEN 650 MG: 325 TABLET ORAL at 05:04

## 2025-04-18 RX ADMIN — SODIUM CHLORIDE: 9 INJECTION, SOLUTION INTRAVENOUS at 10:04

## 2025-04-18 RX ADMIN — ENOXAPARIN SODIUM 40 MG: 40 INJECTION SUBCUTANEOUS at 04:04

## 2025-04-18 RX ADMIN — CEFTRIAXONE 2 G: 2 INJECTION, POWDER, FOR SOLUTION INTRAMUSCULAR; INTRAVENOUS at 01:04

## 2025-04-18 RX ADMIN — AZITHROMYCIN MONOHYDRATE 500 MG: 500 INJECTION, POWDER, LYOPHILIZED, FOR SOLUTION INTRAVENOUS at 03:04

## 2025-04-18 RX ADMIN — PANTOPRAZOLE SODIUM 40 MG: 40 TABLET, DELAYED RELEASE ORAL at 05:04

## 2025-04-18 RX ADMIN — ASPIRIN 81 MG CHEWABLE TABLET 81 MG: 81 TABLET CHEWABLE at 09:04

## 2025-04-18 NOTE — PROGRESS NOTES
Pharmacokinetic Initial Assessment: IV Vancomycin    Assessment/Plan:    Initiate intravenous vancomycin with loading dose of 2000 mg once followed by a maintenance dose of vancomycin 2000mg IV every 24 hours  Desired empiric serum trough concentration is 15 to 20 mcg/mL  Draw vancomycin trough level 60 min prior to third dose on 04/19/2025 at approximately 19:00.  Pharmacy will continue to follow and monitor vancomycin.      Please contact pharmacy at extension [phone] with any questions regarding this assessment.     Thank you for the consult,   Lou Agrawal       Patient brief summary:  Matteo Fraire is a 64 y.o. male initiated on antimicrobial therapy with IV Vancomycin for treatment of suspected bacteremia    Drug Allergies:   Review of patient's allergies indicates:   Allergen Reactions    Venom-wasp Anaphylaxis       Actual Body Weight:   103.4 kg    Renal Function:   Estimated Creatinine Clearance: 55.3 mL/min (A) (based on SCr of 1.6 mg/dL (H)).,     Dialysis Method (if applicable):  N/A    CBC (last 72 hours):  Recent Labs   Lab Result Units 04/17/25  1047 04/17/25  1240 04/18/25  0508   WBC K/uL 15.90* 16.99* 8.85   Hgb gm/dL 12.7* 12.7* 10.9*   Hemoglobin A1c % 6.4*  --   --    Hct % 43.5 43.3 36.7*   Platelet Count K/uL 262 248 180   Mono % % 8.4 10.0 9.6   Eos % % 0.1 0.0 0.3   Basophil % % 0.5 0.4 0.5       Metabolic Panel (last 72 hours):  Recent Labs   Lab Result Units 04/17/25  1030 04/17/25  1047 04/17/25  1239 04/17/25  1926 04/18/25  0508 04/18/25  1601   Sodium mmol/L  --  135* 134*  --  137  --    Potassium mmol/L  --  4.6 4.8  --  4.2  --    Chloride mmol/L  --  103 103  --  108  --    CO2 mmol/L  --  22* 22*  --  19*  --    Glucose mg/dL  --  179* 158*  --  113*  --    Glucose, UA  4+*  --   --  4+*  --   --    BUN mg/dL  --  23 25*  --  25*  --    Creatinine mg/dL  --  2.0* 2.1*  --  1.6*  --    Albumin g/dL  --  4.6 4.4  --  3.7  --    Bilirubin Total mg/dL  --  3.0* 2.9*  --  1.4*  --     ALP unit/L  --  65 62  --  52*  --    AST unit/L  --  10 11  --  11  --    ALT unit/L  --  9* 9*  --  7*  --    Magnesium mg/dL  --  2.0 2.0  --  2.0  --    Phosphorus Level mg/dL  --   --  1.7*  --   --  1.6*       Drug levels (last 3 results):  Recent Labs   Lab Result Units 04/18/25  1601   Vancomycin Trough ug/ml 5.9       Microbiologic Results:  Microbiology Results (last 7 days)       Procedure Component Value Units Date/Time    Blood culture x two cultures. Draw prior to antibiotics [[id number]]  (Normal) Collected: 04/17/25 1252    Order Status: Completed Specimen: Blood Updated: 04/18/25 1501     CULTURE, BLOOD ([facility]) No Growth After 24 Hours    MRSA Screen by PCR [[id number]]  (Normal) Collected: 04/18/25 1306    Order Status: Completed Specimen: Nasal Swab Updated: 04/18/25 1436     MRSA PCR SCRN ([facility]) Not Detected    Rapid Organism ID by PCR (from Blood culture) [[id number]]  (Abnormal) Collected: 04/17/25 1257    Order Status: Completed Specimen: Blood Updated: 04/18/25 1359     Enterococcus faecalis Not Detected     Enterococcus faecium Not Detected     Listeria monocytogenes Not Detected     Staphylococcus spp. See Species for ID     Staphylococcus aureus Not Detected     Staphylococcus epidermidis Detected     Staphylococcus lugdunensis Not Detected     Streptococcus spp. Not Detected     Streptococcus agalactiae (Group B) Not Detected     Streptococcus pneumoniae Not Detected     Streptococcus pyogenes (Group A) Not Detected     Acinetobacter calcoaceticus/baumannii complex Not Detected     Bacteroides fragilis Not Detected     Enterobacterales Not Detected     Enterobacter cloacae complex Not Detected     Escherichia coli Not Detected     Klebsiella aerogenes Not Detected     Klebsiella oxytoca Not Detected     Klebsiella pneumoniae group Not Detected     Proteus spp. Not Detected     Salmonella spp. Not Detected     Serratia marcescens Not Detected     Haemophilus influenzae Not Detected      Neisseria meningitidis Not Detected     Pseudomonas aeruginosa Not Detected     Stenotrophomonas maltophilia Not Detected     Candida albicans Not Detected     Candida auris Not Detected     Candida glabrata Not Detected     Candida krusei Not Detected     Candida parapsilosis Not Detected     Candida tropicalis Not Detected     Cryptococcus neoformans/gattii Not Detected     CTX-M (ESBL ) N/A     Comment: Note: Antimicrobial resistance can occur via multiple mechanisms. A Not Detected result for antimicrobial resistance gene(s) does not indicate antimicrobial susceptibility. Subculturing is required for species identification and susceptibility testing of   isolates.        IMP (Cabapenemase ) N/A     Comment: Note: Antimicrobial resistance can occur via multiple mechanisms. A Not Detected result for antimicrobial resistance gene(s) does not indicate antimicrobial susceptibility. Subculturing is required for species identification and susceptibility testing of   isolates.        KPC resistance gene (Carbapenemase ) N/A     Comment: Note: Antimicrobial resistance can occur via multiple mechanisms. A Not Detected result for antimicrobial resistance gene(s) does not indicate antimicrobial susceptibility. Subculturing is required for species identification and susceptibility testing of   isolates.        mcr-1 N/A     Comment: Note: Antimicrobial resistance can occur via multiple mechanisms. A Not Detected result for antimicrobial resistance gene(s) does not indicate antimicrobial susceptibility. Subculturing is required for species identification and susceptibility testing of   isolates.        mecA/C and MREJ (MRSA) gene N/A     Comment: Note: Antimicrobial resistance can occur via multiple mechanisms. A Not Detected result for antimicrobial resistance gene(s) does not indicate antimicrobial susceptibility. Subculturing is required for species identification and susceptibility testing of    isolates.        NDM (Carbapenemase ) N/A     Comment: Note: Antimicrobial resistance can occur via multiple mechanisms. A Not Detected result for antimicrobial resistance gene(s) does not indicate antimicrobial susceptibility. Subculturing is required for species identification and susceptibility testing of   isolates.        OXA-48-like (Carbapenemase ) N/A     Comment: Note: Antimicrobial resistance can occur via multiple mechanisms. A Not Detected result for antimicrobial resistance gene(s) does not indicate antimicrobial susceptibility. Subculturing is required for species identification and susceptibility testing of   isolates.        Kingsley/B (VRE gene) N/A     Comment: Note: Antimicrobial resistance can occur via multiple mechanisms. A Not Detected result for antimicrobial resistance gene(s) does not indicate antimicrobial susceptibility. Subculturing is required for species identification and susceptibility testing of   isolates.        VIM (Carbapenemase ) N/A     Comment: Note: Antimicrobial resistance can occur via multiple mechanisms. A Not Detected result for antimicrobial resistance gene(s) does not indicate antimicrobial susceptibility. Subculturing is required for species identification and susceptibility testing of   isolates.        mecA ID Not Detected     Comment: Note: Antimicrobial resistance can occur via multiple mechanisms. A Not Detected result for antimicrobial resistance gene(s) does not indicate antimicrobial susceptibility. Subculturing is required for species identification and susceptibility testing of   isolates.       Blood culture x two cultures. Draw prior to antibiotics [[id number]]  (Abnormal) Collected: 04/17/25 1257    Order Status: Completed Specimen: Blood Updated: 04/18/25 1314     CULTURE, BLOOD ([facility]) Positive     GRAM STAIN Gram Stain Anaerobic Bottle:      Gram positive cocci    Urine Culture High Risk [[id number]] Collected: 04/17/25 1926    Order  Status: Completed Specimen: Urine, Clean Catch Updated: 04/18/25 0827     Urine Culture No Growth To Date    Influenza A & B by Molecular [[id number]]  (Normal) Collected: 04/17/25 1345    Order Status: Completed Specimen: Nasopharyngeal Swab Updated: 04/17/25 1438     INFLUENZA A MOLECULAR Negative     INFLUENZA B MOLECULAR  Negative

## 2025-04-18 NOTE — ASSESSMENT & PLAN NOTE
This patient does have evidence of infective focus  My overall impression is sepsis.  Source: Respiratory  Antibiotics given-   Antibiotics (72h ago, onward)      Start     Stop Route Frequency Ordered    04/18/25 1500  azithromycin (ZITHROMAX) 500 mg in 0.9% NaCl 250 mL IVPB (admixture device)         -- IV Every 24 hours (non-standard times) 04/17/25 1747    04/18/25 1330  cefTRIAXone injection 2 g         -- IV Every 24 hours (non-standard times) 04/17/25 1754          Latest lactate reviewed-  Recent Labs   Lab 04/17/25  1239   POCLAC 2.18     Organ dysfunction indicated by Acute kidney injury    Fluid challenge Ideal Body Weight- The patient is obese (BMI>30) and their ideal body weight of Ideal body weight: 70.7 kg (155 lb 13.8 oz) will be used to calculate fluid bolus of 30 ml/kg.     Post- resuscitation assessment No - Post resuscitation assessment not needed       Will Not start Pressors- Levophed for MAP of 65  Source control achieved by: IV abx

## 2025-04-18 NOTE — ASSESSMENT & PLAN NOTE
[id number] Score: 2. The patients heart rate in the last 24 hours is as follows:  Pulse  Min: 70  Max: 94     Antiarrhythmics  amiodarone tablet 200 mg, Daily, Oral    Anticoagulants  enoxaparin injection 40 mg, Every 24 hours, Subcutaneous

## 2025-04-18 NOTE — ASSESSMENT & PLAN NOTE
Maintain iv abx as below    Antibiotics (From admission, onward)      Start     Stop Route Frequency Ordered    04/18/25 1500  azithromycin (ZITHROMAX) 500 mg in 0.9% NaCl 250 mL IVPB (admixture device)         -- IV Every 24 hours (non-standard times) 04/17/25 1747    04/18/25 1330  cefTRIAXone injection 2 g         -- IV Every 24 hours (non-standard times) 04/17/25 1754            Microbiology Results (last 7 days)       Procedure Component Value Units Date/Time    Urine Culture High Risk [[id number]] Collected: 04/17/25 1926    Order Status: Completed Specimen: Urine, Clean Catch Updated: 04/18/25 0827     Urine Culture No Growth To Date    MRSA Screen by PCR [[id number]]     Order Status: Sent Specimen: Nasal Swab     Blood culture x two cultures. Draw prior to antibiotics [[id number]]  (Normal) Collected: 04/17/25 1252    Order Status: Completed Specimen: Blood Updated: 04/17/25 2100     CULTURE, BLOOD ([facility]) No Growth After 6 Hours    Blood culture x two cultures. Draw prior to antibiotics [[id number]]  (Normal) Collected: 04/17/25 1257    Order Status: Completed Specimen: Blood Updated: 04/17/25 2100     CULTURE, BLOOD ([facility]) No Growth After 6 Hours    Influenza A & B by Molecular [[id number]]  (Normal) Collected: 04/17/25 1345    Order Status: Completed Specimen: Nasopharyngeal Swab Updated: 04/17/25 1438     INFLUENZA A MOLECULAR Negative     INFLUENZA B MOLECULAR  Negative

## 2025-04-18 NOTE — PROGRESS NOTES
[facility] Medicine  Progress Note    Patient Name: Matteo Fraire  MRN: [id number]  Patient Class: IP- Inpatient   Admission Date: 4/17/2025  Length of Stay: 1 days  Attending Physician: Trang Garcia MD  Primary Care Provider: Naomi Bliss FNP-C        Subjective     Principal Problem:Sepsis        HPI:  64 year old pt getting admitted with sepsis/Pneumonia  Pt has been suffering from dizziness for past several days  Also has been sweating profusely   Yesterday he was doing yard work and dizziness got worse  Also reports chills and rigors at night  Denied chest pain/SOB/cough/Abdominal pain  Today he had an appt with PCP and was referred to ER and got admitted     Overview/Hospital Course:  No notes on file    Interval History:  Patient reported fever and chills on admission, blood cultures so far no growth today.  Continue treatment empiric for community-acquired pneumonia.    Review of Systems   Constitutional:  Positive for fatigue. Negative for chills.   Cardiovascular:  Negative for chest pain.   Gastrointestinal:  Negative for abdominal pain.   Neurological:  Positive for weakness and light-headedness.   Psychiatric/Behavioral:  Negative for agitation.      Objective:     Vital Signs (Most Recent):  Temp: 98.1 °F (36.7 °C) (04/18/25 1118)  Pulse: 70 (04/18/25 1118)  Resp: 18 (04/18/25 1118)  BP: 114/72 (04/18/25 1118)  SpO2: 95 % (04/18/25 1118) Vital Signs (24h Range):  Temp:  [98.1 °F (36.7 °C)-101.1 °F (38.4 °C)] 98.1 °F (36.7 °C)  Pulse:  [70-94] 70  Resp:  [18] 18  SpO2:  [87 %-99 %] 95 %  BP: (104-151)/(55-84) 114/72     Weight: 103.4 kg (227 lb 15.3 oz)  Body mass index is 33.66 kg/m².    Intake/Output Summary (Last 24 hours) at 4/18/2025 1301  Last data filed at 4/18/2025 0622  Gross per 24 hour   Intake 730.09 ml   Output 800 ml   Net -69.91 ml         Physical Exam  Vitals and nursing note reviewed.   Constitutional:       Appearance: He is well-developed.    HENT:      Head: Atraumatic.      Right Ear: External ear normal.      Left Ear: External ear normal.      Nose: Nose normal.      Mouth/Throat:      Mouth: Mucous membranes are moist.   Eyes:      Extraocular Movements: Extraocular movements intact.   Cardiovascular:      Rate and Rhythm: Normal rate.   Pulmonary:      Effort: Pulmonary effort is normal.   Abdominal:      Palpations: Abdomen is soft.   Musculoskeletal:         General: Normal range of motion.      Cervical back: Full passive range of motion without pain and normal range of motion.   Skin:     General: Skin is warm.   Neurological:      Mental Status: He is alert and oriented to person, place, and time.      Comments: Chronic facial paralysis and residual weakness, strength in upper and lower extremities 5/5   Psychiatric:         Behavior: Behavior normal.               Significant Labs: All pertinent labs within the past 24 hours have been reviewed.  BMP:   Recent Labs   Lab 04/18/25  0508      K 4.2   CO2 19*   BUN 25*   CREATININE 1.6*   CALCIUM 8.3*   MG 2.0     CBC:   Recent Labs   Lab 04/17/25  1047 04/17/25  1240 04/18/25  0508   WBC 15.90* 16.99* 8.85   HGB 12.7* 12.7* 10.9*   HCT 43.5 43.3 36.7*    248 180       Significant Imaging: I have reviewed all pertinent imaging results/findings within the past 24 hours.      Assessment & Plan  Sepsis  This patient does have evidence of infective focus  My overall impression is sepsis.  Source: Respiratory  Antibiotics given-   Antibiotics (72h ago, onward)      Start     Stop Route Frequency Ordered    04/18/25 1500  azithromycin (ZITHROMAX) 500 mg in 0.9% NaCl 250 mL IVPB (admixture device)         -- IV Every 24 hours (non-standard times) 04/17/25 1747    04/18/25 1330  cefTRIAXone injection 2 g         -- IV Every 24 hours (non-standard times) 04/17/25 1754          Latest lactate reviewed-  Recent Labs   Lab 04/17/25  1239   POCLAC 2.18     Organ dysfunction indicated by Acute  kidney injury    Fluid challenge Ideal Body Weight- The patient is obese (BMI>30) and their ideal body weight of Ideal body weight: 70.7 kg (155 lb 13.8 oz) will be used to calculate fluid bolus of 30 ml/kg.     Post- resuscitation assessment No - Post resuscitation assessment not needed       Will Not start Pressors- Levophed for MAP of 65  Source control achieved by: IV abx  Acute pneumonia  Maintain iv abx as below    Antibiotics (From admission, onward)      Start     Stop Route Frequency Ordered    04/18/25 1500  azithromycin (ZITHROMAX) 500 mg in 0.9% NaCl 250 mL IVPB (admixture device)         -- IV Every 24 hours (non-standard times) 04/17/25 1747    04/18/25 1330  cefTRIAXone injection 2 g         -- IV Every 24 hours (non-standard times) 04/17/25 1754            Microbiology Results (last 7 days)       Procedure Component Value Units Date/Time    Urine Culture High Risk [[id number]] Collected: 04/17/25 1926    Order Status: Completed Specimen: Urine, Clean Catch Updated: 04/18/25 0827     Urine Culture No Growth To Date    MRSA Screen by PCR [[id number]]     Order Status: Sent Specimen: Nasal Swab     Blood culture x two cultures. Draw prior to antibiotics [[id number]]  (Normal) Collected: 04/17/25 1252    Order Status: Completed Specimen: Blood Updated: 04/17/25 2100     CULTURE, BLOOD ([facility]) No Growth After 6 Hours    Blood culture x two cultures. Draw prior to antibiotics [[id number]]  (Normal) Collected: 04/17/25 1257    Order Status: Completed Specimen: Blood Updated: 04/17/25 2100     CULTURE, BLOOD ([facility]) No Growth After 6 Hours    Influenza A & B by Molecular [[id number]]  (Normal) Collected: 04/17/25 1345    Order Status: Completed Specimen: Nasopharyngeal Swab Updated: 04/17/25 1438     INFLUENZA A MOLECULAR Negative     INFLUENZA B MOLECULAR  Negative          BETHANY (acute kidney injury)  DC fluids, patient with HFrEF and bethany improving, avoid nephrotoxins    Recent Labs     04/17/25  1047  04/17/25  1239 04/18/25  0508   CREATININE 2.0* 2.1* 1.6*   EGFRNORACEVR 37* 35* 48*        Coronary artery disease involving coronary bypass graft of native heart with angina pectoris  H.o aware  Maintain GDMT  Hold BB in view with relative hypotension  S/P CABG (coronary artery bypass graft)  Aware     History of stroke  Aware     Heart failure with reduced ejection fraction  H/o aware  Hold entresto   Pt takes lasix PRN basis at home    Hyperbilirubinemia  USS abdomen ordered from ER    Atrial fibrillation  [id number] Score: 2. The patients heart rate in the last 24 hours is as follows:  Pulse  Min: 70  Max: 94     Antiarrhythmics  amiodarone tablet 200 mg, Daily, Oral    Anticoagulants  enoxaparin injection 40 mg, Every 24 hours, Subcutaneous    Diabetes  Patient's FSGs are controlled on current medication regimen.  Last A1c reviewed-   Lab Results   Component Value Date    HGBA1C 6.4 (H) 04/17/2025     Most recent fingerstick glucose reviewed-   Recent Labs   Lab 04/17/25 2058 04/18/25  0539 04/18/25  1119   POCTGLUCOSE 119* 125* 133*     Current correctional scale  Medium  Maintain anti-hyperglycemic dose as follows-   Antihyperglycemics (From admission, onward)      Start     Stop Route Frequency Ordered    04/17/25 1845  insulin aspart U-100 pen 0-10 Units         -- SubQ Before meals & nightly PRN 04/17/25 1745          Hold Oral hypoglycemics while patient is in the hospital.  VTE Risk Mitigation (From admission, onward)           Ordered     enoxaparin injection 40 mg  Daily         04/17/25 1753     IP VTE HIGH RISK PATIENT  Once         04/17/25 1745     Place sequential compression device  Until discontinued         04/17/25 1745                    Discharge Planning   MICHAEL:      Code Status: Full Code   Medical Readiness for Discharge Date:   Discharge Plan A: Home with family                        Trang Garcia MD  Department of Hospital Medicine   [facility]

## 2025-04-18 NOTE — PLAN OF CARE
Pending sale to [facility] - Emergency Dept  Initial Discharge Assessment       Primary Care Provider: Naomi Bliss FNP-C    Admission Diagnosis: Sepsis [A41.9]    Admission Date: 4/17/2025  Expected Discharge Date:      met with Pt at bedside to complete discharge assessment. Pt AAOx4s. Demographics, PCP, and insurance verified. No home health. No dialysis. Pt reports ability to complete ADLs without assistance.Pt reports using cane as needed.  Pt verbalized plan to discharge home via family transport. Pt has no other needs to be addressed at this time.      Transition of Care Barriers: (P) None    Payor: BLUE CR[employer] BLUE [employer]IELD / Plan: [facility] FEDERAL BASIC / Product Type: PPO /     Extended Emergency Contact Information  Primary Emergency Contact: abi hayward  Address: [address]  Home Phone: [phone]  Mobile Phone: [phone]  Relation: Significant other  Preferred language: English   needed? No    Discharge Plan A: (P) Home with family  Discharge Plan B: (P) Home      [id number] - [place] [address]  [address] & [place] [id number] [facility] [address]  Phone: [phone] Fax: [phone]    [facility] [address] [address]  Phone: [phone] Fax: [phone]      Initial Assessment (most recent)       Adult Discharge Assessment - 04/17/25 2002          Discharge Assessment    Assessment Type Discharge Planning Assessment     Confirmed/corrected address, phone number and insurance Yes     Confirmed Demographics Correct on Facesheet     Source of Information patient     When was your last doctors appointment? 04/17/25     Communicated MICHAEL with patient/caregiver Date not available/Unable to determine     Reason For Admission Sepsis     People in Home spouse     Facility Arrived From: Doctor Office     Do you expect to  return to your current living situation? Yes     Do you have help at home or someone to help you manage your care at home? Yes     Who are your caregiver(s) and their phone number(s)? Wife: Janessa Merritt [phone]     Prior to hospitilization cognitive status: Alert/Oriented     Current cognitive status: Alert/Oriented     Walking or Climbing Stairs Difficulty no     Dressing/Bathing Difficulty no     Home Accessibility wheelchair accessible     Home Layout Able to live on 1st floor     Equipment Currently Used at Home cane, straight     Readmission within 30 days? No     Patient currently being followed by outpatient case management? No     Do you currently have service(s) that help you manage your care at home? No     Do you take prescription medications? Yes (P)      Do you have prescription coverage? Yes (P)      Coverage Payor: [facility] FEDERAL BASIC - (P)      Do you have any problems affording any of your prescribed medications? No (P)      Is the patient taking medications as prescribed? yes (P)      Who is going to help you get home at discharge? Wife: Janessa Merritt [phone] (P)      How do you get to doctors appointments? car, drives self (P)      Are you on dialysis? No (P)      Do you take coumadin? No (P)      Discharge Plan A Home with family (P)      Discharge Plan B Home (P)      DME Needed Upon Discharge  none (P)      Discharge Plan discussed with: Patient (P)      Transition of Care Barriers None (P)

## 2025-04-18 NOTE — SUBJECTIVE & OBJECTIVE
Interval History:  Patient reported fever and chills on admission, blood cultures so far no growth today.  Continue treatment empiric for community-acquired pneumonia.    Review of Systems   Constitutional:  Positive for fatigue. Negative for chills.   Cardiovascular:  Negative for chest pain.   Gastrointestinal:  Negative for abdominal pain.   Neurological:  Positive for weakness and light-headedness.   Psychiatric/Behavioral:  Negative for agitation.      Objective:     Vital Signs (Most Recent):  Temp: 98.1 °F (36.7 °C) (04/18/25 1118)  Pulse: 70 (04/18/25 1118)  Resp: 18 (04/18/25 1118)  BP: 114/72 (04/18/25 1118)  SpO2: 95 % (04/18/25 1118) Vital Signs (24h Range):  Temp:  [98.1 °F (36.7 °C)-101.1 °F (38.4 °C)] 98.1 °F (36.7 °C)  Pulse:  [70-94] 70  Resp:  [18] 18  SpO2:  [87 %-99 %] 95 %  BP: (104-151)/(55-84) 114/72     Weight: 103.4 kg (227 lb 15.3 oz)  Body mass index is 33.66 kg/m².    Intake/Output Summary (Last 24 hours) at 4/18/2025 1301  Last data filed at 4/18/2025 0622  Gross per 24 hour   Intake 730.09 ml   Output 800 ml   Net -69.91 ml         Physical Exam  Vitals and nursing note reviewed.   Constitutional:       Appearance: He is well-developed.   HENT:      Head: Atraumatic.      Right Ear: External ear normal.      Left Ear: External ear normal.      Nose: Nose normal.      Mouth/Throat:      Mouth: Mucous membranes are moist.   Eyes:      Extraocular Movements: Extraocular movements intact.   Cardiovascular:      Rate and Rhythm: Normal rate.   Pulmonary:      Effort: Pulmonary effort is normal.   Abdominal:      Palpations: Abdomen is soft.   Musculoskeletal:         General: Normal range of motion.      Cervical back: Full passive range of motion without pain and normal range of motion.   Skin:     General: Skin is warm.   Neurological:      Mental Status: He is alert and oriented to person, place, and time.      Comments: Chronic facial paralysis and residual weakness, strength in upper  and lower extremities 5/5   Psychiatric:         Behavior: Behavior normal.               Significant Labs: All pertinent labs within the past 24 hours have been reviewed.  BMP:   Recent Labs   Lab 04/18/25  0508      K 4.2   CO2 19*   BUN 25*   CREATININE 1.6*   CALCIUM 8.3*   MG 2.0     CBC:   Recent Labs   Lab 04/17/25  1047 04/17/25  1240 04/18/25  0508   WBC 15.90* 16.99* 8.85   HGB 12.7* 12.7* 10.9*   HCT 43.5 43.3 36.7*    248 180       Significant Imaging: I have reviewed all pertinent imaging results/findings within the past 24 hours.

## 2025-04-18 NOTE — ASSESSMENT & PLAN NOTE
DC fluids, patient with HFrEF and andre improving, avoid nephrotoxins    Recent Labs     04/17/25  1047 04/17/25  1239 04/18/25  0508   CREATININE 2.0* 2.1* 1.6*   EGFRNORACEVR 37* 35* 48*

## 2025-04-19 PROBLEM — A41.9 SEPSIS: Status: RESOLVED | Noted: 2025-04-17 | Resolved: 2025-04-19

## 2025-04-19 LAB
ABSOLUTE EOSINOPHIL (SMH): 0.23 K/UL
ABSOLUTE MONOCYTE (SMH): 0.81 K/UL (ref 0.3–1)
ABSOLUTE NEUTROPHIL COUNT (SMH): 4.6 K/UL (ref 1.8–7.7)
ADENOVIRUS (SMH): NOT DETECTED
ALBUMIN SERPL-MCNC: 3.5 G/DL (ref 3.5–5.2)
ALP SERPL-CCNC: 49 UNIT/L (ref 55–135)
ALT SERPL-CCNC: 12 UNIT/L (ref 10–44)
ANION GAP (SMH): 9 MMOL/L (ref 8–16)
AST SERPL-CCNC: 17 UNIT/L (ref 10–40)
BACTERIA BLD CULT: ABNORMAL
BASOPHILS # BLD AUTO: 0.02 K/UL
BASOPHILS NFR BLD AUTO: 0.3 %
BILIRUB SERPL-MCNC: 0.8 MG/DL (ref 0.1–1)
BORDETELLA PARAPERTUSSIS (IS1001) (SMH): NOT DETECTED
BORDETELLA PERTUSSIS (PTXP) (SMH): NOT DETECTED
BUN SERPL-MCNC: 18 MG/DL (ref 8–23)
CALCIUM SERPL-MCNC: 8 MG/DL (ref 8.7–10.5)
CHLAMYDIA PNEUMONIAE (SMH): NOT DETECTED
CHLORIDE SERPL-SCNC: 108 MMOL/L (ref 95–110)
CO2 SERPL-SCNC: 20 MMOL/L (ref 23–29)
CORONAVIRUS 229E, COMMON COLD VIRUS (SMH): NOT DETECTED
CORONAVIRUS HKU1, COMMON COLD VIRUS (SMH): NOT DETECTED
CORONAVIRUS NL63, COMMON COLD VIRUS (SMH): NOT DETECTED
CORONAVIRUS OC43, COMMON COLD VIRUS (SMH): NOT DETECTED
CREAT SERPL-MCNC: 1.3 MG/DL (ref 0.5–1.4)
D DIMER PPP IA.FEU-MCNC: 1.52 MG/L FEU
ERYTHROCYTE [DISTWIDTH] IN BLOOD BY AUTOMATED COUNT: 17.4 % (ref 11.5–14.5)
FLUBV RNA NPH QL NAA+NON-PROBE: NOT DETECTED
GFR SERPLBLD CREATININE-BSD FMLA CKD-EPI: >60 ML/MIN/1.73/M2
GLUCOSE SERPL-MCNC: 131 MG/DL (ref 70–110)
GRAM STN SPEC: ABNORMAL
GRAM STN SPEC: ABNORMAL
HCT VFR BLD AUTO: 35.8 % (ref 40–54)
HGB BLD-MCNC: 10.3 GM/DL (ref 14–18)
HPIV1 RNA NPH QL NAA+NON-PROBE: NOT DETECTED
HPIV2 RNA NPH QL NAA+NON-PROBE: NOT DETECTED
HPIV3 RNA NPH QL NAA+NON-PROBE: NOT DETECTED
HPIV4 RNA NPH QL NAA+NON-PROBE: NOT DETECTED
HUMAN METAPNEUMOVIRUS (SMH): NOT DETECTED
IMM GRANULOCYTES # BLD AUTO: 0.03 K/UL (ref 0–0.04)
IMM GRANULOCYTES NFR BLD AUTO: 0.5 % (ref 0–0.5)
INFLUENZA A (SUBTYPES H1,H1-2009,H3) (SMH): NOT DETECTED
LYMPHOCYTES # BLD AUTO: 0.77 K/UL (ref 1–4.8)
MAGNESIUM SERPL-MCNC: 2 MG/DL (ref 1.6–2.6)
MCH RBC QN AUTO: 22.8 PG (ref 27–31)
MCHC RBC AUTO-ENTMCNC: 28.8 G/DL (ref 32–36)
MCV RBC AUTO: 79 FL (ref 82–98)
MYCOPLASMA PNEUMONIAE (SMH): NOT DETECTED
NUCLEATED RBC (/100WBC) (SMH): 0 /100 WBC
PHOSPHATE SERPL-MCNC: 1.9 MG/DL (ref 2.7–4.5)
PLATELET # BLD AUTO: 174 K/UL (ref 150–450)
PMV BLD AUTO: 11.5 FL (ref 9.2–12.9)
POCT GLUCOSE: 134 MG/DL (ref 70–110)
POCT GLUCOSE: 134 MG/DL (ref 70–110)
POCT GLUCOSE: 147 MG/DL (ref 70–110)
POTASSIUM SERPL-SCNC: 3.9 MMOL/L (ref 3.5–5.1)
PROT SERPL-MCNC: 5.8 GM/DL (ref 6–8.4)
RBC # BLD AUTO: 4.52 M/UL (ref 4.6–6.2)
RELATIVE EOSINOPHIL (SMH): 3.6 % (ref 0–8)
RELATIVE LYMPHOCYTE (SMH): 11.9 % (ref 18–48)
RELATIVE MONOCYTE (SMH): 12.5 % (ref 4–15)
RELATIVE NEUTROPHIL (SMH): 71.2 % (ref 38–73)
RESPIRATORY INFECTION PANEL SOURCE (SMH): NORMAL
RSV RNA NPH QL NAA+NON-PROBE: NOT DETECTED
RV+EV RNA NPH QL NAA+NON-PROBE: NOT DETECTED
SARS-COV-2 RNA RESP QL NAA+PROBE: NOT DETECTED
SODIUM SERPL-SCNC: 137 MMOL/L (ref 136–145)
TROPONIN HIGH SENSITIVE (SMH): 58.2 PG/ML
WBC # BLD AUTO: 6.46 K/UL (ref 3.9–12.7)

## 2025-04-19 PROCEDURE — 80053 COMPREHEN METABOLIC PANEL: CPT | Performed by: INTERNAL MEDICINE

## 2025-04-19 PROCEDURE — 0202U NFCT DS 22 TRGT SARS-COV-2: CPT

## 2025-04-19 PROCEDURE — 93010 ELECTROCARDIOGRAM REPORT: CPT | Mod: ,,, | Performed by: INTERNAL MEDICINE

## 2025-04-19 PROCEDURE — 97116 GAIT TRAINING THERAPY: CPT

## 2025-04-19 PROCEDURE — 36415 COLL VENOUS BLD VENIPUNCTURE: CPT | Performed by: INTERNAL MEDICINE

## 2025-04-19 PROCEDURE — 83735 ASSAY OF MAGNESIUM: CPT | Performed by: INTERNAL MEDICINE

## 2025-04-19 PROCEDURE — 25000003 PHARM REV CODE 250: Performed by: INTERNAL MEDICINE

## 2025-04-19 PROCEDURE — 84100 ASSAY OF PHOSPHORUS: CPT

## 2025-04-19 PROCEDURE — 85025 COMPLETE CBC W/AUTO DIFF WBC: CPT | Performed by: INTERNAL MEDICINE

## 2025-04-19 PROCEDURE — 93005 ELECTROCARDIOGRAM TRACING: CPT | Performed by: INTERNAL MEDICINE

## 2025-04-19 PROCEDURE — 85379 FIBRIN DEGRADATION QUANT: CPT | Performed by: INTERNAL MEDICINE

## 2025-04-19 PROCEDURE — 25000003 PHARM REV CODE 250

## 2025-04-19 PROCEDURE — 84484 ASSAY OF TROPONIN QUANT: CPT | Performed by: INTERNAL MEDICINE

## 2025-04-19 PROCEDURE — 12000002 HC ACUTE/MED SURGE SEMI-PRIVATE ROOM

## 2025-04-19 PROCEDURE — 63600175 PHARM REV CODE 636 W HCPCS: Performed by: INTERNAL MEDICINE

## 2025-04-19 PROCEDURE — 97161 PT EVAL LOW COMPLEX 20 MIN: CPT

## 2025-04-19 RX ORDER — HYDRALAZINE HYDROCHLORIDE 20 MG/ML
10 INJECTION INTRAMUSCULAR; INTRAVENOUS EVERY 4 HOURS PRN
Status: DISCONTINUED | OUTPATIENT
Start: 2025-04-19 | End: 2025-04-20

## 2025-04-19 RX ADMIN — CLOPIDOGREL BISULFATE 75 MG: 75 TABLET, FILM COATED ORAL at 08:04

## 2025-04-19 RX ADMIN — CEFTRIAXONE 2 G: 2 INJECTION, POWDER, FOR SOLUTION INTRAMUSCULAR; INTRAVENOUS at 03:04

## 2025-04-19 RX ADMIN — ENOXAPARIN SODIUM 40 MG: 40 INJECTION SUBCUTANEOUS at 04:04

## 2025-04-19 RX ADMIN — ATORVASTATIN CALCIUM 80 MG: 40 TABLET, FILM COATED ORAL at 08:04

## 2025-04-19 RX ADMIN — ASPIRIN 81 MG CHEWABLE TABLET 81 MG: 81 TABLET CHEWABLE at 08:04

## 2025-04-19 RX ADMIN — AZITHROMYCIN MONOHYDRATE 500 MG: 500 INJECTION, POWDER, LYOPHILIZED, FOR SOLUTION INTRAVENOUS at 04:04

## 2025-04-19 RX ADMIN — AMIODARONE HYDROCHLORIDE 200 MG: 200 TABLET ORAL at 08:04

## 2025-04-19 RX ADMIN — POTASSIUM PHOSPHATE, MONOBASIC POTASSIUM PHOSPHATE, DIBASIC 15 MMOL: 224; 236 INJECTION, SOLUTION, CONCENTRATE INTRAVENOUS at 11:04

## 2025-04-19 RX ADMIN — PANTOPRAZOLE SODIUM 40 MG: 40 TABLET, DELAYED RELEASE ORAL at 05:04

## 2025-04-19 RX ADMIN — ACETAMINOPHEN 650 MG: 325 TABLET ORAL at 08:04

## 2025-04-19 NOTE — PLAN OF CARE
Problem: Adult Inpatient Plan of Care  Goal: Plan of Care Review  Outcome: Progressing  Goal: Patient-Specific Goal (Individualized)  Outcome: Progressing  Goal: Absence of Hospital-Acquired Illness or Injury  Outcome: Progressing  Goal: Optimal Comfort and Wellbeing  Outcome: Progressing  Goal: Readiness for Transition of Care  Outcome: Progressing     Problem: Diabetes Comorbidity  Goal: Blood Glucose Level Within Targeted Range  Outcome: Progressing     Problem: Sepsis/Septic Shock  Goal: Optimal Coping  Outcome: Progressing  Goal: Absence of Bleeding  Outcome: Progressing  Goal: Blood Glucose Level Within Targeted Range  Outcome: Progressing  Goal: Absence of Infection Signs and Symptoms  Outcome: Progressing  Goal: Optimal Nutrition Intake  Outcome: Progressing     Problem: Acute Kidney Injury/Impairment  Goal: Fluid and Electrolyte Balance  Outcome: Progressing  Goal: Improved Oral Intake  Outcome: Progressing  Goal: Effective Renal Function  Outcome: Progressing     Problem: Pneumonia  Goal: Fluid Balance  Outcome: Progressing  Goal: Resolution of Infection Signs and Symptoms  Outcome: Progressing  Goal: Effective Oxygenation and Ventilation  Outcome: Progressing     Problem: Wound  Goal: Optimal Coping  Outcome: Progressing  Goal: Optimal Functional Ability  Outcome: Progressing  Goal: Absence of Infection Signs and Symptoms  Outcome: Progressing  Goal: Improved Oral Intake  Outcome: Progressing  Goal: Optimal Pain Control and Function  Outcome: Progressing  Goal: Skin Health and Integrity  Outcome: Progressing  Goal: Optimal Wound Healing  Outcome: Progressing     Problem: Fall Injury Risk  Goal: Absence of Fall and Fall-Related Injury  Outcome: Progressing

## 2025-04-19 NOTE — SUBJECTIVE & OBJECTIVE
Interval History:  Patient feeling better this morning.  He is ambulating in the hallways.  He is afebrile and hemodynamically stable.  Anticipate discharge tomorrow.    Review of Systems   All other systems reviewed and are negative.    Objective:     Vital Signs (Most Recent):  Temp: 98.6 °F (37 °C) (04/19/25 1130)  Pulse: 81 (04/19/25 1130)  Resp: 16 (04/19/25 1130)  BP: (!) 143/87 (04/19/25 1130)  SpO2: 96 % (04/19/25 1130) Vital Signs (24h Range):  Temp:  [98 °F (36.7 °C)-100 °F (37.8 °C)] 98.6 °F (37 °C)  Pulse:  [80-91] 81  Resp:  [14-19] 16  SpO2:  [94 %-97 %] 96 %  BP: (132-149)/(76-87) 143/87     Weight: 103.4 kg (227 lb 15.3 oz)  Body mass index is 33.66 kg/m².    Intake/Output Summary (Last 24 hours) at 4/19/2025 1527  Last data filed at 4/19/2025 0959  Gross per 24 hour   Intake 240 ml   Output 1050 ml   Net -810 ml         Physical Exam  Vitals and nursing note reviewed.   Constitutional:       Appearance: He is well-developed.   HENT:      Head: Atraumatic.      Right Ear: External ear normal.      Left Ear: External ear normal.      Nose: Nose normal.      Mouth/Throat:      Mouth: Mucous membranes are moist.   Eyes:      Extraocular Movements: Extraocular movements intact.   Cardiovascular:      Rate and Rhythm: Normal rate.   Pulmonary:      Effort: Pulmonary effort is normal.   Abdominal:      Palpations: Abdomen is soft.   Musculoskeletal:         General: Normal range of motion.      Cervical back: Full passive range of motion without pain and normal range of motion.   Skin:     General: Skin is warm.   Neurological:      Mental Status: He is alert and oriented to person, place, and time.      Comments: Chronic facial paralysis and residual weakness, strength in upper and lower extremities 5/5   Psychiatric:         Behavior: Behavior normal.               Significant Labs: All pertinent labs within the past 24 hours have been reviewed.  BMP:   Recent Labs   Lab 04/19/25  0632      K 3.9    CO2 20*   BUN 18   CREATININE 1.3   CALCIUM 8.0*   MG 2.0     CBC:   Recent Labs   Lab 04/18/25  0508 04/19/25  0632   WBC 8.85 6.46   HGB 10.9* 10.3*   HCT 36.7* 35.8*    174       Significant Imaging: I have reviewed all pertinent imaging results/findings within the past 24 hours.

## 2025-04-19 NOTE — ASSESSMENT & PLAN NOTE
Patient's FSGs are controlled on current medication regimen.  Last A1c reviewed-   Lab Results   Component Value Date    HGBA1C 6.4 (H) 04/17/2025     Most recent fingerstick glucose reviewed-   Recent Labs   Lab 04/19/25  0634 04/19/25  1129   POCTGLUCOSE 134* 134*     Current correctional scale  Medium  Maintain anti-hyperglycemic dose as follows-   Antihyperglycemics (From admission, onward)      Start     Stop Route Frequency Ordered    04/17/25 1845  insulin aspart U-100 pen 0-10 Units         -- SubQ Before meals & nightly PRN 04/17/25 1745          Hold Oral hypoglycemics while patient is in the hospital.

## 2025-04-19 NOTE — ASSESSMENT & PLAN NOTE
Maintain iv abx as below    Antibiotics (From admission, onward)      Start     Stop Route Frequency Ordered    04/18/25 1500  azithromycin (ZITHROMAX) 500 mg in 0.9% NaCl 250 mL IVPB (admixture device)         -- IV Every 24 hours (non-standard times) 04/17/25 1747    04/18/25 1330  cefTRIAXone injection 2 g         -- IV Every 24 hours (non-standard times) 04/17/25 1754            Microbiology Results (last 7 days)       Procedure Component Value Units Date/Time    Blood culture x two cultures. Draw prior to antibiotics [[id number]]  (Normal) Collected: 04/17/25 1252    Order Status: Completed Specimen: Blood Updated: 04/19/25 1501     CULTURE, BLOOD (H) No Growth After 48 Hours    Respiratory Infection Panel (PCR), Nasopharyngeal [[id number]] Collected: 04/19/25 1032    Order Status: Completed Specimen: Nasopharyngeal Swab Updated: 04/19/25 1148     Respiratory Infection Panel Source Nasopharyngeal Swab     Adenovirus Not Detected     Coronavirus 229E, Common Cold Virus Not Detected     Coronavirus HKU1, Common Cold Virus Not Detected     Coronavirus NL63, Common Cold Virus Not Detected     Coronavirus OC43, Common Cold Virus Not Detected     SARS-CoV2 (COVID-19) Qualitative PCR Not Detected     Human Metapneumovirus Not Detected     Human Rhinovirus/Enterovirus Not Detected     Influenza A (subtypes H1, H1-2009,H3) Not Detected     Influenza B Not Detected     Parainfluenza Virus 1 Not Detected     Parainfluenza Virus 2 Not Detected     Parainfluenza Virus 3 Not Detected     Parainfluenza Virus 4 Not Detected     Respiratory Syncytial Virus Not Detected     Bordetella Parapertussis (PK8924) Not Detected     Bordetella pertussis (ptxP) Not Detected     Chlamydia pneumoniae Not Detected     Mycoplasma pneumoniae Not Detected    Urine Culture High Risk [[id number]] Collected: 04/17/25 1926    Order Status: Completed Specimen: Urine, Clean Catch Updated: 04/19/25 0700     Urine Culture No Growth To Date    Blood  culture x two cultures. Draw prior to antibiotics [[id number]]  (Abnormal) Collected: 04/17/25 1257    Order Status: Completed Specimen: Blood Updated: 04/19/25 0628     CULTURE, BLOOD ([facility]) Staphylococcus epidermidis     Comment: Organism is a probable contaminant        GRAM STAIN Gram Stain Anaerobic Bottle:      Gram positive cocci    MRSA Screen by PCR [[id number]]  (Normal) Collected: 04/18/25 1306    Order Status: Completed Specimen: Nasal Swab Updated: 04/18/25 1436     MRSA PCR SCRN ([facility]) Not Detected    Rapid Organism ID by PCR (from Blood culture) [[id number]]  (Abnormal) Collected: 04/17/25 1257    Order Status: Completed Specimen: Blood Updated: 04/18/25 1359     Enterococcus faecalis Not Detected     Enterococcus faecium Not Detected     Listeria monocytogenes Not Detected     Staphylococcus spp. See Species for ID     Staphylococcus aureus Not Detected     Staphylococcus epidermidis Detected     Staphylococcus lugdunensis Not Detected     Streptococcus spp. Not Detected     Streptococcus agalactiae (Group B) Not Detected     Streptococcus pneumoniae Not Detected     Streptococcus pyogenes (Group A) Not Detected     Acinetobacter calcoaceticus/baumannii complex Not Detected     Bacteroides fragilis Not Detected     Enterobacterales Not Detected     Enterobacter cloacae complex Not Detected     Escherichia coli Not Detected     Klebsiella aerogenes Not Detected     Klebsiella oxytoca Not Detected     Klebsiella pneumoniae group Not Detected     Proteus spp. Not Detected     Salmonella spp. Not Detected     Serratia marcescens Not Detected     Haemophilus influenzae Not Detected     Neisseria meningitidis Not Detected     Pseudomonas aeruginosa Not Detected     Stenotrophomonas maltophilia Not Detected     Candida albicans Not Detected     Candida auris Not Detected     Candida glabrata Not Detected     Candida krusei Not Detected     Candida parapsilosis Not Detected     Candida tropicalis Not Detected      Cryptococcus neoformans/gattii Not Detected     CTX-M (ESBL ) N/A     Comment: Note: Antimicrobial resistance can occur via multiple mechanisms. A Not Detected result for antimicrobial resistance gene(s) does not indicate antimicrobial susceptibility. Subculturing is required for species identification and susceptibility testing of   isolates.        IMP (Cabapenemase ) N/A     Comment: Note: Antimicrobial resistance can occur via multiple mechanisms. A Not Detected result for antimicrobial resistance gene(s) does not indicate antimicrobial susceptibility. Subculturing is required for species identification and susceptibility testing of   isolates.        KPC resistance gene (Carbapenemase ) N/A     Comment: Note: Antimicrobial resistance can occur via multiple mechanisms. A Not Detected result for antimicrobial resistance gene(s) does not indicate antimicrobial susceptibility. Subculturing is required for species identification and susceptibility testing of   isolates.        mcr-1 N/A     Comment: Note: Antimicrobial resistance can occur via multiple mechanisms. A Not Detected result for antimicrobial resistance gene(s) does not indicate antimicrobial susceptibility. Subculturing is required for species identification and susceptibility testing of   isolates.        mecA/C and MREJ (MRSA) gene N/A     Comment: Note: Antimicrobial resistance can occur via multiple mechanisms. A Not Detected result for antimicrobial resistance gene(s) does not indicate antimicrobial susceptibility. Subculturing is required for species identification and susceptibility testing of   isolates.        NDM (Carbapenemase ) N/A     Comment: Note: Antimicrobial resistance can occur via multiple mechanisms. A Not Detected result for antimicrobial resistance gene(s) does not indicate antimicrobial susceptibility. Subculturing is required for species identification and susceptibility testing of   isolates.         OXA-48-like (Carbapenemase ) N/A     Comment: Note: Antimicrobial resistance can occur via multiple mechanisms. A Not Detected result for antimicrobial resistance gene(s) does not indicate antimicrobial susceptibility. Subculturing is required for species identification and susceptibility testing of   isolates.        Kingsley/B (VRE gene) N/A     Comment: Note: Antimicrobial resistance can occur via multiple mechanisms. A Not Detected result for antimicrobial resistance gene(s) does not indicate antimicrobial susceptibility. Subculturing is required for species identification and susceptibility testing of   isolates.        VIM (Carbapenemase ) N/A     Comment: Note: Antimicrobial resistance can occur via multiple mechanisms. A Not Detected result for antimicrobial resistance gene(s) does not indicate antimicrobial susceptibility. Subculturing is required for species identification and susceptibility testing of   isolates.        mecA ID Not Detected     Comment: Note: Antimicrobial resistance can occur via multiple mechanisms. A Not Detected result for antimicrobial resistance gene(s) does not indicate antimicrobial susceptibility. Subculturing is required for species identification and susceptibility testing of   isolates.       Influenza A & B by Molecular [[id number]]  (Normal) Collected: 04/17/25 1345    Order Status: Completed Specimen: Nasopharyngeal Swab Updated: 04/17/25 1438     INFLUENZA A MOLECULAR Negative     INFLUENZA B MOLECULAR  Negative

## 2025-04-19 NOTE — ASSESSMENT & PLAN NOTE
[id number] Score: 2. The patients heart rate in the last 24 hours is as follows:  Pulse  Min: 80  Max: 91     Antiarrhythmics  amiodarone tablet 200 mg, Daily, Oral    Anticoagulants  enoxaparin injection 40 mg, Every 24 hours, Subcutaneous

## 2025-04-19 NOTE — PROGRESS NOTES
[facility]  Progress Note    Patient Name: Matteo Fraire  MRN: [id number]  Patient Class: IP- Inpatient   Admission Date: 4/17/2025  Length of Stay: 2 days  Attending Physician: Trang Garcia MD  Primary Care Provider: Naomi Bliss FNP-C        Subjective     Principal Problem:Sepsis        HPI:  64 year old pt getting admitted with sepsis/Pneumonia  Pt has been suffering from dizziness for past several days  Also has been sweating profusely   Yesterday he was doing yard work and dizziness got worse  Also reports chills and rigors at night  Denied chest pain/SOB/cough/Abdominal pain  Today he had an appt with PCP and was referred to ER and got admitted     Overview/Hospital Course:  No notes on file    Interval History:  Patient feeling better this morning.  He is ambulating in the hallways.  He is afebrile and hemodynamically stable.  Anticipate discharge tomorrow.    Review of Systems   All other systems reviewed and are negative.    Objective:     Vital Signs (Most Recent):  Temp: 98.6 °F (37 °C) (04/19/25 1130)  Pulse: 81 (04/19/25 1130)  Resp: 16 (04/19/25 1130)  BP: (!) 143/87 (04/19/25 1130)  SpO2: 96 % (04/19/25 1130) Vital Signs (24h Range):  Temp:  [98 °F (36.7 °C)-100 °F (37.8 °C)] 98.6 °F (37 °C)  Pulse:  [80-91] 81  Resp:  [14-19] 16  SpO2:  [94 %-97 %] 96 %  BP: (132-149)/(76-87) 143/87     Weight: 103.4 kg (227 lb 15.3 oz)  Body mass index is 33.66 kg/m².    Intake/Output Summary (Last 24 hours) at 4/19/2025 1527  Last data filed at 4/19/2025 0959  Gross per 24 hour   Intake 240 ml   Output 1050 ml   Net -810 ml         Physical Exam  Vitals and nursing note reviewed.   Constitutional:       Appearance: He is well-developed.   HENT:      Head: Atraumatic.      Right Ear: External ear normal.      Left Ear: External ear normal.      Nose: Nose normal.      Mouth/Throat:      Mouth: Mucous membranes are moist.   Eyes:      Extraocular Movements:  Extraocular movements intact.   Cardiovascular:      Rate and Rhythm: Normal rate.   Pulmonary:      Effort: Pulmonary effort is normal.   Abdominal:      Palpations: Abdomen is soft.   Musculoskeletal:         General: Normal range of motion.      Cervical back: Full passive range of motion without pain and normal range of motion.   Skin:     General: Skin is warm.   Neurological:      Mental Status: He is alert and oriented to person, place, and time.      Comments: Chronic facial paralysis and residual weakness, strength in upper and lower extremities 5/5   Psychiatric:         Behavior: Behavior normal.               Significant Labs: All pertinent labs within the past 24 hours have been reviewed.  BMP:   Recent Labs   Lab 04/19/25  0632      K 3.9   CO2 20*   BUN 18   CREATININE 1.3   CALCIUM 8.0*   MG 2.0     CBC:   Recent Labs   Lab 04/18/25  0508 04/19/25  0632   WBC 8.85 6.46   HGB 10.9* 10.3*   HCT 36.7* 35.8*    174       Significant Imaging: I have reviewed all pertinent imaging results/findings within the past 24 hours.      Assessment & Plan  Acute pneumonia  Maintain iv abx as below    Antibiotics (From admission, onward)      Start     Stop Route Frequency Ordered    04/18/25 1500  azithromycin (ZITHROMAX) 500 mg in 0.9% NaCl 250 mL IVPB (admixture device)         -- IV Every 24 hours (non-standard times) 04/17/25 1747    04/18/25 1330  cefTRIAXone injection 2 g         -- IV Every 24 hours (non-standard times) 04/17/25 1754            Microbiology Results (last 7 days)       Procedure Component Value Units Date/Time    Blood culture x two cultures. Draw prior to antibiotics [[id number]]  (Normal) Collected: 04/17/25 1252    Order Status: Completed Specimen: Blood Updated: 04/19/25 1501     CULTURE, BLOOD ([facility]) No Growth After 48 Hours    Respiratory Infection Panel (PCR), Nasopharyngeal [[id number]] Collected: 04/19/25 1032    Order Status: Completed Specimen: Nasopharyngeal Swab  Updated: 04/19/25 1148     Respiratory Infection Panel Source Nasopharyngeal Swab     Adenovirus Not Detected     Coronavirus 229E, Common Cold Virus Not Detected     Coronavirus HKU1, Common Cold Virus Not Detected     Coronavirus NL63, Common Cold Virus Not Detected     Coronavirus OC43, Common Cold Virus Not Detected     SARS-CoV2 (COVID-19) Qualitative PCR Not Detected     Human Metapneumovirus Not Detected     Human Rhinovirus/Enterovirus Not Detected     Influenza A (subtypes H1, H1-2009,H3) Not Detected     Influenza B Not Detected     Parainfluenza Virus 1 Not Detected     Parainfluenza Virus 2 Not Detected     Parainfluenza Virus 3 Not Detected     Parainfluenza Virus 4 Not Detected     Respiratory Syncytial Virus Not Detected     Bordetella Parapertussis (AQ6387) Not Detected     Bordetella pertussis (ptxP) Not Detected     Chlamydia pneumoniae Not Detected     Mycoplasma pneumoniae Not Detected    Urine Culture High Risk [[id number]] Collected: 04/17/25 1926    Order Status: Completed Specimen: Urine, Clean Catch Updated: 04/19/25 0700     Urine Culture No Growth To Date    Blood culture x two cultures. Draw prior to antibiotics [[id number]]  (Abnormal) Collected: 04/17/25 1257    Order Status: Completed Specimen: Blood Updated: 04/19/25 0628     CULTURE, BLOOD ([facility]) Staphylococcus epidermidis     Comment: Organism is a probable contaminant        GRAM STAIN Gram Stain Anaerobic Bottle:      Gram positive cocci    MRSA Screen by PCR [[id number]]  (Normal) Collected: 04/18/25 1306    Order Status: Completed Specimen: Nasal Swab Updated: 04/18/25 1436     MRSA PCR SCRN ([facility]) Not Detected    Rapid Organism ID by PCR (from Blood culture) [[id number]]  (Abnormal) Collected: 04/17/25 1257    Order Status: Completed Specimen: Blood Updated: 04/18/25 1359     Enterococcus faecalis Not Detected     Enterococcus faecium Not Detected     Listeria monocytogenes Not Detected     Staphylococcus spp. See Species for ID      Staphylococcus aureus Not Detected     Staphylococcus epidermidis Detected     Staphylococcus lugdunensis Not Detected     Streptococcus spp. Not Detected     Streptococcus agalactiae (Group B) Not Detected     Streptococcus pneumoniae Not Detected     Streptococcus pyogenes (Group A) Not Detected     Acinetobacter calcoaceticus/baumannii complex Not Detected     Bacteroides fragilis Not Detected     Enterobacterales Not Detected     Enterobacter cloacae complex Not Detected     Escherichia coli Not Detected     Klebsiella aerogenes Not Detected     Klebsiella oxytoca Not Detected     Klebsiella pneumoniae group Not Detected     Proteus spp. Not Detected     Salmonella spp. Not Detected     Serratia marcescens Not Detected     Haemophilus influenzae Not Detected     Neisseria meningitidis Not Detected     Pseudomonas aeruginosa Not Detected     Stenotrophomonas maltophilia Not Detected     Candida albicans Not Detected     Candida auris Not Detected     Candida glabrata Not Detected     Candida krusei Not Detected     Candida parapsilosis Not Detected     Candida tropicalis Not Detected     Cryptococcus neoformans/gattii Not Detected     CTX-M (ESBL ) N/A     Comment: Note: Antimicrobial resistance can occur via multiple mechanisms. A Not Detected result for antimicrobial resistance gene(s) does not indicate antimicrobial susceptibility. Subculturing is required for species identification and susceptibility testing of   isolates.        IMP (Cabapenemase ) N/A     Comment: Note: Antimicrobial resistance can occur via multiple mechanisms. A Not Detected result for antimicrobial resistance gene(s) does not indicate antimicrobial susceptibility. Subculturing is required for species identification and susceptibility testing of   isolates.        KPC resistance gene (Carbapenemase ) N/A     Comment: Note: Antimicrobial resistance can occur via multiple mechanisms. A Not Detected result for  antimicrobial resistance gene(s) does not indicate antimicrobial susceptibility. Subculturing is required for species identification and susceptibility testing of   isolates.        mcr-1 N/A     Comment: Note: Antimicrobial resistance can occur via multiple mechanisms. A Not Detected result for antimicrobial resistance gene(s) does not indicate antimicrobial susceptibility. Subculturing is required for species identification and susceptibility testing of   isolates.        mecA/C and MREJ (MRSA) gene N/A     Comment: Note: Antimicrobial resistance can occur via multiple mechanisms. A Not Detected result for antimicrobial resistance gene(s) does not indicate antimicrobial susceptibility. Subculturing is required for species identification and susceptibility testing of   isolates.        NDM (Carbapenemase ) N/A     Comment: Note: Antimicrobial resistance can occur via multiple mechanisms. A Not Detected result for antimicrobial resistance gene(s) does not indicate antimicrobial susceptibility. Subculturing is required for species identification and susceptibility testing of   isolates.        OXA-48-like (Carbapenemase ) N/A     Comment: Note: Antimicrobial resistance can occur via multiple mechanisms. A Not Detected result for antimicrobial resistance gene(s) does not indicate antimicrobial susceptibility. Subculturing is required for species identification and susceptibility testing of   isolates.        Kingsley/B (VRE gene) N/A     Comment: Note: Antimicrobial resistance can occur via multiple mechanisms. A Not Detected result for antimicrobial resistance gene(s) does not indicate antimicrobial susceptibility. Subculturing is required for species identification and susceptibility testing of   isolates.        VIM (Carbapenemase ) N/A     Comment: Note: Antimicrobial resistance can occur via multiple mechanisms. A Not Detected result for antimicrobial resistance gene(s) does not indicate  antimicrobial susceptibility. Subculturing is required for species identification and susceptibility testing of   isolates.        mecA ID Not Detected     Comment: Note: Antimicrobial resistance can occur via multiple mechanisms. A Not Detected result for antimicrobial resistance gene(s) does not indicate antimicrobial susceptibility. Subculturing is required for species identification and susceptibility testing of   isolates.       Influenza A & B by Molecular [[id number]]  (Normal) Collected: 04/17/25 1345    Order Status: Completed Specimen: Nasopharyngeal Swab Updated: 04/17/25 1438     INFLUENZA A MOLECULAR Negative     INFLUENZA B MOLECULAR  Negative          BETHANY (acute kidney injury)  DC fluids, patient with HFrEF and bethany improving, avoid nephrotoxins    Recent Labs     04/17/25  1239 04/18/25  0508 04/19/25  0632   CREATININE 2.1* 1.6* 1.3   EGFRNORACEVR 35* 48* >60        Coronary artery disease involving coronary bypass graft of native heart with angina pectoris  H.o aware  Maintain GDMT  Hold BB in view with relative hypotension  S/P CABG (coronary artery bypass graft)  Aware     History of stroke  Aware     Heart failure with reduced ejection fraction  H/o aware  Hold entresto   Pt takes lasix PRN basis at home    Hyperbilirubinemia  USS abdomen ordered from ER    Atrial fibrillation  [id number] Score: 2. The patients heart rate in the last 24 hours is as follows:  Pulse  Min: 80  Max: 91     Antiarrhythmics  amiodarone tablet 200 mg, Daily, Oral    Anticoagulants  enoxaparin injection 40 mg, Every 24 hours, Subcutaneous    Diabetes  Patient's FSGs are controlled on current medication regimen.  Last A1c reviewed-   Lab Results   Component Value Date    HGBA1C 6.4 (H) 04/17/2025     Most recent fingerstick glucose reviewed-   Recent Labs   Lab 04/19/25  0634 04/19/25  1129   POCTGLUCOSE 134* 134*     Current correctional scale  Medium  Maintain anti-hyperglycemic dose as follows-   Antihyperglycemics  (From admission, onward)      Start     Stop Route Frequency Ordered    04/17/25 1845  insulin aspart U-100 pen 0-10 Units         -- SubQ Before meals & nightly PRN 04/17/25 1745          Hold Oral hypoglycemics while patient is in the hospital.  VTE Risk Mitigation (From admission, onward)           Ordered     enoxaparin injection 40 mg  Daily         04/17/25 1753     IP VTE HIGH RISK PATIENT  Once         04/17/25 1745     Place sequential compression device  Until discontinued         04/17/25 1745                    Discharge Planning   MICHAEL:      Code Status: Full Code   Medical Readiness for Discharge Date:   Discharge Plan A: Home with family                        Trang Garcia MD  Department of Hospital Medicine   Formerly [facility]

## 2025-04-19 NOTE — ASSESSMENT & PLAN NOTE
DC fluids, patient with HFrEF and andre improving, avoid nephrotoxins    Recent Labs     04/17/25  1239 04/18/25  0508 04/19/25  0632   CREATININE 2.1* 1.6* 1.3   EGFRNORACEVR 35* 48* >60

## 2025-04-19 NOTE — PROGRESS NOTES
Therapy with Vancomycin order discontinued by Dr. Garcia on 04/19/2025 @ 09:15 am.     Pharmacy will sign off, please re-consult as needed.  Thank you for allowing us to participate in this patient's care.  Lou Agrawal 4/19/2025 9:58 AM  Dept of Pharmacy  Ext [phone]

## 2025-04-19 NOTE — PLAN OF CARE
Problem: Diabetes Comorbidity  Goal: Blood Glucose Level Within Targeted Range  Outcome: Progressing     Problem: Sepsis/Septic Shock  Goal: Optimal Coping  Outcome: Progressing

## 2025-04-19 NOTE — PT/OT/SLP EVAL
Physical Therapy Evaluation and Discharge Note    Patient Name:  Matteo Fraire   MRN:  [id number]    Recommendations:     Discharge Recommendations: Low Intensity Therapy  Discharge Equipment Recommendations: none   Barriers to discharge:  medical status    Assessment:     Matteo Fraire is a 64 y.o. male admitted with a medical diagnosis of Sepsis. .  At this time, patient is functioning at their prior level of function and does not require further acute PT services.     Pt found supine in bed at start of session. Pt agreeable to session at this time. Supine<>sit bed mobility SBA. Sit<>stand transfers SBA with RW. Verbal cues used with transfer in order to improve proper sequencing. Pt ambulated 300' with RW and CGA.    Recent Surgery: * No surgery found *      Plan:     During this hospitalization, patient does not require further acute PT services.  Please re-consult if situation changes.      Subjective     Chief Complaint: none verbalized  Patient/Family Comments/goals: none verbalized  Pain/Comfort:  Pain Rating 1: 0/10    Patients cultural, spiritual, Yazidism conflicts given the current situation:      Living Environment:  Pt lives with wife in a raised 2 story house. 5 steps x2 outside of house with dena handrails. 1 flight of steps inside house with R handrails.    Prior to admission, patients level of function was Mod I.  Equipment used at home: cane, straight.  DME owned (not currently used): none.  Upon discharge, patient will have assistance from wife.    Objective:     Communicated with RNChristal, prior to session.  Patient found supine with bed alarm, peripheral IV, telemetry upon PT entry to room.    General Precautions: Standard, fall    Orthopedic Precautions:    Braces:    Respiratory Status: Room air    Exams:  RLE ROM: WFL  RLE Strength: WFL  LLE ROM: WFL  LLE Strength: WFL    Functional Mobility:  Bed Mobility:     Supine to Sit: stand by assistance  Transfers:     Sit to Stand:   stand by assistance with rolling walker and verbal cues  Gait: 300' with RW and CGA    AM-PAC 6 CLICK MOBILITY  Total Score:24       Treatment and Education:  Spoke with pt about using call button to notify RN. Educated pt on HH to help with difficulty with stairs at home.    AM-PAC 6 CLICK MOBILITY  Total Score:24     Patient left up in chair with all lines intact, call button in reach, chair alarm on, RN notified, and wife present.    GOALS:   Multidisciplinary Problems       Physical Therapy Goals       Not on file                    DME Justifications:  No DME recommended requiring DME justifications    History:     Past Medical History:   Diagnosis Date    Coronary artery disease     Heart attack 2005;2011    Hx of CABG     Hypertension 2008    Psoriasis     Stroke 2022       Past Surgical History:   Procedure Laterality Date    ANGIOGRAM, CORONARY, WITH LEFT HEART CATHETERIZATION N/A 8/13/2024    Procedure: Angiogram, Coronary, with Left Heart Cath;  Surgeon: Danica Howell MD;  Location: [facility] CATH/EP LAB;  Service: Cardiology;  Laterality: N/A;    CORONARY ANGIOGRAPHY INCLUDING BYPASS GRAFTS WITH CATHETERIZATION OF LEFT HEART Left 6/27/2022    Procedure: Left heart cath;  Surgeon: Stevan Powell MD;  Location: [facility] CATH/EP LAB;  Service: Cardiology;  Laterality: Left;    CORONARY ARTERY BYPASS GRAFT      CYSTOSCOPY W/ URETERAL STENT PLACEMENT Left 11/21/2019    Procedure: CYSTOSCOPY, WITH URETERAL STENT INSERTION;  Surgeon: Mickey Escudero MD;  Location: [facility] OR;  Service: Urology;  Laterality: Left;    ENDOSCOPIC HARVEST OF VEIN Right 7/8/2022    Procedure: HARVEST-VEIN-ENDOVASCULAR;  Surgeon: Eyal Stone MD;  Location: [facility] OR;  Service: Cardiothoracic;  Laterality: Right;    ESOPHAGOGASTRODUODENOSCOPY N/A 7/15/2022    Procedure: EGD (ESOPHAGOGASTRODUODENOSCOPY);  Surgeon: Art Pino MD;  Location: [facility] ENDO;  Service: Endoscopy;  Laterality: N/A;    EXTRACORPOREAL SHOCK WAVE  LITHOTRIPSY Left 11/21/2019    Procedure: LITHOTRIPSY, ESWL;  Surgeon: Mickey Escudero MD;  Location: [facility];  Service: Urology;  Laterality: Left;    HERNIA REPAIR      Inguinal just after birth    IVUS, CORONARY  8/13/2024    Procedure: IVUS, Coronary;  Surgeon: Danica Howell MD;  Location: [facility] CATH/EP LAB;  Service: Cardiology;;    PERCUTANEOUS CORONARY INTERVENTION, ARTERY N/A 8/13/2024    Procedure: Percutaneous coronary intervention;  Surgeon: Danica Howell MD;  Location: [facility] CATH/EP LAB;  Service: Cardiology;  Laterality: N/A;    REPEAT CORONARY ARTERY BYPASS GRAFTING N/A 7/8/2022    Procedure: CABG, REPEAT;  Surgeon: Eyal Stone MD;  Location: [facility];  Service: Cardiothoracic;  Laterality: N/A;  drugs ordered 7-7  @1322      SURGICAL PROCUREMENT, ARTERY, RADIAL, FOR CABG Left 7/8/2022    Procedure: SURGICAL PROCUREMENT,ARTERY,RADIAL,FOR CABG;  Surgeon: Eyal Stone MD;  Location: [facility];  Service: Cardiothoracic;  Laterality: Left;       Time Tracking:     PT Received On: 04/19/25  PT Start Time: 1047     PT Stop Time: 1105  PT Total Time (min): 18 min     Billable Minutes: Evaluation 8 and Gait Training 10      04/19/2025

## 2025-04-20 PROBLEM — N17.9 AKI (ACUTE KIDNEY INJURY): Status: RESOLVED | Noted: 2025-04-17 | Resolved: 2025-04-20

## 2025-04-20 LAB
ABSOLUTE EOSINOPHIL (SMH): 0.15 K/UL
ABSOLUTE MONOCYTE (SMH): 0.79 K/UL (ref 0.3–1)
ABSOLUTE NEUTROPHIL COUNT (SMH): 5.7 K/UL (ref 1.8–7.7)
ALBUMIN SERPL-MCNC: 3.5 G/DL (ref 3.5–5.2)
ALP SERPL-CCNC: 55 UNIT/L (ref 55–135)
ALT SERPL-CCNC: 17 UNIT/L (ref 10–44)
ANION GAP (SMH): 9 MMOL/L (ref 8–16)
AST SERPL-CCNC: 20 UNIT/L (ref 10–40)
BACTERIA UR CULT: NO GROWTH
BASOPHILS # BLD AUTO: 0.02 K/UL
BASOPHILS NFR BLD AUTO: 0.3 %
BILIRUB SERPL-MCNC: 1 MG/DL (ref 0.1–1)
BUN SERPL-MCNC: 13 MG/DL (ref 8–23)
CALCIUM SERPL-MCNC: 8.2 MG/DL (ref 8.7–10.5)
CHLORIDE SERPL-SCNC: 106 MMOL/L (ref 95–110)
CO2 SERPL-SCNC: 21 MMOL/L (ref 23–29)
CREAT SERPL-MCNC: 1.2 MG/DL (ref 0.5–1.4)
ERYTHROCYTE [DISTWIDTH] IN BLOOD BY AUTOMATED COUNT: 17.2 % (ref 11.5–14.5)
GFR SERPLBLD CREATININE-BSD FMLA CKD-EPI: >60 ML/MIN/1.73/M2
GLUCOSE SERPL-MCNC: 145 MG/DL (ref 70–110)
HCT VFR BLD AUTO: 35.8 % (ref 40–54)
HGB BLD-MCNC: 10.3 GM/DL (ref 14–18)
IMM GRANULOCYTES # BLD AUTO: 0.05 K/UL (ref 0–0.04)
IMM GRANULOCYTES NFR BLD AUTO: 0.7 % (ref 0–0.5)
LYMPHOCYTES # BLD AUTO: 0.68 K/UL (ref 1–4.8)
MAGNESIUM SERPL-MCNC: 1.9 MG/DL (ref 1.6–2.6)
MCH RBC QN AUTO: 22.7 PG (ref 27–31)
MCHC RBC AUTO-ENTMCNC: 28.8 G/DL (ref 32–36)
MCV RBC AUTO: 79 FL (ref 82–98)
NUCLEATED RBC (/100WBC) (SMH): 0 /100 WBC
OHS QRS DURATION: 92 MS
OHS QTC CALCULATION: 426 MS
PHOSPHATE SERPL-MCNC: 1.9 MG/DL (ref 2.7–4.5)
PLATELET # BLD AUTO: 180 K/UL (ref 150–450)
PMV BLD AUTO: 11.9 FL (ref 9.2–12.9)
POCT GLUCOSE: 116 MG/DL (ref 70–110)
POCT GLUCOSE: 138 MG/DL (ref 70–110)
POCT GLUCOSE: 147 MG/DL (ref 70–110)
POCT GLUCOSE: 179 MG/DL (ref 70–110)
POTASSIUM SERPL-SCNC: 3.9 MMOL/L (ref 3.5–5.1)
PROCALCITONIN SERPL-MCNC: 0.29 NG/ML
PROT SERPL-MCNC: 6.1 GM/DL (ref 6–8.4)
RBC # BLD AUTO: 4.53 M/UL (ref 4.6–6.2)
RELATIVE EOSINOPHIL (SMH): 2 % (ref 0–8)
RELATIVE LYMPHOCYTE (SMH): 9.2 % (ref 18–48)
RELATIVE MONOCYTE (SMH): 10.7 % (ref 4–15)
RELATIVE NEUTROPHIL (SMH): 77.1 % (ref 38–73)
SODIUM SERPL-SCNC: 136 MMOL/L (ref 136–145)
WBC # BLD AUTO: 7.38 K/UL (ref 3.9–12.7)

## 2025-04-20 PROCEDURE — 82565 ASSAY OF CREATININE: CPT | Performed by: INTERNAL MEDICINE

## 2025-04-20 PROCEDURE — 12000002 HC ACUTE/MED SURGE SEMI-PRIVATE ROOM

## 2025-04-20 PROCEDURE — 36415 COLL VENOUS BLD VENIPUNCTURE: CPT

## 2025-04-20 PROCEDURE — 63600175 PHARM REV CODE 636 W HCPCS

## 2025-04-20 PROCEDURE — 85025 COMPLETE CBC W/AUTO DIFF WBC: CPT | Performed by: INTERNAL MEDICINE

## 2025-04-20 PROCEDURE — 25000003 PHARM REV CODE 250

## 2025-04-20 PROCEDURE — 84100 ASSAY OF PHOSPHORUS: CPT

## 2025-04-20 PROCEDURE — 63600175 PHARM REV CODE 636 W HCPCS: Performed by: INTERNAL MEDICINE

## 2025-04-20 PROCEDURE — 25500020 PHARM REV CODE 255

## 2025-04-20 PROCEDURE — 84145 PROCALCITONIN (PCT): CPT

## 2025-04-20 PROCEDURE — 25000003 PHARM REV CODE 250: Performed by: INTERNAL MEDICINE

## 2025-04-20 PROCEDURE — 83735 ASSAY OF MAGNESIUM: CPT | Performed by: INTERNAL MEDICINE

## 2025-04-20 RX ORDER — HYDRALAZINE HYDROCHLORIDE 20 MG/ML
10 INJECTION INTRAMUSCULAR; INTRAVENOUS EVERY 4 HOURS PRN
Status: DISCONTINUED | OUTPATIENT
Start: 2025-04-20 | End: 2025-04-21 | Stop reason: HOSPADM

## 2025-04-20 RX ORDER — CEFEPIME HYDROCHLORIDE 2 G/1
2 INJECTION, POWDER, FOR SOLUTION INTRAVENOUS
Status: DISCONTINUED | OUTPATIENT
Start: 2025-04-20 | End: 2025-04-21 | Stop reason: HOSPADM

## 2025-04-20 RX ORDER — METOPROLOL SUCCINATE 25 MG/1
25 TABLET, EXTENDED RELEASE ORAL NIGHTLY
Status: DISCONTINUED | OUTPATIENT
Start: 2025-04-20 | End: 2025-04-21 | Stop reason: HOSPADM

## 2025-04-20 RX ADMIN — ENOXAPARIN SODIUM 40 MG: 40 INJECTION SUBCUTANEOUS at 04:04

## 2025-04-20 RX ADMIN — METOPROLOL SUCCINATE 25 MG: 25 TABLET, EXTENDED RELEASE ORAL at 08:04

## 2025-04-20 RX ADMIN — SACUBITRIL AND VALSARTAN 1 TABLET: 24; 26 TABLET, FILM COATED ORAL at 08:04

## 2025-04-20 RX ADMIN — CEFEPIME 2 G: 2 INJECTION, POWDER, FOR SOLUTION INTRAVENOUS at 10:04

## 2025-04-20 RX ADMIN — CEFEPIME 2 G: 2 INJECTION, POWDER, FOR SOLUTION INTRAVENOUS at 08:04

## 2025-04-20 RX ADMIN — IOHEXOL 100 ML: 350 INJECTION, SOLUTION INTRAVENOUS at 12:04

## 2025-04-20 RX ADMIN — ATORVASTATIN CALCIUM 80 MG: 40 TABLET, FILM COATED ORAL at 08:04

## 2025-04-20 RX ADMIN — SACUBITRIL AND VALSARTAN 1 TABLET: 24; 26 TABLET, FILM COATED ORAL at 10:04

## 2025-04-20 RX ADMIN — CLOPIDOGREL BISULFATE 75 MG: 75 TABLET, FILM COATED ORAL at 08:04

## 2025-04-20 RX ADMIN — ACETAMINOPHEN 650 MG: 325 TABLET ORAL at 06:04

## 2025-04-20 RX ADMIN — PANTOPRAZOLE SODIUM 40 MG: 40 TABLET, DELAYED RELEASE ORAL at 05:04

## 2025-04-20 RX ADMIN — AMIODARONE HYDROCHLORIDE 200 MG: 200 TABLET ORAL at 08:04

## 2025-04-20 RX ADMIN — POTASSIUM PHOSPHATE, MONOBASIC AND POTASSIUM PHOSPHATE, DIBASIC 15 MMOL: 224; 236 INJECTION, SOLUTION, CONCENTRATE INTRAVENOUS at 10:04

## 2025-04-20 RX ADMIN — AZITHROMYCIN MONOHYDRATE 500 MG: 500 INJECTION, POWDER, LYOPHILIZED, FOR SOLUTION INTRAVENOUS at 04:04

## 2025-04-20 RX ADMIN — ASPIRIN 81 MG CHEWABLE TABLET 81 MG: 81 TABLET CHEWABLE at 08:04

## 2025-04-20 NOTE — ASSESSMENT & PLAN NOTE
Maintain iv abx as below    Antibiotics (From admission, onward)      Start     Stop Route Frequency Ordered    04/20/25 1100  ceFEPIme injection 2 g         -- IV Every 8 hours (non-standard times) 04/20/25 0927    04/18/25 1500  azithromycin (ZITHROMAX) 500 mg in 0.9% NaCl 250 mL IVPB (admixture device)         -- IV Every 24 hours (non-standard times) 04/17/25 1747            Microbiology Results (last 7 days)       Procedure Component Value Units Date/Time    Urine Culture High Risk [[id number]] Collected: 04/17/25 1926    Order Status: Completed Specimen: Urine, Clean Catch Updated: 04/20/25 0636     Urine Culture No Growth    Blood culture x two cultures. Draw prior to antibiotics [[id number]]  (Normal) Collected: 04/17/25 1252    Order Status: Completed Specimen: Blood Updated: 04/19/25 1501     CULTURE, BLOOD ([facility]) No Growth After 48 Hours    Respiratory Infection Panel (PCR), Nasopharyngeal [[id number]] Collected: 04/19/25 1032    Order Status: Completed Specimen: Nasopharyngeal Swab Updated: 04/19/25 1148     Respiratory Infection Panel Source Nasopharyngeal Swab     Adenovirus Not Detected     Coronavirus 229E, Common Cold Virus Not Detected     Coronavirus HKU1, Common Cold Virus Not Detected     Coronavirus NL63, Common Cold Virus Not Detected     Coronavirus OC43, Common Cold Virus Not Detected     SARS-CoV2 (COVID-19) Qualitative PCR Not Detected     Human Metapneumovirus Not Detected     Human Rhinovirus/Enterovirus Not Detected     Influenza A (subtypes H1, H1-2009,H3) Not Detected     Influenza B Not Detected     Parainfluenza Virus 1 Not Detected     Parainfluenza Virus 2 Not Detected     Parainfluenza Virus 3 Not Detected     Parainfluenza Virus 4 Not Detected     Respiratory Syncytial Virus Not Detected     Bordetella Parapertussis (KX0111) Not Detected     Bordetella pertussis (ptxP) Not Detected     Chlamydia pneumoniae Not Detected     Mycoplasma pneumoniae Not Detected    Blood culture x two  cultures. Draw prior to antibiotics [[id number]]  (Abnormal) Collected: 04/17/25 1257    Order Status: Completed Specimen: Blood Updated: 04/19/25 0628     CULTURE, BLOOD ([facility]) Staphylococcus epidermidis     Comment: Organism is a probable contaminant        GRAM STAIN Gram Stain Anaerobic Bottle:      Gram positive cocci    MRSA Screen by PCR [[id number]]  (Normal) Collected: 04/18/25 1306    Order Status: Completed Specimen: Nasal Swab Updated: 04/18/25 1436     MRSA PCR SCRN ([facility]) Not Detected    Rapid Organism ID by PCR (from Blood culture) [[id number]]  (Abnormal) Collected: 04/17/25 1257    Order Status: Completed Specimen: Blood Updated: 04/18/25 1359     Enterococcus faecalis Not Detected     Enterococcus faecium Not Detected     Listeria monocytogenes Not Detected     Staphylococcus spp. See Species for ID     Staphylococcus aureus Not Detected     Staphylococcus epidermidis Detected     Staphylococcus lugdunensis Not Detected     Streptococcus spp. Not Detected     Streptococcus agalactiae (Group B) Not Detected     Streptococcus pneumoniae Not Detected     Streptococcus pyogenes (Group A) Not Detected     Acinetobacter calcoaceticus/baumannii complex Not Detected     Bacteroides fragilis Not Detected     Enterobacterales Not Detected     Enterobacter cloacae complex Not Detected     Escherichia coli Not Detected     Klebsiella aerogenes Not Detected     Klebsiella oxytoca Not Detected     Klebsiella pneumoniae group Not Detected     Proteus spp. Not Detected     Salmonella spp. Not Detected     Serratia marcescens Not Detected     Haemophilus influenzae Not Detected     Neisseria meningitidis Not Detected     Pseudomonas aeruginosa Not Detected     Stenotrophomonas maltophilia Not Detected     Candida albicans Not Detected     Candida auris Not Detected     Candida glabrata Not Detected     Candida krusei Not Detected     Candida parapsilosis Not Detected     Candida tropicalis Not Detected      Cryptococcus neoformans/gattii Not Detected     CTX-M (ESBL ) N/A     Comment: Note: Antimicrobial resistance can occur via multiple mechanisms. A Not Detected result for antimicrobial resistance gene(s) does not indicate antimicrobial susceptibility. Subculturing is required for species identification and susceptibility testing of   isolates.        IMP (Cabapenemase ) N/A     Comment: Note: Antimicrobial resistance can occur via multiple mechanisms. A Not Detected result for antimicrobial resistance gene(s) does not indicate antimicrobial susceptibility. Subculturing is required for species identification and susceptibility testing of   isolates.        KPC resistance gene (Carbapenemase ) N/A     Comment: Note: Antimicrobial resistance can occur via multiple mechanisms. A Not Detected result for antimicrobial resistance gene(s) does not indicate antimicrobial susceptibility. Subculturing is required for species identification and susceptibility testing of   isolates.        mcr-1 N/A     Comment: Note: Antimicrobial resistance can occur via multiple mechanisms. A Not Detected result for antimicrobial resistance gene(s) does not indicate antimicrobial susceptibility. Subculturing is required for species identification and susceptibility testing of   isolates.        mecA/C and MREJ (MRSA) gene N/A     Comment: Note: Antimicrobial resistance can occur via multiple mechanisms. A Not Detected result for antimicrobial resistance gene(s) does not indicate antimicrobial susceptibility. Subculturing is required for species identification and susceptibility testing of   isolates.        NDM (Carbapenemase ) N/A     Comment: Note: Antimicrobial resistance can occur via multiple mechanisms. A Not Detected result for antimicrobial resistance gene(s) does not indicate antimicrobial susceptibility. Subculturing is required for species identification and susceptibility testing of   isolates.         OXA-48-like (Carbapenemase ) N/A     Comment: Note: Antimicrobial resistance can occur via multiple mechanisms. A Not Detected result for antimicrobial resistance gene(s) does not indicate antimicrobial susceptibility. Subculturing is required for species identification and susceptibility testing of   isolates.        Kingslye/B (VRE gene) N/A     Comment: Note: Antimicrobial resistance can occur via multiple mechanisms. A Not Detected result for antimicrobial resistance gene(s) does not indicate antimicrobial susceptibility. Subculturing is required for species identification and susceptibility testing of   isolates.        VIM (Carbapenemase ) N/A     Comment: Note: Antimicrobial resistance can occur via multiple mechanisms. A Not Detected result for antimicrobial resistance gene(s) does not indicate antimicrobial susceptibility. Subculturing is required for species identification and susceptibility testing of   isolates.        mecA ID Not Detected     Comment: Note: Antimicrobial resistance can occur via multiple mechanisms. A Not Detected result for antimicrobial resistance gene(s) does not indicate antimicrobial susceptibility. Subculturing is required for species identification and susceptibility testing of   isolates.       Influenza A & B by Molecular [[id number]]  (Normal) Collected: 04/17/25 1345    Order Status: Completed Specimen: Nasopharyngeal Swab Updated: 04/17/25 1438     INFLUENZA A MOLECULAR Negative     INFLUENZA B MOLECULAR  Negative

## 2025-04-20 NOTE — SUBJECTIVE & OBJECTIVE
Interval History:  Patient feeling quite weak today, continue spike fevers.  Profile did improve, however.  We will escalate to cefepime.  Large pneumonia on CT imaging.    Review of Systems   Constitutional:  Positive for fatigue and fever.   Respiratory:  Positive for cough.    Cardiovascular:  Negative for chest pain.   Neurological:  Positive for weakness.   Psychiatric/Behavioral:  Negative for agitation and confusion.      Objective:     Vital Signs (Most Recent):  Temp: 98.7 °F (37.1 °C) (04/20/25 1103)  Pulse: 79 (04/20/25 1103)  Resp: 17 (04/20/25 1103)  BP: (!) 147/83 (04/20/25 1103)  SpO2: 97 % (04/20/25 1103) Vital Signs (24h Range):  Temp:  [98.6 °F (37 °C)-102.2 °F (39 °C)] 98.7 °F (37.1 °C)  Pulse:  [79-95] 79  Resp:  [15-18] 17  SpO2:  [95 %-98 %] 97 %  BP: (122-178)/(67-85) 147/83     Weight: 103.4 kg (227 lb 15.3 oz)  Body mass index is 33.66 kg/m².    Intake/Output Summary (Last 24 hours) at 4/20/2025 1332  Last data filed at 4/20/2025 0918  Gross per 24 hour   Intake 480 ml   Output 1050 ml   Net -570 ml         Physical Exam  Vitals and nursing note reviewed.   Constitutional:       Appearance: He is well-developed.   HENT:      Head: Atraumatic.      Right Ear: External ear normal.      Left Ear: External ear normal.      Nose: Nose normal.      Mouth/Throat:      Mouth: Mucous membranes are moist.   Eyes:      Extraocular Movements: Extraocular movements intact.   Cardiovascular:      Rate and Rhythm: Normal rate.   Pulmonary:      Effort: Pulmonary effort is normal.   Abdominal:      Palpations: Abdomen is soft.   Musculoskeletal:         General: Normal range of motion.      Cervical back: Full passive range of motion without pain and normal range of motion.   Skin:     General: Skin is warm.   Neurological:      Mental Status: He is alert and oriented to person, place, and time.      Comments: Chronic facial paralysis and residual weakness, strength in upper and lower extremities 5/5    Psychiatric:         Behavior: Behavior normal.               Significant Labs: All pertinent labs within the past 24 hours have been reviewed.  BMP:   Recent Labs   Lab 04/20/25  0507      K 3.9   CO2 21*   BUN 13   CREATININE 1.2   CALCIUM 8.2*   MG 1.9     CBC:   Recent Labs   Lab 04/19/25  0632 04/20/25  0507   WBC 6.46 7.38   HGB 10.3* 10.3*   HCT 35.8* 35.8*    180       Significant Imaging: I have reviewed all pertinent imaging results/findings within the past 24 hours.

## 2025-04-20 NOTE — PROGRESS NOTES
[facility] Medicine  Progress Note    Patient Name: Matteo Fraire  MRN: [id number]  Patient Class: IP- Inpatient   Admission Date: 4/17/2025  Length of Stay: 3 days  Attending Physician: Tragn Garcia MD  Primary Care Provider: Naomi Bliss FNP-C        Subjective     Principal Problem:Sepsis        HPI:  64 year old pt getting admitted with sepsis/Pneumonia  Pt has been suffering from dizziness for past several days  Also has been sweating profusely   Yesterday he was doing yard work and dizziness got worse  Also reports chills and rigors at night  Denied chest pain/SOB/cough/Abdominal pain  Today he had an appt with PCP and was referred to ER and got admitted     Overview/Hospital Course:  No notes on file    Interval History:  Patient feeling quite weak today, continue spike fevers.  Profile did improve, however.  We will escalate to cefepime.  Large pneumonia on CT imaging.    Review of Systems   Constitutional:  Positive for fatigue and fever.   Respiratory:  Positive for cough.    Cardiovascular:  Negative for chest pain.   Neurological:  Positive for weakness.   Psychiatric/Behavioral:  Negative for agitation and confusion.      Objective:     Vital Signs (Most Recent):  Temp: 98.7 °F (37.1 °C) (04/20/25 1103)  Pulse: 79 (04/20/25 1103)  Resp: 17 (04/20/25 1103)  BP: (!) 147/83 (04/20/25 1103)  SpO2: 97 % (04/20/25 1103) Vital Signs (24h Range):  Temp:  [98.6 °F (37 °C)-102.2 °F (39 °C)] 98.7 °F (37.1 °C)  Pulse:  [79-95] 79  Resp:  [15-18] 17  SpO2:  [95 %-98 %] 97 %  BP: (122-178)/(67-85) 147/83     Weight: 103.4 kg (227 lb 15.3 oz)  Body mass index is 33.66 kg/m².    Intake/Output Summary (Last 24 hours) at 4/20/2025 1332  Last data filed at 4/20/2025 0918  Gross per 24 hour   Intake 480 ml   Output 1050 ml   Net -570 ml         Physical Exam  Vitals and nursing note reviewed.   Constitutional:       Appearance: He is well-developed.   HENT:      Head: Atraumatic.       Right Ear: External ear normal.      Left Ear: External ear normal.      Nose: Nose normal.      Mouth/Throat:      Mouth: Mucous membranes are moist.   Eyes:      Extraocular Movements: Extraocular movements intact.   Cardiovascular:      Rate and Rhythm: Normal rate.   Pulmonary:      Effort: Pulmonary effort is normal.   Abdominal:      Palpations: Abdomen is soft.   Musculoskeletal:         General: Normal range of motion.      Cervical back: Full passive range of motion without pain and normal range of motion.   Skin:     General: Skin is warm.   Neurological:      Mental Status: He is alert and oriented to person, place, and time.      Comments: Chronic facial paralysis and residual weakness, strength in upper and lower extremities 5/5   Psychiatric:         Behavior: Behavior normal.               Significant Labs: All pertinent labs within the past 24 hours have been reviewed.  BMP:   Recent Labs   Lab 04/20/25  0507      K 3.9   CO2 21*   BUN 13   CREATININE 1.2   CALCIUM 8.2*   MG 1.9     CBC:   Recent Labs   Lab 04/19/25  0632 04/20/25  0507   WBC 6.46 7.38   HGB 10.3* 10.3*   HCT 35.8* 35.8*    180       Significant Imaging: I have reviewed all pertinent imaging results/findings within the past 24 hours.      Assessment & Plan  Acute pneumonia  Maintain iv abx as below    Antibiotics (From admission, onward)      Start     Stop Route Frequency Ordered    04/20/25 1100  ceFEPIme injection 2 g         -- IV Every 8 hours (non-standard times) 04/20/25 0927    04/18/25 1500  azithromycin (ZITHROMAX) 500 mg in 0.9% NaCl 250 mL IVPB (admixture device)         -- IV Every 24 hours (non-standard times) 04/17/25 1747            Microbiology Results (last 7 days)       Procedure Component Value Units Date/Time    Urine Culture High Risk [[id number]] Collected: 04/17/25 1926    Order Status: Completed Specimen: Urine, Clean Catch Updated: 04/20/25 0636     Urine Culture No Growth    Blood culture  x two cultures. Draw prior to antibiotics [[id number]]  (Normal) Collected: 04/17/25 1252    Order Status: Completed Specimen: Blood Updated: 04/19/25 1501     CULTURE, BLOOD ([facility]) No Growth After 48 Hours    Respiratory Infection Panel (PCR), Nasopharyngeal [[id number]] Collected: 04/19/25 1032    Order Status: Completed Specimen: Nasopharyngeal Swab Updated: 04/19/25 1148     Respiratory Infection Panel Source Nasopharyngeal Swab     Adenovirus Not Detected     Coronavirus 229E, Common Cold Virus Not Detected     Coronavirus HKU1, Common Cold Virus Not Detected     Coronavirus NL63, Common Cold Virus Not Detected     Coronavirus OC43, Common Cold Virus Not Detected     SARS-CoV2 (COVID-19) Qualitative PCR Not Detected     Human Metapneumovirus Not Detected     Human Rhinovirus/Enterovirus Not Detected     Influenza A (subtypes H1, H1-2009,H3) Not Detected     Influenza B Not Detected     Parainfluenza Virus 1 Not Detected     Parainfluenza Virus 2 Not Detected     Parainfluenza Virus 3 Not Detected     Parainfluenza Virus 4 Not Detected     Respiratory Syncytial Virus Not Detected     Bordetella Parapertussis (NV4654) Not Detected     Bordetella pertussis (ptxP) Not Detected     Chlamydia pneumoniae Not Detected     Mycoplasma pneumoniae Not Detected    Blood culture x two cultures. Draw prior to antibiotics [[id number]]  (Abnormal) Collected: 04/17/25 1257    Order Status: Completed Specimen: Blood Updated: 04/19/25 0628     CULTURE, BLOOD ([facility]) Staphylococcus epidermidis     Comment: Organism is a probable contaminant        GRAM STAIN Gram Stain Anaerobic Bottle:      Gram positive cocci    MRSA Screen by PCR [[id number]]  (Normal) Collected: 04/18/25 1306    Order Status: Completed Specimen: Nasal Swab Updated: 04/18/25 1436     MRSA PCR SCRN ([facility]) Not Detected    Rapid Organism ID by PCR (from Blood culture) [[id number]]  (Abnormal) Collected: 04/17/25 1257    Order Status: Completed Specimen: Blood Updated:  04/18/25 1359     Enterococcus faecalis Not Detected     Enterococcus faecium Not Detected     Listeria monocytogenes Not Detected     Staphylococcus spp. See Species for ID     Staphylococcus aureus Not Detected     Staphylococcus epidermidis Detected     Staphylococcus lugdunensis Not Detected     Streptococcus spp. Not Detected     Streptococcus agalactiae (Group B) Not Detected     Streptococcus pneumoniae Not Detected     Streptococcus pyogenes (Group A) Not Detected     Acinetobacter calcoaceticus/baumannii complex Not Detected     Bacteroides fragilis Not Detected     Enterobacterales Not Detected     Enterobacter cloacae complex Not Detected     Escherichia coli Not Detected     Klebsiella aerogenes Not Detected     Klebsiella oxytoca Not Detected     Klebsiella pneumoniae group Not Detected     Proteus spp. Not Detected     Salmonella spp. Not Detected     Serratia marcescens Not Detected     Haemophilus influenzae Not Detected     Neisseria meningitidis Not Detected     Pseudomonas aeruginosa Not Detected     Stenotrophomonas maltophilia Not Detected     Candida albicans Not Detected     Candida auris Not Detected     Candida glabrata Not Detected     Candida krusei Not Detected     Candida parapsilosis Not Detected     Candida tropicalis Not Detected     Cryptococcus neoformans/gattii Not Detected     CTX-M (ESBL ) N/A     Comment: Note: Antimicrobial resistance can occur via multiple mechanisms. A Not Detected result for antimicrobial resistance gene(s) does not indicate antimicrobial susceptibility. Subculturing is required for species identification and susceptibility testing of   isolates.        IMP (Cabapenemase ) N/A     Comment: Note: Antimicrobial resistance can occur via multiple mechanisms. A Not Detected result for antimicrobial resistance gene(s) does not indicate antimicrobial susceptibility. Subculturing is required for species identification and susceptibility testing of    isolates.        KPC resistance gene (Carbapenemase ) N/A     Comment: Note: Antimicrobial resistance can occur via multiple mechanisms. A Not Detected result for antimicrobial resistance gene(s) does not indicate antimicrobial susceptibility. Subculturing is required for species identification and susceptibility testing of   isolates.        mcr-1 N/A     Comment: Note: Antimicrobial resistance can occur via multiple mechanisms. A Not Detected result for antimicrobial resistance gene(s) does not indicate antimicrobial susceptibility. Subculturing is required for species identification and susceptibility testing of   isolates.        mecA/C and MREJ (MRSA) gene N/A     Comment: Note: Antimicrobial resistance can occur via multiple mechanisms. A Not Detected result for antimicrobial resistance gene(s) does not indicate antimicrobial susceptibility. Subculturing is required for species identification and susceptibility testing of   isolates.        NDM (Carbapenemase ) N/A     Comment: Note: Antimicrobial resistance can occur via multiple mechanisms. A Not Detected result for antimicrobial resistance gene(s) does not indicate antimicrobial susceptibility. Subculturing is required for species identification and susceptibility testing of   isolates.        OXA-48-like (Carbapenemase ) N/A     Comment: Note: Antimicrobial resistance can occur via multiple mechanisms. A Not Detected result for antimicrobial resistance gene(s) does not indicate antimicrobial susceptibility. Subculturing is required for species identification and susceptibility testing of   isolates.        Kingsley/B (VRE gene) N/A     Comment: Note: Antimicrobial resistance can occur via multiple mechanisms. A Not Detected result for antimicrobial resistance gene(s) does not indicate antimicrobial susceptibility. Subculturing is required for species identification and susceptibility testing of   isolates.        VIM (Carbapenemase  ) N/A     Comment: Note: Antimicrobial resistance can occur via multiple mechanisms. A Not Detected result for antimicrobial resistance gene(s) does not indicate antimicrobial susceptibility. Subculturing is required for species identification and susceptibility testing of   isolates.        mecA ID Not Detected     Comment: Note: Antimicrobial resistance can occur via multiple mechanisms. A Not Detected result for antimicrobial resistance gene(s) does not indicate antimicrobial susceptibility. Subculturing is required for species identification and susceptibility testing of   isolates.       Influenza A & B by Molecular [[id number]]  (Normal) Collected: 04/17/25 1345    Order Status: Completed Specimen: Nasopharyngeal Swab Updated: 04/17/25 1438     INFLUENZA A MOLECULAR Negative     INFLUENZA B MOLECULAR  Negative          Coronary artery disease involving coronary bypass graft of native heart with angina pectoris  H.o aware  Maintain GDMT  Hold BB in view with relative hypotension  S/P CABG (coronary artery bypass graft)  Aware     History of stroke  Aware     Heart failure with reduced ejection fraction  H/o aware  Resume entresto   Pt takes lasix PRN basis at home    Hyperbilirubinemia  USS abdomen ordered from ER    Atrial fibrillation  [id number] Score: 2. The patients heart rate in the last 24 hours is as follows:  Pulse  Min: 79  Max: 95     Antiarrhythmics  amiodarone tablet 200 mg, Daily, Oral  metoprolol succinate (TOPROL-XL) 24 hr tablet 25 mg, Nightly, Oral    Anticoagulants  enoxaparin injection 40 mg, Every 24 hours, Subcutaneous    Diabetes  Patient's FSGs are controlled on current medication regimen.  Last A1c reviewed-   Lab Results   Component Value Date    HGBA1C 6.4 (H) 04/17/2025     Most recent fingerstick glucose reviewed-   Recent Labs   Lab 04/19/25  1532 04/20/25  0045 04/20/25  0713 04/20/25  1104   POCTGLUCOSE 147* 116* 147* 179*     Current correctional scale  Medium  Maintain  anti-hyperglycemic dose as follows-   Antihyperglycemics (From admission, onward)      Start     Stop Route Frequency Ordered    04/17/25 1845  insulin aspart U-100 pen 0-10 Units         -- SubQ Before meals & nightly PRN 04/17/25 1745          Hold Oral hypoglycemics while patient is in the hospital.  VTE Risk Mitigation (From admission, onward)           Ordered     enoxaparin injection 40 mg  Daily         04/17/25 1753     IP VTE HIGH RISK PATIENT  Once         04/17/25 1745     Place sequential compression device  Until discontinued         04/17/25 1745                    Discharge Planning   MICHAEL: 4/20/2025     Code Status: Full Code   Medical Readiness for Discharge Date:   Discharge Plan A: Home with family                        Trang Garcia MD  Department of Hospital Medicine   [facility]

## 2025-04-20 NOTE — PLAN OF CARE
Problem: Diabetes Comorbidity  Goal: Blood Glucose Level Within Targeted Range  Outcome: Progressing     Problem: Sepsis/Septic Shock  Goal: Optimal Coping  Outcome: Progressing     Problem: Fall Injury Risk  Goal: Absence of Fall and Fall-Related Injury  Outcome: Progressing

## 2025-04-20 NOTE — ASSESSMENT & PLAN NOTE
Patient's FSGs are controlled on current medication regimen.  Last A1c reviewed-   Lab Results   Component Value Date    HGBA1C 6.4 (H) 04/17/2025     Most recent fingerstick glucose reviewed-   Recent Labs   Lab 04/19/25  1532 04/20/25  0045 04/20/25  0713 04/20/25  1104   POCTGLUCOSE 147* 116* 147* 179*     Current correctional scale  Medium  Maintain anti-hyperglycemic dose as follows-   Antihyperglycemics (From admission, onward)      Start     Stop Route Frequency Ordered    04/17/25 1845  insulin aspart U-100 pen 0-10 Units         -- SubQ Before meals & nightly PRN 04/17/25 1745          Hold Oral hypoglycemics while patient is in the hospital.

## 2025-04-20 NOTE — ASSESSMENT & PLAN NOTE
[id number] Score: 2. The patients heart rate in the last 24 hours is as follows:  Pulse  Min: 79  Max: 95     Antiarrhythmics  amiodarone tablet 200 mg, Daily, Oral  metoprolol succinate (TOPROL-XL) 24 hr tablet 25 mg, Nightly, Oral    Anticoagulants  enoxaparin injection 40 mg, Every 24 hours, Subcutaneous

## 2025-04-21 ENCOUNTER — TELEPHONE (OUTPATIENT)
Dept: PULMONOLOGY | Facility: CLINIC | Age: 65
End: 2025-04-21
Payer: COMMERCIAL

## 2025-04-21 VITALS
OXYGEN SATURATION: 96 % | WEIGHT: 227.94 LBS | SYSTOLIC BLOOD PRESSURE: 121 MMHG | BODY MASS INDEX: 33.76 KG/M2 | RESPIRATION RATE: 18 BRPM | TEMPERATURE: 98 F | HEIGHT: 69 IN | HEART RATE: 87 BPM | DIASTOLIC BLOOD PRESSURE: 78 MMHG

## 2025-04-21 LAB
ABSOLUTE EOSINOPHIL (SMH): 0.24 K/UL
ABSOLUTE MONOCYTE (SMH): 0.83 K/UL (ref 0.3–1)
ABSOLUTE NEUTROPHIL COUNT (SMH): 6 K/UL (ref 1.8–7.7)
ALBUMIN SERPL-MCNC: 3.5 G/DL (ref 3.5–5.2)
ALP SERPL-CCNC: 56 UNIT/L (ref 55–135)
ALT SERPL-CCNC: 19 UNIT/L (ref 10–44)
ANION GAP (SMH): 7 MMOL/L (ref 8–16)
AST SERPL-CCNC: 21 UNIT/L (ref 10–40)
BASOPHILS # BLD AUTO: 0.03 K/UL
BASOPHILS NFR BLD AUTO: 0.4 %
BILIRUB SERPL-MCNC: 0.9 MG/DL (ref 0.1–1)
BUN SERPL-MCNC: 17 MG/DL (ref 8–23)
CALCIUM SERPL-MCNC: 8.4 MG/DL (ref 8.7–10.5)
CHLORIDE SERPL-SCNC: 107 MMOL/L (ref 95–110)
CO2 SERPL-SCNC: 23 MMOL/L (ref 23–29)
CREAT SERPL-MCNC: 1.2 MG/DL (ref 0.5–1.4)
ERYTHROCYTE [DISTWIDTH] IN BLOOD BY AUTOMATED COUNT: 17.2 % (ref 11.5–14.5)
GFR SERPLBLD CREATININE-BSD FMLA CKD-EPI: >60 ML/MIN/1.73/M2
GLUCOSE SERPL-MCNC: 143 MG/DL (ref 70–110)
HCT VFR BLD AUTO: 35.1 % (ref 40–54)
HGB BLD-MCNC: 10.5 GM/DL (ref 14–18)
IMM GRANULOCYTES # BLD AUTO: 0.05 K/UL (ref 0–0.04)
IMM GRANULOCYTES NFR BLD AUTO: 0.6 % (ref 0–0.5)
LYMPHOCYTES # BLD AUTO: 0.94 K/UL (ref 1–4.8)
MAGNESIUM SERPL-MCNC: 1.9 MG/DL (ref 1.6–2.6)
MCH RBC QN AUTO: 23 PG (ref 27–31)
MCHC RBC AUTO-ENTMCNC: 29.9 G/DL (ref 32–36)
MCV RBC AUTO: 77 FL (ref 82–98)
NUCLEATED RBC (/100WBC) (SMH): 0 /100 WBC
PHOSPHATE SERPL-MCNC: 2.1 MG/DL (ref 2.7–4.5)
PLATELET # BLD AUTO: 198 K/UL (ref 150–450)
PMV BLD AUTO: 10.7 FL (ref 9.2–12.9)
POCT GLUCOSE: 132 MG/DL (ref 70–110)
POCT GLUCOSE: 152 MG/DL (ref 70–110)
POTASSIUM SERPL-SCNC: 4.2 MMOL/L (ref 3.5–5.1)
PROT SERPL-MCNC: 6.3 GM/DL (ref 6–8.4)
RBC # BLD AUTO: 4.57 M/UL (ref 4.6–6.2)
RELATIVE EOSINOPHIL (SMH): 3 % (ref 0–8)
RELATIVE LYMPHOCYTE (SMH): 11.6 % (ref 18–48)
RELATIVE MONOCYTE (SMH): 10.2 % (ref 4–15)
RELATIVE NEUTROPHIL (SMH): 74.2 % (ref 38–73)
SODIUM SERPL-SCNC: 137 MMOL/L (ref 136–145)
WBC # BLD AUTO: 8.12 K/UL (ref 3.9–12.7)

## 2025-04-21 PROCEDURE — 36415 COLL VENOUS BLD VENIPUNCTURE: CPT | Performed by: INTERNAL MEDICINE

## 2025-04-21 PROCEDURE — 84100 ASSAY OF PHOSPHORUS: CPT

## 2025-04-21 PROCEDURE — 83735 ASSAY OF MAGNESIUM: CPT | Performed by: INTERNAL MEDICINE

## 2025-04-21 PROCEDURE — 99223 1ST HOSP IP/OBS HIGH 75: CPT | Mod: ,,, | Performed by: STUDENT IN AN ORGANIZED HEALTH CARE EDUCATION/TRAINING PROGRAM

## 2025-04-21 PROCEDURE — 25000003 PHARM REV CODE 250

## 2025-04-21 PROCEDURE — 63600175 PHARM REV CODE 636 W HCPCS

## 2025-04-21 PROCEDURE — 80053 COMPREHEN METABOLIC PANEL: CPT | Performed by: INTERNAL MEDICINE

## 2025-04-21 PROCEDURE — 25000003 PHARM REV CODE 250: Performed by: INTERNAL MEDICINE

## 2025-04-21 PROCEDURE — 85025 COMPLETE CBC W/AUTO DIFF WBC: CPT | Performed by: INTERNAL MEDICINE

## 2025-04-21 RX ORDER — LEVOFLOXACIN 750 MG/1
750 TABLET, FILM COATED ORAL DAILY
Qty: 5 TABLET | Refills: 0 | Status: SHIPPED | OUTPATIENT
Start: 2025-04-21 | End: 2025-04-29 | Stop reason: ALTCHOICE

## 2025-04-21 RX ADMIN — CLOPIDOGREL BISULFATE 75 MG: 75 TABLET, FILM COATED ORAL at 09:04

## 2025-04-21 RX ADMIN — AMIODARONE HYDROCHLORIDE 200 MG: 200 TABLET ORAL at 09:04

## 2025-04-21 RX ADMIN — CEFEPIME 2 G: 2 INJECTION, POWDER, FOR SOLUTION INTRAVENOUS at 12:04

## 2025-04-21 RX ADMIN — CEFEPIME 2 G: 2 INJECTION, POWDER, FOR SOLUTION INTRAVENOUS at 04:04

## 2025-04-21 RX ADMIN — ASPIRIN 81 MG CHEWABLE TABLET 81 MG: 81 TABLET CHEWABLE at 09:04

## 2025-04-21 RX ADMIN — PANTOPRAZOLE SODIUM 40 MG: 40 TABLET, DELAYED RELEASE ORAL at 04:04

## 2025-04-21 RX ADMIN — SACUBITRIL AND VALSARTAN 1 TABLET: 24; 26 TABLET, FILM COATED ORAL at 09:04

## 2025-04-21 NOTE — CONSULTS
Pulmonary/Critical Care Consult      PATIENT NAME: Matteo Fraire  MRN: [id number]  TODAY'S DATE: 2025  4:38 PM  ADMIT DATE: 2025  AGE: 64 y.o. : 1960    CONSULT REQUESTED BY: Trang Garcia MD    REASON FOR CONSULT:   Pneumonia    HPI:  Mr Fraire is a 64 year old man with psorasis, presents with cough and shortness of breath.     Found to have a pneumonia.     He reports feeling better since starting treatments. He is a little short of breath. He is a non smoker. No hemoptysis. No night sweats.     Review of Systems   Constitutional:  Positive for appetite change, chills, fatigue and fever. Negative for unexpected weight change.   HENT:  Negative for nosebleeds, postnasal drip, trouble swallowing and voice change.    Eyes:  Negative for visual disturbance.   Respiratory:  Positive for cough and shortness of breath. Negative for wheezing.    Cardiovascular:  Negative for chest pain and leg swelling.   Gastrointestinal:  Negative for diarrhea, nausea and vomiting.   Endocrine: Negative for cold intolerance and heat intolerance.   Genitourinary:  Negative for dysuria, flank pain and frequency.   Musculoskeletal:  Negative for neck pain and neck stiffness.   Allergic/Immunologic: Negative for environmental allergies and immunocompromised state.   Neurological:  Negative for syncope, speech difficulty and weakness.   Hematological:  Negative for adenopathy.   Psychiatric/Behavioral:  Negative for confusion.            ALLERGIES  Review of patient's allergies indicates:   Allergen Reactions    Venom-wasp Anaphylaxis       INPATIENT SCHEDULED MEDICATIONS   amiodarone  200 mg Oral Daily    aspirin  81 mg Oral Daily    atorvastatin  80 mg Oral QHS    ceFEPime IV (PEDS and ADULTS)  2 g Intravenous Q8H    clopidogreL  75 mg Oral Daily    enoxparin  40 mg Subcutaneous Daily    metoprolol succinate  25 mg Oral QHS    pantoprazole  40 mg Oral Daily    sacubitriL-valsartan  1 tablet Oral BID          MEDICAL AND SURGICAL HISTORY  Past Medical History:   Diagnosis Date    Coronary artery disease     Heart attack 2005;2011    Hx of CABG     Hypertension 2008    Psoriasis     Stroke 2022     Past Surgical History:   Procedure Laterality Date    ANGIOGRAM, CORONARY, WITH LEFT HEART CATHETERIZATION N/A 8/13/2024    Procedure: Angiogram, Coronary, with Left Heart Cath;  Surgeon: Danica Howell MD;  Location: [facility] CATH/EP LAB;  Service: Cardiology;  Laterality: N/A;    CORONARY ANGIOGRAPHY INCLUDING BYPASS GRAFTS WITH CATHETERIZATION OF LEFT HEART Left 6/27/2022    Procedure: Left heart cath;  Surgeon: Stevan Powell MD;  Location: [facility] CATH/EP LAB;  Service: Cardiology;  Laterality: Left;    CORONARY ARTERY BYPASS GRAFT      CYSTOSCOPY W/ URETERAL STENT PLACEMENT Left 11/21/2019    Procedure: CYSTOSCOPY, WITH URETERAL STENT INSERTION;  Surgeon: Mickey Escudero MD;  Location: [facility] OR;  Service: Urology;  Laterality: Left;    ENDOSCOPIC HARVEST OF VEIN Right 7/8/2022    Procedure: HARVEST-VEIN-ENDOVASCULAR;  Surgeon: Eyal Stone MD;  Location: [facility] OR;  Service: Cardiothoracic;  Laterality: Right;    ESOPHAGOGASTRODUODENOSCOPY N/A 7/15/2022    Procedure: EGD (ESOPHAGOGASTRODUODENOSCOPY);  Surgeon: Art Pino MD;  Location: [facility] ENDO;  Service: Endoscopy;  Laterality: N/A;    EXTRACORPOREAL SHOCK WAVE LITHOTRIPSY Left 11/21/2019    Procedure: LITHOTRIPSY, ESWL;  Surgeon: Mickey Escudero MD;  Location: [facility] OR;  Service: Urology;  Laterality: Left;    HERNIA REPAIR      Inguinal just after birth    IVUS, CORONARY  8/13/2024    Procedure: IVUS, Coronary;  Surgeon: Danica Howell MD;  Location: [facility] CATH/EP LAB;  Service: Cardiology;;    PERCUTANEOUS CORONARY INTERVENTION, ARTERY N/A 8/13/2024    Procedure: Percutaneous coronary intervention;  Surgeon: Danica Howell MD;  Location: [facility] CATH/EP LAB;  Service: Cardiology;  Laterality: N/A;    REPEAT CORONARY ARTERY BYPASS  GRAFTING N/A 7/8/2022    Procedure: CABG, REPEAT;  Surgeon: Eyal Stone MD;  Location: [facility] OR;  Service: Cardiothoracic;  Laterality: N/A;  drugs ordered 7-7  @1322      SURGICAL PROCUREMENT, ARTERY, RADIAL, FOR CABG Left 7/8/2022    Procedure: SURGICAL PROCUREMENT,ARTERY,RADIAL,FOR CABG;  Surgeon: Eyal Stone MD;  Location: [facility] OR;  Service: Cardiothoracic;  Laterality: Left;       ALCOHOL, TOBACCO AND DRUG USE  Tobacco Use History[1]  Social History     Substance and Sexual Activity   Alcohol Use Not Currently     Social History     Substance and Sexual Activity   Drug Use Never       FAMILY HISTORY  Family History   Problem Relation Name Age of Onset    Hearing loss Mother      Arthritis Mother         VITAL SIGNS (MOST RECENT)  Temp: 98.2 °F (36.8 °C) (04/21/25 1236)  Pulse: 78 (04/21/25 1236)  Resp: 18 (04/21/25 1236)  BP: 121/78 (04/21/25 1236)  SpO2: 96 % (04/21/25 1236)    INTAKE AND OUTPUT (LAST 24 HOURS):  Intake/Output Summary (Last 24 hours) at 4/21/2025 1638  Last data filed at 4/21/2025 1000  Gross per 24 hour   Intake 200 ml   Output 1000 ml   Net -800 ml       WEIGHT  Wt Readings from Last 1 Encounters:   04/17/25 103.4 kg (227 lb 15.3 oz)       Physical Exam  Vitals reviewed.   Constitutional:       General: He is not in acute distress.     Appearance: He is well-developed. He is obese. He is ill-appearing. He is not diaphoretic.   HENT:      Head: Normocephalic and atraumatic.      Mouth/Throat:      Pharynx: No oropharyngeal exudate or posterior oropharyngeal erythema.   Eyes:      General: No scleral icterus.     Pupils: Pupils are equal, round, and reactive to light.   Neck:      Vascular: No JVD.   Cardiovascular:      Rate and Rhythm: Normal rate and regular rhythm.      Heart sounds: Normal heart sounds. No murmur heard.  Pulmonary:      Effort: Pulmonary effort is normal. No respiratory distress.      Breath sounds: Normal breath sounds. No wheezing.   Abdominal:       "General: Bowel sounds are normal. There is no distension.      Palpations: Abdomen is soft.      Tenderness: There is no abdominal tenderness.   Musculoskeletal:         General: No swelling.      Cervical back: Normal range of motion and neck supple. No rigidity.   Skin:     General: Skin is warm and dry.      Capillary Refill: Capillary refill takes less than 2 seconds.      Coloration: Skin is not pale.      Findings: Rash present.   Neurological:      General: No focal deficit present.      Mental Status: He is alert and oriented to person, place, and time.      Cranial Nerves: No cranial nerve deficit.      Motor: No weakness or abnormal muscle tone.           ACUTE PHASE REACTANT (LAST 24 HOURS)  No results for input(s): "FERRITIN", "CRP", "LDH", "DDIMER" in the last 24 hours.    CBC LAST (LAST 24 HOURS)  Recent Labs   Lab 04/21/25  0535   WBC 8.12   RBC 4.57*   HGB 10.5*   HCT 35.1*   MCV 77*   MCH 23.0*   MCHC 29.9*   RDW 17.2*      MPV 10.7   NRBC 0       CHEMISTRY LAST (LAST 24 HOURS)  Recent Labs   Lab 04/21/25  0535      K 4.2   CO2 23   BUN 17   CREATININE 1.2   CALCIUM 8.4*   MG 1.9   ALBUMIN 3.5   ALKPHOS 56   ALT 19   AST 21   BILITOT 0.9       COAGULATION LAST (LAST 24 HOURS)  No results for input(s): "LABPT", "INR", "APTT" in the last 24 hours.    CARDIAC PROFILE (LAST 24 HOURS)  Recent Labs   Lab 04/17/25  1047 04/17/25  1240   * 260*   CPK 74  --    CPKMB 1.2  --        LAST 7 DAYS MICROBIOLOGY   Microbiology Results (last 7 days)       Procedure Component Value Units Date/Time    Blood culture x two cultures. Draw prior to antibiotics [[id number]]  (Normal) Collected: 04/17/25 1252    Order Status: Completed Specimen: Blood Updated: 04/20/25 1501     CULTURE, BLOOD ([facility]) No Growth After 72 Hours    Urine Culture High Risk [[id number]] Collected: 04/17/25 1926    Order Status: Completed Specimen: Urine, Clean Catch Updated: 04/20/25 0636     Urine Culture No Growth    " Respiratory Infection Panel (PCR), Nasopharyngeal [[id number]] Collected: 04/19/25 1032    Order Status: Completed Specimen: Nasopharyngeal Swab Updated: 04/19/25 1148     Respiratory Infection Panel Source Nasopharyngeal Swab     Adenovirus Not Detected     Coronavirus 229E, Common Cold Virus Not Detected     Coronavirus HKU1, Common Cold Virus Not Detected     Coronavirus NL63, Common Cold Virus Not Detected     Coronavirus OC43, Common Cold Virus Not Detected     SARS-CoV2 (COVID-19) Qualitative PCR Not Detected     Human Metapneumovirus Not Detected     Human Rhinovirus/Enterovirus Not Detected     Influenza A (subtypes H1, H1-2009,H3) Not Detected     Influenza B Not Detected     Parainfluenza Virus 1 Not Detected     Parainfluenza Virus 2 Not Detected     Parainfluenza Virus 3 Not Detected     Parainfluenza Virus 4 Not Detected     Respiratory Syncytial Virus Not Detected     Bordetella Parapertussis (LA1031) Not Detected     Bordetella pertussis (ptxP) Not Detected     Chlamydia pneumoniae Not Detected     Mycoplasma pneumoniae Not Detected    Blood culture x two cultures. Draw prior to antibiotics [[id number]]  (Abnormal) Collected: 04/17/25 1257    Order Status: Completed Specimen: Blood Updated: 04/19/25 0628     CULTURE, BLOOD ([facility]) Staphylococcus epidermidis     Comment: Organism is a probable contaminant        GRAM STAIN Gram Stain Anaerobic Bottle:      Gram positive cocci    MRSA Screen by PCR [[id number]]  (Normal) Collected: 04/18/25 1306    Order Status: Completed Specimen: Nasal Swab Updated: 04/18/25 1436     MRSA PCR SCRN ([facility]) Not Detected    Rapid Organism ID by PCR (from Blood culture) [[id number]]  (Abnormal) Collected: 04/17/25 1257    Order Status: Completed Specimen: Blood Updated: 04/18/25 1359     Enterococcus faecalis Not Detected     Enterococcus faecium Not Detected     Listeria monocytogenes Not Detected     Staphylococcus spp. See Species for ID     Staphylococcus aureus Not  Detected     Staphylococcus epidermidis Detected     Staphylococcus lugdunensis Not Detected     Streptococcus spp. Not Detected     Streptococcus agalactiae (Group B) Not Detected     Streptococcus pneumoniae Not Detected     Streptococcus pyogenes (Group A) Not Detected     Acinetobacter calcoaceticus/baumannii complex Not Detected     Bacteroides fragilis Not Detected     Enterobacterales Not Detected     Enterobacter cloacae complex Not Detected     Escherichia coli Not Detected     Klebsiella aerogenes Not Detected     Klebsiella oxytoca Not Detected     Klebsiella pneumoniae group Not Detected     Proteus spp. Not Detected     Salmonella spp. Not Detected     Serratia marcescens Not Detected     Haemophilus influenzae Not Detected     Neisseria meningitidis Not Detected     Pseudomonas aeruginosa Not Detected     Stenotrophomonas maltophilia Not Detected     Candida albicans Not Detected     Candida auris Not Detected     Candida glabrata Not Detected     Candida krusei Not Detected     Candida parapsilosis Not Detected     Candida tropicalis Not Detected     Cryptococcus neoformans/gattii Not Detected     CTX-M (ESBL ) N/A     Comment: Note: Antimicrobial resistance can occur via multiple mechanisms. A Not Detected result for antimicrobial resistance gene(s) does not indicate antimicrobial susceptibility. Subculturing is required for species identification and susceptibility testing of   isolates.        IMP (Cabapenemase ) N/A     Comment: Note: Antimicrobial resistance can occur via multiple mechanisms. A Not Detected result for antimicrobial resistance gene(s) does not indicate antimicrobial susceptibility. Subculturing is required for species identification and susceptibility testing of   isolates.        KPC resistance gene (Carbapenemase ) N/A     Comment: Note: Antimicrobial resistance can occur via multiple mechanisms. A Not Detected result for antimicrobial resistance gene(s)  does not indicate antimicrobial susceptibility. Subculturing is required for species identification and susceptibility testing of   isolates.        mcr-1 N/A     Comment: Note: Antimicrobial resistance can occur via multiple mechanisms. A Not Detected result for antimicrobial resistance gene(s) does not indicate antimicrobial susceptibility. Subculturing is required for species identification and susceptibility testing of   isolates.        mecA/C and MREJ (MRSA) gene N/A     Comment: Note: Antimicrobial resistance can occur via multiple mechanisms. A Not Detected result for antimicrobial resistance gene(s) does not indicate antimicrobial susceptibility. Subculturing is required for species identification and susceptibility testing of   isolates.        NDM (Carbapenemase ) N/A     Comment: Note: Antimicrobial resistance can occur via multiple mechanisms. A Not Detected result for antimicrobial resistance gene(s) does not indicate antimicrobial susceptibility. Subculturing is required for species identification and susceptibility testing of   isolates.        OXA-48-like (Carbapenemase ) N/A     Comment: Note: Antimicrobial resistance can occur via multiple mechanisms. A Not Detected result for antimicrobial resistance gene(s) does not indicate antimicrobial susceptibility. Subculturing is required for species identification and susceptibility testing of   isolates.        Kingsley/B (VRE gene) N/A     Comment: Note: Antimicrobial resistance can occur via multiple mechanisms. A Not Detected result for antimicrobial resistance gene(s) does not indicate antimicrobial susceptibility. Subculturing is required for species identification and susceptibility testing of   isolates.        VIM (Carbapenemase ) N/A     Comment: Note: Antimicrobial resistance can occur via multiple mechanisms. A Not Detected result for antimicrobial resistance gene(s) does not indicate antimicrobial susceptibility.  Subculturing is required for species identification and susceptibility testing of   isolates.        mecA ID Not Detected     Comment: Note: Antimicrobial resistance can occur via multiple mechanisms. A Not Detected result for antimicrobial resistance gene(s) does not indicate antimicrobial susceptibility. Subculturing is required for species identification and susceptibility testing of   isolates.       Influenza A & B by Molecular [[id number]]  (Normal) Collected: 04/17/25 1345    Order Status: Completed Specimen: Nasopharyngeal Swab Updated: 04/17/25 7490     INFLUENZA A MOLECULAR Negative     INFLUENZA B MOLECULAR  Negative            MOST RECENT IMAGING  CTA Chest Non-Coronary (PE Studies)  Narrative: EXAMINATION:  CTA CHEST NON CORONARY (PE STUDIES)    CLINICAL HISTORY:  Pulmonary embolism (PE) suspected, positive D-dimer;elevated D-dimer, flushing;    TECHNIQUE:  Low dose axial images, sagittal and coronal reformations were obtained from the thoracic inlet to the lung bases following the IV administration of 100 mL of Omnipaque 350.  Contrast timing was optimized to evaluate the pulmonary arteries.  MIP images were performed.    COMPARISON:  None    FINDINGS:  Postop changes with sternotomy wires present.  Gynecomastia.  Good contrast bolus opacification of the pulmonary arteries. No acute pulmonary thromboembolism. Few prominent right hilar lymph nodes and 1 cm right pretracheal lymph node.  No pericardial effusion.  Area of rounded masslike consolidation in the right upper lobe measuring approximately 6-7 cm in size with air bronchograms present.  Patchy ground-glass infiltrate present in the surrounding right upper lobe about the area of masslike consolidation.  No additional areas of consolidation.  No pneumothorax.  Small posterior layering right pleural effusion.  No left pleural effusion.  Limited images through the upper abdomen demonstrate cholelithiasis.  Impression: No acute pulmonary  thromboemboli.    Area of rounded masslike consolidation in the right upper lobe measuring approximately 6-7 cm in size with air bronchograms present. Patchy ground-glass infiltrate present in the surrounding right upper lobe about the area of masslike consolidation.  Suspect pneumonia given presence of air bronchograms, however, given the masslike configuration strongly recommend follow-up imaging to exclude underlying mass.    Mildly enlarged lymph nodes, as above, suspect reactive.  These can be reassessed on follow-up imaging.    Small right pleural effusion.    Cholelithiasis.    Additional findings as above.    This report was flagged in Epic as abnormal.    Electronically signed by: Franck Lee MD  Date:    04/20/2025  Time:    02:11      CURRENT VISIT EKG  Results for orders placed or performed during the hospital encounter of 04/17/25   EKG 12-lead   Result Value Ref Range    QRS Duration 92 ms    [facility] QTC Calculation 426 ms    Narrative    Test Reason : R50.9,    Vent. Rate :  85 BPM     Atrial Rate :  85 BPM     P-R Int : 196 ms          QRS Dur :  92 ms      QT Int : 358 ms       P-R-T Axes :  21 202  72 degrees    QTcB Int : 426 ms    Normal sinus rhythm  Right superior axis deviation  Incomplete right bundle branch block  Septal infarct ,age undetermined  Inferior infarct ,age undetermined  Abnormal ECG  No previous ECGs available  Confirmed by Jackson Mann ([id number]) on 4/20/2025 5:25:28 PM    Referred By: AAAREFERRAL SELF           Confirmed By: Jackson Mann       ECHOCARDIOGRAM RESULTS  Results for orders placed during the hospital encounter of 03/27/24    Echo    Interpretation Summary    Left Ventricle: The left ventricle is moderately dilated. Normal wall thickness. There is eccentric hypertrophy. Regional wall motion abnormalities present. Septal motion is abnormal. There is moderately reduced systolic function with a visually estimated ejection fraction of 30 - 40%. There is normal  "diastolic function.    Right Ventricle: Normal right ventricular cavity size. Wall thickness is normal. Right ventricle wall motion  is normal. Systolic function is normal.    Left Atrium: Left atrium is moderately dilated.    Mitral Valve: There is mild to moderate regurgitation.    Tricuspid Valve: There is mild regurgitation with a centrally directed jet.    Pulmonary Artery: The estimated pulmonary artery systolic pressure is 28 mmHg.    IVC/SVC: Normal venous pressure at 3 mmHg.        VENTILATOR INFORMATION              LAST ARTERIAL BLOOD GAS  ABG  No results for input(s): "PH", "PO2", "PCO2", "HCO3", "BE" in the last 168 hours.    ASSESSMENT:   RUL pneumonia  PLAN:   - ok for discharge home  - complete outpatient course of antibiotics  - repeat CT in 4-6 weeks and should follow up with me in clinic     Vinod Morrison MD  Date of Service: 04/21/2025  4:38 PM         [1]   Social History  Tobacco Use   Smoking Status Never    Passive exposure: Never   Smokeless Tobacco Never     "

## 2025-04-21 NOTE — TELEPHONE ENCOUNTER
Provider seeing pt at hospital     ----- Message from Dimitri sent at 4/21/2025 12:40 PM CDT -----  Regarding: Consult  Consult/AdvisoryName Of Caller:Contact Preference: unit [id number] of call: Pt needs a consult the reason mass consolidation pt is in room 3114 At [facility]  ----- Message -----  From: Dimitri Chowdhury  Sent: 4/21/2025  12:41 PM CDT  To: Prinec Brock Staff  Subject: Consult                                          Consult/AdvisoryName Of Caller:Contact Preference: unit [phone] of call: Pt needs a consult the pt is in room 3114 At [facility]

## 2025-04-21 NOTE — PLAN OF CARE
Patient cleared for discharge by case management to home.        04/21/25 1556   Final Note   Assessment Type Final Discharge Note   Anticipated Discharge Disposition Home   Hospital Resources/Appts/Education Provided Appointments scheduled and added to AVS

## 2025-04-21 NOTE — ASSESSMENT & PLAN NOTE
[facility] on DC    Antibiotics (From admission, onward)      Start     Stop Route Frequency Ordered    04/20/25 1100  ceFEPIme injection 2 g         -- IV Every 8 hours (non-standard times) 04/20/25 0927            Microbiology Results (last 7 days)       Procedure Component Value Units Date/Time    Blood culture x two cultures. Draw prior to antibiotics [[id number]]  (Normal) Collected: 04/17/25 1252    Order Status: Completed Specimen: Blood Updated: 04/20/25 1501     CULTURE, BLOOD ([facility]) No Growth After 72 Hours    Urine Culture High Risk [[id number]] Collected: 04/17/25 1926    Order Status: Completed Specimen: Urine, Clean Catch Updated: 04/20/25 0636     Urine Culture No Growth    Respiratory Infection Panel (PCR), Nasopharyngeal [[id number]] Collected: 04/19/25 1032    Order Status: Completed Specimen: Nasopharyngeal Swab Updated: 04/19/25 1148     Respiratory Infection Panel Source Nasopharyngeal Swab     Adenovirus Not Detected     Coronavirus 229E, Common Cold Virus Not Detected     Coronavirus HKU1, Common Cold Virus Not Detected     Coronavirus NL63, Common Cold Virus Not Detected     Coronavirus OC43, Common Cold Virus Not Detected     SARS-CoV2 (COVID-19) Qualitative PCR Not Detected     Human Metapneumovirus Not Detected     Human Rhinovirus/Enterovirus Not Detected     Influenza A (subtypes H1, H1-2009,H3) Not Detected     Influenza B Not Detected     Parainfluenza Virus 1 Not Detected     Parainfluenza Virus 2 Not Detected     Parainfluenza Virus 3 Not Detected     Parainfluenza Virus 4 Not Detected     Respiratory Syncytial Virus Not Detected     Bordetella Parapertussis (LM8191) Not Detected     Bordetella pertussis (ptxP) Not Detected     Chlamydia pneumoniae Not Detected     Mycoplasma pneumoniae Not Detected    Blood culture x two cultures. Draw prior to antibiotics [[id number]]  (Abnormal) Collected: 04/17/25 1257    Order Status: Completed Specimen: Blood Updated: 04/19/25 0628     CULTURE,  BLOOD ([facility]) Staphylococcus epidermidis     Comment: Organism is a probable contaminant        GRAM STAIN Gram Stain Anaerobic Bottle:      Gram positive cocci    MRSA Screen by PCR [[id number]]  (Normal) Collected: 04/18/25 1306    Order Status: Completed Specimen: Nasal Swab Updated: 04/18/25 1436     MRSA PCR SCRN ([facility]) Not Detected    Rapid Organism ID by PCR (from Blood culture) [[id number]]  (Abnormal) Collected: 04/17/25 1257    Order Status: Completed Specimen: Blood Updated: 04/18/25 1359     Enterococcus faecalis Not Detected     Enterococcus faecium Not Detected     Listeria monocytogenes Not Detected     Staphylococcus spp. See Species for ID     Staphylococcus aureus Not Detected     Staphylococcus epidermidis Detected     Staphylococcus lugdunensis Not Detected     Streptococcus spp. Not Detected     Streptococcus agalactiae (Group B) Not Detected     Streptococcus pneumoniae Not Detected     Streptococcus pyogenes (Group A) Not Detected     Acinetobacter calcoaceticus/baumannii complex Not Detected     Bacteroides fragilis Not Detected     Enterobacterales Not Detected     Enterobacter cloacae complex Not Detected     Escherichia coli Not Detected     Klebsiella aerogenes Not Detected     Klebsiella oxytoca Not Detected     Klebsiella pneumoniae group Not Detected     Proteus spp. Not Detected     Salmonella spp. Not Detected     Serratia marcescens Not Detected     Haemophilus influenzae Not Detected     Neisseria meningitidis Not Detected     Pseudomonas aeruginosa Not Detected     Stenotrophomonas maltophilia Not Detected     Candida albicans Not Detected     Candida auris Not Detected     Candida glabrata Not Detected     Candida krusei Not Detected     Candida parapsilosis Not Detected     Candida tropicalis Not Detected     Cryptococcus neoformans/gattii Not Detected     CTX-M (ESBL ) N/A     Comment: Note: Antimicrobial resistance can occur via multiple mechanisms. A Not Detected result  for antimicrobial resistance gene(s) does not indicate antimicrobial susceptibility. Subculturing is required for species identification and susceptibility testing of   isolates.        IMP (Cabapenemase ) N/A     Comment: Note: Antimicrobial resistance can occur via multiple mechanisms. A Not Detected result for antimicrobial resistance gene(s) does not indicate antimicrobial susceptibility. Subculturing is required for species identification and susceptibility testing of   isolates.        KPC resistance gene (Carbapenemase ) N/A     Comment: Note: Antimicrobial resistance can occur via multiple mechanisms. A Not Detected result for antimicrobial resistance gene(s) does not indicate antimicrobial susceptibility. Subculturing is required for species identification and susceptibility testing of   isolates.        mcr-1 N/A     Comment: Note: Antimicrobial resistance can occur via multiple mechanisms. A Not Detected result for antimicrobial resistance gene(s) does not indicate antimicrobial susceptibility. Subculturing is required for species identification and susceptibility testing of   isolates.        mecA/C and MREJ (MRSA) gene N/A     Comment: Note: Antimicrobial resistance can occur via multiple mechanisms. A Not Detected result for antimicrobial resistance gene(s) does not indicate antimicrobial susceptibility. Subculturing is required for species identification and susceptibility testing of   isolates.        NDM (Carbapenemase ) N/A     Comment: Note: Antimicrobial resistance can occur via multiple mechanisms. A Not Detected result for antimicrobial resistance gene(s) does not indicate antimicrobial susceptibility. Subculturing is required for species identification and susceptibility testing of   isolates.        OXA-48-like (Carbapenemase ) N/A     Comment: Note: Antimicrobial resistance can occur via multiple mechanisms. A Not Detected result for antimicrobial resistance  gene(s) does not indicate antimicrobial susceptibility. Subculturing is required for species identification and susceptibility testing of   isolates.        Kingsley/B (VRE gene) N/A     Comment: Note: Antimicrobial resistance can occur via multiple mechanisms. A Not Detected result for antimicrobial resistance gene(s) does not indicate antimicrobial susceptibility. Subculturing is required for species identification and susceptibility testing of   isolates.        VIM (Carbapenemase ) N/A     Comment: Note: Antimicrobial resistance can occur via multiple mechanisms. A Not Detected result for antimicrobial resistance gene(s) does not indicate antimicrobial susceptibility. Subculturing is required for species identification and susceptibility testing of   isolates.        mecA ID Not Detected     Comment: Note: Antimicrobial resistance can occur via multiple mechanisms. A Not Detected result for antimicrobial resistance gene(s) does not indicate antimicrobial susceptibility. Subculturing is required for species identification and susceptibility testing of   isolates.       Influenza A & B by Molecular [[id number]]  (Normal) Collected: 04/17/25 1345    Order Status: Completed Specimen: Nasopharyngeal Swab Updated: 04/17/25 1438     INFLUENZA A MOLECULAR Negative     INFLUENZA B MOLECULAR  Negative              Large pneumonia and questionable mass? May need repeat CT in 3 months, appreciate pulm seeing

## 2025-04-21 NOTE — PLAN OF CARE
Problem: Adult Inpatient Plan of Care  Goal: Plan of Care Review  Outcome: Adequate for Care Transition  Goal: Patient-Specific Goal (Individualized)  Outcome: Adequate for Care Transition  Goal: Absence of Hospital-Acquired Illness or Injury  Outcome: Adequate for Care Transition  Goal: Optimal Comfort and Wellbeing  Outcome: Adequate for Care Transition  Goal: Readiness for Transition of Care  Outcome: Adequate for Care Transition     Problem: Diabetes Comorbidity  Goal: Blood Glucose Level Within Targeted Range  Outcome: Adequate for Care Transition     Problem: Sepsis/Septic Shock  Goal: Optimal Coping  Outcome: Adequate for Care Transition  Goal: Absence of Bleeding  Outcome: Adequate for Care Transition  Goal: Blood Glucose Level Within Targeted Range  Outcome: Adequate for Care Transition  Goal: Absence of Infection Signs and Symptoms  Outcome: Adequate for Care Transition  Goal: Optimal Nutrition Intake  Outcome: Adequate for Care Transition     Problem: Acute Kidney Injury/Impairment  Goal: Fluid and Electrolyte Balance  Outcome: Adequate for Care Transition  Goal: Improved Oral Intake  Outcome: Adequate for Care Transition  Goal: Effective Renal Function  Outcome: Adequate for Care Transition     Problem: Pneumonia  Goal: Fluid Balance  Outcome: Adequate for Care Transition  Goal: Resolution of Infection Signs and Symptoms  Outcome: Adequate for Care Transition  Goal: Effective Oxygenation and Ventilation  Outcome: Adequate for Care Transition     Problem: Wound  Goal: Optimal Coping  Outcome: Adequate for Care Transition  Goal: Optimal Functional Ability  Outcome: Adequate for Care Transition  Goal: Absence of Infection Signs and Symptoms  Outcome: Adequate for Care Transition  Goal: Improved Oral Intake  Outcome: Adequate for Care Transition  Goal: Optimal Pain Control and Function  Outcome: Adequate for Care Transition  Goal: Skin Health and Integrity  Outcome: Adequate for Care Transition  Goal:  Optimal Wound Healing  Outcome: Adequate for Care Transition     Problem: Fall Injury Risk  Goal: Absence of Fall and Fall-Related Injury  Outcome: Adequate for Care Transition

## 2025-04-21 NOTE — DISCHARGE SUMMARY
[facility] Medicine  Discharge Summary      Patient Name: Matteo Fraire  MRN: [id number]  ROSS: [id number]  Patient Class: IP- Inpatient  Admission Date: 4/17/2025  Hospital Length of Stay: 4 days  Discharge Date and Time: 04/21/2025 3:55 PM  Attending Physician: Trang Garcia MD   Discharging Provider: Trang Garcia MD  Primary Care Provider: Naomi Bliss FNP-TABATHA    Primary Care Team: Networked reference to record PCT     HPI:   64 year old pt getting admitted with sepsis/Pneumonia  Pt has been suffering from dizziness for past several days  Also has been sweating profusely   Yesterday he was doing yard work and dizziness got worse  Also reports chills and rigors at night  Denied chest pain/SOB/cough/Abdominal pain  Today he had an appt with PCP and was referred to ER and got admitted     * No surgery found *      Hospital Course:   This is a 64-year-old gentleman who presents to emergency department with complaints of fatigue, subjective fever and chills as well as dyspnea who was admitted to Hospital Medicine for the treatment of community-acquired pneumonia.  Patient took quite a bit of time to defervesce, however, his procalcitonin to downtrend.  Due to patient continuing to spike fevers CTA of the chest was performed to rule out PE.  PE was ruled out, however, there was questionable mass confounded by the rather large pneumonia seen on CT imaging.  For which, pulmonologist was consulted.  They agreed with discharge and antibiotics with the recommendation to repeat CT imaging in 4-6 weeks.  Patient overall had continuous downtrend in his leukocytosis and fever curve and was felt to be appropriate for discharge ambulating without difficulty.  Patient was seen and examined prior to discharge in appropriate condition to do so.  He declined home health.     Goals of Care Treatment Preferences:  Code Status: Full Code      SDOH Screening:  The patient was screened for  utility difficulties, food insecurity, transport difficulties, housing insecurity, and interpersonal safety and there were no concerns identified this admission.     Consults:   Consults (From admission, onward)          Status Ordering Provider     Inpatient consult to Pulmonology  Once        Provider:  Vinod Morrison MD    Acknowledged EMANUEL RAMOS     Inpatient consult to Pulmonology  Once        Provider:  Jhon Miranda MD    Acknowledged RADHA GARCIA            Assessment & Plan  Acute pneumonia  Levaquin on DC    Antibiotics (From admission, onward)      Start     Stop Route Frequency Ordered    04/20/25 1100  ceFEPIme injection 2 g         -- IV Every 8 hours (non-standard times) 04/20/25 0927            Microbiology Results (last 7 days)       Procedure Component Value Units Date/Time    Blood culture x two cultures. Draw prior to antibiotics [[id number]]  (Normal) Collected: 04/17/25 1252    Order Status: Completed Specimen: Blood Updated: 04/20/25 1501     CULTURE, BLOOD ([facility]) No Growth After 72 Hours    Urine Culture High Risk [[id number]] Collected: 04/17/25 1926    Order Status: Completed Specimen: Urine, Clean Catch Updated: 04/20/25 0636     Urine Culture No Growth    Respiratory Infection Panel (PCR), Nasopharyngeal [[id number]] Collected: 04/19/25 1032    Order Status: Completed Specimen: Nasopharyngeal Swab Updated: 04/19/25 1148     Respiratory Infection Panel Source Nasopharyngeal Swab     Adenovirus Not Detected     Coronavirus 229E, Common Cold Virus Not Detected     Coronavirus HKU1, Common Cold Virus Not Detected     Coronavirus NL63, Common Cold Virus Not Detected     Coronavirus OC43, Common Cold Virus Not Detected     SARS-CoV2 (COVID-19) Qualitative PCR Not Detected     Human Metapneumovirus Not Detected     Human Rhinovirus/Enterovirus Not Detected     Influenza A (subtypes H1, H1-2009,H3) Not Detected     Influenza B Not Detected     Parainfluenza Virus 1 Not  Detected     Parainfluenza Virus 2 Not Detected     Parainfluenza Virus 3 Not Detected     Parainfluenza Virus 4 Not Detected     Respiratory Syncytial Virus Not Detected     Bordetella Parapertussis (IG7592) Not Detected     Bordetella pertussis (ptxP) Not Detected     Chlamydia pneumoniae Not Detected     Mycoplasma pneumoniae Not Detected    Blood culture x two cultures. Draw prior to antibiotics [[id number]]  (Abnormal) Collected: 04/17/25 1257    Order Status: Completed Specimen: Blood Updated: 04/19/25 0628     CULTURE, BLOOD ([facility]) Staphylococcus epidermidis     Comment: Organism is a probable contaminant        GRAM STAIN Gram Stain Anaerobic Bottle:      Gram positive cocci    MRSA Screen by PCR [[id number]]  (Normal) Collected: 04/18/25 1306    Order Status: Completed Specimen: Nasal Swab Updated: 04/18/25 1436     MRSA PCR SCRN ([facility]) Not Detected    Rapid Organism ID by PCR (from Blood culture) [[id number]]  (Abnormal) Collected: 04/17/25 1257    Order Status: Completed Specimen: Blood Updated: 04/18/25 1359     Enterococcus faecalis Not Detected     Enterococcus faecium Not Detected     Listeria monocytogenes Not Detected     Staphylococcus spp. See Species for ID     Staphylococcus aureus Not Detected     Staphylococcus epidermidis Detected     Staphylococcus lugdunensis Not Detected     Streptococcus spp. Not Detected     Streptococcus agalactiae (Group B) Not Detected     Streptococcus pneumoniae Not Detected     Streptococcus pyogenes (Group A) Not Detected     Acinetobacter calcoaceticus/baumannii complex Not Detected     Bacteroides fragilis Not Detected     Enterobacterales Not Detected     Enterobacter cloacae complex Not Detected     Escherichia coli Not Detected     Klebsiella aerogenes Not Detected     Klebsiella oxytoca Not Detected     Klebsiella pneumoniae group Not Detected     Proteus spp. Not Detected     Salmonella spp. Not Detected     Serratia marcescens Not Detected     Haemophilus  influenzae Not Detected     Neisseria meningitidis Not Detected     Pseudomonas aeruginosa Not Detected     Stenotrophomonas maltophilia Not Detected     Candida albicans Not Detected     Candida auris Not Detected     Candida glabrata Not Detected     Candida krusei Not Detected     Candida parapsilosis Not Detected     Candida tropicalis Not Detected     Cryptococcus neoformans/gattii Not Detected     CTX-M (ESBL ) N/A     Comment: Note: Antimicrobial resistance can occur via multiple mechanisms. A Not Detected result for antimicrobial resistance gene(s) does not indicate antimicrobial susceptibility. Subculturing is required for species identification and susceptibility testing of   isolates.        IMP (Cabapenemase ) N/A     Comment: Note: Antimicrobial resistance can occur via multiple mechanisms. A Not Detected result for antimicrobial resistance gene(s) does not indicate antimicrobial susceptibility. Subculturing is required for species identification and susceptibility testing of   isolates.        KPC resistance gene (Carbapenemase ) N/A     Comment: Note: Antimicrobial resistance can occur via multiple mechanisms. A Not Detected result for antimicrobial resistance gene(s) does not indicate antimicrobial susceptibility. Subculturing is required for species identification and susceptibility testing of   isolates.        mcr-1 N/A     Comment: Note: Antimicrobial resistance can occur via multiple mechanisms. A Not Detected result for antimicrobial resistance gene(s) does not indicate antimicrobial susceptibility. Subculturing is required for species identification and susceptibility testing of   isolates.        mecA/C and MREJ (MRSA) gene N/A     Comment: Note: Antimicrobial resistance can occur via multiple mechanisms. A Not Detected result for antimicrobial resistance gene(s) does not indicate antimicrobial susceptibility. Subculturing is required for species identification and  susceptibility testing of   isolates.        NDM (Carbapenemase ) N/A     Comment: Note: Antimicrobial resistance can occur via multiple mechanisms. A Not Detected result for antimicrobial resistance gene(s) does not indicate antimicrobial susceptibility. Subculturing is required for species identification and susceptibility testing of   isolates.        OXA-48-like (Carbapenemase ) N/A     Comment: Note: Antimicrobial resistance can occur via multiple mechanisms. A Not Detected result for antimicrobial resistance gene(s) does not indicate antimicrobial susceptibility. Subculturing is required for species identification and susceptibility testing of   isolates.        Kingsley/B (VRE gene) N/A     Comment: Note: Antimicrobial resistance can occur via multiple mechanisms. A Not Detected result for antimicrobial resistance gene(s) does not indicate antimicrobial susceptibility. Subculturing is required for species identification and susceptibility testing of   isolates.        VIM (Carbapenemase ) N/A     Comment: Note: Antimicrobial resistance can occur via multiple mechanisms. A Not Detected result for antimicrobial resistance gene(s) does not indicate antimicrobial susceptibility. Subculturing is required for species identification and susceptibility testing of   isolates.        mecA ID Not Detected     Comment: Note: Antimicrobial resistance can occur via multiple mechanisms. A Not Detected result for antimicrobial resistance gene(s) does not indicate antimicrobial susceptibility. Subculturing is required for species identification and susceptibility testing of   isolates.       Influenza A & B by Molecular [[id number]]  (Normal) Collected: 04/17/25 1345    Order Status: Completed Specimen: Nasopharyngeal Swab Updated: 04/17/25 1438     INFLUENZA A MOLECULAR Negative     INFLUENZA B MOLECULAR  Negative              Large pneumonia and questionable mass? May need repeat CT in 3 months,  appreciate pulm seeing  Coronary artery disease involving coronary bypass graft of native heart with angina pectoris  H.o aware  Maintain GDMT    S/P CABG (coronary artery bypass graft)  Aware     History of stroke  Aware     Heart failure with reduced ejection fraction  H/o aware  Resume entresto   Pt takes lasix PRN basis at home    Hyperbilirubinemia  USS abdomen ordered from ER    Atrial fibrillation  [id number] Score: 2. The patients heart rate in the last 24 hours is as follows:  Pulse  Min: 78  Max: 102     Antiarrhythmics  amiodarone tablet 200 mg, Daily, Oral  metoprolol succinate (TOPROL-XL) 24 hr tablet 25 mg, Nightly, Oral    Anticoagulants  enoxaparin injection 40 mg, Every 24 hours, Subcutaneous    Diabetes  Patient's FSGs are controlled on current medication regimen.  Last A1c reviewed-   Lab Results   Component Value Date    HGBA1C 6.4 (H) 04/17/2025     Most recent fingerstick glucose reviewed-   Recent Labs   Lab 04/21/25  0552 04/21/25  1237   POCTGLUCOSE 152* 132*     Current correctional scale  Medium  Maintain anti-hyperglycemic dose as follows-   Antihyperglycemics (From admission, onward)      Start     Stop Route Frequency Ordered    04/17/25 1845  insulin aspart U-100 pen 0-10 Units         -- SubQ Before meals & nightly PRN 04/17/25 1745          Hold Oral hypoglycemics while patient is in the hospital.  Final Active Diagnoses:    Diagnosis Date Noted POA    Hyperbilirubinemia [E80.6] 04/17/2025 Yes    Atrial fibrillation [I48.91] 04/17/2025 Yes    Diabetes [E11.9] 04/17/2025 Yes    Heart failure with reduced ejection fraction [I50.20] 10/14/2024 Yes    History of stroke [Z86.73] 01/24/2023 Not Applicable    Acute pneumonia [J18.9] 07/17/2022 Yes    S/P CABG (coronary artery bypass graft) [Z95.1]  Not Applicable    Coronary artery disease involving coronary bypass graft of native heart with angina pectoris [I25.709] 06/29/2022 Yes      Problems Resolved During this Admission:    Diagnosis  Date Noted Date Resolved POA    PRINCIPAL PROBLEM:  Sepsis [A41.9] 04/17/2025 04/19/2025 Yes    Troponin level elevated [R79.89] 04/17/2025 04/18/2025 Yes    BETHANY (acute kidney injury) [N17.9] 04/17/2025 04/20/2025 Yes       Discharged Condition: stable    Disposition: Home or Self Care    Follow Up:   Follow-up Information       Naomi Bliss, BRIAN-TABATHA Follow up on 4/29/2025.    Specialty: Family Medicine  Why: @ 1:40 pm    As previously scheduled.  Contact information:  [address]  [address]  [place] [address]  [phone]               Siobhan Butterfield MD. Schedule an appointment as soon as possible for a visit in 4 week(s).    Specialties: Pulmonary Disease, Critical Care Medicine  Contact information:  [address]  [address]  [place] [address]  [phone]                           Patient Instructions:   No discharge procedures on file.    Significant Diagnostic Studies: Labs: BMP:   Recent Labs   Lab 04/20/25  0507 04/21/25  0535    137   K 3.9 4.2   CO2 21* 23   BUN 13 17   CREATININE 1.2 1.2   CALCIUM 8.2* 8.4*   MG 1.9 1.9    and CBC   Recent Labs   Lab 04/20/25  0507 04/21/25  0535   WBC 7.38 8.12   HGB 10.3* 10.5*   HCT 35.8* 35.1*    198       Pending Diagnostic Studies:       Procedure Component Value Units Date/Time    EXTRA TUBES [[id number]] Collected: 04/17/25 1249    Order Status: Sent Lab Status: In process Updated: 04/17/25 1249    Specimen: Blood, Venous     Narrative:      The following orders were created for panel order EXTRA TUBES.  Procedure                               Abnormality         Status                     ---------                               -----------         ------                     Light Blue Top Hold[[id number]]                             In process                 Gold Top Hold[[id number]]                                   In process                   Please view results for these tests on the individual orders.            Medications:  Reconciled Home Medications:      Medication List        START taking these medications      levoFLOXacin 750 MG tablet  Commonly known as: LEVAQUIN  Take 1 tablet (750 mg total) by mouth once daily.            CONTINUE taking these medications      amiodarone 200 MG Tab  Commonly known as: PACERONE  Take 1 tablet (200 mg total) by mouth once daily.     aspirin 81 MG Chew  Take 81 mg by mouth once daily.     atorvastatin 80 MG tablet  Commonly known as: LIPITOR  Take 1 tablet (80 mg total) by mouth every evening.     clopidogreL 75 mg tablet  Commonly known as: PLAVIX  Take 1 tablet (75 mg total) by mouth once daily.     ENTRESTO 24-26 mg per tablet  Generic drug: sacubitriL-valsartan  Take 1 tablet by mouth 2 (two) times daily.     furosemide 20 MG tablet  Commonly known as: LASIX  As needed (take 1 tablet for weight gain>3lb in 24 hours or >5lb in a week)     JARDIANCE 10 mg tablet  Generic drug: empagliflozin  Take 1 tablet (10 mg total) by mouth once daily.     metFORMIN 500 MG tablet  Commonly known as: GLUCOPHAGE  Take 1 tablet (500 mg total) by mouth 2 (two) times daily with meals.     metoprolol succinate 25 MG 24 hr tablet  Commonly known as: TOPROL-XL  Take 1 tablet (25 mg total) by mouth every evening.              Indwelling Lines/Drains at time of discharge:   Lines/Drains/Airways       None                   Time spent on the discharge of patient: 35 minutes         Trang Garcia MD  Department of Hospital Medicine  [facility]

## 2025-04-21 NOTE — HOSPITAL COURSE
This is a 64-year-old gentleman who presents to emergency department with complaints of fatigue, subjective fever and chills as well as dyspnea who was admitted to Hospital Medicine for the treatment of community-acquired pneumonia.  Patient took quite a bit of time to defervesce, however, his procalcitonin to downtrend.  Due to patient continuing to spike fevers CTA of the chest was performed to rule out PE.  PE was ruled out, however, there was questionable mass confounded by the rather large pneumonia seen on CT imaging.  For which, pulmonologist was consulted.  They agreed with discharge and antibiotics with the recommendation to repeat CT imaging in 4-6 weeks.  Patient overall had continuous downtrend in his leukocytosis and fever curve and was felt to be appropriate for discharge ambulating without difficulty.  Patient was seen and examined prior to discharge in appropriate condition to do so.  He declined home health.

## 2025-04-21 NOTE — ASSESSMENT & PLAN NOTE
[id number] Score: 2. The patients heart rate in the last 24 hours is as follows:  Pulse  Min: 78  Max: 102     Antiarrhythmics  amiodarone tablet 200 mg, Daily, Oral  metoprolol succinate (TOPROL-XL) 24 hr tablet 25 mg, Nightly, Oral    Anticoagulants  enoxaparin injection 40 mg, Every 24 hours, Subcutaneous

## 2025-04-21 NOTE — NURSING
Discharge instructions reviewed via virtual nurse. IV removed and tele monitor off. Pt discharged home via family transportation.

## 2025-04-21 NOTE — DISCHARGE INSTRUCTIONS
As we discussed, I have sent an antibiotic to your pharmacy.  You will take this daily for 5 days.  This will complete a 10 day course.  We gave you such a length of duration due to how ill you were when you presented to the hospital.  Additionally, as we also discussed I would recommend you obtain a repeat CAT scan imaging in 1-2 months.  Please discuss with your primary care provider.  Additionally, you may follow up with pulmonologist whose information is included in your discharge paperwork.  Thank you for allowing me to participate in your care.

## 2025-04-21 NOTE — ASSESSMENT & PLAN NOTE
Patient's FSGs are controlled on current medication regimen.  Last A1c reviewed-   Lab Results   Component Value Date    HGBA1C 6.4 (H) 04/17/2025     Most recent fingerstick glucose reviewed-   Recent Labs   Lab 04/21/25  0552 04/21/25  1237   POCTGLUCOSE 152* 132*     Current correctional scale  Medium  Maintain anti-hyperglycemic dose as follows-   Antihyperglycemics (From admission, onward)      Start     Stop Route Frequency Ordered    04/17/25 1845  insulin aspart U-100 pen 0-10 Units         -- SubQ Before meals & nightly PRN 04/17/25 1745          Hold Oral hypoglycemics while patient is in the hospital.

## 2025-04-22 ENCOUNTER — TELEPHONE (OUTPATIENT)
Dept: PULMONOLOGY | Facility: CLINIC | Age: 65
End: 2025-04-22
Payer: COMMERCIAL

## 2025-04-22 ENCOUNTER — PATIENT OUTREACH (OUTPATIENT)
Dept: FAMILY MEDICINE | Facility: CLINIC | Age: 65
End: 2025-04-22
Payer: COMMERCIAL

## 2025-04-22 LAB — BACTERIA BLD CULT: NORMAL

## 2025-04-22 NOTE — TELEPHONE ENCOUNTER
----- Message from  Sara sent at 4/21/2025  3:52 PM CDT -----  Regarding: hospital follow up  Hi, patient is being discharged from [facility] today and patient will need a follow up within 4 weeks. Please contact patient to schedule.Thank you,Sara Kramer RN

## 2025-04-22 NOTE — TELEPHONE ENCOUNTER
Discharge Information     Discharge Date:  04/21/2025     Primary Discharge Diagnosis:  Febrile illness acute sepsis, chest pain      Discharge Summary:  Reviewed      Medication & Order Review     Were medication changes made or new medications added?   Yes    If so, has the patient filled the prescriptions?  Yes     Was Home Health ordered? No    If so, has Home Health contacted patient and/or initiated services?  N/A    Name of Home Health Agency? N/A    Durable Medical Equipment ordered?  No     If so, has the DME provider contacted patient and delivered equipment?  N/A    Follow Up               Any problems since discharge? No    How is the patient feeling since returning home? Pt states he feeling much better since d/c. KM     Have you set up recommended follow up appointments?  (cardiology, surgery, etc.) Pt scheduled with Pulmonology Dr. Butterfield on 05/14/2025. KM    Schedule Hospital Follow-up appointment within 7-14 days (preferably 7).  HFU scheduled with PCP Naomi Bliss on 04/29/2025.     Notes:  None            Tasha Johnson

## 2025-04-24 ENCOUNTER — PATIENT OUTREACH (OUTPATIENT)
Dept: ADMINISTRATIVE | Facility: CLINIC | Age: 65
End: 2025-04-24
Payer: COMMERCIAL

## 2025-04-24 NOTE — PROGRESS NOTES
C3 nurse attempted to contact Matteo Fraire  for a TCC post hospital discharge follow up call.LVM. The patient has a scheduled [facility] appointment with Naomi Bliss FNP-C on 04/29/25 @ [id number].

## 2025-04-29 ENCOUNTER — OFFICE VISIT (OUTPATIENT)
Dept: FAMILY MEDICINE | Facility: CLINIC | Age: 65
End: 2025-04-29
Payer: COMMERCIAL

## 2025-04-29 VITALS
HEART RATE: 72 BPM | WEIGHT: 224.63 LBS | HEIGHT: 69 IN | RESPIRATION RATE: 20 BRPM | TEMPERATURE: 98 F | SYSTOLIC BLOOD PRESSURE: 100 MMHG | BODY MASS INDEX: 33.27 KG/M2 | DIASTOLIC BLOOD PRESSURE: 60 MMHG

## 2025-04-29 DIAGNOSIS — Z09 HOSPITAL DISCHARGE FOLLOW-UP: Primary | ICD-10-CM

## 2025-04-29 PROCEDURE — 99213 OFFICE O/P EST LOW 20 MIN: CPT | Mod: S$GLB,,, | Performed by: NURSE PRACTITIONER

## 2025-04-29 PROCEDURE — 4010F ACE/ARB THERAPY RXD/TAKEN: CPT | Mod: CPTII,S$GLB,, | Performed by: NURSE PRACTITIONER

## 2025-04-29 PROCEDURE — 3074F SYST BP LT 130 MM HG: CPT | Mod: CPTII,S$GLB,, | Performed by: NURSE PRACTITIONER

## 2025-04-29 PROCEDURE — 1159F MED LIST DOCD IN RCRD: CPT | Mod: CPTII,S$GLB,, | Performed by: NURSE PRACTITIONER

## 2025-04-29 PROCEDURE — 3078F DIAST BP <80 MM HG: CPT | Mod: CPTII,S$GLB,, | Performed by: NURSE PRACTITIONER

## 2025-04-29 PROCEDURE — 1111F DSCHRG MED/CURRENT MED MERGE: CPT | Mod: CPTII,S$GLB,, | Performed by: NURSE PRACTITIONER

## 2025-04-29 PROCEDURE — 3044F HG A1C LEVEL LT 7.0%: CPT | Mod: CPTII,S$GLB,, | Performed by: NURSE PRACTITIONER

## 2025-04-29 PROCEDURE — 3008F BODY MASS INDEX DOCD: CPT | Mod: CPTII,S$GLB,, | Performed by: NURSE PRACTITIONER

## 2025-04-29 PROCEDURE — 99999 PR PBB SHADOW E&M-EST. PATIENT-LVL IV: CPT | Mod: PBBFAC,,, | Performed by: NURSE PRACTITIONER

## 2025-04-29 NOTE — PROGRESS NOTES
SUBJECTIVE:    Patient ID: Matteo Fraire is a 64 y.o. male.    Chief Complaint: Hospital Follow Up (63 yo male here for HFU. Pt was adm on 04/17/2025 and sepsis and pneumonia. Pt states no c/o since d/c. KM)    History of Present Illness    CHIEF COMPLAINT:  Mr. Fraire presents for a follow-up visit after a recent hospitalization for pneumonia and bacterial blood infection.    HPI:  Mr. Fraire was recently hospitalized for pneumonia and a bacterial blood infection. During the hospital stay, he underwent multiple tests including CTs of chest and head, chest XRs, an EKG, and various labs. He was diagnosed with a bacterial blood infection and given antibiotics, which cleared the infection. He was placed on a heart-healthy, low-sodium, low-sugar diet with fluid intake restricted to 1500 mL daily during hospitalization. He was discharged with a course of antibiotics, which has been completed. He reports overall improvement since discharge, though notes a slower recovery process. He no longer has dizziness. He has engaged in some physical activity post-discharge, including lawn mowing, but acknowledges not being at baseline. After an hour of yard work, he checked his pulse rate, finding it between 60-67 BPM, which he perceives as potentially lower than usual.    He denies current fever or symptoms of heart failure.    MEDICATIONS:  Mr. Fraire has completed a course of antibiotics. He is on medication that slows heart rate. Vancomycin has been discontinued.    MEDICAL HISTORY:  Mr. Fraire has a history of bacterial blood infection, pneumonia, and multiple strokes.    TEST RESULTS:  Mr. Fraire's recent A1C was 6.4%, with previous readings of 6.1% and 6.2%. Before hospital admission, his BNP was 302, magnesium levels were normal, and inflammation markers were normal. Troponin levels were elevated. During the hospital stay, blood cultures showed one bottle negative and the other likely contamination. A urine  sample was completed. COVID and flu tests were negative. D-dimer was positive (elevated). Prolactin and phosphorus tests were completed. Vancomycin levels were monitored. Kidney and liver functions remained stable. Finger prick glucose readings were between 121 and 133. During the hospital stay, an EKG showed normal sinus rhythm. Mr. Fraire was on continuous heart monitoring throughout his stay.    IMAGING:  A CT Angiogram during the hospital stay revealed no blood clots, but noted consolidation (pneumonia) and infiltrates. An ultrasound of the abdomen and pelvis found no infection. A CT Head was normal. A chest XR was completed during the hospital stay. Before hospital admission, a CT Chest was completed.      ROS:  General: -fever, -chills, -fatigue, -weight gain, -weight loss  Eyes: -vision changes, -redness, -discharge  ENT: -ear pain, -nasal congestion, -sore throat  Cardiovascular: -chest pain, -palpitations, -lower extremity edema, +feelings of slow heart rate  Respiratory: -cough, -shortness of breath  Gastrointestinal: -abdominal pain, -nausea, -vomiting, -diarrhea, -constipation, -blood in stool  Genitourinary: -dysuria, -hematuria, -frequency  Musculoskeletal: -joint pain, -muscle pain, +limited movement  Skin: -rash, -lesion  Neurological: -headache, -dizziness, -numbness, -tingling  Psychiatric: -anxiety, -depression, -sleep difficulty       Review of Systems    Admit Date: 4/17/2025  Discharge Date: 4/21/2025  Discharge Facility: Hospital      Family and/or Caretaker present at visit? No  Medication Reconciliation:  Medications changed/added/deleted. Patient has completed outpatient Antibiotics as ordered   New Prescriptions filled after discharge: yes  Discharge summary reviewed:  yes  Diagnostic tests reviewed/disposition: No diagnosic tests pending after this hospitalization  Disease/illness education: Patient had imaging and labs reviewed in great detail   Follow up appointments scheduled:  yes               with Pulmonology  Follow up labs/tests ordered:   yes  Home Health ordered on discharge: Patient does not have home health established from hospital visit.  They do not need home health.  If needed, we will set up home health for the patient  Home Health company name:   Establishment or re-establishment of referral orders for community resources: No other necessary community resources  DME ordered at discharge:   not applicable  How patient is feeling since discharge from the hospital?  Patient states he is feeling better since being discharged.      Discussion with other health care providers: No discussion with other health care providers necessary  Patient follow up phone call documented on separate encounter.    No results displayed because visit has over 200 results.      Orders Only on 04/17/2025   Component Date Value Ref Range Status    QRS Duration 04/17/2025 110  ms Final    [facility] QTC Calculation 04/17/2025 457  ms Final   Lab Visit on 04/17/2025   Component Date Value Ref Range Status    Sodium 04/17/2025 135 (L)  136 - 145 mmol/L Final    Potassium 04/17/2025 4.6  3.5 - 5.1 mmol/L Final    Chloride 04/17/2025 103  95 - 110 mmol/L Final    CO2 04/17/2025 22 (L)  23 - 29 mmol/L Final    Glucose 04/17/2025 179 (H)  70 - 110 mg/dL Final    BUN 04/17/2025 23  8 - 23 mg/dL Final    Creatinine 04/17/2025 2.0 (H)  0.5 - 1.4 mg/dL Final    Calcium 04/17/2025 9.5  8.7 - 10.5 mg/dL Final    Protein Total 04/17/2025 7.6  6.0 - 8.4 gm/dL Final    Albumin 04/17/2025 4.6  3.5 - 5.2 g/dL Final    Bilirubin Total 04/17/2025 3.0 (H)  0.1 - 1.0 mg/dL Final    ALP 04/17/2025 65  55 - 135 unit/L Final    AST 04/17/2025 10  10 - 40 unit/L Final    ALT 04/17/2025 9 (L)  10 - 44 unit/L Final    Anion Gap 04/17/2025 10  8 - 16 mmol/L Final    eGFR 04/17/2025 37 (L)  >60 mL/min/1.73/m2 Final    Sed Rate 04/17/2025 20 (H)  0 - 10 mm/hr Final    Hemoglobin A1c 04/17/2025 6.4 (H)  4.5 - 6.2 % Final    Estimated Average  Glucose 04/17/2025 137 (H)  68 - 131 mg/dL Final    BNP 04/17/2025 302 (H)  <=99 pg/mL Final    Magnesium 04/17/2025 2.0  1.6 - 2.6 mg/dL Final    CPK 04/17/2025 74  20 - 200 U/L Final    CK-MB 04/17/2025 1.2  0.1 - 6.5 ng/mL Final    WBC 04/17/2025 15.90 (H)  3.90 - 12.70 K/uL Final    RBC 04/17/2025 5.45  4.60 - 6.20 M/uL Final    Hgb 04/17/2025 12.7 (L)  14.0 - 18.0 gm/dL Final    Hct 04/17/2025 43.5  40.0 - 54.0 % Final    MCV 04/17/2025 80 (L)  82 - 98 fL Final    MCH 04/17/2025 23.3 (L)  27.0 - 31.0 pg Final    MCHC 04/17/2025 29.2 (L)  32.0 - 36.0 g/dL Final    RDW 04/17/2025 17.7 (H)  11.5 - 14.5 % Final    Platelet Count 04/17/2025 262  150 - 450 K/uL Final    MPV 04/17/2025 11.4  9.2 - 12.9 fL Final    Nucleated RBC 04/17/2025 0  <=0 /100 WBC Final    Neut % 04/17/2025 85.2 (H)  38 - 73 % Final    Lymph % 04/17/2025 5.0 (L)  18 - 48 % Final    Mono % 04/17/2025 8.4  4 - 15 % Final    Eos % 04/17/2025 0.1  0 - 8 % Final    Basophil % 04/17/2025 0.5  <=1.9 % Final    Imm Grans % 04/17/2025 0.8 (H)  0.0 - 0.5 % Final    Neut # 04/17/2025 13.6 (H)  1.8 - 7.7 K/uL Final    Lymph # 04/17/2025 0.79 (L)  1 - 4.8 K/uL Final    Mono # 04/17/2025 1.33 (H)  0.3 - 1 K/uL Final    Eos # 04/17/2025 0.01  <=0.5 K/uL Final    Baso # 04/17/2025 0.08  <=0.2 K/uL Final    Imm Grans # 04/17/2025 0.13 (H)  0.00 - 0.04 K/uL Final   Lab Visit on 04/17/2025   Component Date Value Ref Range Status    Color, UA 04/17/2025 Yellow  Straw, Liudmila, Yellow, Light-Orange Final    Appearance, UA 04/17/2025 Clear  Clear Final    pH, UA 04/17/2025 6.0  5.0 - 8.0 Final    Spec Grav UA 04/17/2025 >=1.030 (A)  1.005 - 1.030 Final    Protein, UA 04/17/2025 1+ (A)  Negative Final    Glucose, UA 04/17/2025 4+ (A)  Negative Final    Ketones, UA 04/17/2025 Trace (A)  Negative Final    Bilirubin, UA 04/17/2025 Negative  Negative Final    Blood, UA 04/17/2025 Negative  Negative Final    Nitrites, UA 04/17/2025 Negative  Negative Final    Urobilinogen,  UA 04/17/2025 Negative  <2.0 EU/dL Final    Leukocyte Esterase, UA 04/17/2025 Negative  Negative Final    Troponin High Sensitive 04/17/2025 50.9 (HH)  <=14.9 pg/mL Final    RBC, UA 04/17/2025 1  0 - 4 /HPF Final    WBC, UA 04/17/2025 1  0 - 5 /HPF Final    Bacteria, UA 04/17/2025 None  None, Rare, Occasional /HPF Final    Yeast, UA 04/17/2025 None  None /HPF Final    Squamous Epithelial Cells, UA 04/17/2025 <1  /HPF Final    Hyaline Casts, UA 04/17/2025 0  0 - 1 /LPF Final    Microscopic Comment 04/17/2025    Final   Office Visit on 04/17/2025   Component Date Value Ref Range Status    POC Rapid COVID 04/17/2025 Negative  Negative Final     Acceptable 04/17/2025 Yes   Final    Rapid Influenza A Ag 04/17/2025 Negative  Negative Final    Rapid Influenza B Ag 04/17/2025 Negative  Negative Final     Acceptable 04/17/2025 Yes   Final   Lab Visit on 12/18/2024   Component Date Value Ref Range Status    Sodium 12/18/2024 139  136 - 145 mmol/L Final    Potassium 12/18/2024 5.1  3.5 - 5.1 mmol/L Final    Chloride 12/18/2024 108  95 - 110 mmol/L Final    CO2 12/18/2024 23  23 - 29 mmol/L Final    Glucose 12/18/2024 132 (H)  70 - 110 mg/dL Final    BUN 12/18/2024 16  8 - 23 mg/dL Final    Creatinine 12/18/2024 1.4  0.5 - 1.4 mg/dL Final    Calcium 12/18/2024 9.1  8.7 - 10.5 mg/dL Final    Total Protein 12/18/2024 6.5  6.0 - 8.4 g/dL Final    Albumin 12/18/2024 3.9  3.5 - 5.2 g/dL Final    Total Bilirubin 12/18/2024 1.4 (H)  0.1 - 1.0 mg/dL Final    Alkaline Phosphatase 12/18/2024 64  40 - 150 U/L Final    AST 12/18/2024 16  10 - 40 U/L Final    ALT 12/18/2024 15  10 - 44 U/L Final    eGFR 12/18/2024 56.1 (A)  >60 mL/min/1.73 m^2 Final    Anion Gap 12/18/2024 8  8 - 16 mmol/L Final    WBC 12/18/2024 6.19  3.90 - 12.70 K/uL Final    RBC 12/18/2024 5.13  4.60 - 6.20 M/uL Final    Hemoglobin 12/18/2024 11.6 (L)  14.0 - 18.0 g/dL Final    Hematocrit 12/18/2024 41.3  40.0 - 54.0 % Final    MCV  12/18/2024 81 (L)  82 - 98 fL Final    MCH 12/18/2024 22.6 (L)  27.0 - 31.0 pg Final    MCHC 12/18/2024 28.1 (L)  32.0 - 36.0 g/dL Final    RDW 12/18/2024 17.6 (H)  11.5 - 14.5 % Final    Platelets 12/18/2024 247  150 - 450 K/uL Final    MPV 12/18/2024 11.4  9.2 - 12.9 fL Final    Immature Granulocytes 12/18/2024 0.3  0.0 - 0.5 % Final    Gran # (ANC) 12/18/2024 3.6  1.8 - 7.7 K/uL Final    Immature Grans (Abs) 12/18/2024 0.02  0.00 - 0.04 K/uL Final    Lymph # 12/18/2024 1.3  1.0 - 4.8 K/uL Final    Mono # 12/18/2024 0.5  0.3 - 1.0 K/uL Final    Eos # 12/18/2024 0.7 (H)  0.0 - 0.5 K/uL Final    Baso # 12/18/2024 0.06  0.00 - 0.20 K/uL Final    nRBC 12/18/2024 0  0 /100 WBC Final    Gran % 12/18/2024 58.5  38.0 - 73.0 % Final    Lymph % 12/18/2024 21.6  18.0 - 48.0 % Final    Mono % 12/18/2024 8.1  4.0 - 15.0 % Final    Eosinophil % 12/18/2024 10.5 (H)  0.0 - 8.0 % Final    Basophil % 12/18/2024 1.0  0.0 - 1.9 % Final    Differential Method 12/18/2024 Automated   Final    Cholesterol 12/18/2024 183  120 - 199 mg/dL Final    Triglycerides 12/18/2024 176 (H)  30 - 150 mg/dL Final    HDL 12/18/2024 29 (L)  40 - 75 mg/dL Final    LDL Cholesterol 12/18/2024 118.8  63.0 - 159.0 mg/dL Final    HDL/Cholesterol Ratio 12/18/2024 15.8 (L)  20.0 - 50.0 % Final    Total Cholesterol/HDL Ratio 12/18/2024 6.3 (H)  2.0 - 5.0 Final    Non-HDL Cholesterol 12/18/2024 154  mg/dL Final    Hemoglobin A1C 12/18/2024 6.1 (H)  4.0 - 5.6 % Final    Estimated Avg Glucose 12/18/2024 128  68 - 131 mg/dL Final    Sodium 12/18/2024 139  136 - 145 mmol/L Final    Potassium 12/18/2024 5.1  3.5 - 5.1 mmol/L Final    Chloride 12/18/2024 108  95 - 110 mmol/L Final    CO2 12/18/2024 23  23 - 29 mmol/L Final    Glucose 12/18/2024 132 (H)  70 - 110 mg/dL Final    BUN 12/18/2024 16  8 - 23 mg/dL Final    Creatinine 12/18/2024 1.4  0.5 - 1.4 mg/dL Final    Calcium 12/18/2024 9.1  8.7 - 10.5 mg/dL Final    Total Protein 12/18/2024 6.5  6.0 - 8.4 g/dL Final     Albumin 12/18/2024 3.9  3.5 - 5.2 g/dL Final    Total Bilirubin 12/18/2024 1.4 (H)  0.1 - 1.0 mg/dL Final    Alkaline Phosphatase 12/18/2024 64  40 - 150 U/L Final    AST 12/18/2024 16  10 - 40 U/L Final    ALT 12/18/2024 15  10 - 44 U/L Final    eGFR 12/18/2024 56.1 (A)  >60 mL/min/1.73 m^2 Final    Anion Gap 12/18/2024 8  8 - 16 mmol/L Final    WBC 12/18/2024 6.19  3.90 - 12.70 K/uL Final    RBC 12/18/2024 5.13  4.60 - 6.20 M/uL Final    Hemoglobin 12/18/2024 11.6 (L)  14.0 - 18.0 g/dL Final    Hematocrit 12/18/2024 41.3  40.0 - 54.0 % Final    MCV 12/18/2024 81 (L)  82 - 98 fL Final    MCH 12/18/2024 22.6 (L)  27.0 - 31.0 pg Final    MCHC 12/18/2024 28.1 (L)  32.0 - 36.0 g/dL Final    RDW 12/18/2024 17.6 (H)  11.5 - 14.5 % Final    Platelets 12/18/2024 247  150 - 450 K/uL Final    MPV 12/18/2024 11.4  9.2 - 12.9 fL Final    Immature Granulocytes 12/18/2024 0.3  0.0 - 0.5 % Final    Gran # (ANC) 12/18/2024 3.6  1.8 - 7.7 K/uL Final    Immature Grans (Abs) 12/18/2024 0.02  0.00 - 0.04 K/uL Final    Lymph # 12/18/2024 1.3  1.0 - 4.8 K/uL Final    Mono # 12/18/2024 0.5  0.3 - 1.0 K/uL Final    Eos # 12/18/2024 0.7 (H)  0.0 - 0.5 K/uL Final    Baso # 12/18/2024 0.06  0.00 - 0.20 K/uL Final    nRBC 12/18/2024 0  0 /100 WBC Final    Gran % 12/18/2024 58.5  38.0 - 73.0 % Final    Lymph % 12/18/2024 21.6  18.0 - 48.0 % Final    Mono % 12/18/2024 8.1  4.0 - 15.0 % Final    Eosinophil % 12/18/2024 10.5 (H)  0.0 - 8.0 % Final    Basophil % 12/18/2024 1.0  0.0 - 1.9 % Final    Differential Method 12/18/2024 Automated   Final    PSA, Screen 12/18/2024 0.56  0.00 - 4.00 ng/mL Final       Past Medical History:   Diagnosis Date    Coronary artery disease     Heart attack 2005;2011    Hx of CABG     Hypertension 2008    Psoriasis     Stroke 2022     Past Surgical History:   Procedure Laterality Date    ANGIOGRAM, CORONARY, WITH LEFT HEART CATHETERIZATION N/A 8/13/2024    Procedure: Angiogram, Coronary, with Left Heart Cath;   Surgeon: Danica Howell MD;  Location: [facility] CATH/EP LAB;  Service: Cardiology;  Laterality: N/A;    CORONARY ANGIOGRAPHY INCLUDING BYPASS GRAFTS WITH CATHETERIZATION OF LEFT HEART Left 6/27/2022    Procedure: Left heart cath;  Surgeon: Stevan Powell MD;  Location: [facility] CATH/EP LAB;  Service: Cardiology;  Laterality: Left;    CORONARY ARTERY BYPASS GRAFT      CYSTOSCOPY W/ URETERAL STENT PLACEMENT Left 11/21/2019    Procedure: CYSTOSCOPY, WITH URETERAL STENT INSERTION;  Surgeon: Mickey Escudero MD;  Location: [facility] OR;  Service: Urology;  Laterality: Left;    ENDOSCOPIC HARVEST OF VEIN Right 7/8/2022    Procedure: HARVEST-VEIN-ENDOVASCULAR;  Surgeon: Eyal Stone MD;  Location: [facility];  Service: Cardiothoracic;  Laterality: Right;    ESOPHAGOGASTRODUODENOSCOPY N/A 7/15/2022    Procedure: EGD (ESOPHAGOGASTRODUODENOSCOPY);  Surgeon: Art Pino MD;  Location: [facility] ENDO;  Service: Endoscopy;  Laterality: N/A;    EXTRACORPOREAL SHOCK WAVE LITHOTRIPSY Left 11/21/2019    Procedure: LITHOTRIPSY, ESWL;  Surgeon: Mickey Escudero MD;  Location: [facility] OR;  Service: Urology;  Laterality: Left;    HERNIA REPAIR      Inguinal just after birth    IVUS, CORONARY  8/13/2024    Procedure: IVUS, Coronary;  Surgeon: Danica Howell MD;  Location: [facility] CATH/EP LAB;  Service: Cardiology;;    PERCUTANEOUS CORONARY INTERVENTION, ARTERY N/A 8/13/2024    Procedure: Percutaneous coronary intervention;  Surgeon: Danica Howell MD;  Location: [facility] CATH/EP LAB;  Service: Cardiology;  Laterality: N/A;    REPEAT CORONARY ARTERY BYPASS GRAFTING N/A 7/8/2022    Procedure: CABG, REPEAT;  Surgeon: Eyal Stone MD;  Location: [facility] OR;  Service: Cardiothoracic;  Laterality: N/A;  drugs ordered 7-7  @1322      SURGICAL PROCUREMENT, ARTERY, RADIAL, FOR CABG Left 7/8/2022    Procedure: SURGICAL PROCUREMENT,ARTERY,RADIAL,FOR CABG;  Surgeon: Eyal Stone MD;  Location: [facility];  Service: Cardiothoracic;   "Laterality: Left;     Family History   Problem Relation Name Age of Onset    Hearing loss Mother      Arthritis Mother         Marital Status: Single  Alcohol History:  reports that he does not currently use alcohol.  Tobacco History:  reports that he has never smoked. He has never been exposed to tobacco smoke. He has never used smokeless tobacco.  Drug History:  reports no history of drug use.    Review of patient's allergies indicates:   Allergen Reactions    Venom-wasp Anaphylaxis     Current Medications[1]  Objective:      Vitals:    04/29/25 1333   BP: 100/60   Pulse: 72   Resp: 20   Temp: 98.4 °F (36.9 °C)   TempSrc: Oral   Weight: 101.9 kg (224 lb 10.4 oz)   Height: 5' 9" (1.753 m)     Physical Exam  Physical Exam    General: No acute distress. Well-developed. Well-nourished.  Eyes: EOMI. Sclerae anicteric.  HENT: Normocephalic. Atraumatic. Nares patent. Moist oral mucosa.  Ears: Bilateral TMs clear. Bilateral EACs clear.  Cardiovascular: Regular rate. Regular rhythm. No murmurs. No rubs. No gallops. Normal S1, S2.  Respiratory: Normal respiratory effort. Clear to auscultation bilaterally. No rales. No rhonchi. No wheezing.  Abdomen: Soft. Non-tender. Non-distended. Normoactive bowel sounds.  Musculoskeletal: No  obvious deformity.  Extremities: No lower extremity edema.  Neurological: Alert & oriented x3. No slurred speech. Normal gait.  Psychiatric: Normal mood. Normal affect. Good insight. Good judgment.  Skin: Warm. Dry. No rash.       Assessment:       Assessment & Plan    N18.31 CKD stage 3a, GFR 45-59 ml/min  A41.9 Sepsis, unspecified organism  J18.9 Pneumonia, unspecified organism  Z86.73 Personal history of TIA, and cerebral infarction without residual deficits  R73.03 Prediabetes  R79.89 Other specified abnormal findings of blood chemistry  R91.8 Other nonspecific abnormal finding of lung field  R73.09 Other abnormal glucose    IMPRESSION:  - Reviewed recent hospitalization for suspected blood " infection and pneumonia.  - A1C stable at 6.4, BNP normal at 302, magnesium normal, inflammation markers normal.  - Evaluated troponin levels, which initially prompted hospital referral due to concerns about potential heart attack.  - Blood culture results: one negative, one likely contamination.  - Positive D-dimer test, leading to CTA to rule out pulmonary embolism.  - CTA: no blood clots found, but consolidation indicative of pneumonia observed.  - Considered possibility of underlying lung mass, requiring follow-up with pulmonologist.  - CT Head and abdominal/pelvic US results normal.  - Confirmed completion of prescribed antibiotic course.  - Overall improvement in condition post-hospitalization.    CKD STAGE 3A, GFR 45-59 ML/MIN:  - Monitored kidney function throughout hospital stay, which remained stable based on labs and kidney function tests.  - Implemented a specialized diet with limited fluid intake due to renal concerns.  - Evaluated hospital discharge summary and test results, maintained all current medications, and scheduled a follow-up visit to prevent readmission.    ## SEPSIS:  - Blood cultures were performed to check for infection, with one bottle returning negative, suggesting contamination rather than true bacteremia.  - Observed trends in white blood cell count and fever indicated an infection.  - Antibiotics were administered with successful resolution, and vancomycin levels were monitored throughout treatment.  - Review of all culture results concluded there was likely no blood infection.    ## PNEUMONIA:  - Imaging studies including CT and chest XRs revealed consolidation and infiltrates in the lungs, confirming pneumonia diagnosis.  - Antibiotics were administered for treatment.  - Further follow-up recommended to rule out underlying conditions.    ## STROKE HISTORY:  - CT of the head was normal with no evidence of new strokes.  - Mr. Fraire referred to cardiology for follow-up visit.    ##  PREDIABETES:  - A1C levels were monitored, with recent results at 6.4%, compared to previous results of 6.1% and 6.2%.  - Finger prick glucose readings ranged between 121 and 133.  - Assessment confirmed blood sugar are stable and within an acceptable range for prediabetes.    ## ABNORMAL BLOOD CHEMISTRY:  - Comprehensive labs were performed, including BNP (302, considered acceptable), magnesium, inflammation markers, troponin, prolactin, phosphorus, and kidney and liver function tests.  - Most results were within normal ranges or stable.    ## ABNORMAL LUNG FINDINGS:  - Imaging studies revealed consolidation and infiltrates in the lungs, with suspicion of pneumonia and possible underlying conditions.  - Pulmonology consultation recommended a follow-up CT in 4-6 weeks to evaluate resolution of abnormalities and rule out underlying conditions.    ## FOLLOW-UP:  - Scheduled follow-up visit with pulmonologist on May 20th for further evaluation and possible imaging.       Plan:       Hospital discharge follow-up  -Continue all medication as prescribed.     Follow up in about 3 months (around 7/29/2025), or if symptoms worsen or fail to improve.    This note was generated with the assistance of ambient listening technology. Verbal consent was obtained by the patient and accompanying visitor(s) for the recording of patient appointment to facilitate this note. I attest to having reviewed and edited the generated note for accuracy, though some syntax or spelling errors may persist. Please contact the author of this note for any clarification.               [1]   Current Outpatient Medications:     amiodarone (PACERONE) 200 MG Tab, Take 1 tablet (200 mg total) by mouth once daily., Disp: 90 tablet, Rfl: 3    aspirin 81 MG Chew, Take 81 mg by mouth once daily., Disp: , Rfl:     atorvastatin (LIPITOR) 80 MG tablet, Take 1 tablet (80 mg total) by mouth every evening., Disp: 90 tablet, Rfl: 3    clopidogreL (PLAVIX) 75 mg  tablet, Take 1 tablet (75 mg total) by mouth once daily., Disp: 90 tablet, Rfl: 3    furosemide (LASIX) 20 MG tablet, As needed (take 1 tablet for weight gain>3lb in 24 hours or >5lb in a week), Disp: 60 tablet, Rfl: 1    metoprolol succinate (TOPROL-XL) 25 MG 24 hr tablet, Take 1 tablet (25 mg total) by mouth every evening., Disp: 90 tablet, Rfl: 2    sacubitriL-valsartan (ENTRESTO) 24-26 mg per tablet, Take 1 tablet by mouth 2 (two) times daily., Disp: 180 tablet, Rfl: 2    empagliflozin (JARDIANCE) 10 mg tablet, Take 1 tablet (10 mg total) by mouth once daily. (Patient not taking: Reported on 4/29/2025), Disp: 90 tablet, Rfl: 0    metFORMIN (GLUCOPHAGE) 500 MG tablet, Take 1 tablet (500 mg total) by mouth 2 (two) times daily with meals. (Patient not taking: Reported on 4/29/2025), Disp: 180 tablet, Rfl: 3  No current facility-administered medications for this visit.    Facility-Administered Medications Ordered in Other Visits:     sodium bicarbonate 10 mEq, potassium chloride 30 mEq in cardioplegic solution (PLEGISOL) 1,000 mL, , Intravenous, Once, Eyal Stone MD    sodium bicarbonate 10 mEq, potassium chloride 70 mEq in cardioplegic solution (PLEGISOL) 1,000 mL, , Intravenous, Once, Eyal Stone MD    thromb,vy-zyvohr-zyyvfrw,s-Ca (TISSEEL) 4 mL, , Topical (Top), Once, Eyal Stone MD

## 2025-05-08 DIAGNOSIS — E11.9 TYPE 2 DIABETES MELLITUS WITHOUT COMPLICATION: ICD-10-CM

## 2025-05-20 ENCOUNTER — OFFICE VISIT (OUTPATIENT)
Dept: PULMONOLOGY | Facility: CLINIC | Age: 65
End: 2025-05-20
Payer: COMMERCIAL

## 2025-05-20 VITALS
HEART RATE: 74 BPM | SYSTOLIC BLOOD PRESSURE: 134 MMHG | WEIGHT: 224.63 LBS | DIASTOLIC BLOOD PRESSURE: 79 MMHG | OXYGEN SATURATION: 98 % | BODY MASS INDEX: 33.27 KG/M2 | HEIGHT: 69 IN

## 2025-05-20 DIAGNOSIS — J18.9 ACUTE PNEUMONIA: Primary | ICD-10-CM

## 2025-05-20 PROCEDURE — 99999 PR PBB SHADOW E&M-EST. PATIENT-LVL III: CPT | Mod: PBBFAC,,, | Performed by: STUDENT IN AN ORGANIZED HEALTH CARE EDUCATION/TRAINING PROGRAM

## 2025-05-20 PROCEDURE — 3044F HG A1C LEVEL LT 7.0%: CPT | Mod: CPTII,S$GLB,, | Performed by: STUDENT IN AN ORGANIZED HEALTH CARE EDUCATION/TRAINING PROGRAM

## 2025-05-20 PROCEDURE — 99213 OFFICE O/P EST LOW 20 MIN: CPT | Mod: S$GLB,,, | Performed by: STUDENT IN AN ORGANIZED HEALTH CARE EDUCATION/TRAINING PROGRAM

## 2025-05-20 PROCEDURE — 4010F ACE/ARB THERAPY RXD/TAKEN: CPT | Mod: CPTII,S$GLB,, | Performed by: STUDENT IN AN ORGANIZED HEALTH CARE EDUCATION/TRAINING PROGRAM

## 2025-05-20 PROCEDURE — 3075F SYST BP GE 130 - 139MM HG: CPT | Mod: CPTII,S$GLB,, | Performed by: STUDENT IN AN ORGANIZED HEALTH CARE EDUCATION/TRAINING PROGRAM

## 2025-05-20 PROCEDURE — 3008F BODY MASS INDEX DOCD: CPT | Mod: CPTII,S$GLB,, | Performed by: STUDENT IN AN ORGANIZED HEALTH CARE EDUCATION/TRAINING PROGRAM

## 2025-05-20 PROCEDURE — 3078F DIAST BP <80 MM HG: CPT | Mod: CPTII,S$GLB,, | Performed by: STUDENT IN AN ORGANIZED HEALTH CARE EDUCATION/TRAINING PROGRAM

## 2025-05-20 PROCEDURE — 1159F MED LIST DOCD IN RCRD: CPT | Mod: CPTII,S$GLB,, | Performed by: STUDENT IN AN ORGANIZED HEALTH CARE EDUCATION/TRAINING PROGRAM

## 2025-05-20 NOTE — PROGRESS NOTES
5/20/2025    Matteo Fraire  New Patient History and Physical    Chief Complaint   Patient presents with    Hospital Follow Up       HPI:Mr Fraire is a 64 year old man who presents for hospital follow up.       I saw him on 4/21/25 for a pneumonia. He was admitted to the hospital at the time     He reports that he is now feeling better.     He is a never smoker.     No hx of lung idsease or issues.     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     Feeling much better. Breathing better since hospital admission.           The chief complaint problem is New to me    PFSH:  Past Medical History:   Diagnosis Date    Coronary artery disease     Heart attack 2005;2011    Hx of CABG     Hypertension 2008    Psoriasis     Stroke 2022         Past Surgical History:   Procedure Laterality Date    ANGIOGRAM, CORONARY, WITH LEFT HEART CATHETERIZATION N/A 8/13/2024    Procedure: Angiogram, Coronary, with Left Heart Cath;  Surgeon: Danica Howell MD;  Location: [facility] CATH/EP LAB;  Service: Cardiology;  Laterality: N/A;    CORONARY ANGIOGRAPHY INCLUDING BYPASS GRAFTS WITH CATHETERIZATION OF LEFT HEART Left 6/27/2022    Procedure: Left heart cath;  Surgeon: Stevan Powell MD;  Location: [facility] CATH/EP LAB;  Service: Cardiology;  Laterality: Left;    CORONARY ARTERY BYPASS GRAFT      CYSTOSCOPY W/ URETERAL STENT PLACEMENT Left 11/21/2019    Procedure: CYSTOSCOPY, WITH URETERAL STENT INSERTION;  Surgeon: Mickey Escudero MD;  Location: [facility] OR;  Service: Urology;  Laterality: Left;    ENDOSCOPIC HARVEST OF VEIN Right 7/8/2022    Procedure: HARVEST-VEIN-ENDOVASCULAR;  Surgeon: Eyal Stone MD;  Location: [facility] OR;  Service: Cardiothoracic;  Laterality: Right;    ESOPHAGOGASTRODUODENOSCOPY N/A 7/15/2022    Procedure: EGD (ESOPHAGOGASTRODUODENOSCOPY);  Surgeon: Art Pino MD;  Location: [facility] ENDO;   Service: Endoscopy;  Laterality: N/A;    EXTRACORPOREAL SHOCK WAVE LITHOTRIPSY Left 11/21/2019    Procedure: LITHOTRIPSY, ESWL;  Surgeon: Mickey Escudero MD;  Location: [facility] OR;  Service: Urology;  Laterality: Left;    HERNIA REPAIR      Inguinal just after birth    IVUS, CORONARY  8/13/2024    Procedure: IVUS, Coronary;  Surgeon: Danica Howell MD;  Location: [facility] CATH/EP LAB;  Service: Cardiology;;    PERCUTANEOUS CORONARY INTERVENTION, ARTERY N/A 8/13/2024    Procedure: Percutaneous coronary intervention;  Surgeon: Danica Howell MD;  Location: [facility] CATH/EP LAB;  Service: Cardiology;  Laterality: N/A;    REPEAT CORONARY ARTERY BYPASS GRAFTING N/A 7/8/2022    Procedure: CABG, REPEAT;  Surgeon: Eyal Stone MD;  Location: [facility];  Service: Cardiothoracic;  Laterality: N/A;  drugs ordered 7-7  @1322      SURGICAL PROCUREMENT, ARTERY, RADIAL, FOR CABG Left 7/8/2022    Procedure: SURGICAL PROCUREMENT,ARTERY,RADIAL,FOR CABG;  Surgeon: Eyal Stone MD;  Location: [facility];  Service: Cardiothoracic;  Laterality: Left;     Social History[1]  Family History   Problem Relation Name Age of Onset    Hearing loss Mother      Arthritis Mother       Review of patient's allergies indicates:   Allergen Reactions    Venom-wasp Anaphylaxis       Performance Status:The patient's activity level is no limits with regular activity.  No intentional exercise but he is very active.     Review of Systems:   Review of Systems   Constitutional:  Negative for chills, fever, malaise/fatigue and weight loss.   HENT:  Negative for congestion, sinus pain and sore throat.    Eyes:  Negative for blurred vision and pain.   Respiratory:  Negative for cough, shortness of breath and wheezing.    Cardiovascular:  Negative for chest pain, palpitations, orthopnea, claudication and leg swelling.   Gastrointestinal:  Negative for abdominal pain, constipation, diarrhea, heartburn, melena, nausea and vomiting.   Genitourinary:   Negative for dysuria, frequency, hematuria and urgency.   Skin:  Negative for itching and rash.   Neurological:  Negative for dizziness, seizures, loss of consciousness and headaches.   Endo/Heme/Allergies:  Negative for environmental allergies and polydipsia. Does not bruise/bleed easily.   Psychiatric/Behavioral:  Negative for depression. The patient is not nervous/anxious.         Exam:  Physical Exam  Vitals reviewed.   Constitutional:       General: He is not in acute distress.     Appearance: He is well-developed. He is not diaphoretic.   HENT:      Head: Normocephalic and atraumatic.      Mouth/Throat:      Pharynx: No oropharyngeal exudate or posterior oropharyngeal erythema.   Eyes:      General: No scleral icterus.     Pupils: Pupils are equal, round, and reactive to light.   Neck:      Vascular: No JVD.   Cardiovascular:      Rate and Rhythm: Normal rate and regular rhythm.      Heart sounds: Normal heart sounds. No murmur heard.  Pulmonary:      Effort: Pulmonary effort is normal. No respiratory distress.      Breath sounds: Normal breath sounds. No wheezing.   Abdominal:      General: Bowel sounds are normal. There is no distension.      Palpations: Abdomen is soft.      Tenderness: There is no abdominal tenderness.   Musculoskeletal:         General: No swelling.      Cervical back: Normal range of motion and neck supple. No rigidity.   Skin:     General: Skin is warm and dry.      Capillary Refill: Capillary refill takes less than 2 seconds.      Coloration: Skin is not pale.      Findings: No rash.   Neurological:      General: No focal deficit present.      Mental Status: He is alert and oriented to person, place, and time.      Cranial Nerves: No cranial nerve deficit.      Motor: No weakness or abnormal muscle tone.          Radiographs (ct chest and cxr) reviewed: results reviewed CXR and CT chest from admission, no underlying lung idsease, there is a RUL opacity with air bronchograms     Labs  none available   Lab Results   Component Value Date    WBC 8.12 04/21/2025    HGB 10.5 (L) 04/21/2025    HCT 35.1 (L) 04/21/2025    MCV 77 (L) 04/21/2025     04/21/2025       CMP  Sodium   Date Value Ref Range Status   04/21/2025 137 136 - 145 mmol/L Final     Potassium   Date Value Ref Range Status   04/21/2025 4.2 3.5 - 5.1 mmol/L Final     Chloride   Date Value Ref Range Status   04/21/2025 107 95 - 110 mmol/L Final     CO2   Date Value Ref Range Status   04/21/2025 23 23 - 29 mmol/L Final     Glucose   Date Value Ref Range Status   04/21/2025 143 (H) 70 - 110 mg/dL Final     BUN   Date Value Ref Range Status   04/21/2025 17 8 - 23 mg/dL Final     Creatinine   Date Value Ref Range Status   04/21/2025 1.2 0.5 - 1.4 mg/dL Final     Calcium   Date Value Ref Range Status   04/21/2025 8.4 (L) 8.7 - 10.5 mg/dL Final     Protein Total   Date Value Ref Range Status   04/21/2025 6.3 6.0 - 8.4 gm/dL Final     Albumin   Date Value Ref Range Status   04/21/2025 3.5 3.5 - 5.2 g/dL Final     Bilirubin Total   Date Value Ref Range Status   04/21/2025 0.9 0.1 - 1.0 mg/dL Final     Comment:     For infants and newborns, interpretation of results should be based   on gestational age, weight and in agreement with clinical   observations.    Premature Infant recommended reference ranges:   0-24 hours:  <8.0 mg/dL   24-48 hours: <12.0 mg/dL   3-5 days:    <15.0 mg/dL   6-29 days:   <15.0 mg/dL     ALP   Date Value Ref Range Status   04/21/2025 56 55 - 135 unit/L Final     AST   Date Value Ref Range Status   04/21/2025 21 10 - 40 unit/L Final     ALT   Date Value Ref Range Status   04/21/2025 19 10 - 44 unit/L Final     Anion Gap   Date Value Ref Range Status   04/21/2025 7 (L) 8 - 16 mmol/L Final     eGFR   Date Value Ref Range Status   04/21/2025 >60 >60 mL/min/1.73/m2 Final   12/18/2024 56.1 (A) >60 mL/min/1.73 m^2 Final   12/18/2024 56.1 (A) >60 mL/min/1.73 m^2 Final         PFT not done.       Plan:  Clinical impression is  apparently straight forward and impression with management as below.       Mr Fraire is a 64 year old man who presents with recent hospital admission for pneumonia, RUL.     Clinically appears resolved. Check CXR.   No need for future follow-up.     Problem List Items Addressed This Visit    None      No follow-ups on file.    Discussed with patient above for education the following:      There are no Patient Instructions on file for this visit.       [1]   Social History  Tobacco Use    Smoking status: Never     Passive exposure: Never    Smokeless tobacco: Never   Substance Use Topics    Alcohol use: Not Currently    Drug use: Never

## 2025-05-21 ENCOUNTER — HOSPITAL ENCOUNTER (OUTPATIENT)
Dept: RADIOLOGY | Facility: HOSPITAL | Age: 65
Discharge: HOME | End: 2025-05-21
Attending: STUDENT IN AN ORGANIZED HEALTH CARE EDUCATION/TRAINING PROGRAM
Payer: COMMERCIAL

## 2025-05-21 DIAGNOSIS — J18.9 ACUTE PNEUMONIA: ICD-10-CM

## 2025-05-21 PROCEDURE — 71046 X-RAY EXAM CHEST 2 VIEWS: CPT | Mod: TC

## 2025-05-21 PROCEDURE — 71046 X-RAY EXAM CHEST 2 VIEWS: CPT | Mod: 26,,, | Performed by: RADIOLOGY

## 2025-05-22 ENCOUNTER — RESULTS FOLLOW-UP (OUTPATIENT)
Dept: PULMONOLOGY | Facility: HOSPITAL | Age: 65
End: 2025-05-22

## 2025-06-16 ENCOUNTER — RESULTS FOLLOW-UP (OUTPATIENT)
Dept: FAMILY MEDICINE | Facility: CLINIC | Age: 65
End: 2025-06-16

## 2025-06-16 ENCOUNTER — HOSPITAL ENCOUNTER (OUTPATIENT)
Dept: RADIOLOGY | Facility: HOSPITAL | Age: 65
Discharge: HOME | End: 2025-06-16
Attending: NURSE PRACTITIONER
Payer: COMMERCIAL

## 2025-06-16 ENCOUNTER — E-CONSULT (OUTPATIENT)
Dept: TRANSPLANT | Facility: HOSPITAL | Age: 65
End: 2025-06-16
Payer: COMMERCIAL

## 2025-06-16 ENCOUNTER — TELEPHONE (OUTPATIENT)
Dept: FAMILY MEDICINE | Facility: CLINIC | Age: 65
End: 2025-06-16

## 2025-06-16 ENCOUNTER — OFFICE VISIT (OUTPATIENT)
Dept: FAMILY MEDICINE | Facility: CLINIC | Age: 65
End: 2025-06-16
Payer: COMMERCIAL

## 2025-06-16 VITALS
HEIGHT: 69 IN | TEMPERATURE: 98 F | BODY MASS INDEX: 34.48 KG/M2 | HEART RATE: 80 BPM | OXYGEN SATURATION: 97 % | DIASTOLIC BLOOD PRESSURE: 74 MMHG | WEIGHT: 232.81 LBS | SYSTOLIC BLOOD PRESSURE: 136 MMHG | RESPIRATION RATE: 18 BRPM

## 2025-06-16 DIAGNOSIS — I82.402 ACUTE DEEP VEIN THROMBOSIS (DVT) OF LEFT LOWER EXTREMITY, UNSPECIFIED VEIN: Primary | ICD-10-CM

## 2025-06-16 DIAGNOSIS — M79.662 PAIN AND SWELLING OF LEFT LOWER LEG: Primary | ICD-10-CM

## 2025-06-16 DIAGNOSIS — M25.572 ACUTE LEFT ANKLE PAIN: ICD-10-CM

## 2025-06-16 DIAGNOSIS — M79.662 PAIN AND SWELLING OF LEFT LOWER LEG: ICD-10-CM

## 2025-06-16 DIAGNOSIS — I82.4Z2 ACUTE DEEP VEIN THROMBOSIS (DVT) OF DISTAL END OF LEFT LOWER EXTREMITY: Primary | ICD-10-CM

## 2025-06-16 DIAGNOSIS — M79.89 PAIN AND SWELLING OF LEFT LOWER LEG: ICD-10-CM

## 2025-06-16 DIAGNOSIS — M79.89 PAIN AND SWELLING OF LEFT LOWER LEG: Primary | ICD-10-CM

## 2025-06-16 DIAGNOSIS — I82.442 ACUTE DEEP VEIN THROMBOSIS (DVT) OF LEFT TIBIAL VEIN: ICD-10-CM

## 2025-06-16 PROCEDURE — 3044F HG A1C LEVEL LT 7.0%: CPT | Mod: CPTII,S$GLB,, | Performed by: NURSE PRACTITIONER

## 2025-06-16 PROCEDURE — 99214 OFFICE O/P EST MOD 30 MIN: CPT | Mod: S$GLB,,, | Performed by: NURSE PRACTITIONER

## 2025-06-16 PROCEDURE — 93971 EXTREMITY STUDY: CPT | Mod: TC,LT

## 2025-06-16 PROCEDURE — 1159F MED LIST DOCD IN RCRD: CPT | Mod: CPTII,S$GLB,, | Performed by: NURSE PRACTITIONER

## 2025-06-16 PROCEDURE — 93971 EXTREMITY STUDY: CPT | Mod: 26,LT,, | Performed by: RADIOLOGY

## 2025-06-16 PROCEDURE — 3078F DIAST BP <80 MM HG: CPT | Mod: CPTII,S$GLB,, | Performed by: NURSE PRACTITIONER

## 2025-06-16 PROCEDURE — 99999 PR PBB SHADOW E&M-EST. PATIENT-LVL V: CPT | Mod: PBBFAC,,, | Performed by: NURSE PRACTITIONER

## 2025-06-16 PROCEDURE — 3075F SYST BP GE 130 - 139MM HG: CPT | Mod: CPTII,S$GLB,, | Performed by: NURSE PRACTITIONER

## 2025-06-16 PROCEDURE — 4010F ACE/ARB THERAPY RXD/TAKEN: CPT | Mod: CPTII,S$GLB,, | Performed by: NURSE PRACTITIONER

## 2025-06-16 PROCEDURE — 3008F BODY MASS INDEX DOCD: CPT | Mod: CPTII,S$GLB,, | Performed by: NURSE PRACTITIONER

## 2025-06-16 NOTE — TELEPHONE ENCOUNTER
----- Message from Nurse Cleary sent at 6/16/2025 11:03 AM CDT -----  Contact: [facility]    ----- Message -----  From: Violet Juárez  Sent: 6/16/2025  10:52 AM CDT  To: Reuben Miller Staff    [facility] states that the pt has clots in the posterior tibial vein and lesser saphanous vein. [facility] [phone]

## 2025-06-16 NOTE — PROGRESS NOTES
SUBJECTIVE:      Patient ID: Matteo Fraire is a 64 y.o. male.    Chief Complaint: Foot Swelling (Left ) and Joint Swelling (Left )    History of Present Illness    CHIEF COMPLAINT:  Matteo presents today for left ankle swelling and pain x 3 days    HISTORY OF PRESENT ILLNESS:  Patient of ERINN Bliss- He noticed swelling in his left ankle on Saturday evening after an 8-hour drive last week. The following morning, pain developed after performing yard work. He reports a tender lump in the left calf with pain radiating down the leg. He experiences pain across the back of his leg when sitting with the leg down, describing the sensation as a lump under the skin. He reports high pain tolerance. He denies fever, chills, chest pain, color change, numbness/tingling, SOB or heart fluttering.  Denies history of PE or DVT or clotting d/o. Denies any recent injury, trauma or falls.     CARDIOVASCULAR HISTORY:  He has undergone two bypass surgeries, with the most recent being a triple bypass. During the last bypass procedure, he experienced three strokes. A vein harvest was performed from his leg during the 2011 bypass surgery.    RECENT HOSPITALIZATION:  He was recently hospitalized at [facility] for pneumonia with right lung fluid accumulation and concurrent bacterial blood infection isolated to one arm last month.     MEDICAL HISTORY:  He has psoriasis since 2005.    MEDICATIONS:  He takes Plavix 75 mg and aspirin 81 mg daily. His Jardiance prescription has lapsed- needs appt with cardiology.         Family History   Problem Relation Name Age of Onset    Hearing loss Mother      Arthritis Mother        Social History     Socioeconomic History    Marital status: Single   Tobacco Use    Smoking status: Never     Passive exposure: Never    Smokeless tobacco: Never   Substance and Sexual Activity    Alcohol use: Not Currently    Drug use: Never    Sexual activity: Not Currently     Social Drivers of Health      Financial Resource Strain: Low Risk  (4/17/2025)    Overall Financial Resource Strain (CARDIA)     Difficulty of Paying Living Expenses: Not hard at all   Food Insecurity: No Food Insecurity (4/17/2025)    Hunger Vital Sign     Worried About Running Out of Food in the Last Year: Never true     Ran Out of Food in the Last Year: Never true   Transportation Needs: No Transportation Needs (4/17/2025)    PRAPARE - Transportation     Lack of Transportation (Medical): No     Lack of Transportation (Non-Medical): No   Physical Activity: Inactive (9/13/2022)    Exercise Vital Sign     Days of Exercise per Week: 0 days     Minutes of Exercise per Session: 0 min   Stress: No Stress Concern Present (4/17/2025)    [employer] - Occupational Stress Questionnaire     Feeling of Stress : Not at all   Housing Stability: Low Risk  (4/17/2025)    Housing Stability Vital Sign     Unable to Pay for Housing in the Last Year: No     Number of Times Moved in the Last Year: 0     Homeless in the Last Year: No     Current Medications[1]  Review of patient's allergies indicates:   Allergen Reactions    Venom-wasp Anaphylaxis      Past Medical History:   Diagnosis Date    Coronary artery disease     Heart attack 2005;2011    Hx of CABG     Hypertension 2008    Psoriasis     Stroke 2022     Past Surgical History:   Procedure Laterality Date    ANGIOGRAM, CORONARY, WITH LEFT HEART CATHETERIZATION N/A 8/13/2024    Procedure: Angiogram, Coronary, with Left Heart Cath;  Surgeon: Danica Howell MD;  Location: [facility] CATH/EP LAB;  Service: Cardiology;  Laterality: N/A;    CORONARY ANGIOGRAPHY INCLUDING BYPASS GRAFTS WITH CATHETERIZATION OF LEFT HEART Left 6/27/2022    Procedure: Left heart cath;  Surgeon: Stevan Powell MD;  Location: [facility] CATH/EP LAB;  Service: Cardiology;  Laterality: Left;    CORONARY ARTERY BYPASS GRAFT      CYSTOSCOPY W/ URETERAL STENT PLACEMENT Left 11/21/2019    Procedure: CYSTOSCOPY, WITH  URETERAL STENT INSERTION;  Surgeon: Mickey Escudero MD;  Location: [facility] OR;  Service: Urology;  Laterality: Left;    ENDOSCOPIC HARVEST OF VEIN Right 7/8/2022    Procedure: HARVEST-VEIN-ENDOVASCULAR;  Surgeon: Eyal Stone MD;  Location: [facility] OR;  Service: Cardiothoracic;  Laterality: Right;    ESOPHAGOGASTRODUODENOSCOPY N/A 7/15/2022    Procedure: EGD (ESOPHAGOGASTRODUODENOSCOPY);  Surgeon: Art Pnio MD;  Location: [facility] ENDO;  Service: Endoscopy;  Laterality: N/A;    EXTRACORPOREAL SHOCK WAVE LITHOTRIPSY Left 11/21/2019    Procedure: LITHOTRIPSY, ESWL;  Surgeon: Mickey Escudero MD;  Location: [facility] OR;  Service: Urology;  Laterality: Left;    HERNIA REPAIR      Inguinal just after birth    IVUS, CORONARY  8/13/2024    Procedure: IVUS, Coronary;  Surgeon: Danica Howell MD;  Location: [facility] CATH/EP LAB;  Service: Cardiology;;    PERCUTANEOUS CORONARY INTERVENTION, ARTERY N/A 8/13/2024    Procedure: Percutaneous coronary intervention;  Surgeon: Danica Howell MD;  Location: [facility] CATH/EP LAB;  Service: Cardiology;  Laterality: N/A;    REPEAT CORONARY ARTERY BYPASS GRAFTING N/A 7/8/2022    Procedure: CABG, REPEAT;  Surgeon: Eyal Stone MD;  Location: [facility] OR;  Service: Cardiothoracic;  Laterality: N/A;  drugs ordered 7-7  @1322      SURGICAL PROCUREMENT, ARTERY, RADIAL, FOR CABG Left 7/8/2022    Procedure: SURGICAL PROCUREMENT,ARTERY,RADIAL,FOR CABG;  Surgeon: Eyal Stone MD;  Location: [facility] OR;  Service: Cardiothoracic;  Laterality: Left;       Review of Systems   Constitutional:  Negative for chills and fever.   Respiratory:  Negative for shortness of breath.    Cardiovascular:  Negative for chest pain, palpitations and leg swelling.   Gastrointestinal:  Negative for abdominal pain, nausea and vomiting.   Musculoskeletal:  Positive for arthralgias and joint swelling (left ankle pain). Negative for back pain and myalgias.   Skin:  Positive for rash (chronic psoriasis).  "Negative for color change and wound.   Neurological:  Negative for dizziness, weakness and numbness.   Hematological:  Does not bruise/bleed easily.      OBJECTIVE:      Vitals:    06/16/25 0920 06/16/25 0922   BP: (!) 146/80 136/74   BP Location: Right arm Right arm   Patient Position: Sitting Sitting   Pulse: 80    Resp: 18    Temp: 98 °F (36.7 °C)    TempSrc: Oral    SpO2: 97%    Weight: 105.6 kg (232 lb 12.9 oz)    Height: 5' 9" (1.753 m)      Physical Exam  Vitals and nursing note reviewed.   Constitutional:       General: He is not in acute distress.     Appearance: Normal appearance. He is obese. He is not ill-appearing or diaphoretic.   HENT:      Mouth/Throat:      Mouth: Mucous membranes are moist.   Eyes:      General: No scleral icterus.     Pupils: Pupils are equal, round, and reactive to light.   Cardiovascular:      Rate and Rhythm: Normal rate and regular rhythm.      Pulses: Normal pulses.   Pulmonary:      Effort: Pulmonary effort is normal. No respiratory distress.      Breath sounds: Normal breath sounds. No wheezing or rales.   Musculoskeletal:      Right lower leg: No edema.      Left lower leg: Tenderness (firm nodule/swelling left medial calf- +tenderness with palpation; no warmth or erythema) present. No deformity, lacerations or bony tenderness. Edema (trace edema at sockline) present.        Legs:       Comments: Neg agnieszka's sign LLE    Skin:     General: Skin is warm and dry.      Coloration: Skin is not jaundiced or pale.      Findings: No bruising or erythema.   Neurological:      Mental Status: He is alert and oriented to person, place, and time.   Psychiatric:         Mood and Affect: Mood normal.         Behavior: Behavior normal.         Thought Content: Thought content normal.         Judgment: Judgment normal.        Assessment:       1. Pain and swelling of left lower leg    2. Acute left ankle pain    3. Acute deep vein thrombosis (DVT) of left tibial vein        Plan:      Pain " and swelling of left lower leg  -     US Lower Extremity Veins Left; Future; Expected date: 06/16/2025  - R/o DVT, Stat US today     Acute left ankle pain  -     Basic Metabolic Panel; Future; Expected date: 06/16/2025  -     Uric Acid; Future; Expected date: 06/16/2025    Acute deep vein thrombosis (DVT) of left tibial vein  -     apixaban (ELIQUIS) 5 mg Tab; Take 2 tablets (10 mg total) by mouth 2 (two) times daily for 7 days, THEN 1 tablet (5 mg total) 2 (two) times daily.  Dispense: 88 tablet; Refill: 0  -     Ambulatory referral/consult to Hematology / Oncology; Future; Expected date: 06/23/2025   -no gout, ultrasound confirms left tibial posterior vein thrombosis, also in lesser saphenous vein; spoke to collaborative Dr. Cruz and patient's PCP, agree with plan to discontinue aspirin 81, continue Plavix, start Eliquis for DVT treatment; refer to hematology for hypercoagulability workup and follow-up on thrombosis; E consult was initiated also by patient's PCP for any further guidance and recommendations in the meantime; patient was instructed by phone to proceed to emergency room for any increasing pain, swelling, shortness for breath or chest pain-voiced understanding      Follow up if symptoms worsen or fail to improve.      6/16/2025 KARLA Rodrigues, BRIAN  This note was generated with the assistance of ambient listening technology. Verbal consent was obtained by the patient and accompanying visitor(s) for the recording of patient appointment to facilitate this note. I attest to having reviewed and edited the generated note for accuracy, though some syntax or spelling errors may persist. Please contact the author of this note for any clarification.     This note was created using [employer] voice recognition software that occasionally misinterprets phrases or words.            [1]   Current Outpatient Medications   Medication Sig Dispense Refill    amiodarone (PACERONE) 200 MG Tab Take 1 tablet (200 mg  total) by mouth once daily. 90 tablet 3    atorvastatin (LIPITOR) 80 MG tablet Take 1 tablet (80 mg total) by mouth every evening. 90 tablet 3    clopidogreL (PLAVIX) 75 mg tablet Take 1 tablet (75 mg total) by mouth once daily. 90 tablet 3    furosemide (LASIX) 20 MG tablet As needed (take 1 tablet for weight gain>3lb in 24 hours or >5lb in a week) 60 tablet 1    metoprolol succinate (TOPROL-XL) 25 MG 24 hr tablet Take 1 tablet (25 mg total) by mouth every evening. 90 tablet 2    sacubitriL-valsartan (ENTRESTO) 24-26 mg per tablet Take 1 tablet by mouth 2 (two) times daily. 180 tablet 2    apixaban (ELIQUIS) 5 mg Tab Take 2 tablets (10 mg total) by mouth 2 (two) times daily for 7 days, THEN 1 tablet (5 mg total) 2 (two) times daily. 88 tablet 0     No current facility-administered medications for this visit.     Facility-Administered Medications Ordered in Other Visits   Medication Dose Route Frequency Provider Last Rate Last Admin    sodium bicarbonate 10 mEq, potassium chloride 30 mEq in cardioplegic solution (PLEGISOL) 1,000 mL   Intravenous Once Eyal Stone MD        sodium bicarbonate 10 mEq, potassium chloride 70 mEq in cardioplegic solution (PLEGISOL) 1,000 mL   Intravenous Once Eyal Stone MD        thromb,pw-rhsuwf-ovfyerm,s-Ca (TISSEEL) 4 mL   Topical (Top) Once Eyal Stone MD

## 2025-06-17 ENCOUNTER — TELEPHONE (OUTPATIENT)
Facility: CLINIC | Age: 65
End: 2025-06-17
Payer: COMMERCIAL

## 2025-06-17 NOTE — NURSING
Oncology Navigation   Intake  Date of Diagnosis: 06/16/25  Cancer Type: Benign hem  Type of Referral: Internal  Date of Referral: 06/16/25  Initial Nurse Navigator Contact: 06/17/25  Referral to Initial Contact Timeline (days): 1  First Appointment Available: 07/16/25  Appointment Date: 07/16/25  First Available Date vs. Scheduled Date (days): 0  Reason if booked > 7 days after scheduling: Specific provider / access     Treatment                              Acuity      Follow Up  No follow-ups on file.   The pt has been advised to contact the referring provider if needs arise prior to the appt with Dr Banks. Verbalized understanding  Pt scheduled for new pt Hematology/Oncology clinic appt as requested in referral received in workqueue. Appt date, time and location reviewed with the pt. No questions at this time.

## 2025-06-17 NOTE — CONSULTS
[facility] [employer]  Response for E-Consult     Patient Name: Matteo Fraire  MRN: [id number]  Primary Care Provider: Naomi Bliss FNP-C   Requesting Provider: Naomi Bliss, *  E-Consult to [facility]  Consult performed by: Sara Guillaume MD  Consult ordered by: Naomi Bliss FNP-C  Reason for consult: Provoked DVT          Recommendation: Imaging and history reviewed. Provoked DVT following 8 hour drive. Plavix and asa are antiplatelet agents, not anticoagulation. A DOAC [like xarelto or eliquis] is warranted for a three month course here. Please prescribe DOAC.    Total time of Consultation: 10 minute    I did not speak to the requesting provider verbally about this.     *This eConsult is based on the clinical data available to me and is furnished without benefit of a physical examination. The eConsult will need to be interpreted in light of any clinical issues or changes in patient status not available to me at the time of filing this eConsults. Significant changes in patient condition or level of acuity should result in immediate formal consultation and reevaluation. Please alert me if you have further questions.    Thank you for this eConsult referral.     Sara Guillaume MD  [facility] [employer]

## 2025-06-23 ENCOUNTER — PATIENT OUTREACH (OUTPATIENT)
Dept: ADMINISTRATIVE | Facility: HOSPITAL | Age: 65
End: 2025-06-23
Payer: COMMERCIAL

## 2025-06-23 NOTE — PROGRESS NOTES
Population Health Chart Review & Patient Outreach Details      Additional [facility] Health Notes:               Updates Requested / Reviewed:      Updated Care Coordination Note, Care Everywhere, , and Immunizations Reconciliation Completed or Queried: [place] Topics Overdue:      [facility] Score: 3     Colon Cancer Screening  Urine Screening  Foot Exam    Pneumonia Vaccine  RSV Vaccine                  Health Maintenance Topic(s) Outreach Outcomes & Actions Taken:    Lab(s) - Outreach Outcomes & Actions Taken  : Overdue Lab(s) Ordered

## 2025-07-02 ENCOUNTER — LAB VISIT (OUTPATIENT)
Dept: LAB | Facility: HOSPITAL | Age: 65
End: 2025-07-02
Attending: INTERNAL MEDICINE
Payer: COMMERCIAL

## 2025-07-02 ENCOUNTER — OFFICE VISIT (OUTPATIENT)
Facility: CLINIC | Age: 65
End: 2025-07-02
Payer: COMMERCIAL

## 2025-07-02 VITALS
HEART RATE: 83 BPM | TEMPERATURE: 99 F | BODY MASS INDEX: 35.21 KG/M2 | SYSTOLIC BLOOD PRESSURE: 132 MMHG | DIASTOLIC BLOOD PRESSURE: 69 MMHG | RESPIRATION RATE: 16 BRPM | WEIGHT: 238.38 LBS

## 2025-07-02 DIAGNOSIS — I82.442 ACUTE DEEP VEIN THROMBOSIS (DVT) OF TIBIAL VEIN OF LEFT LOWER EXTREMITY: Primary | ICD-10-CM

## 2025-07-02 DIAGNOSIS — I82.442 ACUTE DEEP VEIN THROMBOSIS (DVT) OF LEFT TIBIAL VEIN: ICD-10-CM

## 2025-07-02 DIAGNOSIS — Z83.2 FAMILY HISTORY OF FACTOR V LEIDEN MUTATION: ICD-10-CM

## 2025-07-02 DIAGNOSIS — I82.442 ACUTE DEEP VEIN THROMBOSIS (DVT) OF TIBIAL VEIN OF LEFT LOWER EXTREMITY: ICD-10-CM

## 2025-07-02 LAB
ABSOLUTE EOSINOPHIL (SMH): 0.34 K/UL
ABSOLUTE MONOCYTE (SMH): 0.58 K/UL (ref 0.3–1)
ABSOLUTE NEUTROPHIL COUNT (SMH): 4.5 K/UL (ref 1.8–7.7)
ALBUMIN SERPL-MCNC: 4.3 G/DL (ref 3.5–5.2)
ALP SERPL-CCNC: 69 UNIT/L (ref 55–135)
ALT SERPL-CCNC: 11 UNIT/L (ref 10–44)
ANION GAP (SMH): 8 MMOL/L (ref 8–16)
AST SERPL-CCNC: 11 UNIT/L (ref 10–40)
BASOPHILS # BLD AUTO: 0.04 K/UL
BASOPHILS NFR BLD AUTO: 0.6 %
BILIRUB SERPL-MCNC: 1.1 MG/DL (ref 0.1–1)
BUN SERPL-MCNC: 17 MG/DL (ref 8–23)
CALCIUM SERPL-MCNC: 9.1 MG/DL (ref 8.7–10.5)
CHLORIDE SERPL-SCNC: 108 MMOL/L (ref 95–110)
CO2 SERPL-SCNC: 26 MMOL/L (ref 23–29)
CREAT SERPL-MCNC: 1.3 MG/DL (ref 0.5–1.4)
ERYTHROCYTE [DISTWIDTH] IN BLOOD BY AUTOMATED COUNT: 16.6 % (ref 11.5–14.5)
GFR SERPLBLD CREATININE-BSD FMLA CKD-EPI: >60 ML/MIN/1.73/M2
GLUCOSE SERPL-MCNC: 108 MG/DL (ref 70–110)
HCT VFR BLD AUTO: 38.3 % (ref 40–54)
HGB BLD-MCNC: 11.5 GM/DL (ref 14–18)
IMM GRANULOCYTES # BLD AUTO: 0.02 K/UL (ref 0–0.04)
IMM GRANULOCYTES NFR BLD AUTO: 0.3 % (ref 0–0.5)
LYMPHOCYTES # BLD AUTO: 1.36 K/UL (ref 1–4.8)
MCH RBC QN AUTO: 24.1 PG (ref 27–31)
MCHC RBC AUTO-ENTMCNC: 30 G/DL (ref 32–36)
MCV RBC AUTO: 80 FL (ref 82–98)
NUCLEATED RBC (/100WBC) (SMH): 0 /100 WBC
PLATELET # BLD AUTO: 235 K/UL (ref 150–450)
PMV BLD AUTO: 11 FL (ref 9.2–12.9)
POTASSIUM SERPL-SCNC: 4.2 MMOL/L (ref 3.5–5.1)
PROT SERPL-MCNC: 6.2 GM/DL (ref 6–8.4)
RBC # BLD AUTO: 4.77 M/UL (ref 4.6–6.2)
RELATIVE EOSINOPHIL (SMH): 5 % (ref 0–8)
RELATIVE LYMPHOCYTE (SMH): 19.8 % (ref 18–48)
RELATIVE MONOCYTE (SMH): 8.5 % (ref 4–15)
RELATIVE NEUTROPHIL (SMH): 65.8 % (ref 38–73)
SODIUM SERPL-SCNC: 142 MMOL/L (ref 136–145)
WBC # BLD AUTO: 6.86 K/UL (ref 3.9–12.7)

## 2025-07-02 PROCEDURE — 99999 PR PBB SHADOW E&M-EST. PATIENT-LVL III: CPT | Mod: PBBFAC,,, | Performed by: INTERNAL MEDICINE

## 2025-07-02 PROCEDURE — 82040 ASSAY OF SERUM ALBUMIN: CPT

## 2025-07-02 PROCEDURE — 1159F MED LIST DOCD IN RCRD: CPT | Mod: CPTII,S$GLB,, | Performed by: INTERNAL MEDICINE

## 2025-07-02 PROCEDURE — 85025 COMPLETE CBC W/AUTO DIFF WBC: CPT

## 2025-07-02 PROCEDURE — 4010F ACE/ARB THERAPY RXD/TAKEN: CPT | Mod: CPTII,S$GLB,, | Performed by: INTERNAL MEDICINE

## 2025-07-02 PROCEDURE — 81241 F5 GENE: CPT

## 2025-07-02 PROCEDURE — 3078F DIAST BP <80 MM HG: CPT | Mod: CPTII,S$GLB,, | Performed by: INTERNAL MEDICINE

## 2025-07-02 PROCEDURE — 36415 COLL VENOUS BLD VENIPUNCTURE: CPT

## 2025-07-02 PROCEDURE — 99204 OFFICE O/P NEW MOD 45 MIN: CPT | Mod: S$GLB,,, | Performed by: INTERNAL MEDICINE

## 2025-07-02 PROCEDURE — 3008F BODY MASS INDEX DOCD: CPT | Mod: CPTII,S$GLB,, | Performed by: INTERNAL MEDICINE

## 2025-07-02 PROCEDURE — 3075F SYST BP GE 130 - 139MM HG: CPT | Mod: CPTII,S$GLB,, | Performed by: INTERNAL MEDICINE

## 2025-07-02 PROCEDURE — 3044F HG A1C LEVEL LT 7.0%: CPT | Mod: CPTII,S$GLB,, | Performed by: INTERNAL MEDICINE

## 2025-07-02 NOTE — ASSESSMENT & PLAN NOTE
Patient has a new DVT which appears provoked by the long drive which happened immediately before the symptoms appeared.  Furthermore,  he does have a very strong family history of FVL.      He is on Eliquis at this time and doing ok and this will need to continue for at least six months.      Given this strong family history of FVL, I feel this testing is needed and will order this today along with CBC and CMP.      Lastly, it should be stated that while the lesser saphenous vein is a superficial vein, the posterior tibial vein is considered to be in the deep venous system and should be treated as DVT.

## 2025-07-02 NOTE — PROGRESS NOTES
INITIAL [facility] HEM/ONC CONSULTATION      Subjective:       Patient ID: Matteo Fraire is a 64 y.o. male.    4/20/2025-CTA Chest:  No PE  Mediastinal adenopathy      6/16/2025-Left LE u/s:  Acute calf vein thrombosis involving the posterior tibial veins and lesser saphenous vein.     The deep veins of the left leg are patent.    Chief Complaint: No chief complaint on file.  Left LE DVT    Mr. Fraire is a 65yo male who noticed his left foot was swollen the evening after making a drive home from [place], [place] which he did without significant break.  He tried putting his foot up but this process continued and his calf became very painful.  He called his MD office to get an appointment and was seen that day.  U/s was done which showed the DVT.      In 2022 he underwent CABG which was complicated by CVA after the surgery.  He also has a history of AMI x 4.  He o/w denies any DVT or PE issues in the past.      He does have a family history of factor V leiden in 2 step-siblings.  Also had a niece with multiple blood clots.              Past Medical History:   Diagnosis Date    Coronary artery disease     Heart attack 2005;2011    Hx of CABG     Hypertension 2008    Psoriasis     Stroke 2022       Past Surgical History:   Procedure Laterality Date    ANGIOGRAM, CORONARY, WITH LEFT HEART CATHETERIZATION N/A 8/13/2024    Procedure: Angiogram, Coronary, with Left Heart Cath;  Surgeon: Danica Howell MD;  Location: [facility] CATH/EP LAB;  Service: Cardiology;  Laterality: N/A;    CORONARY ANGIOGRAPHY INCLUDING BYPASS GRAFTS WITH CATHETERIZATION OF LEFT HEART Left 6/27/2022    Procedure: Left heart cath;  Surgeon: Stevan Powell MD;  Location: [facility] CATH/EP LAB;  Service: Cardiology;  Laterality: Left;    CORONARY ARTERY BYPASS GRAFT      CYSTOSCOPY W/ URETERAL STENT PLACEMENT Left 11/21/2019    Procedure: CYSTOSCOPY, WITH URETERAL STENT INSERTION;  Surgeon: Mickey Escudero MD;  Location: [facility] OR;  Service: Urology;   Laterality: Left;    ENDOSCOPIC HARVEST OF VEIN Right 7/8/2022    Procedure: HARVEST-VEIN-ENDOVASCULAR;  Surgeon: Eyal Stone MD;  Location: [facility] OR;  Service: Cardiothoracic;  Laterality: Right;    ESOPHAGOGASTRODUODENOSCOPY N/A 7/15/2022    Procedure: EGD (ESOPHAGOGASTRODUODENOSCOPY);  Surgeon: Art Pino MD;  Location: [facility] ENDO;  Service: Endoscopy;  Laterality: N/A;    EXTRACORPOREAL SHOCK WAVE LITHOTRIPSY Left 11/21/2019    Procedure: LITHOTRIPSY, ESWL;  Surgeon: Mickey Escudero MD;  Location: [facility] OR;  Service: Urology;  Laterality: Left;    HERNIA REPAIR      Inguinal just after birth    IVUS, CORONARY  8/13/2024    Procedure: IVUS, Coronary;  Surgeon: Danica Howell MD;  Location: [facility] CATH/EP LAB;  Service: Cardiology;;    PERCUTANEOUS CORONARY INTERVENTION, ARTERY N/A 8/13/2024    Procedure: Percutaneous coronary intervention;  Surgeon: Danica Howell MD;  Location: [facility] CATH/EP LAB;  Service: Cardiology;  Laterality: N/A;    REPEAT CORONARY ARTERY BYPASS GRAFTING N/A 7/8/2022    Procedure: CABG, REPEAT;  Surgeon: Eyal Stoen MD;  Location: [facility] OR;  Service: Cardiothoracic;  Laterality: N/A;  drugs ordered 7-7  @1322      SURGICAL PROCUREMENT, ARTERY, RADIAL, FOR CABG Left 7/8/2022    Procedure: SURGICAL PROCUREMENT,ARTERY,RADIAL,FOR CABG;  Surgeon: Eyal Stone MD;  Location: [facility] OR;  Service: Cardiothoracic;  Laterality: Left;       Social History     Socioeconomic History    Marital status: Single   Tobacco Use    Smoking status: Never     Passive exposure: Never    Smokeless tobacco: Never   Substance and Sexual Activity    Alcohol use: Not Currently    Drug use: Never    Sexual activity: Not Currently     Social Drivers of Health     Financial Resource Strain: Low Risk  (4/17/2025)    Overall Financial Resource Strain (CARDIA)     Difficulty of Paying Living Expenses: Not hard at all   Food Insecurity: No Food Insecurity (4/17/2025)    Hunger Vital  Sign     Worried About Running Out of Food in the Last Year: Never true     Ran Out of Food in the Last Year: Never true   Transportation Needs: No Transportation Needs (4/17/2025)    PRAPARE - Transportation     Lack of Transportation (Medical): No     Lack of Transportation (Non-Medical): No   Physical Activity: Inactive (9/13/2022)    Exercise Vital Sign     Days of Exercise per Week: 0 days     Minutes of Exercise per Session: 0 min   Stress: No Stress Concern Present (4/17/2025)    [employer] - Occupational Stress Questionnaire     Feeling of Stress : Not at all   Housing Stability: Low Risk  (4/17/2025)    Housing Stability Vital Sign     Unable to Pay for Housing in the Last Year: No     Number of Times Moved in the Last Year: 0     Homeless in the Last Year: No       Family History   Problem Relation Name Age of Onset    Hearing loss Mother      Arthritis Mother         Review of patient's allergies indicates:   Allergen Reactions    Venom-wasp Anaphylaxis     Current Medications[1]    All medications and past history have been reviewed.    Review of Systems   Constitutional:  Negative for fever and unexpected weight change.   HENT:  Negative for nosebleeds.    Respiratory:  Negative for chest tightness and shortness of breath.    Cardiovascular:  Negative for chest pain.   Gastrointestinal:  Negative for abdominal pain and blood in stool.   Genitourinary:  Negative for hematuria.   Skin:  Negative for rash.   Hematological:  Does not bruise/bleed easily.       Objective:        /69   Pulse 83   Temp 98.5 °F (36.9 °C)   Resp 16   Wt 108.1 kg (238 lb 6.4 oz)   BMI 35.21 kg/m²     Physical Exam  Constitutional:       Appearance: Normal appearance.   HENT:      Head: Normocephalic and atraumatic.   Eyes:      General: No scleral icterus.     Conjunctiva/sclera: Conjunctivae normal.   Cardiovascular:      Rate and Rhythm: Normal rate.   Pulmonary:      Effort: Pulmonary  effort is normal.   Abdominal:      General: Abdomen is flat.   Neurological:      General: No focal deficit present.      Mental Status: He is alert and oriented to person, place, and time.   Psychiatric:         Mood and Affect: Mood normal.         Behavior: Behavior normal.           Lab  No results found for this or any previous visit (from the past 2 weeks).  CMP  Sodium   Date Value Ref Range Status   06/16/2025 139 136 - 145 mmol/L Final     Potassium   Date Value Ref Range Status   06/16/2025 4.6 3.5 - 5.1 mmol/L Final     Chloride   Date Value Ref Range Status   06/16/2025 106 95 - 110 mmol/L Final     CO2   Date Value Ref Range Status   06/16/2025 25 23 - 29 mmol/L Final     Glucose   Date Value Ref Range Status   06/16/2025 234 (H) 70 - 110 mg/dL Final     BUN   Date Value Ref Range Status   06/16/2025 20 8 - 23 mg/dL Final     Creatinine   Date Value Ref Range Status   06/16/2025 1.4 0.5 - 1.4 mg/dL Final     Calcium   Date Value Ref Range Status   06/16/2025 8.9 8.7 - 10.5 mg/dL Final     Protein Total   Date Value Ref Range Status   04/21/2025 6.3 6.0 - 8.4 gm/dL Final     Albumin   Date Value Ref Range Status   04/21/2025 3.5 3.5 - 5.2 g/dL Final     Bilirubin Total   Date Value Ref Range Status   04/21/2025 0.9 0.1 - 1.0 mg/dL Final     Comment:     For infants and newborns, interpretation of results should be based   on gestational age, weight and in agreement with clinical   observations.    Premature Infant recommended reference ranges:   0-24 hours:  <8.0 mg/dL   24-48 hours: <12.0 mg/dL   3-5 days:    <15.0 mg/dL   6-29 days:   <15.0 mg/dL     ALP   Date Value Ref Range Status   04/21/2025 56 55 - 135 unit/L Final     AST   Date Value Ref Range Status   04/21/2025 21 10 - 40 unit/L Final     ALT   Date Value Ref Range Status   04/21/2025 19 10 - 44 unit/L Final     Anion Gap   Date Value Ref Range Status   06/16/2025 8 8 - 16 mmol/L Final     eGFR if    Date Value Ref Range  Status   07/19/2022 >60.0 >60 mL/min/1.73 m^2 Final     eGFR if non    Date Value Ref Range Status   07/19/2022 >60.0 >60 mL/min/1.73 m^2 Final     Comment:     Calculation used to obtain the estimated glomerular filtration  rate (eGFR) is the CKD-EPI equation.            Specimen (24h ago, onward)      None                  All lab results and imaging results have been reviewed and discussed with the patient.     Assessment:       1. Acute deep vein thrombosis (DVT) of tibial vein of left lower extremity    2. Acute deep vein thrombosis (DVT) of left tibial vein    3. Family history of factor V Leiden mutation      Problem List Items Addressed This Visit       Acute provoked DVT of tibial vein of left lower extremity - Primary    Patient has a new DVT which appears provoked by the long drive which happened immediately before the symptoms appeared.  Furthermore,  he does have a very strong family history of FVL.      He is on Eliquis at this time and doing ok and this will need to continue for at least six months.      Given this strong family history of FVL, I feel this testing is needed and will order this today along with CBC and CMP.      Lastly, it should be stated that while the lesser saphenous vein is a superficial vein, the posterior tibial vein is considered to be in the deep venous system and should be treated as DVT.          Relevant Medications    apixaban (ELIQUIS) 5 mg Tab    Other Relevant Orders    Factor V (Leiden) Mutation Analysis    CBC Auto Differential    Comprehensive Metabolic Panel    Family history of factor V Leiden mutation     Other Visit Diagnoses         Acute deep vein thrombosis (DVT) of left tibial vein               Cancer Staging   No matching staging information was found for the patient.        Plan:         Follow up in about 6 weeks (around 8/13/2025).       The plan was discussed with the patient and all questions/concerns have been answered to the patient's  satisfaction.                   [1]   Current Outpatient Medications:     amiodarone (PACERONE) 200 MG Tab, Take 1 tablet (200 mg total) by mouth once daily., Disp: 90 tablet, Rfl: 3    atorvastatin (LIPITOR) 80 MG tablet, Take 1 tablet (80 mg total) by mouth every evening., Disp: 90 tablet, Rfl: 3    clopidogreL (PLAVIX) 75 mg tablet, Take 1 tablet (75 mg total) by mouth once daily., Disp: 90 tablet, Rfl: 3    furosemide (LASIX) 20 MG tablet, As needed (take 1 tablet for weight gain>3lb in 24 hours or >5lb in a week), Disp: 60 tablet, Rfl: 1    sacubitriL-valsartan (ENTRESTO) 24-26 mg per tablet, Take 1 tablet by mouth 2 (two) times daily., Disp: 180 tablet, Rfl: 2    apixaban (ELIQUIS) 5 mg Tab, Take 1 tablet (5 mg total) by mouth 2 (two) times daily., Disp: 180 tablet, Rfl: 1    metoprolol succinate (TOPROL-XL) 25 MG 24 hr tablet, Take 1 tablet (25 mg total) by mouth every evening., Disp: 90 tablet, Rfl: 2  No current facility-administered medications for this visit.    Facility-Administered Medications Ordered in Other Visits:     sodium bicarbonate 10 mEq, potassium chloride 30 mEq in cardioplegic solution (PLEGISOL) 1,000 mL, , Intravenous, Once, Eyal Stone MD    sodium bicarbonate 10 mEq, potassium chloride 70 mEq in cardioplegic solution (PLEGISOL) 1,000 mL, , Intravenous, Once, Eyal Stone MD    thromb,uh-thscpw-yrdnqyk,s-Ca (TISSEEL) 4 mL, , Topical (Top), Once, Eyal Stone MD

## 2025-07-09 LAB
F5 P.R506Q BLD/T QL: NORMAL
IMP & REVIEW OF LAB RESULTS: NORMAL

## 2025-07-16 ENCOUNTER — TELEPHONE (OUTPATIENT)
Dept: FAMILY MEDICINE | Facility: CLINIC | Age: 65
End: 2025-07-16
Payer: COMMERCIAL

## 2025-07-16 NOTE — TELEPHONE ENCOUNTER
----- Message from Nini sent at 7/16/2025 12:01 PM CDT -----  Pt returned call to office.    [phone]

## 2025-07-30 ENCOUNTER — OFFICE VISIT (OUTPATIENT)
Dept: FAMILY MEDICINE | Facility: CLINIC | Age: 65
End: 2025-07-30
Payer: COMMERCIAL

## 2025-07-30 VITALS
HEART RATE: 80 BPM | TEMPERATURE: 98 F | DIASTOLIC BLOOD PRESSURE: 70 MMHG | RESPIRATION RATE: 20 BRPM | BODY MASS INDEX: 34.84 KG/M2 | SYSTOLIC BLOOD PRESSURE: 130 MMHG | HEIGHT: 69 IN | WEIGHT: 235.25 LBS

## 2025-07-30 DIAGNOSIS — I47.29 NSVT (NONSUSTAINED VENTRICULAR TACHYCARDIA): ICD-10-CM

## 2025-07-30 DIAGNOSIS — Z95.1 S/P CABG (CORONARY ARTERY BYPASS GRAFT): ICD-10-CM

## 2025-07-30 DIAGNOSIS — I25.709 CORONARY ARTERY DISEASE INVOLVING CORONARY BYPASS GRAFT OF NATIVE HEART WITH ANGINA PECTORIS: ICD-10-CM

## 2025-07-30 DIAGNOSIS — N18.31 CKD STAGE 3A, GFR 45-59 ML/MIN: ICD-10-CM

## 2025-07-30 DIAGNOSIS — R73.03 PREDIABETES: Primary | ICD-10-CM

## 2025-07-30 PROCEDURE — 3075F SYST BP GE 130 - 139MM HG: CPT | Mod: CPTII,S$GLB,, | Performed by: NURSE PRACTITIONER

## 2025-07-30 PROCEDURE — 4010F ACE/ARB THERAPY RXD/TAKEN: CPT | Mod: CPTII,S$GLB,, | Performed by: NURSE PRACTITIONER

## 2025-07-30 PROCEDURE — 3044F HG A1C LEVEL LT 7.0%: CPT | Mod: CPTII,S$GLB,, | Performed by: NURSE PRACTITIONER

## 2025-07-30 PROCEDURE — 1159F MED LIST DOCD IN RCRD: CPT | Mod: CPTII,S$GLB,, | Performed by: NURSE PRACTITIONER

## 2025-07-30 PROCEDURE — 3078F DIAST BP <80 MM HG: CPT | Mod: CPTII,S$GLB,, | Performed by: NURSE PRACTITIONER

## 2025-07-30 PROCEDURE — 3008F BODY MASS INDEX DOCD: CPT | Mod: CPTII,S$GLB,, | Performed by: NURSE PRACTITIONER

## 2025-07-30 PROCEDURE — 99213 OFFICE O/P EST LOW 20 MIN: CPT | Mod: S$GLB,,, | Performed by: NURSE PRACTITIONER

## 2025-07-30 PROCEDURE — 99999 PR PBB SHADOW E&M-EST. PATIENT-LVL V: CPT | Mod: PBBFAC,,, | Performed by: NURSE PRACTITIONER

## 2025-07-31 NOTE — PROGRESS NOTES
Patient ID: Matteo Fraire is a 64 y.o. male.    Chief Complaint: Follow-up (65 yo male here for 3 month recheck. Pt states no c/o. KM)    History of Present Illness    CHIEF COMPLAINT:  Mr. Fraire presents for a follow-up visit to discuss recent labs results and management of a blood clot.    HPI:  Mr. Fraire reports recent evaluation by Dr. Bianchi, who performed extensive labs, including testing for factor V. He was diagnosed with a blood clot, provoked by prolonged sitting. He had pain and swelling in his upper leg, with a painful lump that impaired walking. The ankle was also affected and remains swollen, which he considers a minor issue. He is still able to perform yard work despite the discomfort. To manage the swelling, he has modified his shoes to accommodate the swollen foot.    He is currently taking Eliquis (apixaban) for blood clot management. The treatment is expected to last for 6 months. The doctor had discussed the option of using Coumadin (warfarin) instead, which would require frequent labs for monitoring.    He was diagnosed with glaucoma during a previous eye exam and informed it was a degenerative condition with no available treatment.    He denies having diabetes.    MEDICATIONS:  Mr. Fraire is on Gardiance for an unspecified reason and Eliquis for blood thinning. He is also taking other medications.    MEDICAL HISTORY:  Mr. Fraire has a history of DVT, glaucoma, and diabetes.    TEST RESULTS:  Mr. Fraire has undergone a Factor V test and extensive labs, with results pending for both. A diabetic eye screening photo is being considered as a potential future test for the patient.    SOCIAL HISTORY:  Occupation: Retired     History: , [facility]      ROS:  General: -fever, -chills, -fatigue, -weight gain, -weight loss, +excessive sweating  Eyes: -vision changes, -redness, -discharge  ENT: -ear pain, -nasal congestion, -sore throat  Cardiovascular: -chest pain,  -palpitations, +lower extremity edema  Respiratory: -cough, -shortness of breath  Gastrointestinal: -abdominal pain, -nausea, -vomiting, -diarrhea, -constipation, -blood in stool  Genitourinary: -dysuria, -hematuria, -frequency  Musculoskeletal: -joint pain, -muscle pain, +limb pain  Skin: -rash, -lesion  Neurological: -headache, -dizziness, -numbness, -tingling  Psychiatric: -anxiety, -depression, -sleep difficulty             Past Medical History:   Diagnosis Date    Coronary artery disease     Heart attack 2005;2011    Hx of CABG     Hypertension 2008    Psoriasis     Stroke 2022     Past Surgical History:   Procedure Laterality Date    ANGIOGRAM, CORONARY, WITH LEFT HEART CATHETERIZATION N/A 8/13/2024    Procedure: Angiogram, Coronary, with Left Heart Cath;  Surgeon: Danica Howell MD;  Location: [facility] CATH/EP LAB;  Service: Cardiology;  Laterality: N/A;    CORONARY ANGIOGRAPHY INCLUDING BYPASS GRAFTS WITH CATHETERIZATION OF LEFT HEART Left 6/27/2022    Procedure: Left heart cath;  Surgeon: Stevan Powell MD;  Location: [facility] CATH/EP LAB;  Service: Cardiology;  Laterality: Left;    CORONARY ARTERY BYPASS GRAFT      CYSTOSCOPY W/ URETERAL STENT PLACEMENT Left 11/21/2019    Procedure: CYSTOSCOPY, WITH URETERAL STENT INSERTION;  Surgeon: Mickey Escudero MD;  Location: [facility] OR;  Service: Urology;  Laterality: Left;    ENDOSCOPIC HARVEST OF VEIN Right 7/8/2022    Procedure: HARVEST-VEIN-ENDOVASCULAR;  Surgeon: Eyal Stone MD;  Location: [facility] OR;  Service: Cardiothoracic;  Laterality: Right;    ESOPHAGOGASTRODUODENOSCOPY N/A 7/15/2022    Procedure: EGD (ESOPHAGOGASTRODUODENOSCOPY);  Surgeon: Art Pino MD;  Location: [facility] ENDO;  Service: Endoscopy;  Laterality: N/A;    EXTRACORPOREAL SHOCK WAVE LITHOTRIPSY Left 11/21/2019    Procedure: LITHOTRIPSY, ESWL;  Surgeon: Mickey Escudero MD;  Location: [facility] OR;  Service: Urology;  Laterality: Left;    HERNIA REPAIR      Inguinal just after birth  "   IVUS, CORONARY  8/13/2024    Procedure: IVUS, Coronary;  Surgeon: Dainca Howell MD;  Location: [facility] CATH/EP LAB;  Service: Cardiology;;    PERCUTANEOUS CORONARY INTERVENTION, ARTERY N/A 8/13/2024    Procedure: Percutaneous coronary intervention;  Surgeon: Danica Howell MD;  Location: [facility] CATH/EP LAB;  Service: Cardiology;  Laterality: N/A;    REPEAT CORONARY ARTERY BYPASS GRAFTING N/A 7/8/2022    Procedure: CABG, REPEAT;  Surgeon: Eyal Stone MD;  Location: [facility] OR;  Service: Cardiothoracic;  Laterality: N/A;  drugs ordered 7-7  @1322      SURGICAL PROCUREMENT, ARTERY, RADIAL, FOR CABG Left 7/8/2022    Procedure: SURGICAL PROCUREMENT,ARTERY,RADIAL,FOR CABG;  Surgeon: Eyal Stone MD;  Location: [facility];  Service: Cardiothoracic;  Laterality: Left;         Tobacco History:  reports that he has never smoked. He has never been exposed to tobacco smoke. He has never used smokeless tobacco.      Review of patient's allergies indicates:   Allergen Reactions    Venom-wasp Anaphylaxis     Current Medications[1]           Objective:      Vitals:    07/30/25 1338   BP: 130/70   Pulse: 80   Resp: 20   Temp: 98.1 °F (36.7 °C)   TempSrc: Oral   Weight: 106.7 kg (235 lb 3.7 oz)   Height: 5' 9" (1.753 m)     Physical Exam  Vitals and nursing note reviewed.   Constitutional:       General: He is not in acute distress.     Appearance: Normal appearance. He is obese. He is not ill-appearing or diaphoretic.   HENT:      Mouth/Throat:      Mouth: Mucous membranes are moist.   Eyes:      General: No scleral icterus.     Pupils: Pupils are equal, round, and reactive to light.   Cardiovascular:      Rate and Rhythm: Normal rate and regular rhythm.      Pulses: Normal pulses.   Pulmonary:      Effort: Pulmonary effort is normal. No respiratory distress.      Breath sounds: Normal breath sounds. No wheezing or rales.   Musculoskeletal:      Right lower leg: No edema.      Left lower leg: Tenderness (firm " nodule/swelling left medial calf- +tenderness with palpation; no warmth or erythema) present. No deformity, lacerations or bony tenderness. Edema (trace edema at sockline) present.        Legs:       Comments: Neg agnieszka's sign LLE    Skin:     General: Skin is warm and dry.      Coloration: Skin is not jaundiced or pale.      Findings: No bruising or erythema.   Neurological:      Mental Status: He is alert and oriented to person, place, and time.   Psychiatric:         Mood and Affect: Mood normal.         Behavior: Behavior normal.         Thought Content: Thought content normal.         Judgment: Judgment normal.           Assessment:       1. Prediabetes    2. CKD stage 3a, GFR 45-59 ml/min    3. S/P CABG (coronary artery bypass graft)    4. NSVT (nonsustained ventricular tachycardia)    5. Coronary artery disease involving coronary bypass graft of native heart with angina pectoris           Plan:       Assessment & Plan    I82.401 Acute embolism and thrombosis of unspecified deep veins of right lower extremity  H40.10X0 Unspecified open-angle glaucoma, stage unspecified    DEEP VEIN THROMBOSIS (DVT) OF RIGHT LOWER EXTREMITY:  - Assessed recent DVT as likely provoked by prolonged sitting.  - Mr. Fraire presents with a painful lump in the leg making walking difficult, with persistent ankle swelling.  - Currently on Eliquis for anticoagulation ($200/month with insurance).  - Discussed potential switch to Coumadin, which would require frequent testing until stabilization, versus continuing Eliquis.  - Treatment duration likely 6 months.  - Instructed patient to take hourly breaks during long car rides to walk and stretch legs, and to use cruise control while moving feet when driving to prevent recurrence.  - Referred to cardiology for follow-up with Dr. Wilde; office will contact patient to schedule appointment.    OPEN-ANGLE GLAUCOMA:  - Evaluated patient's degenerative glaucoma with ulceration.  - Advised to  return in 2 months for follow-up evaluation.    GENERAL HEALTH RECOMMENDATIONS:  - Mr. Fraire to maintain hydration with water and occasional small bottle of Gatorade when working outside in heat.    FOLLOW-UP:  - Scheduled diabetic eye screening photo at the office when due in October.         Prediabetes  -     Hemoglobin A1C; Future; Expected date: 07/30/2025  -     Comprehensive Metabolic Panel; Future; Expected date: 07/30/2025  -     Microalbumin/Creatinine Ratio, Urine; Future; Expected date: 07/30/2025  -     Comprehensive Metabolic Panel; Future; Expected date: 07/30/2025  -     Hemoglobin A1C; Future; Expected date: 07/30/2025  -     Comprehensive Metabolic Panel; Future; Expected date: 07/30/2025  -     Microalbumin/Creatinine Ratio, Urine; Future; Expected date: 07/30/2025  -     Comprehensive Metabolic Panel; Future; Expected date: 07/30/2025    CKD stage 3a, GFR 45-59 ml/min  -     Comprehensive Metabolic Panel; Future; Expected date: 07/30/2025  -     Microalbumin/Creatinine Ratio, Urine; Future; Expected date: 07/30/2025  -     Comprehensive Metabolic Panel; Future; Expected date: 07/30/2025    S/P CABG (coronary artery bypass graft)  -     Ambulatory referral/consult to Cardiology; Future; Expected date: 08/06/2025    NSVT (nonsustained ventricular tachycardia)  -     Ambulatory referral/consult to Cardiology; Future; Expected date: 08/06/2025    Coronary artery disease involving coronary bypass graft of native heart with angina pectoris  -     Ambulatory referral/consult to Cardiology; Future; Expected date: 08/06/2025      Follow up in about 3 months (around 10/30/2025), or if symptoms worsen or fail to improve.        7/30/2025 Naomi Bliss NP    This note was generated with the assistance of ambient listening technology. Verbal consent was obtained by the patient and accompanying visitor(s) for the recording of patient appointment to facilitate this note. I attest to having reviewed and  edited the generated note for accuracy, though some syntax or spelling errors may persist. Please contact the author of this note for any clarification.             [1]   Current Outpatient Medications:     amiodarone (PACERONE) 200 MG Tab, Take 1 tablet (200 mg total) by mouth once daily., Disp: 90 tablet, Rfl: 3    apixaban (ELIQUIS) 5 mg Tab, Take 1 tablet (5 mg total) by mouth 2 (two) times daily., Disp: 180 tablet, Rfl: 1    atorvastatin (LIPITOR) 80 MG tablet, Take 1 tablet (80 mg total) by mouth every evening., Disp: 90 tablet, Rfl: 3    clopidogreL (PLAVIX) 75 mg tablet, Take 1 tablet (75 mg total) by mouth once daily., Disp: 90 tablet, Rfl: 3    furosemide (LASIX) 20 MG tablet, As needed (take 1 tablet for weight gain>3lb in 24 hours or >5lb in a week), Disp: 60 tablet, Rfl: 1    sacubitriL-valsartan (ENTRESTO) 24-26 mg per tablet, Take 1 tablet by mouth 2 (two) times daily., Disp: 180 tablet, Rfl: 2    metoprolol succinate (TOPROL-XL) 25 MG 24 hr tablet, Take 1 tablet (25 mg total) by mouth every evening., Disp: 90 tablet, Rfl: 2  No current facility-administered medications for this visit.    Facility-Administered Medications Ordered in Other Visits:     sodium bicarbonate 10 mEq, potassium chloride 30 mEq in cardioplegic solution (PLEGISOL) 1,000 mL, , Intravenous, Once, Eyal Stone MD    sodium bicarbonate 10 mEq, potassium chloride 70 mEq in cardioplegic solution (PLEGISOL) 1,000 mL, , Intravenous, Once, Eyal Stone MD    thromb,jp-blcvlj-ppezpjn,s-Ca (TISSEEL) 4 mL, , Topical (Top), Once, Eyal Stone MD

## 2025-08-08 ENCOUNTER — TELEPHONE (OUTPATIENT)
Facility: CLINIC | Age: 65
End: 2025-08-08
Payer: COMMERCIAL

## 2025-08-13 ENCOUNTER — OFFICE VISIT (OUTPATIENT)
Facility: CLINIC | Age: 65
End: 2025-08-13
Payer: COMMERCIAL

## 2025-08-13 VITALS
TEMPERATURE: 98 F | RESPIRATION RATE: 18 BRPM | WEIGHT: 235.19 LBS | SYSTOLIC BLOOD PRESSURE: 145 MMHG | DIASTOLIC BLOOD PRESSURE: 71 MMHG | HEART RATE: 85 BPM | BODY MASS INDEX: 34.73 KG/M2

## 2025-08-13 DIAGNOSIS — I82.442 ACUTE DEEP VEIN THROMBOSIS (DVT) OF TIBIAL VEIN OF LEFT LOWER EXTREMITY: Primary | ICD-10-CM

## 2025-08-13 PROCEDURE — 1159F MED LIST DOCD IN RCRD: CPT | Mod: CPTII,S$GLB,, | Performed by: INTERNAL MEDICINE

## 2025-08-13 PROCEDURE — 3078F DIAST BP <80 MM HG: CPT | Mod: CPTII,S$GLB,, | Performed by: INTERNAL MEDICINE

## 2025-08-13 PROCEDURE — 4010F ACE/ARB THERAPY RXD/TAKEN: CPT | Mod: CPTII,S$GLB,, | Performed by: INTERNAL MEDICINE

## 2025-08-13 PROCEDURE — 99999 PR PBB SHADOW E&M-EST. PATIENT-LVL III: CPT | Mod: PBBFAC,,, | Performed by: INTERNAL MEDICINE

## 2025-08-13 PROCEDURE — 3008F BODY MASS INDEX DOCD: CPT | Mod: CPTII,S$GLB,, | Performed by: INTERNAL MEDICINE

## 2025-08-13 PROCEDURE — 3077F SYST BP >= 140 MM HG: CPT | Mod: CPTII,S$GLB,, | Performed by: INTERNAL MEDICINE

## 2025-08-13 PROCEDURE — 3044F HG A1C LEVEL LT 7.0%: CPT | Mod: CPTII,S$GLB,, | Performed by: INTERNAL MEDICINE

## 2025-08-13 PROCEDURE — 99213 OFFICE O/P EST LOW 20 MIN: CPT | Mod: S$GLB,,, | Performed by: INTERNAL MEDICINE

## 2025-08-13 PROCEDURE — G2211 COMPLEX E/M VISIT ADD ON: HCPCS | Mod: S$GLB,,, | Performed by: INTERNAL MEDICINE

## (undated) DEVICE — KIT GLIDESHEATH SLEND 6FR 10CM

## (undated) DEVICE — SUTURE ETHIBOND 2-0 SH-1 36 X763H

## (undated) DEVICE — SUTURE ETHIBOND 2-0 SH 36 X523H

## (undated) DEVICE — CATH DXTERITY AL-I 100CM 6FR

## (undated) DEVICE — Device

## (undated) DEVICE — CLIP APPLIER MSM20 STERILMED  ETHMSM20

## (undated) DEVICE — STAPLER SKIN NON ROTATE PXW35

## (undated) DEVICE — WIRE GUIDE HI TRQ BAL

## (undated) DEVICE — INTRODUCER SUPER SHEATH 5FR 16035-05B

## (undated) DEVICE — DRESSING TEGADERM 4X4 3/4 TD1004

## (undated) DEVICE — DRAPE IOBAN 23X17 6650EZ

## (undated) DEVICE — CANNULA SELECT 3D II 20FR 78520

## (undated) DEVICE — DRAPE 3/4

## (undated) DEVICE — TRAY CV ACCESS W AUX B

## (undated) DEVICE — SUTURE MONOCRYL 0 CT-1 36 Y946H

## (undated) DEVICE — TIP BOVIE 6.5 0014

## (undated) DEVICE — CLIP APPLIER MCS20 STERILMED ETHMCS20

## (undated) DEVICE — SYSTEM PREVENA PEEL & PLACE PRE1001US

## (undated) DEVICE — DRAPE SPLIT CV 66350

## (undated) DEVICE — DRAPE SLUSH 66X44 ORS-330

## (undated) DEVICE — RESERVOIR FRESENIUS 9108474

## (undated) DEVICE — NEEDLE SAFETY COMBO 3ML 25G 5/8IN 305781

## (undated) DEVICE — KIT MICROINTRODUCE MINI 5X10CM

## (undated) DEVICE — CATHETER BALLOON EUPHORA 2.00X12MM

## (undated) DEVICE — CANNULA MAMMARY 31001

## (undated) DEVICE — BORDER MEPILEX SILICONE 9.2X9.2IN 282455

## (undated) DEVICE — SUTURE SILK 2-0 SH 18 CR C012D

## (undated) DEVICE — TRAY SKIN PREP DRY

## (undated) DEVICE — CATHETER DIAGNOSTIC DXTERITY 6FR JL 4

## (undated) DEVICE — SUTURE SILK 2 60 SA8H

## (undated) DEVICE — SUTURE SILK 4-0 18 A183H

## (undated) DEVICE — SUTURE MONOCRYL 3-0 27 PS-1 MCP936H

## (undated) DEVICE — SOLUTION SCRUB IODINE 4OZ

## (undated) DEVICE — NEEDLE ARTERIAL18G 7CM  G00003

## (undated) DEVICE — DRAPE BILATERAL LEG 84X80

## (undated) DEVICE — SUTURE MONOCRYL 2-0 CT-1 MCP945H

## (undated) DEVICE — CATH IMA DXT 5F 100CM

## (undated) DEVICE — CATH DXTERITY JR40 100CM 5FR

## (undated) DEVICE — SHEATH INTRODUCER 6FR 11CM

## (undated) DEVICE — MARKER CORONARY BYPASS GRAFT 40149

## (undated) DEVICE — PACK PERFUSION

## (undated) DEVICE — TUBING SUCTION FRESENIUS 9108484

## (undated) DEVICE — DRAIN CHEST DRY SUCTION

## (undated) DEVICE — SHEATH PINNACLE 6FRX10CM W/GUIDEWIRE

## (undated) DEVICE — BULB DRAIN 100CC 70740

## (undated) DEVICE — SUTURE STEELPACING 2-0 SH 24 TPW32

## (undated) DEVICE — SOLUTION NACL 0.9% 3000ML

## (undated) DEVICE — CATH GUIDE LINER  V3 6F

## (undated) DEVICE — SUTURE STEEL 6 18 M654G

## (undated) DEVICE — GUIDEWIRE RUNTHROUGH EF 180CM

## (undated) DEVICE — PAD BOVIE ADULT

## (undated) DEVICE — SYSTEM VEIN HARVESTING VSP550

## (undated) DEVICE — CLIP LIGACLIP LIGATING TITANIUM LG LT400

## (undated) DEVICE — SYRINGE 20CC SLIP TIP 20ML 302831

## (undated) DEVICE — CATHETER DIAGNOSTIC DXTERITY 6FR JR 4.0

## (undated) DEVICE — SUTURE SILK 2-0 18 A185H

## (undated) DEVICE — DRAPE IOBAN 23X33 6651EZ

## (undated) DEVICE — CATHETER THORACIC 28FR 8128

## (undated) DEVICE — GLOVE SURG BIOGEL LTX PF ST SZ 6.5

## (undated) DEVICE — SUTURE ETHBOND 4-0 RB-1 36 X557H

## (undated) DEVICE — DRESSING POST OP MEPILEX AG 4X6  498300

## (undated) DEVICE — BAG DECANTER 10-102

## (undated) DEVICE — SUTURE PROLENE 7-0 BV-1 30 8703H

## (undated) DEVICE — GUIDEWIRE J TIP BMW .014X190

## (undated) DEVICE — CATHETER THORACIC 28FR 8028

## (undated) DEVICE — MARKER SKIN W/ RULER 77734

## (undated) DEVICE — GUIDE LAUNCH 6FR AL 1.0

## (undated) DEVICE — SUTURE PROLENE 7-0 BV175-6 24 8735H

## (undated) DEVICE — SPONGE XRAY DETECTABLE 4X4

## (undated) DEVICE — PREP CHLORA 26ML

## (undated) DEVICE — CATH DXTERITY LCB 100CM 6FR

## (undated) DEVICE — VISIPAQUE CONTRAST 320MG/100ML

## (undated) DEVICE — CATHETER GUIDE LAUNCHER IMA 6FX90CM

## (undated) DEVICE — SUTURE SILK 0 PSL 30 590H

## (undated) DEVICE — DRAPE SPLIT W/ ADHESIVE

## (undated) DEVICE — HEMOSTAT VASC BAND LONG 27CM

## (undated) DEVICE — GUIDEWIRE INQWIRE SE 3MM JTIP

## (undated) DEVICE — CATH NC EMERGE MR 2X12X143

## (undated) DEVICE — SOLUTION PREP IODINE 4OZ

## (undated) DEVICE — WAX BONE 2.5G (YELLOW)

## (undated) DEVICE — BLADE SCALPEL #11 371111

## (undated) DEVICE — HEMOCONCENTRATOR HPH1000TS

## (undated) DEVICE — SUTURE PROLENE 4-0 SH 36 8521H

## (undated) DEVICE — CANNULA 14FR RC2014

## (undated) DEVICE — GLOVE #7.5 BIOGEL 30475

## (undated) DEVICE — GUAZE QUIKCLOT CONTROL + 5X5 4 PLY

## (undated) DEVICE — DRAIN 19FR TROCAR  072231

## (undated) DEVICE — CATH NC EMERGE MR 2.5X12MM

## (undated) DEVICE — SUTURE PROLENE 6-0 C-1 30 M8706

## (undated) DEVICE — TOWEL OR WHITE

## (undated) DEVICE — BLADE SAGITTAL 24.8MM  X .88MM  X 73.8MM

## (undated) DEVICE — GUIDEWIRE DOUBLE ENDED .035 DIA. 150CML

## (undated) DEVICE — TUBING M/M PRESSURE LL 72

## (undated) DEVICE — TUBING EASY SPRAY TISSEEL

## (undated) DEVICE — CATHETER DIAGNOSTIC DXTERITY 6F PIGST

## (undated) DEVICE — SET AUTOTRANSFUSION FRESENIUS 9005104

## (undated) DEVICE — CANNULA VENOUS 33/43FR TF33430

## (undated) DEVICE — CATH DXTERITY JL35 100CM 5FR

## (undated) DEVICE — CONTRAST VISIPAQUE 150ML

## (undated) DEVICE — BANDAGE ACE STERILE 4 REB3114

## (undated) DEVICE — TUBING INSUFFLATION 10FT

## (undated) DEVICE — CATH EAGLE EYE ST .014X20X150

## (undated) DEVICE — TIP BOVIE TEFLON E1450X

## (undated) DEVICE — SUTURE PROLENE 7-0 BV175-6 30

## (undated) DEVICE — INSERT EVERGRIP86

## (undated) DEVICE — POUCH INSTRUMENT 1018

## (undated) DEVICE — CONTAINER SPECIMEN 4 OZ STERILE 1053

## (undated) DEVICE — SUCTION YANKAUER 34970

## (undated) DEVICE — TRAY CV ACCESS W/AUX A

## (undated) DEVICE — DRESSING POST OP MEPILEX  AG 4X10

## (undated) DEVICE — CABLE PACING 6' DISPOSABLE  FL-601-97

## (undated) DEVICE — CATH MPA2 INFINITI 5FR 125CM